# Patient Record
Sex: FEMALE | Race: WHITE | NOT HISPANIC OR LATINO | Employment: OTHER | URBAN - METROPOLITAN AREA
[De-identification: names, ages, dates, MRNs, and addresses within clinical notes are randomized per-mention and may not be internally consistent; named-entity substitution may affect disease eponyms.]

---

## 2017-01-05 ENCOUNTER — TELEPHONE (OUTPATIENT)
Dept: GYNECOLOGIC ONCOLOGY | Facility: CLINIC | Age: 37
End: 2017-01-05

## 2017-01-05 ENCOUNTER — HOSPITAL ENCOUNTER (INPATIENT)
Facility: HOSPITAL | Age: 37
LOS: 4 days | Discharge: HOME | DRG: 758 | End: 2017-01-09
Attending: OBSTETRICS & GYNECOLOGY | Admitting: OBSTETRICS & GYNECOLOGY
Payer: MEDICARE

## 2017-01-05 DIAGNOSIS — F41.9 ANXIETY: ICD-10-CM

## 2017-01-05 DIAGNOSIS — N73.9 PELVIC ABSCESS IN FEMALE: Primary | ICD-10-CM

## 2017-01-05 PROCEDURE — 25000003 PHARM REV CODE 250: Performed by: STUDENT IN AN ORGANIZED HEALTH CARE EDUCATION/TRAINING PROGRAM

## 2017-01-05 PROCEDURE — 20600001 HC STEP DOWN PRIVATE ROOM

## 2017-01-05 RX ORDER — HYDROMORPHONE HYDROCHLORIDE 1 MG/ML
0.5 INJECTION, SOLUTION INTRAMUSCULAR; INTRAVENOUS; SUBCUTANEOUS EVERY 6 HOURS PRN
Status: DISCONTINUED | OUTPATIENT
Start: 2017-01-06 | End: 2017-01-05

## 2017-01-05 RX ORDER — HYDROMORPHONE HYDROCHLORIDE 1 MG/ML
0.5 INJECTION, SOLUTION INTRAMUSCULAR; INTRAVENOUS; SUBCUTANEOUS EVERY 6 HOURS PRN
Status: DISCONTINUED | OUTPATIENT
Start: 2017-01-06 | End: 2017-01-09 | Stop reason: HOSPADM

## 2017-01-05 RX ORDER — OXYCODONE HYDROCHLORIDE 5 MG/1
5 TABLET ORAL EVERY 4 HOURS PRN
Status: DISCONTINUED | OUTPATIENT
Start: 2017-01-05 | End: 2017-01-09 | Stop reason: HOSPADM

## 2017-01-05 RX ORDER — SODIUM CHLORIDE 0.9 % (FLUSH) 0.9 %
3 SYRINGE (ML) INJECTION EVERY 8 HOURS
Status: DISCONTINUED | OUTPATIENT
Start: 2017-01-06 | End: 2017-01-09 | Stop reason: HOSPADM

## 2017-01-05 RX ORDER — SODIUM CHLORIDE 9 MG/ML
INJECTION, SOLUTION INTRAVENOUS CONTINUOUS
Status: DISCONTINUED | OUTPATIENT
Start: 2017-01-05 | End: 2017-01-09 | Stop reason: HOSPADM

## 2017-01-05 RX ORDER — OXYCODONE HYDROCHLORIDE 5 MG/1
10 TABLET ORAL EVERY 4 HOURS PRN
Status: DISCONTINUED | OUTPATIENT
Start: 2017-01-05 | End: 2017-01-09 | Stop reason: HOSPADM

## 2017-01-05 RX ORDER — ONDANSETRON 2 MG/ML
4 INJECTION INTRAMUSCULAR; INTRAVENOUS EVERY 12 HOURS PRN
Status: DISCONTINUED | OUTPATIENT
Start: 2017-01-05 | End: 2017-01-09 | Stop reason: HOSPADM

## 2017-01-05 RX ORDER — IBUPROFEN 600 MG/1
600 TABLET ORAL EVERY 6 HOURS PRN
Status: DISCONTINUED | OUTPATIENT
Start: 2017-01-05 | End: 2017-01-09 | Stop reason: HOSPADM

## 2017-01-05 RX ADMIN — HYDROMORPHONE HYDROCHLORIDE 0.5 MG: 1 INJECTION, SOLUTION INTRAMUSCULAR; INTRAVENOUS; SUBCUTANEOUS at 11:01

## 2017-01-05 RX ADMIN — SODIUM CHLORIDE: 0.9 INJECTION, SOLUTION INTRAVENOUS at 11:01

## 2017-01-05 NOTE — TELEPHONE ENCOUNTER
----- Message from Andrew Silveira MD sent at 1/5/2017  4:07 PM CST -----  Left message that pathology report was benign.

## 2017-01-05 NOTE — IP AVS SNAPSHOT
[facility]  [address]  [facility] [address]  Phone: [phone]           Patient Discharge Instructions     Our goal is to set you up for success. This packet includes information on your condition, medications, and your home care. It will help you to care for yourself so you don't get sicker and need to go back to the hospital.     Please ask your nurse if you have any questions.        There are many details to remember when preparing to leave the hospital. Here is what you will need to do:    1. Take your medicine. If you are prescribed medications, review your Medication List in the following pages. You may have new medications to  at the pharmacy and others that you'll need to stop taking. Review the instructions for how and when to take your medications. Talk with your doctor or nurses if you are unsure of what to do.     2. Go to your follow-up appointments. Specific follow-up information is listed in the following pages. Your may be contacted by a transition nurse or clinical provider about future appointments. Be sure we have all of the phone numbers to reach you, if needed. Please contact your provider's office if you are unable to make an appointment.     3. Watch for warning signs. Your doctor or nurse will give you detailed warning signs to watch for and when to call for assistance. These instructions may also include educational information about your condition. If you experience any of warning signs to your health, call your doctor.               [facility]  Unless otherwise directed by your provider, please contact [facility], our nurse care line that is available for 24/7 assistance.     [phone] (toll-free)    Registered nurses in the [facility] provide clinical advisement, health education, appointment booking, and other advisory services.                    ** Verify the list of medication(s) below is accurate and up  to date. Carry this with you in case of emergency. If your medications have changed, please notify your healthcare provider.             Medication List      START taking these medications        Additional Info                      doxycycline 100 MG capsule   Commonly known as:  MONODOX   Quantity:  14 capsule   Refills:  0   Dose:  100 mg    Instructions:  Take 1 capsule (100 mg total) by mouth every 12 (twelve) hours.     Begin Date    AM    Noon    PM    Bedtime       metronidazole 500 MG tablet   Commonly known as:  FLAGYL   Quantity:  14 tablet   Refills:  0   Dose:  500 mg    Instructions:  Take 1 tablet (500 mg total) by mouth every 8 (eight) hours.     Begin Date    AM    Noon    PM    Bedtime       oxycodone-acetaminophen 5-325 mg per tablet   Commonly known as:  PERCOCET   Quantity:  20 tablet   Refills:  0   Dose:  1 tablet    Instructions:  Take 1 tablet by mouth every 6 (six) hours as needed for Pain.     Begin Date    AM    Noon    PM    Bedtime         CONTINUE taking these medications        Additional Info                      alprazolam 1 MG tablet   Commonly known as:  XANAX   Quantity:  61 tablet   Refills:  0   Dose:  1 mg   Indications:  Anxiety    Last time this was given:  1 mg on 1/8/2017  9:32 AM   Instructions:  Take 1 tablet (1 mg total) by mouth 4 (four) times daily as needed for Anxiety.     Begin Date    AM    Noon    PM    Bedtime       CHOLESTYRAMINE LIGHT 4 gram Powd   Refills:  0   Generic drug:  cholestyramine-aspartame    Instructions:  TAKE 1 SCOOP by mouth three times a day     Begin Date    AM    Noon    PM    Bedtime       diphenoxylate-atropine 2.5-0.025 mg 2.5-0.025 mg per tablet   Commonly known as:  LOMOTIL   Quantity:  120 tablet   Refills:  4    Instructions:  take 1 to 2 tablets by mouth four times a day if needed for ILOSTOMY OUTPUT     Begin Date    AM    Noon    PM    Bedtime       estradiol 0.5 MG tablet   Commonly known as:  ESTRACE   Quantity:  30 tablet    Refills:  3   Dose:  0.5 mg    Instructions:  Take 1 tablet (0.5 mg total) by mouth once daily.     Begin Date    AM    Noon    PM    Bedtime       * HUMIRA PEN CROHN'S-UC-HS START Pnkt injection   Refills:  0   Generic drug:  adalimumab    Instructions:  INJECT THE CONTENTS OF 2 PENS SUBCUTANSOUSLY ON DAY 1, 2 PENS ON ...  (REFER TO PRESCRIPTION NOTES).     Begin Date    AM    Noon    PM    Bedtime       * HUMIRA 40 mg/0.8 mL injection   Refills:  0   Generic drug:  adalimumab      Begin Date    AM    Noon    PM    Bedtime       HYDROmorphone 2 MG tablet   Commonly known as:  DILAUDID   Quantity:  40 tablet   Refills:  0   Dose:  2 mg    Instructions:  Take 1 tablet (2 mg total) by mouth every 4 to 6 hours as needed.     Begin Date    AM    Noon    PM    Bedtime       pantoprazole 40 MG tablet   Commonly known as:  PROTONIX   Refills:  0   Dose:  40 mg    Instructions:  Take 40 mg by mouth 2 (two) times daily.     Begin Date    AM    Noon    PM    Bedtime       promethazine 25 MG tablet   Commonly known as:  PHENERGAN   Refills:  0   Dose:  25 mg    Instructions:  Take 25 mg by mouth every 4 (four) hours as needed.     Begin Date    AM    Noon    PM    Bedtime       sertraline 100 MG tablet   Commonly known as:  ZOLOFT   Refills:  0   Dose:  100 mg    Instructions:  Take 100 mg by mouth once daily.     Begin Date    AM    Noon    PM    Bedtime       topiramate 50 MG tablet   Commonly known as:  TOPAMAX   Refills:  0   Dose:  75 mg    Instructions:  Take 75 mg by mouth 2 (two) times daily.     Begin Date    AM    Noon    PM    Bedtime       * Notice:  This list has 2 medication(s) that are the same as other medications prescribed for you. Read the directions carefully, and ask your doctor or other care provider to review them with you.         Where to Get Your Medications      These medications were sent to [facility] [address], [place] - [address] [place] [address]  [address]     Phone:  [phone]     doxycycline 100 MG capsule    metronidazole 500 MG tablet         You can get these medications from any pharmacy     Bring a paper prescription for each of these medications     oxycodone-acetaminophen 5-325 mg per tablet                  Please bring to all follow up appointments:    1. A copy of your discharge instructions.  2. All medicines you are currently taking in their original bottles.  3. Identification and insurance card.    Please arrive 15 minutes ahead of scheduled appointment time.    Please call 24 hours in advance if you must reschedule your appointment and/or time.        Your Scheduled Appointments     Jan 17, 2017  2:15 PM CST   Post OP with Andrew Silveira MD   [facility] ([facility] )    [address]  [place] [id number] [phone]            Jan 20, 2017  2:00 PM CST   Cysto and Stent Removal with [facility]   [facility] ([facility])    [address] [facility] [id number] [phone]              Follow-up Information     Follow up with Andrew Silveira MD On 1/17/2017.    Specialty:  Gynecologic Oncology    Contact information:    [address] [facility] [address]  [phone]          Discharge Instructions     Future Orders    Activity as tolerated     Call MD for:  difficulty breathing or increased cough     Call MD for:  persistent dizziness, light-headedness, or visual disturbances     Call MD for:  persistent nausea and vomiting or diarrhea     Call MD for:  redness, tenderness, or signs of infection (pain, swelling, redness, odor or green/yellow discharge around incision site)     Call MD for:  severe persistent headache     Call MD for:  severe uncontrolled pain     Call MD for:  temperature >100.4     Diet general     Questions:    Total calories:      Fat restriction, if any:      Protein restriction, if any:      Na restriction, if any:      Fluid  "restriction:      Additional restrictions:          Primary Diagnosis     Your primary diagnosis was:  Pelvic Abscess      Admission Information     Date & Time Provider Department CSN    1/5/2017 11:08 PM Andrew Silveira MD [facility] [id number]      Care Providers     Provider Role Specialty Primary office phone    Andrew Silveira MD Attending Provider Gynecologic Oncology [phone]    Rhonda Surgeon Surgeon  -- Number not on file      Your Vitals Were     BP Pulse Temp Resp Height Weight    115/64 (BP [facility]: Left arm, Patient Position: Lying, BP Method: Automatic) 69 97.7 °F (36.5 °C) (Oral) 16 5' 6" (1.676 m) 86.9 kg (191 lb 8 oz)    Last Period SpO2 BMI          12/12/2016 93% 30.91 kg/m2        Recent Lab Values        3/1/2014                          10:26 AM           A1C 5.1                       Pending Labs     Order Current Status    Culture, Anaerobe In process    Aerobic culture Preliminary result      Allergies as of 1/9/2017        Reactions    Imuran [Azathioprine Sodium] Diarrhea, Nausea And Vomiting    [place]       Advance Directives     An advance directive is a document which, in the event you are no longer able to make decisions for yourself, tells your healthcare team what kind of treatment you do or do not want to receive, or who you would like to make those decisions for you.  If you do not currently have an advance directive, [facility] encourages you to create one.  For more information call:  [phone]), [phone] ([phone]),  or log on to [email or web address].        Language Assistance Services     ATTENTION: Language assistance services are available, free of charge. Please call [phone].      ATENCIÓN: Si habla español, tiene a raphael disposición servicios gratuitos de asistencia lingüística. Llame al [phone].     CHÚ Ý: N?u b?n nói Ti?ng Vi?t, có các d?ch v? h? tr? ngôn ng? mi?n phí dành cho b?n. G?i s? [phone].      "   [facility] Sign-Up     Activating your [facility] account is as easy as 1-2-3!     1) Visit [email or web address], select Sign Up Now, enter this activation code and your date of birth, then select Next.  [email or web address]  Expires: 2/23/2017 11:50 AM      2) Create a username and password to use when you visit [facility] in the future and select a security question in case you lose your password and select Next.    3) Enter your e-mail address and click Sign Up!    Additional Information  If you have questions, please e-mail [email or web address] or call [phone] to talk to our [facility] staff. Remember, [facility] is NOT to be used for urgent needs. For medical emergencies, dial 911.          [facility] complies with applicable Federal civil rights laws and does not discriminate on the basis of race, color, national origin, age, disability, or sex.

## 2017-01-05 NOTE — IP AVS SNAPSHOT
[address]  [place] [address]  Phone: [phone]           I have received a copy of my After Visit Summary and discharge instructions from [facility].    INSTRUCTIONS RECEIVED AND UNDERSTOOD BY:                     Patient/Patient Representative: ________________________________________________________________     Date/Time: ________________________________________________________________                     Instructions Given By: ________________________________________________________________     Date/Time: ________________________________________________________________

## 2017-01-06 ENCOUNTER — SURGERY (OUTPATIENT)
Age: 37
End: 2017-01-06

## 2017-01-06 DIAGNOSIS — N73.9 PELVIC ABSCESS IN FEMALE: Primary | ICD-10-CM

## 2017-01-06 LAB
ALBUMIN SERPL BCP-MCNC: 2.3 G/DL
ALBUMIN SERPL BCP-MCNC: 2.3 G/DL
ALP SERPL-CCNC: 101 U/L
ALP SERPL-CCNC: 116 U/L
ALT SERPL W/O P-5'-P-CCNC: 7 U/L
ALT SERPL W/O P-5'-P-CCNC: 7 U/L
ANION GAP SERPL CALC-SCNC: 9 MMOL/L
ANION GAP SERPL CALC-SCNC: 9 MMOL/L
APTT BLDCRRT: <21 SEC
AST SERPL-CCNC: 10 U/L
AST SERPL-CCNC: 10 U/L
BASOPHILS # BLD AUTO: 0.02 K/UL
BASOPHILS # BLD AUTO: 0.02 K/UL
BASOPHILS NFR BLD: 0.2 %
BASOPHILS NFR BLD: 0.2 %
BILIRUB SERPL-MCNC: 1 MG/DL
BILIRUB SERPL-MCNC: 1 MG/DL
BUN SERPL-MCNC: 15 MG/DL
BUN SERPL-MCNC: 17 MG/DL
CALCIUM SERPL-MCNC: 8.2 MG/DL
CALCIUM SERPL-MCNC: 8.4 MG/DL
CHLORIDE SERPL-SCNC: 101 MMOL/L
CHLORIDE SERPL-SCNC: 102 MMOL/L
CO2 SERPL-SCNC: 18 MMOL/L
CO2 SERPL-SCNC: 18 MMOL/L
CREAT SERPL-MCNC: 1.1 MG/DL
CREAT SERPL-MCNC: 1.1 MG/DL
DIFFERENTIAL METHOD: ABNORMAL
DIFFERENTIAL METHOD: ABNORMAL
EOSINOPHIL # BLD AUTO: 0.1 K/UL
EOSINOPHIL # BLD AUTO: 0.1 K/UL
EOSINOPHIL NFR BLD: 0.9 %
EOSINOPHIL NFR BLD: 1 %
ERYTHROCYTE [DISTWIDTH] IN BLOOD BY AUTOMATED COUNT: 14.1 %
ERYTHROCYTE [DISTWIDTH] IN BLOOD BY AUTOMATED COUNT: 14.4 %
EST. GFR  (AFRICAN AMERICAN): >60 ML/MIN/1.73 M^2
EST. GFR  (AFRICAN AMERICAN): >60 ML/MIN/1.73 M^2
EST. GFR  (NON AFRICAN AMERICAN): >60 ML/MIN/1.73 M^2
EST. GFR  (NON AFRICAN AMERICAN): >60 ML/MIN/1.73 M^2
GLUCOSE SERPL-MCNC: 89 MG/DL
GLUCOSE SERPL-MCNC: 89 MG/DL
HCT VFR BLD AUTO: 29.3 %
HCT VFR BLD AUTO: 29.9 %
HGB BLD-MCNC: 9.4 G/DL
HGB BLD-MCNC: 9.6 G/DL
INR PPP: 1.1
LYMPHOCYTES # BLD AUTO: 1.4 K/UL
LYMPHOCYTES # BLD AUTO: 1.5 K/UL
LYMPHOCYTES NFR BLD: 11.8 %
LYMPHOCYTES NFR BLD: 12.9 %
MCH RBC QN AUTO: 27 PG
MCH RBC QN AUTO: 27.3 PG
MCHC RBC AUTO-ENTMCNC: 32.1 %
MCHC RBC AUTO-ENTMCNC: 32.1 %
MCV RBC AUTO: 84 FL
MCV RBC AUTO: 85 FL
MONOCYTES # BLD AUTO: 1 K/UL
MONOCYTES # BLD AUTO: 1 K/UL
MONOCYTES NFR BLD: 8.5 %
MONOCYTES NFR BLD: 8.8 %
NEUTROPHILS # BLD AUTO: 9.1 K/UL
NEUTROPHILS # BLD AUTO: 9.1 K/UL
NEUTROPHILS NFR BLD: 76.9 %
NEUTROPHILS NFR BLD: 78.2 %
PLATELET # BLD AUTO: 314 K/UL
PLATELET # BLD AUTO: 333 K/UL
PMV BLD AUTO: 9.1 FL
PMV BLD AUTO: 9.4 FL
POTASSIUM SERPL-SCNC: 4.1 MMOL/L
POTASSIUM SERPL-SCNC: 4.1 MMOL/L
PROT SERPL-MCNC: 6.3 G/DL
PROT SERPL-MCNC: 6.3 G/DL
PROTHROMBIN TIME: 11.2 SEC
RBC # BLD AUTO: 3.48 M/UL
RBC # BLD AUTO: 3.52 M/UL
SODIUM SERPL-SCNC: 128 MMOL/L
SODIUM SERPL-SCNC: 129 MMOL/L
WBC # BLD AUTO: 11.61 K/UL
WBC # BLD AUTO: 11.76 K/UL

## 2017-01-06 PROCEDURE — 80053 COMPREHEN METABOLIC PANEL: CPT

## 2017-01-06 PROCEDURE — 25000003 PHARM REV CODE 250: Performed by: STUDENT IN AN ORGANIZED HEALTH CARE EDUCATION/TRAINING PROGRAM

## 2017-01-06 PROCEDURE — 36415 COLL VENOUS BLD VENIPUNCTURE: CPT

## 2017-01-06 PROCEDURE — 71000033 HC RECOVERY, INTIAL HOUR

## 2017-01-06 PROCEDURE — 20600001 HC STEP DOWN PRIVATE ROOM

## 2017-01-06 PROCEDURE — 80053 COMPREHEN METABOLIC PANEL: CPT | Mod: 91

## 2017-01-06 PROCEDURE — 85025 COMPLETE CBC W/AUTO DIFF WBC: CPT | Mod: 91

## 2017-01-06 PROCEDURE — 85610 PROTHROMBIN TIME: CPT

## 2017-01-06 PROCEDURE — 85730 THROMBOPLASTIN TIME PARTIAL: CPT

## 2017-01-06 RX ORDER — METRONIDAZOLE 500 MG/100ML
500 INJECTION, SOLUTION INTRAVENOUS
Status: DISCONTINUED | OUTPATIENT
Start: 2017-01-06 | End: 2017-01-09 | Stop reason: HOSPADM

## 2017-01-06 RX ORDER — ALPRAZOLAM 1 MG/1
1 TABLET ORAL 2 TIMES DAILY PRN
Status: DISCONTINUED | OUTPATIENT
Start: 2017-01-06 | End: 2017-01-09 | Stop reason: HOSPADM

## 2017-01-06 RX ADMIN — SODIUM CHLORIDE: 0.9 INJECTION, SOLUTION INTRAVENOUS at 07:01

## 2017-01-06 RX ADMIN — OXYCODONE HYDROCHLORIDE 10 MG: 5 TABLET ORAL at 07:01

## 2017-01-06 RX ADMIN — HYDROMORPHONE HYDROCHLORIDE 0.5 MG: 1 INJECTION, SOLUTION INTRAMUSCULAR; INTRAVENOUS; SUBCUTANEOUS at 05:01

## 2017-01-06 RX ADMIN — Medication 3 ML: at 02:01

## 2017-01-06 RX ADMIN — METRONIDAZOLE 500 MG: 500 SOLUTION INTRAVENOUS at 04:01

## 2017-01-06 RX ADMIN — OXYCODONE HYDROCHLORIDE 10 MG: 5 TABLET ORAL at 02:01

## 2017-01-06 RX ADMIN — OXYCODONE HYDROCHLORIDE 10 MG: 5 TABLET ORAL at 08:01

## 2017-01-06 RX ADMIN — HYDROMORPHONE HYDROCHLORIDE 0.5 MG: 1 INJECTION, SOLUTION INTRAMUSCULAR; INTRAVENOUS; SUBCUTANEOUS at 11:01

## 2017-01-06 RX ADMIN — PIPERACILLIN SODIUM AND TAZOBACTAM SODIUM 4.5 G: 4; .5 INJECTION, POWDER, LYOPHILIZED, FOR SOLUTION INTRAVENOUS at 07:01

## 2017-01-06 RX ADMIN — PIPERACILLIN SODIUM AND TAZOBACTAM SODIUM 4.5 G: 4; .5 INJECTION, POWDER, LYOPHILIZED, FOR SOLUTION INTRAVENOUS at 12:01

## 2017-01-06 RX ADMIN — METRONIDAZOLE 500 MG: 500 SOLUTION INTRAVENOUS at 12:01

## 2017-01-06 RX ADMIN — SODIUM CHLORIDE: 0.9 INJECTION, SOLUTION INTRAVENOUS at 10:01

## 2017-01-06 RX ADMIN — METRONIDAZOLE 500 MG: 500 SOLUTION INTRAVENOUS at 07:01

## 2017-01-06 RX ADMIN — PIPERACILLIN SODIUM AND TAZOBACTAM SODIUM 4.5 G: 4; .5 INJECTION, POWDER, LYOPHILIZED, FOR SOLUTION INTRAVENOUS at 04:01

## 2017-01-06 NOTE — PLAN OF CARE
Problem: Patient Care Overview  Goal: Plan of Care Review  Outcome: Ongoing (interventions implemented as appropriate)  Pt remains free from falls and injury. Afebrile, IV Flagyl and Zosyn administered. NPO as ordered. IV fluids continued. Complained of pain to abdomen and lower back. Oxycodone and Morphine administered with relief. Pt denies nausea this shift. No further complaints. SCDs in place, pt refused TEDS. VSS, no acute events, will continue to monitor.

## 2017-01-06 NOTE — CONSULTS
Radiology Consult    Candida Cardona is a 36 y.o. female POD#18 s/p ERIK/BSO/left ureteral stent placement 2/2 bilateral ovarian masses found to be benign who presents with fever, pelvic pain, fatigue and nausea/vomiting.     IR consulted for pelvic abscess drain. Outside CT reviewed with staff.    Past Medical History   Diagnosis Date    Anemia     Constipation     Crohn's disease     GERD (gastroesophageal reflux disease)     Interstitial cystitis     Kidney stones     Mass, ovarian 11/22/2016    Osteoporosis     S/P ERIK-BSO (total abdominal hysterectomy and bilateral salpingo-oophorectomy) 12/19/2016     Past Surgical History   Procedure Laterality Date    Portacath placement      Colon surgery       x 2    Colonoscopy       multiple    Total colectomy  2014    Ileostomy         Discussed with primary team, Dr. Raul Hammer.    Imaging reviewed with Radiology staff, Dr. Kareem Summers.     Procedure: Pelvic abscess drain.    Scheduled Meds:    metronidazole  500 mg Intravenous Q8H    piperacillin-tazobactam 4.5 g in dextrose 5 % 100 mL IVPB (ready to mix system)  4.5 g Intravenous Q8H    sodium chloride 0.9%  3 mL Intravenous Q8H     Continuous Infusions:    sodium chloride 0.9% 125 mL/hr at 01/05/17 2350     PRN Meds:HYDROmorphone, ibuprofen, ondansetron, oxycodone, oxycodone, promethazine (PHENERGAN) IVPB    Allergies:   Review of patient's allergies indicates:   Allergen Reactions    Imuran [azathioprine sodium] Diarrhea and Nausea And Vomiting    [place]        Labs:    Recent Labs  Lab 01/06/17  0033   INR 1.1       Recent Labs  Lab 01/06/17  0511   WBC 11.61   HGB 9.6*   HCT 29.9*   MCV 85         Recent Labs  Lab 01/06/17  0511   GLU 89   *   K 4.1      CO2 18*   BUN 15   CREATININE 1.1   CALCIUM 8.4*   ALT 7*   AST 10   ALBUMIN 2.3*   BILITOT 1.0         Vitals (Most Recent):  Temp: 98.3 °F (36.8 °C) (01/06/17 0751)  Pulse: 81 (01/06/17 0751)  Resp: 19  (01/06/17 0751)  BP: (!) 94/55 (01/06/17 0751)  SpO2: 97 % (01/06/17 0751)    Plan:   Pelvic abscess amenable for drainage with tube placement with IR.  IR order for abscess drainage and tube placement can be placed.    ZHANG CISNEROS  PGY-II Radiology Resident  [address] [facility]  Pager: [phone]

## 2017-01-06 NOTE — PROGRESS NOTES
"Pt arrived to  for abscess drain.  Pt is apprehensive and expresses concern over procedure.  Pt states she requires anesthesia and wishes to "not feel anything".  Dr. Summers speaking to patient, patient begins to cry appearing very anxious.    Procedure on hold for now.    "

## 2017-01-06 NOTE — PROGRESS NOTES
TISSUE NOTE:   Healing midline abdominal incision noted with old steri strips in place. RUQ ostomy noted, stoma beefy red    FALLS:  Patient at minimal risk for falls at this time. No complaints of dizziness, SOB or weakness at this time. Not connected to any monitoring equipment. Currently is not taking drugs for HTN.  Patient instructed to call for help when getting out of bed or needing assistance. Non-skid socks in place. Lighting adjusted for safety. Will continue to monitor.

## 2017-01-06 NOTE — H&P
See consult note 1/6/17    Addendum    ASA 3  Mallampati 2    Plan: pelvic abscess drainage with MAC with anesthesia

## 2017-01-06 NOTE — H&P
[facility]  History & Physical  Gynecological Oncology      SUBJECTIVE:     Chief Complaint/Reason for Admission: Possible pelvic abscess    History of Present Illness:  Candida Cardona is a 36 y.o. POD#18 s/p ERIK/BSO/left ureteral stent placement 2/2 bilateral ovarian masses found to be benign who presents with fever, pelvic pain, fatigue and nausea/vomiting.  She reports that two days ago, she began having flu like symptoms.  She reports a recorded fever of 101.  At that time, she reports decreased appetite and increasing nausea/vomiting to the point that she was unable to tolerate food or liquids.  She also began having dull abdominal/pelvic pain at that time.  Tylenol helped her symptoms a little at first.  Physical activity seemed to make it worse.  Because of these symptoms, she went to the ER at which time a CT scan showed a pelvic fluid collection concerning for an abscess.  She was then transferred to [facility].      PTA Medications   Medication Sig    alprazolam (XANAX) 1 MG tablet Take 1 tablet (1 mg total) by mouth 4 (four) times daily as needed for Anxiety.    CHOLESTYRAMINE LIGHT 4 gram Powd TAKE 1 SCOOP by mouth three times a day    diphenoxylate-atropine 2.5-0.025 mg (LOMOTIL) 2.5-0.025 mg per tablet take 1 to 2 tablets by mouth four times a day if needed for ILOSTOMY OUTPUT    estradiol (ESTRACE) 0.5 MG tablet Take 1 tablet (0.5 mg total) by mouth once daily.    HUMIRA 40 mg/0.8 mL injection     HUMIRA PEN CROHN'S-UC-HS START PnKt injection INJECT THE CONTENTS OF 2 PENS SUBCUTANSOUSLY ON DAY 1, 2 PENS ON ...  (REFER TO PRESCRIPTION NOTES).    HYDROmorphone (DILAUDID) 2 MG tablet Take 1 tablet (2 mg total) by mouth every 4 to 6 hours as needed.    pantoprazole (PROTONIX) 40 MG tablet Take 40 mg by mouth 2 (two) times daily.    promethazine (PHENERGAN) 25 MG tablet Take 25 mg by mouth every 4 (four) hours as needed.    sertraline (ZOLOFT) 100 MG tablet Take 100 mg by  mouth once daily.    topiramate (TOPAMAX) 50 MG tablet Take 75 mg by mouth 2 (two) times daily.       Review of patient's allergies indicates:   Allergen Reactions    Imuran [azathioprine sodium] Diarrhea and Nausea And Vomiting    [place]        Past Medical History   Diagnosis Date    Anemia     Constipation     Crohn's disease     GERD (gastroesophageal reflux disease)     Interstitial cystitis     Kidney stones     Mass, ovarian 11/22/2016    Osteoporosis     S/P ERIK-BSO (total abdominal hysterectomy and bilateral salpingo-oophorectomy) 12/19/2016     Past Surgical History   Procedure Laterality Date    Portacath placement      Colon surgery       x 2    Colonoscopy       multiple    Total colectomy  2014    Ileostomy       OB History     No data available        Family History   Problem Relation Age of Onset    Hypertension Mother     Joint hypermobility Mother     Cancer Father      brain    Stroke Paternal Grandmother     Ovarian cancer Neg Hx     Uterine cancer Neg Hx     Breast cancer Neg Hx     Colon cancer Neg Hx      Social History   Substance Use Topics    Smoking status: Never Smoker    Smokeless tobacco: Never Used    Alcohol use No       Review of Systems:  Constitutional: pain well controlled  Respiratory: no cough or shortness of breath  Cardiovascular: no chest pain or palpitations  Genitourinary: no hematuria, Positive for dysuria     OBJECTIVE:     Vital Signs (Most Recent):  Temp: 98.4 °F (36.9 °C) (01/06/17 0341)  Pulse: 83 (01/06/17 0341)  Resp: 16 (01/06/17 0341)  BP: (!) 102/56 (01/06/17 0341)  SpO2: (!) 93 % (01/06/17 0341)  Body mass index is 30.91 kg/(m^2).    Physical Exam:  General: well developed, well nourished, no distress  Lungs:  clear to auscultation bilaterally and normal respiratory effort  Heart: regular rate and rhythm, S1, S2 normal, no murmur, click, rub or gallop  Abdomen: soft, non tender, non distended colostomy bag in place, midline incision  healing well  Wound/Incision: clean, dry, intact  Pelvic:  Exam deferred.    Laboratory:  Recent Results (from the past 12 hour(s))   Comprehensive metabolic panel    Collection Time: 01/06/17 12:33 AM   Result Value Ref Range    Sodium 128 (L) 136 - 145 mmol/L    Potassium 4.1 3.5 - 5.1 mmol/L    Chloride 101 95 - 110 mmol/L    CO2 18 (L) 23 - 29 mmol/L    Glucose 89 70 - 110 mg/dL    BUN, Bld 17 6 - 20 mg/dL    Creatinine 1.1 0.5 - 1.4 mg/dL    Calcium 8.2 (L) 8.7 - 10.5 mg/dL    Total Protein 6.3 6.0 - 8.4 g/dL    Albumin 2.3 (L) 3.5 - 5.2 g/dL    Total Bilirubin 1.0 0.1 - 1.0 mg/dL    Alkaline Phosphatase 101 55 - 135 U/L    AST 10 10 - 40 U/L    ALT 7 (L) 10 - 44 U/L    Anion Gap 9 8 - 16 mmol/L    eGFR if African American >60.0 >60 mL/min/1.73 m^2    eGFR if non African American >60.0 >60 mL/min/1.73 m^2   CBC auto differential    Collection Time: 01/06/17 12:33 AM   Result Value Ref Range    WBC 11.76 3.90 - 12.70 K/uL    RBC 3.48 (L) 4.00 - 5.40 M/uL    Hemoglobin 9.4 (L) 12.0 - 16.0 g/dL    Hematocrit 29.3 (L) 37.0 - 48.5 %    MCV 84 82 - 98 fL    MCH 27.0 27.0 - 31.0 pg    MCHC 32.1 32.0 - 36.0 %    RDW 14.1 11.5 - 14.5 %    Platelets 314 150 - 350 K/uL    MPV 9.1 (L) 9.2 - 12.9 fL    Gran # 9.1 (H) 1.8 - 7.7 K/uL    Lymph # 1.5 1.0 - 4.8 K/uL    Mono # 1.0 0.3 - 1.0 K/uL    Eos # 0.1 0.0 - 0.5 K/uL    Baso # 0.02 0.00 - 0.20 K/uL    Gran% 76.9 (H) 38.0 - 73.0 %    Lymph% 12.9 (L) 18.0 - 48.0 %    Mono% 8.8 4.0 - 15.0 %    Eosinophil% 0.9 0.0 - 8.0 %    Basophil% 0.2 0.0 - 1.9 %    Differential Method Automated    Protime-INR    Collection Time: 01/06/17 12:33 AM   Result Value Ref Range    Prothrombin Time 11.2 9.0 - 12.5 sec    INR 1.1 0.8 - 1.2   APTT    Collection Time: 01/06/17 12:33 AM   Result Value Ref Range    aPTT <21.0 21.0 - 32.0 sec           ASSESSMENT/PLAN:     Active Hospital Problems    Diagnosis  POA    Pelvic abscess in female [N73.2]  Yes      Resolved Hospital Problems    Diagnosis  Date Resolved POA   No resolved problems to display.       1. Pelvic abscess  -VSSAF.  No leukocytosis  -Currently receiving zosyn/flagyl  -Will consult IR for possible drainage  -NPO  -IV fluids  -Percocet and dilaudid for pain control    2. Crohn's disease  -No acute issues    Raul Alaniz M.D.  PGY-2 OBGYN  [phone]

## 2017-01-06 NOTE — PLAN OF CARE
Problem: Patient Care Overview  Goal: Plan of Care Review  Outcome: Ongoing (interventions implemented as appropriate)  Pt has remained free from injury this shift. Bed in low locked position. Call light and personal belongings within reach. Side rails up x2. Nonskid socks in place. Pt ambulates in room independently. Pt c/o constant pain; PRN meds given as needed. Pt is anxious; calming techniques and reassurance provided. IR unable to do abscess drainage, so pt will be brought to OR this evening for procedure with anesthesia. IVFs continued; pt remains NPO. All questions and concerns addressed at this time. Will continue to monitor.

## 2017-01-06 NOTE — PROGRESS NOTES
Pt complaining of sever back and abdominal pain. Offered pt Oxycodone 10 mg, pt refused. Notified Dr. Corbin MD placed telephone order with read back for Dilaudid 0.5 mg IV q6h PRN for severe pain. Will continue to monitor.

## 2017-01-07 ENCOUNTER — ANESTHESIA (OUTPATIENT)
Dept: ENDOSCOPY | Facility: HOSPITAL | Age: 37
DRG: 758 | End: 2017-01-07
Payer: MEDICARE

## 2017-01-07 ENCOUNTER — SURGERY (OUTPATIENT)
Age: 37
End: 2017-01-07

## 2017-01-07 ENCOUNTER — ANESTHESIA EVENT (OUTPATIENT)
Dept: ENDOSCOPY | Facility: HOSPITAL | Age: 37
DRG: 758 | End: 2017-01-07
Payer: MEDICARE

## 2017-01-07 LAB
GRAM STN SPEC: NORMAL
GRAM STN SPEC: NORMAL

## 2017-01-07 PROCEDURE — D9220A PRA ANESTHESIA: Mod: CRNA,,, | Performed by: NURSE ANESTHETIST, CERTIFIED REGISTERED

## 2017-01-07 PROCEDURE — 25000003 PHARM REV CODE 250: Performed by: NURSE ANESTHETIST, CERTIFIED REGISTERED

## 2017-01-07 PROCEDURE — 87205 SMEAR GRAM STAIN: CPT

## 2017-01-07 PROCEDURE — D9220A PRA ANESTHESIA: Mod: ANES,,, | Performed by: ANESTHESIOLOGY

## 2017-01-07 PROCEDURE — 87070 CULTURE OTHR SPECIMN AEROBIC: CPT

## 2017-01-07 PROCEDURE — 63600175 PHARM REV CODE 636 W HCPCS: Performed by: NURSE ANESTHETIST, CERTIFIED REGISTERED

## 2017-01-07 PROCEDURE — 63600175 PHARM REV CODE 636 W HCPCS: Performed by: STUDENT IN AN ORGANIZED HEALTH CARE EDUCATION/TRAINING PROGRAM

## 2017-01-07 PROCEDURE — 99233 SBSQ HOSP IP/OBS HIGH 50: CPT | Mod: ,,, | Performed by: OBSTETRICS & GYNECOLOGY

## 2017-01-07 PROCEDURE — 37000009 HC ANESTHESIA EA ADD 15 MINS

## 2017-01-07 PROCEDURE — 71000033 HC RECOVERY, INTIAL HOUR

## 2017-01-07 PROCEDURE — 0W9J30Z DRAINAGE OF PELVIC CAVITY WITH DRAINAGE DEVICE, PERCUTANEOUS APPROACH: ICD-10-PCS | Performed by: RADIOLOGY

## 2017-01-07 PROCEDURE — 37000008 HC ANESTHESIA 1ST 15 MINUTES

## 2017-01-07 PROCEDURE — 87075 CULTR BACTERIA EXCEPT BLOOD: CPT

## 2017-01-07 PROCEDURE — 20600001 HC STEP DOWN PRIVATE ROOM

## 2017-01-07 PROCEDURE — 25000003 PHARM REV CODE 250: Performed by: STUDENT IN AN ORGANIZED HEALTH CARE EDUCATION/TRAINING PROGRAM

## 2017-01-07 RX ORDER — PROPOFOL 10 MG/ML
VIAL (ML) INTRAVENOUS CONTINUOUS PRN
Status: DISCONTINUED | OUTPATIENT
Start: 2017-01-07 | End: 2017-01-07

## 2017-01-07 RX ORDER — LIDOCAINE HYDROCHLORIDE 20 MG/ML
JELLY TOPICAL ONCE
Status: DISCONTINUED | OUTPATIENT
Start: 2017-01-07 | End: 2017-03-08

## 2017-01-07 RX ORDER — FENTANYL CITRATE 50 UG/ML
INJECTION, SOLUTION INTRAMUSCULAR; INTRAVENOUS
Status: DISCONTINUED | OUTPATIENT
Start: 2017-01-07 | End: 2017-01-07

## 2017-01-07 RX ORDER — CIPROFLOXACIN 500 MG/1
500 TABLET ORAL ONCE
Status: DISCONTINUED | OUTPATIENT
Start: 2017-01-07 | End: 2017-03-08

## 2017-01-07 RX ORDER — MIDAZOLAM HYDROCHLORIDE 1 MG/ML
INJECTION, SOLUTION INTRAMUSCULAR; INTRAVENOUS
Status: DISCONTINUED | OUTPATIENT
Start: 2017-01-07 | End: 2017-01-07

## 2017-01-07 RX ORDER — PROPOFOL 10 MG/ML
VIAL (ML) INTRAVENOUS
Status: DISCONTINUED | OUTPATIENT
Start: 2017-01-07 | End: 2017-01-07

## 2017-01-07 RX ORDER — LIDOCAINE HCL/PF 100 MG/5ML
SYRINGE (ML) INTRAVENOUS
Status: DISCONTINUED | OUTPATIENT
Start: 2017-01-07 | End: 2017-01-07

## 2017-01-07 RX ADMIN — HYDROMORPHONE HYDROCHLORIDE 0.5 MG: 1 INJECTION, SOLUTION INTRAMUSCULAR; INTRAVENOUS; SUBCUTANEOUS at 11:01

## 2017-01-07 RX ADMIN — PIPERACILLIN SODIUM AND TAZOBACTAM SODIUM 4.5 G: 4; .5 INJECTION, POWDER, LYOPHILIZED, FOR SOLUTION INTRAVENOUS at 12:01

## 2017-01-07 RX ADMIN — HYDROMORPHONE HYDROCHLORIDE 0.5 MG: 1 INJECTION, SOLUTION INTRAMUSCULAR; INTRAVENOUS; SUBCUTANEOUS at 12:01

## 2017-01-07 RX ADMIN — OXYCODONE HYDROCHLORIDE 10 MG: 5 TABLET ORAL at 01:01

## 2017-01-07 RX ADMIN — MIDAZOLAM HYDROCHLORIDE 2 MG: 1 INJECTION, SOLUTION INTRAMUSCULAR; INTRAVENOUS at 03:01

## 2017-01-07 RX ADMIN — SODIUM CHLORIDE, SODIUM ACETATE ANHYDROUS, SODIUM GLUCONATE, POTASSIUM CHLORIDE, AND MAGNESIUM CHLORIDE: 526; 222; 502; 37; 30 INJECTION, SOLUTION INTRAVENOUS at 03:01

## 2017-01-07 RX ADMIN — Medication 3 ML: at 02:01

## 2017-01-07 RX ADMIN — PIPERACILLIN SODIUM AND TAZOBACTAM SODIUM 4.5 G: 4; .5 INJECTION, POWDER, LYOPHILIZED, FOR SOLUTION INTRAVENOUS at 08:01

## 2017-01-07 RX ADMIN — PIPERACILLIN SODIUM AND TAZOBACTAM SODIUM 4.5 G: 4; .5 INJECTION, POWDER, LYOPHILIZED, FOR SOLUTION INTRAVENOUS at 06:01

## 2017-01-07 RX ADMIN — METRONIDAZOLE 500 MG: 500 SOLUTION INTRAVENOUS at 08:01

## 2017-01-07 RX ADMIN — METRONIDAZOLE 500 MG: 500 SOLUTION INTRAVENOUS at 06:01

## 2017-01-07 RX ADMIN — PROPOFOL 175 MCG/KG/MIN: 10 INJECTION, EMULSION INTRAVENOUS at 03:01

## 2017-01-07 RX ADMIN — OXYCODONE HYDROCHLORIDE 10 MG: 5 TABLET ORAL at 04:01

## 2017-01-07 RX ADMIN — OXYCODONE HYDROCHLORIDE 10 MG: 5 TABLET ORAL at 09:01

## 2017-01-07 RX ADMIN — PROPOFOL 30 MG: 10 INJECTION, EMULSION INTRAVENOUS at 03:01

## 2017-01-07 RX ADMIN — ONDANSETRON 4 MG: 2 INJECTION INTRAMUSCULAR; INTRAVENOUS at 04:01

## 2017-01-07 RX ADMIN — HYDROMORPHONE HYDROCHLORIDE 0.5 MG: 1 INJECTION, SOLUTION INTRAMUSCULAR; INTRAVENOUS; SUBCUTANEOUS at 07:01

## 2017-01-07 RX ADMIN — HYDROMORPHONE HYDROCHLORIDE 0.5 MG: 1 INJECTION, SOLUTION INTRAMUSCULAR; INTRAVENOUS; SUBCUTANEOUS at 06:01

## 2017-01-07 RX ADMIN — SODIUM CHLORIDE: 0.9 INJECTION, SOLUTION INTRAVENOUS at 04:01

## 2017-01-07 RX ADMIN — FENTANYL CITRATE 50 MCG: 50 INJECTION, SOLUTION INTRAMUSCULAR; INTRAVENOUS at 04:01

## 2017-01-07 RX ADMIN — LIDOCAINE HYDROCHLORIDE 50 MG: 20 INJECTION, SOLUTION INTRAVENOUS at 03:01

## 2017-01-07 RX ADMIN — METRONIDAZOLE 500 MG: 500 SOLUTION INTRAVENOUS at 12:01

## 2017-01-07 NOTE — TRANSFER OF CARE
"Anesthesia Transfer of Care Note    Patient: Candida Cardona    Procedure(s) Performed: Procedure(s) (LRB):  [id number] (N/A)    Patient location: PACU    Anesthesia Type: general    Transport from OR: Transported from OR on 6-10 L/min O2 by face mask with adequate spontaneous ventilation    Post pain: adequate analgesia    Post assessment: no apparent anesthetic complications    Post vital signs: stable    Level of consciousness: awake, alert and oriented    Nausea/Vomiting: no nausea/vomiting    Complications: none          Last vitals:   Visit Vitals    BP (!) 99/58    Pulse 84    Temp 36.9 °C (98.5 °F) (Axillary)    Resp 18    Ht 5' 6" (1.676 m)    Wt 86.9 kg (191 lb 8 oz)    LMP 12/12/2016    SpO2 95%    Breastfeeding No    BMI 30.91 kg/m2     "

## 2017-01-07 NOTE — PLAN OF CARE
Problem: Patient Care Overview  Goal: Plan of Care Review  Outcome: Ongoing (interventions implemented as appropriate)  Pt remains free from falls and injury. Afebrile. NPO for abscess drainage. Flagyl and Zosyn continued. Pt complains of pelvic pain. Oxy and Dilaudid given with relief. One episode of nausea noted. Zofran administered with relief. Old midline incision noted with steri strips still in place. RUQ ileostomy in place, stoma beefy red with good output. Pt with no further complaints. VSS, no acute events. Will continue to monitor.

## 2017-01-07 NOTE — PROGRESS NOTES
Pt resting quietly, denies c/o pain ,VSS per monitor, RLQ drain with dressing intact, draining clear tan fluid to bulb suction, report called to Esther ROLLINS, pt made ready for transport to [facility] via stretcher per RN,stable at present.

## 2017-01-07 NOTE — ANESTHESIA PREPROCEDURE EVALUATION
Pre-operative evaluation for Procedure(s) (LRB):  [id number] (N/A)    Candida Cardona is a 36 y.o. female POD#19 s/p ERIK/BSO/Left ureteral sent placement 2/2 bilateral ovarian masses admitted 2/2 pelvic abscess. Pt was set to have IR drainage of abscess yesterday. However, pt unable to tolerate procedure without anesthesia. Plan for above procedure today.     LDA:   Port-a cath single lumen R subclavian  22G PIV    Prev airway:   Present Prior to Hospital Arrival?: No; Placement Date: 12/19/16; Placement Time: 0828; Method of Intubation: Direct laryngoscopy; Inserted by: CRNA; Airway Device: Endotracheal Tube; Mask Ventilation: Easy; Intubated: Postinduction; Blade: Arango #2; Airway Device Size: 7.0; Style: Cuffed; Cuff Inflation: Minimal occlusive pressure; Placement Verified By: Auscultation, Capnometry, ETT Condensation; Grade: Grade I; Complicating Factors: None; Intubation Findings: Positive EtCO2, Atraumatic/Condition of teeth unchanged, Bilateral breath sounds;  Depth of Insertion: 22; Securment: Lips; Complications: None; Breath Sounds: Equal Bilateral; Insertion Attempts: 1; Removal Date: 12/19/16;  Removal Time: 1119    Patient Active Problem List   Diagnosis    Malnutrition    Attention to ileostomy    Crohn's disease of small and large intestines    Latent tuberculosis by blood test    Mass, ovarian    S/P ERIK-BSO (total abdominal hysterectomy and bilateral salpingo-oophorectomy)    Pelvic abscess in female       Review of patient's allergies indicates:   Allergen Reactions    Imuran [azathioprine sodium] Diarrhea and Nausea And Vomiting    Amy         No current facility-administered medications on file prior to encounter.      Current Outpatient Prescriptions on File Prior to Encounter   Medication Sig Dispense Refill    alprazolam (XANAX) 1 MG tablet Take 1 tablet (1 mg total) by mouth 4 (four) times daily as needed for  Anxiety. 61 tablet 0    CHOLESTYRAMINE LIGHT 4 gram Powd TAKE 1 SCOOP by mouth three times a day  0    diphenoxylate-atropine 2.5-0.025 mg (LOMOTIL) 2.5-0.025 mg per tablet take 1 to 2 tablets by mouth four times a day if needed for ILOSTOMY OUTPUT 120 tablet 4    estradiol (ESTRACE) 0.5 MG tablet Take 1 tablet (0.5 mg total) by mouth once daily. 30 tablet 3    HUMIRA 40 mg/0.8 mL injection   0    HUMIRA PEN CROHN'S-UC-HS START PnKt injection INJECT THE CONTENTS OF 2 PENS SUBCUTANSOUSLY ON DAY 1, 2 PENS ON ...  (REFER TO PRESCRIPTION NOTES).  0    HYDROmorphone (DILAUDID) 2 MG tablet Take 1 tablet (2 mg total) by mouth every 4 to 6 hours as needed. 40 tablet 0    pantoprazole (PROTONIX) 40 MG tablet Take 40 mg by mouth 2 (two) times daily.      promethazine (PHENERGAN) 25 MG tablet Take 25 mg by mouth every 4 (four) hours as needed.      sertraline (ZOLOFT) 100 MG tablet Take 100 mg by mouth once daily.      topiramate (TOPAMAX) 50 MG tablet Take 75 mg by mouth 2 (two) times daily.  0       Past Surgical History   Procedure Laterality Date    Portacath placement      Colon surgery       x 2    Colonoscopy       multiple    Total colectomy  2014    Ileostomy         Social History     Social History    Marital status: Single     Spouse name: N/A    Number of children: N/A    Years of education: N/A     Occupational History    Not on file.     Social History Main Topics    Smoking status: Never Smoker    Smokeless tobacco: Never Used    Alcohol use No    Drug use: No    Sexual activity: Yes     Partners: Male     Other Topics Concern    Not on file     Social History Narrative         Vital Signs Range (Last 24H):  Temp:  [36.7 °C (98.1 °F)-37.4 °C (99.4 °F)]   Pulse:  [78-91]   Resp:  [16-18]   BP: (101-107)/(55-61)   SpO2:  [95 %-97 %]       CBC:   Recent Labs      01/06/17   0033  01/06/17   0511   WBC  11.76  11.61   RBC  3.48*  3.52*   HGB  9.4*  9.6*   HCT  29.3*  29.9*   PLT  314  333    MCV  84  85   MCH  27.0  27.3   MCHC  32.1  32.1       CMP:   Recent Labs      17   0033  17   0511   NA  128*  129*   K  4.1  4.1   CL  101  102   CO2  18*  18*   BUN  17  15   CREATININE  1.1  1.1   GLU  89  89   CALCIUM  8.2*  8.4*   ALBUMIN  2.3*  2.3*   PROT  6.3  6.3   ALKPHOS  101  116   ALT  7*  7*   AST  10  10   BILITOT  1.0  1.0       INR  Recent Labs      17   0033   INR  1.1   APTT  <21.0     EK/16:  Vent. Rate : 098 BPM     Atrial Rate : 098 BPM     P-R Int : 136 ms          QRS Dur : 074 ms      QT Int : 346 ms       P-R-T Axes : 072 091 064 degrees     QTc Int : 441 ms    Normal sinus rhythm  Rightward axis  No previous ECGs available  Confirmed by Bonita JEAN, Allen ([id number]) on 2016 8:51:37 AM    2D Echo:  3/2014:  CONCLUSIONS     1 - Normal left ventricular systolic function (EF 60-65%).     2 - Normal left ventricular diastolic function.     3 - Normal right ventricular systolic function .     4 - Trivial pericardial effusion.       [facility] Anesthesia Evaluation    I have reviewed the Patient Summary Reports.    I have reviewed the Nursing Notes.      Review of Systems  Anesthesia Hx:  No problems with previous Anesthesia  History of prior surgery of interest to airway management or planning: Previous anesthesia: General 16 ERIK with BSO  with general anesthesia.  Denies Family Hx of Anesthesia complications.   Denies Personal Hx of Anesthesia complications.   Social:  Non-Smoker    Cardiovascular:  Cardiovascular Normal     Pulmonary:  Pulmonary Normal    Hepatic/GI:   GERD, well controlled    Neurological:  Neurology Normal    Endocrine:  Endocrine Normal        Physical Exam  General:  Well nourished, Obesity    Airway/Jaw/Neck:  Airway Findings: Mouth Opening: Normal Tongue: Normal  General Airway Assessment: Adult  Mallampati: II  Improves to II with phonation.  TM Distance: Normal, at least 6 cm  Jaw/Neck Findings:  Neck ROM: Normal ROM  Neck Findings:  Girth  Increased     Eyes/Ears/Nose:  Eyes/Ears/Nose Findings:    Dental:  Dental Findings: Periodontal disease, Mild   Chest/Lungs:  Chest/Lungs Findings: Normal Respiratory Rate, Clear to auscultation     Heart/Vascular:  Heart Findings: Rate: Normal  Rhythm: Regular Rhythm  Sounds: Normal        Mental Status:  Mental Status Findings:  Cooperative, Alert and Oriented         Anesthesia Plan  Type of Anesthesia, risks & benefits discussed:  Anesthesia Type:  general, MAC  Patient's Preference:   Intra-op Monitoring Plan:   Intra-op Monitoring Plan Comments:   Post Op Pain Control Plan:   Post Op Pain Control Plan Comments:   Induction:   IV  Beta Blocker:  Patient is not currently on a Beta-Blocker (No further documentation required).       Informed Consent: Patient understands risks and agrees with Anesthesia plan.  Questions answered. Anesthesia consent signed with patient.  ASA Score: 2     Day of Surgery Review of History & Physical: I have interviewed and examined the patient. I have reviewed the patient's H&P dated:    H&P update referred to the surgeon.         Ready For Surgery From Anesthesia Perspective.

## 2017-01-07 NOTE — PROGRESS NOTES
Progress Note  Gynecological Oncology      Admit Date: 1/5/2017  Post-operative Day:    Hospital Day: 3    SUBJECTIVE:     Candida Cardona is a 36 y.o. POD#19 s/p ERIK/BSO/Left ureteral sent placement 2/2 bilateral ovarian masses admitted 2/2 pelvic abscess     Pt doing well this morning.  Reports pain improved since admission. Pain controlled with IV and PO pain medication.  Pt was set to have IR drainage of abscess yesterday.  However, pt unable to tolerate procedure without anesthesia.  Pt tolerated a regular diet yesterday without difficulty. Voiding without difficulty but reports feeling that bladder doesn't completely empty. No nausea/vomiting.  Denies fever/chills.    Scheduled Meds:   metronidazole  500 mg Intravenous Q8H    piperacillin-tazobactam 4.5 g in dextrose 5 % 100 mL IVPB (ready to mix system)  4.5 g Intravenous Q8H    sodium chloride 0.9%  3 mL Intravenous Q8H     Continuous Infusions:   sodium chloride 0.9% 125 mL/hr at 01/07/17 0441     PRN Meds:alprazolam, HYDROmorphone, ibuprofen, ondansetron, oxycodone, oxycodone, promethazine (PHENERGAN) IVPB    Review of patient's allergies indicates:   Allergen Reactions    Imuran [azathioprine sodium] Diarrhea and Nausea And Vomiting    Amy        OBJECTIVE:     Vital Signs (Most Recent)  Temp: 98.1 °F (36.7 °C) (01/07/17 0815)  Pulse: 78 (01/07/17 0815)  Resp: 18 (01/07/17 0815)  BP: (!) 101/55 (01/07/17 0815)  SpO2: 96 % (01/07/17 0815)    Temperature Range Min/Max (Last 24H):  Temp:  [98.1 °F (36.7 °C)-99.4 °F (37.4 °C)]     Vital Signs Range (Last 24H):  Temp:  [98.1 °F (36.7 °C)-99.4 °F (37.4 °C)]   Pulse:  [78-91]   Resp:  [16-18]   BP: (101-107)/(55-61)   SpO2:  [95 %-97 %]     I & O (Last 24H):  Intake/Output Summary (Last 24 hours) at 01/07/17 0820  Last data filed at 01/07/17 0441   Gross per 24 hour   Intake          3475.41 ml   Output             1150 ml   Net          2325.41 ml     Physical Exam:  General: well developed,  well nourished  Lungs:  clear to auscultation bilaterally and normal respiratory effort  Heart: regular rate and rhythm, S1, S2 normal, no murmur, click, rub or gallop  Abdomen: soft, mild TTP in lower abdomen/pelvic area. +BS, no rebound/guarding  Extremities: no cyanosis or edema, or clubbing    Lines/Drains:       Port A Cath Single Lumen 02/28/14 1353 right subclavian (Active)   Number of days:1043            Peripheral IV - Double Lumen 01/05/16 0120 Left Forearm (Active)   Site Assessment Clean;Dry;Intact 1/6/2017  7:40 PM   Line 1 Status Infusing 1/6/2017  7:40 PM   Line 2 Status Infusing 1/6/2017  7:40 PM   Dressing Status Clean;Dry;Intact 1/6/2017  7:51 AM   Dressing Change Due 01/09/17 1/6/2017  7:40 PM   Site Change Due 01/09/17 1/6/2017  7:40 PM   Reason Not Rotated Not due 1/6/2017  7:40 PM   Number of days:368            Ileostomy 03/14/14 1651 Loop RLQ (Active)   Stoma Appearance rosebud appearance 1/6/2017  7:40 PM   Appliance intact;no leakage 1/6/2017  7:40 PM   Stoma Function flatus;stool 1/6/2017  7:40 PM   Peristomal Assessment Clean;Intact 1/6/2017  7:40 PM   Tolerance no signs/symptoms of discomfort 1/6/2017  7:40 PM   Output (mL) 250 mL 1/6/2017  5:22 AM   Number of days:1029            Ureteral Drain/Stent 12/20/16 1555 Left ureter 6.24 Fr. (Active)   Number of days:17       Laboratory:  CBC with Diff: No results for input(s): WBC, NEUTROPCT, LYMPH, MONOPCT, EOSPCT, BASOPHIL, RBC, HCT, HGB, MCV, MCH, RDW, PLT, MPV in the last 24 hours.    Invalid input(s): MCMC  BMP: No results for input(s): GLU, CALCIUM, NA, K, CO2, CL, BUN, CREATININE in the last 24 hours.    Diagnostic Results:  CT scan reviewed from [facility]    ASSESSMENT/PLAN:     Active Hospital Problems    Diagnosis  POA    Pelvic abscess in female [N73.9]  Yes      Resolved Hospital Problems    Diagnosis Date Resolved POA   No resolved problems to display.     Assessment:  37 y/o POD#19 s/p ERIK/BSO 2/2 ovarian masses found to be benign,  now with a pelvic abscess.  Improved this AM    Plan:    Pelvic Abscess  - Pt seen by IR yesterday who recommends IR drainage with drain placement  - NPO since midnight  - Plans to go to OR for IR drainage with MAC anesthesia  - Afebrile overnight.  VSS  - CBC pending  - Pain improved this AM.  Pain medication PRN  - Zosyn and Flagyl for abscess    Crohn's Disease  - No acute issues  - Ileostomy in place with output    Dispo: IR drainage of abscess today.  If stable, possible DC tomorrow      Jodie Dallas MD  PGY-3 OB/GYN  [phone]

## 2017-01-07 NOTE — PROCEDURES
Radiology Post-Procedure Note    Pre Op Diagnosis: Ct guided drainage of pelvic fluid collection  Post Op Diagnosis: Same    Procedure: CT guided drainage    Procedure performed by: Kareem Summers MD    Written Informed Consent Obtained: Yes  Specimen Removed: YES 60 cc of clear, dark yellow fluid  Estimated Blood Loss: Minimal    Findings:   CT guided drainage of pelvic fluid collection. 8 Fr APD catheter placed and connected to bulb suction.    Patient tolerated procedure well.    Kareem Summers MD  Department of Radiology  Pager: [phone]

## 2017-01-07 NOTE — ANESTHESIA RELEASE NOTE
Anesthesia Release from PACU Note    Patient: Candida Cardona    Procedure(s) Performed: Procedure(s) (LRB):  [id number] (N/A)    Anesthesia type: general    Post pain: Adequate analgesia    Post assessment: no apparent anesthetic complications, tolerated procedure well and no evidence of recall    Post vital signs:   Vitals:    01/07/17 1645   BP: 98/60   Pulse: 76   Resp: 16   Temp:         Level of consciousness: awake, alert  and oriented    Nausea/Vomiting: no nausea/no vomiting    Complications: none    Airway Patency: patent    Respiratory: unassisted, spontaneous ventilation    Cardiovascular: stable and blood pressure at baseline    Hydration: euvolemic

## 2017-01-07 NOTE — H&P
Inpatient Radiology Pre-procedure Note    History of Present Illness:  Candida Cardona is a 36 y.o. female who presents for drainage of pelvic fluid collection.  Admission H&P reviewed.  Past Medical History   Diagnosis Date    Anemia     Constipation     Crohn's disease     GERD (gastroesophageal reflux disease)     Interstitial cystitis     Kidney stones     Mass, ovarian 11/22/2016    Osteoporosis     S/P ERIK-BSO (total abdominal hysterectomy and bilateral salpingo-oophorectomy) 12/19/2016     Past Surgical History   Procedure Laterality Date    Portacath placement      Colon surgery       x 2    Colonoscopy       multiple    Total colectomy  2014    Ileostomy         Review of Systems:   As documented in primary team H&P    Home Meds:   Prior to Admission medications    Medication Sig Start Date End Date Taking? Authorizing Provider   alprazolam (XANAX) 1 MG tablet Take 1 tablet (1 mg total) by mouth 4 (four) times daily as needed for Anxiety. 3/21/14   Manuela Reed MD   CHOLESTYRAMINE LIGHT 4 gram Powd TAKE 1 SCOOP by mouth three times a day 9/6/16   Historical Provider, MD   diphenoxylate-atropine 2.5-0.025 mg (LOMOTIL) 2.5-0.025 mg per tablet take 1 to 2 tablets by mouth four times a day if needed for ILOSTOMY OUTPUT 9/23/14   Reji Peterson MD   estradiol (ESTRACE) 0.5 MG tablet Take 1 tablet (0.5 mg total) by mouth once daily. 12/29/16 12/29/17  MD ABIDA Devlin 40 mg/0.8 mL injection  10/29/16   Historical Provider, MD ABIDA LEON CROHN'S-UC-HS START PnKt injection INJECT THE CONTENTS OF 2 PENS SUBCUTANSOUSLY ON DAY 1, 2 PENS ON ...  (REFER TO PRESCRIPTION NOTES). 5/16/16   Historical Provider, MD   HYDROmorphone (DILAUDID) 2 MG tablet Take 1 tablet (2 mg total) by mouth every 4 to 6 hours as needed. 12/22/16   LETICIA Stoddard MD   pantoprazole (PROTONIX) 40 MG tablet Take 40 mg by mouth 2 (two) times daily.    Historical Provider, MD   promethazine (PHENERGAN)  25 MG tablet Take 25 mg by mouth every 4 (four) hours as needed.    Historical Provider, MD   sertraline (ZOLOFT) 100 MG tablet Take 100 mg by mouth once daily.    Historical Provider, MD   topiramate (TOPAMAX) 50 MG tablet Take 75 mg by mouth 2 (two) times daily. 4/27/16   Historical Provider, MD     Scheduled Meds:    metronidazole  500 mg Intravenous Q8H    piperacillin-tazobactam 4.5 g in dextrose 5 % 100 mL IVPB (ready to mix system)  4.5 g Intravenous Q8H    sodium chloride 0.9%  3 mL Intravenous Q8H     Continuous Infusions:    sodium chloride 0.9% 125 mL/hr at 01/07/17 0441     PRN Meds:alprazolam, HYDROmorphone, ibuprofen, ondansetron, oxycodone, oxycodone, promethazine (PHENERGAN) IVPB  Anticoagulants/Antiplatelets: no anticoagulation    Allergies:   Review of patient's allergies indicates:   Allergen Reactions    Imuran [azathioprine sodium] Diarrhea and Nausea And Vomiting    [place]      Sedation Hx: have not been any systemic reactions    Labs:    Recent Labs  Lab 01/06/17  0033   INR 1.1       Recent Labs  Lab 01/06/17  0511   WBC 11.61   HGB 9.6*   HCT 29.9*   MCV 85         Recent Labs  Lab 01/06/17  0511   GLU 89   *   K 4.1      CO2 18*   BUN 15   CREATININE 1.1   CALCIUM 8.4*   ALT 7*   AST 10   ALBUMIN 2.3*   BILITOT 1.0         Vitals:  Temp: 98.1 °F (36.7 °C) (01/07/17 1114)  Pulse: 78 (01/07/17 1114)  Resp: 18 (01/07/17 1114)  BP: (!) 101/58 (01/07/17 1114)  SpO2: 96 % (01/07/17 1114)     Physical Exam:  ASA: 3  Mallampati: 2    General: no acute distress  Mental Status: alert and oriented to person, place and time  HEENT: normocephalic, atraumatic  Chest: unlabored breathing  Heart: regular heart rate  Abdomen: nondistended  Extremity: moves all extremities    Plan: Ct guided drainage of pelvic fluid collection. Informed consent obtained. Patient will need anesthesia for the procedure. Greatly appreciate the help of the Anesthesiology Department.  Sedation Plan:  Anesthesia Choice.    Kareem Summers MD  Department of Radiology  Pager: [phone]

## 2017-01-07 NOTE — ANESTHESIA POSTPROCEDURE EVALUATION
"Anesthesia Post Evaluation    Patient: Candida Cardona    Procedure(s) Performed: Procedure(s) (LRB):  [id number] (N/A)    Final Anesthesia Type: general  Patient location during evaluation: PACU  Patient participation: Yes- Able to Participate  Level of consciousness: awake and alert  Post-procedure vital signs: reviewed and stable  Pain management: adequate  Airway patency: patent  PONV status at discharge: No PONV  Anesthetic complications: no      Cardiovascular status: hemodynamically stable  Respiratory status: unassisted  Hydration status: euvolemic  Follow-up not needed.        Visit Vitals    BP 98/60    Pulse 76    Temp 36.9 °C (98.5 °F) (Axillary)    Resp 16    Ht 5' 6" (1.676 m)    Wt 86.9 kg (191 lb 8 oz)    LMP 12/12/2016    SpO2 (!) 94%    Breastfeeding No    BMI 30.91 kg/m2       Pain/Enrrique Score: Pain Assessment Performed: Yes (1/7/2017  4:39 PM)  Presence of Pain: denies (1/7/2017  4:39 PM)  Pain Rating Prior to Med Admin: 7 (1/7/2017  1:58 PM)  Pain Rating Post Med Admin: 7 (1/7/2017 11:38 AM)  Enrrique Score: 10 (1/7/2017  3:08 PM)      "

## 2017-01-08 LAB
ANION GAP SERPL CALC-SCNC: 9 MMOL/L
BASOPHILS # BLD AUTO: 0.01 K/UL
BASOPHILS NFR BLD: 0.2 %
BODY FLUID SOURCE, CREATININE: NORMAL
BUN SERPL-MCNC: 3 MG/DL
CALCIUM SERPL-MCNC: 8.4 MG/DL
CHLORIDE SERPL-SCNC: 106 MMOL/L
CO2 SERPL-SCNC: 19 MMOL/L
CREAT FLD-MCNC: 0.8 MG/DL
CREAT SERPL-MCNC: 0.9 MG/DL
DIFFERENTIAL METHOD: ABNORMAL
EOSINOPHIL # BLD AUTO: 0.2 K/UL
EOSINOPHIL NFR BLD: 4.1 %
ERYTHROCYTE [DISTWIDTH] IN BLOOD BY AUTOMATED COUNT: 14.4 %
EST. GFR  (AFRICAN AMERICAN): >60 ML/MIN/1.73 M^2
EST. GFR  (NON AFRICAN AMERICAN): >60 ML/MIN/1.73 M^2
GLUCOSE SERPL-MCNC: 90 MG/DL
HCT VFR BLD AUTO: 30.5 %
HGB BLD-MCNC: 9.4 G/DL
LYMPHOCYTES # BLD AUTO: 1 K/UL
LYMPHOCYTES NFR BLD: 19.8 %
MCH RBC QN AUTO: 26.3 PG
MCHC RBC AUTO-ENTMCNC: 30.8 %
MCV RBC AUTO: 85 FL
MONOCYTES # BLD AUTO: 0.4 K/UL
MONOCYTES NFR BLD: 8.4 %
NEUTROPHILS # BLD AUTO: 3.4 K/UL
NEUTROPHILS NFR BLD: 67.3 %
PLATELET # BLD AUTO: 361 K/UL
PMV BLD AUTO: 9.5 FL
POTASSIUM SERPL-SCNC: 3.5 MMOL/L
RBC # BLD AUTO: 3.58 M/UL
SODIUM SERPL-SCNC: 134 MMOL/L
WBC # BLD AUTO: 5.09 K/UL

## 2017-01-08 PROCEDURE — 82570 ASSAY OF URINE CREATININE: CPT

## 2017-01-08 PROCEDURE — 36415 COLL VENOUS BLD VENIPUNCTURE: CPT

## 2017-01-08 PROCEDURE — 25000003 PHARM REV CODE 250: Performed by: STUDENT IN AN ORGANIZED HEALTH CARE EDUCATION/TRAINING PROGRAM

## 2017-01-08 PROCEDURE — 99233 SBSQ HOSP IP/OBS HIGH 50: CPT | Mod: ,,, | Performed by: OBSTETRICS & GYNECOLOGY

## 2017-01-08 PROCEDURE — 63600175 PHARM REV CODE 636 W HCPCS: Performed by: STUDENT IN AN ORGANIZED HEALTH CARE EDUCATION/TRAINING PROGRAM

## 2017-01-08 PROCEDURE — 80048 BASIC METABOLIC PNL TOTAL CA: CPT

## 2017-01-08 PROCEDURE — 25000003 PHARM REV CODE 250: Performed by: OBSTETRICS & GYNECOLOGY

## 2017-01-08 PROCEDURE — 85025 COMPLETE CBC W/AUTO DIFF WBC: CPT

## 2017-01-08 PROCEDURE — 20600001 HC STEP DOWN PRIVATE ROOM

## 2017-01-08 RX ADMIN — METRONIDAZOLE 500 MG: 500 SOLUTION INTRAVENOUS at 01:01

## 2017-01-08 RX ADMIN — OXYCODONE HYDROCHLORIDE 10 MG: 5 TABLET ORAL at 10:01

## 2017-01-08 RX ADMIN — METRONIDAZOLE 500 MG: 500 SOLUTION INTRAVENOUS at 11:01

## 2017-01-08 RX ADMIN — Medication 3 ML: at 08:01

## 2017-01-08 RX ADMIN — HYDROMORPHONE HYDROCHLORIDE 0.5 MG: 1 INJECTION, SOLUTION INTRAMUSCULAR; INTRAVENOUS; SUBCUTANEOUS at 08:01

## 2017-01-08 RX ADMIN — OXYCODONE HYDROCHLORIDE 10 MG: 5 TABLET ORAL at 04:01

## 2017-01-08 RX ADMIN — Medication 3 ML: at 01:01

## 2017-01-08 RX ADMIN — OXYCODONE HYDROCHLORIDE 10 MG: 5 TABLET ORAL at 11:01

## 2017-01-08 RX ADMIN — OXYCODONE HYDROCHLORIDE 10 MG: 5 TABLET ORAL at 06:01

## 2017-01-08 RX ADMIN — ONDANSETRON 4 MG: 2 INJECTION INTRAMUSCULAR; INTRAVENOUS at 06:01

## 2017-01-08 RX ADMIN — PIPERACILLIN SODIUM AND TAZOBACTAM SODIUM 4.5 G: 4; .5 INJECTION, POWDER, LYOPHILIZED, FOR SOLUTION INTRAVENOUS at 08:01

## 2017-01-08 RX ADMIN — Medication 3 ML: at 02:01

## 2017-01-08 RX ADMIN — PIPERACILLIN SODIUM AND TAZOBACTAM SODIUM 4.5 G: 4; .5 INJECTION, POWDER, LYOPHILIZED, FOR SOLUTION INTRAVENOUS at 04:01

## 2017-01-08 RX ADMIN — ALPRAZOLAM 1 MG: 1 TABLET ORAL at 09:01

## 2017-01-08 RX ADMIN — METRONIDAZOLE 500 MG: 500 SOLUTION INTRAVENOUS at 04:01

## 2017-01-08 RX ADMIN — HYDROMORPHONE HYDROCHLORIDE 0.5 MG: 1 INJECTION, SOLUTION INTRAMUSCULAR; INTRAVENOUS; SUBCUTANEOUS at 01:01

## 2017-01-08 RX ADMIN — PIPERACILLIN SODIUM AND TAZOBACTAM SODIUM 4.5 G: 4; .5 INJECTION, POWDER, LYOPHILIZED, FOR SOLUTION INTRAVENOUS at 01:01

## 2017-01-08 RX ADMIN — HYDROMORPHONE HYDROCHLORIDE 0.5 MG: 1 INJECTION, SOLUTION INTRAMUSCULAR; INTRAVENOUS; SUBCUTANEOUS at 02:01

## 2017-01-08 RX ADMIN — METRONIDAZOLE 500 MG: 500 SOLUTION INTRAVENOUS at 08:01

## 2017-01-08 NOTE — PROGRESS NOTES
Pt returned to room.  No acute distress noted.  RLQ drain to bulb suction, draining serous fluid.  VSS. Reconnected to IVF. Placed on regular diet. Low bed, call light within reach, will continue to monitor.

## 2017-01-08 NOTE — PLAN OF CARE
Problem: Patient Care Overview  Goal: Plan of Care Review  Outcome: Ongoing (interventions implemented as appropriate)  POC reviewed with patient who verbalized understanding. VSS. AAOX4. Midline incision intact with edges approximated and open to air. Right IR drain with moderate yellowish/tan output this shift. Fluids and Abx infusing. Nausea and pain well managed with current medication regimen throughout shift.Right ileostomy in place with moderate output over night. Adequate urinary output; ambulated up to restroom several times this shift and tolerated well. WCTM.

## 2017-01-08 NOTE — PROGRESS NOTES
Progress Note  Gynecological Oncology      Admit Date: 1/5/2017  Post-operative Day: 1 Day Post-Op  Hospital Day: 4    SUBJECTIVE:     Candida Cardona is a 36 y.o. POD#20 s/p ERIK/BSO/Left ureteral sent placement 2/2 bilateral ovarian masses admitted 2/2 pelvic abscess     Pt doing well this morning.  Reports pain improved since admission. Pain controlled with IV and PO pain medication.  Pt had IR drainage of abscess yesterday. Per note, 60 cc of dark yellow fluid removed.  Doing well with drain in place. Pt tolerated a regular diet yesterday without difficulty. Voiding without difficulty. No nausea/vomiting.  Denies fever/chills.    Scheduled Meds:   metronidazole  500 mg Intravenous Q8H    piperacillin-tazobactam 4.5 g in dextrose 5 % 100 mL IVPB (ready to mix system)  4.5 g Intravenous Q8H    sodium chloride 0.9%  3 mL Intravenous Q8H     Continuous Infusions:   sodium chloride 0.9% 125 mL/hr at 01/07/17 0441     PRN Meds:alprazolam, HYDROmorphone, ibuprofen, ondansetron, oxycodone, oxycodone, promethazine (PHENERGAN) IVPB    Review of patient's allergies indicates:   Allergen Reactions    Imuran [azathioprine sodium] Diarrhea and Nausea And Vomiting    Amy        OBJECTIVE:     Vital Signs (Most Recent)  Temp: 98.8 °F (37.1 °C) (01/08/17 0801)  Pulse: 78 (01/08/17 0801)  Resp: 18 (01/08/17 0801)  BP: (!) 111/56 (01/08/17 0801)  SpO2: 98 % (01/08/17 0801)    Temperature Range Min/Max (Last 24H):  Temp:  [98 °F (36.7 °C)-98.8 °F (37.1 °C)]     Vital Signs Range (Last 24H):  Temp:  [98 °F (36.7 °C)-98.8 °F (37.1 °C)]   Pulse:  [74-93]   Resp:  [16-18]   BP: ()/(56-63)   SpO2:  [93 %-98 %]     I & O (Last 24H):    Intake/Output Summary (Last 24 hours) at 01/08/17 0828  Last data filed at 01/08/17 0812   Gross per 24 hour   Intake              550 ml   Output             1590 ml   Net            -1040 ml       THAI drain: 95cc/24 hours.   20cc this AM    Physical Exam:  General: well developed,  well nourished  Lungs:  clear to auscultation bilaterally and normal respiratory effort  Heart: regular rate and rhythm, S1, S2 normal, no murmur, click, rub or gallop  Abdomen: soft, mild TTP in lower abdomen/pelvic area. +BS, no rebound/guarding. Ostomy with output  Drains:  THAI drain with dark yellow output  Extremities: no cyanosis or edema, or clubbing    Lines/Drains:       Port A Cath Single Lumen 02/28/14 1353 right subclavian (Active)   Number of days:1043            Peripheral IV - Double Lumen 01/05/16 0120 Left Forearm (Active)   Site Assessment Clean;Dry;Intact 1/6/2017  7:40 PM   Line 1 Status Infusing 1/6/2017  7:40 PM   Line 2 Status Infusing 1/6/2017  7:40 PM   Dressing Status Clean;Dry;Intact 1/6/2017  7:51 AM   Dressing Change Due 01/09/17 1/6/2017  7:40 PM   Site Change Due 01/09/17 1/6/2017  7:40 PM   Reason Not Rotated Not due 1/6/2017  7:40 PM   Number of days:368            Ileostomy 03/14/14 1651 Loop RLQ (Active)   Stoma Appearance rosebud appearance 1/6/2017  7:40 PM   Appliance intact;no leakage 1/6/2017  7:40 PM   Stoma Function flatus;stool 1/6/2017  7:40 PM   Peristomal Assessment Clean;Intact 1/6/2017  7:40 PM   Tolerance no signs/symptoms of discomfort 1/6/2017  7:40 PM   Output (mL) 250 mL 1/6/2017  5:22 AM   Number of days:1029            Ureteral Drain/Stent 12/20/16 1555 Left ureter 6.24 Fr. (Active)   Number of days:17       Laboratory:  CBC with Diff: No results for input(s): WBC, NEUTROPCT, LYMPH, MONOPCT, EOSPCT, BASOPHIL, RBC, HCT, HGB, MCV, MCH, RDW, PLT, MPV in the last 24 hours.    Invalid input(s): MCMC  BMP: No results for input(s): GLU, CALCIUM, NA, K, CO2, CL, BUN, CREATININE in the last 24 hours.    Diagnostic Results:  CT scan reviewed from [facility]SH    ASSESSMENT/PLAN:     Active Hospital Problems    Diagnosis  POA    Pelvic abscess in female [N73.9]  Yes      Resolved Hospital Problems    Diagnosis Date Resolved POA   No resolved problems to display.     Assessment:  36  y/o POD#20 s/p ERIK/BSO, POD#1 s/p IR drainage of pelvic abscess.  Improved this AM    Plan:    Pelvic Abscess  - s/p pelvic abscess drainage yesterday. THAI drain in place  - THAI Fluid is dark yellow.  95cc/24 hours.   - Will send fluid for Cr  - Pain controlled with pain medication  - Afebrile overnight.  VSS  - Zosyn and Flagyl for abscess.  Consider PO abx outpatient (doxy/flagyl)  - CBC not drawn yesterday 2/2 pt refusal.  CBC and BMP pending this AM  - Pain improved this AM.  Pain medication PRN    Crohn's Disease  - No acute issues  - Ileostomy in place with output    Dispo: THAI drain in place with decreasing output.  Will monitor output and consider discharge when output low.  Would discharge with PO abx      Jodie Dallas MD  PGY-3 OB/GYN  [phone]

## 2017-01-08 NOTE — PLAN OF CARE
Problem: Patient Care Overview  Goal: Plan of Care Review  Outcome: Ongoing (interventions implemented as appropriate)  POC reviewed with pt. No falls or trauma this shift. Pt c/o intermittent abdominal pain, stated is worse after pt voids.  Prn oxy and dilaudid given, moderate relief obtained. Continues on zosyn and flagyl IV.  IVF@ 125 mL/hr. Pt c/o anxiety, xanax given x1, full relief obtained. RLQ drain to bulb suction, draining serous/tan fluid. Afebrile this shift. VSS. Low bed, call light within reach, will continue to monitor.

## 2017-01-09 VITALS
TEMPERATURE: 98 F | SYSTOLIC BLOOD PRESSURE: 115 MMHG | HEIGHT: 66 IN | RESPIRATION RATE: 16 BRPM | HEART RATE: 69 BPM | WEIGHT: 191.5 LBS | DIASTOLIC BLOOD PRESSURE: 64 MMHG | OXYGEN SATURATION: 93 % | BODY MASS INDEX: 30.78 KG/M2

## 2017-01-09 PROCEDURE — 63600175 PHARM REV CODE 636 W HCPCS: Performed by: STUDENT IN AN ORGANIZED HEALTH CARE EDUCATION/TRAINING PROGRAM

## 2017-01-09 PROCEDURE — 25000003 PHARM REV CODE 250: Performed by: STUDENT IN AN ORGANIZED HEALTH CARE EDUCATION/TRAINING PROGRAM

## 2017-01-09 RX ORDER — METRONIDAZOLE 500 MG/1
500 TABLET ORAL EVERY 8 HOURS
Qty: 14 TABLET | Refills: 0 | Status: SHIPPED | OUTPATIENT
Start: 2017-01-09 | End: 2017-01-16

## 2017-01-09 RX ORDER — DOXYCYCLINE 100 MG/1
100 CAPSULE ORAL EVERY 12 HOURS
Qty: 14 CAPSULE | Refills: 0 | Status: SHIPPED | OUTPATIENT
Start: 2017-01-09 | End: 2017-01-16

## 2017-01-09 RX ORDER — OXYCODONE AND ACETAMINOPHEN 5; 325 MG/1; MG/1
1 TABLET ORAL EVERY 6 HOURS PRN
Qty: 20 TABLET | Refills: 0 | Status: SHIPPED | OUTPATIENT
Start: 2017-01-09 | End: 2017-01-17 | Stop reason: SDUPTHER

## 2017-01-09 RX ADMIN — METRONIDAZOLE 500 MG: 500 SOLUTION INTRAVENOUS at 08:01

## 2017-01-09 RX ADMIN — HYDROMORPHONE HYDROCHLORIDE 0.5 MG: 1 INJECTION, SOLUTION INTRAMUSCULAR; INTRAVENOUS; SUBCUTANEOUS at 02:01

## 2017-01-09 RX ADMIN — PIPERACILLIN SODIUM AND TAZOBACTAM SODIUM 4.5 G: 4; .5 INJECTION, POWDER, LYOPHILIZED, FOR SOLUTION INTRAVENOUS at 01:01

## 2017-01-09 RX ADMIN — ONDANSETRON 4 MG: 2 INJECTION INTRAMUSCULAR; INTRAVENOUS at 08:01

## 2017-01-09 RX ADMIN — HYDROMORPHONE HYDROCHLORIDE 0.5 MG: 1 INJECTION, SOLUTION INTRAMUSCULAR; INTRAVENOUS; SUBCUTANEOUS at 08:01

## 2017-01-09 RX ADMIN — OXYCODONE HYDROCHLORIDE 10 MG: 5 TABLET ORAL at 04:01

## 2017-01-09 RX ADMIN — OXYCODONE HYDROCHLORIDE 10 MG: 5 TABLET ORAL at 10:01

## 2017-01-09 NOTE — PROGRESS NOTES
Progress Note  Gynecological Oncology      Admit Date: 1/5/2017  Post-operative Day: 2 Days Post-Op  Hospital Day: 5    SUBJECTIVE:     Candida Cardona is a 36 y.o. POD#21 s/p ERIK/BSO/Left ureteral sent placement 2/2 bilateral ovarian masses admitted 2/2 pelvic abscess now s/p IR drainage.     Pt doing well this morning.  She is resting comfortably in bed.  Reports pain improved since admission. Pain controlled with IV and PO pain medication.  Pt had IR drainage of abscess on 1/7. Per note, 60 cc of dark yellow fluid removed.  70cc out of drain yesterday.  Doing well with drain in place. Pt tolerated a regular diet yesterday without difficulty. Voiding without difficulty. No nausea/vomiting.  Denies fever/chills.    Scheduled Meds:   metronidazole  500 mg Intravenous Q8H    piperacillin-tazobactam 4.5 g in dextrose 5 % 100 mL IVPB (ready to mix system)  4.5 g Intravenous Q8H    sodium chloride 0.9%  3 mL Intravenous Q8H     Continuous Infusions:   sodium chloride 0.9% Stopped (01/09/17 0330)     PRN Meds:alprazolam, HYDROmorphone, ibuprofen, ondansetron, oxycodone, oxycodone, promethazine (PHENERGAN) IVPB    Review of patient's allergies indicates:   Allergen Reactions    Imuran [azathioprine sodium] Diarrhea and Nausea And Vomiting    Amy        OBJECTIVE:     Vital Signs (Most Recent)  Temp: 97.9 °F (36.6 °C) (01/09/17 0427)  Pulse: 81 (01/09/17 0427)  Resp: 18 (01/09/17 0427)  BP: (!) 103/58 (01/09/17 0427)  SpO2: 98 % (01/09/17 0427)    Temperature Range Min/Max (Last 24H):  Temp:  [97.5 °F (36.4 °C)-98.8 °F (37.1 °C)]     Vital Signs Range (Last 24H):  Temp:  [97.5 °F (36.4 °C)-98.8 °F (37.1 °C)]   Pulse:  [77-85]   Resp:  [18]   BP: (103-116)/(56-73)   SpO2:  [98 %-99 %]     I & O (Last 24H):    Intake/Output Summary (Last 24 hours) at 01/09/17 0602  Last data filed at 01/09/17 0500   Gross per 24 hour   Intake          9252.08 ml   Output             1520 ml   Net          7732.08 ml        THAI drain: 70cc/24 hours.      Physical Exam:  General: well developed, well nourished  Lungs:  clear to auscultation bilaterally and normal respiratory effort  Heart: regular rate and rhythm, S1, S2 normal, no murmur, click, rub or gallop  Abdomen: soft, mild TTP in lower abdomen/pelvic area. +BS, no rebound/guarding. Ostomy with output  Drains:  THAI drain with dark yellow output  Extremities: no cyanosis or edema, or clubbing    Lines/Drains:       Port A Cath Single Lumen 02/28/14 1353 right subclavian (Active)   Number of days:1043            Peripheral IV - Double Lumen 01/05/16 0120 Left Forearm (Active)   Site Assessment Clean;Dry;Intact 1/6/2017  7:40 PM   Line 1 Status Infusing 1/6/2017  7:40 PM   Line 2 Status Infusing 1/6/2017  7:40 PM   Dressing Status Clean;Dry;Intact 1/6/2017  7:51 AM   Dressing Change Due 01/09/17 1/6/2017  7:40 PM   Site Change Due 01/09/17 1/6/2017  7:40 PM   Reason Not Rotated Not due 1/6/2017  7:40 PM   Number of days:368            Ileostomy 03/14/14 1651 Loop RLQ (Active)   Stoma Appearance rosebud appearance 1/6/2017  7:40 PM   Appliance intact;no leakage 1/6/2017  7:40 PM   Stoma Function flatus;stool 1/6/2017  7:40 PM   Peristomal Assessment Clean;Intact 1/6/2017  7:40 PM   Tolerance no signs/symptoms of discomfort 1/6/2017  7:40 PM   Output (mL) 250 mL 1/6/2017  5:22 AM   Number of days:1029            Ureteral Drain/Stent 12/20/16 1555 Left ureter 6.24 Fr. (Active)   Number of days:17       Laboratory:  CBC with Diff:     Recent Labs  Lab 01/08/17  1907   WBC 5.09   LYMPH 19.8  1.0   BASOPHIL 0.2   RBC 3.58*   HCT 30.5*   HGB 9.4*   MCV 85   MCH 26.3*   RDW 14.4   *   MPV 9.5     BMP:     Recent Labs  Lab 01/08/17  1907   GLU 90   CALCIUM 8.4*   *   K 3.5   CO2 19*      BUN 3*   CREATININE 0.9       Diagnostic Results:  CT scan reviewed from [facility]    ASSESSMENT/PLAN:     Active Hospital Problems    Diagnosis  POA    Pelvic abscess in female [N73.9]   Yes      Resolved Hospital Problems    Diagnosis Date Resolved POA   No resolved problems to display.     Assessment:  37 y/o POD#21 s/p ERIK/BSO, POD#1 s/p IR drainage of pelvic abscess.  Improved this AM    Plan:    Pelvic Abscess  - s/p pelvic abscess drainage on 1/7. THAI drain in place  - THAI Fluid is dark yellow.  70cc/24 hours.   - Pain controlled with pain medication  - Afebrile overnight.  VSS  - Zosyn and Flagyl for abscess.  Consider PO abx outpatient (doxy/flagyl)  - CBC yesterday: 9.4/30.5  - Pain improved this AM.  Pain medication PRN    Crohn's Disease  - No acute issues  - Ileostomy in place with output    Dispo: THAI drain in place with decreasing output.  Plan to discharge today with PO abx    Raul Alaniz M.D.  PGY-2 OBGYN  [phone]

## 2017-01-09 NOTE — PLAN OF CARE
Problem: Patient Care Overview  Goal: Plan of Care Review  Outcome: Ongoing (interventions implemented as appropriate)  Pt AAO VSS, see flowsheet for further assessment data.     Adx 1/5 Dx: Pelvic abcess. Drain placed 1/7, grenade put out 30mls of fluid overnight. Pain moderately managed w/ current pain medication regimen. IV abx administered as ordered, Zosyn. NS gtt @ 125mls/hr.     I/Os monitored. Ileostomy remains intact. Ileostomy care performed per pt. UO 600mls overnight.     Pt up ad shamika. No new skin breakdown noted.      Fall precautions in place; pt remains free of injury. Daily labs monitored. No acute events overnight.

## 2017-01-09 NOTE — DISCHARGE SUMMARY
[facility]  Discharge Summary  Gynecology      Admit Date: 1/5/2017    Discharge Date and Time: 1/9/2017 6:52 AM    Attending Physician: Andrew Silveira MD     Discharge Provider: Same    Reason for Admission: Pelvic abscess    Procedures Performed: Procedure(s) (LRB):  [id number] (N/A)    Hospital Course (synopsis of major diagnoses, care, treatment, and services provided during the course of the hospital stay): Ms. Cardona is a 36 y.o. F who was admitted on 1/6/17 for management of a pelvic abscess.  At the time of admission, vitals were stable and she was afebrile.  She was continued on zosyn and flagyl.  Interventional radiology was consulted and drainage was performed on 1/7/17.   60cc of fluid was removed at that time and a THAI drain was placed.  The rest of her hospital course was uncomplicated.  At the time of discharge, the drainage had decreased.  Her pain was controlled with PO medications.  She was discharged home with PO abx and instructions to call Dr. Silveira once the THAI drain was putting out less than 30cc/24hrs.    Consults: interventional radiology      Final Diagnoses:   Principal Problem: Pelvic abscess in female   Secondary Diagnoses:   Active Hospital Problems    Diagnosis  POA    *Pelvic abscess in female [N73.9]  Yes      Resolved Hospital Problems    Diagnosis Date Resolved POA   No resolved problems to display.       Discharged Condition: good    Disposition: Home or Self Care    Follow Up/Patient Instructions:     Medications:  Reconciled Home Medications:   Current Discharge Medication List      START taking these medications    Details   doxycycline (MONODOX) 100 MG capsule Take 1 capsule (100 mg total) by mouth every 12 (twelve) hours.  Qty: 14 capsule, Refills: 0    Associated Diagnoses: Pelvic abscess in female      metronidazole (FLAGYL) 500 MG tablet Take 1 tablet (500 mg total) by mouth every 8 (eight) hours.  Qty: 14 tablet, Refills: 0    Associated  Diagnoses: Pelvic abscess in female      oxycodone-acetaminophen (PERCOCET) 5-325 mg per tablet Take 1 tablet by mouth every 6 (six) hours as needed for Pain.  Qty: 20 tablet, Refills: 0    Associated Diagnoses: Pelvic abscess in female         CONTINUE these medications which have NOT CHANGED    Details   alprazolam (XANAX) 1 MG tablet Take 1 tablet (1 mg total) by mouth 4 (four) times daily as needed for Anxiety.  Qty: 61 tablet, Refills: 0      CHOLESTYRAMINE LIGHT 4 gram Powd TAKE 1 SCOOP by mouth three times a day  Refills: 0      diphenoxylate-atropine 2.5-0.025 mg (LOMOTIL) 2.5-0.025 mg per tablet take 1 to 2 tablets by mouth four times a day if needed for ILOSTOMY OUTPUT  Qty: 120 tablet, Refills: 4      estradiol (ESTRACE) 0.5 MG tablet Take 1 tablet (0.5 mg total) by mouth once daily.  Qty: 30 tablet, Refills: 3    Associated Diagnoses: Vasomotor symptoms due to menopause      HUMIRA 40 mg/0.8 mL injection Refills: 0      HUMIRA PEN CROHN'S-UC-HS START PnKt injection INJECT THE CONTENTS OF 2 PENS SUBCUTANSOUSLY ON DAY 1, 2 PENS ON ...  (REFER TO PRESCRIPTION NOTES).  Refills: 0      HYDROmorphone (DILAUDID) 2 MG tablet Take 1 tablet (2 mg total) by mouth every 4 to 6 hours as needed.  Qty: 40 tablet, Refills: 0    Associated Diagnoses: S/P ERIK-BSO (total abdominal hysterectomy and bilateral salpingo-oophorectomy)      pantoprazole (PROTONIX) 40 MG tablet Take 40 mg by mouth 2 (two) times daily.      promethazine (PHENERGAN) 25 MG tablet Take 25 mg by mouth every 4 (four) hours as needed.      sertraline (ZOLOFT) 100 MG tablet Take 100 mg by mouth once daily.      topiramate (TOPAMAX) 50 MG tablet Take 75 mg by mouth 2 (two) times daily.  Refills: 0             Discharge Procedure Orders  Diet general     Activity as tolerated     Call MD for:  temperature >100.4     Call MD for:  persistent nausea and vomiting or diarrhea     Call MD for:  severe uncontrolled pain     Call MD for:  redness, tenderness, or  signs of infection (pain, swelling, redness, odor or green/yellow discharge around incision site)     Call MD for:  difficulty breathing or increased cough     Call MD for:  severe persistent headache     Call MD for:  persistent dizziness, light-headedness, or visual disturbances       Follow-up Information     Follow up with Andrew Cam MD On 1/17/2017.    Specialty:  Gynecologic Oncology    Contact information:    [address] [place] [id number]  [phone]            ANDREW CAM MD

## 2017-01-09 NOTE — PROGRESS NOTES
ROADTEST  O2- Pt on room air sating 96%  Activity-Pt ambulates independently  Devices- Pt not being sent home on any devices.   Tolerating-Pt tolerating PO diet and medication.  Elimination-Pt voiding and having bowel movements independently.  Self Care- Pt able to do personal hygiene independently  Teaching- Pt instructed on when to take home meds.     Pt's peripheral IV removed. Cath tip intact. Pt tolerated well. AVS and prescriptions given to pt. All questions answered. Pt verbalized understanding. Pt awaiting transport at this time.

## 2017-01-10 LAB — BACTERIA SPEC AEROBE CULT: NO GROWTH

## 2017-01-13 ENCOUNTER — TELEPHONE (OUTPATIENT)
Dept: GYNECOLOGIC ONCOLOGY | Facility: CLINIC | Age: 37
End: 2017-01-13

## 2017-01-13 DIAGNOSIS — N73.9 PELVIC ABSCESS IN FEMALE: Primary | ICD-10-CM

## 2017-01-13 NOTE — TELEPHONE ENCOUNTER
----- Message from Atiya Bullard sent at 1/13/2017  9:56 AM CST -----  Candida Cardona said she was told to call when her drain was less than 30 cc/it has come to that point/pt can be reached at

## 2017-01-13 NOTE — TELEPHONE ENCOUNTER
Spoke with pt. She states she was informed to contact office when drain reach 30cc. Pt states her appetite has not been the same since surgery on 12/19/16 and she can hardly keep anything down. Pt was advise Dr. Silveira is in clinic, will speak with physician. She voiced understanding

## 2017-01-13 NOTE — TELEPHONE ENCOUNTER
Spoke with pt. Per Dr. Silveira, pt is aware she is to have drain removed, appt will be scheduled with interventional radiology and possible appetite loss is due to recovering from surgery. She voiced understanding

## 2017-01-16 LAB — BACTERIA SPEC ANAEROBE CULT: NORMAL

## 2017-01-17 ENCOUNTER — OFFICE VISIT (OUTPATIENT)
Dept: GYNECOLOGIC ONCOLOGY | Facility: CLINIC | Age: 37
End: 2017-01-17
Payer: MEDICARE

## 2017-01-17 VITALS
DIASTOLIC BLOOD PRESSURE: 64 MMHG | BODY MASS INDEX: 27.99 KG/M2 | SYSTOLIC BLOOD PRESSURE: 109 MMHG | HEIGHT: 66 IN | HEART RATE: 110 BPM | WEIGHT: 174.19 LBS

## 2017-01-17 DIAGNOSIS — Z90.722 S/P TAH-BSO (TOTAL ABDOMINAL HYSTERECTOMY AND BILATERAL SALPINGO-OOPHORECTOMY): Primary | ICD-10-CM

## 2017-01-17 DIAGNOSIS — Z90.710 S/P TAH-BSO (TOTAL ABDOMINAL HYSTERECTOMY AND BILATERAL SALPINGO-OOPHORECTOMY): Primary | ICD-10-CM

## 2017-01-17 DIAGNOSIS — N73.9 PELVIC ABSCESS IN FEMALE: ICD-10-CM

## 2017-01-17 DIAGNOSIS — R11.14 BILIOUS VOMITING WITH NAUSEA: ICD-10-CM

## 2017-01-17 DIAGNOSIS — Z90.79 S/P TAH-BSO (TOTAL ABDOMINAL HYSTERECTOMY AND BILATERAL SALPINGO-OOPHORECTOMY): Primary | ICD-10-CM

## 2017-01-17 PROCEDURE — 99213 OFFICE O/P EST LOW 20 MIN: CPT | Mod: PBBFAC | Performed by: OBSTETRICS & GYNECOLOGY

## 2017-01-17 PROCEDURE — 99999 PR PBB SHADOW E&M-EST. PATIENT-LVL III: CPT | Mod: PBBFAC,,, | Performed by: OBSTETRICS & GYNECOLOGY

## 2017-01-17 PROCEDURE — 99024 POSTOP FOLLOW-UP VISIT: CPT | Mod: ,,, | Performed by: OBSTETRICS & GYNECOLOGY

## 2017-01-17 RX ORDER — OXYCODONE AND ACETAMINOPHEN 5; 325 MG/1; MG/1
1 TABLET ORAL EVERY 6 HOURS PRN
Qty: 30 TABLET | Refills: 0 | Status: SHIPPED | OUTPATIENT
Start: 2017-01-17 | End: 2017-03-08

## 2017-01-17 RX ORDER — PROMETHAZINE HYDROCHLORIDE 25 MG/1
25 TABLET ORAL EVERY 4 HOURS PRN
Qty: 30 TABLET | Refills: 0 | Status: SHIPPED | OUTPATIENT
Start: 2017-01-17 | End: 2018-07-02

## 2017-01-17 NOTE — MR AVS SNAPSHOT
[facility] - GYN Oncology  [facility]  [place] [address]  Phone: [phone]                  Candida Cardona   2017 2:15 PM   Office Visit    Description:  Female : 1980   Provider:  Andrew Silveira MD   Department:  [facility] - GYN Oncology           Reason for Visit     mass, ovarian           Diagnoses this Visit        Comments    S/P ERIK-BSO (total abdominal hysterectomy and bilateral salpingo-oophorectomy)    -  Primary     Pelvic abscess in female         Bilious vomiting with nausea                To Do List           Future Appointments        Provider Department Dept Phone    2017 2:00 PM [facility] [phone]      Goals (5 Years of Data)     None      Follow-Up and Disposition     Return in about 4 weeks (around 2017).       These Medications        Disp Refills Start End    oxycodone-acetaminophen (PERCOCET) 5-325 mg per tablet 30 tablet 0 2017     Take 1 tablet by mouth every 6 (six) hours as needed for Pain. - Oral    Pharmacy: [facility] - [place], [place] - [address] Ph #: [phone]       promethazine (PHENERGAN) 25 MG tablet 30 tablet 0 2017     Take 1 tablet (25 mg total) by mouth every 4 (four) hours as needed. - Oral    [facility]: [facility] [address] Ph #: [phone]         [facility]     [facility] Care [facility] -  Assistance  Registered nurses in the [facility] provide clinical advisement, health education, appointment booking, and other advisory services.  Call for this free service at [phone].             Medications           Message regarding Medications     Verify the changes and/or additions to your medication regime listed below are the same as discussed with your clinician today.  If any of these changes or additions are incorrect, please notify your  healthcare provider.        CHANGE how you are taking these medications     Start Taking Instead of    promethazine (PHENERGAN) 25 MG tablet promethazine (PHENERGAN) 25 MG tablet    Dosage:  Take 1 tablet (25 mg total) by mouth every 4 (four) hours as needed. Dosage:  Take 25 mg by mouth every 4 (four) hours as needed.    Reason for Change:  Reorder       STOP taking these medications     HYDROmorphone (DILAUDID) 2 MG tablet Take 1 tablet (2 mg total) by mouth every 4 to 6 hours as needed.           Verify that the below list of medications is an accurate representation of the medications you are currently taking.  If none reported, the list may be blank. If incorrect, please contact your healthcare provider. Carry this list with you in case of emergency.           Current Medications     alprazolam (XANAX) 1 MG tablet Take 1 tablet (1 mg total) by mouth 4 (four) times daily as needed for Anxiety.    CHOLESTYRAMINE LIGHT 4 gram Powd TAKE 1 SCOOP by mouth three times a day    diphenoxylate-atropine 2.5-0.025 mg (LOMOTIL) 2.5-0.025 mg per tablet take 1 to 2 tablets by mouth four times a day if needed for ILOSTOMY OUTPUT    estradiol (ESTRACE) 0.5 MG tablet Take 1 tablet (0.5 mg total) by mouth once daily.    HUMIRA 40 mg/0.8 mL injection     HUMIRA PEN CROHN'S-UC-HS START PnKt injection INJECT THE CONTENTS OF 2 PENS SUBCUTANSOUSLY ON DAY 1, 2 PENS ON ...  (REFER TO PRESCRIPTION NOTES).    oxycodone-acetaminophen (PERCOCET) 5-325 mg per tablet Take 1 tablet by mouth every 6 (six) hours as needed for Pain.    pantoprazole (PROTONIX) 40 MG tablet Take 40 mg by mouth 2 (two) times daily.    promethazine (PHENERGAN) 25 MG tablet Take 1 tablet (25 mg total) by mouth every 4 (four) hours as needed.    sertraline (ZOLOFT) 100 MG tablet Take 100 mg by mouth once daily.    topiramate (TOPAMAX) 50 MG tablet Take 75 mg by mouth 2 (two) times daily.           Clinical Reference Information           Vital Signs - Last Recorded   "Most recent update: 1/17/2017  1:53 PM by Loreto Benedict MA    BP Pulse Ht Wt LMP BMI    109/64 110 5' 6" (1.676 m) 79 kg (174 lb 3.2 oz) 12/12/2016 28.12 kg/m2      Blood Pressure          Most Recent Value    BP  109/64      Allergies as of 1/17/2017     Imuran [Azathioprine Sodium]    [place]      Immunizations Administered on Date of Encounter - 1/17/2017     None      [facility] Sign-Up     Activating your [facility] account is as easy as 1-2-3!     1) Visit [email or web address], select Sign Up Now, enter this activation code and your date of birth, then select Next.  [email or web address]  Expires: 2/23/2017 11:50 AM      2) Create a username and password to use when you visit [facility] in the future and select a security question in case you lose your password and select Next.    3) Enter your e-mail address and click Sign Up!    Additional Information  If you have questions, please e-mail [email or web address] or call [phone] to talk to our [facility] staff. Remember, [facility] is NOT to be used for urgent needs. For medical emergencies, dial 911.         "

## 2017-01-17 NOTE — PROGRESS NOTES
"Subjective:       Patient ID: Candida Cardona is a 36 y.o. female.    Chief Complaint: mass, ovarian (post op)    HPI   Patient ocmes in today for her post op visit after open ERIK/BSO.   Had post op fluid collection just below the peritoneum that required IR drainage and placement of tube. Had drain removed today as the out put has been less that 10 cc for 2 days.   Has good ileostomy output.   Having trouble with nausea off and on but thinks this is due to nerves. Having trouble with her roommate and roommate's  .   Denies fever.     FINAL PATHOLOGIC DIAGNOSIS  UTERUS, CERVIX, RIGHT AND LEFT OVARIES AND FALLOPIAN TUBES, HYSTERECTOMY AND BILATERAL  SALPINGOOOPHORECTOMY:  -Cervix with chronic inflammation. Negative for dysplasia and malignancy.  -Proliferative endometrium, negative for hyperplasia and malignancy.  -Right ovary with benign follicular cyst and corpus luteum cyst.  -Right fallopian tube with benign paratubal cysts, hemorrhage and adhesions.  -Left ovary with benign serous cyst.  -Left fallopian tube with hydrosalpinx, adhesions, fibrosis and hemorrhage.  -Negative for malignancy.    Review of Systems   Constitutional: Negative for fever.   Gastrointestinal: Positive for nausea (see hpi ).       Objective:       Visit Vitals    /64    Pulse 110    Ht 5' 6" (1.676 m)    Wt 79 kg (174 lb 3.2 oz)    LMP 12/12/2016    BMI 28.12 kg/m2       Physical Exam   Constitutional: She is oriented to person, place, and time.   Abdominal: Soft.   Healing midline incision. Not distended. No rebound. Normal bowel sounds.    Genitourinary:   Genitourinary Comments: Bimanual exam:  Vulva: no lesions. Normal appearance  Urethra: Normal size and location. No lesions  Bladder: No masses or tenderness.  Vagina: normal mucosa. No lesion. Cuff intact. Stitches present. Mild induration.   Cervix: absent.   Uterus: absent.  Adnexa: no masses.  Rectovaginal: Not performed      Neurological: She is alert " and oriented to person, place, and time.   Skin: Skin is warm and dry.   Psychiatric: She has a normal mood and affect. Her behavior is normal. Judgment and thought content normal.       Assessment:       1. S/P ERIK-BSO (total abdominal hysterectomy and bilateral salpingo-oophorectomy)    2. Pelvic abscess in female    3. Bilious vomiting with nausea        Plan:   S/P ERIK-BSO (total abdominal hysterectomy and bilateral salpingo-oophorectomy)  RTC in 4 weeks for reinspection of vagina.     Pelvic abscess in female  -     oxycodone-acetaminophen (PERCOCET) 5-325 mg per tablet; Take 1 tablet by mouth every 6 (six) hours as needed for Pain.  Dispense: 30 tablet; Refill: 0    Bilious vomiting with nausea  -     promethazine (PHENERGAN) 25 MG tablet; Take 1 tablet (25 mg total) by mouth every 4 (four) hours as needed.  Dispense: 30 tablet; Refill: 0

## 2017-01-20 ENCOUNTER — TELEPHONE (OUTPATIENT)
Dept: UROLOGY | Facility: CLINIC | Age: 37
End: 2017-01-20

## 2017-01-20 NOTE — TELEPHONE ENCOUNTER
Called pt and left a message on her cell to tell her that i was forwarding her message to dr udnne and to call if she had any questions.

## 2017-01-20 NOTE — TELEPHONE ENCOUNTER
----- Message from Vanda Gillespie sent at 1/20/2017  8:41 AM CST -----  Contact: pt  duncan [phone]   states wife was admitted in hospital on last night and will not make it on this morning. Please call to reschedule cysto.

## 2017-02-03 ENCOUNTER — HOSPITAL ENCOUNTER (OUTPATIENT)
Dept: UROLOGY | Facility: HOSPITAL | Age: 37
Discharge: HOME | End: 2017-02-03
Attending: UROLOGY

## 2017-02-09 PROBLEM — E87.1 ACUTE HYPONATREMIA: Status: ACTIVE | Noted: 2017-02-09

## 2017-02-10 PROBLEM — R07.9 CHEST PAIN: Status: RESOLVED | Noted: 2017-02-10 | Resolved: 2017-02-10

## 2017-02-10 PROBLEM — R07.9 CHEST PAIN: Status: ACTIVE | Noted: 2017-02-10

## 2017-02-10 PROBLEM — E87.20 METABOLIC ACIDOSIS: Status: ACTIVE | Noted: 2017-02-10

## 2017-02-10 PROBLEM — N18.30 CKD (CHRONIC KIDNEY DISEASE) STAGE 3, GFR 30-59 ML/MIN: Chronic | Status: ACTIVE | Noted: 2017-02-10

## 2017-02-10 PROBLEM — N20.0 NEPHROLITHIASIS: Status: ACTIVE | Noted: 2017-02-10

## 2017-02-10 PROBLEM — E83.52 HYPERCALCEMIA: Status: ACTIVE | Noted: 2017-02-10

## 2017-02-10 PROBLEM — E87.5 HYPERKALEMIA: Status: ACTIVE | Noted: 2017-02-10

## 2017-02-11 PROBLEM — R19.7 DIARRHEA: Status: ACTIVE | Noted: 2017-02-11

## 2017-02-11 PROBLEM — D64.9 ANEMIA: Status: ACTIVE | Noted: 2017-02-11

## 2017-02-12 PROBLEM — I95.9 HYPOTENSION: Status: ACTIVE | Noted: 2017-02-12

## 2017-02-12 PROBLEM — R80.9 PROTEINURIA: Status: ACTIVE | Noted: 2017-02-12

## 2017-02-12 PROBLEM — D51.1 VITAMIN B12 DEFICIENCY ANEMIA DUE TO SELECTIVE VITAMIN B12 MALABSORPTION WITH PROTEINURIA: Status: ACTIVE | Noted: 2017-02-11

## 2017-02-12 PROBLEM — I95.9 HYPOTENSION: Status: RESOLVED | Noted: 2017-02-12 | Resolved: 2017-02-12

## 2017-02-12 PROBLEM — E87.1 ACUTE HYPONATREMIA: Status: RESOLVED | Noted: 2017-02-09 | Resolved: 2017-02-12

## 2017-02-13 PROBLEM — R19.7 DIARRHEA: Chronic | Status: ACTIVE | Noted: 2017-02-11

## 2017-02-13 PROBLEM — D64.9 ANEMIA: Chronic | Status: ACTIVE | Noted: 2017-02-11

## 2017-02-15 PROBLEM — E83.52 HYPERCALCEMIA: Status: RESOLVED | Noted: 2017-02-10 | Resolved: 2017-02-15

## 2017-02-16 PROBLEM — E87.5 HYPERKALEMIA: Status: RESOLVED | Noted: 2017-02-10 | Resolved: 2017-02-16

## 2017-02-16 PROBLEM — E87.6 HYPOKALEMIA: Status: ACTIVE | Noted: 2017-02-16

## 2017-02-17 PROBLEM — E87.6 HYPOKALEMIA: Chronic | Status: ACTIVE | Noted: 2017-02-16

## 2017-03-13 PROBLEM — E87.1 HYPONATREMIA: Status: ACTIVE | Noted: 2017-03-13

## 2017-05-03 PROBLEM — K50.90 CROHN DISEASE: Status: ACTIVE | Noted: 2017-05-03

## 2017-05-17 PROBLEM — Z92.241 HISTORY OF RECENT STEROID USE: Status: ACTIVE | Noted: 2017-05-17

## 2017-06-05 PROBLEM — R10.9 ABDOMINAL PAIN: Status: ACTIVE | Noted: 2017-06-05

## 2017-06-05 PROBLEM — E86.0 DEHYDRATION, MODERATE: Status: ACTIVE | Noted: 2017-06-05

## 2017-06-06 PROBLEM — T40.601A NARCOTIC BOWEL SYNDROME: Status: ACTIVE | Noted: 2017-06-06

## 2017-06-06 PROBLEM — M79.89 LEFT UPPER EXTREMITY SWELLING: Status: ACTIVE | Noted: 2017-06-06

## 2017-06-06 PROBLEM — D84.9 IMMUNOSUPPRESSED STATUS: Status: ACTIVE | Noted: 2017-06-06

## 2017-06-06 PROBLEM — E87.20 METABOLIC ACIDOSIS, NORMAL ANION GAP (NAG): Status: RESOLVED | Noted: 2017-02-10 | Resolved: 2017-06-06

## 2017-06-06 PROBLEM — E87.1 HYPONATREMIA: Status: RESOLVED | Noted: 2017-03-13 | Resolved: 2017-06-06

## 2017-06-06 PROBLEM — K50.90 CROHN DISEASE: Status: RESOLVED | Noted: 2017-05-03 | Resolved: 2017-06-06

## 2017-06-06 PROBLEM — K63.89 NARCOTIC BOWEL SYNDROME: Status: ACTIVE | Noted: 2017-06-06

## 2017-06-06 PROBLEM — D64.89 OTHER SPECIFIED ANEMIAS: Status: ACTIVE | Noted: 2017-06-06

## 2017-06-06 PROBLEM — E86.0 DEHYDRATION, MODERATE: Status: ACTIVE | Noted: 2017-06-06

## 2017-06-06 PROBLEM — R11.14 BILIOUS VOMITING WITH NAUSEA: Status: RESOLVED | Noted: 2017-01-17 | Resolved: 2017-06-06

## 2017-06-06 PROBLEM — N18.30 CKD (CHRONIC KIDNEY DISEASE) STAGE 3, GFR 30-59 ML/MIN: Chronic | Status: RESOLVED | Noted: 2017-02-10 | Resolved: 2017-06-06

## 2017-06-21 PROBLEM — E86.0 DEHYDRATION: Status: ACTIVE | Noted: 2017-06-21

## 2018-01-18 PROBLEM — N95.1 VASOMOTOR SYMPTOMS DUE TO MENOPAUSE: Status: ACTIVE | Noted: 2018-01-18

## 2018-01-18 PROBLEM — R05.9 COUGH: Status: ACTIVE | Noted: 2018-01-18

## 2018-01-18 PROBLEM — J06.9 UPPER RESPIRATORY TRACT INFECTION: Status: ACTIVE | Noted: 2018-01-18

## 2018-07-02 PROBLEM — B37.2 CANDIDAL INTERTRIGO: Status: ACTIVE | Noted: 2018-07-02

## 2018-10-24 PROBLEM — E87.20 METABOLIC ACIDOSIS: Status: ACTIVE | Noted: 2018-10-24

## 2018-10-25 PROBLEM — R79.89 ELEVATED LFTS: Status: ACTIVE | Noted: 2018-10-25

## 2018-10-25 PROBLEM — R80.9 PROTEINURIA: Chronic | Status: ACTIVE | Noted: 2017-02-12

## 2018-10-25 PROBLEM — N17.9 AKI (ACUTE KIDNEY INJURY): Status: ACTIVE | Noted: 2018-10-25

## 2018-10-25 PROBLEM — R21 RASH: Status: ACTIVE | Noted: 2018-10-25

## 2018-10-28 PROBLEM — E87.5 HYPERKALEMIA: Status: RESOLVED | Noted: 2017-02-10 | Resolved: 2018-10-28

## 2018-10-28 PROBLEM — E87.20 METABOLIC ACIDOSIS: Status: RESOLVED | Noted: 2018-10-24 | Resolved: 2018-10-28

## 2018-10-30 PROBLEM — N17.9 AKI (ACUTE KIDNEY INJURY): Status: RESOLVED | Noted: 2018-10-25 | Resolved: 2018-10-30

## 2018-10-30 PROBLEM — E87.1 HYPONATREMIA: Status: RESOLVED | Noted: 2017-03-13 | Resolved: 2018-10-30

## 2018-10-30 PROBLEM — R21 RASH: Status: RESOLVED | Noted: 2018-10-25 | Resolved: 2018-10-30

## 2018-10-31 PROBLEM — E87.6 HYPOKALEMIA: Chronic | Status: RESOLVED | Noted: 2017-02-16 | Resolved: 2018-10-31

## 2018-11-01 PROBLEM — E83.42 HYPOMAGNESEMIA: Status: ACTIVE | Noted: 2018-11-01

## 2018-11-01 PROBLEM — E63.9 INADEQUATE DIETARY ENERGY INTAKE: Status: ACTIVE | Noted: 2018-11-01

## 2018-11-02 PROBLEM — E83.42 HYPOMAGNESEMIA: Status: RESOLVED | Noted: 2018-11-01 | Resolved: 2018-11-02

## 2018-11-06 PROBLEM — E87.20 METABOLIC ACIDOSIS: Status: ACTIVE | Noted: 2018-11-06

## 2018-11-07 PROBLEM — R53.81 PHYSICAL DECONDITIONING: Status: ACTIVE | Noted: 2018-11-07

## 2018-11-26 ENCOUNTER — OFFICE VISIT (OUTPATIENT)
Dept: SURGERY | Facility: CLINIC | Age: 38
End: 2018-11-26
Payer: MEDICARE

## 2018-11-26 VITALS
SYSTOLIC BLOOD PRESSURE: 104 MMHG | HEART RATE: 106 BPM | DIASTOLIC BLOOD PRESSURE: 69 MMHG | BODY MASS INDEX: 25.99 KG/M2 | WEIGHT: 165.56 LBS | HEIGHT: 67 IN

## 2018-11-26 DIAGNOSIS — K50.819 CROHN'S DISEASE OF SMALL AND LARGE INTESTINES WITH COMPLICATION: Primary | ICD-10-CM

## 2018-11-26 PROCEDURE — 99999 PR PBB SHADOW E&M-EST. PATIENT-LVL III: CPT | Mod: PBBFAC,,, | Performed by: COLON & RECTAL SURGERY

## 2018-11-26 PROCEDURE — 99213 OFFICE O/P EST LOW 20 MIN: CPT | Mod: PBBFAC | Performed by: COLON & RECTAL SURGERY

## 2018-11-26 PROCEDURE — 99204 OFFICE O/P NEW MOD 45 MIN: CPT | Mod: S$PBB,,, | Performed by: COLON & RECTAL SURGERY

## 2018-11-26 NOTE — LETTER
November 30, 2018      Hussain Amaro MD  [address] [place] [address] [facility], [facility] [address]           [facility]  [address]  [facility] [address]  Phone: [phone]          Patient: Candida Cardona   MR Number: [id number]   YOB: 1980   Date of Visit: 11/26/2018       Dear Dr. Hussain Amaro:    Thank you for referring Candida Cardona to me for evaluation. Attached you will find relevant portions of my assessment and plan of care.    If you have questions, please do not hesitate to call me. I look forward to following Candida Cardona along with you.    Sincerely,    Reji Peterson MD    Enclosure  CC:  No Recipients    If you would like to receive this communication electronically, please contact [email or web address] or [phone] to request more information on [employer] access.    For providers and/or their staff who would like to refer a patient to [facility], please contact us through our one-stop-shop provider referral line, [facility], at [phone].    If you feel you have received this communication in error or would no longer like to receive these types of communications, please e-mail [email or web address]

## 2018-11-27 NOTE — PROGRESS NOTES
Subjective:       Patient ID: Candida Cardona is a 38 y.o. female.    Chief Complaint: Crohn's Disease      Past Medical History:   Diagnosis Date    Anemia     Anxiety     Bilious vomiting with nausea 1/17/2017    Constipation     Crohn's disease     Encounter for blood transfusion     Fibromyalgia     GERD (gastroesophageal reflux disease)     Hyponatremia     Interstitial cystitis     Kidney stones     Mass, ovarian 11/22/2016    Metabolic acidosis     Osteoporosis     PAF (paroxysmal atrial fibrillation)     S/P ERIK-BSO (total abdominal hysterectomy and bilateral salpingo-oophorectomy) 12/19/2016    UTI (urinary tract infection)      Past Surgical History:   Procedure Laterality Date    ABDOMINAL SURGERY      ANGIOPLASTY      ASPIRATION ABSCESS N/A 1/6/2017    Performed by [facility]    [id number]-CL N/A 1/7/2017    [facility] by [facility]    CATHETERIZATION-URETERAL Left 12/19/2016    [facility] by [facility]frey [facility] at [facility] OR 2ND FLR    COLECTOMY, TOTAL, ABDOMINAL N/A 3/14/2014    [facility]formed by [facility]ji [facility] at [facility] OR [facility]ND [facility]LR    COLON SURGERY      x 2    COLONOSCOPY      multiple    COLONOSCOPY N/A 4/11/2017    Procedure: COLONOSCOPY;  Surgeon: Hussain Amaro MD;  [facility][facility];  Service: Endoscopy;  Laterality: N/A;    COLONOSCOPY N/A 4/11/2017    Performed by [facility]ain [facility] MD at [facility] ENDO    CYSTOSCOPY N/A 12/19/2016    [facility] by [facility] at [facility] OR [facility]ND [facility]LR    CYSTOSCOPY W/ URETERAL STENT PLACEMENT      CYSTOSCOPY W/ URETERAL STENT REMOVAL      CYSTOSCOPY WITH STENT PLACEMENT Left 12/20/2016    [facility] by [facility] at [facility] OR [facility]ST FLR    EXPLORATORY-LAPAROTOMY N/A 3/14/2014    Performed by [facility]ji [facility] at [facility] OR [facility]    HYSTERECTOMY      HYSTERECTOMY-TOTAL-ABDOMINAL AND BSO N/A 12/19/2016    Performed by [facility]ira [facility] at [facility] OR [facility]ND [facility]    ILEOSTOMY       INSERTION, PICC N/A 10/30/2018    Performed by [facility] at [facility] OR    LYSIS-ADHESION Left 12/19/2016    Performed by [facility] MD at [facility] OR 2ND FLR    LYSIS-ADHESION  12/19/2016    Performed by Reji Peterson MD at [facility] OR [facility]ND [facility]LR    PERIPHERALLY INSERTED CENTRAL CATHETER INSERTION N/A 10/30/2018    Procedure: INSERTION, PICC;  Surgeon: [facility] Diagnostic Provider;  [facility]: [facility] OR;  Service: General;  Laterality: N/A;    PORTACATH PLACEMENT      SIGMOIDOSCOPY, FLEXIBLE N/A 3/5/2014    Performed by Reji Peterson MD at [facility] ENDO (2ND FLR)    TOTAL COLECTOMY  2014       HPI new patient.  Status post total abdominal colectomy with end ileostomy March of 2014 for Crohn's disease with colonic obstruction. Sigmoidoscopy April 2017 showed mild erythema in the sigmoid colon was otherwise unremarkable.  She is currently on Stelara and is asymptomatic from a Crohn's disease standpoint.  She presents today for consideration of ileostomy closure after having talked with Dr. Amaro.      Review of Systems   Constitutional: Negative for chills and fever.   Respiratory: Negative for cough and shortness of breath.    Cardiovascular: Negative for chest pain and palpitations.   Gastrointestinal: Negative for nausea and vomiting.   Genitourinary: Negative for dysuria and urgency.   Neurological: Negative for seizures and numbness.       Objective:      Physical Exam   Constitutional: She is oriented to person, place, and time. She appears well-developed and well-nourished.   Eyes: Conjunctivae and EOM are normal.   Pulmonary/Chest: Effort normal. No respiratory distress.   Abdominal: Soft. She exhibits no distension. No hernia.   Right lower quadrant ileostomy.   Musculoskeletal: Normal range of motion. She exhibits no edema.   Neurological: She is alert and oriented to person, place, and time.   Skin: Skin is warm and dry.   Psychiatric: She has a normal mood and affect. Her behavior is  normal.       Assessment:       1. Crohn's disease of small and large intestines with complication        Plan:       Will plan for ileostomy closure on 15 January 2019.  Her next Stelara should be mid December.  I have explained the risks, benefits, and alternatives of the procedure in detail.  The patient voices understanding and all questions have been answered. The patient agrees to proceed as planned.

## 2018-12-17 ENCOUNTER — OFFICE VISIT (OUTPATIENT)
Dept: SURGERY | Facility: CLINIC | Age: 38
End: 2018-12-17
Payer: MEDICARE

## 2018-12-17 VITALS — WEIGHT: 158.31 LBS | BODY MASS INDEX: 24.85 KG/M2 | HEIGHT: 67 IN

## 2018-12-17 DIAGNOSIS — K50.819 CROHN'S DISEASE OF SMALL AND LARGE INTESTINES WITH COMPLICATION: Primary | ICD-10-CM

## 2018-12-17 PROCEDURE — 99212 OFFICE O/P EST SF 10 MIN: CPT | Mod: S$PBB,,, | Performed by: COLON & RECTAL SURGERY

## 2018-12-17 PROCEDURE — 99999 PR PBB SHADOW E&M-EST. PATIENT-LVL II: CPT | Mod: PBBFAC,,, | Performed by: COLON & RECTAL SURGERY

## 2018-12-17 PROCEDURE — 99212 OFFICE O/P EST SF 10 MIN: CPT | Mod: PBBFAC | Performed by: COLON & RECTAL SURGERY

## 2018-12-17 RX ORDER — NEOMYCIN SULFATE 500 MG/1
500 TABLET ORAL SEE ADMIN INSTRUCTIONS
Qty: 6 TABLET | Refills: 0 | Status: ON HOLD | OUTPATIENT
Start: 2018-12-17 | End: 2019-01-24 | Stop reason: HOSPADM

## 2018-12-17 RX ORDER — METRONIDAZOLE 500 MG/1
500 TABLET ORAL SEE ADMIN INSTRUCTIONS
Qty: 3 TABLET | Refills: 0 | Status: ON HOLD | OUTPATIENT
Start: 2018-12-17 | End: 2019-01-24 | Stop reason: HOSPADM

## 2018-12-17 NOTE — H&P (VIEW-ONLY)
Subjective:       Patient ID: Candida Cardona is a 38 y.o. female.    Chief Complaint: Pre-op Exam    HPI: Status post total abdominal colectomy with end ileostomy in March of 2014 for Crohn's disease with colonic obstruction. Sigmoidoscopy April 2017 showed mild erythema in the sigmoid colon but was otherwise unremarkable.  She is currently on Stelara and is asymptomatic from a Crohn's disease standpoint. Last dose of Stelara was November 12. She notes she plans on receiving next dose of Stelara 8 weeks from last dose (around December 31st). She presents today for pre-op workup for ileostomy closure. Only interval change is that she notes recent increase in ostomy output. States she has mentioned this with Dr. Nieves; his PA is currently running stool samples for workup. Otherwise no new fevers/chills or constitutional symptoms.      Past Medical History:   Diagnosis Date    Anemia     Anxiety     Bilious vomiting with nausea 1/17/2017    Constipation     Crohn's disease     Encounter for blood transfusion     Fibromyalgia     GERD (gastroesophageal reflux disease)     Hyponatremia     Interstitial cystitis     Kidney stones     Mass, ovarian 11/22/2016    Metabolic acidosis     Osteoporosis     PAF (paroxysmal atrial fibrillation)     S/P ERIK-BSO (total abdominal hysterectomy and bilateral salpingo-oophorectomy) 12/19/2016    UTI (urinary tract infection)      Past Surgical History:   Procedure Laterality Date    ABDOMINAL SURGERY      ANGIOPLASTY      ASPIRATION ABSCESS N/A 1/6/2017    Performed by Rhonda [facility]geon at [facility]    [id number] N/A 1/7/2017    Performed by Rhonda Surgeon at [facility]    CATHETERIZATION-URETERAL Left 12/19/2016    Performed by Matti [facility]frey MD at [facility] OR [facility]    COLECTOMY, TOTAL, ABDOMINAL N/A 3/14/2014    Performed by Reji Peterson MD at [facility] OR 2ND FLR    COLON SURGERY      x 2    COLONOSCOPY      multiple    COLONOSCOPY N/A  4/11/2017    Procedure: COLONOSCOPY;  Surgeon: Hussain Amaro MD;  [facility]: [facility] ENDO;  Service: Endoscopy;  Laterality: N/A;    COLONOSCOPY N/A 4/11/2017    Performed by Hussain [facility]aro MD at [facility] END[facility]    CYSTOSCOPY N/A 12/19/2016    Performed by [facility]i [facility]frey MD at [facility] OR 2ND FLR    CYSTOSCOPY W/ URETERAL STENT PLACEMENT      CYSTOSCOPY W/ URETERAL STENT REMOVAL      CYSTOSCOPY WITH STENT PLACEMENT Left 12/20/2016    Performed by Matti Godfrey MD at [facility] OR 1ST FLR    EXPLORATORY-LAPAROTOMY N/A 3/14/2014    Performed by Reji Peterson MD at [facility] OR 2ND FLR    HYSTERECTOMY      HYSTERECTOMY-TOTAL-ABDOMINAL AND BSO N/A 12/19/2016    Performed by Andrew Silveira MD at [facility] OR 2ND FLR    ILEOSTOMY      INSERTION, PICC N/A 10/30/2018    Performed by [facility] Diagnostic Provider at [facility] OR    LYSIS-ADHESION Left 12/19/2016    Performed by Andrew Silveira MD at [facility] OR 2ND FLR    LYSIS-ADHESION  12/19/2016    Performed by Reji Peterson MD at [facility] OR 2ND FLR    PERIPHERALLY INSERTED CENTRAL CATHETER INSERTION N/A 10/30/2018    Procedure: INSERTION, PICC;  Surgeon: [facility] Diagnostic Provider;  [facility]: [facility] OR;  Service: General;  Laterality: N/A;    PORTACATH PLACEMENT      SIGMOIDOSCOPY, FLEXIBLE N/A 3/5/2014    Performed by Reji Peterson MD at [facility] ENDO (2ND FLR)    TOTAL COLECTOMY  2014       Review of Systems   Constitutional: Negative for chills and fever.   Respiratory: Negative for cough and shortness of breath.    Cardiovascular: Negative for chest pain and palpitations.   Gastrointestinal: Negative for nausea and vomiting.   Genitourinary: Negative for dysuria and urgency.   Neurological: Negative for seizures and numbness.       Objective:      Physical Exam   Constitutional: She is oriented to person, place, and time. She appears well-developed and well-nourished.   Eyes: Conjunctivae and EOM are normal.   Pulmonary/Chest: Effort normal. No respiratory  distress.   Abdominal: Soft. She exhibits no distension. No hernia.   Right lower quadrant ileostomy.   Musculoskeletal: Normal range of motion. She exhibits no edema.   Neurological: She is alert and oriented to person, place, and time.   Skin: Skin is warm and dry.   Psychiatric: She has a normal mood and affect. Her behavior is normal.       Assessment:       Ms. Candida Cardona is a 37 yo female who presents for pre-operative assessment for ileostomy closure.   Plan:       Will plan for Open ileostomy closure on 15 January 2019.  Her last Stelara dose was November 12, with the next dose scheduled for end of December. Will hold December dose.  I have explained the risks, benefits, and alternatives of the procedure in detail.  The patient voices understanding and all questions have been answered. The patient agrees to proceed as planned.    -Will obtain consents and complete pre-operative teaching.      Cat Wilson MD  Colorectal Surgery, PGYI [facility]

## 2018-12-17 NOTE — PRE ADMISSION SCREENING
"Anesthesia Assessment: Preoperative EQUATION    Planned Procedure: Procedure(s) (LRB):  CLOSURE, ILEOSTOMY (N/A)  Requested Anesthesia Type:General  Surgeon: Reji Peterson MD  Service: Colon and Rectal  Known or anticipated Date of Surgery:1/15/2019    Surgeon notes: reviewed and Crohn's disease of small and large intestines with complication  Previous anesthesia records:4/11/17-Colonoscopy-MAC-no apparent anesthetic complications    Anesthesia Hx:  No problems with previous Anesthesia     Last PCP note: 3-6 months ago , within [facility] , focused visit   Subspecialty notes: GYN Oncology  Tests already available:  Results have been reviewed.Labs-11/8/18-CBC/BMP/Phosphorus/Magnesium/  10/24/18-UA/  10/11/18-HFP/  CXR-3/13/17/   EKG- 3/13/17              Plan:    Testing:  Hemoglobin and HCT ?     Patient  has previously scheduled Medical Appointment:    Navigation: Tests Scheduled. Pending decision per anesthesia review of recent labs?             Consults scheduled.POC on ? & [facility] PCP--Sent in-basket message requesting "Clearance"-PENDING             Results will be tracked by Preop Clinic.                 Reyna Elizabeth RN  12/17/18  "

## 2018-12-17 NOTE — PROGRESS NOTES
Subjective:       Patient ID: Candida Cardona is a 38 y.o. female.    Chief Complaint: Pre-op Exam    HPI: Status post total abdominal colectomy with end ileostomy in March of 2014 for Crohn's disease with colonic obstruction. Sigmoidoscopy April 2017 showed mild erythema in the sigmoid colon but was otherwise unremarkable.  She is currently on Stelara and is asymptomatic from a Crohn's disease standpoint. Last dose of Stelara was November 12. She notes she plans on receiving next dose of Stelara 8 weeks from last dose (around December 31st). She presents today for pre-op workup for ileostomy closure. Only interval change is that she notes recent increase in ostomy output. States she has mentioned this with Dr. Nieves; his PA is currently running stool samples for workup. Otherwise no new fevers/chills or constitutional symptoms.      Past Medical History:   Diagnosis Date    Anemia     Anxiety     Bilious vomiting with nausea 1/17/2017    Constipation     Crohn's disease     Encounter for blood transfusion     Fibromyalgia     GERD (gastroesophageal reflux disease)     Hyponatremia     Interstitial cystitis     Kidney stones     Mass, ovarian 11/22/2016    Metabolic acidosis     Osteoporosis     PAF (paroxysmal atrial fibrillation)     S/P ERIK-BSO (total abdominal hysterectomy and bilateral salpingo-oophorectomy) 12/19/2016    UTI (urinary tract infection)      Past Surgical History:   Procedure Laterality Date    ABDOMINAL SURGERY      ANGIOPLASTY      ASPIRATION ABSCESS N/A 1/6/2017    Performed by [facility]onda [facility]geon at [facility]DA    [id number] N/A 1/7/2017    Performed by Rhonda Surgeon at [facility] RHONDA    CATHETERIZATION-URETERAL Left 12/19/2016    Performed by Matti Godfrey MD at [facility] OR [facility] FLR    COLECTOMY, TOTAL, ABDOMINAL N/A 3/14/2014    Performed by Reji Peterson MD at [facility] OR 2ND FLR    COLON SURGERY      x 2    COLONOSCOPY      multiple    COLONOSCOPY N/A  4/11/2017    Procedure: COLONOSCOPY;  Surgeon: Hussain Amaro MD;  [facility]: [facility] ENDO;  Service: Endoscopy;  Laterality: N/A;    COLONOSCOPY N/A 4/11/2017    Performed by Hussain Amaro MD at [facility] ENDO    CYSTOSCOPY N/A 12/19/2016    Performed by Matti [facility]frey MD at [facility] OR 2ND FLR    CYSTOSCOPY W/ URETERAL STENT PLACEMENT      CYSTOSCOPY W/ URETERAL STENT REMOVAL      CYSTOSCOPY WITH STENT PLACEMENT Left 12/20/2016    Performed by Matti [facility]frey MD at [facility] OR 1ST FLR    EXPLORATORY-LAPAROTOMY N/A 3/14/2014    Performed by Reji Peterson MD at [facility] OR 2ND FLR    HYSTERECTOMY      HYSTERECTOMY-TOTAL-ABDOMINAL AND BSO N/A 12/19/2016    Performed by Andrew Silveira MD at [facility] OR 2ND FLR    ILEOSTOMY      INSERTION, PICC N/A 10/30/2018    Performed by [facility] Diagnostic Provider at [facility] OR    LYSIS-ADHESION Left 12/19/2016    Performed by Andrew Silveira MD at [facility] OR 2ND FLR    LYSIS-ADHESION  12/19/2016    Performed by Reji Peterson MD at [facility] OR 2ND FLR    PERIPHERALLY INSERTED CENTRAL CATHETER INSERTION N/A 10/30/2018    Procedure: INSERTION, PICC;  Surgeon: [facility] Diagnostic Provider;  [facility]: [facility] OR;  Service: General;  Laterality: N/A;    PORTACATH PLACEMENT      SIGMOIDOSCOPY, FLEXIBLE N/A 3/5/2014    Performed by Reji Peterson MD at [facility] ENDO (2ND FLR)    TOTAL COLECTOMY  2014       Review of Systems   Constitutional: Negative for chills and fever.   Respiratory: Negative for cough and shortness of breath.    Cardiovascular: Negative for chest pain and palpitations.   Gastrointestinal: Negative for nausea and vomiting.   Genitourinary: Negative for dysuria and urgency.   Neurological: Negative for seizures and numbness.       Objective:      Physical Exam   Constitutional: She is oriented to person, place, and time. She appears well-developed and well-nourished.   Eyes: Conjunctivae and EOM are normal.   Pulmonary/Chest: Effort normal. No respiratory  distress.   Abdominal: Soft. She exhibits no distension. No hernia.   Right lower quadrant ileostomy.   Musculoskeletal: Normal range of motion. She exhibits no edema.   Neurological: She is alert and oriented to person, place, and time.   Skin: Skin is warm and dry.   Psychiatric: She has a normal mood and affect. Her behavior is normal.       Assessment:       Ms. Candida Cardona is a 37 yo female who presents for pre-operative assessment for ileostomy closure.   Plan:       Will plan for Open ileostomy closure on 15 January 2019.  Her last Stelara dose was November 12, with the next dose scheduled for end of December. Will hold December dose.  I have explained the risks, benefits, and alternatives of the procedure in detail.  The patient voices understanding and all questions have been answered. The patient agrees to proceed as planned.    -Will obtain consents and complete pre-operative teaching.      Cat Wilson MD  Colorectal Surgery, PGYI [facility]

## 2018-12-18 ENCOUNTER — ANESTHESIA EVENT (OUTPATIENT)
Dept: SURGERY | Facility: HOSPITAL | Age: 38
DRG: 330 | End: 2018-12-18
Payer: MEDICARE

## 2018-12-19 DIAGNOSIS — Z01.818 PREOP TESTING: Primary | ICD-10-CM

## 2018-12-19 NOTE — ANESTHESIA PREPROCEDURE EVALUATION
" Anesthesia Assessment: Preoperative EQUATION    Planned Procedure: Procedure(s) (LRB):  CLOSURE, ILEOSTOMY (N/A)  Requested Anesthesia Type:General  Surgeon: Reji Peterson MD  Service: Colon and Rectal  Known or anticipated Date of Surgery:1/15/2019    Surgeon notes: reviewed and Crohn's disease of small and large intestines with complication  Previous anesthesia records:4/11/17-Colonoscopy-MAC-no apparent anesthetic complications    Anesthesia Hx:  No problems with previous Anesthesia     Last PCP note: 3-6 months ago , within [facility] , focused visit   Subspecialty notes: GYN Oncology  Tests already available:  Results have been reviewed.Labs-11/8/18-CBC/BMP/Phosphorus/Magnesium/  10/24/18-UA/  10/11/18-HFP/  CXR-3/13/17/   EKG- 3/13/17              Plan:    Testing: Hem Prof (per review with -Anesthesia)     Patient  has previously scheduled Medical Appointment:    Navigation: Tests Scheduled. Hem Profile on  1/8/18 @ 10:15a @ [facility] lab   (per review of last H&H results with )             Consults scheduled.POC on 1/9/19 @ 3p & [facility] PCP--Sent in-basket message requesting "Clearance"-PENDING--Update:Patient has "Clearance" appt. Set up with Dr. Hiram Vásquez on 1/7/18 @ 10:20 @ [facility]/Clinic-PENDING             Results will be tracked by Preop Clinic.                 Reyna Elizabeth RN  12/17/18     Addendum:Patient has "Clearance" appt. set up with Dr. Hiram Vásquez @ [facility] on 1/7/18 @ 10:20a-                                                                                                         12/19/2018  Candida Cardona is a 38 y.o., female.    Anesthesia Evaluation         Review of Systems  Anesthesia Hx:  No problems with previous Anesthesia History of prior surgery of interest to airway management or planning: Previous anesthesia: General Colonoscopy 10/2018 at outside facility with general " anesthesia.  Denies Family Hx of Anesthesia complications.    Social:  Non-Smoker, No Alcohol Use    Hematology/Oncology:     Oncology Normal    -- Anemia:   EENT/Dental:   glasses   Cardiovascular:   Denies Hypertension. Dysrhythmias (paroxysmal AFib 2017 r/t dehydration)   Functional Capacity good / => 4 METS, climb 1 FOS, grocery shop; tires easily; denies CP, SOB    Pulmonary:   Denies Asthma.  Denies Shortness of breath.  Denies Sleep Apnea.    Renal/:   Chronic Renal Disease ([facility] 12/21/18 per labs-followed by PCP; repeat BMP today, 1/9/19) renal calculi    Hepatic/GI:   GERD, well controlled Crohn's ds s/p TAC w/ileostomy 2014; h/o hydration issues due to ileostomy output   Musculoskeletal:   Arthritis (pt reports RA)     OB/GYN/PEDS:  S/p hysterectomy 2017   Neurological:   Denies CVA. Denies Seizures. fibromyalgia Denies Pain    Endocrine:   Denies Diabetes.    Psych:   anxiety          Physical Exam  General:  Well nourished    Airway/Jaw/Neck:  Airway Findings: Mouth Opening: Small, but > 3cm Tongue: Normal  General Airway Assessment: Adult  Mallampati: I  TM Distance: Normal, at least 6 cm  Jaw/Neck Findings:     Neck ROM: Normal ROM      Dental:  Dental Findings: Periodontal disease, Severe    Chest/Lungs:  Chest/Lungs Findings: Clear to auscultation, Normal Respiratory Rate     Heart/Vascular:  Heart Findings: Rate: Normal  Rhythm: Regular Rhythm  Sounds: Normal        Mental Status:  Mental Status Findings:  Cooperative, Alert and Oriented       Pt was seen in POC 1/9/19; pending PCP clearance  /Paris Keenan RN    Addendum 1/14/19 1726: requested final clearance from Dr Vásquez by in basket message (11:13 am); also discussed case with Dr Mireles-no need to repeat BMP the AM of surgery.    Pt was seen by PCP on 1/7/19; per PCP, Dr Vásquez:    1.  Patient here for preop clearance, has a history of Crohn's disease, has a ileostomy, plan to have reversal done at [facility], denied any chest pain  shortness of breath able to do adult daily living with no issues, can walk up a flight of stairs with no issues, review of lab noted with IRIS, patient has a tendency to have dehydration secondary to ostomy output, will need repeat blood work, also had a episode of proximal AFib, after dehydration episode, denied any chest pain or palpitation, no issues since then, EKG done today with sinus rhythm, repeat blood work, would in no with final clearance  2. IRIS, repeat blood work from 2 weeks ago showed creatinine 1.5, baseline around 0.9, will need to repeat  3.  Ileostomy, planning for reversal with surgeons at [facility], no paperwork present  4.  Crohn disease, followed with GI, on Stelara therapy  5.  Lab today  6. RTC as scheduled or if symptoms worsen    Pt had BMP on 1/7/19, 1/8/19 and 1/9/18; results were reviewed with Dr Mireles/Paris Keenan RN              Anesthesia Plan  Type of Anesthesia, risks & benefits discussed:  Anesthesia Type:  general  Patient's Preference:   Intra-op Monitoring Plan: standard ASA monitors  Intra-op Monitoring Plan Comments:   Post Op Pain Control Plan: multimodal analgesia  Post Op Pain Control Plan Comments:   Induction:   IV  Beta Blocker:  Patient is not currently on a Beta-Blocker (No further documentation required).       Informed Consent: Patient understands risks and agrees with Anesthesia plan.  Questions answered. Anesthesia consent signed with patient.  ASA Score: 2     Day of Surgery Review of History & Physical:    H&P update referred to the surgeon.         Ready For Surgery From Anesthesia Perspective.

## 2018-12-20 ENCOUNTER — TELEPHONE (OUTPATIENT)
Dept: PREADMISSION TESTING | Facility: HOSPITAL | Age: 38
End: 2018-12-20

## 2018-12-20 NOTE — TELEPHONE ENCOUNTER
----- Message from Reyna Elizabeth RN sent at 12/19/2018  1:33 PM CST -----  Garrett Roldan, Patient is having surgery with , on 1/15/18, and will need a POC appt. The lab appt-(Hem Prof) has already been set up to be done at [facility]-pt. Requested. Thank you. PJ

## 2019-01-08 RX ORDER — SODIUM CHLORIDE 9 MG/ML
INJECTION, SOLUTION INTRAVENOUS CONTINUOUS
Status: DISCONTINUED | OUTPATIENT
Start: 2019-01-08 | End: 2019-01-08 | Stop reason: HOSPADM

## 2019-01-09 ENCOUNTER — HOSPITAL ENCOUNTER (OUTPATIENT)
Dept: PREADMISSION TESTING | Facility: HOSPITAL | Age: 39
Discharge: HOME | End: 2019-01-09
Attending: ANESTHESIOLOGY
Payer: MEDICARE

## 2019-01-09 VITALS
OXYGEN SATURATION: 98 % | TEMPERATURE: 98 F | BODY MASS INDEX: 24.53 KG/M2 | HEIGHT: 67 IN | DIASTOLIC BLOOD PRESSURE: 59 MMHG | HEART RATE: 96 BPM | SYSTOLIC BLOOD PRESSURE: 92 MMHG | WEIGHT: 156.31 LBS

## 2019-01-09 NOTE — DISCHARGE INSTRUCTIONS
Your surgery has been scheduled for:__________________________________________    You should report to:  ____[facility], located on the [place] side of the first floor of the           [facility] ([phone])  ____[facility], located on the [facility] side of the            Second floor of the [facility] ([phone])  ____3rd Floor [facility] located on the [facility] side of the [facility] [phone]  Please Note   - Tell your doctor if you take Aspirin, products containing Aspirin, herbal medications  or blood thinners, such as Coumadin, Ticlid, or Plavix.  (Consult your provider regarding holding or stopping before surgery).  - Arrange for someone to drive you home following surgery.  You will not be allowed to leave the surgical facility alone or drive yourself home following sedation and anesthesia.  Before Surgery  - Stop taking all herbal medications 14days prior to surgery  - No Motrin/Advil (Ibuprofen) 7 days before surgery  - No Aleve (Naproxen) 7 days before surgery  - Stop Taking Asprin, products containing Asprin _____days before surgery  - Stop taking blood thinners_______days before surgery  - No Goody's/BC  Powder 7 days before surgery  - Refrain from drinking alcoholic beverages for 24hours before and after surgery  - Stop or limit smoking _________days before surgery  - You may take Tylenol for pain    Night before Surgery  NOTHING TO EAT OR DRINK AFTER MIDNIGHT OR FOLLOW SURGEON'S INSTRUCTIONS  - Take a shower or bath (shower is recommended).  Bathe with Hibiclens soap or an antibacterial soap from the neck down.  If not supplied by your surgeon, hibiclens soap will need to be purchased over the counter in pharmacy.  Rinse soap off thoroughly.  - Shampoo your hair with your regular shampoo  The Day of Surgery  ·  If you are told to take medication on the morning of surgery, it may be taken with  a sip of water.   - Take another bath or shower with hibiclens or any antibacterial soap, to reduce the chance of infection.  - Take heart and blood pressure medications with a small sip of water, as advised by the perioperative team.  - Do not take fluid pills  - You may brush your teeth and rinse your mouth, but do not swall any additional water.   - Do not apply perfumes, powder, body lotions or deodorant on the day of surgery.  - Nail polish should be removed.  - Do not wear makeup or moisturizer  - Wear comfortable clothes, such as a button front shirt and loose fitting pants.  - Leave all jewelry, including body piercings, and valuables at home.    - Bring any devices you will neeed after surgery such as crutches or canes.  - If you have sleep apnea, please bring your CPAP machine  In the event that your physical condition changes including the onset of a cold or respiratory illness, or if you have to delay or cancel your surgery, please notify your surgeon.    Anesthesia: General Anesthesia  Youre due to have surgery. During surgery, youll be given medication called anesthesia. (It is also called anesthetic.) This will keep you comfortable and pain-free. Your anesthesia provider will use general anesthesia. This sheet tells you more about it.  What is general anesthesia?     You are watched continuously during your procedure by the anesthesia provider   General anesthesia puts you into a state like deep sleep. It goes into the bloodstream (IV anesthetics), into the lungs (gas anesthetics), or both. You feel nothing during the procedure. You will not remember it. During the procedure, the anesthesia provider monitors you continuously. He or she checks your heart rate and rhythm, blood pressure, breathing, and blood oxygen.  · IV Anesthetics. IV anesthetics are given through an IV line in your arm. Theyre often given first. This is so you are asleep before a gas anesthetic is started. Some kinds of IV  anesthetics relieve pain. Others relax you. Your doctor will decide which kind is best in your case.  · Gas Anesthetics. Gas anesthetics are breathed into the lungs. They are often used to keep you asleep. They can be given through a facemask or a tube placed in your larynx or trachea (breathing tube).  ? If you have a facemask, your anesthesia provider will most likely place it over your nose and mouth while youre still awake. Youll breathe oxygen through the mask as your IV anesthetic is started. Gas anesthetic may be added through the mask.  ? If you have a tube in the larynx or trachea, it will be inserted into your throat after youre asleep.  Anesthesia tools and medications  You will likely have:  · IV anesthetics. These are put into an IV line into your bloodstream.  · Gas anesthetics. You breathe these anesthetics into your lungs, where they pass into your bloodstream.  · Pulse oximeter. This is a small clip that is attached to the end of your finger. This measures your blood oxygen level.  · Electrocardiography leads (electrodes). These are small sticky pads that are placed on your chest. They record your heart rate and rhythm.  · Blood pressure cuff. This reads your blood pressure.  Risks and possible complications  General anesthesia has some risks. These include:  · Breathing problems  · Nausea and vomiting  · Sore throat or hoarseness (usually temporary)  · Allergic reaction to the anesthetic  · Irregular heartbeat (rare)  · Cardiac arrest (rare)   Anesthesia safety  · Follow all instructions you are given for how long not to eat or drink before your procedure.  · Be sure your doctor knows what medications and drugs you take. This includes over-the-counter medications, herbs, supplements, alcohol or other drugs. You will be asked when those were last taken.  · Have an adult family member or friend drive you home after the procedure.  · For the first 24 hours after your surgery:  ? Do not drive or use  heavy equipment.  ? Have a trusted family member or spouse make important decisions or sign documents.  ? Avoid alcohol.  ? Have a responsible adult stay with you. He or she can watch for problems and help keep you safe.  Date Last Reviewed: 10/16/2014  © 9715-5979 [employer]. [address] [place] [address]. All rights reserved. This information is not intended as a substitute for professional medical care. Always follow your healthcare professional's instructions.

## 2019-01-11 ENCOUNTER — TELEPHONE (OUTPATIENT)
Dept: SURGERY | Facility: CLINIC | Age: 39
End: 2019-01-11

## 2019-01-11 NOTE — TELEPHONE ENCOUNTER
----- Message from Rosalba Ramachandran sent at 1/11/2019  1:50 PM CST -----  Contact:  (Amarilys):[phone]  .Needs Advice    Reason for call:'s office Amarilys called and states she would like to speak with the nurse in regards to getting more information for the pt surgery clearance.        Communication Preference: (Amarilys):[phone] or Fax:[phone]    Additional Information:

## 2019-01-11 NOTE — TELEPHONE ENCOUNTER
Called Tasneem with the answering service picking up.  The office closed at 2:00 pm.  Will call again on Monday.

## 2019-01-14 ENCOUNTER — TELEPHONE (OUTPATIENT)
Dept: SURGERY | Facility: CLINIC | Age: 39
End: 2019-01-14

## 2019-01-14 NOTE — TELEPHONE ENCOUNTER
Spoke with Amarilys and she informed me that she is waiting for her lab work to be done in order for Dr. Vásquez to clear her for surgery.

## 2019-01-14 NOTE — TELEPHONE ENCOUNTER
----- Message from Kanika Morales RN sent at 1/11/2019  3:57 PM CST -----  Contact:  (Amarilys):[phone]  Call Monday.  ----- Message -----  From: Rosalba Ramachandran  Sent: 1/11/2019   1:50 PM  To: Kristen MISHRA Staff    .Needs Advice    Reason for call:'s office Amarilys called and states she would like to speak with the nurse in regards to getting more information for the pt surgery clearance.        Communication Preference: (Amarilys):[phone] or Fax:[phone]    Additional Information:

## 2019-01-15 ENCOUNTER — HOSPITAL ENCOUNTER (INPATIENT)
Facility: HOSPITAL | Age: 39
LOS: 17 days | Discharge: HOME HEALTH | DRG: 330 | End: 2019-02-01
Attending: COLON & RECTAL SURGERY | Admitting: COLON & RECTAL SURGERY
Payer: MEDICARE

## 2019-01-15 ENCOUNTER — ANESTHESIA (OUTPATIENT)
Dept: SURGERY | Facility: HOSPITAL | Age: 39
DRG: 330 | End: 2019-01-15
Payer: MEDICARE

## 2019-01-15 DIAGNOSIS — K50.819 CROHN'S DISEASE OF SMALL AND LARGE INTESTINES WITH COMPLICATION: Chronic | ICD-10-CM

## 2019-01-15 DIAGNOSIS — E46 MALNUTRITION, UNSPECIFIED TYPE: ICD-10-CM

## 2019-01-15 DIAGNOSIS — E87.1 HYPONATREMIA: ICD-10-CM

## 2019-01-15 DIAGNOSIS — K50.818 CROHN'S DISEASE OF BOTH SMALL AND LARGE INTESTINE WITH OTHER COMPLICATION: Primary | Chronic | ICD-10-CM

## 2019-01-15 DIAGNOSIS — Z22.7 LATENT TUBERCULOSIS BY BLOOD TEST: ICD-10-CM

## 2019-01-15 DIAGNOSIS — Z93.2 ILEOSTOMY IN PLACE: ICD-10-CM

## 2019-01-15 DIAGNOSIS — N28.9 ACUTE RENAL INSUFFICIENCY: ICD-10-CM

## 2019-01-15 LAB
ABO + RH BLD: NORMAL
BLD GP AB SCN CELLS X3 SERPL QL: NORMAL

## 2019-01-15 PROCEDURE — 63600175 PHARM REV CODE 636 W HCPCS

## 2019-01-15 PROCEDURE — 71000033 HC RECOVERY, INTIAL HOUR: Performed by: COLON & RECTAL SURGERY

## 2019-01-15 PROCEDURE — 63600175 PHARM REV CODE 636 W HCPCS: Performed by: STUDENT IN AN ORGANIZED HEALTH CARE EDUCATION/TRAINING PROGRAM

## 2019-01-15 PROCEDURE — D9220A PRA ANESTHESIA: Mod: CRNA,,, | Performed by: NURSE ANESTHETIST, CERTIFIED REGISTERED

## 2019-01-15 PROCEDURE — C9290 INJ, BUPIVACAINE LIPOSOME: HCPCS | Performed by: COLON & RECTAL SURGERY

## 2019-01-15 PROCEDURE — 94799 UNLISTED PULMONARY SVC/PX: CPT

## 2019-01-15 PROCEDURE — 44626 REPAIR BOWEL OPENING: CPT | Mod: 22,,, | Performed by: COLON & RECTAL SURGERY

## 2019-01-15 PROCEDURE — 86850 RBC ANTIBODY SCREEN: CPT

## 2019-01-15 PROCEDURE — D9220A PRA ANESTHESIA: Mod: ANES,,, | Performed by: ANESTHESIOLOGY

## 2019-01-15 PROCEDURE — 27000221 HC OXYGEN, UP TO 24 HOURS

## 2019-01-15 PROCEDURE — 20600001 HC STEP DOWN PRIVATE ROOM

## 2019-01-15 PROCEDURE — 25000003 PHARM REV CODE 250: Performed by: NURSE PRACTITIONER

## 2019-01-15 PROCEDURE — 44626 PR CLOSE ENTEROSTOMY,RESEC+COLOREC ANAS: ICD-10-PCS | Mod: 22,,, | Performed by: COLON & RECTAL SURGERY

## 2019-01-15 PROCEDURE — 63600175 PHARM REV CODE 636 W HCPCS: Performed by: NURSE PRACTITIONER

## 2019-01-15 PROCEDURE — 36000708 HC OR TIME LEV III 1ST 15 MIN: Performed by: COLON & RECTAL SURGERY

## 2019-01-15 PROCEDURE — 63600175 PHARM REV CODE 636 W HCPCS: Performed by: NURSE ANESTHETIST, CERTIFIED REGISTERED

## 2019-01-15 PROCEDURE — 37000009 HC ANESTHESIA EA ADD 15 MINS: Performed by: COLON & RECTAL SURGERY

## 2019-01-15 PROCEDURE — 37000008 HC ANESTHESIA 1ST 15 MINUTES: Performed by: COLON & RECTAL SURGERY

## 2019-01-15 PROCEDURE — C1765 ADHESION BARRIER: HCPCS | Performed by: COLON & RECTAL SURGERY

## 2019-01-15 PROCEDURE — D9220A PRA ANESTHESIA: ICD-10-PCS | Mod: ANES,,, | Performed by: ANESTHESIOLOGY

## 2019-01-15 PROCEDURE — 63600175 PHARM REV CODE 636 W HCPCS: Performed by: COLON & RECTAL SURGERY

## 2019-01-15 PROCEDURE — 94761 N-INVAS EAR/PLS OXIMETRY MLT: CPT

## 2019-01-15 PROCEDURE — 25000003 PHARM REV CODE 250: Performed by: COLON & RECTAL SURGERY

## 2019-01-15 PROCEDURE — 27201423 OPTIME MED/SURG SUP & DEVICES STERILE SUPPLY: Performed by: COLON & RECTAL SURGERY

## 2019-01-15 PROCEDURE — 25000003 PHARM REV CODE 250: Performed by: STUDENT IN AN ORGANIZED HEALTH CARE EDUCATION/TRAINING PROGRAM

## 2019-01-15 PROCEDURE — 36000709 HC OR TIME LEV III EA ADD 15 MIN: Performed by: COLON & RECTAL SURGERY

## 2019-01-15 PROCEDURE — C1729 CATH, DRAINAGE: HCPCS | Performed by: COLON & RECTAL SURGERY

## 2019-01-15 PROCEDURE — 27100025 HC TUBING, SET FLUID WARMER: Performed by: NURSE ANESTHETIST, CERTIFIED REGISTERED

## 2019-01-15 PROCEDURE — 71000039 HC RECOVERY, EACH ADD'L HOUR: Performed by: COLON & RECTAL SURGERY

## 2019-01-15 PROCEDURE — 25000003 PHARM REV CODE 250: Performed by: NURSE ANESTHETIST, CERTIFIED REGISTERED

## 2019-01-15 PROCEDURE — S0030 INJECTION, METRONIDAZOLE: HCPCS | Performed by: NURSE PRACTITIONER

## 2019-01-15 PROCEDURE — D9220A PRA ANESTHESIA: ICD-10-PCS | Mod: CRNA,,, | Performed by: NURSE ANESTHETIST, CERTIFIED REGISTERED

## 2019-01-15 DEVICE — BARRIER SEPRAFILM ADHESION: Type: IMPLANTABLE DEVICE | Site: ABDOMEN | Status: FUNCTIONAL

## 2019-01-15 RX ORDER — DEXAMETHASONE SODIUM PHOSPHATE 4 MG/ML
INJECTION, SOLUTION INTRA-ARTICULAR; INTRALESIONAL; INTRAMUSCULAR; INTRAVENOUS; SOFT TISSUE
Status: DISCONTINUED | OUTPATIENT
Start: 2019-01-15 | End: 2019-01-15

## 2019-01-15 RX ORDER — ROCURONIUM BROMIDE 10 MG/ML
INJECTION, SOLUTION INTRAVENOUS
Status: DISCONTINUED | OUTPATIENT
Start: 2019-01-15 | End: 2019-01-15

## 2019-01-15 RX ORDER — SODIUM CHLORIDE 9 MG/ML
INJECTION, SOLUTION INTRAVENOUS CONTINUOUS
Status: DISCONTINUED | OUTPATIENT
Start: 2019-01-15 | End: 2019-01-16

## 2019-01-15 RX ORDER — MUPIROCIN 20 MG/G
OINTMENT TOPICAL
Status: DISPENSED | OUTPATIENT
Start: 2019-01-15

## 2019-01-15 RX ORDER — PROPOFOL 10 MG/ML
VIAL (ML) INTRAVENOUS
Status: DISCONTINUED | OUTPATIENT
Start: 2019-01-15 | End: 2019-01-15

## 2019-01-15 RX ORDER — ACETAMINOPHEN 10 MG/ML
1000 INJECTION, SOLUTION INTRAVENOUS
Status: COMPLETED | OUTPATIENT
Start: 2019-01-15 | End: 2019-01-15

## 2019-01-15 RX ORDER — ONDANSETRON 2 MG/ML
4 INJECTION INTRAMUSCULAR; INTRAVENOUS EVERY 6 HOURS PRN
Status: DISCONTINUED | OUTPATIENT
Start: 2019-01-15 | End: 2019-01-16

## 2019-01-15 RX ORDER — ACETAMINOPHEN 10 MG/ML
1000 INJECTION, SOLUTION INTRAVENOUS EVERY 8 HOURS
Status: COMPLETED | OUTPATIENT
Start: 2019-01-15 | End: 2019-01-16

## 2019-01-15 RX ORDER — LIDOCAINE HCL/PF 100 MG/5ML
SYRINGE (ML) INTRAVENOUS
Status: DISCONTINUED | OUTPATIENT
Start: 2019-01-15 | End: 2019-01-15

## 2019-01-15 RX ORDER — PANTOPRAZOLE SODIUM 40 MG/1
40 TABLET, DELAYED RELEASE ORAL 2 TIMES DAILY
Status: DISCONTINUED | OUTPATIENT
Start: 2019-01-15 | End: 2019-02-01 | Stop reason: HOSPADM

## 2019-01-15 RX ORDER — NALOXONE HCL 0.4 MG/ML
0.02 VIAL (ML) INJECTION
Status: DISCONTINUED | OUTPATIENT
Start: 2019-01-15 | End: 2019-02-01 | Stop reason: HOSPADM

## 2019-01-15 RX ORDER — METRONIDAZOLE 500 MG/100ML
500 INJECTION, SOLUTION INTRAVENOUS
Status: COMPLETED | OUTPATIENT
Start: 2019-01-15 | End: 2019-01-15

## 2019-01-15 RX ORDER — LIDOCAINE HYDROCHLORIDE 10 MG/ML
1 INJECTION, SOLUTION EPIDURAL; INFILTRATION; INTRACAUDAL; PERINEURAL ONCE
Status: COMPLETED | OUTPATIENT
Start: 2019-01-15 | End: 2019-01-15

## 2019-01-15 RX ORDER — HEPARIN SODIUM 5000 [USP'U]/ML
5000 INJECTION, SOLUTION INTRAVENOUS; SUBCUTANEOUS
Status: COMPLETED | OUTPATIENT
Start: 2019-01-15 | End: 2019-01-15

## 2019-01-15 RX ORDER — FLUOXETINE HYDROCHLORIDE 20 MG/1
60 CAPSULE ORAL DAILY
Status: DISCONTINUED | OUTPATIENT
Start: 2019-01-15 | End: 2019-02-01 | Stop reason: HOSPADM

## 2019-01-15 RX ORDER — FENTANYL CITRATE 50 UG/ML
INJECTION, SOLUTION INTRAMUSCULAR; INTRAVENOUS
Status: DISCONTINUED | OUTPATIENT
Start: 2019-01-15 | End: 2019-01-15

## 2019-01-15 RX ORDER — GLYCOPYRROLATE 0.2 MG/ML
INJECTION INTRAMUSCULAR; INTRAVENOUS
Status: DISCONTINUED | OUTPATIENT
Start: 2019-01-15 | End: 2019-01-15

## 2019-01-15 RX ORDER — MIDAZOLAM HYDROCHLORIDE 1 MG/ML
INJECTION, SOLUTION INTRAMUSCULAR; INTRAVENOUS
Status: DISCONTINUED | OUTPATIENT
Start: 2019-01-15 | End: 2019-01-15

## 2019-01-15 RX ORDER — PHENYLEPHRINE HYDROCHLORIDE 10 MG/ML
INJECTION INTRAVENOUS
Status: DISCONTINUED | OUTPATIENT
Start: 2019-01-15 | End: 2019-01-15

## 2019-01-15 RX ORDER — HYDROMORPHONE HCL IN 0.9% NACL 6 MG/30 ML
PATIENT CONTROLLED ANALGESIA SYRINGE INTRAVENOUS
Status: COMPLETED
Start: 2019-01-15 | End: 2019-01-15

## 2019-01-15 RX ORDER — TOPIRAMATE 25 MG/1
50 TABLET ORAL NIGHTLY
Status: DISCONTINUED | OUTPATIENT
Start: 2019-01-15 | End: 2019-02-01 | Stop reason: HOSPADM

## 2019-01-15 RX ORDER — MUPIROCIN 20 MG/G
1 OINTMENT TOPICAL 2 TIMES DAILY
Status: DISPENSED | OUTPATIENT
Start: 2019-01-15 | End: 2019-01-20

## 2019-01-15 RX ORDER — SODIUM CHLORIDE, SODIUM LACTATE, POTASSIUM CHLORIDE, CALCIUM CHLORIDE 600; 310; 30; 20 MG/100ML; MG/100ML; MG/100ML; MG/100ML
INJECTION, SOLUTION INTRAVENOUS CONTINUOUS
Status: DISCONTINUED | OUTPATIENT
Start: 2019-01-15 | End: 2019-01-16

## 2019-01-15 RX ORDER — KETAMINE HYDROCHLORIDE 10 MG/ML
INJECTION, SOLUTION INTRAMUSCULAR; INTRAVENOUS
Status: DISCONTINUED | OUTPATIENT
Start: 2019-01-15 | End: 2019-01-15

## 2019-01-15 RX ORDER — HYDROMORPHONE HCL IN 0.9% NACL 6 MG/30 ML
PATIENT CONTROLLED ANALGESIA SYRINGE INTRAVENOUS CONTINUOUS
Status: DISCONTINUED | OUTPATIENT
Start: 2019-01-15 | End: 2019-01-20

## 2019-01-15 RX ORDER — ONDANSETRON 2 MG/ML
INJECTION INTRAMUSCULAR; INTRAVENOUS
Status: DISCONTINUED | OUTPATIENT
Start: 2019-01-15 | End: 2019-01-15

## 2019-01-15 RX ORDER — NEOSTIGMINE METHYLSULFATE 1 MG/ML
INJECTION, SOLUTION INTRAVENOUS
Status: DISCONTINUED | OUTPATIENT
Start: 2019-01-15 | End: 2019-01-15

## 2019-01-15 RX ORDER — HYDROMORPHONE HYDROCHLORIDE 1 MG/ML
0.2 INJECTION, SOLUTION INTRAMUSCULAR; INTRAVENOUS; SUBCUTANEOUS EVERY 5 MIN PRN
Status: DISCONTINUED | OUTPATIENT
Start: 2019-01-15 | End: 2019-01-15 | Stop reason: HOSPADM

## 2019-01-15 RX ORDER — CYCLOBENZAPRINE HCL 5 MG
10 TABLET ORAL 3 TIMES DAILY PRN
Status: DISCONTINUED | OUTPATIENT
Start: 2019-01-15 | End: 2019-02-01 | Stop reason: HOSPADM

## 2019-01-15 RX ORDER — BUPIVACAINE HYDROCHLORIDE 2.5 MG/ML
INJECTION, SOLUTION EPIDURAL; INFILTRATION; INTRACAUDAL
Status: DISCONTINUED | OUTPATIENT
Start: 2019-01-15 | End: 2019-01-15 | Stop reason: HOSPADM

## 2019-01-15 RX ADMIN — ROCURONIUM BROMIDE 10 MG: 10 INJECTION, SOLUTION INTRAVENOUS at 08:01

## 2019-01-15 RX ADMIN — SODIUM CHLORIDE, SODIUM LACTATE, POTASSIUM CHLORIDE, AND CALCIUM CHLORIDE: .6; .31; .03; .02 INJECTION, SOLUTION INTRAVENOUS at 11:01

## 2019-01-15 RX ADMIN — FENTANYL CITRATE 100 MCG: 50 INJECTION, SOLUTION INTRAMUSCULAR; INTRAVENOUS at 07:01

## 2019-01-15 RX ADMIN — FENTANYL CITRATE 25 MCG: 50 INJECTION, SOLUTION INTRAMUSCULAR; INTRAVENOUS at 09:01

## 2019-01-15 RX ADMIN — PROPOFOL 200 MG: 10 INJECTION, EMULSION INTRAVENOUS at 07:01

## 2019-01-15 RX ADMIN — Medication: at 08:01

## 2019-01-15 RX ADMIN — CEFTRIAXONE 2 G: 2 INJECTION, SOLUTION INTRAVENOUS at 07:01

## 2019-01-15 RX ADMIN — SODIUM CHLORIDE, SODIUM LACTATE, POTASSIUM CHLORIDE, AND CALCIUM CHLORIDE: .6; .31; .03; .02 INJECTION, SOLUTION INTRAVENOUS at 06:01

## 2019-01-15 RX ADMIN — ACETAMINOPHEN 1000 MG: 10 INJECTION, SOLUTION INTRAVENOUS at 01:01

## 2019-01-15 RX ADMIN — LIDOCAINE HYDROCHLORIDE 100 MG: 20 INJECTION, SOLUTION INTRAVENOUS at 07:01

## 2019-01-15 RX ADMIN — Medication: at 11:01

## 2019-01-15 RX ADMIN — ROCURONIUM BROMIDE 50 MG: 10 INJECTION, SOLUTION INTRAVENOUS at 07:01

## 2019-01-15 RX ADMIN — SODIUM CHLORIDE: 0.9 INJECTION, SOLUTION INTRAVENOUS at 07:01

## 2019-01-15 RX ADMIN — NEOSTIGMINE METHYLSULFATE 3.5 MG: 1 INJECTION INTRAVENOUS at 10:01

## 2019-01-15 RX ADMIN — DEXAMETHASONE SODIUM PHOSPHATE 8 MG: 4 INJECTION, SOLUTION INTRAMUSCULAR; INTRAVENOUS at 07:01

## 2019-01-15 RX ADMIN — KETAMINE HYDROCHLORIDE 10 MG: 10 INJECTION, SOLUTION INTRAMUSCULAR; INTRAVENOUS at 09:01

## 2019-01-15 RX ADMIN — MIDAZOLAM HYDROCHLORIDE 2 MG: 1 INJECTION, SOLUTION INTRAMUSCULAR; INTRAVENOUS at 07:01

## 2019-01-15 RX ADMIN — KETAMINE HYDROCHLORIDE 10 MG: 10 INJECTION, SOLUTION INTRAMUSCULAR; INTRAVENOUS at 07:01

## 2019-01-15 RX ADMIN — METRONIDAZOLE 500 MG: 500 SOLUTION INTRAVENOUS at 07:01

## 2019-01-15 RX ADMIN — ACETAMINOPHEN 1000 MG: 10 INJECTION, SOLUTION INTRAVENOUS at 09:01

## 2019-01-15 RX ADMIN — PHENYLEPHRINE HYDROCHLORIDE 100 MCG: 10 INJECTION INTRAVENOUS at 10:01

## 2019-01-15 RX ADMIN — PHENYLEPHRINE HYDROCHLORIDE 100 MCG: 10 INJECTION INTRAVENOUS at 07:01

## 2019-01-15 RX ADMIN — PANTOPRAZOLE SODIUM 40 MG: 40 TABLET, DELAYED RELEASE ORAL at 08:01

## 2019-01-15 RX ADMIN — MUPIROCIN: 20 OINTMENT TOPICAL at 06:01

## 2019-01-15 RX ADMIN — TOPIRAMATE 50 MG: 25 TABLET, FILM COATED ORAL at 08:01

## 2019-01-15 RX ADMIN — LIDOCAINE HYDROCHLORIDE 0.1 MG: 10 INJECTION, SOLUTION EPIDURAL; INFILTRATION; INTRACAUDAL; PERINEURAL at 07:01

## 2019-01-15 RX ADMIN — HEPARIN SODIUM 5000 UNITS: 5000 INJECTION, SOLUTION INTRAVENOUS; SUBCUTANEOUS at 06:01

## 2019-01-15 RX ADMIN — MUPIROCIN 1 G: 20 OINTMENT TOPICAL at 01:01

## 2019-01-15 RX ADMIN — ROCURONIUM BROMIDE 5 MG: 10 INJECTION, SOLUTION INTRAVENOUS at 10:01

## 2019-01-15 RX ADMIN — SODIUM CHLORIDE, SODIUM GLUCONATE, SODIUM ACETATE, POTASSIUM CHLORIDE, MAGNESIUM CHLORIDE, SODIUM PHOSPHATE, DIBASIC, AND POTASSIUM PHOSPHATE: .53; .5; .37; .037; .03; .012; .00082 INJECTION, SOLUTION INTRAVENOUS at 10:01

## 2019-01-15 RX ADMIN — ONDANSETRON 4 MG: 2 INJECTION INTRAMUSCULAR; INTRAVENOUS at 07:01

## 2019-01-15 RX ADMIN — SODIUM CHLORIDE, SODIUM GLUCONATE, SODIUM ACETATE, POTASSIUM CHLORIDE, MAGNESIUM CHLORIDE, SODIUM PHOSPHATE, DIBASIC, AND POTASSIUM PHOSPHATE: .53; .5; .37; .037; .03; .012; .00082 INJECTION, SOLUTION INTRAVENOUS at 08:01

## 2019-01-15 RX ADMIN — GLYCOPYRROLATE 0.4 MG: 0.2 INJECTION, SOLUTION INTRAMUSCULAR; INTRAVENOUS at 10:01

## 2019-01-15 RX ADMIN — ACETAMINOPHEN 1000 MG: 10 INJECTION, SOLUTION INTRAVENOUS at 07:01

## 2019-01-15 NOTE — INTERVAL H&P NOTE
The patient has been examined and the H&P has been reviewed:    I concur with the findings and no changes have occurred since H&P was written.    Anesthesia/Surgery risks, benefits and alternative options discussed and understood by patient/family.          Active Hospital Problems    Diagnosis  POA    Ileostomy in place [Z93.2]  Not Applicable      Resolved Hospital Problems   No resolved problems to display.

## 2019-01-15 NOTE — PLAN OF CARE
VSS. Patient demonstrates proper usage of PCA pump. No N&V. SCD's in place throughout duration in PACU. Transferred to the next Phase of Care.

## 2019-01-15 NOTE — PROGRESS NOTES
Pt. Difficult IV. Delay to OR due to difficult stick. Dr. Roque made aware of rash and skin condition to abdomen.

## 2019-01-15 NOTE — NURSING TRANSFER
Nursing Transfer Note      1/15/2019     Transfer To: 1022    Transfer via bed    Transported by PCT    Medicines sent: MIVF & dilaudid PCA    Chart send with patient: Yes    Notified: sister    Patient reassessed at: 1/15/18 @ 1300    x2 belongings bags in bed at time of transport

## 2019-01-15 NOTE — TRANSFER OF CARE
"Anesthesia Transfer of Care Note    Patient: Candida Cardona    Procedure(s) Performed: Procedure(s):  ANASTOMOSIS, ILEORECTAL  CLOSURE, ILEOSTOMY  URETEROLYSIS  LAPAROTOMY, EXPLORATORY;extensive lysis of adhesions    Patient location: PACU    Anesthesia Type: general    Transport from OR: Transported from OR on 100% O2 by closed face mask with adequate spontaneous ventilation    Post pain: adequate analgesia    Post assessment: no apparent anesthetic complications and tolerated procedure well    Post vital signs: stable    Level of consciousness: awake and responds to stimulation    Nausea/Vomiting: no nausea/vomiting    Complications: none    Transfer of care protocol was followed      Last vitals:   Visit Vitals  BP 95/66 (BP [facility]: Left arm, Patient Position: Lying)   Pulse 66   Temp 36.5 °C (97.7 °F) (Temporal)   Resp 13   Ht 5' 7" (1.702 m)   Wt 70.3 kg (155 lb)   LMP  (LMP Unknown)   SpO2 100%   Breastfeeding? No   BMI 24.28 kg/m²     "

## 2019-01-15 NOTE — OP NOTE
DATE OF PROCEDURE:  01/15/2019    PREOPERATIVE DIAGNOSIS:  Crohn's disease with unwanted ileostomy.    POSTOPERATIVE DIAGNOSIS:  Crohn's disease with unwanted ileostomy.    OPERATIONS:  1.  Ileostomy closure with ileorectal anastomosis.  2.  Exploratory laparotomy with extensive lysis of adhesions.  3.  Ureterolysis.  4.  Sigmoidoscopy.    SURGEON:  Reji Peterson M.D.    ASSISTANT:  Dr. Luis Frank.    ANESTHESIA:  General endotracheal.    SPECIMENS:  None.    ESTIMATED BLOOD LOSS:  300 mL.    COMPLICATIONS:  None.    INDICATIONS FOR OPERATION:  Ms. Cardona is a 38-year-old female who is status   post subtotal colectomy with end ileostomy.  She has been on treatment for   Crohn's disease and most recently has had good clinical response to her   treatment and has no evidence of active Crohn's disease.  She presents today for   ileostomy closure understanding that she may have poor functional results given   the short amount of colon and rectum that she has.    DESCRIPTION OF PROCEDURE:  After obtaining informed consent, the patient was   taken to the Operating Room and placed in the supine position.  She underwent   general endotracheal anesthesia and was placed lithotomy.  She was noted to have   extensive excoriation of the skin around her stoma.  A midline incision was   made from the symphysis pubis up to just above the umbilicus, taken down sharply   through the subcutaneous fat, and linea alba.  On entering the abdominal   cavity, there were significant adhesions to the abdominal wall to the   retroperitoneum and to adjacent small bowel.  These were taken down sharply.    This portion of the case involved extensive adhesiolysis, which made the case   take twice as long as what would normally be expected.  Once the adhesions were   taken down off the abdominal wall, retroperitoneum, and out of the pelvis, we   were able to identify the remaining colon and rectum.  This was adherent to the   posterior bladder  and taken down sharply.  Some mobilization posterior was   performed.  Once this was completed, a sigmoidoscope was placed and inserted up   to the apex of the rectum.  There was no evidence of active Crohn's disease.    The ileostomy was then taken down sharply by incising the mucocutaneous junction   down to the level of the fascia.  Once this was completely mobilized, small   bowel was brought back in the abdominal cavity.  TAP block was performed with 20   mL of Exparel, 25 mL of 0.25% Marcaine, and 50 mL of saline.  The small bowel   was mobilized to the point that it would reach the rectum without difficulty.    The stapled end of the sigmoid colon was excised.  There was brisk bleeding from   the mesentery here.  Pursestring suture was placed with 3-0 PDS.  The anvil of   a 25 mm circular stapler was placed.  The cut end of the terminal small bowel   was then excised and the stapler was placed with the spike brought out through   the antimesenteric portion approximately 6 cm from the transected end.  Stapled   side-to-end anastomosis was performed.  The staple was removed and then the   enterotomy where the staple was not placed was closed with a linear firing of   the linear cutting stapler.  This staple line was oversewed with a running 3-0   PDS.  Interrupted 3-0 Lembert's were placed circumferentially around the   side-to-end anastomosis.  This was leak tested.  There were no bubbles.  The   anastomosis was widely patent.  Hemostasis was ensured.  ____ placed in the   pelvis and the space of Retzius was removed.  A drain was placed in the pelvis   and brought up through a separate stab incision in the left lower quadrant.  The   prior ostomy site was closed anterior and posterior with #1 PDS.  Subcutaneous   fat was irrigated.  Seprafilm was placed throughout the abdomen and then the   midline fascia was closed with #1 PDS in a running fashion.  Subcutaneous fat   was irrigated.  Skin was closed with  subcuticular Monocryl and the previous   ostomy site was closed to the skin with a pursestring 4-0 Monocryl and the wound   packed.  Sponge, needle, and instrument counts were correct at this point.    Sterile dressings were applied.  The patient tolerated the procedure well, taken   to Recovery Room in a stable condition.      RHEA/WADE  dd: 01/15/2019 16:08:08 (CST)  td: 01/15/2019 17:30:28 (CST)  Doc ID   #[id number]  Job ID #[id number]    CC:

## 2019-01-15 NOTE — NURSING
Pt arrived to unit. No acute distress noted. Pt lying in bed resting. Pt oriented to unit. Will continue to monitor closely.

## 2019-01-15 NOTE — BRIEF OP NOTE
[facility]  Brief Operative Note    SUMMARY     Surgery Date: 1/15/2019     Surgeon(s) and Role:     * Reji Peterson MD - Primary    Assisting Surgeon: None    Pre-Operative Care:  Pre-operative bowel prep Mechanical and PO antibiotics  IV antibiotics given within 1 hour of incision? Yes  Preoperative multimodal pain control? Orfirmev, Calador and TAP    Pre-op Diagnosis:  Crohn's disease of small and large intestines with complication [K50.819]    Operative Care:  Post-op Diagnosis: Post-Op Diagnosis Codes:     * Crohn's disease of small and large intestines with complication [K50.819]    Procedure(s):  ANASTOMOSIS, ILEORECTAL  CLOSURE, ILEOSTOMY  URETEROLYSIS  LAPAROTOMY, EXPLORATORY;extensive lysis of adhesions    Anesthesia: * No anesthesia type entered *  Total volume administered .   Total UOP: .  Anesthesia Start: .  Anesthesia Stop: .    Technical Procedures Used:  Ileostomy takedown, RENETTA, ileorectal anastomosis    Use of closing instrument setup? N/A  Gown/Glove Change @ time of closing? N/A  Suction tip change at time of closing? N/A  Wound Protector used if open case? N/A    Description of the findings of the procedure:   1. Extensive RENETTA  2. Ileorectal anastomosis     Wound Class contaminated    Complications: No     Estimated Blood Loss: 300 mL .          Specimens:   Specimen (12h ago, onward)    None          Implants:   Implant Name Type Inv. Item Serial No.  Lot No. LRB No. Used   BARRIER SEPRAFILM ADHESION - [id number]  BARRIER SEPRAFILM ADHESION  [id number] [id number]  1       Post-Operative Care:         Disposition: PACU - hemodynamically stable.           Condition: Good  PT Temp >36 upon leaving the OR? Yes

## 2019-01-16 PROBLEM — N28.9 ACUTE RENAL INSUFFICIENCY: Status: ACTIVE | Noted: 2019-01-16

## 2019-01-16 LAB
ANION GAP SERPL CALC-SCNC: 11 MMOL/L
ANION GAP SERPL CALC-SCNC: 12 MMOL/L
ANION GAP SERPL CALC-SCNC: 6 MMOL/L
ANION GAP SERPL CALC-SCNC: 9 MMOL/L
ANISOCYTOSIS BLD QL SMEAR: SLIGHT
BASO STIPL BLD QL SMEAR: ABNORMAL
BASOPHILS # BLD AUTO: 0.02 K/UL
BASOPHILS # BLD AUTO: 0.02 K/UL
BASOPHILS NFR BLD: 0.1 %
BASOPHILS NFR BLD: 0.1 %
BUN SERPL-MCNC: 39 MG/DL
BUN SERPL-MCNC: 39 MG/DL
BUN SERPL-MCNC: 41 MG/DL
BUN SERPL-MCNC: 41 MG/DL
CALCIUM SERPL-MCNC: 8.7 MG/DL
CALCIUM SERPL-MCNC: 8.8 MG/DL
CALCIUM SERPL-MCNC: 9.2 MG/DL
CALCIUM SERPL-MCNC: 9.5 MG/DL
CHLORIDE SERPL-SCNC: 100 MMOL/L
CHLORIDE SERPL-SCNC: 96 MMOL/L
CHLORIDE SERPL-SCNC: 96 MMOL/L
CHLORIDE SERPL-SCNC: 97 MMOL/L
CO2 SERPL-SCNC: 15 MMOL/L
CO2 SERPL-SCNC: 15 MMOL/L
CO2 SERPL-SCNC: 18 MMOL/L
CO2 SERPL-SCNC: 21 MMOL/L
CREAT SERPL-MCNC: 1.9 MG/DL
CREAT SERPL-MCNC: 2 MG/DL
CREAT SERPL-MCNC: 2 MG/DL
CREAT SERPL-MCNC: 2.2 MG/DL
CREAT UR-MCNC: 101 MG/DL
DIFFERENTIAL METHOD: ABNORMAL
DIFFERENTIAL METHOD: ABNORMAL
EOSINOPHIL # BLD AUTO: 0 K/UL
EOSINOPHIL # BLD AUTO: 0 K/UL
EOSINOPHIL NFR BLD: 0 %
EOSINOPHIL NFR BLD: 0 %
ERYTHROCYTE [DISTWIDTH] IN BLOOD BY AUTOMATED COUNT: 15.9 %
ERYTHROCYTE [DISTWIDTH] IN BLOOD BY AUTOMATED COUNT: 16.3 %
EST. GFR  (AFRICAN AMERICAN): 32.3 ML/MIN/1.73 M^2
EST. GFR  (AFRICAN AMERICAN): 36.2 ML/MIN/1.73 M^2
EST. GFR  (AFRICAN AMERICAN): 36.2 ML/MIN/1.73 M^2
EST. GFR  (AFRICAN AMERICAN): 38.5 ML/MIN/1.73 M^2
EST. GFR  (NON AFRICAN AMERICAN): 28 ML/MIN/1.73 M^2
EST. GFR  (NON AFRICAN AMERICAN): 31.4 ML/MIN/1.73 M^2
EST. GFR  (NON AFRICAN AMERICAN): 31.4 ML/MIN/1.73 M^2
EST. GFR  (NON AFRICAN AMERICAN): 33.4 ML/MIN/1.73 M^2
GLUCOSE SERPL-MCNC: 127 MG/DL
GLUCOSE SERPL-MCNC: 127 MG/DL
GLUCOSE SERPL-MCNC: 132 MG/DL
GLUCOSE SERPL-MCNC: 133 MG/DL
HCT VFR BLD AUTO: 35.1 %
HCT VFR BLD AUTO: 38.2 %
HGB BLD-MCNC: 11 G/DL
HGB BLD-MCNC: 12.2 G/DL
IMM GRANULOCYTES # BLD AUTO: 0.09 K/UL
IMM GRANULOCYTES # BLD AUTO: 0.09 K/UL
IMM GRANULOCYTES NFR BLD AUTO: 0.5 %
IMM GRANULOCYTES NFR BLD AUTO: 0.6 %
LYMPHOCYTES # BLD AUTO: 1.3 K/UL
LYMPHOCYTES # BLD AUTO: 1.3 K/UL
LYMPHOCYTES NFR BLD: 6.4 %
LYMPHOCYTES NFR BLD: 8.1 %
MAGNESIUM SERPL-MCNC: 1.6 MG/DL
MCH RBC QN AUTO: 29.5 PG
MCH RBC QN AUTO: 29.6 PG
MCHC RBC AUTO-ENTMCNC: 31.3 G/DL
MCHC RBC AUTO-ENTMCNC: 31.9 G/DL
MCV RBC AUTO: 92 FL
MCV RBC AUTO: 95 FL
MONOCYTES # BLD AUTO: 1.8 K/UL
MONOCYTES # BLD AUTO: 2.2 K/UL
MONOCYTES NFR BLD: 11.1 %
MONOCYTES NFR BLD: 11.1 %
NEUTROPHILS # BLD AUTO: 12.9 K/UL
NEUTROPHILS # BLD AUTO: 16.1 K/UL
NEUTROPHILS NFR BLD: 80.1 %
NEUTROPHILS NFR BLD: 81.9 %
NRBC BLD-RTO: 0 /100 WBC
NRBC BLD-RTO: 0 /100 WBC
PHOSPHATE SERPL-MCNC: 6 MG/DL
PLATELET # BLD AUTO: 393 K/UL
PLATELET # BLD AUTO: 448 K/UL
PLATELET BLD QL SMEAR: ABNORMAL
PMV BLD AUTO: 9.8 FL
PMV BLD AUTO: 9.9 FL
POTASSIUM SERPL-SCNC: 4.2 MMOL/L
POTASSIUM SERPL-SCNC: 4.2 MMOL/L
POTASSIUM SERPL-SCNC: 4.3 MMOL/L
POTASSIUM SERPL-SCNC: 4.7 MMOL/L
RBC # BLD AUTO: 3.71 M/UL
RBC # BLD AUTO: 4.14 M/UL
SODIUM SERPL-SCNC: 122 MMOL/L
SODIUM SERPL-SCNC: 123 MMOL/L
SODIUM SERPL-SCNC: 124 MMOL/L
SODIUM SERPL-SCNC: 127 MMOL/L
SODIUM UR-SCNC: <20 MMOL/L
WBC # BLD AUTO: 16.11 K/UL
WBC # BLD AUTO: 19.66 K/UL

## 2019-01-16 PROCEDURE — 84100 ASSAY OF PHOSPHORUS: CPT

## 2019-01-16 PROCEDURE — 25000003 PHARM REV CODE 250: Performed by: STUDENT IN AN ORGANIZED HEALTH CARE EDUCATION/TRAINING PROGRAM

## 2019-01-16 PROCEDURE — 63600175 PHARM REV CODE 636 W HCPCS: Performed by: STUDENT IN AN ORGANIZED HEALTH CARE EDUCATION/TRAINING PROGRAM

## 2019-01-16 PROCEDURE — 25000003 PHARM REV CODE 250: Performed by: NURSE PRACTITIONER

## 2019-01-16 PROCEDURE — 36415 COLL VENOUS BLD VENIPUNCTURE: CPT

## 2019-01-16 PROCEDURE — 80048 BASIC METABOLIC PNL TOTAL CA: CPT | Mod: 91

## 2019-01-16 PROCEDURE — 82570 ASSAY OF URINE CREATININE: CPT

## 2019-01-16 PROCEDURE — 20600001 HC STEP DOWN PRIVATE ROOM

## 2019-01-16 PROCEDURE — 84300 ASSAY OF URINE SODIUM: CPT

## 2019-01-16 PROCEDURE — 83735 ASSAY OF MAGNESIUM: CPT

## 2019-01-16 PROCEDURE — 85025 COMPLETE CBC W/AUTO DIFF WBC: CPT

## 2019-01-16 PROCEDURE — 80048 BASIC METABOLIC PNL TOTAL CA: CPT

## 2019-01-16 RX ORDER — ONDANSETRON 2 MG/ML
8 INJECTION INTRAMUSCULAR; INTRAVENOUS EVERY 6 HOURS PRN
Status: DISCONTINUED | OUTPATIENT
Start: 2019-01-16 | End: 2019-02-01 | Stop reason: HOSPADM

## 2019-01-16 RX ORDER — SODIUM CHLORIDE 9 MG/ML
INJECTION, SOLUTION INTRAVENOUS CONTINUOUS
Status: DISCONTINUED | OUTPATIENT
Start: 2019-01-16 | End: 2019-01-19

## 2019-01-16 RX ADMIN — MUPIROCIN 1 G: 20 OINTMENT TOPICAL at 11:01

## 2019-01-16 RX ADMIN — PANTOPRAZOLE SODIUM 40 MG: 40 TABLET, DELAYED RELEASE ORAL at 08:01

## 2019-01-16 RX ADMIN — SODIUM CHLORIDE: 0.9 INJECTION, SOLUTION INTRAVENOUS at 06:01

## 2019-01-16 RX ADMIN — SODIUM CHLORIDE: 0.9 INJECTION, SOLUTION INTRAVENOUS at 05:01

## 2019-01-16 RX ADMIN — MUPIROCIN 1 G: 20 OINTMENT TOPICAL at 08:01

## 2019-01-16 RX ADMIN — ONDANSETRON 4 MG: 2 INJECTION INTRAMUSCULAR; INTRAVENOUS at 12:01

## 2019-01-16 RX ADMIN — SODIUM CHLORIDE, SODIUM LACTATE, POTASSIUM CHLORIDE, AND CALCIUM CHLORIDE 1000 ML: .6; .31; .03; .02 INJECTION, SOLUTION INTRAVENOUS at 06:01

## 2019-01-16 RX ADMIN — ONDANSETRON 4 MG: 2 INJECTION INTRAMUSCULAR; INTRAVENOUS at 10:01

## 2019-01-16 RX ADMIN — FLUOXETINE HYDROCHLORIDE 60 MG: 20 CAPSULE ORAL at 08:01

## 2019-01-16 RX ADMIN — TOPIRAMATE 50 MG: 25 TABLET, FILM COATED ORAL at 08:01

## 2019-01-16 RX ADMIN — SODIUM CHLORIDE: 0.9 INJECTION, SOLUTION INTRAVENOUS at 10:01

## 2019-01-16 RX ADMIN — ACETAMINOPHEN 1000 MG: 10 INJECTION, SOLUTION INTRAVENOUS at 06:01

## 2019-01-16 RX ADMIN — Medication: at 03:01

## 2019-01-16 RX ADMIN — SODIUM CHLORIDE, SODIUM LACTATE, POTASSIUM CHLORIDE, AND CALCIUM CHLORIDE 1000 ML: .6; .31; .03; .02 INJECTION, SOLUTION INTRAVENOUS at 11:01

## 2019-01-16 RX ADMIN — ONDANSETRON 4 MG: 2 INJECTION INTRAMUSCULAR; INTRAVENOUS at 03:01

## 2019-01-16 RX ADMIN — ONDANSETRON 8 MG: 2 INJECTION INTRAMUSCULAR; INTRAVENOUS at 08:01

## 2019-01-16 NOTE — PROGRESS NOTES
Laurent not discontinued per MD order. 1000 ml bolus of LR started for low urine output. Will continue to monitor.

## 2019-01-16 NOTE — PROGRESS NOTES
[facility]  Colorectal Surgery  Progress Note    Patient Name: Candida Cardona  MRN: [id number]  Admission Date: 1/15/2019  Hospital Length of Stay: 1 days  Attending Physician: Reji Peterson MD    Subjective:     Interval History: no acute events overnite, good urine output, adequaet pain control, no n/v    Post-Op Info:  Procedure(s):  ANASTOMOSIS, ILEORECTAL  CLOSURE, ILEOSTOMY  URETEROLYSIS  LAPAROTOMY, EXPLORATORY;extensive lysis of adhesions   1 Day Post-Op      Medications:  Continuous Infusions:   sodium chloride 0.9% 150 mL/hr at 01/16/19 1033    hydromorphone in 0.9 % NaCl 6 mg/30 ml       Scheduled Meds:   FLUoxetine  60 mg Oral Daily    lactated ringers  1,000 mL Intravenous Once    mupirocin  1 g Nasal BID    pantoprazole  40 mg Oral BID    topiramate  50 mg Oral QHS     PRN Meds:   cyclobenzaprine    mupirocin    naloxone    ondansetron        Objective:     Vital Signs (Most Recent):  Temp: 97.5 °F (36.4 °C) (01/16/19 1147)  Pulse: 90 (01/16/19 1147)  Resp: 16 (01/16/19 1147)  BP: (!) 104/56 (01/16/19 1147)  SpO2: 97 % (01/16/19 1147) Vital Signs (24h Range):  Temp:  [96.9 °F (36.1 °C)-97.9 °F (36.6 °C)] 97.5 °F (36.4 °C)  Pulse:  [65-94] 90  Resp:  [15-17] 16  SpO2:  [95 %-100 %] 97 %  BP: (103-114)/(53-71) 104/56     Intake/Output - Last 3 Shifts       01/14 0700 - 01/15 0659 01/15 0700 - 01/16 0659 01/16 0700 - 01/17 0659    P.O.  1237     I.V. (mL/kg)  4051 (57.6)     IV Piggyback  200     Total Intake(mL/kg)  5488 (78.1)     Urine (mL/kg/hr)  400 (0.2)     Emesis/NG output  0     Drains  145     Other  0     Stool  0     Blood  300     Total Output  845     Net  +4643            Stool Occurrence  0 x     Emesis Occurrence  0 x           Physical Exam   Constitutional: She is oriented to person, place, and time. She appears well-developed and well-nourished.   HENT:   Head: Normocephalic.   Eyes: Pupils are equal, round, and reactive to light.   Cardiovascular:  Normal rate, regular rhythm and normal heart sounds.   Pulmonary/Chest: Effort normal and breath sounds normal. No respiratory distress. She has no wheezes. She has no rales.   Abdominal: Soft. She exhibits no mass. There is no rebound and no guarding.   abd inc line healing well  Former ostomy site healing well  Denies flatus or bm   Neurological: She is alert and oriented to person, place, and time.   Skin: Skin is warm and dry.   Psychiatric: She has a normal mood and affect. Her behavior is normal. Judgment and thought content normal.   Nursing note and vitals reviewed.        Significant Labs:  BMP (Last 3 Results):   Recent Labs   Lab 01/16/19  0336 01/16/19  0604 01/16/19  1200   * 133* 127*   * 122* 124*   K 4.2 4.7 4.2   CL 96 96 97   CO2 15* 15* 18*   BUN 39* 39* 41*   CREATININE 1.9* 2.0* 2.2*   CALCIUM 9.5 9.2 8.7   MG 1.6  --   --      CBC (Last 3 Results):   Recent Labs   Lab 01/16/19  0336 01/16/19  1200   WBC 19.66* 16.11*   RBC 4.14 3.71*   HGB 12.2 11.0*   HCT 38.2 35.1*   * 393*   MCV 92 95   MCH 29.5 29.6   MCHC 31.9* 31.3*       Significant Diagnostics:  None    Assessment/Plan:     * Ileostomy in place    See above     Acute renal insufficiency    2 IVF bolus  Monitor labs  Accurate I&O      Hyponatremia    Na 22 1/16  Change IVF to normal saline  Monitor labs     Crohn's disease of small and large intestines    Ileostomy closure 1/15, post op TAC for Crohns    -await for return of bowel funtion  -cont PCA for now  -enc amb and IS  -ivan hose and SCD  -no NSAIDS due to renal function  -PPI     Malnutrition    Await return of bowel function  Monitor labs           Vidhi Mathis NP  Colorectal Surgery  [facility]

## 2019-01-16 NOTE — PLAN OF CARE
Problem: Adult Inpatient Plan of Care  Goal: Plan of Care Review  Outcome: Ongoing (interventions implemented as appropriate)  Pt AAOX4. Pain managed with PCA pump. Clear liquid diet, one complaint of nausea managed with PRN meds. Folley intact and patent. Low urine output overnight, MD notified. VS stable. SCD's in place. Pt slept between care. Bed low and locked, call bell within reach. Will continue to monitor.

## 2019-01-16 NOTE — SUBJECTIVE & OBJECTIVE
Subjective:     Interval History: no acute events overnite, good urine output, adequaet pain control, no n/v    Post-Op Info:  Procedure(s):  ANASTOMOSIS, ILEORECTAL  CLOSURE, ILEOSTOMY  URETEROLYSIS  LAPAROTOMY, EXPLORATORY;extensive lysis of adhesions   1 Day Post-Op      Medications:  Continuous Infusions:   sodium chloride 0.9% 150 mL/hr at 01/16/19 1033    hydromorphone in 0.9 % NaCl 6 mg/30 ml       Scheduled Meds:   FLUoxetine  60 mg Oral Daily    lactated ringers  1,000 mL Intravenous Once    mupirocin  1 g Nasal BID    pantoprazole  40 mg Oral BID    topiramate  50 mg Oral QHS     PRN Meds:   cyclobenzaprine    mupirocin    naloxone    ondansetron        Objective:     Vital Signs (Most Recent):  Temp: 97.5 °F (36.4 °C) (01/16/19 1147)  Pulse: 90 (01/16/19 1147)  Resp: 16 (01/16/19 1147)  BP: (!) 104/56 (01/16/19 1147)  SpO2: 97 % (01/16/19 1147) Vital Signs (24h Range):  Temp:  [96.9 °F (36.1 °C)-97.9 °F (36.6 °C)] 97.5 °F (36.4 °C)  Pulse:  [65-94] 90  Resp:  [15-17] 16  SpO2:  [95 %-100 %] 97 %  BP: (103-114)/(53-71) 104/56     Intake/Output - Last 3 Shifts       01/14 0700 - 01/15 0659 01/15 0700 - 01/16 0659 01/16 0700 - 01/17 0659    P.O.  1237     I.V. (mL/kg)  4051 (57.6)     IV Piggyback  200     Total Intake(mL/kg)  5488 (78.1)     Urine (mL/kg/hr)  400 (0.2)     Emesis/NG output  0     Drains  145     Other  0     Stool  0     Blood  300     Total Output  845     Net  +4643            Stool Occurrence  0 x     Emesis Occurrence  0 x           Physical Exam   Constitutional: She is oriented to person, place, and time. She appears well-developed and well-nourished.   HENT:   Head: Normocephalic.   Eyes: Pupils are equal, round, and reactive to light.   Cardiovascular: Normal rate, regular rhythm and normal heart sounds.   Pulmonary/Chest: Effort normal and breath sounds normal. No respiratory distress. She has no wheezes. She has no rales.   Abdominal: Soft. She exhibits no mass. There  is no rebound and no guarding.   abd inc line healing well  Former ostomy site healing well  Denies flatus or bm   Neurological: She is alert and oriented to person, place, and time.   Skin: Skin is warm and dry.   Psychiatric: She has a normal mood and affect. Her behavior is normal. Judgment and thought content normal.   Nursing note and vitals reviewed.        Significant Labs:  BMP (Last 3 Results):   Recent Labs   Lab 01/16/19  0336 01/16/19  0604 01/16/19  1200   * 133* 127*   * 122* 124*   K 4.2 4.7 4.2   CL 96 96 97   CO2 15* 15* 18*   BUN 39* 39* 41*   CREATININE 1.9* 2.0* 2.2*   CALCIUM 9.5 9.2 8.7   MG 1.6  --   --      CBC (Last 3 Results):   Recent Labs   Lab 01/16/19  0336 01/16/19  1200   WBC 19.66* 16.11*   RBC 4.14 3.71*   HGB 12.2 11.0*   HCT 38.2 35.1*   * 393*   MCV 92 95   MCH 29.5 29.6   MCHC 31.9* 31.3*       Significant Diagnostics:  None

## 2019-01-16 NOTE — ASSESSMENT & PLAN NOTE
Ileostomy closure 1/15, post op TAC for Crohns    -await for return of bowel funtion  -cont PCA for now  -enc amb and IS  -ivan hose and SCD  -no NSAIDS due to renal function  -PPI

## 2019-01-16 NOTE — ANESTHESIA RELEASE NOTE
"Anesthesia Release from PACU Note    Patient: Candida Cardona    Procedure(s) Performed: Procedure(s):  ANASTOMOSIS, ILEORECTAL  CLOSURE, ILEOSTOMY  URETEROLYSIS  LAPAROTOMY, EXPLORATORY;extensive lysis of adhesions    Anesthesia type: general    Post pain: Adequate analgesia    Post assessment: no apparent anesthetic complications and tolerated procedure well    Last Vitals:   Visit Vitals  BP (!) 104/56 (BP [facility]: Right arm, Patient Position: Sitting)   Pulse 90   Temp 36.4 °C (97.5 °F) (Oral)   Resp 16   Ht 5' 7" (1.702 m)   Wt 70.3 kg (155 lb)   LMP  (LMP Unknown)   SpO2 97%   Breastfeeding? No   BMI 24.28 kg/m²       Post vital signs: stable    Level of consciousness: awake and alert     Nausea/Vomiting: no nausea/no vomiting    Complications: none    Airway Patency: patent    Respiratory: unassisted, spontaneous ventilation, room air    Cardiovascular: stable and blood pressure at baseline    Hydration: euvolemic  "

## 2019-01-16 NOTE — ANESTHESIA POSTPROCEDURE EVALUATION
"Anesthesia Post Evaluation    Patient: Candida Cardona    Procedure(s) Performed: Procedure(s):  ANASTOMOSIS, ILEORECTAL  CLOSURE, ILEOSTOMY  URETEROLYSIS  LAPAROTOMY, EXPLORATORY;extensive lysis of adhesions    Final Anesthesia Type: general  Patient location during evaluation: PACU  Patient participation: Yes- Able to Participate  Level of consciousness: awake and alert  Post-procedure vital signs: reviewed and stable  Pain management: adequate  Airway patency: patent  PONV status at discharge: No PONV  Anesthetic complications: no      Cardiovascular status: hemodynamically stable  Respiratory status: unassisted, spontaneous ventilation and room air  Hydration status: euvolemic  Follow-up not needed.        Visit Vitals  BP (!) 104/56 (BP [facility]: Right arm, Patient Position: Sitting)   Pulse 90   Temp 36.4 °C (97.5 °F) (Oral)   Resp 16   Ht 5' 7" (1.702 m)   Wt 70.3 kg (155 lb)   LMP  (LMP Unknown)   SpO2 97%   Breastfeeding? No   BMI 24.28 kg/m²       Pain/Enrrique Score: Pain Rating Prior to Med Admin: 7 (1/16/2019  6:20 AM)  Enrrique Score: 9 (1/15/2019  1:00 PM)        "

## 2019-01-17 LAB
ANION GAP SERPL CALC-SCNC: 11 MMOL/L
ANION GAP SERPL CALC-SCNC: 8 MMOL/L
BASOPHILS # BLD AUTO: 0.02 K/UL
BASOPHILS # BLD AUTO: 0.02 K/UL
BASOPHILS NFR BLD: 0.1 %
BASOPHILS NFR BLD: 0.1 %
BUN SERPL-MCNC: 43 MG/DL
BUN SERPL-MCNC: 45 MG/DL
CALCIUM SERPL-MCNC: 8.8 MG/DL
CALCIUM SERPL-MCNC: 9.1 MG/DL
CHLORIDE SERPL-SCNC: 101 MMOL/L
CHLORIDE SERPL-SCNC: 99 MMOL/L
CO2 SERPL-SCNC: 20 MMOL/L
CO2 SERPL-SCNC: 20 MMOL/L
CREAT SERPL-MCNC: 1.6 MG/DL
CREAT SERPL-MCNC: 1.7 MG/DL
DIFFERENTIAL METHOD: ABNORMAL
DIFFERENTIAL METHOD: ABNORMAL
EOSINOPHIL # BLD AUTO: 0 K/UL
EOSINOPHIL # BLD AUTO: 0 K/UL
EOSINOPHIL NFR BLD: 0 %
EOSINOPHIL NFR BLD: 0 %
ERYTHROCYTE [DISTWIDTH] IN BLOOD BY AUTOMATED COUNT: 16.3 %
ERYTHROCYTE [DISTWIDTH] IN BLOOD BY AUTOMATED COUNT: 16.4 %
EST. GFR  (AFRICAN AMERICAN): 44.1 ML/MIN/1.73 M^2
EST. GFR  (AFRICAN AMERICAN): 47.4 ML/MIN/1.73 M^2
EST. GFR  (NON AFRICAN AMERICAN): 38.3 ML/MIN/1.73 M^2
EST. GFR  (NON AFRICAN AMERICAN): 41.2 ML/MIN/1.73 M^2
GLUCOSE SERPL-MCNC: 106 MG/DL
GLUCOSE SERPL-MCNC: 98 MG/DL
HCT VFR BLD AUTO: 31.2 %
HCT VFR BLD AUTO: 32.4 %
HGB BLD-MCNC: 10.2 G/DL
HGB BLD-MCNC: 10.7 G/DL
IMM GRANULOCYTES # BLD AUTO: 0.09 K/UL
IMM GRANULOCYTES # BLD AUTO: 0.15 K/UL
IMM GRANULOCYTES NFR BLD AUTO: 0.6 %
IMM GRANULOCYTES NFR BLD AUTO: 0.9 %
LYMPHOCYTES # BLD AUTO: 0.7 K/UL
LYMPHOCYTES # BLD AUTO: 0.9 K/UL
LYMPHOCYTES NFR BLD: 4.5 %
LYMPHOCYTES NFR BLD: 5.8 %
MAGNESIUM SERPL-MCNC: 1.5 MG/DL
MCH RBC QN AUTO: 29.6 PG
MCH RBC QN AUTO: 30.4 PG
MCHC RBC AUTO-ENTMCNC: 31.5 G/DL
MCHC RBC AUTO-ENTMCNC: 34.3 G/DL
MCV RBC AUTO: 89 FL
MCV RBC AUTO: 94 FL
MONOCYTES # BLD AUTO: 1.5 K/UL
MONOCYTES # BLD AUTO: 1.8 K/UL
MONOCYTES NFR BLD: 10.9 %
MONOCYTES NFR BLD: 9.2 %
NEUTROPHILS # BLD AUTO: 13.4 K/UL
NEUTROPHILS # BLD AUTO: 13.9 K/UL
NEUTROPHILS NFR BLD: 82.6 %
NEUTROPHILS NFR BLD: 85.3 %
NRBC BLD-RTO: 0 /100 WBC
NRBC BLD-RTO: 0 /100 WBC
PHOSPHATE SERPL-MCNC: 4.2 MG/DL
PLATELET # BLD AUTO: 347 K/UL
PLATELET # BLD AUTO: 377 K/UL
PMV BLD AUTO: 10 FL
PMV BLD AUTO: 10.2 FL
POTASSIUM SERPL-SCNC: 4 MMOL/L
POTASSIUM SERPL-SCNC: 4.5 MMOL/L
RBC # BLD AUTO: 3.45 M/UL
RBC # BLD AUTO: 3.52 M/UL
SODIUM SERPL-SCNC: 127 MMOL/L
SODIUM SERPL-SCNC: 132 MMOL/L
WBC # BLD AUTO: 16.2 K/UL
WBC # BLD AUTO: 16.36 K/UL

## 2019-01-17 PROCEDURE — 85025 COMPLETE CBC W/AUTO DIFF WBC: CPT | Mod: 91

## 2019-01-17 PROCEDURE — 20600001 HC STEP DOWN PRIVATE ROOM

## 2019-01-17 PROCEDURE — 80048 BASIC METABOLIC PNL TOTAL CA: CPT

## 2019-01-17 PROCEDURE — 36415 COLL VENOUS BLD VENIPUNCTURE: CPT

## 2019-01-17 PROCEDURE — 83735 ASSAY OF MAGNESIUM: CPT

## 2019-01-17 PROCEDURE — 84100 ASSAY OF PHOSPHORUS: CPT

## 2019-01-17 PROCEDURE — 80048 BASIC METABOLIC PNL TOTAL CA: CPT | Mod: 91

## 2019-01-17 PROCEDURE — 63600175 PHARM REV CODE 636 W HCPCS: Performed by: NURSE PRACTITIONER

## 2019-01-17 PROCEDURE — 63600175 PHARM REV CODE 636 W HCPCS: Performed by: STUDENT IN AN ORGANIZED HEALTH CARE EDUCATION/TRAINING PROGRAM

## 2019-01-17 PROCEDURE — 25000003 PHARM REV CODE 250: Performed by: STUDENT IN AN ORGANIZED HEALTH CARE EDUCATION/TRAINING PROGRAM

## 2019-01-17 RX ORDER — HEPARIN SODIUM 5000 [USP'U]/ML
5000 INJECTION, SOLUTION INTRAVENOUS; SUBCUTANEOUS EVERY 8 HOURS
Status: DISCONTINUED | OUTPATIENT
Start: 2019-01-17 | End: 2019-02-01 | Stop reason: HOSPADM

## 2019-01-17 RX ORDER — LORAZEPAM 2 MG/ML
0.5 INJECTION INTRAMUSCULAR EVERY 6 HOURS PRN
Status: DISCONTINUED | OUTPATIENT
Start: 2019-01-17 | End: 2019-01-22

## 2019-01-17 RX ADMIN — MUPIROCIN 1 G: 20 OINTMENT TOPICAL at 10:01

## 2019-01-17 RX ADMIN — LORAZEPAM 0.5 MG: 2 INJECTION INTRAMUSCULAR; INTRAVENOUS at 12:01

## 2019-01-17 RX ADMIN — Medication: at 09:01

## 2019-01-17 RX ADMIN — TOPIRAMATE 50 MG: 25 TABLET, FILM COATED ORAL at 10:01

## 2019-01-17 RX ADMIN — Medication: at 02:01

## 2019-01-17 RX ADMIN — PANTOPRAZOLE SODIUM 40 MG: 40 TABLET, DELAYED RELEASE ORAL at 09:01

## 2019-01-17 RX ADMIN — SODIUM CHLORIDE: 0.9 INJECTION, SOLUTION INTRAVENOUS at 03:01

## 2019-01-17 RX ADMIN — SODIUM CHLORIDE: 0.9 INJECTION, SOLUTION INTRAVENOUS at 11:01

## 2019-01-17 RX ADMIN — Medication: at 05:01

## 2019-01-17 RX ADMIN — PANTOPRAZOLE SODIUM 40 MG: 40 TABLET, DELAYED RELEASE ORAL at 10:01

## 2019-01-17 RX ADMIN — LORAZEPAM 0.5 MG: 2 INJECTION INTRAMUSCULAR; INTRAVENOUS at 05:01

## 2019-01-17 RX ADMIN — FLUOXETINE HYDROCHLORIDE 60 MG: 20 CAPSULE ORAL at 09:01

## 2019-01-17 RX ADMIN — HEPARIN SODIUM 5000 UNITS: 5000 INJECTION, SOLUTION INTRAVENOUS; SUBCUTANEOUS at 10:01

## 2019-01-17 RX ADMIN — MUPIROCIN 1 G: 20 OINTMENT TOPICAL at 09:01

## 2019-01-17 RX ADMIN — HEPARIN SODIUM 5000 UNITS: 5000 INJECTION, SOLUTION INTRAVENOUS; SUBCUTANEOUS at 01:01

## 2019-01-17 NOTE — PLAN OF CARE
SW following for d/c needs. Post acute needs to be determined.        Crow Canela, MSW, [facility]  [facility]  [id number]

## 2019-01-17 NOTE — PLAN OF CARE
PCP: Ted Gómez MD    Pharmacy:   [id number]  [place], [place] - [address] AT [address] [facility] [address]  [address]  [place] [address]  Phone: [phone] Fax: [phone]    Payor: MEDICARE / Plan: MEDICARE PART A & B / Product Type: Government /      01/17/19 1700   Discharge Assessment   Assessment Type Discharge Planning Assessment   Confirmed/corrected address and phone number on facesheet? Yes   Assessment information obtained from? Medical Record   Communicated expected length of stay with patient/caregiver yes;no   Prior to hospitalization functional status: Independent   Current cognitive status: Alert/Oriented   Able to Return to Prior Arrangements yes   Is patient able to care for self after discharge? Unable to determine at this time (comments)   Patient's perception of discharge disposition home or selfcare   Readmission Within the Last 30 Days no previous admission in last 30 days   Patient currently being followed by outpatient case management? Unable to determine (comments)   Discharge Plan A Home;Home with family   Discharge Plan B Home;Home with family;Home Health   Patient/Family in Agreement with Plan yes

## 2019-01-17 NOTE — PROGRESS NOTES
[facility]  Colorectal Surgery  Progress Note    Patient Name: Candida Cardona  MRN: [id number]  Admission Date: 1/15/2019  Hospital Length of Stay: 2 days  Attending Physician: Reji Peterson MD    Subjective:     Interval History: emesis last night requiring insertion of NGT, obtained 1000cc stomach content,      Post-Op Info:  Procedure(s):  ANASTOMOSIS, ILEORECTAL  CLOSURE, ILEOSTOMY  URETEROLYSIS  LAPAROTOMY, EXPLORATORY;extensive lysis of adhesions   2 Days Post-Op      Medications:  Continuous Infusions:   sodium chloride 0.9% 150 mL/hr at 01/17/19 0727    hydromorphone in 0.9 % NaCl 6 mg/30 ml       Scheduled Meds:   FLUoxetine  60 mg Oral Daily    heparin (porcine)  5,000 Units Subcutaneous Q8H    mupirocin  1 g Nasal BID    pantoprazole  40 mg Oral BID    topiramate  50 mg Oral QHS     PRN Meds:   cyclobenzaprine    lorazepam    mupirocin    naloxone    ondansetron        Objective:     Vital Signs (Most Recent):  Temp: 96.1 °F (35.6 °C) (01/17/19 0923)  Pulse: 88 (01/17/19 0923)  Resp: 18 (01/17/19 0923)  BP: 104/62 (01/17/19 0923)  SpO2: (!) 93 % (01/17/19 0923) Vital Signs (24h Range):  Temp:  [96.1 °F (35.6 °C)-97.7 °F (36.5 °C)] 96.1 °F (35.6 °C)  Pulse:  [86-97] 88  Resp:  [16-21] 18  SpO2:  [93 %-99 %] 93 %  BP: (104-117)/(56-68) 104/62     Intake/Output - Last 3 Shifts       01/15 0700 - 01/16 0659 01/16 0700 - 01/17 0659 01/17 0700 - 01/18 0659    P.O. 1237      I.V. (mL/kg) 4051 (57.6) 2754.2 (39.2)     IV Piggyback 200      Total Intake(mL/kg) 5488 (78.1) 2754.2 (39.2)     Urine (mL/kg/hr) 400 (0.2) 430 (0.3)     Emesis/NG output 0 600     Drains 145 1000     Other 0 0     Stool 0 0     Blood 300 0     Total Output 845 2030     Net +4643 +724.2            Stool Occurrence 0 x      Emesis Occurrence 0 x 700 x           Physical Exam   Constitutional: She is oriented to person, place, and time. She appears well-developed and well-nourished. No distress.   HENT:    Head: Normocephalic.   Eyes: Pupils are equal, round, and reactive to light.   Cardiovascular: Normal rate, regular rhythm and normal heart sounds.   Pulmonary/Chest: Effort normal and breath sounds normal. No respiratory distress. She has no wheezes. She has no rales.   Abdominal: Soft. She exhibits no mass. There is no rebound and no guarding.   NGT in place  abd inc line healing well  Former ostomy site healing well   Neurological: She is alert and oriented to person, place, and time.   Skin: Skin is warm and dry.   Psychiatric: She has a normal mood and affect. Her behavior is normal. Judgment and thought content normal.   Nursing note and vitals reviewed.        Significant Labs:  BMP (Last 3 Results):   Recent Labs   Lab 01/16/19  0336  01/16/19  1200 01/16/19  1824 01/17/19  0518   *   < > 127* 127* 106   *   < > 124* 127* 127*   K 4.2   < > 4.2 4.3 4.0   CL 96   < > 97 100 99   CO2 15*   < > 18* 21* 20*   BUN 39*   < > 41* 41* 43*   CREATININE 1.9*   < > 2.2* 2.0* 1.7*   CALCIUM 9.5   < > 8.7 8.8 9.1   MG 1.6  --   --   --  1.5*    < > = values in this interval not displayed.     CBC (Last 3 Results):   Recent Labs   Lab 01/16/19  0336 01/16/19  1200 01/17/19  0518   WBC 19.66* 16.11* 16.20*   RBC 4.14 3.71* 3.45*   HGB 12.2 11.0* 10.2*   HCT 38.2 35.1* 32.4*   * 393* 377*   MCV 92 95 94   MCH 29.5 29.6 29.6   MCHC 31.9* 31.3* 31.5*       Significant Diagnostics:  None    Assessment/Plan:     * Ileostomy in place    See above     Acute renal insufficiency    2 IVF bolus given 1/16  Monitor labs  Accurate I&O      Hyponatremia    Na 127 1/17  Change IVF to normal saline  Monitor labs     Crohn's disease of small and large intestines    Ileostomy closure 1/15, post op TAC for Crohns    -await for return of bowel funtion  -NGT inserted 1/16 for post op n/v  -cont PCA for now  -enc amb and IS  -ivan hose and SCD  -no NSAIDS due to renal function  -PPI     Malnutrition    Await return of  bowel function  Monitor labs           Vidhi Mathis NP  Colorectal Surgery  [facility]

## 2019-01-17 NOTE — ASSESSMENT & PLAN NOTE
Ileostomy closure 1/15, post op TAC for Crohns    -await for return of bowel funtion  -NGT inserted 1/16 for post op n/v  -cont PCA for now  -enc amb and IS  -ivan hose and SCD  -no NSAIDS due to renal function  -PPI

## 2019-01-17 NOTE — PLAN OF CARE
Problem: Adult Inpatient Plan of Care  Goal: Plan of Care Review  Outcome: Ongoing (interventions implemented as appropriate)  Pt A & O x 4, low urine output via barakat catheter, spoke with Dr Vivas, he is going to look at her chart, labs etc and get back with me.  Vital signs stable on room air.  Pt has thrown up emesis twice today for a total of 600 mL today.  Is drinking water and juice, but has slept most of today.     Pt just vomited 100 mL, looked like the cranberry juice that she just finished drinking.

## 2019-01-17 NOTE — SUBJECTIVE & OBJECTIVE
Subjective:     Interval History: emesis last night requiring insertion of NGT, obtained 1000cc stomach content,      Post-Op Info:  Procedure(s):  ANASTOMOSIS, ILEORECTAL  CLOSURE, ILEOSTOMY  URETEROLYSIS  LAPAROTOMY, EXPLORATORY;extensive lysis of adhesions   2 Days Post-Op      Medications:  Continuous Infusions:   sodium chloride 0.9% 150 mL/hr at 01/17/19 0727    hydromorphone in 0.9 % NaCl 6 mg/30 ml       Scheduled Meds:   FLUoxetine  60 mg Oral Daily    heparin (porcine)  5,000 Units Subcutaneous Q8H    mupirocin  1 g Nasal BID    pantoprazole  40 mg Oral BID    topiramate  50 mg Oral QHS     PRN Meds:   cyclobenzaprine    lorazepam    mupirocin    naloxone    ondansetron        Objective:     Vital Signs (Most Recent):  Temp: 96.1 °F (35.6 °C) (01/17/19 0923)  Pulse: 88 (01/17/19 0923)  Resp: 18 (01/17/19 0923)  BP: 104/62 (01/17/19 0923)  SpO2: (!) 93 % (01/17/19 0923) Vital Signs (24h Range):  Temp:  [96.1 °F (35.6 °C)-97.7 °F (36.5 °C)] 96.1 °F (35.6 °C)  Pulse:  [86-97] 88  Resp:  [16-21] 18  SpO2:  [93 %-99 %] 93 %  BP: (104-117)/(56-68) 104/62     Intake/Output - Last 3 Shifts       01/15 0700 - 01/16 0659 01/16 0700 - 01/17 0659 01/17 0700 - 01/18 0659    P.O. 1237      I.V. (mL/kg) 4051 (57.6) 2754.2 (39.2)     IV Piggyback 200      Total Intake(mL/kg) 5488 (78.1) 2754.2 (39.2)     Urine (mL/kg/hr) 400 (0.2) 430 (0.3)     Emesis/NG output 0 600     Drains 145 1000     Other 0 0     Stool 0 0     Blood 300 0     Total Output 845 2030     Net +4643 +724.2            Stool Occurrence 0 x      Emesis Occurrence 0 x 700 x           Physical Exam   Constitutional: She is oriented to person, place, and time. She appears well-developed and well-nourished. No distress.   HENT:   Head: Normocephalic.   Eyes: Pupils are equal, round, and reactive to light.   Cardiovascular: Normal rate, regular rhythm and normal heart sounds.   Pulmonary/Chest: Effort normal and breath sounds normal. No  respiratory distress. She has no wheezes. She has no rales.   Abdominal: Soft. She exhibits no mass. There is no rebound and no guarding.   NGT in place  abd inc line healing well  Former ostomy site healing well   Neurological: She is alert and oriented to person, place, and time.   Skin: Skin is warm and dry.   Psychiatric: She has a normal mood and affect. Her behavior is normal. Judgment and thought content normal.   Nursing note and vitals reviewed.        Significant Labs:  BMP (Last 3 Results):   Recent Labs   Lab 01/16/19  0336  01/16/19  1200 01/16/19  1824 01/17/19  0518   *   < > 127* 127* 106   *   < > 124* 127* 127*   K 4.2   < > 4.2 4.3 4.0   CL 96   < > 97 100 99   CO2 15*   < > 18* 21* 20*   BUN 39*   < > 41* 41* 43*   CREATININE 1.9*   < > 2.2* 2.0* 1.7*   CALCIUM 9.5   < > 8.7 8.8 9.1   MG 1.6  --   --   --  1.5*    < > = values in this interval not displayed.     CBC (Last 3 Results):   Recent Labs   Lab 01/16/19  0336 01/16/19  1200 01/17/19  0518   WBC 19.66* 16.11* 16.20*   RBC 4.14 3.71* 3.45*   HGB 12.2 11.0* 10.2*   HCT 38.2 35.1* 32.4*   * 393* 377*   MCV 92 95 94   MCH 29.5 29.6 29.6   MCHC 31.9* 31.3* 31.5*       Significant Diagnostics:  None

## 2019-01-17 NOTE — PLAN OF CARE
Problem: Adult Inpatient Plan of Care  Goal: Plan of Care Review  Outcome: Ongoing (interventions implemented as appropriate)  Pt AAOX4. Pain managed with dilaudid PCA pump. Folley intact and patent, low urine output overnight. MD aware. NG to left nostril, secure and patent. NPO. VS stable. Bed low and locked, call bell within reach. Will continue to monitor.

## 2019-01-17 NOTE — PROGRESS NOTES
MD notified for multiple complaints of N/V unrelieved by PRN meds. NG tube ordered. 1000ml output. Will continue to monitor.

## 2019-01-18 LAB
ANION GAP SERPL CALC-SCNC: 11 MMOL/L
BASOPHILS # BLD AUTO: 0.01 K/UL
BASOPHILS NFR BLD: 0.1 %
BUN SERPL-MCNC: 43 MG/DL
CALCIUM SERPL-MCNC: 9.3 MG/DL
CHLORIDE SERPL-SCNC: 99 MMOL/L
CO2 SERPL-SCNC: 22 MMOL/L
CREAT SERPL-MCNC: 1.2 MG/DL
CRP SERPL-MCNC: 291.4 MG/L
DIFFERENTIAL METHOD: ABNORMAL
EOSINOPHIL # BLD AUTO: 0 K/UL
EOSINOPHIL NFR BLD: 0.3 %
ERYTHROCYTE [DISTWIDTH] IN BLOOD BY AUTOMATED COUNT: 16.8 %
EST. GFR  (AFRICAN AMERICAN): >60 ML/MIN/1.73 M^2
EST. GFR  (NON AFRICAN AMERICAN): 58.3 ML/MIN/1.73 M^2
GLUCOSE SERPL-MCNC: 93 MG/DL
HCT VFR BLD AUTO: 35.2 %
HGB BLD-MCNC: 10.9 G/DL
IMM GRANULOCYTES # BLD AUTO: 0.09 K/UL
IMM GRANULOCYTES NFR BLD AUTO: 1 %
LYMPHOCYTES # BLD AUTO: 0.7 K/UL
LYMPHOCYTES NFR BLD: 7.5 %
MAGNESIUM SERPL-MCNC: 1.6 MG/DL
MCH RBC QN AUTO: 30.3 PG
MCHC RBC AUTO-ENTMCNC: 31 G/DL
MCV RBC AUTO: 98 FL
MONOCYTES # BLD AUTO: 0.9 K/UL
MONOCYTES NFR BLD: 9.8 %
NEUTROPHILS # BLD AUTO: 7.2 K/UL
NEUTROPHILS NFR BLD: 81.3 %
NRBC BLD-RTO: 1 /100 WBC
PHOSPHATE SERPL-MCNC: 2.8 MG/DL
PLATELET # BLD AUTO: 337 K/UL
PMV BLD AUTO: 9.8 FL
POTASSIUM SERPL-SCNC: 3.7 MMOL/L
RBC # BLD AUTO: 3.6 M/UL
SODIUM SERPL-SCNC: 132 MMOL/L
WBC # BLD AUTO: 8.8 K/UL

## 2019-01-18 PROCEDURE — 85025 COMPLETE CBC W/AUTO DIFF WBC: CPT

## 2019-01-18 PROCEDURE — 25000003 PHARM REV CODE 250: Performed by: STUDENT IN AN ORGANIZED HEALTH CARE EDUCATION/TRAINING PROGRAM

## 2019-01-18 PROCEDURE — 36415 COLL VENOUS BLD VENIPUNCTURE: CPT

## 2019-01-18 PROCEDURE — 63600175 PHARM REV CODE 636 W HCPCS: Performed by: NURSE PRACTITIONER

## 2019-01-18 PROCEDURE — 86140 C-REACTIVE PROTEIN: CPT

## 2019-01-18 PROCEDURE — 84100 ASSAY OF PHOSPHORUS: CPT

## 2019-01-18 PROCEDURE — 80048 BASIC METABOLIC PNL TOTAL CA: CPT

## 2019-01-18 PROCEDURE — 25000003 PHARM REV CODE 250: Performed by: NURSE PRACTITIONER

## 2019-01-18 PROCEDURE — 20600001 HC STEP DOWN PRIVATE ROOM

## 2019-01-18 PROCEDURE — 83735 ASSAY OF MAGNESIUM: CPT

## 2019-01-18 PROCEDURE — 63600175 PHARM REV CODE 636 W HCPCS: Performed by: STUDENT IN AN ORGANIZED HEALTH CARE EDUCATION/TRAINING PROGRAM

## 2019-01-18 RX ORDER — MAGNESIUM SULFATE HEPTAHYDRATE 40 MG/ML
2 INJECTION, SOLUTION INTRAVENOUS ONCE
Status: COMPLETED | OUTPATIENT
Start: 2019-01-18 | End: 2019-01-18

## 2019-01-18 RX ORDER — LORAZEPAM 2 MG/ML
INJECTION INTRAMUSCULAR
Status: DISPENSED
Start: 2019-01-18 | End: 2019-01-18

## 2019-01-18 RX ADMIN — SODIUM CHLORIDE: 0.9 INJECTION, SOLUTION INTRAVENOUS at 08:01

## 2019-01-18 RX ADMIN — HEPARIN SODIUM 5000 UNITS: 5000 INJECTION, SOLUTION INTRAVENOUS; SUBCUTANEOUS at 01:01

## 2019-01-18 RX ADMIN — FLUOXETINE HYDROCHLORIDE 60 MG: 20 CAPSULE ORAL at 08:01

## 2019-01-18 RX ADMIN — MAGNESIUM SULFATE IN WATER 2 G: 40 INJECTION, SOLUTION INTRAVENOUS at 08:01

## 2019-01-18 RX ADMIN — MUPIROCIN 1 G: 20 OINTMENT TOPICAL at 08:01

## 2019-01-18 RX ADMIN — PANTOPRAZOLE SODIUM 40 MG: 40 TABLET, DELAYED RELEASE ORAL at 08:01

## 2019-01-18 RX ADMIN — LORAZEPAM 0.5 MG: 2 INJECTION INTRAMUSCULAR; INTRAVENOUS at 01:01

## 2019-01-18 RX ADMIN — LORAZEPAM 0.5 MG: 2 INJECTION INTRAMUSCULAR; INTRAVENOUS at 06:01

## 2019-01-18 RX ADMIN — LORAZEPAM 0.5 MG: 2 INJECTION INTRAMUSCULAR; INTRAVENOUS at 08:01

## 2019-01-18 RX ADMIN — HEPARIN SODIUM 5000 UNITS: 5000 INJECTION, SOLUTION INTRAVENOUS; SUBCUTANEOUS at 05:01

## 2019-01-18 RX ADMIN — LORAZEPAM 0.5 MG: 2 INJECTION INTRAMUSCULAR; INTRAVENOUS at 12:01

## 2019-01-18 RX ADMIN — HEPARIN SODIUM 5000 UNITS: 5000 INJECTION, SOLUTION INTRAVENOUS; SUBCUTANEOUS at 08:01

## 2019-01-18 RX ADMIN — Medication: at 05:01

## 2019-01-18 RX ADMIN — TOPIRAMATE 50 MG: 25 TABLET, FILM COATED ORAL at 08:01

## 2019-01-18 NOTE — SUBJECTIVE & OBJECTIVE
Subjective:     Interval History:  Pain controlled, ambulating. NAEON.     Post-Op Info:  Procedure(s):  ANASTOMOSIS, ILEORECTAL  CLOSURE, ILEOSTOMY  URETEROLYSIS  LAPAROTOMY, EXPLORATORY;extensive lysis of adhesions   3 Days Post-Op      Medications:  Continuous Infusions:   sodium chloride 0.9% 100 mL/hr at 01/18/19 0836    hydromorphone in 0.9 % NaCl 6 mg/30 ml       Scheduled Meds:   FLUoxetine  60 mg Oral Daily    heparin (porcine)  5,000 Units Subcutaneous Q8H    lorazepam        magnesium sulfate IVPB  2 g Intravenous Once    mupirocin  1 g Nasal BID    pantoprazole  40 mg Oral BID    topiramate  50 mg Oral QHS     PRN Meds:   cyclobenzaprine    lorazepam    mupirocin    naloxone    ondansetron        Objective:     Vital Signs (Most Recent):  Temp: 98.1 °F (36.7 °C) (01/18/19 0834)  Pulse: 106 (01/18/19 0834)  Resp: 16 (01/18/19 0834)  BP: 114/78 (01/18/19 0834)  SpO2: (!) 93 % (01/18/19 0834) Vital Signs (24h Range):  Temp:  [96.4 °F (35.8 °C)-98.3 °F (36.8 °C)] 98.1 °F (36.7 °C)  Pulse:  [] 106  Resp:  [16-19] 16  SpO2:  [93 %-99 %] 93 %  BP: (114-131)/(57-80) 114/78     Intake/Output - Last 3 Shifts       01/16 0700 - 01/17 0659 01/17 0700 - 01/18 0659 01/18 0700 - 01/19 0659    P.O.       I.V. (mL/kg) 2754.2 (39.2)      IV Piggyback       Total Intake(mL/kg) 2754.2 (39.2)      Urine (mL/kg/hr) 430 (0.3) 480 (0.3)     Emesis/NG output 600      Drains 1000 2000     Other 0      Stool 0      Blood 0      Total Output 2030 2480     Net +724.2 -2480            Emesis Occurrence 700 x            Physical Exam   Constitutional: She is oriented to person, place, and time. She appears well-developed and well-nourished. No distress.   HENT:   Head: Normocephalic.   Eyes: Pupils are equal, round, and reactive to light.   Cardiovascular: Normal rate, regular rhythm and normal heart sounds.   Pulmonary/Chest: Effort normal and breath sounds normal. No respiratory distress. She has no wheezes.  She has no rales.   Abdominal: Soft. She exhibits no mass. There is no rebound and no guarding.   NGT in place  abd inc line healing well  Former ostomy site healing well   Neurological: She is alert and oriented to person, place, and time.   Skin: Skin is warm and dry.   Psychiatric: She has a normal mood and affect. Her behavior is normal. Judgment and thought content normal.   Nursing note and vitals reviewed.        Significant Labs:  BMP (Last 3 Results):   Recent Labs   Lab 01/16/19  0336  01/17/19  0518 01/17/19  1226 01/18/19  0402   *   < > 106 98 93   *   < > 127* 132* 132*   K 4.2   < > 4.0 4.5 3.7   CL 96   < > 99 101 99   CO2 15*   < > 20* 20* 22*   BUN 39*   < > 43* 45* 43*   CREATININE 1.9*   < > 1.7* 1.6* 1.2   CALCIUM 9.5   < > 9.1 8.8 9.3   MG 1.6  --  1.5*  --  1.6    < > = values in this interval not displayed.     CBC (Last 3 Results):   Recent Labs   Lab 01/17/19  0518 01/17/19  1226 01/18/19  0750   WBC 16.20* 16.36* 8.80   RBC 3.45* 3.52* 3.60*   HGB 10.2* 10.7* 10.9*   HCT 32.4* 31.2* 35.2*   * 347 337   MCV 94 89 98   MCH 29.6 30.4 30.3   MCHC 31.5* 34.3 31.0*       Significant Diagnostics:  None

## 2019-01-18 NOTE — PLAN OF CARE
Problem: Adult Inpatient Plan of Care  Goal: Plan of Care Review  Outcome: Ongoing (interventions implemented as appropriate)  POC reviewed w/ patient who verbalized understanding.  AAOx4, vss on room air.  NG tube to LIWS w/ green output.  ML w/ dermabond.  [facility] patent and intact w/ clear, yellow urine.  Low urine output; MD made aware.  LLQ THAI drain to bulb suction w/ serosanguineous drainage.  Pt walked the halls w/ walker.  Pain controlled w/ Dilaudid PCA.  No acute events this shift.  WCTM.

## 2019-01-18 NOTE — PROGRESS NOTES
[facility]  Colorectal Surgery  Progress Note    Patient Name: Candida Cardona  MRN: [id number]  Admission Date: 1/15/2019  Hospital Length of Stay: 3 days  Attending Physician: Reji Peterson MD    Subjective:     Interval History:  Pain controlled, ambulating. NAEON.     Post-Op Info:  Procedure(s):  ANASTOMOSIS, ILEORECTAL  CLOSURE, ILEOSTOMY  URETEROLYSIS  LAPAROTOMY, EXPLORATORY;extensive lysis of adhesions   3 Days Post-Op      Medications:  Continuous Infusions:   sodium chloride 0.9% 100 mL/hr at 01/18/19 0836    hydromorphone in 0.9 % NaCl 6 mg/30 ml       Scheduled Meds:   FLUoxetine  60 mg Oral Daily    heparin (porcine)  5,000 Units Subcutaneous Q8H    lorazepam        magnesium sulfate IVPB  2 g Intravenous Once    mupirocin  1 g Nasal BID    pantoprazole  40 mg Oral BID    topiramate  50 mg Oral QHS     PRN Meds:   cyclobenzaprine    lorazepam    mupirocin    naloxone    ondansetron        Objective:     Vital Signs (Most Recent):  Temp: 98.1 °F (36.7 °C) (01/18/19 0834)  Pulse: 106 (01/18/19 0834)  Resp: 16 (01/18/19 0834)  BP: 114/78 (01/18/19 0834)  SpO2: (!) 93 % (01/18/19 0834) Vital Signs (24h Range):  Temp:  [96.4 °F (35.8 °C)-98.3 °F (36.8 °C)] 98.1 °F (36.7 °C)  Pulse:  [] 106  Resp:  [16-19] 16  SpO2:  [93 %-99 %] 93 %  BP: (114-131)/(57-80) 114/78     Intake/Output - Last 3 Shifts       01/16 0700 - 01/17 0659 01/17 0700 - 01/18 0659 01/18 0700 - 01/19 0659    P.O.       I.V. (mL/kg) 2754.2 (39.2)      IV Piggyback       Total Intake(mL/kg) 2754.2 (39.2)      Urine (mL/kg/hr) 430 (0.3) 480 (0.3)     Emesis/NG output 600      Drains 1000 2000     Other 0      Stool 0      Blood 0      Total Output 2030 2480     Net +724.2 -2480            Emesis Occurrence 700 x            Physical Exam   Constitutional: She is oriented to person, place, and time. She appears well-developed and well-nourished. No distress.   HENT:   Head: Normocephalic.   Eyes:  Pupils are equal, round, and reactive to light.   Cardiovascular: Normal rate, regular rhythm and normal heart sounds.   Pulmonary/Chest: Effort normal and breath sounds normal. No respiratory distress. She has no wheezes. She has no rales.   Abdominal: Soft. She exhibits no mass. There is no rebound and no guarding.   NGT in place  abd inc line healing well  Former ostomy site healing well   Neurological: She is alert and oriented to person, place, and time.   Skin: Skin is warm and dry.   Psychiatric: She has a normal mood and affect. Her behavior is normal. Judgment and thought content normal.   Nursing note and vitals reviewed.        Significant Labs:  BMP (Last 3 Results):   Recent Labs   Lab 01/16/19  0336  01/17/19  0518 01/17/19  1226 01/18/19  0402   *   < > 106 98 93   *   < > 127* 132* 132*   K 4.2   < > 4.0 4.5 3.7   CL 96   < > 99 101 99   CO2 15*   < > 20* 20* 22*   BUN 39*   < > 43* 45* 43*   CREATININE 1.9*   < > 1.7* 1.6* 1.2   CALCIUM 9.5   < > 9.1 8.8 9.3   MG 1.6  --  1.5*  --  1.6    < > = values in this interval not displayed.     CBC (Last 3 Results):   Recent Labs   Lab 01/17/19  0518 01/17/19  1226 01/18/19  0750   WBC 16.20* 16.36* 8.80   RBC 3.45* 3.52* 3.60*   HGB 10.2* 10.7* 10.9*   HCT 32.4* 31.2* 35.2*   * 347 337   MCV 94 89 98   MCH 29.6 30.4 30.3   MCHC 31.5* 34.3 31.0*       Significant Diagnostics:  None    Assessment/Plan:     * Ileostomy in place    See above     Acute renal insufficiency    2 IVF bolus given 1/16  Monitor labs  Accurate I&O      Hyponatremia    improving  Change IVF to normal saline, decrease  Monitor labs     Crohn's disease of small and large intestines    Ileostomy closure 1/15, post op TAC for Crohns    -await for return of bowel funtion  -Clamp trial, likely remove NG this afternoon   -cont PCA for now  -enc amb and IS  -ivan hose and SCD  -no NSAIDS due to renal function  -PPI     Malnutrition    Await return of bowel function  Monitor  labs           Irma Hughes NP  Colorectal Surgery  [facility]

## 2019-01-18 NOTE — ASSESSMENT & PLAN NOTE
Ileostomy closure 1/15, post op TAC for Crohns    -await for return of bowel funtion  -Clamp trial, likely remove NG this afternoon   -cont PCA for now  -enc amb and IS  -ivan hose and SCD  -no NSAIDS due to renal function  -PPI

## 2019-01-19 LAB
ANION GAP SERPL CALC-SCNC: 14 MMOL/L
BASOPHILS # BLD AUTO: 0.02 K/UL
BASOPHILS NFR BLD: 0.2 %
BUN SERPL-MCNC: 34 MG/DL
CALCIUM SERPL-MCNC: 8.7 MG/DL
CHLORIDE SERPL-SCNC: 98 MMOL/L
CO2 SERPL-SCNC: 25 MMOL/L
CREAT SERPL-MCNC: 0.9 MG/DL
CRP SERPL-MCNC: 231.3 MG/L
DIFFERENTIAL METHOD: ABNORMAL
EOSINOPHIL # BLD AUTO: 0.1 K/UL
EOSINOPHIL NFR BLD: 0.8 %
ERYTHROCYTE [DISTWIDTH] IN BLOOD BY AUTOMATED COUNT: 16.4 %
EST. GFR  (AFRICAN AMERICAN): >60 ML/MIN/1.73 M^2
EST. GFR  (NON AFRICAN AMERICAN): >60 ML/MIN/1.73 M^2
GLUCOSE SERPL-MCNC: 74 MG/DL
HCT VFR BLD AUTO: 28.9 %
HGB BLD-MCNC: 9 G/DL
IMM GRANULOCYTES # BLD AUTO: 0.11 K/UL
IMM GRANULOCYTES NFR BLD AUTO: 1.2 %
LYMPHOCYTES # BLD AUTO: 1.1 K/UL
LYMPHOCYTES NFR BLD: 12.4 %
MAGNESIUM SERPL-MCNC: 1.8 MG/DL
MCH RBC QN AUTO: 29.9 PG
MCHC RBC AUTO-ENTMCNC: 31.1 G/DL
MCV RBC AUTO: 96 FL
MONOCYTES # BLD AUTO: 1 K/UL
MONOCYTES NFR BLD: 10.7 %
NEUTROPHILS # BLD AUTO: 6.8 K/UL
NEUTROPHILS NFR BLD: 74.7 %
NRBC BLD-RTO: 0 /100 WBC
PHOSPHATE SERPL-MCNC: 2.3 MG/DL
PLATELET # BLD AUTO: 385 K/UL
PMV BLD AUTO: 9.7 FL
POTASSIUM SERPL-SCNC: 3.2 MMOL/L
RBC # BLD AUTO: 3.01 M/UL
SODIUM SERPL-SCNC: 137 MMOL/L
WBC # BLD AUTO: 9.16 K/UL

## 2019-01-19 PROCEDURE — 36415 COLL VENOUS BLD VENIPUNCTURE: CPT

## 2019-01-19 PROCEDURE — 85025 COMPLETE CBC W/AUTO DIFF WBC: CPT

## 2019-01-19 PROCEDURE — 84100 ASSAY OF PHOSPHORUS: CPT

## 2019-01-19 PROCEDURE — 63600175 PHARM REV CODE 636 W HCPCS: Performed by: STUDENT IN AN ORGANIZED HEALTH CARE EDUCATION/TRAINING PROGRAM

## 2019-01-19 PROCEDURE — 25000003 PHARM REV CODE 250: Performed by: STUDENT IN AN ORGANIZED HEALTH CARE EDUCATION/TRAINING PROGRAM

## 2019-01-19 PROCEDURE — 63600175 PHARM REV CODE 636 W HCPCS: Performed by: NURSE PRACTITIONER

## 2019-01-19 PROCEDURE — 86140 C-REACTIVE PROTEIN: CPT

## 2019-01-19 PROCEDURE — 20600001 HC STEP DOWN PRIVATE ROOM

## 2019-01-19 PROCEDURE — 83735 ASSAY OF MAGNESIUM: CPT

## 2019-01-19 PROCEDURE — 80048 BASIC METABOLIC PNL TOTAL CA: CPT

## 2019-01-19 RX ORDER — DEXTROSE MONOHYDRATE, SODIUM CHLORIDE, AND POTASSIUM CHLORIDE 50; 1.49; 4.5 G/1000ML; G/1000ML; G/1000ML
INJECTION, SOLUTION INTRAVENOUS CONTINUOUS
Status: DISCONTINUED | OUTPATIENT
Start: 2019-01-19 | End: 2019-01-25

## 2019-01-19 RX ORDER — OXYCODONE AND ACETAMINOPHEN 5; 325 MG/1; MG/1
1 TABLET ORAL ONCE
Status: COMPLETED | OUTPATIENT
Start: 2019-01-19 | End: 2019-01-19

## 2019-01-19 RX ORDER — SODIUM CHLORIDE AND POTASSIUM CHLORIDE 150; 450 MG/100ML; MG/100ML
INJECTION, SOLUTION INTRAVENOUS CONTINUOUS
Status: DISCONTINUED | OUTPATIENT
Start: 2019-01-19 | End: 2019-01-19

## 2019-01-19 RX ORDER — SODIUM,POTASSIUM PHOSPHATES 280-250MG
1 POWDER IN PACKET (EA) ORAL ONCE
Status: COMPLETED | OUTPATIENT
Start: 2019-01-19 | End: 2019-01-19

## 2019-01-19 RX ADMIN — TOPIRAMATE 50 MG: 25 TABLET, FILM COATED ORAL at 08:01

## 2019-01-19 RX ADMIN — PANTOPRAZOLE SODIUM 40 MG: 40 TABLET, DELAYED RELEASE ORAL at 08:01

## 2019-01-19 RX ADMIN — OXYCODONE HYDROCHLORIDE AND ACETAMINOPHEN 1 TABLET: 5; 325 TABLET ORAL at 10:01

## 2019-01-19 RX ADMIN — HEPARIN SODIUM 5000 UNITS: 5000 INJECTION, SOLUTION INTRAVENOUS; SUBCUTANEOUS at 05:01

## 2019-01-19 RX ADMIN — POTASSIUM CHLORIDE, DEXTROSE MONOHYDRATE AND SODIUM CHLORIDE: 150; 5; 450 INJECTION, SOLUTION INTRAVENOUS at 03:01

## 2019-01-19 RX ADMIN — FLUOXETINE HYDROCHLORIDE 60 MG: 20 CAPSULE ORAL at 08:01

## 2019-01-19 RX ADMIN — Medication: at 03:01

## 2019-01-19 RX ADMIN — HEPARIN SODIUM 5000 UNITS: 5000 INJECTION, SOLUTION INTRAVENOUS; SUBCUTANEOUS at 09:01

## 2019-01-19 RX ADMIN — LORAZEPAM 0.5 MG: 2 INJECTION INTRAMUSCULAR; INTRAVENOUS at 02:01

## 2019-01-19 RX ADMIN — HEPARIN SODIUM 5000 UNITS: 5000 INJECTION, SOLUTION INTRAVENOUS; SUBCUTANEOUS at 02:01

## 2019-01-19 RX ADMIN — MUPIROCIN 1 G: 20 OINTMENT TOPICAL at 08:01

## 2019-01-19 RX ADMIN — POTASSIUM & SODIUM PHOSPHATES POWDER PACK 280-160-250 MG 1 PACKET: 280-160-250 PACK at 02:01

## 2019-01-19 RX ADMIN — LORAZEPAM 0.5 MG: 2 INJECTION INTRAMUSCULAR; INTRAVENOUS at 11:01

## 2019-01-19 RX ADMIN — LORAZEPAM 0.5 MG: 2 INJECTION INTRAMUSCULAR; INTRAVENOUS at 04:01

## 2019-01-19 NOTE — PROGRESS NOTES
Notified Dr. Benitez notified that patient sneaking water while in restroom. Patient asked for privacy in bathroom. Nurse heard water running in restroom. Nurse caught patient drinking water. Patient was told by MD and Nurse multiple times that she could not drink due high NG tube output. Patient currently does not have IV access. Anesthesia notified. They were in a case and would call nurse back. Dr. Benitez wants midline placed. Midline consult ordered and information relayed to anesthesia. Nurse will continue to monitor.

## 2019-01-19 NOTE — PLAN OF CARE
Problem: Adult Inpatient Plan of Care  Goal: Plan of Care Review  Outcome: Ongoing (interventions implemented as appropriate)  Patient is alert and oriented. Able to make needs known to staff. Dilaudid PCA pump remains effective for pain control. No s/s of respiratory/cardiac distress noted. RLQ closure site dressing is intact. LLQ THAI drain remains patent. PIVs are patent and flush well. NS runs continuously at 100 ml/hr. Patient remains NPO except ice chips and meds. R nare NG tube remains. MD ordered for the NG tube to be clamped this AM and for residuals to be checked at 1200. At 1230, the patient's residuals were 500 after 5 min of being placed on continuous wall suction per MD verbal order. Patient was then placed on LIWS. As of 1830 the patient has drained a total of 2400 mls from NG tube. MD is aware of the patient's high NG tube output. Patient's barakat catheter was discontinued today. Patient has voided 175 mls once today on her own and states she will try again in a little bit. Patient was educated on complying with her NPO diet and the reasoning of why she has been placed on that diet. Patient verbalized an understanding and patient's water was taken and she was given ice chips only. No issues or concerns at this time. Continue to monitor.

## 2019-01-19 NOTE — PROGRESS NOTES
"The pt is very manipulative, when pt asked to go walking it was down to the observation room.  Pt told the nurse she did not need any help to walk to the room but the nurse informed the pt they were walking with them to make sure she did not fall.  The pt did take her little purse with her for the walk, the nurse asked her why she needed to take the purse and the pt's response it was for her cell phone.  The nurse left the pt in the observation room because the pt stated "I need some fresh air".  On the way back up the pierre the nurse asked the charge nurse to check on the pt. When the charge nurse walked into the observation room pt was up to the coffee machine, the charge nurse told her she could not have any coffee, the pt said "I was just looking at it".  The nurse returned to visit the pt about 10 min later and pt was sitting in a wheel chair with a large coke and orange juice she had just purchased from the vending machine.  The nurse asked her what she was doing with the drinks and she said she could have them if she wanted, and that she had paid for them.  The nurse was angry and told the pt she had broke the trust with the nurse and that she could no longer be trusted. Pt went back to her room except this time she did not play the injured pt using the walker but pulled it back to her room with no problems.  When the pt arrived in her room the nurse reconnected the NG tube to low intermittent wall suction. The pt did complain of nausea after having the coke and orange juice.  The pt's PCA did not need refilling during the night shift.  At 0200 the pt was given Ativan, at that time the pt wanted to see the charge nurse and also wanted to see the on call Dr.  The nurse informed the charge nurse that the pt wanted to see her and the nurse called on call Dr to inform her that the pt wanted to see a DR.  At 0300 the charge nurse informed the night nurse she would be taking over the care of the pt because the pt " was not comfortable with her current nurse.

## 2019-01-19 NOTE — PROGRESS NOTES
[facility]  Colorectal Surgery  Progress Note    Patient Name: Candida Cardona  MRN: [id number]  Admission Date: 1/15/2019  Hospital Length of Stay: 4 days  Attending Physician: Reji Peterson MD    Subjective:     Interval History: Over 4L output through NG tube. Patient had been drinking large amounts of clears through vending machines overnight. Felt nauseated this morning. 2 BMs overnight.     Post-Op Info:  Procedure(s):  ANASTOMOSIS, ILEORECTAL  CLOSURE, ILEOSTOMY  URETEROLYSIS  LAPAROTOMY, EXPLORATORY;extensive lysis of adhesions   4 Days Post-Op      Medications:  Continuous Infusions:   sodium chloride 0.9% 100 mL/hr at 01/18/19 2023    hydromorphone in 0.9 % NaCl 6 mg/30 ml       Scheduled Meds:   FLUoxetine  60 mg Oral Daily    heparin (porcine)  5,000 Units Subcutaneous Q8H    mupirocin  1 g Nasal BID    pantoprazole  40 mg Oral BID    topiramate  50 mg Oral QHS     PRN Meds:   cyclobenzaprine    lorazepam    mupirocin    naloxone    ondansetron        Objective:     Vital Signs (Most Recent):  Temp: 98.7 °F (37.1 °C) (01/19/19 0729)  Pulse: 85 (01/19/19 0729)  Resp: 18 (01/19/19 0729)  BP: 116/71 (01/19/19 0729)  SpO2: 99 % (01/19/19 0729) Vital Signs (24h Range):  Temp:  [96.3 °F (35.7 °C)-98.7 °F (37.1 °C)] 98.7 °F (37.1 °C)  Pulse:  [] 85  Resp:  [16-18] 18  SpO2:  [93 %-100 %] 99 %  BP: (104-127)/(63-78) 116/71     Intake/Output - Last 3 Shifts       01/17 0700 - 01/18 0659 01/18 0700 - 01/19 0659 01/19 0700 - 01/20 0659    I.V. (mL/kg) 3643.3 (51.8) 2874.6 (40.9)     NG/GT  30     Total Intake(mL/kg) 3643.3 (51.8) 2904.6 (41.3)     Urine (mL/kg/hr) 480 (0.3) 575 (0.3)     Emesis/NG output       Drains 2000 4880     Other       Stool  0     Blood       Total Output 2480 5455     Net +1163.3 -2550.4            Urine Occurrence  1 x     Stool Occurrence  0 x 0 x          Physical Exam   Constitutional: She is oriented to person, place, and time. She appears  well-developed and well-nourished. No distress.   HENT:   Head: Normocephalic.   Eyes: Pupils are equal, round, and reactive to light.   Cardiovascular: Normal rate, regular rhythm and normal heart sounds.   Pulmonary/Chest: Effort normal and breath sounds normal. No respiratory distress. She has no wheezes. She has no rales.   Abdominal: Soft. She exhibits no mass. There is no rebound and no guarding.   NGT in place  abd inc line healing well  Former ostomy site healing well   Neurological: She is alert and oriented to person, place, and time.   Skin: Skin is warm and dry.   Psychiatric: She has a normal mood and affect. Her behavior is normal. Judgment and thought content normal.   Nursing note and vitals reviewed.      Significant Labs:  Labs pending    Significant Diagnostics:  I have reviewed all pertinent imaging results/findings within the past 24 hours.    Assessment/Plan:     * Ileostomy in place    See above     Acute renal insufficiency    2 IVF bolus given 1/16  Monitor labs  Accurate I&O      Hyponatremia    improving  Change IVF to normal saline, decrease  Monitor labs     Crohn's disease of small and large intestines    Ileostomy closure 1/15, post op TAC for Crohns. Drank large amounts of clears overnight while NPO. Contributing to high NG output. 2 bowel movements yesterday.     -Consider pulling NG tube today.   -Consider advancing diet.   -Continue oral pain meds with dilaudid for breakthrough.   -Follow up labs and bolus if renal function indicates.   -enc amb and IS  -vian hose and SCD  -no NSAIDS due to renal function  -PPI     Malnutrition    Await return of bowel function  Monitor labs           Benjamin Vivas MD  Colorectal Surgery  [facility]

## 2019-01-19 NOTE — ASSESSMENT & PLAN NOTE
Ileostomy closure 1/15, post op TAC for Crohns. Drank large amounts of clears overnight while NPO. Contributing to high NG output. 2 bowel movements yesterday.     -Consider pulling NG tube today.   -Consider advancing diet.   -Continue oral pain meds with dilaudid for breakthrough.   -Follow up labs and bolus if renal function indicates.   -enc amb and IS  -ivan hose and SCD  -no NSAIDS due to renal function  -PPI

## 2019-01-19 NOTE — SUBJECTIVE & OBJECTIVE
Subjective:     Interval History: Over 4L output through NG tube. Patient had been drinking large amounts of clears through vending machines overnight. Felt nauseated this morning. 2 BMs overnight.     Post-Op Info:  Procedure(s):  ANASTOMOSIS, ILEORECTAL  CLOSURE, ILEOSTOMY  URETEROLYSIS  LAPAROTOMY, EXPLORATORY;extensive lysis of adhesions   4 Days Post-Op      Medications:  Continuous Infusions:   sodium chloride 0.9% 100 mL/hr at 01/18/19 2023    hydromorphone in 0.9 % NaCl 6 mg/30 ml       Scheduled Meds:   FLUoxetine  60 mg Oral Daily    heparin (porcine)  5,000 Units Subcutaneous Q8H    mupirocin  1 g Nasal BID    pantoprazole  40 mg Oral BID    topiramate  50 mg Oral QHS     PRN Meds:   cyclobenzaprine    lorazepam    mupirocin    naloxone    ondansetron        Objective:     Vital Signs (Most Recent):  Temp: 98.7 °F (37.1 °C) (01/19/19 0729)  Pulse: 85 (01/19/19 0729)  Resp: 18 (01/19/19 0729)  BP: 116/71 (01/19/19 0729)  SpO2: 99 % (01/19/19 0729) Vital Signs (24h Range):  Temp:  [96.3 °F (35.7 °C)-98.7 °F (37.1 °C)] 98.7 °F (37.1 °C)  Pulse:  [] 85  Resp:  [16-18] 18  SpO2:  [93 %-100 %] 99 %  BP: (104-127)/(63-78) 116/71     Intake/Output - Last 3 Shifts       01/17 0700 - 01/18 0659 01/18 0700 - 01/19 0659 01/19 0700 - 01/20 0659    I.V. (mL/kg) 3643.3 (51.8) 2874.6 (40.9)     NG/GT  30     Total Intake(mL/kg) 3643.3 (51.8) 2904.6 (41.3)     Urine (mL/kg/hr) 480 (0.3) 575 (0.3)     Emesis/NG output       Drains 2000 4880     Other       Stool  0     Blood       Total Output 2480 5455     Net +1163.3 -2550.4            Urine Occurrence  1 x     Stool Occurrence  0 x 0 x          Physical Exam   Constitutional: She is oriented to person, place, and time. She appears well-developed and well-nourished. No distress.   HENT:   Head: Normocephalic.   Eyes: Pupils are equal, round, and reactive to light.   Cardiovascular: Normal rate, regular rhythm and normal heart sounds.    Pulmonary/Chest: Effort normal and breath sounds normal. No respiratory distress. She has no wheezes. She has no rales.   Abdominal: Soft. She exhibits no mass. There is no rebound and no guarding.   NGT in place  abd inc line healing well  Former ostomy site healing well   Neurological: She is alert and oriented to person, place, and time.   Skin: Skin is warm and dry.   Psychiatric: She has a normal mood and affect. Her behavior is normal. Judgment and thought content normal.   Nursing note and vitals reviewed.      Significant Labs:  Labs pending    Significant Diagnostics:  I have reviewed all pertinent imaging results/findings within the past 24 hours.

## 2019-01-19 NOTE — PLAN OF CARE
"Problem: Adult Inpatient Plan of Care  Goal: Plan of Care Review  Outcome: Ongoing (interventions implemented as appropriate)  POC reviewed with patient. Verbalizes understanding. VSS. NG tube removed at the beginning of shift. No reports of nausea or vomiting noted. Incisions intact. Patient complained of "dryness in throat" throughout shift; IV fluids restarted after IV placement. Patient has no other concerns at this time. Patient denies any pain. Call light within reach. WCTM.      "

## 2019-01-19 NOTE — PLAN OF CARE
Problem: Adult Inpatient Plan of Care  Goal: Plan of Care Review  Outcome: Ongoing (interventions implemented as appropriate)  Pt is alert and oriented x 4 injury free.  Breathing is regular equal and unlabored bilaterally.  The pt's pain is well controlled with the PCA.   The pt is is very manipulative and can not be allowed to go to the bathroom by her self because she will drink water from the faucet. Pt can not walk the pierre by her self because she will go to the coffee machine in the day room or go to the vending machines by the elevators.  If the Dr's are to get any kind of accurate NG tube output this pt can not be left alone. The pt is able to ambulate in the pierre with no problems and the walker is not needed.

## 2019-01-20 PROBLEM — N28.9 ACUTE RENAL INSUFFICIENCY: Status: RESOLVED | Noted: 2019-01-16 | Resolved: 2019-01-20

## 2019-01-20 PROBLEM — Z93.2 ILEOSTOMY IN PLACE: Status: RESOLVED | Noted: 2019-01-15 | Resolved: 2019-01-20

## 2019-01-20 PROBLEM — E87.1 HYPONATREMIA: Status: RESOLVED | Noted: 2017-03-13 | Resolved: 2019-01-20

## 2019-01-20 LAB
ANION GAP SERPL CALC-SCNC: 8 MMOL/L
BASOPHILS # BLD AUTO: 0.02 K/UL
BASOPHILS NFR BLD: 0.2 %
BUN SERPL-MCNC: 27 MG/DL
CALCIUM SERPL-MCNC: 8.5 MG/DL
CHLORIDE SERPL-SCNC: 100 MMOL/L
CO2 SERPL-SCNC: 29 MMOL/L
CREAT SERPL-MCNC: 0.8 MG/DL
CRP SERPL-MCNC: 172 MG/L
DIFFERENTIAL METHOD: ABNORMAL
EOSINOPHIL # BLD AUTO: 0.1 K/UL
EOSINOPHIL NFR BLD: 1.3 %
ERYTHROCYTE [DISTWIDTH] IN BLOOD BY AUTOMATED COUNT: 16.2 %
EST. GFR  (AFRICAN AMERICAN): >60 ML/MIN/1.73 M^2
EST. GFR  (NON AFRICAN AMERICAN): >60 ML/MIN/1.73 M^2
GLUCOSE SERPL-MCNC: 78 MG/DL
HCT VFR BLD AUTO: 27.6 %
HGB BLD-MCNC: 9.1 G/DL
IMM GRANULOCYTES # BLD AUTO: 0.19 K/UL
IMM GRANULOCYTES NFR BLD AUTO: 2.2 %
LYMPHOCYTES # BLD AUTO: 1.3 K/UL
LYMPHOCYTES NFR BLD: 15.2 %
MAGNESIUM SERPL-MCNC: 1.5 MG/DL
MCH RBC QN AUTO: 29.5 PG
MCHC RBC AUTO-ENTMCNC: 33 G/DL
MCV RBC AUTO: 90 FL
MONOCYTES # BLD AUTO: 1 K/UL
MONOCYTES NFR BLD: 11.4 %
NEUTROPHILS # BLD AUTO: 6 K/UL
NEUTROPHILS NFR BLD: 69.7 %
NRBC BLD-RTO: 0 /100 WBC
PHOSPHATE SERPL-MCNC: 1.9 MG/DL
PLATELET # BLD AUTO: 318 K/UL
PMV BLD AUTO: 9.6 FL
POTASSIUM SERPL-SCNC: 3.6 MMOL/L
RBC # BLD AUTO: 3.08 M/UL
SODIUM SERPL-SCNC: 137 MMOL/L
WBC # BLD AUTO: 8.62 K/UL

## 2019-01-20 PROCEDURE — 86140 C-REACTIVE PROTEIN: CPT

## 2019-01-20 PROCEDURE — 80048 BASIC METABOLIC PNL TOTAL CA: CPT

## 2019-01-20 PROCEDURE — 63600175 PHARM REV CODE 636 W HCPCS: Performed by: STUDENT IN AN ORGANIZED HEALTH CARE EDUCATION/TRAINING PROGRAM

## 2019-01-20 PROCEDURE — 25000003 PHARM REV CODE 250: Performed by: STUDENT IN AN ORGANIZED HEALTH CARE EDUCATION/TRAINING PROGRAM

## 2019-01-20 PROCEDURE — 20600001 HC STEP DOWN PRIVATE ROOM

## 2019-01-20 PROCEDURE — 83735 ASSAY OF MAGNESIUM: CPT

## 2019-01-20 PROCEDURE — 63600175 PHARM REV CODE 636 W HCPCS: Performed by: NURSE PRACTITIONER

## 2019-01-20 PROCEDURE — 84100 ASSAY OF PHOSPHORUS: CPT

## 2019-01-20 PROCEDURE — 85025 COMPLETE CBC W/AUTO DIFF WBC: CPT

## 2019-01-20 PROCEDURE — 36415 COLL VENOUS BLD VENIPUNCTURE: CPT

## 2019-01-20 RX ORDER — HYDROMORPHONE HYDROCHLORIDE 1 MG/ML
0.5 INJECTION, SOLUTION INTRAMUSCULAR; INTRAVENOUS; SUBCUTANEOUS EVERY 6 HOURS PRN
Status: DISCONTINUED | OUTPATIENT
Start: 2019-01-20 | End: 2019-02-01 | Stop reason: HOSPADM

## 2019-01-20 RX ORDER — OXYCODONE AND ACETAMINOPHEN 7.5; 325 MG/1; MG/1
1 TABLET ORAL EVERY 4 HOURS PRN
Status: DISCONTINUED | OUTPATIENT
Start: 2019-01-20 | End: 2019-01-30

## 2019-01-20 RX ADMIN — PANTOPRAZOLE SODIUM 40 MG: 40 TABLET, DELAYED RELEASE ORAL at 09:01

## 2019-01-20 RX ADMIN — OXYCODONE AND ACETAMINOPHEN 1 TABLET: 7.5; 325 TABLET ORAL at 04:01

## 2019-01-20 RX ADMIN — OXYCODONE AND ACETAMINOPHEN 1 TABLET: 7.5; 325 TABLET ORAL at 09:01

## 2019-01-20 RX ADMIN — TOPIRAMATE 50 MG: 25 TABLET, FILM COATED ORAL at 08:01

## 2019-01-20 RX ADMIN — HYDROMORPHONE HYDROCHLORIDE 0.5 MG: 1 INJECTION, SOLUTION INTRAMUSCULAR; INTRAVENOUS; SUBCUTANEOUS at 12:01

## 2019-01-20 RX ADMIN — POTASSIUM CHLORIDE, DEXTROSE MONOHYDRATE AND SODIUM CHLORIDE: 150; 5; 450 INJECTION, SOLUTION INTRAVENOUS at 04:01

## 2019-01-20 RX ADMIN — ONDANSETRON 8 MG: 2 INJECTION INTRAMUSCULAR; INTRAVENOUS at 10:01

## 2019-01-20 RX ADMIN — PANTOPRAZOLE SODIUM 40 MG: 40 TABLET, DELAYED RELEASE ORAL at 08:01

## 2019-01-20 RX ADMIN — HEPARIN SODIUM 5000 UNITS: 5000 INJECTION, SOLUTION INTRAVENOUS; SUBCUTANEOUS at 12:01

## 2019-01-20 RX ADMIN — HEPARIN SODIUM 5000 UNITS: 5000 INJECTION, SOLUTION INTRAVENOUS; SUBCUTANEOUS at 06:01

## 2019-01-20 RX ADMIN — OXYCODONE AND ACETAMINOPHEN 1 TABLET: 7.5; 325 TABLET ORAL at 11:01

## 2019-01-20 RX ADMIN — LORAZEPAM 0.5 MG: 2 INJECTION INTRAMUSCULAR; INTRAVENOUS at 09:01

## 2019-01-20 RX ADMIN — FLUOXETINE HYDROCHLORIDE 60 MG: 20 CAPSULE ORAL at 09:01

## 2019-01-20 RX ADMIN — LORAZEPAM 0.5 MG: 2 INJECTION INTRAMUSCULAR; INTRAVENOUS at 10:01

## 2019-01-20 RX ADMIN — HEPARIN SODIUM 5000 UNITS: 5000 INJECTION, SOLUTION INTRAVENOUS; SUBCUTANEOUS at 10:01

## 2019-01-20 RX ADMIN — POTASSIUM CHLORIDE, DEXTROSE MONOHYDRATE AND SODIUM CHLORIDE: 150; 5; 450 INJECTION, SOLUTION INTRAVENOUS at 01:01

## 2019-01-20 RX ADMIN — HYDROMORPHONE HYDROCHLORIDE 0.5 MG: 1 INJECTION, SOLUTION INTRAMUSCULAR; INTRAVENOUS; SUBCUTANEOUS at 06:01

## 2019-01-20 RX ADMIN — LORAZEPAM 0.5 MG: 2 INJECTION INTRAMUSCULAR; INTRAVENOUS at 04:01

## 2019-01-20 NOTE — SUBJECTIVE & OBJECTIVE
Subjective:     Interval History: NGT removed, had multiple BMs yesterday, no N/V with sips    Post-Op Info:  Procedure(s):  ANASTOMOSIS, ILEORECTAL  CLOSURE, ILEOSTOMY  URETEROLYSIS  LAPAROTOMY, EXPLORATORY;extensive lysis of adhesions   5 Days Post-Op      Medications:  Continuous Infusions:   dextrose 5 % and 0.45 % NaCl with KCl 20 mEq 100 mL/hr at 01/20/19 0157    hydromorphone in 0.9 % NaCl 6 mg/30 ml       Scheduled Meds:   FLUoxetine  60 mg Oral Daily    heparin (porcine)  5,000 Units Subcutaneous Q8H    pantoprazole  40 mg Oral BID    topiramate  50 mg Oral QHS     PRN Meds:   cyclobenzaprine    lorazepam    mupirocin    naloxone    ondansetron        Objective:     Vital Signs (Most Recent):  Temp: 96.9 °F (36.1 °C) (01/20/19 0813)  Pulse: 83 (01/20/19 0813)  Resp: 20 (01/20/19 0813)  BP: 122/71 (01/20/19 0813)  SpO2: 95 % (01/20/19 0813) Vital Signs (24h Range):  Temp:  [96.9 °F (36.1 °C)-98.4 °F (36.9 °C)] 96.9 °F (36.1 °C)  Pulse:  [83-93] 83  Resp:  [12-20] 20  SpO2:  [95 %-100 %] 95 %  BP: (107-139)/(60-78) 122/71     Intake/Output - Last 3 Shifts       01/18 0700 - 01/19 0659 01/19 0700 - 01/20 0659 01/20 0700 - 01/21 0659    P.O.  180     I.V. (mL/kg) 2874.6 (40.9) 48.3 (0.7)     NG/GT 30      Total Intake(mL/kg) 2904.6 (41.3) 228.3 (3.2)     Urine (mL/kg/hr) 575 (0.3) 0 (0)     Drains 4880 315     Stool 0 0     Total Output 5455 315     Net -2550.4 -86.7            Urine Occurrence 1 x 4 x     Stool Occurrence 0 x 2 x           Physical Exam   Constitutional: She is oriented to person, place, and time. No distress.   Neck: Neck supple.   Cardiovascular: Normal rate and regular rhythm.   Pulmonary/Chest: Effort normal. No respiratory distress.   Abdominal: Soft.   Incision c/d/i, excoriated skin around previous ostomy site, THAI serosang   Neurological: She is alert and oriented to person, place, and time.       Significant Labs:  BMP (Last 3 Results):   Recent Labs   Lab 01/18/19  040  01/19/19  0706 01/20/19  0424   GLU 93 74 78   * 137 137   K 3.7 3.2* 3.6   CL 99 98 100   CO2 22* 25 29   BUN 43* 34* 27*   CREATININE 1.2 0.9 0.8   CALCIUM 9.3 8.7 8.5*   MG 1.6 1.8 1.5*     CBC (Last 3 Results):   Recent Labs   Lab 01/18/19  0750 01/19/19  1526 01/20/19  0424   WBC 8.80 9.16 8.62   RBC 3.60* 3.01* 3.08*   HGB 10.9* 9.0* 9.1*   HCT 35.2* 28.9* 27.6*    385* 318   MCV 98 96 90   MCH 30.3 29.9 29.5   MCHC 31.0* 31.1* 33.0       Significant Diagnostics:  None

## 2019-01-20 NOTE — PROGRESS NOTES
[facility]  Colorectal Surgery  Progress Note    Patient Name: Candida Cardona  MRN: [id number]  Admission Date: 1/15/2019  Hospital Length of Stay: 5 days  Attending Physician: Reji Peterson MD    Subjective:     Interval History: NGT removed, had multiple BMs yesterday, no N/V with sips    Post-Op Info:  Procedure(s):  ANASTOMOSIS, ILEORECTAL  CLOSURE, ILEOSTOMY  URETEROLYSIS  LAPAROTOMY, EXPLORATORY;extensive lysis of adhesions   5 Days Post-Op      Medications:  Continuous Infusions:   dextrose 5 % and 0.45 % NaCl with KCl 20 mEq 100 mL/hr at 01/20/19 0157    hydromorphone in 0.9 % NaCl 6 mg/30 ml       Scheduled Meds:   FLUoxetine  60 mg Oral Daily    heparin (porcine)  5,000 Units Subcutaneous Q8H    pantoprazole  40 mg Oral BID    topiramate  50 mg Oral QHS     PRN Meds:   cyclobenzaprine    lorazepam    mupirocin    naloxone    ondansetron        Objective:     Vital Signs (Most Recent):  Temp: 96.9 °F (36.1 °C) (01/20/19 0813)  Pulse: 83 (01/20/19 0813)  Resp: 20 (01/20/19 0813)  BP: 122/71 (01/20/19 0813)  SpO2: 95 % (01/20/19 0813) Vital Signs (24h Range):  Temp:  [96.9 °F (36.1 °C)-98.4 °F (36.9 °C)] 96.9 °F (36.1 °C)  Pulse:  [83-93] 83  Resp:  [12-20] 20  SpO2:  [95 %-100 %] 95 %  BP: (107-139)/(60-78) 122/71     Intake/Output - Last 3 Shifts       01/18 0700 - 01/19 0659 01/19 0700 - 01/20 0659 01/20 0700 - 01/21 0659    P.O.  180     I.V. (mL/kg) 2874.6 (40.9) 48.3 (0.7)     NG/GT 30      Total Intake(mL/kg) 2904.6 (41.3) 228.3 (3.2)     Urine (mL/kg/hr) 575 (0.3) 0 (0)     Drains 4880 315     Stool 0 0     Total Output 5455 315     Net -2550.4 -86.7            Urine Occurrence 1 x 4 x     Stool Occurrence 0 x 2 x           Physical Exam   Constitutional: She is oriented to person, place, and time. No distress.   Neck: Neck supple.   Cardiovascular: Normal rate and regular rhythm.   Pulmonary/Chest: Effort normal. No respiratory distress.   Abdominal: Soft.    Incision c/d/i, excoriated skin around previous ostomy site, THAI serosang   Neurological: She is alert and oriented to person, place, and time.       Significant Labs:  BMP (Last 3 Results):   Recent Labs   Lab 01/18/19  0402 01/19/19  0706 01/20/19  0424   GLU 93 74 78   * 137 137   K 3.7 3.2* 3.6   CL 99 98 100   CO2 22* 25 29   BUN 43* 34* 27*   CREATININE 1.2 0.9 0.8   CALCIUM 9.3 8.7 8.5*   MG 1.6 1.8 1.5*     CBC (Last 3 Results):   Recent Labs   Lab 01/18/19  0750 01/19/19  1526 01/20/19  0424   WBC 8.80 9.16 8.62   RBC 3.60* 3.01* 3.08*   HGB 10.9* 9.0* 9.1*   HCT 35.2* 28.9* 27.6*    385* 318   MCV 98 96 90   MCH 30.3 29.9 29.5   MCHC 31.0* 31.1* 33.0       Significant Diagnostics:  None    Assessment/Plan:     Crohn's disease of small and large intestines    Ileostomy closure 1/15, post op TAC for Crohns. Drank large amounts of clears overnight while NPO. Contributing to high NG output. 2 bowel movements yesterday.     -adv to clears  -d/c PCA  - oral pain meds with dilaudid for breakthrough.   -Cr has normalized, cont MIVFs    -enc amb and IS  -ivan hose and SCD, ppx hep  -PPI     Malnutrition    Adv diet as tolerated  Monitor labs           Luis Roque MD  Colorectal Surgery  [facility]

## 2019-01-20 NOTE — PLAN OF CARE
Problem: Adult Inpatient Plan of Care  Goal: Plan of Care Review  Outcome: Ongoing (interventions implemented as appropriate)  POC reviewed with patient. Verbal understanding received. Patient aox4, VSS. Pt is alert and oriented x 4 injury free.  Pain well controlled with dilaudid PCA. Bed alarm on as patient has been non compliant with NPO status. Ice chips made available. Patient incontinent of bowls. Call light in reach. Bed low locked. RN WCTM

## 2019-01-20 NOTE — ASSESSMENT & PLAN NOTE
Ileostomy closure 1/15, post op TAC for Crohns. Drank large amounts of clears overnight while NPO. Contributing to high NG output. 2 bowel movements yesterday.     -adv to clears  -d/c PCA  - oral pain meds with dilaudid for breakthrough.   -Cr has normalized, cont MIVFs    -enc amb and IS  -ivan hose and SCD, ppx hep  -PPI

## 2019-01-21 PROBLEM — E83.39 HYPOPHOSPHATASIA: Status: ACTIVE | Noted: 2019-01-21

## 2019-01-21 LAB
ANION GAP SERPL CALC-SCNC: 9 MMOL/L
BASOPHILS # BLD AUTO: 0.02 K/UL
BASOPHILS NFR BLD: 0.1 %
BUN SERPL-MCNC: 18 MG/DL
CALCIUM SERPL-MCNC: 8.4 MG/DL
CHLORIDE SERPL-SCNC: 101 MMOL/L
CO2 SERPL-SCNC: 25 MMOL/L
CREAT SERPL-MCNC: 0.8 MG/DL
CRP SERPL-MCNC: 143.1 MG/L
DIFFERENTIAL METHOD: ABNORMAL
EOSINOPHIL # BLD AUTO: 0.1 K/UL
EOSINOPHIL NFR BLD: 0.8 %
ERYTHROCYTE [DISTWIDTH] IN BLOOD BY AUTOMATED COUNT: 16.3 %
EST. GFR  (AFRICAN AMERICAN): >60 ML/MIN/1.73 M^2
EST. GFR  (NON AFRICAN AMERICAN): >60 ML/MIN/1.73 M^2
GLUCOSE SERPL-MCNC: 114 MG/DL
HCT VFR BLD AUTO: 32 %
HGB BLD-MCNC: 10.4 G/DL
IMM GRANULOCYTES # BLD AUTO: 0.38 K/UL
IMM GRANULOCYTES NFR BLD AUTO: 2.7 %
LYMPHOCYTES # BLD AUTO: 1 K/UL
LYMPHOCYTES NFR BLD: 6.8 %
MAGNESIUM SERPL-MCNC: 1.3 MG/DL
MCH RBC QN AUTO: 29.6 PG
MCHC RBC AUTO-ENTMCNC: 32.5 G/DL
MCV RBC AUTO: 91 FL
MONOCYTES # BLD AUTO: 1 K/UL
MONOCYTES NFR BLD: 7.3 %
NEUTROPHILS # BLD AUTO: 11.7 K/UL
NEUTROPHILS NFR BLD: 82.3 %
NRBC BLD-RTO: 0 /100 WBC
PHOSPHATE SERPL-MCNC: 1.9 MG/DL
PLATELET # BLD AUTO: 393 K/UL
PMV BLD AUTO: 9.8 FL
POTASSIUM SERPL-SCNC: 3.4 MMOL/L
RBC # BLD AUTO: 3.51 M/UL
SODIUM SERPL-SCNC: 135 MMOL/L
WBC # BLD AUTO: 14.2 K/UL

## 2019-01-21 PROCEDURE — 25000003 PHARM REV CODE 250: Performed by: NURSE PRACTITIONER

## 2019-01-21 PROCEDURE — 25000003 PHARM REV CODE 250: Performed by: STUDENT IN AN ORGANIZED HEALTH CARE EDUCATION/TRAINING PROGRAM

## 2019-01-21 PROCEDURE — 84100 ASSAY OF PHOSPHORUS: CPT

## 2019-01-21 PROCEDURE — 36415 COLL VENOUS BLD VENIPUNCTURE: CPT

## 2019-01-21 PROCEDURE — 83735 ASSAY OF MAGNESIUM: CPT

## 2019-01-21 PROCEDURE — 85025 COMPLETE CBC W/AUTO DIFF WBC: CPT

## 2019-01-21 PROCEDURE — 86140 C-REACTIVE PROTEIN: CPT

## 2019-01-21 PROCEDURE — 20600001 HC STEP DOWN PRIVATE ROOM

## 2019-01-21 PROCEDURE — 63600175 PHARM REV CODE 636 W HCPCS: Performed by: NURSE PRACTITIONER

## 2019-01-21 PROCEDURE — 63600175 PHARM REV CODE 636 W HCPCS

## 2019-01-21 PROCEDURE — 63600175 PHARM REV CODE 636 W HCPCS: Performed by: STUDENT IN AN ORGANIZED HEALTH CARE EDUCATION/TRAINING PROGRAM

## 2019-01-21 PROCEDURE — 80048 BASIC METABOLIC PNL TOTAL CA: CPT

## 2019-01-21 RX ORDER — SODIUM,POTASSIUM PHOSPHATES 280-250MG
1 POWDER IN PACKET (EA) ORAL ONCE
Status: COMPLETED | OUTPATIENT
Start: 2019-01-21 | End: 2019-01-21

## 2019-01-21 RX ORDER — LORAZEPAM 2 MG/ML
INJECTION INTRAMUSCULAR
Status: COMPLETED
Start: 2019-01-21 | End: 2019-01-21

## 2019-01-21 RX ORDER — SODIUM,POTASSIUM PHOSPHATES 280-250MG
1 POWDER IN PACKET (EA) ORAL
Status: COMPLETED | OUTPATIENT
Start: 2019-01-21 | End: 2019-01-21

## 2019-01-21 RX ORDER — MAGNESIUM SULFATE HEPTAHYDRATE 40 MG/ML
2 INJECTION, SOLUTION INTRAVENOUS ONCE
Status: COMPLETED | OUTPATIENT
Start: 2019-01-21 | End: 2019-01-21

## 2019-01-21 RX ADMIN — TOPIRAMATE 50 MG: 25 TABLET, FILM COATED ORAL at 09:01

## 2019-01-21 RX ADMIN — OXYCODONE AND ACETAMINOPHEN 1 TABLET: 7.5; 325 TABLET ORAL at 09:01

## 2019-01-21 RX ADMIN — FLUOXETINE HYDROCHLORIDE 60 MG: 20 CAPSULE ORAL at 09:01

## 2019-01-21 RX ADMIN — LORAZEPAM 0.5 MG: 2 INJECTION INTRAMUSCULAR; INTRAVENOUS at 05:01

## 2019-01-21 RX ADMIN — HYDROMORPHONE HYDROCHLORIDE 0.5 MG: 1 INJECTION, SOLUTION INTRAMUSCULAR; INTRAVENOUS; SUBCUTANEOUS at 11:01

## 2019-01-21 RX ADMIN — POTASSIUM CHLORIDE, DEXTROSE MONOHYDRATE AND SODIUM CHLORIDE: 150; 5; 450 INJECTION, SOLUTION INTRAVENOUS at 04:01

## 2019-01-21 RX ADMIN — HYDROMORPHONE HYDROCHLORIDE 0.5 MG: 1 INJECTION, SOLUTION INTRAMUSCULAR; INTRAVENOUS; SUBCUTANEOUS at 05:01

## 2019-01-21 RX ADMIN — MAGNESIUM SULFATE IN WATER 2 G: 40 INJECTION, SOLUTION INTRAVENOUS at 11:01

## 2019-01-21 RX ADMIN — HEPARIN SODIUM 5000 UNITS: 5000 INJECTION, SOLUTION INTRAVENOUS; SUBCUTANEOUS at 09:01

## 2019-01-21 RX ADMIN — PANTOPRAZOLE SODIUM 40 MG: 40 TABLET, DELAYED RELEASE ORAL at 09:01

## 2019-01-21 RX ADMIN — POTASSIUM & SODIUM PHOSPHATES POWDER PACK 280-160-250 MG 1 PACKET: 280-160-250 PACK at 09:01

## 2019-01-21 RX ADMIN — HYDROMORPHONE HYDROCHLORIDE 0.5 MG: 1 INJECTION, SOLUTION INTRAMUSCULAR; INTRAVENOUS; SUBCUTANEOUS at 04:01

## 2019-01-21 RX ADMIN — MAGNESIUM SULFATE IN WATER 2 G: 40 INJECTION, SOLUTION INTRAVENOUS at 09:01

## 2019-01-21 RX ADMIN — HEPARIN SODIUM 5000 UNITS: 5000 INJECTION, SOLUTION INTRAVENOUS; SUBCUTANEOUS at 01:01

## 2019-01-21 RX ADMIN — POTASSIUM & SODIUM PHOSPHATES POWDER PACK 280-160-250 MG 1 PACKET: 280-160-250 PACK at 01:01

## 2019-01-21 RX ADMIN — POTASSIUM & SODIUM PHOSPHATES POWDER PACK 280-160-250 MG 1 PACKET: 280-160-250 PACK at 11:01

## 2019-01-21 RX ADMIN — HEPARIN SODIUM 5000 UNITS: 5000 INJECTION, SOLUTION INTRAVENOUS; SUBCUTANEOUS at 05:01

## 2019-01-21 RX ADMIN — LORAZEPAM 0.5 MG: 2 INJECTION INTRAMUSCULAR; INTRAVENOUS at 04:01

## 2019-01-21 RX ADMIN — LORAZEPAM 0.5 MG: 2 INJECTION, SOLUTION INTRAMUSCULAR; INTRAVENOUS at 11:01

## 2019-01-21 RX ADMIN — POTASSIUM & SODIUM PHOSPHATES POWDER PACK 280-160-250 MG 1 PACKET: 280-160-250 PACK at 12:01

## 2019-01-21 RX ADMIN — LORAZEPAM 0.5 MG: 2 INJECTION INTRAMUSCULAR; INTRAVENOUS at 11:01

## 2019-01-21 NOTE — PROGRESS NOTES
[facility]  Colorectal Surgery  Progress Note    Patient Name: Candida Cardona  MRN: [id number]  Admission Date: 1/15/2019  Hospital Length of Stay: 6 days  Attending Physician: Reji Peterson MD    Subjective:     Interval History: no acute events overnite, nausea improved, adequate pain control    Post-Op Info:  Procedure(s):  ANASTOMOSIS, ILEORECTAL  CLOSURE, ILEOSTOMY  URETEROLYSIS  LAPAROTOMY, EXPLORATORY;extensive lysis of adhesions   6 Days Post-Op      Medications:  Continuous Infusions:   dextrose 5 % and 0.45 % NaCl with KCl 20 mEq 50 mL/hr at 01/21/19 0941     Scheduled Meds:   FLUoxetine  60 mg Oral Daily    heparin (porcine)  5,000 Units Subcutaneous Q8H    magnesium sulfate IVPB  2 g Intravenous Once    magnesium sulfate IVPB  2 g Intravenous Once    pantoprazole  40 mg Oral BID    potassium, sodium phosphates  1 packet Oral Q1H    topiramate  50 mg Oral QHS     PRN Meds:   cyclobenzaprine    HYDROmorphone    lorazepam    mupirocin    naloxone    ondansetron    oxyCODONE-acetaminophen        Objective:     Vital Signs (Most Recent):  Temp: 98.2 °F (36.8 °C) (01/21/19 1000)  Pulse: 81 (01/21/19 1000)  Resp: 16 (01/21/19 1000)  BP: (!) 93/51 (01/21/19 1000)  SpO2: 96 % (01/21/19 1000) Vital Signs (24h Range):  Temp:  [97.4 °F (36.3 °C)-98.2 °F (36.8 °C)] 98.2 °F (36.8 °C)  Pulse:  [79-87] 81  Resp:  [16] 16  SpO2:  [96 %-99 %] 96 %  BP: ()/(51-76) 93/51     Intake/Output - Last 3 Shifts       01/19 0700 - 01/20 0659 01/20 0700 - 01/21 0659 01/21 0700 - 01/22 0659    P.O. 180 550 0    I.V. (mL/kg) 48.3 (0.7) 2513.3 (35.8) 471.7 (6.7)    NG/GT       Total Intake(mL/kg) 228.3 (3.2) 3063.3 (43.6) 471.7 (6.7)    Urine (mL/kg/hr) 0 (0) 0 (0)     Emesis/NG output  100     Drains 315 40     Stool 0 0     Total Output 315 140     Net -86.7 +2923.3 +471.7           Urine Occurrence 4 x 6 x 1 x    Stool Occurrence 2 x 4 x           Physical Exam   Constitutional: She is  oriented to person, place, and time. She appears well-developed and well-nourished.   HENT:   Head: Normocephalic.   Eyes: Pupils are equal, round, and reactive to light.   Cardiovascular: Normal rate, regular rhythm and normal heart sounds.   Pulmonary/Chest: Effort normal and breath sounds normal. No respiratory distress. She has no wheezes. She has no rales.   Abdominal: Soft. She exhibits no mass. There is no rebound and no guarding.   abd inc line healing well   Neurological: She is alert and oriented to person, place, and time.   Skin: Skin is warm and dry.   Psychiatric: She has a normal mood and affect. Her behavior is normal. Judgment and thought content normal.   Nursing note and vitals reviewed.        Significant Labs:  BMP (Last 3 Results):   Recent Labs   Lab 01/19/19  0706 01/20/19  0424 01/21/19  0545   GLU 74 78 114*    137 135*   K 3.2* 3.6 3.4*   CL 98 100 101   CO2 25 29 25   BUN 34* 27* 18   CREATININE 0.9 0.8 0.8   CALCIUM 8.7 8.5* 8.4*   MG 1.8 1.5* 1.3*     CBC (Last 3 Results):   Recent Labs   Lab 01/19/19  1526 01/20/19  0424 01/21/19  0545   WBC 9.16 8.62 14.20*   RBC 3.01* 3.08* 3.51*   HGB 9.0* 9.1* 10.4*   HCT 28.9* 27.6* 32.0*   * 318 393*   MCV 96 90 91   MCH 29.9 29.5 29.6   MCHC 31.1* 33.0 32.5       Significant Diagnostics:  None    Assessment/Plan:     Hypophosphatasia    Replace phos  Monitor labs     Hypomagnesemia    Replace Mag  Monitor labs     Crohn's disease of small and large intestines    Ileostomy closure 1/15, post op TAC for Crohns.      -adv to lfd  -d/c PCA  - oral pain meds with dilaudid for breakthrough.   -Cr has normalized, cont MIVFs    -enc amb and IS  -ivan hose and SCD, ppx hep  -PPI     Malnutrition    Adv diet as tolerated  Monitor labs           Vidhi Mathis NP  Colorectal Surgery  [facility]

## 2019-01-21 NOTE — SUBJECTIVE & OBJECTIVE
Subjective:     Interval History: no acute events overnite, nausea improved, adequate pain control    Post-Op Info:  Procedure(s):  ANASTOMOSIS, ILEORECTAL  CLOSURE, ILEOSTOMY  URETEROLYSIS  LAPAROTOMY, EXPLORATORY;extensive lysis of adhesions   6 Days Post-Op      Medications:  Continuous Infusions:   dextrose 5 % and 0.45 % NaCl with KCl 20 mEq 50 mL/hr at 01/21/19 0941     Scheduled Meds:   FLUoxetine  60 mg Oral Daily    heparin (porcine)  5,000 Units Subcutaneous Q8H    magnesium sulfate IVPB  2 g Intravenous Once    magnesium sulfate IVPB  2 g Intravenous Once    pantoprazole  40 mg Oral BID    potassium, sodium phosphates  1 packet Oral Q1H    topiramate  50 mg Oral QHS     PRN Meds:   cyclobenzaprine    HYDROmorphone    lorazepam    mupirocin    naloxone    ondansetron    oxyCODONE-acetaminophen        Objective:     Vital Signs (Most Recent):  Temp: 98.2 °F (36.8 °C) (01/21/19 1000)  Pulse: 81 (01/21/19 1000)  Resp: 16 (01/21/19 1000)  BP: (!) 93/51 (01/21/19 1000)  SpO2: 96 % (01/21/19 1000) Vital Signs (24h Range):  Temp:  [97.4 °F (36.3 °C)-98.2 °F (36.8 °C)] 98.2 °F (36.8 °C)  Pulse:  [79-87] 81  Resp:  [16] 16  SpO2:  [96 %-99 %] 96 %  BP: ()/(51-76) 93/51     Intake/Output - Last 3 Shifts       01/19 0700 - 01/20 0659 01/20 0700 - 01/21 0659 01/21 0700 - 01/22 0659    P.O. 180 550 0    I.V. (mL/kg) 48.3 (0.7) 2513.3 (35.8) 471.7 (6.7)    NG/GT       Total Intake(mL/kg) 228.3 (3.2) 3063.3 (43.6) 471.7 (6.7)    Urine (mL/kg/hr) 0 (0) 0 (0)     Emesis/NG output  100     Drains 315 40     Stool 0 0     Total Output 315 140     Net -86.7 +2923.3 +471.7           Urine Occurrence 4 x 6 x 1 x    Stool Occurrence 2 x 4 x           Physical Exam   Constitutional: She is oriented to person, place, and time. She appears well-developed and well-nourished.   HENT:   Head: Normocephalic.   Eyes: Pupils are equal, round, and reactive to light.   Cardiovascular: Normal rate, regular rhythm and  normal heart sounds.   Pulmonary/Chest: Effort normal and breath sounds normal. No respiratory distress. She has no wheezes. She has no rales.   Abdominal: Soft. She exhibits no mass. There is no rebound and no guarding.   abd inc line healing well   Neurological: She is alert and oriented to person, place, and time.   Skin: Skin is warm and dry.   Psychiatric: She has a normal mood and affect. Her behavior is normal. Judgment and thought content normal.   Nursing note and vitals reviewed.        Significant Labs:  BMP (Last 3 Results):   Recent Labs   Lab 01/19/19  0706 01/20/19  0424 01/21/19  0545   GLU 74 78 114*    137 135*   K 3.2* 3.6 3.4*   CL 98 100 101   CO2 25 29 25   BUN 34* 27* 18   CREATININE 0.9 0.8 0.8   CALCIUM 8.7 8.5* 8.4*   MG 1.8 1.5* 1.3*     CBC (Last 3 Results):   Recent Labs   Lab 01/19/19  1526 01/20/19  0424 01/21/19  0545   WBC 9.16 8.62 14.20*   RBC 3.01* 3.08* 3.51*   HGB 9.0* 9.1* 10.4*   HCT 28.9* 27.6* 32.0*   * 318 393*   MCV 96 90 91   MCH 29.9 29.5 29.6   MCHC 31.1* 33.0 32.5       Significant Diagnostics:  None

## 2019-01-21 NOTE — PLAN OF CARE
SW following for d/c needs. Pt expected to d/c home with no needs.          Crow Canela, MSW, LCSW  [facility]  [id number]

## 2019-01-21 NOTE — PHYSICIAN QUERY
"PT Name: Candida Cardona  MR #: [id number]    Physician Query Form - Nutrition Clarification     CDS/: Brandy E Capley               Contact information: Spectralink:  [phone]    This form is a permanent document in the medical record.     Query Date: January 21, 2019    By submitting this query, we are merely seeking further clarification of documentation.. Please utilize your independent clinical judgment when addressing the question(s) below.    The Medical record contains the following:   Indicators  Supporting Clinical Findings [facility] in Medical Record    % of Estimated Energy Intake over a time frame from p.o., TF, or TPN      Weight Status over a time frame      Subcutaneous Fat and/or Muscle Loss      Fluid Accumulation or Edema      Reduced  Strength     X Wt / BMI / Usual Body Weight Height 5' 7" (1.702 m)    Weight 71.7 kg (158lb)  BMI (Calculated) 24.8    Anthropometric flowsheet 1/17    Delayed Wound Healing / Failure to Thrive     X Acute or Chronic Illness HPI:   Status post total abdominal colectomy with end ileostomy in March of 2014 for Crohn's disease with colonic obstruction.     Only interval change is that she notes recent increase in ostomy output. States she has mentioned this with Dr. Nieves; his PA is currently running stool samples for workup.    DATE OF PROCEDURE:  01/15/2019     POSTOPERATIVE DIAGNOSIS:  Crohn's disease with unwanted ileostomy.     OPERATIONS:  1.  Ileostomy closure with ileorectal anastomosis.  2.  Exploratory laparotomy with extensive lysis of adhesions.  3.  Ureterolysis.  4.  Sigmoidoscopy   Interval H&P 1/15                Op note filed 1/21    Medication      Treatment     X Other She appears well-developed and well-nourished.    Malnutrition  Await return of bowel function  Monitor labs    Hyponatremia  Hypophosphatasia  Hypomagnesemia   Colon and rectal surgery progress  Note 1/16            Colon and rectal surgery progress notes 1050     AND / ASPEN " Clinical Characteristics (October 2011)  A minimum of two characteristics is recommended for diagnosing either moderate or severe malnutrition   Mild Malnutrition Moderate Malnutrition Severe Malnutrition   Energy Intake from p.o., TF or TPN. < 75% intake of estimated energy needs for less than 7 days < 75% intake of estimated energy needs for greater than 7 days < 50% intake of estimated energy needs for > 5 days   Weight Loss 1-2% in 1 month  5% in 3 months  7.5% in 6 months  10% in 1 year 1-2 % in 1 week  5% in 1 month  7.5% in 3 months  10% in 6 months  20% in 1 year > 2% in 1 week  > 5% in 1 month  > 7.5% in 3 months  > 10% in 6 months  > 20% in 1 year   Physical Findings     None *Mild subcutaneous fat and/or muscle loss  *Mild fluid accumulation  *Stage II decubitus  *Surgical wound or non-healing wound *Mod/severe subcutaneous fat and/or muscle loss  *Mod/severe fluid accumulation  *Stage III or IV decubitus  *Non-healing surgical wound     Provider, please specify diagnosis or diagnoses associated with above clinical findings.    [  ] Mild Protein-Calorie Malnutrition   [  ] Other Nutritional Diagnosis (please specify):    [  ] Malnutrition ruled out   [x  ] Other:    [  ] Clinically Undetermined       Please document in your progress notes daily for the duration of treatment until resolved and include in your discharge summary.

## 2019-01-21 NOTE — ASSESSMENT & PLAN NOTE
Ileostomy closure 1/15, post op TAC for Crohns.      -adv to lfd  -d/c PCA  - oral pain meds with dilaudid for breakthrough.   -Cr has normalized, cont MIVFs    -enc amb and IS  -ivan hose and SCD, ppx hep  -PPI

## 2019-01-21 NOTE — PLAN OF CARE
Problem: Adult Inpatient Plan of Care  Goal: Plan of Care Review  POC reviewed with patient. AAOX4, VSS. ABD ML healing, LUQ guaze dressing intact. L.THAI drain intact draining small amounts of serosanguineous fluid. IVF infusing. PCA pump discontinued per order. Pain remained at a 9 on a 1-10 scale. Prn percocet,dilaudid given. Prn Ativan given for anxiety. Diet advanced to clear liquids. Pt. Consumed 50% of tray. No complaints of nausea. Bedside commode present. Pt. Refused to get out of bed, had 5 incontinent episodes of urine/bowel. Shashi hose in place, refused SCD's. Nurse encouraged ambulation. Call light is within reach. Frequent rounding completed by nurse.

## 2019-01-22 LAB
ANION GAP SERPL CALC-SCNC: 9 MMOL/L
ANISOCYTOSIS BLD QL SMEAR: SLIGHT
BASOPHILS # BLD AUTO: 0.04 K/UL
BASOPHILS NFR BLD: 0.4 %
BUN SERPL-MCNC: 16 MG/DL
CALCIUM SERPL-MCNC: 8.2 MG/DL
CHLORIDE SERPL-SCNC: 103 MMOL/L
CO2 SERPL-SCNC: 25 MMOL/L
CREAT SERPL-MCNC: 0.8 MG/DL
CRP SERPL-MCNC: 149.5 MG/L
DIFFERENTIAL METHOD: ABNORMAL
EOSINOPHIL # BLD AUTO: 0.2 K/UL
EOSINOPHIL NFR BLD: 1.6 %
ERYTHROCYTE [DISTWIDTH] IN BLOOD BY AUTOMATED COUNT: 16.3 %
EST. GFR  (AFRICAN AMERICAN): >60 ML/MIN/1.73 M^2
EST. GFR  (NON AFRICAN AMERICAN): >60 ML/MIN/1.73 M^2
GLUCOSE SERPL-MCNC: 80 MG/DL
HCT VFR BLD AUTO: 30.5 %
HGB BLD-MCNC: 9.4 G/DL
IMM GRANULOCYTES # BLD AUTO: 0.27 K/UL
IMM GRANULOCYTES NFR BLD AUTO: 2.6 %
LYMPHOCYTES # BLD AUTO: 1.7 K/UL
LYMPHOCYTES NFR BLD: 16.1 %
MAGNESIUM SERPL-MCNC: 1.8 MG/DL
MCH RBC QN AUTO: 29.6 PG
MCHC RBC AUTO-ENTMCNC: 30.8 G/DL
MCV RBC AUTO: 96 FL
MONOCYTES # BLD AUTO: 0.8 K/UL
MONOCYTES NFR BLD: 8 %
NEUTROPHILS # BLD AUTO: 7.4 K/UL
NEUTROPHILS NFR BLD: 71.3 %
NRBC BLD-RTO: 0 /100 WBC
PHOSPHATE SERPL-MCNC: 2.3 MG/DL
PLATELET # BLD AUTO: 385 K/UL
PLATELET BLD QL SMEAR: ABNORMAL
PMV BLD AUTO: 10.2 FL
POLYCHROMASIA BLD QL SMEAR: ABNORMAL
POTASSIUM SERPL-SCNC: 3.7 MMOL/L
RBC # BLD AUTO: 3.18 M/UL
SODIUM SERPL-SCNC: 137 MMOL/L
WBC # BLD AUTO: 10.37 K/UL

## 2019-01-22 PROCEDURE — 25000003 PHARM REV CODE 250: Performed by: NURSE PRACTITIONER

## 2019-01-22 PROCEDURE — 86140 C-REACTIVE PROTEIN: CPT

## 2019-01-22 PROCEDURE — 20600001 HC STEP DOWN PRIVATE ROOM

## 2019-01-22 PROCEDURE — 36415 COLL VENOUS BLD VENIPUNCTURE: CPT

## 2019-01-22 PROCEDURE — 80048 BASIC METABOLIC PNL TOTAL CA: CPT

## 2019-01-22 PROCEDURE — 84100 ASSAY OF PHOSPHORUS: CPT

## 2019-01-22 PROCEDURE — 83735 ASSAY OF MAGNESIUM: CPT

## 2019-01-22 PROCEDURE — 85025 COMPLETE CBC W/AUTO DIFF WBC: CPT

## 2019-01-22 PROCEDURE — 63600175 PHARM REV CODE 636 W HCPCS: Performed by: NURSE PRACTITIONER

## 2019-01-22 PROCEDURE — 63600175 PHARM REV CODE 636 W HCPCS: Performed by: STUDENT IN AN ORGANIZED HEALTH CARE EDUCATION/TRAINING PROGRAM

## 2019-01-22 PROCEDURE — 25000003 PHARM REV CODE 250: Performed by: STUDENT IN AN ORGANIZED HEALTH CARE EDUCATION/TRAINING PROGRAM

## 2019-01-22 RX ORDER — LOPERAMIDE HYDROCHLORIDE 2 MG/1
2 CAPSULE ORAL 2 TIMES DAILY
Status: DISCONTINUED | OUTPATIENT
Start: 2019-01-22 | End: 2019-01-24

## 2019-01-22 RX ORDER — DRONABINOL 2.5 MG/1
2.5 CAPSULE ORAL 2 TIMES DAILY
Status: DISCONTINUED | OUTPATIENT
Start: 2019-01-22 | End: 2019-02-01 | Stop reason: HOSPADM

## 2019-01-22 RX ORDER — LORAZEPAM 0.5 MG/1
0.5 TABLET ORAL EVERY 6 HOURS PRN
Status: DISCONTINUED | OUTPATIENT
Start: 2019-01-22 | End: 2019-01-24

## 2019-01-22 RX ORDER — SODIUM,POTASSIUM PHOSPHATES 280-250MG
1 POWDER IN PACKET (EA) ORAL
Status: COMPLETED | OUTPATIENT
Start: 2019-01-22 | End: 2019-01-22

## 2019-01-22 RX ADMIN — LORAZEPAM 0.5 MG: 2 INJECTION INTRAMUSCULAR; INTRAVENOUS at 04:01

## 2019-01-22 RX ADMIN — PANTOPRAZOLE SODIUM 40 MG: 40 TABLET, DELAYED RELEASE ORAL at 09:01

## 2019-01-22 RX ADMIN — POTASSIUM CHLORIDE, DEXTROSE MONOHYDRATE AND SODIUM CHLORIDE: 150; 5; 450 INJECTION, SOLUTION INTRAVENOUS at 05:01

## 2019-01-22 RX ADMIN — POTASSIUM & SODIUM PHOSPHATES POWDER PACK 280-160-250 MG 1 PACKET: 280-160-250 PACK at 11:01

## 2019-01-22 RX ADMIN — HEPARIN SODIUM 5000 UNITS: 5000 INJECTION, SOLUTION INTRAVENOUS; SUBCUTANEOUS at 05:01

## 2019-01-22 RX ADMIN — LOPERAMIDE HYDROCHLORIDE 2 MG: 2 CAPSULE ORAL at 10:01

## 2019-01-22 RX ADMIN — DRONABINOL 2.5 MG: 2.5 CAPSULE ORAL at 10:01

## 2019-01-22 RX ADMIN — OXYCODONE AND ACETAMINOPHEN 1 TABLET: 7.5; 325 TABLET ORAL at 04:01

## 2019-01-22 RX ADMIN — OXYCODONE AND ACETAMINOPHEN 1 TABLET: 7.5; 325 TABLET ORAL at 09:01

## 2019-01-22 RX ADMIN — PANTOPRAZOLE SODIUM 40 MG: 40 TABLET, DELAYED RELEASE ORAL at 10:01

## 2019-01-22 RX ADMIN — LORAZEPAM 0.5 MG: 2 INJECTION INTRAMUSCULAR; INTRAVENOUS at 05:01

## 2019-01-22 RX ADMIN — POTASSIUM CHLORIDE, DEXTROSE MONOHYDRATE AND SODIUM CHLORIDE: 150; 5; 450 INJECTION, SOLUTION INTRAVENOUS at 04:01

## 2019-01-22 RX ADMIN — LORAZEPAM 0.5 MG: 2 INJECTION INTRAMUSCULAR; INTRAVENOUS at 10:01

## 2019-01-22 RX ADMIN — TOPIRAMATE 50 MG: 25 TABLET, FILM COATED ORAL at 10:01

## 2019-01-22 RX ADMIN — OXYCODONE AND ACETAMINOPHEN 1 TABLET: 7.5; 325 TABLET ORAL at 02:01

## 2019-01-22 RX ADMIN — LOPERAMIDE HYDROCHLORIDE 2 MG: 2 CAPSULE ORAL at 09:01

## 2019-01-22 RX ADMIN — POTASSIUM & SODIUM PHOSPHATES POWDER PACK 280-160-250 MG 1 PACKET: 280-160-250 PACK at 10:01

## 2019-01-22 RX ADMIN — FLUOXETINE HYDROCHLORIDE 60 MG: 20 CAPSULE ORAL at 09:01

## 2019-01-22 RX ADMIN — HEPARIN SODIUM 5000 UNITS: 5000 INJECTION, SOLUTION INTRAVENOUS; SUBCUTANEOUS at 02:01

## 2019-01-22 RX ADMIN — POTASSIUM & SODIUM PHOSPHATES POWDER PACK 280-160-250 MG 1 PACKET: 280-160-250 PACK at 09:01

## 2019-01-22 RX ADMIN — HEPARIN SODIUM 5000 UNITS: 5000 INJECTION, SOLUTION INTRAVENOUS; SUBCUTANEOUS at 10:01

## 2019-01-22 RX ADMIN — LORAZEPAM 0.5 MG: 0.5 TABLET ORAL at 11:01

## 2019-01-22 NOTE — SUBJECTIVE & OBJECTIVE
Subjective:     Interval History: no acute events last night, not eating. Some nausea but no vomiting, adequate pain control     Post-Op Info:  Procedure(s):  ANASTOMOSIS, ILEORECTAL  CLOSURE, ILEOSTOMY  URETEROLYSIS  LAPAROTOMY, EXPLORATORY;extensive lysis of adhesions   7 Days Post-Op      Medications:  Continuous Infusions:   dextrose 5 % and 0.45 % NaCl with KCl 20 mEq 75 mL/hr at 01/22/19 0431     Scheduled Meds:   FLUoxetine  60 mg Oral Daily    heparin (porcine)  5,000 Units Subcutaneous Q8H    loperamide  2 mg Oral BID    pantoprazole  40 mg Oral BID    topiramate  50 mg Oral QHS     PRN Meds:   cyclobenzaprine    HYDROmorphone    lorazepam    mupirocin    naloxone    ondansetron    oxyCODONE-acetaminophen        Objective:     Vital Signs (Most Recent):  Temp: 97.7 °F (36.5 °C) (01/22/19 1224)  Pulse: 81 (01/22/19 1224)  Resp: 20 (01/22/19 1224)  BP: 102/66 (01/22/19 1224)  SpO2: 99 % (01/22/19 1224) Vital Signs (24h Range):  Temp:  [96 °F (35.6 °C)-98 °F (36.7 °C)] 97.7 °F (36.5 °C)  Pulse:  [75-90] 81  Resp:  [12-20] 20  SpO2:  [94 %-100 %] 99 %  BP: ()/(57-67) 102/66     Intake/Output - Last 3 Shifts       01/20 0700 - 01/21 0659 01/21 0700 - 01/22 0659 01/22 0700 - 01/23 0659    P.O. 550 480     I.V. (mL/kg) 2513.3 (35.8) 1707.5 (24.3) 186.3 (2.6)    Total Intake(mL/kg) 3063.3 (43.6) 2187.5 (31.1) 186.3 (2.6)    Urine (mL/kg/hr) 0 (0) 250 (0.1) 0 (0)    Emesis/NG output 100      Drains 40 30 15    Stool 0 0 0    Total Output 140 280 15    Net +2923.3 +1907.5 +171.3           Urine Occurrence 6 x 5 x 1 x    Stool Occurrence 4 x 5 x 2 x          Physical Exam   Constitutional: She is oriented to person, place, and time. She appears well-developed and well-nourished. No distress.   HENT:   Head: Normocephalic.   Eyes: Pupils are equal, round, and reactive to light.   Cardiovascular: Normal rate, regular rhythm and normal heart sounds.   Pulmonary/Chest: Effort normal and breath sounds  normal. No respiratory distress. She has no wheezes. She has no rales.   Abdominal: Soft. She exhibits no mass. There is no rebound and no guarding.   abd inc line with some erythema below umbilicus, THAI serous drainage  Pos for bm with flatus   Neurological: She is alert and oriented to person, place, and time.   Skin: Skin is warm and dry.   Psychiatric: She has a normal mood and affect. Her behavior is normal. Judgment and thought content normal.   Nursing note and vitals reviewed.        Significant Labs:  BMP (Last 3 Results):   Recent Labs   Lab 01/20/19  0424 01/21/19  0545 01/22/19  0353   GLU 78 114* 80    135* 137   K 3.6 3.4* 3.7    101 103   CO2 29 25 25   BUN 27* 18 16   CREATININE 0.8 0.8 0.8   CALCIUM 8.5* 8.4* 8.2*   MG 1.5* 1.3* 1.8     CBC (Last 3 Results):   Recent Labs   Lab 01/20/19  0424 01/21/19  0545 01/22/19  0353   WBC 8.62 14.20* 10.37   RBC 3.08* 3.51* 3.18*   HGB 9.1* 10.4* 9.4*   HCT 27.6* 32.0* 30.5*    393* 385*   MCV 90 91 96   MCH 29.5 29.6 29.6   MCHC 33.0 32.5 30.8*       Significant Diagnostics:  None

## 2019-01-22 NOTE — PLAN OF CARE
CM placed call to [facility] to schedule pt's post op appt with Dr Peterson.  Future Appointments   Date Time Provider Department Center   2/5/2019 11:15 AM Ted Gómez MD [facility]  [facility]   2/14/2019  2:15 PM Reji Peterson MD [facility]

## 2019-01-22 NOTE — PROGRESS NOTES
[facility]  Colorectal Surgery  Progress Note    Patient Name: Candida Cardona  MRN: [id number]  Admission Date: 1/15/2019  Hospital Length of Stay: 7 days  Attending Physician: Reji Peterson MD    Subjective:     Interval History: no acute events last night, not eating. Some nausea but no vomiting, adequate pain control     Post-Op Info:  Procedure(s):  ANASTOMOSIS, ILEORECTAL  CLOSURE, ILEOSTOMY  URETEROLYSIS  LAPAROTOMY, EXPLORATORY;extensive lysis of adhesions   7 Days Post-Op      Medications:  Continuous Infusions:   dextrose 5 % and 0.45 % NaCl with KCl 20 mEq 75 mL/hr at 01/22/19 0431     Scheduled Meds:   FLUoxetine  60 mg Oral Daily    heparin (porcine)  5,000 Units Subcutaneous Q8H    loperamide  2 mg Oral BID    pantoprazole  40 mg Oral BID    topiramate  50 mg Oral QHS     PRN Meds:   cyclobenzaprine    HYDROmorphone    lorazepam    mupirocin    naloxone    ondansetron    oxyCODONE-acetaminophen        Objective:     Vital Signs (Most Recent):  Temp: 97.7 °F (36.5 °C) (01/22/19 1224)  Pulse: 81 (01/22/19 1224)  Resp: 20 (01/22/19 1224)  BP: 102/66 (01/22/19 1224)  SpO2: 99 % (01/22/19 1224) Vital Signs (24h Range):  Temp:  [96 °F (35.6 °C)-98 °F (36.7 °C)] 97.7 °F (36.5 °C)  Pulse:  [75-90] 81  Resp:  [12-20] 20  SpO2:  [94 %-100 %] 99 %  BP: ()/(57-67) 102/66     Intake/Output - Last 3 Shifts       01/20 0700 - 01/21 0659 01/21 0700 - 01/22 0659 01/22 0700 - 01/23 0659    P.O. 550 480     I.V. (mL/kg) 2513.3 (35.8) 1707.5 (24.3) 186.3 (2.6)    Total Intake(mL/kg) 3063.3 (43.6) 2187.5 (31.1) 186.3 (2.6)    Urine (mL/kg/hr) 0 (0) 250 (0.1) 0 (0)    Emesis/NG output 100      Drains 40 30 15    Stool 0 0 0    Total Output 140 280 15    Net +2923.3 +1907.5 +171.3           Urine Occurrence 6 x 5 x 1 x    Stool Occurrence 4 x 5 x 2 x          Physical Exam   Constitutional: She is oriented to person, place, and time. She appears well-developed and well-nourished.  No distress.   HENT:   Head: Normocephalic.   Eyes: Pupils are equal, round, and reactive to light.   Cardiovascular: Normal rate, regular rhythm and normal heart sounds.   Pulmonary/Chest: Effort normal and breath sounds normal. No respiratory distress. She has no wheezes. She has no rales.   Abdominal: Soft. She exhibits no mass. There is no rebound and no guarding.   abd inc line with some erythema below umbilicus, THAI serous drainage  Pos for bm with flatus   Neurological: She is alert and oriented to person, place, and time.   Skin: Skin is warm and dry.   Psychiatric: She has a normal mood and affect. Her behavior is normal. Judgment and thought content normal.   Nursing note and vitals reviewed.        Significant Labs:  BMP (Last 3 Results):   Recent Labs   Lab 01/20/19  0424 01/21/19  0545 01/22/19  0353   GLU 78 114* 80    135* 137   K 3.6 3.4* 3.7    101 103   CO2 29 25 25   BUN 27* 18 16   CREATININE 0.8 0.8 0.8   CALCIUM 8.5* 8.4* 8.2*   MG 1.5* 1.3* 1.8     CBC (Last 3 Results):   Recent Labs   Lab 01/20/19  0424 01/21/19  0545 01/22/19  0353   WBC 8.62 14.20* 10.37   RBC 3.08* 3.51* 3.18*   HGB 9.1* 10.4* 9.4*   HCT 27.6* 32.0* 30.5*    393* 385*   MCV 90 91 96   MCH 29.5 29.6 29.6   MCHC 33.0 32.5 30.8*       Significant Diagnostics:  None    Assessment/Plan:     Hypophosphatasia    Replace phos  Monitor labs     Hypomagnesemia    Replace Mag  Monitor labs     Crohn's disease of small and large intestines    Ileostomy closure 1/15, post op TAC for Crohns.      -adv to lfd  -d/c PCA  - oral pain meds with dilaudid for breakthrough.   -Cr has normalized, cont MIVFs    -enc amb and IS  -ivan hose and SCD, ppx hep  -PPI     Malnutrition    Adv diet as tolerated  Monitor labs  Enc diet           Vidhi Mathis NP  Colorectal Surgery  [facility]

## 2019-01-22 NOTE — PLAN OF CARE
Problem: Adult Inpatient Plan of Care  Goal: Plan of Care Review  Outcome: Ongoing (interventions implemented as appropriate)  POC reviewed with patient. Verbal understanding received. Patient aox4, VSS. Patient has no complaints of nausea this shift. Up to bedside commode. Pain and anxiety well controlled with PRN medication administered as per MAR. No acute events overnight. Call light in reach. Bed low locked. RN WCTM

## 2019-01-22 NOTE — PLAN OF CARE
01/21/19 1500   Discharge Reassessment   Assessment Type Discharge Planning Reassessment   Do you have any problems affording any of your prescribed medications? TBD   Discharge Plan A Home;Home with family   Discharge Plan B Home;Home with family;Home Health   DME Needed Upon Discharge  none   Anticipated Discharge Disposition Home   Can the patient answer the patient profile reliably? Yes, cognitively intact   How does the patient rate their overall health at the present time? Fair   Describe the patient's ability to walk at the present time. No restrictions   How often would a person be available to care for the patient? Often   Number of comorbid conditions (as recorded on the chart) Two

## 2019-01-22 NOTE — PLAN OF CARE
Problem: Adult Inpatient Plan of Care  Goal: Plan of Care Review  Outcome: Ongoing (interventions implemented as appropriate)  POC is reviewed and understood by patient. VS stable, while on room air. AAOx4, possibly developmentally delayed. Pt advanced to low residue/ low fiber diet. Pt incontinent at times to bowel and bladder. Bedside commode in room. Pt gets Dilaudid 0.5mg Q6 PRN for pain, and Ativan Q6 for anxiety. Call bell in reach. HOB elevated 45 degrees. No signs of distress noted.

## 2019-01-23 PROBLEM — F39 UNSPECIFIED MOOD (AFFECTIVE) DISORDER: Status: ACTIVE | Noted: 2019-01-23

## 2019-01-23 PROBLEM — E83.39 HYPOPHOSPHATEMIA: Status: ACTIVE | Noted: 2019-01-23

## 2019-01-23 LAB
ANION GAP SERPL CALC-SCNC: 9 MMOL/L
BASOPHILS # BLD AUTO: 0.05 K/UL
BASOPHILS NFR BLD: 0.4 %
BUN SERPL-MCNC: 13 MG/DL
CALCIUM SERPL-MCNC: 8.3 MG/DL
CHLORIDE SERPL-SCNC: 104 MMOL/L
CO2 SERPL-SCNC: 21 MMOL/L
CREAT SERPL-MCNC: 0.8 MG/DL
CRP SERPL-MCNC: 104.8 MG/L
DIFFERENTIAL METHOD: ABNORMAL
EOSINOPHIL # BLD AUTO: 0.1 K/UL
EOSINOPHIL NFR BLD: 1.3 %
ERYTHROCYTE [DISTWIDTH] IN BLOOD BY AUTOMATED COUNT: 16.9 %
EST. GFR  (AFRICAN AMERICAN): >60 ML/MIN/1.73 M^2
EST. GFR  (NON AFRICAN AMERICAN): >60 ML/MIN/1.73 M^2
GLUCOSE SERPL-MCNC: 84 MG/DL
HCT VFR BLD AUTO: 32.4 %
HGB BLD-MCNC: 9.6 G/DL
IMM GRANULOCYTES # BLD AUTO: 0.34 K/UL
IMM GRANULOCYTES NFR BLD AUTO: 3 %
LYMPHOCYTES # BLD AUTO: 2 K/UL
LYMPHOCYTES NFR BLD: 17.6 %
MAGNESIUM SERPL-MCNC: 1.4 MG/DL
MCH RBC QN AUTO: 29.1 PG
MCHC RBC AUTO-ENTMCNC: 29.6 G/DL
MCV RBC AUTO: 98 FL
MONOCYTES # BLD AUTO: 0.8 K/UL
MONOCYTES NFR BLD: 7.1 %
NEUTROPHILS # BLD AUTO: 7.9 K/UL
NEUTROPHILS NFR BLD: 70.6 %
NRBC BLD-RTO: 0 /100 WBC
PHOSPHATE SERPL-MCNC: 2.4 MG/DL
PLATELET # BLD AUTO: 306 K/UL
PMV BLD AUTO: 10.4 FL
POTASSIUM SERPL-SCNC: 3.8 MMOL/L
RBC # BLD AUTO: 3.3 M/UL
SODIUM SERPL-SCNC: 134 MMOL/L
WBC # BLD AUTO: 11.2 K/UL

## 2019-01-23 PROCEDURE — 25000003 PHARM REV CODE 250: Performed by: STUDENT IN AN ORGANIZED HEALTH CARE EDUCATION/TRAINING PROGRAM

## 2019-01-23 PROCEDURE — 80048 BASIC METABOLIC PNL TOTAL CA: CPT

## 2019-01-23 PROCEDURE — 85025 COMPLETE CBC W/AUTO DIFF WBC: CPT

## 2019-01-23 PROCEDURE — 25000003 PHARM REV CODE 250: Performed by: NURSE PRACTITIONER

## 2019-01-23 PROCEDURE — 86140 C-REACTIVE PROTEIN: CPT

## 2019-01-23 PROCEDURE — 99222 1ST HOSP IP/OBS MODERATE 55: CPT | Mod: ,,, | Performed by: PSYCHIATRY & NEUROLOGY

## 2019-01-23 PROCEDURE — 84100 ASSAY OF PHOSPHORUS: CPT

## 2019-01-23 PROCEDURE — 20600001 HC STEP DOWN PRIVATE ROOM

## 2019-01-23 PROCEDURE — 99222 PR INITIAL HOSPITAL CARE,LEVL II: ICD-10-PCS | Mod: ,,, | Performed by: PSYCHIATRY & NEUROLOGY

## 2019-01-23 PROCEDURE — 83735 ASSAY OF MAGNESIUM: CPT

## 2019-01-23 PROCEDURE — 36415 COLL VENOUS BLD VENIPUNCTURE: CPT

## 2019-01-23 PROCEDURE — 63600175 PHARM REV CODE 636 W HCPCS: Performed by: STUDENT IN AN ORGANIZED HEALTH CARE EDUCATION/TRAINING PROGRAM

## 2019-01-23 PROCEDURE — 63600175 PHARM REV CODE 636 W HCPCS: Performed by: NURSE PRACTITIONER

## 2019-01-23 RX ORDER — MAGNESIUM SULFATE HEPTAHYDRATE 40 MG/ML
2 INJECTION, SOLUTION INTRAVENOUS ONCE
Status: COMPLETED | OUTPATIENT
Start: 2019-01-23 | End: 2019-01-23

## 2019-01-23 RX ADMIN — POTASSIUM CHLORIDE, DEXTROSE MONOHYDRATE AND SODIUM CHLORIDE: 150; 5; 450 INJECTION, SOLUTION INTRAVENOUS at 05:01

## 2019-01-23 RX ADMIN — LORAZEPAM 0.5 MG: 0.5 TABLET ORAL at 10:01

## 2019-01-23 RX ADMIN — DRONABINOL 2.5 MG: 2.5 CAPSULE ORAL at 09:01

## 2019-01-23 RX ADMIN — HEPARIN SODIUM 5000 UNITS: 5000 INJECTION, SOLUTION INTRAVENOUS; SUBCUTANEOUS at 02:01

## 2019-01-23 RX ADMIN — LOPERAMIDE HYDROCHLORIDE 2 MG: 2 CAPSULE ORAL at 10:01

## 2019-01-23 RX ADMIN — PANTOPRAZOLE SODIUM 40 MG: 40 TABLET, DELAYED RELEASE ORAL at 09:01

## 2019-01-23 RX ADMIN — HEPARIN SODIUM 5000 UNITS: 5000 INJECTION, SOLUTION INTRAVENOUS; SUBCUTANEOUS at 05:01

## 2019-01-23 RX ADMIN — DRONABINOL 2.5 MG: 2.5 CAPSULE ORAL at 10:01

## 2019-01-23 RX ADMIN — OXYCODONE AND ACETAMINOPHEN 1 TABLET: 7.5; 325 TABLET ORAL at 12:01

## 2019-01-23 RX ADMIN — FLUOXETINE HYDROCHLORIDE 60 MG: 20 CAPSULE ORAL at 10:01

## 2019-01-23 RX ADMIN — TOPIRAMATE 50 MG: 25 TABLET, FILM COATED ORAL at 09:01

## 2019-01-23 RX ADMIN — LORAZEPAM 0.5 MG: 0.5 TABLET ORAL at 05:01

## 2019-01-23 RX ADMIN — MAGNESIUM SULFATE IN WATER 2 G: 40 INJECTION, SOLUTION INTRAVENOUS at 10:01

## 2019-01-23 RX ADMIN — LOPERAMIDE HYDROCHLORIDE 2 MG: 2 CAPSULE ORAL at 09:01

## 2019-01-23 RX ADMIN — POTASSIUM CHLORIDE, DEXTROSE MONOHYDRATE AND SODIUM CHLORIDE: 150; 5; 450 INJECTION, SOLUTION INTRAVENOUS at 09:01

## 2019-01-23 RX ADMIN — PANTOPRAZOLE SODIUM 40 MG: 40 TABLET, DELAYED RELEASE ORAL at 10:01

## 2019-01-23 RX ADMIN — OXYCODONE AND ACETAMINOPHEN 1 TABLET: 7.5; 325 TABLET ORAL at 07:01

## 2019-01-23 RX ADMIN — OXYCODONE AND ACETAMINOPHEN 1 TABLET: 7.5; 325 TABLET ORAL at 02:01

## 2019-01-23 RX ADMIN — POTASSIUM PHOSPHATE, MONOBASIC AND POTASSIUM PHOSPHATE, DIBASIC 20 MMOL: 224; 236 INJECTION, SOLUTION, CONCENTRATE INTRAVENOUS at 11:01

## 2019-01-23 RX ADMIN — HEPARIN SODIUM 5000 UNITS: 5000 INJECTION, SOLUTION INTRAVENOUS; SUBCUTANEOUS at 09:01

## 2019-01-23 NOTE — SUBJECTIVE & OBJECTIVE
Patient History           Medical as of 1/23/2019     Past Medical History     Diagnosis Date Comments Source    Anemia -- -- Provider    Anxiety -- -- Provider    Bilious vomiting with nausea 1/17/2017 -- Provider    Constipation -- -- Provider    Crohn's disease -- -- Provider    Encounter for blood transfusion -- -- Provider    Fibromyalgia -- -- Provider    GERD (gastroesophageal reflux disease) -- -- Provider    Hyponatremia -- -- Provider    Interstitial cystitis -- -- Provider    Kidney stones -- -- Provider    Mass, ovarian 11/22/2016 -- Provider    Metabolic acidosis -- -- Provider    Osteoporosis -- -- Provider    PAF (paroxysmal atrial fibrillation) -- -- Provider    S/P ERIK-BSO (total abdominal hysterectomy and bilateral salpingo-oophorectomy) 12/19/2016 -- Provider    UTI (urinary tract infection) -- -- Provider                  Surgical as of 1/23/2019     Past Surgical History     Procedure Laterality Date Comments Source    PORTACATH PLACEMENT -- -- -- Provider    COLONOSCOPY -- -- multiple Provider    TOTAL COLECTOMY -- 2014 -- Provider    ILEOSTOMY -- -- -- Provider    HYSTERECTOMY -- -- -- Provider    ABDOMINAL SURGERY -- -- -- Provider    CYSTOSCOPY W/ URETERAL STENT PLACEMENT -- -- -- Provider    CYSTOSCOPY W/ URETERAL STENT REMOVAL -- -- -- Provider    ANGIOPLASTY -- -- -- Provider    COLONOSCOPY N/A 4/11/2017 Procedure: COLONOSCOPY;  Surgeon: Hussain Amaro MD;  [facility]: [facility] END[facility];  Service: Endoscopy;  Laterality: N/A; Provider    COLON SURGERY -- -- x 2 Provider    PERIPHERALLY INSERTED CENTRAL CATHETER INSERTION N/A 10/30/2018 Procedure: INSERTION, PICC;  Surgeon: [facility] Diagnostic Provider;  [facility]: [facility] OR;  Service: General;  Laterality: N/A; Provider    ANASTOMOSIS OF ILEUM TO RECTUM -- 1/15/2019 Procedure: ANASTOMOSIS, ILEORECTAL;  Surgeon: Reji Peterson MD;  [facility]: [address];  Service: Colon and Rectal;; Provider    LYSIS OF ADHESIONS OF URETER --  1/15/2019 Procedure: URETEROLYSIS;  Surgeon: Reji Peterson MD;  [facility];  Service: Colon and Rectal;; Provider    LAPAROTOMY, EXPLORATORY -- 1/15/2019 Procedure: LAPAROTOMY, EXPLORATORY;extensive lysis of adhesions;  Surgeon: Reji Peterson MD;  [facility];  Service: Colon and Rectal;; Provider                  Family as of 1/23/2019     Problem Relation Name Age of Onset Comments Source    Hypertension Mother -- -- -- Provider    Joint hypermobility Mother -- -- -- Provider    Cancer Father -- -- brain Provider    Diabetes Father -- -- -- Provider    No Known Problems Brother 1 -- -- Provider    No Known Problems Brother -- -- -- Provider    Stroke Paternal Grandmother -- -- -- Provider    Ovarian cancer Neg Hx -- -- -- Provider    Uterine cancer Neg Hx -- -- -- Provider    Breast cancer Neg Hx -- -- -- Provider    Colon cancer Neg Hx -- -- -- Provider    Anesthesia problems Neg Hx -- -- -- Provider            Tobacco Use as of 1/23/2019     Smoking Status Smoking Start Date Smoking Quit Date Packs/Day Years Used    Never Smoker -- -- -- --    Types Comments Smokeless Tobacco Status Smokeless Tobacco Quit Date Source    -- -- Never Used -- Provider            Alcohol Use as of 1/23/2019     Alcohol Use Drinks/Week Alcohol/Week Comments Source    No -- -- -- Provider    Frequency Standard Drinks Binge Drinking Source      -- -- -- Provider             Drug Use as of 1/23/2019     Drug Use Types Frequency Comments Source    No -- -- -- Provider            Sexual Activity as of 1/23/2019     Sexually Active Birth Control Partners Comments Source    Not Asked -- -- -- Provider            Activities of Daily Living as of 1/23/2019    None           Social Documentation as of 1/23/2019    Lives with significant other  Source: Provider           Occupational as of 1/23/2019    None           Socioeconomic as of 1/23/2019     Marital Status Spouse Name Number of Children Years  Education Education Level Preferred Language Ethnicity Race Source    Single -- 0 -- -- English /White White Provider    Financial Resource Strain Food Insecurity: Worry Food Insecurity: Inability Transportation Needs: Medical Transportation Needs: Non-medical       -- -- -- -- --             Pertinent History     Question Response Comments    Lives with -- --    Place in Birth Order -- --    Lives in -- --    Number of Siblings -- --    Raised by -- --    Legal Involvement -- --    Childhood Trauma -- --    Criminal History of -- --    Financial Status -- --    Highest Level of Education -- --    Does patient have access to a firearm? -- --     Service -- --    Primary Leisure Activity -- --    Spirituality -- --        Past Medical History:   Diagnosis Date    Anemia     Anxiety     Bilious vomiting with nausea 1/17/2017    Constipation     Crohn's disease     Encounter for blood transfusion     Fibromyalgia     GERD (gastroesophageal reflux disease)     Hyponatremia     Interstitial cystitis     Kidney stones     Mass, ovarian 11/22/2016    Metabolic acidosis     Osteoporosis     PAF (paroxysmal atrial fibrillation)     S/P ERIK-BSO (total abdominal hysterectomy and bilateral salpingo-oophorectomy) 12/19/2016    UTI (urinary tract infection)      Past Surgical History:   Procedure Laterality Date    ABDOMINAL SURGERY      ANASTOMOSIS, ILEORECTAL  1/15/2019    [facility]    ANGIOPLASTY      ASPIRATION ABSCESS N/A 1/6/2017    Performed by Rhonda Surgeon at [facility]    [id number]-FA N/A 1/7/2017    [facility]formed by [facility]geon at [facility]    CATHETERIZATION-URETERAL Left 12/19/2016    [facility] at [facility]    CLOSURE, ILEOSTOMY  1/15/2019    [facility]    COLECTOMY, TOTAL, ABDOMINAL N/A 3/14/2014    Performed by [facility] MD at [facility] OR [address] [place] [address]     COLON SURGERY      x 2    COLONOSCOPY      multiple    COLONOSCOPY N/A 4/11/2017    [facility]    CYSTOSCOPY N/A 12/19/2016    [facility] at [facility]    CYSTOSCOPY W/ URETERAL STENT PLACEMENT      CYSTOSCOPY W/ URETERAL STENT REMOVAL      CYSTOSCOPY WITH STENT PLACEMENT Left 12/20/2016    [facility] at [facility]    EXPLORATORY-LAPAROTOMY N/A 3/14/2014    [facility] at [facility]    HYSTERECTOMY      HYSTERECTOMY-TOTAL-ABDOMINAL AND BSO N/A 12/19/2016    Performed by Andrew Sil[facility]ira MD at [facility]    ILEOSTOMY      INSERTION, PICC N/A 10/30/2018    [facility] by [facility]    LAPAROTOMY, EXPLORATORY;extensive lysis of adhesions  1/15/2019    Per[facility] at [facility]    LYSIS-ADHESION Left 12/19/2016    Per[facility] by [facility] at [facility]    LYSIS-ADHESION  12/19/2016    Per[facility]ji [facility] at [facility]    PORTACATH PLACEMENT      SIGMOIDOSCOPY, FLEXIBLE N/A 3/5/2014    Performed by Reji Peterson MD at [facility] END[facility] ([facility]ND [facility])    TOTAL COLECTOMY  2014    URETEROLYSIS  1/15/2019    Performed by [facility]ji Peterson MD at [facility]     Family History     Problem Relation (Age of Onset)    Cancer Father    Diabetes Father    Hypertension Mother    Joint hypermobility Mother    No Known Problems Brother, Brother    Stroke Paternal Grandmother        Tobacco Use    Smoking status: Never Smoker    Smokeless tobacco: Never Used   Substance and Sexual Activity    Alcohol use: No    Drug use: No    Sexual activity: Not on file     Review of patient's allergies indicates:   Allergen Reactions    Ciprofloxacin Shortness Of Breath    Imuran [azathioprine sodium] Diarrhea and Nausea And Vomiting     Pt states that she becomes hot sweaty and passes out also. She states that  she has diarrhea and nausea and vomiting     Galt     Adhesive Itching       No current facility-administered medications on file prior to encounter.      Current Outpatient Medications on File Prior to Encounter   Medication Sig    diphenoxylate-atropine 2.5-0.025 mg (LOMOTIL) 2.5-0.025 mg per tablet take 1 to 2 tablets by mouth four times a day if needed for ILOSTOMY OUTPUT    estradiol (ESTRACE) 2 MG tablet Take 1 tablet (2 mg total) by mouth once daily. (Patient taking differently: Take 2 mg by mouth every morning. )    FLUoxetine 60 mg Tab Take 60 mg by mouth every morning.     hydrOXYzine pamoate (VISTARIL) 25 MG Cap 25 mg every 8 (eight) hours as needed.     nystatin (MYCOSTATIN) powder Apply topically 2 (two) times daily.    pantoprazole (PROTONIX) 40 MG tablet take 1 tablet by mouth twice a day    methotrexate 25 mg/mL injection 25 mg every 7 days. TAKES ON FRIDAYS    topiramate (TOPAMAX) 50 MG tablet Take 50 mg by mouth every evening.     ustekinumab (STELARA) 90 mg/mL Syrg syringe Inject 90 mg into the skin every 8 weeks.     zinc oxide 20 % ointment Apply topically once daily.     Psychotherapeutics (From admission, onward)    Start     Stop Route Frequency Ordered    01/22/19 2222  LORazepam tablet 0.5 mg      -- Oral Every 6 hours PRN 01/22/19 2123 01/18/19 0001  lorazepam (ATIVAN) 2 mg/mL injection     Comments:  Created by cabinet override    01/18 1214   01/18/19 0001    01/15/19 1215  FLUoxetine capsule 60 mg      -- Oral Daily 01/15/19 1104        Review of Systems  Strengths and Liabilities: Liability: Patient lacks social skills., Liability: Patient has poor judgment    Objective:     Vital Signs (Most Recent):  Temp: 99.2 °F (37.3 °C) (01/23/19 1158)  Pulse: 84 (01/23/19 1158)  Resp: 16 (01/23/19 1158)  BP: (!) 98/58 (01/23/19 1158)  SpO2: 100 % (01/23/19 1158) Vital Signs (24h Range):  Temp:  [96.8 °F (36 °C)-99.2 °F (37.3 °C)] 99.2 °F (37.3 °C)  Pulse:  [81-90] 84  Resp:   "[16-20] 16  SpO2:  [96 %-100 %] 100 %  BP: ()/(56-74) 98/58     Height: 5' 7" (170.2 cm)  Weight: 70.3 kg (155 lb)  Body mass index is 24.28 kg/m².      Intake/Output Summary (Last 24 hours) at 1/23/2019 1413  Last data filed at 1/23/2019 0427  Gross per 24 hour   Intake 820 ml   Output 1200 ml   Net -380 ml       Physical Exam      Mental Status Exam:  Appearance: disheveled,  female, poor dentition, wearing glasses  Grooming: appropriate to situation  Arousal: alert, awake  Behavior/Cooperation: reluctant to participate, attitude variable, irritable at times  Speech: normal tone, normal rate, normal pitch, normal volume  Language: appropriate english vocabulary  Mood: "I don't know"  Affect: reactive, irritable  Thought Process: goal-directed  Thought Content: normal, no suicidality, no homicidality, delusions, or paranoia  Associations: no loose associations noted  Orientation: grossly intact  Memory: Grossly intact  Fund of Knowledge: appropriate for education level  Attention Span/Concentration: Normal  Cognition: grossly intact  Insight: limited  Judgment: limited      Significant Labs:   Last 24 Hours:   Recent Lab Results       01/23/19  0414        Immature Granulocytes 3.0     Immature Grans (Abs) 0.34  Comment:  Mild elevation in immature granulocytes is non specific and   can be seen in a variety of conditions including stress response,   acute inflammation, trauma and pregnancy. Correlation with other   laboratory and clinical findings is essential.       Anion Gap 9     Baso # 0.05     Basophil% 0.4     BUN, Bld 13     Calcium 8.3     Chloride 104     CO2 21     Creatinine 0.8     .8     Differential Method Automated     eGFR if African American >60.0     eGFR if non  >60.0  Comment:  Calculation used to obtain the estimated glomerular filtration  rate (eGFR) is the CKD-EPI equation.        Eos # 0.1     Eosinophil% 1.3     Glucose 84     Gran # (ANC) 7.9     Gran% " 70.6     Hematocrit 32.4     Hemoglobin 9.6     Lymph # 2.0     Lymph% 17.6     Magnesium 1.4     MCH 29.1     MCHC 29.6     MCV 98     Mono # 0.8     Mono% 7.1     MPV 10.4     nRBC 0     Phosphorus 2.4     Platelets 306     Potassium 3.8     RBC 3.30     RDW 16.9     Sodium 134     WBC 11.20           Significant Imaging: None
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
## 2019-01-23 NOTE — ASSESSMENT & PLAN NOTE
39 yo F with reported history of bipolar disorder (but no history consistent with manic episodes), mood dysphoria was admitted for ileostomy takedown.  Patient minimally participatory in her care while in hospital, and has a few negative encounters with nursing staff.  Patient admits to poor coping skills, fixates on small perceived slights, and demonstrates pervasive negative cognitive distortions.  These issues are likely reflective of her trauma history and personality construct and are not easily amenable to medication but require long term intensive psychotherapy.      There are no emergent psychiatric issues present, no suicidal ideation, homicidal ideation, bradley, or psychosis.  Patient initially denied anxiety; though endorsed anxiety/panic and PTSD symptoms on rounds with staff.  She has been receiving Ativan q6 hours here; but is not on benzos at home.  We discussed the risks of benzodiazepine use in chronically anxious patients with PTSD.  We also discussed alternative treatments for anxiety including boosting coping skills, and utilization of meditation and breathing techniques.  Patient is hesitant to make medication adjustments in the inpatient setting, which is reasonable.  She was encouraged to discuss potential medication changes and additional therapy needs with the outpatient providers she has already established.      Impression:   - Unspecified Mood Disorder  - Unspecified Anxiety Disorder  - Cluster B Personality Traits    Recommendations:   - Medications: Continue home Prozac and Topamax at current doses.  Would begin weaning Ativan before discontinuing prior to discharge.  Vistaril 25 mg q6 hours could be used for first line anxiety.    - Legal: No indication for PEC or involuntary psychiatric commitment.   - We will sign off at this time, please call with questions.

## 2019-01-23 NOTE — SUBJECTIVE & OBJECTIVE
Subjective:     Interval History: no acute events overnite, still not eating, says too nauseated, appears more depressed, requesting to see [facility], not motivated     Post-Op Info:  Procedure(s):  ANASTOMOSIS, ILEORECTAL  CLOSURE, ILEOSTOMY  URETEROLYSIS  LAPAROTOMY, EXPLORATORY;extensive lysis of adhesions   8 Days Post-Op      Medications:  Continuous Infusions:   dextrose 5 % and 0.45 % NaCl with KCl 20 mEq 75 mL/hr at 01/23/19 0541     Scheduled Meds:   dronabinol  2.5 mg Oral BID    FLUoxetine  60 mg Oral Daily    heparin (porcine)  5,000 Units Subcutaneous Q8H    loperamide  2 mg Oral BID    magnesium sulfate IVPB  2 g Intravenous Once    pantoprazole  40 mg Oral BID    potassium phosphate IVPB  20 mmol Intravenous Once    topiramate  50 mg Oral QHS     PRN Meds:   cyclobenzaprine    HYDROmorphone    LORazepam    mupirocin    naloxone    ondansetron    oxyCODONE-acetaminophen        Objective:     Vital Signs (Most Recent):  Temp: 97.1 °F (36.2 °C) (01/23/19 0816)  Pulse: 85 (01/23/19 0816)  Resp: 16 (01/23/19 0816)  BP: 102/65 (01/23/19 0816)  SpO2: 98 % (01/23/19 0816) Vital Signs (24h Range):  Temp:  [96.8 °F (36 °C)-98 °F (36.7 °C)] 97.1 °F (36.2 °C)  Pulse:  [81-90] 85  Resp:  [16-20] 16  SpO2:  [96 %-99 %] 98 %  BP: (100-114)/(56-74) 102/65     Intake/Output - Last 3 Shifts       01/21 0700 - 01/22 0659 01/22 0700 - 01/23 0659 01/23 0700 - 01/24 0659    P.O. 480      I.V. (mL/kg) 1707.5 (24.3) 1006.3 (14.3)     Total Intake(mL/kg) 2187.5 (31.1) 1006.3 (14.3)     Urine (mL/kg/hr) 250 (0.1) 1200 (0.7)     Emesis/NG output       Drains 30 15     Stool 0 0     Total Output 280 1215     Net +1907.5 -208.8            Urine Occurrence 5 x 4 x     Stool Occurrence 5 x 3 x           Physical Exam   Constitutional: She is oriented to person, place, and time. She appears well-developed and well-nourished. No distress.   HENT:   Head: Normocephalic.   Eyes: Pupils are equal, round, and reactive to  light.   Cardiovascular: Normal rate, regular rhythm and normal heart sounds.   Pulmonary/Chest: Effort normal and breath sounds normal. No respiratory distress. She has no wheezes. She has no rales.   Abdominal: Soft. She exhibits no mass. There is tenderness. There is no rebound and no guarding.   abd inc line erythema slightly improved  THAI with serous drainage  Multiple BM (had TAC)   Neurological: She is alert and oriented to person, place, and time.   Skin: Skin is warm and dry.   Psychiatric: Her behavior is normal. Judgment normal.   Nursing note and vitals reviewed.        Significant Labs:  BMP (Last 3 Results):   Recent Labs   Lab 01/21/19  0545 01/22/19  0353 01/23/19  0414   * 80 84   * 137 134*   K 3.4* 3.7 3.8    103 104   CO2 25 25 21*   BUN 18 16 13   CREATININE 0.8 0.8 0.8   CALCIUM 8.4* 8.2* 8.3*   MG 1.3* 1.8 1.4*     CBC (Last 3 Results):   Recent Labs   Lab 01/21/19  0545 01/22/19  0353 01/23/19  0414   WBC 14.20* 10.37 11.20   RBC 3.51* 3.18* 3.30*   HGB 10.4* 9.4* 9.6*   HCT 32.0* 30.5* 32.4*   * 385* 306   MCV 91 96 98   MCH 29.6 29.6 29.1   MCHC 32.5 30.8* 29.6*       Significant Diagnostics:  None

## 2019-01-23 NOTE — PLAN OF CARE
Problem: Adult Inpatient Plan of Care  Goal: Plan of Care Review  Outcome: Ongoing (interventions implemented as appropriate)  Care plan reviewed and understood by patient. Resp rate even and unlabored. VSS. LLQ THAI drain intact and patent.   Patient tolerating diet with no reports of nausea. Patient up to bedside commode throughout day independently. Patient does not want to take a shower today and states she promises she will take one tomorrow. Patient remain free of falls. No acute pain or distress noted. Will continue to monitor.

## 2019-01-23 NOTE — PROGRESS NOTES
[facility]  Colorectal Surgery  Progress Note    Patient Name: Candida Cardona  MRN: [id number]  Admission Date: 1/15/2019  Hospital Length of Stay: 8 days  Attending Physician: Reji Peterson MD    Subjective:     Interval History: no acute events overnite, still not eating, says too nauseated, appears more depressed, requesting to see [facility], not motivated     Post-Op Info:  Procedure(s):  ANASTOMOSIS, ILEORECTAL  CLOSURE, ILEOSTOMY  URETEROLYSIS  LAPAROTOMY, EXPLORATORY;extensive lysis of adhesions   8 Days Post-Op      Medications:  Continuous Infusions:   dextrose 5 % and 0.45 % NaCl with KCl 20 mEq 75 mL/hr at 01/23/19 0541     Scheduled Meds:   dronabinol  2.5 mg Oral BID    FLUoxetine  60 mg Oral Daily    heparin (porcine)  5,000 Units Subcutaneous Q8H    loperamide  2 mg Oral BID    magnesium sulfate IVPB  2 g Intravenous Once    pantoprazole  40 mg Oral BID    potassium phosphate IVPB  20 mmol Intravenous Once    topiramate  50 mg Oral QHS     PRN Meds:   cyclobenzaprine    HYDROmorphone    LORazepam    mupirocin    naloxone    ondansetron    oxyCODONE-acetaminophen        Objective:     Vital Signs (Most Recent):  Temp: 97.1 °F (36.2 °C) (01/23/19 0816)  Pulse: 85 (01/23/19 0816)  Resp: 16 (01/23/19 0816)  BP: 102/65 (01/23/19 0816)  SpO2: 98 % (01/23/19 0816) Vital Signs (24h Range):  Temp:  [96.8 °F (36 °C)-98 °F (36.7 °C)] 97.1 °F (36.2 °C)  Pulse:  [81-90] 85  Resp:  [16-20] 16  SpO2:  [96 %-99 %] 98 %  BP: (100-114)/(56-74) 102/65     Intake/Output - Last 3 Shifts       01/21 0700 - 01/22 0659 01/22 0700 - 01/23 0659 01/23 0700 - 01/24 0659    P.O. 480      I.V. (mL/kg) 1707.5 (24.3) 1006.3 (14.3)     Total Intake(mL/kg) 2187.5 (31.1) 1006.3 (14.3)     Urine (mL/kg/hr) 250 (0.1) 1200 (0.7)     Emesis/NG output       Drains 30 15     Stool 0 0     Total Output 280 1215     Net +1907.5 -208.8            Urine Occurrence 5 x 4 x     Stool Occurrence 5 x 3 x            Physical Exam   Constitutional: She is oriented to person, place, and time. She appears well-developed and well-nourished. No distress.   HENT:   Head: Normocephalic.   Eyes: Pupils are equal, round, and reactive to light.   Cardiovascular: Normal rate, regular rhythm and normal heart sounds.   Pulmonary/Chest: Effort normal and breath sounds normal. No respiratory distress. She has no wheezes. She has no rales.   Abdominal: Soft. She exhibits no mass. There is tenderness. There is no rebound and no guarding.   abd inc line erythema slightly improved  THAI with serous drainage  Multiple BM (had TAC)   Neurological: She is alert and oriented to person, place, and time.   Skin: Skin is warm and dry.   Psychiatric: Her behavior is normal. Judgment normal.   Nursing note and vitals reviewed.        Significant Labs:  BMP (Last 3 Results):   Recent Labs   Lab 01/21/19  0545 01/22/19  0353 01/23/19  0414   * 80 84   * 137 134*   K 3.4* 3.7 3.8    103 104   CO2 25 25 21*   BUN 18 16 13   CREATININE 0.8 0.8 0.8   CALCIUM 8.4* 8.2* 8.3*   MG 1.3* 1.8 1.4*     CBC (Last 3 Results):   Recent Labs   Lab 01/21/19  0545 01/22/19  0353 01/23/19  0414   WBC 14.20* 10.37 11.20   RBC 3.51* 3.18* 3.30*   HGB 10.4* 9.4* 9.6*   HCT 32.0* 30.5* 32.4*   * 385* 306   MCV 91 96 98   MCH 29.6 29.6 29.1   MCHC 32.5 30.8* 29.6*       Significant Diagnostics:  None    Assessment/Plan:     Hypophosphatemia    Monitor labs  repalce phos     Hypophosphatasia    Replace phos  Monitor labs     Hypomagnesemia    Replace Mag  Monitor labs     Crohn's disease of small and large intestines    Ileostomy closure 1/15, post op TAC for Crohns.      -adv to lfd  -d/c PCA  - oral pain meds with dilaudid for breakthrough.   -Cr has normalized, cont MIVFs    -enc amb and IS  -ivan hose and SCD, ppx hep  -PPI  -enc basis hygeine  -PT/OT for mobilization     Malnutrition    Adv diet as tolerated  Monitor labs  Enc diet  Begin  marinol  psy consult as requested           Vidhi Mathis NP  Colorectal Surgery  [facility]
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
## 2019-01-23 NOTE — ASSESSMENT & PLAN NOTE
Ileostomy closure 1/15, post op TAC for Crohns.      -adv to lfd  -d/c PCA  - oral pain meds with dilaudid for breakthrough.   -Cr has normalized, cont MIVFs    -enc amb and IS  -ivan hose and SCD, ppx hep  -PPI  -enc basis hygeine  -PT/OT for mobilization

## 2019-01-23 NOTE — PLAN OF CARE
Problem: Adult Inpatient Plan of Care  Goal: Plan of Care Review  Outcome: Ongoing (interventions implemented as appropriate)  POC reviewed with patient. Verbal understanding received. Patient aox4, VSS. Patient has no complaints of nausea this shift. Up to bedside commode. Pain and anxiety well controlled with PO PRN medication administered as per MAR. No acute events overnight. Call light in reach. Bed low locked. RN WCTM
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
## 2019-01-24 LAB
ANION GAP SERPL CALC-SCNC: 8 MMOL/L
BASOPHILS # BLD AUTO: 0.03 K/UL
BASOPHILS NFR BLD: 0.3 %
BUN SERPL-MCNC: 8 MG/DL
CALCIUM SERPL-MCNC: 8 MG/DL
CHLORIDE SERPL-SCNC: 107 MMOL/L
CO2 SERPL-SCNC: 21 MMOL/L
CREAT SERPL-MCNC: 0.8 MG/DL
CRP SERPL-MCNC: 75.2 MG/L
DIFFERENTIAL METHOD: ABNORMAL
EOSINOPHIL # BLD AUTO: 0.1 K/UL
EOSINOPHIL NFR BLD: 1.2 %
ERYTHROCYTE [DISTWIDTH] IN BLOOD BY AUTOMATED COUNT: 16.6 %
EST. GFR  (AFRICAN AMERICAN): >60 ML/MIN/1.73 M^2
EST. GFR  (NON AFRICAN AMERICAN): >60 ML/MIN/1.73 M^2
GLUCOSE SERPL-MCNC: 88 MG/DL
HCT VFR BLD AUTO: 30.2 %
HGB BLD-MCNC: 10 G/DL
IMM GRANULOCYTES # BLD AUTO: 0.2 K/UL
IMM GRANULOCYTES NFR BLD AUTO: 1.8 %
LYMPHOCYTES # BLD AUTO: 1.8 K/UL
LYMPHOCYTES NFR BLD: 16.8 %
MAGNESIUM SERPL-MCNC: 1.7 MG/DL
MCH RBC QN AUTO: 29.4 PG
MCHC RBC AUTO-ENTMCNC: 33.1 G/DL
MCV RBC AUTO: 89 FL
MONOCYTES # BLD AUTO: 0.7 K/UL
MONOCYTES NFR BLD: 6.1 %
NEUTROPHILS # BLD AUTO: 8 K/UL
NEUTROPHILS NFR BLD: 73.8 %
NRBC BLD-RTO: 0 /100 WBC
PHOSPHATE SERPL-MCNC: 3.7 MG/DL
PLATELET # BLD AUTO: 190 K/UL
PMV BLD AUTO: 10.7 FL
POTASSIUM SERPL-SCNC: 4.5 MMOL/L
RBC # BLD AUTO: 3.4 M/UL
SODIUM SERPL-SCNC: 136 MMOL/L
WBC # BLD AUTO: 10.89 K/UL

## 2019-01-24 PROCEDURE — 97165 OT EVAL LOW COMPLEX 30 MIN: CPT

## 2019-01-24 PROCEDURE — 25000003 PHARM REV CODE 250: Performed by: STUDENT IN AN ORGANIZED HEALTH CARE EDUCATION/TRAINING PROGRAM

## 2019-01-24 PROCEDURE — 36415 COLL VENOUS BLD VENIPUNCTURE: CPT

## 2019-01-24 PROCEDURE — 25000003 PHARM REV CODE 250: Performed by: NURSE PRACTITIONER

## 2019-01-24 PROCEDURE — 83735 ASSAY OF MAGNESIUM: CPT

## 2019-01-24 PROCEDURE — 85025 COMPLETE CBC W/AUTO DIFF WBC: CPT

## 2019-01-24 PROCEDURE — 80048 BASIC METABOLIC PNL TOTAL CA: CPT

## 2019-01-24 PROCEDURE — 20600001 HC STEP DOWN PRIVATE ROOM

## 2019-01-24 PROCEDURE — 63600175 PHARM REV CODE 636 W HCPCS: Performed by: STUDENT IN AN ORGANIZED HEALTH CARE EDUCATION/TRAINING PROGRAM

## 2019-01-24 PROCEDURE — 97161 PT EVAL LOW COMPLEX 20 MIN: CPT

## 2019-01-24 PROCEDURE — 63600175 PHARM REV CODE 636 W HCPCS: Performed by: NURSE PRACTITIONER

## 2019-01-24 PROCEDURE — 86140 C-REACTIVE PROTEIN: CPT

## 2019-01-24 PROCEDURE — 84100 ASSAY OF PHOSPHORUS: CPT

## 2019-01-24 RX ORDER — LORAZEPAM 0.5 MG/1
0.5 TABLET ORAL EVERY 12 HOURS PRN
Status: DISCONTINUED | OUTPATIENT
Start: 2019-01-24 | End: 2019-02-01 | Stop reason: HOSPADM

## 2019-01-24 RX ORDER — LOPERAMIDE HYDROCHLORIDE 2 MG/1
2 CAPSULE ORAL 4 TIMES DAILY
Status: DISCONTINUED | OUTPATIENT
Start: 2019-01-24 | End: 2019-01-24

## 2019-01-24 RX ORDER — OXYCODONE AND ACETAMINOPHEN 7.5; 325 MG/1; MG/1
1 TABLET ORAL EVERY 4 HOURS PRN
Qty: 30 TABLET | Refills: 0 | Status: SHIPPED | OUTPATIENT
Start: 2019-01-24 | End: 2019-02-28

## 2019-01-24 RX ORDER — HYDROXYZINE PAMOATE 25 MG/1
25 CAPSULE ORAL EVERY 6 HOURS PRN
Status: DISCONTINUED | OUTPATIENT
Start: 2019-01-24 | End: 2019-02-01 | Stop reason: HOSPADM

## 2019-01-24 RX ORDER — LOPERAMIDE HYDROCHLORIDE 2 MG/1
4 CAPSULE ORAL 4 TIMES DAILY
Qty: 120 CAPSULE | Refills: 2 | Status: SHIPPED | OUTPATIENT
Start: 2019-01-24 | End: 2019-02-03

## 2019-01-24 RX ORDER — LOPERAMIDE HYDROCHLORIDE 2 MG/1
4 CAPSULE ORAL 4 TIMES DAILY
Status: DISCONTINUED | OUTPATIENT
Start: 2019-01-24 | End: 2019-01-26

## 2019-01-24 RX ADMIN — LOPERAMIDE HYDROCHLORIDE 4 MG: 2 CAPSULE ORAL at 12:01

## 2019-01-24 RX ADMIN — HYDROXYZINE PAMOATE 25 MG: 25 CAPSULE ORAL at 10:01

## 2019-01-24 RX ADMIN — LOPERAMIDE HYDROCHLORIDE 4 MG: 2 CAPSULE ORAL at 04:01

## 2019-01-24 RX ADMIN — LOPERAMIDE HYDROCHLORIDE 4 MG: 2 CAPSULE ORAL at 09:01

## 2019-01-24 RX ADMIN — HEPARIN SODIUM 5000 UNITS: 5000 INJECTION, SOLUTION INTRAVENOUS; SUBCUTANEOUS at 09:01

## 2019-01-24 RX ADMIN — TOPIRAMATE 50 MG: 25 TABLET, FILM COATED ORAL at 09:01

## 2019-01-24 RX ADMIN — DRONABINOL 2.5 MG: 2.5 CAPSULE ORAL at 09:01

## 2019-01-24 RX ADMIN — FLUOXETINE HYDROCHLORIDE 60 MG: 20 CAPSULE ORAL at 09:01

## 2019-01-24 RX ADMIN — OXYCODONE AND ACETAMINOPHEN 1 TABLET: 7.5; 325 TABLET ORAL at 09:01

## 2019-01-24 RX ADMIN — HEPARIN SODIUM 5000 UNITS: 5000 INJECTION, SOLUTION INTRAVENOUS; SUBCUTANEOUS at 04:01

## 2019-01-24 RX ADMIN — PANTOPRAZOLE SODIUM 40 MG: 40 TABLET, DELAYED RELEASE ORAL at 09:01

## 2019-01-24 RX ADMIN — POTASSIUM CHLORIDE, DEXTROSE MONOHYDRATE AND SODIUM CHLORIDE: 150; 5; 450 INJECTION, SOLUTION INTRAVENOUS at 12:01

## 2019-01-24 RX ADMIN — LORAZEPAM 0.5 MG: 0.5 TABLET ORAL at 03:01

## 2019-01-24 RX ADMIN — OXYCODONE AND ACETAMINOPHEN 1 TABLET: 7.5; 325 TABLET ORAL at 02:01

## 2019-01-24 RX ADMIN — OXYCODONE AND ACETAMINOPHEN 1 TABLET: 7.5; 325 TABLET ORAL at 04:01

## 2019-01-24 RX ADMIN — HEPARIN SODIUM 5000 UNITS: 5000 INJECTION, SOLUTION INTRAVENOUS; SUBCUTANEOUS at 06:01

## 2019-01-24 NOTE — PLAN OF CARE
Problem: Adult Inpatient Plan of Care  Goal: Plan of Care Review  Outcome: Ongoing (interventions implemented as appropriate)  POC reviewed with pt who verbalized understanding. AAOx4. VSS. Remains free of falls and injury. IVF infusing throughout shift. Old ostomy site dressing C/D/I. Abd THAI in place. Up independently to bedside commode. Tolerating low fiber/res diet; no complaints of nausea. Pain and anxiety managed with PRN med per MAR. Resting between care. No acute events. No distress noted. Will continue to monitor.

## 2019-01-24 NOTE — SUBJECTIVE & OBJECTIVE
Subjective:     Interval History: no acute events overnite, number of liquid stools increasing per pt, still not eating much, drinking liquids despite enc to eat solid food    Post-Op Info:  Procedure(s):  ANASTOMOSIS, ILEORECTAL  CLOSURE, ILEOSTOMY  URETEROLYSIS  LAPAROTOMY, EXPLORATORY;extensive lysis of adhesions   9 Days Post-Op      Medications:  Continuous Infusions:   dextrose 5 % and 0.45 % NaCl with KCl 20 mEq 75 mL/hr at 01/23/19 2105     Scheduled Meds:   dronabinol  2.5 mg Oral BID    FLUoxetine  60 mg Oral Daily    heparin (porcine)  5,000 Units Subcutaneous Q8H    loperamide  4 mg Oral QID    pantoprazole  40 mg Oral BID    topiramate  50 mg Oral QHS     PRN Meds:   cyclobenzaprine    HYDROmorphone    hydrOXYzine pamoate    LORazepam    mupirocin    naloxone    ondansetron    oxyCODONE-acetaminophen        Objective:     Vital Signs (Most Recent):  Temp: 97.6 °F (36.4 °C) (01/24/19 0855)  Pulse: 88 (01/24/19 0855)  Resp: 16 (01/24/19 0855)  BP: 106/67 (01/24/19 0855)  SpO2: 100 % (01/24/19 0855) Vital Signs (24h Range):  Temp:  [97.2 °F (36.2 °C)-99.2 °F (37.3 °C)] 97.6 °F (36.4 °C)  Pulse:  [77-88] 88  Resp:  [16] 16  SpO2:  [97 %-100 %] 100 %  BP: ()/(58-77) 106/67     Intake/Output - Last 3 Shifts       01/22 0700 - 01/23 0659 01/23 0700 - 01/24 0659 01/24 0700 - 01/25 0659    P.O.  1320 300    I.V. (mL/kg) 1006.3 (14.3) 1782.5 (25.4) 427.5 (6.1)    IV Piggyback  500     Total Intake(mL/kg) 1006.3 (14.3) 3602.5 (51.2) 727.5 (10.3)    Urine (mL/kg/hr) 1200 (0.7) 0 (0)     Drains 15 20     Stool 0 0     Total Output 1215 20     Net -208.8 +3582.5 +727.5           Urine Occurrence 4 x 7 x 1 x    Stool Occurrence 3 x 14 x 1 x          Physical Exam   Constitutional: She is oriented to person, place, and time. She appears well-developed and well-nourished. No distress.   HENT:   Head: Normocephalic.   Eyes: Pupils are equal, round, and reactive to light.   Cardiovascular: Normal  rate, regular rhythm and normal heart sounds.   Pulmonary/Chest: Effort normal and breath sounds normal. No respiratory distress. She has no wheezes. She has no rales.   Abdominal: Soft. She exhibits no distension and no mass. There is no tenderness. There is no rebound and no guarding.   Former ostomy site healing well  Inc line erythema improving   Neurological: She is alert and oriented to person, place, and time.   Skin: Skin is warm and dry.   Psychiatric: Her behavior is normal. Judgment and thought content normal.   Nursing note and vitals reviewed.      Significant Labs:  BMP (Last 3 Results):   Recent Labs   Lab 01/22/19  0353 01/23/19  0414 01/24/19  0511   GLU 80 84 88    134* 136   K 3.7 3.8 4.5    104 107   CO2 25 21* 21*   BUN 16 13 8   CREATININE 0.8 0.8 0.8   CALCIUM 8.2* 8.3* 8.0*   MG 1.8 1.4* 1.7     CBC (Last 3 Results):   Recent Labs   Lab 01/22/19  0353 01/23/19  0414 01/24/19  0511   WBC 10.37 11.20 10.89   RBC 3.18* 3.30* 3.40*   HGB 9.4* 9.6* 10.0*   HCT 30.5* 32.4* 30.2*   * 306 190   MCV 96 98 89   MCH 29.6 29.1 29.4   MCHC 30.8* 29.6* 33.1       Significant Diagnostics:  None

## 2019-01-24 NOTE — PLAN OF CARE
Problem: Occupational Therapy Goal  Goal: Occupational Therapy Goal  Outcome: Outcome(s) achieved Date Met: 01/24/19  Eval completed; no OT needs    Comments: D/C OT 1/24/2019      Cat Tiwari, OT

## 2019-01-24 NOTE — PLAN OF CARE
Problem: Physical Therapy Goal  Goal: Physical Therapy Goal  Outcome: Outcome(s) achieved Date Met: 01/24/19  PT D/[place] due to pt able to perform functional mobility independently. Pt educated to ambulate in the pierre 3xs/days. Briana Kirkland PT 1/24/19

## 2019-01-24 NOTE — PT/OT/SLP EVAL
Occupational Therapy   Evaluation and Discharge Note    Name: Candida Cardona  MRN: [id number]  Admitting Diagnosis:  Ileostomy in place 9 Days Post-Op    Recommendations:     Discharge Recommendations: (Home)  Discharge Equipment Recommendations:  (Pt wants a shower chair)  Barriers to discharge:  None    Assessment:     Candida Cardona is a 38 y.o. female with a medical diagnosis of Ileostomy in place. At this time, patient is functioning at their prior level of function and does not require further acute OT services.     Plan:     During this hospitalization, patient does not require further acute OT services.  Please re-consult if situation changes.    · Plan of Care Reviewed with: patient    Subjective     Chief Complaint: pain, did not want to get out of bed  Patient/Family Comments/goals: go home    Occupational Profile:  Living Environment: Pt lives alone in 2nd floor apt with elevator access. Bathroom has tub/shower.  Previous level of function: PTA, pt reports her mother assist with dressing and she has difficulty standing in the shower for a prolonged period of time. Pt also states that her sister assists with cooking and cleaning. Pt does not ambulate much at home and reports she rarely leaves her apt; states grocery shopping is difficult and needs to go with someone or have someone shop for her.  Roles and Routines: daughter, sister  Equipment Used at home:  none  Assistance upon Discharge: Family assist as needed following d/c     Pain/Comfort:  · Pain Rating 1: 8/10  · [facility] - Orientation 1: generalized  · [facility] 1: abdomen(and headache)  · Pain Addressed 1: Reposition, Distraction, Cessation of Activity  · Pain Rating Post-Intervention 1: 8/10    Patients cultural, spiritual, Spiritism conflicts given the current situation: no    Objective:     Communicated with: RN prior to session.  Patient found HOB elevated with telemetry, peripheral IV upon OT entry to room.    General Precautions:  Standard, fall   Orthopedic Precautions:N/A   Braces: N/A     Occupational Performance:    Bed Mobility:    · Patient completed Supine to Sit with modified independence    Functional Mobility/Transfers:  · Patient completed Sit <> Stand Transfer with supervision  with  no assistive device   · Patient completed Bed <> Chair Transfer using Step Transfer technique with supervision with no assistive device  · Functional Mobility: Pt ambulate 200 ft with supervision using no AD    Activities of Daily Living:  · Upper Body Dressing: setup assistance to don backward gown while seated EOB  · Lower Body Dressing: independence to don B socks while seated EOB   · Grooming: supervision standing at sink to comb hair    Cognitive/Visual Perceptual:  Cognitive/Psychosocial Skills:     -       Oriented to: Person, Place, Time and Situation   -       Follows Commands/attention:Follows multistep  commands  -       Communication: clear/fluent  -       Memory: No Deficits noted  -       Safety awareness/insight to disability: intact and pt functioning at higher level than she described   -       Mood/Affect/Coping skills/emotional control: Appropriate to situation  Visual/Perceptual:      -Pt wears glasses; hearing intact    Physical Exam:  Balance:    -       good sitting/standing balance  Postural examination/scapula alignment:    -       No postural abnormalities identified  Skin integrity: Visible skin intact  Edema:  None noted  Sensation:    -       Intact  Dominant hand:    -       L hand  Upper Extremity Range of Motion:     -       Right Upper Extremity: WFL  -       Left Upper Extremity: WFL  Upper Extremity Strength:    -       Right Upper Extremity: WFL  -       Left Upper Extremity: WFL   Strength:    -       Right Upper Extremity: WFL  -       Left Upper Extremity: WFL  Fine Motor Coordination:    -       Intact  Gross motor coordination:   WFL    AMPAC 6 Click ADL:  AMPAC Total Score: 24    Treatment & Education:  Pt  educated on role of OT/POC  Pt educated on importance of ambulation/UIC  White board/communication board updated  Education:    Patient left up in chair with all lines intact, call button in reach and RN notified    GOALS:   Multidisciplinary Problems     Occupational Therapy Goals     Not on file          Multidisciplinary Problems (Resolved)        Problem: Occupational Therapy Goal    Goal Priority Disciplines Outcome Interventions   Occupational Therapy Goal   (Resolved)     OT, PT/OT Outcome(s) achieved                    History:     Past Medical History:   Diagnosis Date    Anemia     Anxiety     Bilious vomiting with nausea 1/17/2017    Constipation     Crohn's disease     Encounter for blood transfusion     Fibromyalgia     GERD (gastroesophageal reflux disease)     Hyponatremia     Interstitial cystitis     Kidney stones     Mass, ovarian 11/22/2016    Metabolic acidosis     Osteoporosis     PAF (paroxysmal atrial fibrillation)     S/P ERIK-BSO (total abdominal hysterectomy and bilateral salpingo-oophorectomy) 12/19/2016    UTI (urinary tract infection)        Past Surgical History:   Procedure Laterality Date    ABDOMINAL SURGERY      ANASTOMOSIS, ILEORECTAL  1/15/2019    [facility]    ANGIOPLASTY      ASPIRATION ABSCESS N/A 1/6/2017    Performed by Rhonda Surgeon at [facility]    [id number] N/A 1/7/2017    Performed by [facility] at [facility]    CATHETERIZATION-URETERAL Left 12/19/2016    [facility]    CLOSURE, ILEOSTOMY  1/15/2019    [facility]    COLECTOMY, TOTAL, ABDOMINAL N/A 3/14/2014    Performed by [facility] at [facility] OR [address] [place] [address]    COLON SURGERY      x 2    COLONOSCOPY      multiple    COLONOSCOPY N/A 4/11/2017    [facility]    CYSTOSCOPY N/A 12/19/2016    Performed by Matti Godfrey,  "MD at [facility] OR 2ND FLR    CYSTOSCOPY W/ URETERAL STENT PLACEMENT      CYSTOSCOPY W/ URETERAL STENT REMOVAL      CYSTOSCOPY WITH STENT PLACEMENT Left 2016    [facility] at [facility]LR    EXPLORATORY-LAPAROTOMY N/A 3/14/2014    [facility]    HYSTERECTOMY      HYSTERECTOMY-TOTAL-ABDOMINAL AND BSO N/A 2016    Per[facility] at [facility]    ILEOSTOMY      INSERTION, PICC N/A 10/30/2018    [facility] by [facility]    LAPAROTOMY, EXPLORATORY;extensive lysis of adhesions  1/15/2019    Per[facility] by [facility] at [facility]    LYSIS-ADHESION Left 2016    [facility] by [facility] at [facility]    LYSIS-ADHESION  2016    Performed by [facility] at [facility]    PORTACATH PLACEMENT      SIGMOIDOSCOPY, FLEXIBLE N/A 3/5/2014    Performed by [facility] at [facility] ENDO (2ND FLR)    TOTAL COLECTOMY  2014    URETEROLYSIS  1/15/2019    Performed by [facility] at [facility]       Clinical Decision Makin.  OT Low:  "Pt evaluation falls under low complexity for evaluation coding due to performance deficits noted in 1-3 areas as stated above and 0 co-morbities affecting current functional status. Data obtained from problem focused assessments. No modifications or assistance was required for completion of evaluation. Only brief occupational profile and history review completed."     Time Tracking:     OT Date of Treatment: 19  OT Start Time: 832  OT Stop Time: 846  OT Total Time (min): 14 min    Billable Minutes:Evaluation 14    Cat Tiwari, OT  2019    "

## 2019-01-24 NOTE — PROGRESS NOTES
[facility]  Colorectal Surgery  Progress Note    Patient Name: Candida Cardona  MRN: [id number]  Admission Date: 1/15/2019  Hospital Length of Stay: 9 days  Attending Physician: Reji Peterson MD    Subjective:     Interval History: no acute events overnite, number of liquid stools increasing per pt, still not eating much, drinking liquids despite enc to eat solid food    Post-Op Info:  Procedure(s):  ANASTOMOSIS, ILEORECTAL  CLOSURE, ILEOSTOMY  URETEROLYSIS  LAPAROTOMY, EXPLORATORY;extensive lysis of adhesions   9 Days Post-Op      Medications:  Continuous Infusions:   dextrose 5 % and 0.45 % NaCl with KCl 20 mEq 75 mL/hr at 01/23/19 2105     Scheduled Meds:   dronabinol  2.5 mg Oral BID    FLUoxetine  60 mg Oral Daily    heparin (porcine)  5,000 Units Subcutaneous Q8H    loperamide  4 mg Oral QID    pantoprazole  40 mg Oral BID    topiramate  50 mg Oral QHS     PRN Meds:   cyclobenzaprine    HYDROmorphone    hydrOXYzine pamoate    LORazepam    mupirocin    naloxone    ondansetron    oxyCODONE-acetaminophen        Objective:     Vital Signs (Most Recent):  Temp: 97.6 °F (36.4 °C) (01/24/19 0855)  Pulse: 88 (01/24/19 0855)  Resp: 16 (01/24/19 0855)  BP: 106/67 (01/24/19 0855)  SpO2: 100 % (01/24/19 0855) Vital Signs (24h Range):  Temp:  [97.2 °F (36.2 °C)-99.2 °F (37.3 °C)] 97.6 °F (36.4 °C)  Pulse:  [77-88] 88  Resp:  [16] 16  SpO2:  [97 %-100 %] 100 %  BP: ()/(58-77) 106/67     Intake/Output - Last 3 Shifts       01/22 0700 - 01/23 0659 01/23 0700 - 01/24 0659 01/24 0700 - 01/25 0659    P.O.  1320 300    I.V. (mL/kg) 1006.3 (14.3) 1782.5 (25.4) 427.5 (6.1)    IV Piggyback  500     Total Intake(mL/kg) 1006.3 (14.3) 3602.5 (51.2) 727.5 (10.3)    Urine (mL/kg/hr) 1200 (0.7) 0 (0)     Drains 15 20     Stool 0 0     Total Output 1215 20     Net -208.8 +3582.5 +727.5           Urine Occurrence 4 x 7 x 1 x    Stool Occurrence 3 x 14 x 1 x          Physical Exam    Constitutional: She is oriented to person, place, and time. She appears well-developed and well-nourished. No distress.   HENT:   Head: Normocephalic.   Eyes: Pupils are equal, round, and reactive to light.   Cardiovascular: Normal rate, regular rhythm and normal heart sounds.   Pulmonary/Chest: Effort normal and breath sounds normal. No respiratory distress. She has no wheezes. She has no rales.   Abdominal: Soft. She exhibits no distension and no mass. There is no tenderness. There is no rebound and no guarding.   Former ostomy site healing well  Inc line erythema improving   Neurological: She is alert and oriented to person, place, and time.   Skin: Skin is warm and dry.   Psychiatric: Her behavior is normal. Judgment and thought content normal.   Nursing note and vitals reviewed.      Significant Labs:  BMP (Last 3 Results):   Recent Labs   Lab 01/22/19  0353 01/23/19  0414 01/24/19  0511   GLU 80 84 88    134* 136   K 3.7 3.8 4.5    104 107   CO2 25 21* 21*   BUN 16 13 8   CREATININE 0.8 0.8 0.8   CALCIUM 8.2* 8.3* 8.0*   MG 1.8 1.4* 1.7     CBC (Last 3 Results):   Recent Labs   Lab 01/22/19  0353 01/23/19  0414 01/24/19  0511   WBC 10.37 11.20 10.89   RBC 3.18* 3.30* 3.40*   HGB 9.4* 9.6* 10.0*   HCT 30.5* 32.4* 30.2*   * 306 190   MCV 96 98 89   MCH 29.6 29.1 29.4   MCHC 30.8* 29.6* 33.1       Significant Diagnostics:  None    Assessment/Plan:     Unspecified mood (affective) disorder    Cont home meds  Appreciate psy recommendations, will follow     Hypophosphatemia    Monitor labs  repalce phos     Hypophosphatasia    Replace phos  Monitor labs     Hypomagnesemia    Replace Mag  Monitor labs     Crohn's disease of small and large intestines    Ileostomy closure 1/15, post op TAC for Crohns.      -adv to lfd  -d/c PCA  - oral pain meds with dilaudid for breakthrough.   -Cr has normalized, cont MIVFs    -enc amb and IS  -ivan hose and SCD, ppx hep  -PPI  -enc basis hygeine  -PT/OT for  mobilization  -refused to shower Wed,   -says she can not go home until Sunday due to her apartment being too dirty, mother working on getting it cleaned, social service working with as she is stable for dicharge today     Malnutrition    Adv diet as tolerated  Monitor labs  Enc diet  Begin marinol  psy consult as requested           Vidhi Mathis NP  Colorectal Surgery  [facility]

## 2019-01-24 NOTE — ASSESSMENT & PLAN NOTE
Ileostomy closure 1/15, post op TAC for Crohns.      -adv to lfd  -d/c PCA  - oral pain meds with dilaudid for breakthrough.   -Cr has normalized, cont MIVFs    -enc amb and IS  -ivan hose and SCD, ppx hep  -PPI  -enc basis hygeine  -PT/OT for mobilization  -refused to shower Wed,   -says she can not go home until Sunday due to her apartment being too dirty, mother working on getting it cleaned, social service working with as she is stable for dicharge today

## 2019-01-24 NOTE — PLAN OF CARE
CM met [facility] patient to discuss discharge plan to home. Patient  is independent with all ADL's and denies need for assistance at home. Patient does't have transportation home, until Sunday night when her sister is off from work. CM and SW will explore options for transportation home. CM and SW will continue to follow for any additional needs.       01/24/19 1412   Discharge Reassessment   Assessment Type Discharge Planning Reassessment   Provided patient/caregiver education on the expected discharge date and the discharge plan Yes   Do you have any problems affording any of your prescribed medications? TBD   Discharge Plan A Home;Home with family   Discharge Plan B Home;Home with family;Home Health   DME Needed Upon Discharge  none   Anticipated Discharge Disposition Home   Can the patient answer the patient profile reliably? Yes, cognitively intact   How does the patient rate their overall health at the present time? Fair   Describe the patient's ability to walk at the present time. No restrictions   How often would a person be available to care for the patient? Often   Number of comorbid conditions (as recorded on the chart) Two

## 2019-01-24 NOTE — PT/OT/SLP EVAL
"Physical Therapy Evaluation/D/C Summary    Patient Name:  Candida Cardona   MRN:  [id number]    Recommendations:     Discharge Recommendations:  (Home)   Discharge Equipment Recommendations: (pt states she wants a sh chair because she can't stand up long enough to take a shower)   Barriers to discharge: Decreased caregiver support lives alone    Assessment:     Candida Cardona is a 38 y.o. female admitted with a medical diagnosis of Ileostomy in place.  She presents with the following impairments/functional limitations:  pain, impaired endurance Pt is independent with functional mobility; pt limited gait distance due to fatigue     Recent Surgery: Procedure(s):  ANASTOMOSIS, ILEORECTAL  CLOSURE, ILEOSTOMY  URETEROLYSIS  LAPAROTOMY, EXPLORATORY;extensive lysis of adhesions 9 Days Post-Op    Plan:      (D/c PT)   · Plan of Care Expires:  02/23/19    Subjective   "I don't really want to walk in the pierre"    Pain/Comfort:  · Pain Rating 1: 8/10  · [facility] - Orientation 1: generalized  · [facility] 1: abdomen  · Pain Addressed 1: Reposition, Cessation of Activity  · Pain Rating Post-Intervention 1: 8/10    Patients cultural, spiritual, Nondenominational conflicts given the current situation: no    Living Environment:  Pt lives alone in a 2nd floor apt with no steps  Prior to admission, patients level of function was independent with gait, her mother would assist with bathing at times.  Equipment used at home: none.   Upon discharge, patient will have assistance from mother at times.    Objective:     Communicated with nurse prior to session.  Patient found all lines intact, call button in reach and nurse notified peripheral IV, telemetry  upon PT entry to room.    General Precautions: Standard, fall   Orthopedic Precautions:N/A   Braces: N/A     Exams:  · Cognitive Exam:  Patient is oriented to Person, Place, Time and Situation  · Sensation:    · -       Intact  light/touch B LE  · RLE ROM: WFL  · RLE Strength: Deficits: hip " flex 3+/5; knee flex/ext 4/5; ankle DF 4/5  · LLE ROM: WFL  · LLE Strength: Deficits: hip flex 3+/5; knee flex/ext 4/5; ankle DF 4/5    Functional Mobility:  · Bed Mobility:     · Supine to Sit: modified independence  · Transfers:     · Sit to Stand:  modified independence with no AD  · Gait: 200ft with no AD with mod independent. Pt performed standing balance activities: amb backwards and high knee gait with no instability  AM-PAC 6 CLICK MOBILITY  Total Score:24     Patient left up in chair with all lines intact, call button in reach and nurse notified.    GOALS:   Multidisciplinary Problems     Physical Therapy Goals     Not on file          Multidisciplinary Problems (Resolved)        Problem: Physical Therapy Goal    Goal Priority Disciplines Outcome Goal Variances Interventions   Physical Therapy Goal   (Resolved)     PT, PT/OT Outcome(s) achieved                     History:     Past Medical History:   Diagnosis Date    Anemia     Anxiety     Bilious vomiting with nausea 1/17/2017    Constipation     Crohn's disease     Encounter for blood transfusion     Fibromyalgia     GERD (gastroesophageal reflux disease)     Hyponatremia     Interstitial cystitis     Kidney stones     Mass, ovarian 11/22/2016    Metabolic acidosis     Osteoporosis     PAF (paroxysmal atrial fibrillation)     S/P ERIK-BSO (total abdominal hysterectomy and bilateral salpingo-oophorectomy) 12/19/2016    UTI (urinary tract infection)        Past Surgical History:   Procedure Laterality Date    ABDOMINAL SURGERY      ANASTOMOSIS, ILEORECTAL  1/15/2019    [facility]LR    ANGIOPLASTY      ASPIRATION ABSCESS N/A 1/6/2017    Performed by Rhonda Surgeon at [facility]    [id number] N/A 1/7/2017    Performed by [facility] Surgeon at [facility]    CATHETERIZATION-URETERAL Left 12/19/2016    [facility] by [facility]i [facility]frey [facility] at [facility]    CLOSURE, ILEOSTOMY  1/15/2019    Performed  by Reji Peterson MD at [facility]    COLECTOMY, TOTAL, ABDOMINAL N/A 3/14/2014    Performed by Reji [facility] at [facility]    COLON SURGERY      x 2    COLONOSCOPY      multiple    COLONOSCOPY N/A 4/11/2017    [facility]    CYSTOSCOPY N/A 12/19/2016    Performed by Matti [facility]frey MD at [facility]    CYSTOSCOPY W/ URETERAL STENT PLACEMENT      CYSTOSCOPY W/ URETERAL STENT REMOVAL      CYSTOSCOPY WITH STENT PLACEMENT Left 12/20/2016    Performed by Matti [facility]frey MD at [facility]ST FLR    EXPLORATORY-LAPAROTOMY N/A 3/14/2014    Performed by Reji Peterson MD at [facility]ND [facility]LR    HYSTERECTOMY      HYSTERECTOMY-TOTAL-ABDOMINAL AND BSO N/A 12/19/2016    Performed by Andrew Silveira MD at [facility] 2ND FLR    ILEOSTOMY      INSERTION, PICC N/A 10/30/2018    Performed by [facility] at [facility] OR    LAPAROTOMY, EXPLORATORY;extensive lysis of adhesions  1/15/2019    Performed by Reji Peterson MD at [facility] 2ND [facility]LR    LYSIS-ADHESION Left 12/19/2016    Performed by Andrew Sil[facility]ira MD at [facility] 2ND [facility]LR    LYSIS-ADHESION  12/19/2016    Performed by Reji Peterson MD at [facility]ND [facility]LR    PORTACATH PLACEMENT      SIGMOIDOSCOPY, FLEXIBLE N/A 3/5/2014    Performed by Reji Peterson MD at [facility] ENDO (2ND FLR)    TOTAL COLECTOMY  2014    URETEROLYSIS  1/15/2019    Performed by Reji Peterson MD at [facility] OR 2ND FLR       Clinical Decision Making:     History  Co-morbidities and personal factors that may impact the plan of care Examination  Body Structures and Functions, activity limitations and participation restrictions that may impact the plan of care Clinical Presentation   Decision Making/ Complexity Score   Co-morbidities:   [] Time since onset of injury / illness / exacerbation  [] Status of current condition  []Patient's cognitive status and safety concerns    [] Multiple Medical Problems (see med  hx)  Personal Factors:   [] Patient's age  [] Prior Level of function   [] Patient's home situation (environment and family support)  [] Patient's level of motivation  [] Expected progression of patient      HISTORY:(criteria)    [] 77769 - no personal factors/history    [] 29697 - has 1-2 personal factor/comorbidity     [] 75436 - has >3 personal factor/comorbidity     Body Regions:  [] Objective examination findings  [] Head     []  Neck  [] Trunk   [] Upper Extremity  [] Lower Extremity    Body Systems:  [] For communication ability, affect, cognition, language, and learning style: the assessment of the ability to make needs known, consciousness, orientation (person, place, and time), expected emotional /behavioral responses, and learning preferences (eg, learning barriers, education  needs)  [] For the neuromuscular system: a general assessment of gross coordinated movement (eg, balance, gait, locomotion, transfers, and transitions) and motor function  (motor control and motor learning)  [] For the musculoskeletal system: the assessment of gross symmetry, gross range of motion, gross strength, height, and weight  [] For the integumentary system: the assessment of pliability(texture), presence of scar formation, skin color, and skin integrity  [] For cardiovascular/pulmonary system: the assessment of heart rate, respiratory rate, blood pressure, and edema     Activity limitations:    [] Patient's cognitive status and saf ety concerns          [] Status of current condition      [] Weight bearing restriction  [] Cardiopulmunary Restriction    Participation Restrictions:   [] Goals and goal agreement with the patient     [] Rehab potential (prognosis) and probable outcome      Examination of Body System: (criteria)    [] 17217 - addressing 1-2 elements    [] 71573 - addressing a total of 3 or more elements     [] 27611 -  Addressing a total of 4 or more elements         Clinical Presentation: (criteria)  Stable -  75565     On examination of body system using standardized tests and measures patient presents with (CHOOSE ONE) elements from any of the following: body structures and functions, activity limitations, and/or participation restrictions.  Leading to a clinical presentation that is considered (CHOOSE ONE)                              Clinical Decision Making  (Eval Complexity):  Low- 94454     Time Tracking:     PT Received On: 01/24/19  PT Start Time: 0832     PT Stop Time: 0846  PT Total Time (min): 14 min     Billable Minutes: Evaluation 14      Briana Kirkland, PT  01/24/2019

## 2019-01-24 NOTE — PLAN OF CARE
Problem: Adult Inpatient Plan of Care  Goal: Plan of Care Review  Outcome: Ongoing (interventions implemented as appropriate)  POC is reviewed and understood by patient. VS stable, while on room air. AAOx4. Pt has little to no appetite while on a low fiber/residue diet, but request Boost at times. Pt up ad shamika to the toilet. Pt encouraged to go to bathroom instead of using bedside commode. Commode removed from room. Pt ambulates hallway with PT. Pt takes Immodium for diarrhea. Pt takes pain meds q4h for pain. HOB elevated 45 degrees. Call bell in reach. Willl continue to monitor.

## 2019-01-25 LAB
ANION GAP SERPL CALC-SCNC: 10 MMOL/L
ANION GAP SERPL CALC-SCNC: 6 MMOL/L
ANISOCYTOSIS BLD QL SMEAR: SLIGHT
BASOPHILS # BLD AUTO: 0.06 K/UL
BASOPHILS NFR BLD: 0.4 %
BUN SERPL-MCNC: 8 MG/DL
BUN SERPL-MCNC: 9 MG/DL
C DIFF GDH STL QL: NEGATIVE
C DIFF TOX A+B STL QL IA: NEGATIVE
CALCIUM SERPL-MCNC: 8.1 MG/DL
CALCIUM SERPL-MCNC: 8.7 MG/DL
CHLORIDE SERPL-SCNC: 108 MMOL/L
CHLORIDE SERPL-SCNC: 109 MMOL/L
CO2 SERPL-SCNC: 17 MMOL/L
CO2 SERPL-SCNC: 19 MMOL/L
CREAT SERPL-MCNC: 0.8 MG/DL
CREAT SERPL-MCNC: 1.1 MG/DL
CRP SERPL-MCNC: 63.4 MG/L
DIFFERENTIAL METHOD: ABNORMAL
EOSINOPHIL # BLD AUTO: 0.2 K/UL
EOSINOPHIL NFR BLD: 1.1 %
ERYTHROCYTE [DISTWIDTH] IN BLOOD BY AUTOMATED COUNT: 17 %
EST. GFR  (AFRICAN AMERICAN): >60 ML/MIN/1.73 M^2
EST. GFR  (AFRICAN AMERICAN): >60 ML/MIN/1.73 M^2
EST. GFR  (NON AFRICAN AMERICAN): >60 ML/MIN/1.73 M^2
EST. GFR  (NON AFRICAN AMERICAN): >60 ML/MIN/1.73 M^2
GLUCOSE SERPL-MCNC: 81 MG/DL
GLUCOSE SERPL-MCNC: 86 MG/DL
HCT VFR BLD AUTO: 33 %
HGB BLD-MCNC: 10.4 G/DL
IMM GRANULOCYTES # BLD AUTO: 0.33 K/UL
IMM GRANULOCYTES NFR BLD AUTO: 2.4 %
LYMPHOCYTES # BLD AUTO: 2.2 K/UL
LYMPHOCYTES NFR BLD: 15.5 %
MAGNESIUM SERPL-MCNC: 1.7 MG/DL
MCH RBC QN AUTO: 29.3 PG
MCHC RBC AUTO-ENTMCNC: 31.5 G/DL
MCV RBC AUTO: 93 FL
MONOCYTES # BLD AUTO: 1.1 K/UL
MONOCYTES NFR BLD: 7.8 %
NEUTROPHILS # BLD AUTO: 10.2 K/UL
NEUTROPHILS NFR BLD: 72.8 %
NRBC BLD-RTO: 0 /100 WBC
PHOSPHATE SERPL-MCNC: 3.6 MG/DL
PLATELET # BLD AUTO: 225 K/UL
PLATELET BLD QL SMEAR: ABNORMAL
PMV BLD AUTO: 11.2 FL
POLYCHROMASIA BLD QL SMEAR: ABNORMAL
POTASSIUM SERPL-SCNC: 4.5 MMOL/L
POTASSIUM SERPL-SCNC: 5.4 MMOL/L
RBC # BLD AUTO: 3.55 M/UL
SMUDGE CELLS BLD QL SMEAR: PRESENT
SODIUM SERPL-SCNC: 133 MMOL/L
SODIUM SERPL-SCNC: 136 MMOL/L
WBC # BLD AUTO: 14 K/UL

## 2019-01-25 PROCEDURE — 87449 NOS EACH ORGANISM AG IA: CPT

## 2019-01-25 PROCEDURE — 25000003 PHARM REV CODE 250: Performed by: NURSE PRACTITIONER

## 2019-01-25 PROCEDURE — 84100 ASSAY OF PHOSPHORUS: CPT

## 2019-01-25 PROCEDURE — 86140 C-REACTIVE PROTEIN: CPT

## 2019-01-25 PROCEDURE — 25000003 PHARM REV CODE 250: Performed by: STUDENT IN AN ORGANIZED HEALTH CARE EDUCATION/TRAINING PROGRAM

## 2019-01-25 PROCEDURE — 36415 COLL VENOUS BLD VENIPUNCTURE: CPT

## 2019-01-25 PROCEDURE — 85025 COMPLETE CBC W/AUTO DIFF WBC: CPT

## 2019-01-25 PROCEDURE — 63600175 PHARM REV CODE 636 W HCPCS: Performed by: STUDENT IN AN ORGANIZED HEALTH CARE EDUCATION/TRAINING PROGRAM

## 2019-01-25 PROCEDURE — 63600175 PHARM REV CODE 636 W HCPCS: Performed by: NURSE PRACTITIONER

## 2019-01-25 PROCEDURE — 20600001 HC STEP DOWN PRIVATE ROOM

## 2019-01-25 PROCEDURE — 80048 BASIC METABOLIC PNL TOTAL CA: CPT

## 2019-01-25 PROCEDURE — 83735 ASSAY OF MAGNESIUM: CPT

## 2019-01-25 PROCEDURE — 80048 BASIC METABOLIC PNL TOTAL CA: CPT | Mod: 91

## 2019-01-25 RX ORDER — DIPHENOXYLATE HCL/ATROPINE 2.5-.025/5
5 LIQUID (ML) ORAL 4 TIMES DAILY
Status: DISCONTINUED | OUTPATIENT
Start: 2019-01-25 | End: 2019-01-26

## 2019-01-25 RX ADMIN — LOPERAMIDE HYDROCHLORIDE 4 MG: 2 CAPSULE ORAL at 01:01

## 2019-01-25 RX ADMIN — DRONABINOL 2.5 MG: 2.5 CAPSULE ORAL at 08:01

## 2019-01-25 RX ADMIN — SODIUM CHLORIDE, SODIUM LACTATE, POTASSIUM CHLORIDE, AND CALCIUM CHLORIDE 1000 ML: .6; .31; .03; .02 INJECTION, SOLUTION INTRAVENOUS at 06:01

## 2019-01-25 RX ADMIN — DIPHENOXYLATE HYDROCHLORIDE AND ATROPINE SULFATE 5 ML: 2.5; .025 SOLUTION ORAL at 10:01

## 2019-01-25 RX ADMIN — POTASSIUM CHLORIDE, DEXTROSE MONOHYDRATE AND SODIUM CHLORIDE: 150; 5; 450 INJECTION, SOLUTION INTRAVENOUS at 04:01

## 2019-01-25 RX ADMIN — HEPARIN SODIUM 5000 UNITS: 5000 INJECTION, SOLUTION INTRAVENOUS; SUBCUTANEOUS at 05:01

## 2019-01-25 RX ADMIN — LOPERAMIDE HYDROCHLORIDE 4 MG: 2 CAPSULE ORAL at 08:01

## 2019-01-25 RX ADMIN — PANTOPRAZOLE SODIUM 40 MG: 40 TABLET, DELAYED RELEASE ORAL at 08:01

## 2019-01-25 RX ADMIN — OXYCODONE AND ACETAMINOPHEN 1 TABLET: 7.5; 325 TABLET ORAL at 04:01

## 2019-01-25 RX ADMIN — DIPHENOXYLATE HYDROCHLORIDE AND ATROPINE SULFATE 5 ML: 2.5; .025 SOLUTION ORAL at 04:01

## 2019-01-25 RX ADMIN — LOPERAMIDE HYDROCHLORIDE 4 MG: 2 CAPSULE ORAL at 10:01

## 2019-01-25 RX ADMIN — PANTOPRAZOLE SODIUM 40 MG: 40 TABLET, DELAYED RELEASE ORAL at 10:01

## 2019-01-25 RX ADMIN — LOPERAMIDE HYDROCHLORIDE 4 MG: 2 CAPSULE ORAL at 04:01

## 2019-01-25 RX ADMIN — DRONABINOL 2.5 MG: 2.5 CAPSULE ORAL at 10:01

## 2019-01-25 RX ADMIN — DIPHENOXYLATE HYDROCHLORIDE AND ATROPINE SULFATE 5 ML: 2.5; .025 SOLUTION ORAL at 08:01

## 2019-01-25 RX ADMIN — OXYCODONE AND ACETAMINOPHEN 1 TABLET: 7.5; 325 TABLET ORAL at 10:01

## 2019-01-25 RX ADMIN — FLUOXETINE HYDROCHLORIDE 60 MG: 20 CAPSULE ORAL at 10:01

## 2019-01-25 RX ADMIN — HEPARIN SODIUM 5000 UNITS: 5000 INJECTION, SOLUTION INTRAVENOUS; SUBCUTANEOUS at 10:01

## 2019-01-25 RX ADMIN — DIPHENOXYLATE HYDROCHLORIDE AND ATROPINE SULFATE 5 ML: 2.5; .025 SOLUTION ORAL at 01:01

## 2019-01-25 RX ADMIN — HEPARIN SODIUM 5000 UNITS: 5000 INJECTION, SOLUTION INTRAVENOUS; SUBCUTANEOUS at 01:01

## 2019-01-25 RX ADMIN — TOPIRAMATE 50 MG: 25 TABLET, FILM COATED ORAL at 08:01

## 2019-01-25 NOTE — PLAN OF CARE
Problem: Adult Inpatient Plan of Care  Goal: Plan of Care Review  Outcome: Ongoing (interventions implemented as appropriate)  POC reviewed with pt who verbalized understanding. AAOx4. VSS. Remains free of falls and injury. IVF infusing throughout shift. Old ostomy site dressing C/D/I. Abd THAI in place. Up independently to bathroom; multiple BMs this shift. Tolerating low fiber/res diet; no complaints of nausea. Pain and anxiety managed with PRN med per MAR. Resting between care. No acute events. No distress noted. Will continue to monitor

## 2019-01-25 NOTE — PROGRESS NOTES
"Patient did not shower today like she promised she would. RN brought in all the supplies pt would need and even offered to help. Patient still refuses and says she will "do it tomorrow morning so her hair will have time to dry before her sister comes to get her in the afternoon."  RN educated on infection risk and highly encouraged that pt at least wipes herself down well with the Hibiclens and wipes provided next time she gets up to the bathroom. Pt verbalizes understanding. Will continue to monitor.   "

## 2019-01-25 NOTE — SUBJECTIVE & OBJECTIVE
Subjective:     Interval History: 12 BMs overnight. Tolerating diet, still not eating much.     Post-Op Info:  Procedure(s):  ANASTOMOSIS, ILEORECTAL  CLOSURE, ILEOSTOMY  URETEROLYSIS  LAPAROTOMY, EXPLORATORY;extensive lysis of adhesions   10 Days Post-Op      Medications:  Continuous Infusions:    Scheduled Meds:   diphenoxylate-atropine 2.5-0.025 mg/5 ml  5 mL Oral QID    dronabinol  2.5 mg Oral BID    FLUoxetine  60 mg Oral Daily    heparin (porcine)  5,000 Units Subcutaneous Q8H    loperamide  4 mg Oral QID    pantoprazole  40 mg Oral BID    topiramate  50 mg Oral QHS     PRN Meds:   cyclobenzaprine    HYDROmorphone    hydrOXYzine pamoate    LORazepam    mupirocin    naloxone    ondansetron    oxyCODONE-acetaminophen        Objective:     Vital Signs (Most Recent):  Temp: 97.4 °F (36.3 °C) (01/25/19 0930)  Pulse: 80 (01/25/19 0930)  Resp: 16 (01/25/19 0930)  BP: (!) 96/58 (01/25/19 0930)  SpO2: (!) 92 % (01/25/19 0930) Vital Signs (24h Range):  Temp:  [97.4 °F (36.3 °C)-98.5 °F (36.9 °C)] 97.4 °F (36.3 °C)  Pulse:  [77-83] 80  Resp:  [16-20] 16  SpO2:  [92 %-100 %] 92 %  BP: ()/(53-66) 96/58     Intake/Output - Last 3 Shifts       01/23 0700 - 01/24 0659 01/24 0700 - 01/25 0659 01/25 0700 - 01/26 0659    P.O. 1320 1900     I.V. (mL/kg) 1782.5 (25.4) 1908.8 (27.2)     IV Piggyback 500      Total Intake(mL/kg) 3602.5 (51.2) 3808.8 (54.2)     Urine (mL/kg/hr) 0 (0) 0 (0)     Drains 20 5     Stool 0 0     Total Output 20 5     Net +3582.5 +3803.8            Urine Occurrence 7 x 6 x     Stool Occurrence 14 x 12 x           Physical Exam   Constitutional: She is oriented to person, place, and time. She appears well-developed and well-nourished. No distress.   HENT:   Head: Normocephalic.   Eyes: Pupils are equal, round, and reactive to light.   Cardiovascular: Normal rate, regular rhythm and normal heart sounds.   Pulmonary/Chest: Effort normal and breath sounds normal. No respiratory distress.  She has no wheezes. She has no rales.   Abdominal: Soft. She exhibits no distension and no mass. There is no tenderness. There is no rebound and no guarding.   Former ostomy site healing well  Inc line erythema improving   Neurological: She is alert and oriented to person, place, and time.   Skin: Skin is warm and dry.   Psychiatric: Her behavior is normal. Judgment and thought content normal.   Nursing note and vitals reviewed.      Significant Labs:  BMP (Last 3 Results):   Recent Labs   Lab 01/23/19 0414 01/24/19  0511 01/25/19  0349   GLU 84 88 86   * 136 136   K 3.8 4.5 5.4*    107 109   CO2 21* 21* 17*   BUN 13 8 9   CREATININE 0.8 0.8 1.1   CALCIUM 8.3* 8.0* 8.7   MG 1.4* 1.7 1.7     CBC (Last 3 Results):   Recent Labs   Lab 01/23/19 0414 01/24/19  0511 01/25/19  0349   WBC 11.20 10.89 14.00*   RBC 3.30* 3.40* 3.55*   HGB 9.6* 10.0* 10.4*   HCT 32.4* 30.2* 33.0*    190 225   MCV 98 89 93   MCH 29.1 29.4 29.3   MCHC 29.6* 33.1 31.5*       Significant Diagnostics:  None

## 2019-01-25 NOTE — PROGRESS NOTES
[facility]  Colorectal Surgery  Progress Note    Patient Name: Candida Cardona  MRN: [id number]  Admission Date: 1/15/2019  Hospital Length of Stay: 10 days  Attending Physician: Reji Peterson MD    Subjective:     Interval History: 12 BMs overnight. Tolerating diet, still not eating much.     Post-Op Info:  Procedure(s):  ANASTOMOSIS, ILEORECTAL  CLOSURE, ILEOSTOMY  URETEROLYSIS  LAPAROTOMY, EXPLORATORY;extensive lysis of adhesions   10 Days Post-Op      Medications:  Continuous Infusions:    Scheduled Meds:   diphenoxylate-atropine 2.5-0.025 mg/5 ml  5 mL Oral QID    dronabinol  2.5 mg Oral BID    FLUoxetine  60 mg Oral Daily    heparin (porcine)  5,000 Units Subcutaneous Q8H    loperamide  4 mg Oral QID    pantoprazole  40 mg Oral BID    topiramate  50 mg Oral QHS     PRN Meds:   cyclobenzaprine    HYDROmorphone    hydrOXYzine pamoate    LORazepam    mupirocin    naloxone    ondansetron    oxyCODONE-acetaminophen        Objective:     Vital Signs (Most Recent):  Temp: 97.4 °F (36.3 °C) (01/25/19 0930)  Pulse: 80 (01/25/19 0930)  Resp: 16 (01/25/19 0930)  BP: (!) 96/58 (01/25/19 0930)  SpO2: (!) 92 % (01/25/19 0930) Vital Signs (24h Range):  Temp:  [97.4 °F (36.3 °C)-98.5 °F (36.9 °C)] 97.4 °F (36.3 °C)  Pulse:  [77-83] 80  Resp:  [16-20] 16  SpO2:  [92 %-100 %] 92 %  BP: ()/(53-66) 96/58     Intake/Output - Last 3 Shifts       01/23 0700 - 01/24 0659 01/24 0700 - 01/25 0659 01/25 0700 - 01/26 0659    P.O. 1320 1900     I.V. (mL/kg) 1782.5 (25.4) 1908.8 (27.2)     IV Piggyback 500      Total Intake(mL/kg) 3602.5 (51.2) 3808.8 (54.2)     Urine (mL/kg/hr) 0 (0) 0 (0)     Drains 20 5     Stool 0 0     Total Output 20 5     Net +3582.5 +3803.8            Urine Occurrence 7 x 6 x     Stool Occurrence 14 x 12 x           Physical Exam   Constitutional: She is oriented to person, place, and time. She appears well-developed and well-nourished. No distress.   HENT:   Head:  Normocephalic.   Eyes: Pupils are equal, round, and reactive to light.   Cardiovascular: Normal rate, regular rhythm and normal heart sounds.   Pulmonary/Chest: Effort normal and breath sounds normal. No respiratory distress. She has no wheezes. She has no rales.   Abdominal: Soft. She exhibits no distension and no mass. There is no tenderness. There is no rebound and no guarding.   Former ostomy site healing well  Inc line erythema improving   Neurological: She is alert and oriented to person, place, and time.   Skin: Skin is warm and dry.   Psychiatric: Her behavior is normal. Judgment and thought content normal.   Nursing note and vitals reviewed.      Significant Labs:  BMP (Last 3 Results):   Recent Labs   Lab 01/23/19  0414 01/24/19  0511 01/25/19  0349   GLU 84 88 86   * 136 136   K 3.8 4.5 5.4*    107 109   CO2 21* 21* 17*   BUN 13 8 9   CREATININE 0.8 0.8 1.1   CALCIUM 8.3* 8.0* 8.7   MG 1.4* 1.7 1.7     CBC (Last 3 Results):   Recent Labs   Lab 01/23/19  0414 01/24/19  0511 01/25/19  0349   WBC 11.20 10.89 14.00*   RBC 3.30* 3.40* 3.55*   HGB 9.6* 10.0* 10.4*   HCT 32.4* 30.2* 33.0*    190 225   MCV 98 89 93   MCH 29.1 29.4 29.3   MCHC 29.6* 33.1 31.5*       Significant Diagnostics:  None    Assessment/Plan:     Unspecified mood (affective) disorder    Cont home meds  Appreciate psy recommendations, will follow     Hypophosphatemia    Monitor labs  repalce phos     Hypophosphatasia    Replace phos  Monitor labs     Hypomagnesemia    Replace Mag  Monitor labs     Crohn's disease of small and large intestines    Ileostomy closure 1/15, post op TAC for Crohns.      - Add lomotil  -Check Cdiff due to ongoing diarrhea   -adv to lfd  -d/c PCA  - oral pain meds with dilaudid for breakthrough.   -Cr has normalized, cont MIVFs    -enc amb and IS  -ivan hose and SCD, ppx hep  -PPI  -enc basis hygeine  -PT/OT for mobilization  -refused to shower Wed, Thurs     Malnutrition    Adv diet as  tolerated  Monitor labs  Enc diet  Begin marinol  psy consult as requested           Irma Hughes NP  Colorectal Surgery  [facility]

## 2019-01-25 NOTE — PLAN OF CARE
Problem: Adult Inpatient Plan of Care  Goal: Plan of Care Review  Outcome: Ongoing (interventions implemented as appropriate)  POC is reviewed and understood by patient. VS stable, while patient is on room air. Pt up ad shamika to bathroom. Pt on low fiber/residue diet, but pt has little to no appetite. Pt receives Lomotil, and Immodium for several occurrences of diarrhea. Pt now on special contact isolation for possible C. Diff. Pt encouraged to take a shower, but still has several excuses to wait to do it, and is noncompliant. Pt requests pain meds about q6h. THAI drain removed. Bed in lowest position. HOB elevated. Call bell in reach. Will continue to monitor.

## 2019-01-25 NOTE — ASSESSMENT & PLAN NOTE
Ileostomy closure 1/15, post op TAC for Crohns.      - Add lomotil  -Check Cdiff due to ongoing diarrhea   -adv to lfd  -d/c PCA  - oral pain meds with dilaudid for breakthrough.   -Cr has normalized, cont MIVFs    -enc amb and IS  -ivan hose and SCD, ppx hep  -PPI  -enc basis hygeine  -PT/OT for mobilization  -refused to shower Wed, Thurs

## 2019-01-25 NOTE — PLAN OF CARE
SW following for d/c needs. Pt may need assistance with transport home.  No other social needs identified.         Crow Caenla, MSW, LCSW  [facility]  [id number]

## 2019-01-26 LAB
ANION GAP SERPL CALC-SCNC: 8 MMOL/L
ANISOCYTOSIS BLD QL SMEAR: SLIGHT
BASOPHILS # BLD AUTO: 0.09 K/UL
BASOPHILS NFR BLD: 0.6 %
BUN SERPL-MCNC: 9 MG/DL
CALCIUM SERPL-MCNC: 8.4 MG/DL
CHLORIDE SERPL-SCNC: 107 MMOL/L
CO2 SERPL-SCNC: 16 MMOL/L
CREAT SERPL-MCNC: 0.9 MG/DL
CRP SERPL-MCNC: 62.5 MG/L
DIFFERENTIAL METHOD: ABNORMAL
EOSINOPHIL # BLD AUTO: 0.2 K/UL
EOSINOPHIL NFR BLD: 1.3 %
ERYTHROCYTE [DISTWIDTH] IN BLOOD BY AUTOMATED COUNT: 16.7 %
EST. GFR  (AFRICAN AMERICAN): >60 ML/MIN/1.73 M^2
EST. GFR  (NON AFRICAN AMERICAN): >60 ML/MIN/1.73 M^2
GLUCOSE SERPL-MCNC: 83 MG/DL
HCT VFR BLD AUTO: 36.6 %
HGB BLD-MCNC: 10.8 G/DL
HYPOCHROMIA BLD QL SMEAR: ABNORMAL
IMM GRANULOCYTES # BLD AUTO: 0.25 K/UL
IMM GRANULOCYTES NFR BLD AUTO: 1.8 %
LYMPHOCYTES # BLD AUTO: 2.1 K/UL
LYMPHOCYTES NFR BLD: 14.8 %
MAGNESIUM SERPL-MCNC: 1.7 MG/DL
MCH RBC QN AUTO: 29.7 PG
MCHC RBC AUTO-ENTMCNC: 29.5 G/DL
MCV RBC AUTO: 101 FL
MONOCYTES # BLD AUTO: 0.8 K/UL
MONOCYTES NFR BLD: 5.9 %
NEUTROPHILS # BLD AUTO: 10.8 K/UL
NEUTROPHILS NFR BLD: 75.6 %
NRBC BLD-RTO: 0 /100 WBC
PHOSPHATE SERPL-MCNC: 3.5 MG/DL
PLATELET # BLD AUTO: 321 K/UL
PLATELET BLD QL SMEAR: ABNORMAL
PMV BLD AUTO: 10.4 FL
POTASSIUM SERPL-SCNC: 4.6 MMOL/L
RBC # BLD AUTO: 3.64 M/UL
SODIUM SERPL-SCNC: 131 MMOL/L
WBC # BLD AUTO: 14.22 K/UL

## 2019-01-26 PROCEDURE — 83735 ASSAY OF MAGNESIUM: CPT

## 2019-01-26 PROCEDURE — 20600001 HC STEP DOWN PRIVATE ROOM

## 2019-01-26 PROCEDURE — 25000003 PHARM REV CODE 250: Performed by: STUDENT IN AN ORGANIZED HEALTH CARE EDUCATION/TRAINING PROGRAM

## 2019-01-26 PROCEDURE — 86140 C-REACTIVE PROTEIN: CPT

## 2019-01-26 PROCEDURE — 80048 BASIC METABOLIC PNL TOTAL CA: CPT

## 2019-01-26 PROCEDURE — 63600175 PHARM REV CODE 636 W HCPCS: Performed by: STUDENT IN AN ORGANIZED HEALTH CARE EDUCATION/TRAINING PROGRAM

## 2019-01-26 PROCEDURE — 36415 COLL VENOUS BLD VENIPUNCTURE: CPT

## 2019-01-26 PROCEDURE — 63600175 PHARM REV CODE 636 W HCPCS: Performed by: NURSE PRACTITIONER

## 2019-01-26 PROCEDURE — 84100 ASSAY OF PHOSPHORUS: CPT

## 2019-01-26 PROCEDURE — 25000003 PHARM REV CODE 250: Performed by: SURGERY

## 2019-01-26 PROCEDURE — 25000003 PHARM REV CODE 250: Performed by: NURSE PRACTITIONER

## 2019-01-26 PROCEDURE — 85025 COMPLETE CBC W/AUTO DIFF WBC: CPT

## 2019-01-26 RX ORDER — DOXYLAMINE SUCCINATE 25 MG
TABLET ORAL 2 TIMES DAILY
Status: DISCONTINUED | OUTPATIENT
Start: 2019-01-26 | End: 2019-02-01 | Stop reason: HOSPADM

## 2019-01-26 RX ORDER — MICONAZOLE NITRATE 2 %
POWDER (GRAM) TOPICAL 2 TIMES DAILY
Status: DISCONTINUED | OUTPATIENT
Start: 2019-01-26 | End: 2019-02-01 | Stop reason: HOSPADM

## 2019-01-26 RX ORDER — DIPHENOXYLATE HCL/ATROPINE 2.5-.025/5
10 LIQUID (ML) ORAL 4 TIMES DAILY
Status: DISCONTINUED | OUTPATIENT
Start: 2019-01-26 | End: 2019-01-27

## 2019-01-26 RX ADMIN — OXYCODONE AND ACETAMINOPHEN 1 TABLET: 7.5; 325 TABLET ORAL at 10:01

## 2019-01-26 RX ADMIN — MICONAZOLE NITRATE: 20 POWDER TOPICAL at 10:01

## 2019-01-26 RX ADMIN — TOPIRAMATE 50 MG: 25 TABLET, FILM COATED ORAL at 10:01

## 2019-01-26 RX ADMIN — OXYCODONE AND ACETAMINOPHEN 1 TABLET: 7.5; 325 TABLET ORAL at 01:01

## 2019-01-26 RX ADMIN — PANTOPRAZOLE SODIUM 40 MG: 40 TABLET, DELAYED RELEASE ORAL at 10:01

## 2019-01-26 RX ADMIN — LOPERAMIDE HYDROCHLORIDE 4 MG: 1 SOLUTION ORAL at 01:01

## 2019-01-26 RX ADMIN — PANTOPRAZOLE SODIUM 40 MG: 40 TABLET, DELAYED RELEASE ORAL at 08:01

## 2019-01-26 RX ADMIN — MICONAZOLE NITRATE: 20 CREAM TOPICAL at 01:01

## 2019-01-26 RX ADMIN — OXYCODONE AND ACETAMINOPHEN 1 TABLET: 7.5; 325 TABLET ORAL at 11:01

## 2019-01-26 RX ADMIN — LOPERAMIDE HYDROCHLORIDE 4 MG: 2 CAPSULE ORAL at 08:01

## 2019-01-26 RX ADMIN — HEPARIN SODIUM 5000 UNITS: 5000 INJECTION, SOLUTION INTRAVENOUS; SUBCUTANEOUS at 05:01

## 2019-01-26 RX ADMIN — MICONAZOLE NITRATE: 20 CREAM TOPICAL at 08:01

## 2019-01-26 RX ADMIN — DIPHENOXYLATE HYDROCHLORIDE AND ATROPINE SULFATE 10 ML: 2.5; .025 SOLUTION ORAL at 01:01

## 2019-01-26 RX ADMIN — OXYCODONE AND ACETAMINOPHEN 1 TABLET: 7.5; 325 TABLET ORAL at 03:01

## 2019-01-26 RX ADMIN — MICONAZOLE NITRATE: 20 CREAM TOPICAL at 10:01

## 2019-01-26 RX ADMIN — FLUOXETINE HYDROCHLORIDE 60 MG: 20 CAPSULE ORAL at 08:01

## 2019-01-26 RX ADMIN — DIPHENOXYLATE HYDROCHLORIDE AND ATROPINE SULFATE 10 ML: 2.5; .025 SOLUTION ORAL at 05:01

## 2019-01-26 RX ADMIN — OXYCODONE AND ACETAMINOPHEN 1 TABLET: 7.5; 325 TABLET ORAL at 05:01

## 2019-01-26 RX ADMIN — LOPERAMIDE HYDROCHLORIDE 4 MG: 1 SOLUTION ORAL at 05:01

## 2019-01-26 RX ADMIN — HEPARIN SODIUM 5000 UNITS: 5000 INJECTION, SOLUTION INTRAVENOUS; SUBCUTANEOUS at 10:01

## 2019-01-26 RX ADMIN — HEPARIN SODIUM 5000 UNITS: 5000 INJECTION, SOLUTION INTRAVENOUS; SUBCUTANEOUS at 01:01

## 2019-01-26 RX ADMIN — DRONABINOL 2.5 MG: 2.5 CAPSULE ORAL at 08:01

## 2019-01-26 RX ADMIN — DRONABINOL 2.5 MG: 2.5 CAPSULE ORAL at 10:01

## 2019-01-26 RX ADMIN — DIPHENOXYLATE HYDROCHLORIDE AND ATROPINE SULFATE 10 ML: 2.5; .025 SOLUTION ORAL at 10:01

## 2019-01-26 RX ADMIN — MICONAZOLE NITRATE: 20 POWDER TOPICAL at 11:01

## 2019-01-26 NOTE — PROGRESS NOTES
[facility]  Colorectal Surgery  Progress Note    Patient Name: Candida Cardona  MRN: [id number]  Admission Date: 1/15/2019  Hospital Length of Stay: 11 days  Attending Physician: Reji Peterson MD    Subjective:     Interval History: Continued multiple BMs, recorded as 19, self-reporting some improvement after increase in lomotil and loperamide   Post-Op Info:  Procedure(s):  ANASTOMOSIS, ILEORECTAL  CLOSURE, ILEOSTOMY  URETEROLYSIS  LAPAROTOMY, EXPLORATORY;extensive lysis of adhesions   11 Days Post-Op      Medications:  Continuous Infusions:    Scheduled Meds:   diphenoxylate-atropine 2.5-0.025 mg/5 ml  10 mL Oral QID    dronabinol  2.5 mg Oral BID    FLUoxetine  60 mg Oral Daily    heparin (porcine)  5,000 Units Subcutaneous Q8H    loperamide  4 mg Oral QID    miconazole   Topical (Top) BID    pantoprazole  40 mg Oral BID    topiramate  50 mg Oral QHS     PRN Meds:   cyclobenzaprine    HYDROmorphone    hydrOXYzine pamoate    LORazepam    mupirocin    naloxone    ondansetron    oxyCODONE-acetaminophen        Objective:     Vital Signs (Most Recent):  Temp: 97.8 °F (36.6 °C) (01/26/19 0419)  Pulse: 77 (01/26/19 0419)  Resp: 18 (01/26/19 0419)  BP: 97/61 (01/26/19 0419)  SpO2: 100 % (01/26/19 0419) Vital Signs (24h Range):  Temp:  [97.4 °F (36.3 °C)-98.4 °F (36.9 °C)] 97.8 °F (36.6 °C)  Pulse:  [76-80] 77  Resp:  [16-18] 18  SpO2:  [92 %-100 %] 100 %  BP: ()/(56-64) 97/61     Intake/Output - Last 3 Shifts       01/24 0700 - 01/25 0659 01/25 0700 - 01/26 0659    P.O. 1900 2440    I.V. (mL/kg) 1908.8 (27.2) 421.3 (6)    IV Piggyback 1000     Total Intake(mL/kg) 4808.8 (68.4) 2861.3 (40.7)    Urine (mL/kg/hr) 0 (0)     Drains 5     Stool 0     Total Output 5     Net +4803.8 +2861.3          Urine Occurrence 6 x 10 x    Stool Occurrence 12 x 19 x          Physical Exam   Constitutional: She is oriented to person, place, and time. She appears well-developed and  well-nourished. No distress.   HENT:   Head: Normocephalic.   Eyes: Pupils are equal, round, and reactive to light.   Cardiovascular: Normal rate, regular rhythm and normal heart sounds.   Pulmonary/Chest: Effort normal and breath sounds normal. No respiratory distress. She has no wheezes. She has no rales.   Abdominal: Soft. She exhibits no distension and no mass. There is no tenderness. There is no rebound and no guarding.   Former ostomy site healing well  Inc line erythema improving   Neurological: She is alert and oriented to person, place, and time.   Skin: Skin is warm and dry.   Psychiatric: Her behavior is normal. Judgment and thought content normal.   Nursing note and vitals reviewed.      Significant Labs:  BMP (Last 3 Results):   Recent Labs   Lab 01/23/19 0414 01/24/19  0511 01/25/19  0349 01/25/19  1209   GLU 84 88 86 81   * 136 136 133*   K 3.8 4.5 5.4* 4.5    107 109 108   CO2 21* 21* 17* 19*   BUN 13 8 9 8   CREATININE 0.8 0.8 1.1 0.8   CALCIUM 8.3* 8.0* 8.7 8.1*   MG 1.4* 1.7 1.7  --      CBC (Last 3 Results):   Recent Labs   Lab 01/23/19 0414 01/24/19  0511 01/25/19  0349   WBC 11.20 10.89 14.00*   RBC 3.30* 3.40* 3.55*   HGB 9.6* 10.0* 10.4*   HCT 32.4* 30.2* 33.0*    190 225   MCV 98 89 93   MCH 29.1 29.4 29.3   MCHC 29.6* 33.1 31.5*       Significant Diagnostics:  None    Assessment/Plan:     Unspecified mood (affective) disorder    Cont home meds  Appreciate psy recommendations, will follow     Hypophosphatemia    Monitor labs  repalce phos     Hypophosphatasia    Replace phos  Monitor labs     Hypomagnesemia    Replace Mag  Monitor labs     Crohn's disease of small and large intestines    Ileostomy closure 1/15, post op TAC for Crohns.      - on lomotil and loperamide   - c diff neg   -LRD  - oral pain meds with dilaudid for breakthrough.   -Cr has normalized, cont MIVFs    -enc amb and IS  -ivan hose and SCD, ppx hep  -PPI  -enc basis hygeine  -PT/OT for mobilization      Malnutrition    Adv diet as tolerated  Monitor labs  Enc diet  Begin marinol  psy consult as requested           Paola Carballo MD  Colorectal Surgery  [facility]

## 2019-01-26 NOTE — PLAN OF CARE
Problem: Adult Inpatient Plan of Care  Goal: Plan of Care Review  Outcome: Ongoing (interventions implemented as appropriate)  POC reviewed with pt who verbalized understanding. AAOx4. VSS. Remains free of falls and injury. Cdiff negative - removed from contact per MD. Old ostomy site dressing C/D/I. Old THAI side C/D/I. ML with DB. Up independently to bathroom; multiple BMs this shift. Rash under left breast noticed - Miconazole ordered. Tolerating low fiber/res diet; no complaints of nausea. Pain managed with PRN med per MAR. Pt showered with Hibiclens this shift. Resting between care. No acute events. No distress noted. Will continue to monitor.

## 2019-01-26 NOTE — ASSESSMENT & PLAN NOTE
Ileostomy closure 1/15, post op TAC for Crohns.      - on lomotil and loperamide   - c diff neg   -LRD  - oral pain meds with dilaudid for breakthrough.   -Cr has normalized, cont MIVFs    -enc amb and IS  -ivan hose and SCD, ppx hep  -PPI  -enc basis hygeine  -PT/OT for mobilization

## 2019-01-26 NOTE — SUBJECTIVE & OBJECTIVE
Subjective:     Interval History: Continued multiple BMs, recorded as 19, self-reporting some improvement after increase in lomotil and loperamide   Post-Op Info:  Procedure(s):  ANASTOMOSIS, ILEORECTAL  CLOSURE, ILEOSTOMY  URETEROLYSIS  LAPAROTOMY, EXPLORATORY;extensive lysis of adhesions   11 Days Post-Op      Medications:  Continuous Infusions:    Scheduled Meds:   diphenoxylate-atropine 2.5-0.025 mg/5 ml  10 mL Oral QID    dronabinol  2.5 mg Oral BID    FLUoxetine  60 mg Oral Daily    heparin (porcine)  5,000 Units Subcutaneous Q8H    loperamide  4 mg Oral QID    miconazole   Topical (Top) BID    pantoprazole  40 mg Oral BID    topiramate  50 mg Oral QHS     PRN Meds:   cyclobenzaprine    HYDROmorphone    hydrOXYzine pamoate    LORazepam    mupirocin    naloxone    ondansetron    oxyCODONE-acetaminophen        Objective:     Vital Signs (Most Recent):  Temp: 97.8 °F (36.6 °C) (01/26/19 0419)  Pulse: 77 (01/26/19 0419)  Resp: 18 (01/26/19 0419)  BP: 97/61 (01/26/19 0419)  SpO2: 100 % (01/26/19 0419) Vital Signs (24h Range):  Temp:  [97.4 °F (36.3 °C)-98.4 °F (36.9 °C)] 97.8 °F (36.6 °C)  Pulse:  [76-80] 77  Resp:  [16-18] 18  SpO2:  [92 %-100 %] 100 %  BP: ()/(56-64) 97/61     Intake/Output - Last 3 Shifts       01/24 0700 - 01/25 0659 01/25 0700 - 01/26 0659    P.O. 1900 2440    I.V. (mL/kg) 1908.8 (27.2) 421.3 (6)    IV Piggyback 1000     Total Intake(mL/kg) 4808.8 (68.4) 2861.3 (40.7)    Urine (mL/kg/hr) 0 (0)     Drains 5     Stool 0     Total Output 5     Net +4803.8 +2861.3          Urine Occurrence 6 x 10 x    Stool Occurrence 12 x 19 x          Physical Exam   Constitutional: She is oriented to person, place, and time. She appears well-developed and well-nourished. No distress.   HENT:   Head: Normocephalic.   Eyes: Pupils are equal, round, and reactive to light.   Cardiovascular: Normal rate, regular rhythm and normal heart sounds.   Pulmonary/Chest: Effort normal and breath  sounds normal. No respiratory distress. She has no wheezes. She has no rales.   Abdominal: Soft. She exhibits no distension and no mass. There is no tenderness. There is no rebound and no guarding.   Former ostomy site healing well  Inc line erythema improving   Neurological: She is alert and oriented to person, place, and time.   Skin: Skin is warm and dry.   Psychiatric: Her behavior is normal. Judgment and thought content normal.   Nursing note and vitals reviewed.      Significant Labs:  BMP (Last 3 Results):   Recent Labs   Lab 01/23/19  0414 01/24/19  0511 01/25/19  0349 01/25/19  1209   GLU 84 88 86 81   * 136 136 133*   K 3.8 4.5 5.4* 4.5    107 109 108   CO2 21* 21* 17* 19*   BUN 13 8 9 8   CREATININE 0.8 0.8 1.1 0.8   CALCIUM 8.3* 8.0* 8.7 8.1*   MG 1.4* 1.7 1.7  --      CBC (Last 3 Results):   Recent Labs   Lab 01/23/19  0414 01/24/19  0511 01/25/19  0349   WBC 11.20 10.89 14.00*   RBC 3.30* 3.40* 3.55*   HGB 9.6* 10.0* 10.4*   HCT 32.4* 30.2* 33.0*    190 225   MCV 98 89 93   MCH 29.1 29.4 29.3   MCHC 29.6* 33.1 31.5*       Significant Diagnostics:  None

## 2019-01-26 NOTE — PROGRESS NOTES
Patient took a shower.  Linen changed, gown, socks and underwear changed, abd dressings changed.     Pt showed RN that she noticed her left breast was swollen and red underneath, she said it smelled very bad in the shower. Patient cleaned and dried the area well. MD on call notified and he will be at the bedside soon to assess.   Will continue to monitor.

## 2019-01-26 NOTE — PLAN OF CARE
Problem: Adult Inpatient Plan of Care  Goal: Plan of Care Review  Outcome: Ongoing (interventions implemented as appropriate)  POC reviewed with pt, all questions and concerns addressed. VSS on RA. Tolerating low fiber diet with no NVD. Up frequently to toilet with bms, adequate UOP. Midline incision draining serous drainage, pt does not show much interest in keeping surrounding skin clean. Pain managed with PRN percocet. No adverse events this shift. Pt resting with call light in reach, will continue to monitor.

## 2019-01-27 LAB
ANION GAP SERPL CALC-SCNC: 10 MMOL/L
BASOPHILS # BLD AUTO: 0.06 K/UL
BASOPHILS NFR BLD: 0.5 %
BUN SERPL-MCNC: 10 MG/DL
CALCIUM SERPL-MCNC: 8.3 MG/DL
CHLORIDE SERPL-SCNC: 106 MMOL/L
CO2 SERPL-SCNC: 18 MMOL/L
CREAT SERPL-MCNC: 0.9 MG/DL
CRP SERPL-MCNC: 57.2 MG/L
DIFFERENTIAL METHOD: ABNORMAL
EOSINOPHIL # BLD AUTO: 0.1 K/UL
EOSINOPHIL NFR BLD: 0.8 %
ERYTHROCYTE [DISTWIDTH] IN BLOOD BY AUTOMATED COUNT: 16.5 %
EST. GFR  (AFRICAN AMERICAN): >60 ML/MIN/1.73 M^2
EST. GFR  (NON AFRICAN AMERICAN): >60 ML/MIN/1.73 M^2
GLUCOSE SERPL-MCNC: 91 MG/DL
HCT VFR BLD AUTO: 35 %
HGB BLD-MCNC: 10.6 G/DL
IMM GRANULOCYTES # BLD AUTO: 0.13 K/UL
IMM GRANULOCYTES NFR BLD AUTO: 1 %
LYMPHOCYTES # BLD AUTO: 2.2 K/UL
LYMPHOCYTES NFR BLD: 16.5 %
MAGNESIUM SERPL-MCNC: 1.2 MG/DL
MCH RBC QN AUTO: 29.3 PG
MCHC RBC AUTO-ENTMCNC: 30.3 G/DL
MCV RBC AUTO: 97 FL
MONOCYTES # BLD AUTO: 0.8 K/UL
MONOCYTES NFR BLD: 5.8 %
NEUTROPHILS # BLD AUTO: 9.8 K/UL
NEUTROPHILS NFR BLD: 75.4 %
NRBC BLD-RTO: 0 /100 WBC
PHOSPHATE SERPL-MCNC: 3.7 MG/DL
PLATELET # BLD AUTO: 306 K/UL
PMV BLD AUTO: 10.1 FL
POTASSIUM SERPL-SCNC: 4 MMOL/L
RBC # BLD AUTO: 3.62 M/UL
SODIUM SERPL-SCNC: 134 MMOL/L
WBC # BLD AUTO: 13.01 K/UL

## 2019-01-27 PROCEDURE — 85025 COMPLETE CBC W/AUTO DIFF WBC: CPT

## 2019-01-27 PROCEDURE — 25000003 PHARM REV CODE 250: Performed by: STUDENT IN AN ORGANIZED HEALTH CARE EDUCATION/TRAINING PROGRAM

## 2019-01-27 PROCEDURE — 83735 ASSAY OF MAGNESIUM: CPT

## 2019-01-27 PROCEDURE — 25000003 PHARM REV CODE 250: Performed by: SURGERY

## 2019-01-27 PROCEDURE — 86140 C-REACTIVE PROTEIN: CPT

## 2019-01-27 PROCEDURE — 84100 ASSAY OF PHOSPHORUS: CPT

## 2019-01-27 PROCEDURE — 63600175 PHARM REV CODE 636 W HCPCS: Performed by: STUDENT IN AN ORGANIZED HEALTH CARE EDUCATION/TRAINING PROGRAM

## 2019-01-27 PROCEDURE — 25000003 PHARM REV CODE 250: Performed by: COLON & RECTAL SURGERY

## 2019-01-27 PROCEDURE — 20600001 HC STEP DOWN PRIVATE ROOM

## 2019-01-27 PROCEDURE — 63600175 PHARM REV CODE 636 W HCPCS: Performed by: NURSE PRACTITIONER

## 2019-01-27 PROCEDURE — 36415 COLL VENOUS BLD VENIPUNCTURE: CPT

## 2019-01-27 PROCEDURE — 63600175 PHARM REV CODE 636 W HCPCS: Performed by: SURGERY

## 2019-01-27 PROCEDURE — 80048 BASIC METABOLIC PNL TOTAL CA: CPT

## 2019-01-27 RX ORDER — DIPHENOXYLATE HCL/ATROPINE 2.5-.025/5
15 LIQUID (ML) ORAL 4 TIMES DAILY
Status: DISCONTINUED | OUTPATIENT
Start: 2019-01-27 | End: 2019-01-27 | Stop reason: DRUGHIGH

## 2019-01-27 RX ORDER — HYOSCYAMINE SULFATE 0.12 MG/1
0.12 TABLET SUBLINGUAL EVERY 6 HOURS PRN
Status: DISCONTINUED | OUTPATIENT
Start: 2019-01-27 | End: 2019-02-01 | Stop reason: HOSPADM

## 2019-01-27 RX ORDER — DIPHENOXYLATE HCL/ATROPINE 2.5-.025/5
10 LIQUID (ML) ORAL 4 TIMES DAILY
Status: DISCONTINUED | OUTPATIENT
Start: 2019-01-27 | End: 2019-01-29

## 2019-01-27 RX ADMIN — DIPHENOXYLATE HYDROCHLORIDE AND ATROPINE SULFATE 10 ML: 2.5; .025 SOLUTION ORAL at 09:01

## 2019-01-27 RX ADMIN — PSYLLIUM HUSK 2 PACKET: 3.4 POWDER ORAL at 09:01

## 2019-01-27 RX ADMIN — MICONAZOLE NITRATE: 20 POWDER TOPICAL at 09:01

## 2019-01-27 RX ADMIN — DIPHENOXYLATE HYDROCHLORIDE AND ATROPINE SULFATE 10 ML: 2.5; .025 SOLUTION ORAL at 05:01

## 2019-01-27 RX ADMIN — LOPERAMIDE HYDROCHLORIDE 4 MG: 1 SOLUTION ORAL at 10:01

## 2019-01-27 RX ADMIN — DRONABINOL 2.5 MG: 2.5 CAPSULE ORAL at 09:01

## 2019-01-27 RX ADMIN — HYOSCYAMINE SULFATE 0.12 MG: 0.12 TABLET ORAL; SUBLINGUAL at 09:01

## 2019-01-27 RX ADMIN — LOPERAMIDE HYDROCHLORIDE 4 MG: 1 SOLUTION ORAL at 12:01

## 2019-01-27 RX ADMIN — OXYCODONE AND ACETAMINOPHEN 1 TABLET: 7.5; 325 TABLET ORAL at 05:01

## 2019-01-27 RX ADMIN — PANTOPRAZOLE SODIUM 40 MG: 40 TABLET, DELAYED RELEASE ORAL at 09:01

## 2019-01-27 RX ADMIN — HEPARIN SODIUM 5000 UNITS: 5000 INJECTION, SOLUTION INTRAVENOUS; SUBCUTANEOUS at 05:01

## 2019-01-27 RX ADMIN — OXYCODONE AND ACETAMINOPHEN 1 TABLET: 7.5; 325 TABLET ORAL at 07:01

## 2019-01-27 RX ADMIN — HEPARIN SODIUM 5000 UNITS: 5000 INJECTION, SOLUTION INTRAVENOUS; SUBCUTANEOUS at 02:01

## 2019-01-27 RX ADMIN — HYOSCYAMINE SULFATE 0.12 MG: 0.12 TABLET ORAL; SUBLINGUAL at 07:01

## 2019-01-27 RX ADMIN — HEPARIN SODIUM 5000 UNITS: 5000 INJECTION, SOLUTION INTRAVENOUS; SUBCUTANEOUS at 10:01

## 2019-01-27 RX ADMIN — LOPERAMIDE HYDROCHLORIDE 4 MG: 1 SOLUTION ORAL at 02:01

## 2019-01-27 RX ADMIN — MICONAZOLE NITRATE: 20 CREAM TOPICAL at 09:01

## 2019-01-27 RX ADMIN — FLUOXETINE HYDROCHLORIDE 60 MG: 20 CAPSULE ORAL at 09:01

## 2019-01-27 RX ADMIN — TOPIRAMATE 50 MG: 25 TABLET, FILM COATED ORAL at 09:01

## 2019-01-27 RX ADMIN — OXYCODONE AND ACETAMINOPHEN 1 TABLET: 7.5; 325 TABLET ORAL at 09:01

## 2019-01-27 RX ADMIN — DIPHENOXYLATE HYDROCHLORIDE AND ATROPINE SULFATE 10 ML: 2.5; .025 SOLUTION ORAL at 12:01

## 2019-01-27 RX ADMIN — PSYLLIUM HUSK 2 PACKET: 3.4 POWDER ORAL at 02:01

## 2019-01-27 RX ADMIN — OXYCODONE AND ACETAMINOPHEN 1 TABLET: 7.5; 325 TABLET ORAL at 01:01

## 2019-01-27 NOTE — PROGRESS NOTES
[facility]  Colorectal Surgery  Progress Note    Patient Name: Candida Cardona  MRN: [id number]  Admission Date: 1/15/2019  Hospital Length of Stay: 12 days  Attending Physician: Reji Peterson MD    Subjective:     Interval History: No acute events, continued cramping pains with diarrhea, didn't receive orange breeze, asking for regular diet, recorded 16 BM     Post-Op Info:  Procedure(s):  ANASTOMOSIS, ILEORECTAL  CLOSURE, ILEOSTOMY  URETEROLYSIS  LAPAROTOMY, EXPLORATORY;extensive lysis of adhesions   12 Days Post-Op      Medications:  Continuous Infusions:    Scheduled Meds:   diphenoxylate-atropine 2.5-0.025 mg/5 ml  10 mL Oral QID    dronabinol  2.5 mg Oral BID    FLUoxetine  60 mg Oral Daily    heparin (porcine)  5,000 Units Subcutaneous Q8H    loperamide  4 mg Oral QID    miconazole   Topical (Top) BID    miconazole NITRATE 2 %   Topical (Top) BID    pantoprazole  40 mg Oral BID    topiramate  50 mg Oral QHS     PRN Meds:   cyclobenzaprine    HYDROmorphone    hydrOXYzine pamoate    hyoscyamine    LORazepam    mupirocin    naloxone    ondansetron    oxyCODONE-acetaminophen        Objective:     Vital Signs (Most Recent):  Temp: 97.8 °F (36.6 °C) (01/27/19 0355)  Pulse: 75 (01/27/19 0355)  Resp: 18 (01/27/19 0355)  BP: 99/60 (01/27/19 0355)  SpO2: 99 % (01/27/19 0355) Vital Signs (24h Range):  Temp:  [97.4 °F (36.3 °C)-98.7 °F (37.1 °C)] 97.8 °F (36.6 °C)  Pulse:  [67-87] 75  Resp:  [10-20] 18  SpO2:  [99 %-100 %] 99 %  BP: ()/(60-67) 99/60     Intake/Output - Last 3 Shifts       01/25 0700 - 01/26 0659 01/26 0700 - 01/27 0659 01/27 0700 - 01/28 0659    P.O. 2440 865     I.V. (mL/kg) 421.3 (6)      IV Piggyback       Total Intake(mL/kg) 2861.3 (40.7) 865 (12.3)     Urine (mL/kg/hr)       Drains       Stool       Total Output       Net +2861.3 +865            Urine Occurrence 10 x 11 x     Stool Occurrence 19 x 16 x           Physical Exam   Constitutional: She is  oriented to person, place, and time. She appears well-developed and well-nourished. No distress.   HENT:   Head: Normocephalic.   Eyes: Pupils are equal, round, and reactive to light.   Cardiovascular: Normal rate, regular rhythm and normal heart sounds.   Pulmonary/Chest: Effort normal and breath sounds normal. No respiratory distress. She has no wheezes. She has no rales.   Abdominal: Soft. She exhibits no distension and no mass. There is no tenderness. There is no rebound and no guarding.   Former ostomy site healing well  Inc line erythema improving  Able to tolerate palpation    Neurological: She is alert and oriented to person, place, and time.   Skin: Skin is warm and dry.   Psychiatric: Her behavior is normal. Judgment and thought content normal.   Nursing note and vitals reviewed.      Significant Labs:  BMP (Last 3 Results):   Recent Labs   Lab 01/24/19  0511 01/25/19  0349 01/25/19  1209 01/26/19  0425   GLU 88 86 81 83    136 133* 131*   K 4.5 5.4* 4.5 4.6    109 108 107   CO2 21* 17* 19* 16*   BUN 8 9 8 9   CREATININE 0.8 1.1 0.8 0.9   CALCIUM 8.0* 8.7 8.1* 8.4*   MG 1.7 1.7  --  1.7     CBC (Last 3 Results):   Recent Labs   Lab 01/24/19  0511 01/25/19  0349 01/26/19  0425   WBC 10.89 14.00* 14.22*   RBC 3.40* 3.55* 3.64*   HGB 10.0* 10.4* 10.8*   HCT 30.2* 33.0* 36.6*    225 321   MCV 89 93 101*   MCH 29.4 29.3 29.7   MCHC 33.1 31.5* 29.5*       Significant Diagnostics:  None    Assessment/Plan:     Unspecified mood (affective) disorder    Cont home meds  Appreciate psy recommendations, will follow     Hypophosphatemia    Monitor labs  repalce phos     Hypophosphatasia    Replace phos  Monitor labs     Hypomagnesemia    Replace Mag  Monitor labs     Crohn's disease of small and large intestines    Ileostomy closure 1/15, post op TAC for Crohns.      - on lomotil and loperamide, added levsin  - c diff neg   -reg diet   - oral pain meds with dilaudid for breakthrough.   -Cr has  normalized, cont MIVFs    -enc amb and IS  -ivan hose and SCD, ppx hep  -PPI  -enc basis hygeine  -PT/OT for mobilization     Malnutrition    Adv diet as tolerated  Monitor labs  Enc diet  Begin marinol  psy consult as requested           Paola Carballo MD  Colorectal Surgery  [facility]

## 2019-01-27 NOTE — SUBJECTIVE & OBJECTIVE
Subjective:     Interval History: No acute events, continued cramping pains with diarrhea, didn't receive orange breeze, asking for regular diet, recorded 16 BM     Post-Op Info:  Procedure(s):  ANASTOMOSIS, ILEORECTAL  CLOSURE, ILEOSTOMY  URETEROLYSIS  LAPAROTOMY, EXPLORATORY;extensive lysis of adhesions   12 Days Post-Op      Medications:  Continuous Infusions:    Scheduled Meds:   diphenoxylate-atropine 2.5-0.025 mg/5 ml  10 mL Oral QID    dronabinol  2.5 mg Oral BID    FLUoxetine  60 mg Oral Daily    heparin (porcine)  5,000 Units Subcutaneous Q8H    loperamide  4 mg Oral QID    miconazole   Topical (Top) BID    miconazole NITRATE 2 %   Topical (Top) BID    pantoprazole  40 mg Oral BID    topiramate  50 mg Oral QHS     PRN Meds:   cyclobenzaprine    HYDROmorphone    hydrOXYzine pamoate    hyoscyamine    LORazepam    mupirocin    naloxone    ondansetron    oxyCODONE-acetaminophen        Objective:     Vital Signs (Most Recent):  Temp: 97.8 °F (36.6 °C) (01/27/19 0355)  Pulse: 75 (01/27/19 0355)  Resp: 18 (01/27/19 0355)  BP: 99/60 (01/27/19 0355)  SpO2: 99 % (01/27/19 0355) Vital Signs (24h Range):  Temp:  [97.4 °F (36.3 °C)-98.7 °F (37.1 °C)] 97.8 °F (36.6 °C)  Pulse:  [67-87] 75  Resp:  [10-20] 18  SpO2:  [99 %-100 %] 99 %  BP: ()/(60-67) 99/60     Intake/Output - Last 3 Shifts       01/25 0700 - 01/26 0659 01/26 0700 - 01/27 0659 01/27 0700 - 01/28 0659    P.O. 2440 865     I.V. (mL/kg) 421.3 (6)      IV Piggyback       Total Intake(mL/kg) 2861.3 (40.7) 865 (12.3)     Urine (mL/kg/hr)       Drains       Stool       Total Output       Net +2861.3 +865            Urine Occurrence 10 x 11 x     Stool Occurrence 19 x 16 x           Physical Exam   Constitutional: She is oriented to person, place, and time. She appears well-developed and well-nourished. No distress.   HENT:   Head: Normocephalic.   Eyes: Pupils are equal, round, and reactive to light.   Cardiovascular: Normal rate, regular  rhythm and normal heart sounds.   Pulmonary/Chest: Effort normal and breath sounds normal. No respiratory distress. She has no wheezes. She has no rales.   Abdominal: Soft. She exhibits no distension and no mass. There is no tenderness. There is no rebound and no guarding.   Former ostomy site healing well  Inc line erythema improving  Able to tolerate palpation    Neurological: She is alert and oriented to person, place, and time.   Skin: Skin is warm and dry.   Psychiatric: Her behavior is normal. Judgment and thought content normal.   Nursing note and vitals reviewed.      Significant Labs:  BMP (Last 3 Results):   Recent Labs   Lab 01/24/19  0511 01/25/19  0349 01/25/19  1209 01/26/19  0425   GLU 88 86 81 83    136 133* 131*   K 4.5 5.4* 4.5 4.6    109 108 107   CO2 21* 17* 19* 16*   BUN 8 9 8 9   CREATININE 0.8 1.1 0.8 0.9   CALCIUM 8.0* 8.7 8.1* 8.4*   MG 1.7 1.7  --  1.7     CBC (Last 3 Results):   Recent Labs   Lab 01/24/19  0511 01/25/19  0349 01/26/19  0425   WBC 10.89 14.00* 14.22*   RBC 3.40* 3.55* 3.64*   HGB 10.0* 10.4* 10.8*   HCT 30.2* 33.0* 36.6*    225 321   MCV 89 93 101*   MCH 29.4 29.3 29.7   MCHC 33.1 31.5* 29.5*       Significant Diagnostics:  None

## 2019-01-27 NOTE — PLAN OF CARE
Problem: Adult Inpatient Plan of Care  Goal: Plan of Care Review  Outcome: Ongoing (interventions implemented as appropriate)  POC reviewed with pt. VSS on RA. Advanced to regular diet, pt tolerating well. Up to toilet ad shamika with frequent episodes of diarrhea. Refuses IV site change because she does not want to be stuck again. PRN percocet given for pain. No adverse events. Will continue to monitor.

## 2019-01-27 NOTE — ASSESSMENT & PLAN NOTE
Ileostomy closure 1/15, post op TAC for Crohns.      - on lomotil and loperamide, added levsin  - c diff neg   -reg diet   - oral pain meds with dilaudid for breakthrough.   -Cr has normalized, cont MIVFs    -enc amb and IS  -ivan hose and SCD, ppx hep  -PPI  -enc basis hygeine  -PT/OT for mobilization

## 2019-01-27 NOTE — PLAN OF CARE
Problem: Adult Inpatient Plan of Care  Goal: Plan of Care Review  Outcome: Ongoing (interventions implemented as appropriate)  POC reviewed and understood by pt. AAOx4. Pt complains of frequent diarrhea throughout shift. Pt ambulates to the bathRM independently. Low fiber/residue diet. Pain managed by PRN pain meds per MD order. VSS on RM air. Pt refusing IV change throughout shift. No acute events at this time. Call bell within reach. Bed in lowest position. WCTM.

## 2019-01-28 PROBLEM — E44.0 MODERATE MALNUTRITION: Status: ACTIVE | Noted: 2019-01-28

## 2019-01-28 LAB
ANION GAP SERPL CALC-SCNC: 9 MMOL/L
BASOPHILS # BLD AUTO: 0.05 K/UL
BASOPHILS NFR BLD: 0.4 %
BUN SERPL-MCNC: 7 MG/DL
CALCIUM SERPL-MCNC: 8.5 MG/DL
CHLORIDE SERPL-SCNC: 105 MMOL/L
CO2 SERPL-SCNC: 19 MMOL/L
CREAT SERPL-MCNC: 0.9 MG/DL
CRP SERPL-MCNC: 50.2 MG/L
DIFFERENTIAL METHOD: ABNORMAL
EOSINOPHIL # BLD AUTO: 0.1 K/UL
EOSINOPHIL NFR BLD: 0.8 %
ERYTHROCYTE [DISTWIDTH] IN BLOOD BY AUTOMATED COUNT: 16.3 %
EST. GFR  (AFRICAN AMERICAN): >60 ML/MIN/1.73 M^2
EST. GFR  (NON AFRICAN AMERICAN): >60 ML/MIN/1.73 M^2
GLUCOSE SERPL-MCNC: 87 MG/DL
HCT VFR BLD AUTO: 33.5 %
HGB BLD-MCNC: 10.1 G/DL
IMM GRANULOCYTES # BLD AUTO: 0.15 K/UL
IMM GRANULOCYTES NFR BLD AUTO: 1.3 %
LYMPHOCYTES # BLD AUTO: 1.9 K/UL
LYMPHOCYTES NFR BLD: 16.8 %
MAGNESIUM SERPL-MCNC: 1 MG/DL
MCH RBC QN AUTO: 28.8 PG
MCHC RBC AUTO-ENTMCNC: 30.1 G/DL
MCV RBC AUTO: 95 FL
MONOCYTES # BLD AUTO: 0.8 K/UL
MONOCYTES NFR BLD: 7.1 %
NEUTROPHILS # BLD AUTO: 8.3 K/UL
NEUTROPHILS NFR BLD: 73.6 %
NRBC BLD-RTO: 0 /100 WBC
PHOSPHATE SERPL-MCNC: 3.7 MG/DL
PLATELET # BLD AUTO: 335 K/UL
PMV BLD AUTO: 10.6 FL
POTASSIUM SERPL-SCNC: 3.9 MMOL/L
RBC # BLD AUTO: 3.51 M/UL
SODIUM SERPL-SCNC: 133 MMOL/L
WBC # BLD AUTO: 11.33 K/UL

## 2019-01-28 PROCEDURE — 36415 COLL VENOUS BLD VENIPUNCTURE: CPT

## 2019-01-28 PROCEDURE — 25000003 PHARM REV CODE 250: Performed by: NURSE PRACTITIONER

## 2019-01-28 PROCEDURE — 25000003 PHARM REV CODE 250: Performed by: STUDENT IN AN ORGANIZED HEALTH CARE EDUCATION/TRAINING PROGRAM

## 2019-01-28 PROCEDURE — 63600175 PHARM REV CODE 636 W HCPCS: Performed by: SURGERY

## 2019-01-28 PROCEDURE — 84100 ASSAY OF PHOSPHORUS: CPT

## 2019-01-28 PROCEDURE — 63600175 PHARM REV CODE 636 W HCPCS: Performed by: NURSE PRACTITIONER

## 2019-01-28 PROCEDURE — 80048 BASIC METABOLIC PNL TOTAL CA: CPT

## 2019-01-28 PROCEDURE — 25000003 PHARM REV CODE 250: Performed by: COLON & RECTAL SURGERY

## 2019-01-28 PROCEDURE — 63600175 PHARM REV CODE 636 W HCPCS: Performed by: STUDENT IN AN ORGANIZED HEALTH CARE EDUCATION/TRAINING PROGRAM

## 2019-01-28 PROCEDURE — 85025 COMPLETE CBC W/AUTO DIFF WBC: CPT

## 2019-01-28 PROCEDURE — 86140 C-REACTIVE PROTEIN: CPT

## 2019-01-28 PROCEDURE — 25000003 PHARM REV CODE 250: Performed by: SURGERY

## 2019-01-28 PROCEDURE — 83735 ASSAY OF MAGNESIUM: CPT

## 2019-01-28 PROCEDURE — 51798 US URINE CAPACITY MEASURE: CPT

## 2019-01-28 PROCEDURE — 20600001 HC STEP DOWN PRIVATE ROOM

## 2019-01-28 RX ADMIN — LOPERAMIDE HYDROCHLORIDE 4 MG: 1 SOLUTION ORAL at 08:01

## 2019-01-28 RX ADMIN — FLUOXETINE HYDROCHLORIDE 60 MG: 20 CAPSULE ORAL at 08:01

## 2019-01-28 RX ADMIN — HEPARIN SODIUM 5000 UNITS: 5000 INJECTION, SOLUTION INTRAVENOUS; SUBCUTANEOUS at 01:01

## 2019-01-28 RX ADMIN — SODIUM CHLORIDE 500 ML: 0.9 INJECTION, SOLUTION INTRAVENOUS at 03:01

## 2019-01-28 RX ADMIN — HEPARIN SODIUM 5000 UNITS: 5000 INJECTION, SOLUTION INTRAVENOUS; SUBCUTANEOUS at 10:01

## 2019-01-28 RX ADMIN — OXYCODONE AND ACETAMINOPHEN 1 TABLET: 7.5; 325 TABLET ORAL at 11:01

## 2019-01-28 RX ADMIN — PANTOPRAZOLE SODIUM 40 MG: 40 TABLET, DELAYED RELEASE ORAL at 08:01

## 2019-01-28 RX ADMIN — MICONAZOLE NITRATE: 20 POWDER TOPICAL at 08:01

## 2019-01-28 RX ADMIN — LOPERAMIDE HYDROCHLORIDE 4 MG: 1 SOLUTION ORAL at 05:01

## 2019-01-28 RX ADMIN — MICONAZOLE NITRATE: 20 POWDER TOPICAL at 09:01

## 2019-01-28 RX ADMIN — MICONAZOLE NITRATE: 20 CREAM TOPICAL at 09:01

## 2019-01-28 RX ADMIN — LOPERAMIDE HYDROCHLORIDE 4 MG: 1 SOLUTION ORAL at 01:01

## 2019-01-28 RX ADMIN — DIPHENOXYLATE HYDROCHLORIDE AND ATROPINE SULFATE 10 ML: 2.5; .025 SOLUTION ORAL at 08:01

## 2019-01-28 RX ADMIN — OXYCODONE AND ACETAMINOPHEN 1 TABLET: 7.5; 325 TABLET ORAL at 08:01

## 2019-01-28 RX ADMIN — MICONAZOLE NITRATE: 20 CREAM TOPICAL at 08:01

## 2019-01-28 RX ADMIN — OXYCODONE AND ACETAMINOPHEN 1 TABLET: 7.5; 325 TABLET ORAL at 05:01

## 2019-01-28 RX ADMIN — HEPARIN SODIUM 5000 UNITS: 5000 INJECTION, SOLUTION INTRAVENOUS; SUBCUTANEOUS at 05:01

## 2019-01-28 RX ADMIN — DRONABINOL 2.5 MG: 2.5 CAPSULE ORAL at 08:01

## 2019-01-28 RX ADMIN — DIPHENOXYLATE HYDROCHLORIDE AND ATROPINE SULFATE 10 ML: 2.5; .025 SOLUTION ORAL at 01:01

## 2019-01-28 RX ADMIN — TOPIRAMATE 50 MG: 25 TABLET, FILM COATED ORAL at 08:01

## 2019-01-28 RX ADMIN — DIPHENOXYLATE HYDROCHLORIDE AND ATROPINE SULFATE 10 ML: 2.5; .025 SOLUTION ORAL at 10:01

## 2019-01-28 RX ADMIN — HYOSCYAMINE SULFATE 0.12 MG: 0.12 TABLET ORAL; SUBLINGUAL at 03:01

## 2019-01-28 RX ADMIN — DIPHENOXYLATE HYDROCHLORIDE AND ATROPINE SULFATE 10 ML: 2.5; .025 SOLUTION ORAL at 05:01

## 2019-01-28 RX ADMIN — OXYCODONE AND ACETAMINOPHEN 1 TABLET: 7.5; 325 TABLET ORAL at 03:01

## 2019-01-28 NOTE — PLAN OF CARE
Problem: Adult Inpatient Plan of Care  Goal: Plan of Care Review  Outcome: Ongoing (interventions implemented as appropriate)  POC reviewed and understood by pt. AAOx4. IV is due to be changed- pt states she does not want it changed unless the PICC team come to the bedside to change it. Pain managed by PRN pain meds per MD order. Pt ambulates to the [facility] overnight. Regular diet. No acute events at this time. Call bell within reach. Bed in lowest position. WCTM.

## 2019-01-28 NOTE — SUBJECTIVE & OBJECTIVE
Subjective:     Interval History: no acute events overnite, not motivated to do much, eating a little, approx 10 semi solid stools     Post-Op Info:  Procedure(s):  ANASTOMOSIS, ILEORECTAL  CLOSURE, ILEOSTOMY  URETEROLYSIS  LAPAROTOMY, EXPLORATORY;extensive lysis of adhesions   13 Days Post-Op      Medications:  Continuous Infusions:  Scheduled Meds:   diphenoxylate-atropine 2.5-0.025 mg/5 ml  10 mL Oral QID    dronabinol  2.5 mg Oral BID    FLUoxetine  60 mg Oral Daily    heparin (porcine)  5,000 Units Subcutaneous Q8H    loperamide  4 mg Oral QID    miconazole   Topical (Top) BID    miconazole NITRATE 2 %   Topical (Top) BID    pantoprazole  40 mg Oral BID    psyllium husk (aspartame)  2 packet Oral TID    topiramate  50 mg Oral QHS     PRN Meds:   cyclobenzaprine    HYDROmorphone    hydrOXYzine pamoate    hyoscyamine    LORazepam    mupirocin    naloxone    ondansetron    oxyCODONE-acetaminophen        Objective:     Vital Signs (Most Recent):  Temp: 97.9 °F (36.6 °C) (01/28/19 0711)  Pulse: 87 (01/28/19 0711)  Resp: 10 (01/28/19 0711)  BP: 111/74 (01/28/19 0711)  SpO2: 98 % (01/28/19 0711) Vital Signs (24h Range):  Temp:  [97.5 °F (36.4 °C)-98.2 °F (36.8 °C)] 97.9 °F (36.6 °C)  Pulse:  [66-91] 87  Resp:  [10-20] 10  SpO2:  [98 %-100 %] 98 %  BP: ()/(61-79) 111/74     Intake/Output - Last 3 Shifts       01/26 0700 - 01/27 0659 01/27 0700 - 01/28 0659 01/28 0700 - 01/29 0659    P.O. 865  240    I.V. (mL/kg)       Total Intake(mL/kg) 865 (12.3)  240 (3.4)    Net +865  +240           Urine Occurrence 11 x 10 x     Stool Occurrence 16 x 10 x           Physical Exam   Constitutional: She is oriented to person, place, and time. She appears well-developed and well-nourished.   HENT:   Head: Normocephalic.   Eyes: Pupils are equal, round, and reactive to light.   Cardiovascular: Normal rate, regular rhythm and normal heart sounds.   Pulmonary/Chest: Effort normal and breath sounds normal. No  respiratory distress. She has no wheezes. She has no rales.   Abdominal: Soft. She exhibits no mass. There is no rebound and no guarding.   abd inc line healing well     Neurological: She is alert and oriented to person, place, and time.   Skin: Skin is warm and dry.   Psychiatric: She has a normal mood and affect. Her behavior is normal. Judgment and thought content normal.   Nursing note and vitals reviewed.        Significant Labs:  BMP (Last 3 Results):   Recent Labs   Lab 01/26/19  0425 01/27/19  0612 01/28/19  0518   GLU 83 91 87   * 134* 133*   K 4.6 4.0 3.9    106 105   CO2 16* 18* 19*   BUN 9 10 7   CREATININE 0.9 0.9 0.9   CALCIUM 8.4* 8.3* 8.5*   MG 1.7 1.2* 1.0*     CBC (Last 3 Results):   Recent Labs   Lab 01/26/19  0425 01/27/19  0914 01/28/19  0518   WBC 14.22* 13.01* 11.33   RBC 3.64* 3.62* 3.51*   HGB 10.8* 10.6* 10.1*   HCT 36.6* 35.0* 33.5*    306 335   * 97 95   MCH 29.7 29.3 28.8   MCHC 29.5* 30.3* 30.1*       Significant Diagnostics:  None

## 2019-01-28 NOTE — PLAN OF CARE
Problem: Adult Inpatient Plan of Care  Goal: Plan of Care Review      Recommendations    Recommendation/Intervention:     Pt with moderate malnutrition.     1. Continue Regular diet.   2. Recommend adding Boost Plus (strawberry) with meals.   3. Encourage po intake.   4. RD following.     Goals: consume >75% of meals  Nutrition Goal Status: new

## 2019-01-28 NOTE — PROGRESS NOTES
"[facility]  Adult Nutrition  Progress Note    SUMMARY       Recommendations    Recommendation/Intervention:     Pt with moderate malnutrition.     1. Continue Regular diet.   2. Recommend adding Boost Plus (strawberry) with meals.   3. Encourage po intake.   4. RD following.     Goals: consume >75% of meals  Nutrition Goal Status: new  Communication of RD Recs: (POC)    Reason for Assessment    Reason For Assessment: length of stay  Diagnosis: gastrointestinal disease( Ileostomy in place )  Interdisciplinary Rounds: attended  General Information Comments: POD13 ex lap, ileostomy closure. Pt reports appetite is poor 2/2 abdominal cramping and pain. States that she drinks ONS if it is the correct flavor. Denies N/V. States appetite PTA was on and off 2/2 Crohn's. Reports wt loss. NFPE completed. Pt meets moderate malnutrition at this time. Educated and provided handout on IBD Nutrition Therapy. All questions and concerns addressed.   Nutrition Discharge Planning: adequate po intake    Nutrition Risk Screen    Nutrition Risk Screen: other (see comments)(poor appetite. Fear of incontinence)    Nutrition/Diet History    Spiritual, Cultural Beliefs, [employer]ism Practices, Values that Affect Care: no  Factors Affecting Nutritional Intake: decreased appetite, altered gastrointestinal function, pain    Anthropometrics    Temp: 98.2 °F (36.8 °C)  Height Method: Stated  Height: 5' 7" (170.2 cm)  Height (inches): 67 in  Weight Method: Bed Scale  Weight: 70.3 kg (155 lb)  Weight (lb): 155 lb  Ideal Body Weight (IBW), Female: 135 lb  % Ideal Body Weight, Female (lb): 114.81 lb  BMI (Calculated): 24.3  BMI Grade: 18.5-24.9 - normal  Weight Loss: unintentional  Usual Body Weight (UBW), k.9 kg(per chart review 18)  % Usual Body Weight: 89.3  % Weight Change From Usual Weight: -10.89 %     Lab/Procedures/Meds    Pertinent Labs Reviewed: reviewed  Pertinent Labs Comments: Na 133, Mg 1.  Pertinent " Medications Reviewed: reviewed  Pertinent Medications Comments: pantoprazole, psyllium husk     Estimated/Assessed Needs    Weight Used For Calorie Calculations: 70.3 kg (154 lb 15.7 oz)  Energy Calorie Requirements (kcal): 1781 kcal/day  Energy Need Method: [place]-St Jeor(x 1.25)  Protein Requirements: 70-92 gm/day(1.0-1.3 gm/kg)  Weight Used For Protein Calculations: 70.3 kg (154 lb 15.7 oz)  Fluid Requirements (mL): per MD     RDA Method (mL): 1781     Nutrition Prescription Ordered    Current Diet Order: Regular  Oral Nutrition Supplement: Boost Breeze    Evaluation of Received Nutrient/Fluid Intake    I/O: +20.6L since admit  Comments: LBM 1/28  Tolerance: tolerating  % Intake of Estimated Energy Needs: Other: on and off appetite  % Meal Intake: Other: on and off appetite    Nutrition Risk    Level of Risk/Frequency of Follow-up: (f/u 1 x wk)     Assessment and Plan    Moderate malnutrition    Malnutrition in the context of Chronic Illness/Injury    Related to (etiology):  Crohn's Disease    Signs and Symptoms (as evidenced by):  Energy Intake: on and off appetite per pt   Body Fat Depletion: mild depletion of triceps   Muscle Mass Depletion: mild and moderate depletion of temples and lower extremities   Weight Loss: 11% x 4 months per chart review      Interventions/Recommendations (treatment strategy):  Collaboration and Referral of Nutrition Care  Nutrition Supplement Therapy    Nutrition Diagnosis Status:  New            Monitor and Evaluation    Food and Nutrient Intake: energy intake, food and beverage intake  Food and Nutrient Adminstration: diet order  Physical Activity and Function: nutrition-related ADLs and IADLs  Anthropometric Measurements: weight, weight change, body mass index  Biochemical Data, Medical Tests and Procedures: electrolyte and renal panel, gastrointestinal profile, glucose/endocrine profile, inflammatory profile, lipid profile  Nutrition-Focused Physical Findings: overall  appearance     Malnutrition Assessment  Malnutrition Type: chronic illness          Weight Loss (Malnutrition): (11% in 4 months per chart review )  Energy Intake (Malnutrition): (on and off appetite 2/2 Crohn's)   Upper Arm Region (Subcutaneous Fat Loss): mild depletion   [place] (Muscle Loss): mild depletion  Posterior Calf Region (Muscle Loss): moderate depletion                 Nutrition Follow-Up    RD Follow-up?: Yes

## 2019-01-28 NOTE — PROGRESS NOTES
[facility]  Colorectal Surgery  Progress Note    Patient Name: Candida Cardona  MRN: [id number]  Admission Date: 1/15/2019  Hospital Length of Stay: 13 days  Attending Physician: Reji Peterson MD    Subjective:     Interval History: no acute events overnite, not motivated to do much, eating a little, approx 10 semi solid stools     Post-Op Info:  Procedure(s):  ANASTOMOSIS, ILEORECTAL  CLOSURE, ILEOSTOMY  URETEROLYSIS  LAPAROTOMY, EXPLORATORY;extensive lysis of adhesions   13 Days Post-Op      Medications:  Continuous Infusions:  Scheduled Meds:   diphenoxylate-atropine 2.5-0.025 mg/5 ml  10 mL Oral QID    dronabinol  2.5 mg Oral BID    FLUoxetine  60 mg Oral Daily    heparin (porcine)  5,000 Units Subcutaneous Q8H    loperamide  4 mg Oral QID    miconazole   Topical (Top) BID    miconazole NITRATE 2 %   Topical (Top) BID    pantoprazole  40 mg Oral BID    psyllium husk (aspartame)  2 packet Oral TID    topiramate  50 mg Oral QHS     PRN Meds:   cyclobenzaprine    HYDROmorphone    hydrOXYzine pamoate    hyoscyamine    LORazepam    mupirocin    naloxone    ondansetron    oxyCODONE-acetaminophen        Objective:     Vital Signs (Most Recent):  Temp: 97.9 °F (36.6 °C) (01/28/19 0711)  Pulse: 87 (01/28/19 0711)  Resp: 10 (01/28/19 0711)  BP: 111/74 (01/28/19 0711)  SpO2: 98 % (01/28/19 0711) Vital Signs (24h Range):  Temp:  [97.5 °F (36.4 °C)-98.2 °F (36.8 °C)] 97.9 °F (36.6 °C)  Pulse:  [66-91] 87  Resp:  [10-20] 10  SpO2:  [98 %-100 %] 98 %  BP: ()/(61-79) 111/74     Intake/Output - Last 3 Shifts       01/26 0700 - 01/27 0659 01/27 0700 - 01/28 0659 01/28 0700 - 01/29 0659    P.O. 865  240    I.V. (mL/kg)       Total Intake(mL/kg) 865 (12.3)  240 (3.4)    Net +865  +240           Urine Occurrence 11 x 10 x     Stool Occurrence 16 x 10 x           Physical Exam   Constitutional: She is oriented to person, place, and time. She appears well-developed and well-nourished.    HENT:   Head: Normocephalic.   Eyes: Pupils are equal, round, and reactive to light.   Cardiovascular: Normal rate, regular rhythm and normal heart sounds.   Pulmonary/Chest: Effort normal and breath sounds normal. No respiratory distress. She has no wheezes. She has no rales.   Abdominal: Soft. She exhibits no mass. There is no rebound and no guarding.   abd inc line healing well     Neurological: She is alert and oriented to person, place, and time.   Skin: Skin is warm and dry.   Psychiatric: She has a normal mood and affect. Her behavior is normal. Judgment and thought content normal.   Nursing note and vitals reviewed.        Significant Labs:  BMP (Last 3 Results):   Recent Labs   Lab 01/26/19  0425 01/27/19  0612 01/28/19  0518   GLU 83 91 87   * 134* 133*   K 4.6 4.0 3.9    106 105   CO2 16* 18* 19*   BUN 9 10 7   CREATININE 0.9 0.9 0.9   CALCIUM 8.4* 8.3* 8.5*   MG 1.7 1.2* 1.0*     CBC (Last 3 Results):   Recent Labs   Lab 01/26/19  0425 01/27/19  0914 01/28/19  0518   WBC 14.22* 13.01* 11.33   RBC 3.64* 3.62* 3.51*   HGB 10.8* 10.6* 10.1*   HCT 36.6* 35.0* 33.5*    306 335   * 97 95   MCH 29.7 29.3 28.8   MCHC 29.5* 30.3* 30.1*       Significant Diagnostics:  None    Assessment/Plan:     Unspecified mood (affective) disorder    Cont home meds  Appreciate psy recommendations, will follow     Hypophosphatemia    Monitor labs  repalce phos     Hypophosphatasia    Replace phos  Monitor labs     Hypomagnesemia    Replace Mag  Monitor labs     Crohn's disease of small and large intestines    Ileostomy closure 1/15, post op TAC for Crohns.      - on lomotil and loperamide, added levsin  - c diff neg   -reg diet   - oral pain meds with dilaudid for breakthrough.   -Cr has normalized, cont MIVFs    -enc amb and IS  -ivan hose and SCD, ppx hep  -PPI  -enc basis hygeine  -PT/OT for mobilization     Malnutrition    Adv diet as tolerated  Monitor labs  Enc diet  Begin marinol  psy consult as  requested           Vidhi Mathis NP  Colorectal Surgery  [facility]

## 2019-01-28 NOTE — PLAN OF CARE
Problem: Adult Inpatient Plan of Care  Goal: Plan of Care Review  Outcome: Ongoing (interventions implemented as appropriate)  POC reviewed with pt. VSS on ra. Tolerating regular diet. Continues to have frequent episodes of diarrhea. Low UOP this shift, will continue to assess. Dressings placed on midline abd incision and ostomy site due to purulent drainage. Pt showered today. PRN percocet given for pain. L PIV obtained by midline team. No adverse events. Will continue to monitor.

## 2019-01-28 NOTE — ASSESSMENT & PLAN NOTE
Malnutrition in the context of Chronic Illness/Injury    Related to (etiology):  Crohn's Disease    Signs and Symptoms (as evidenced by):  Energy Intake: on and off appetite per pt   Body Fat Depletion: mild depletion of triceps   Muscle Mass Depletion: mild and moderate depletion of temples and lower extremities   Weight Loss: 11% x 4 months per chart review      Interventions/Recommendations (treatment strategy):  Collaboration and Referral of Nutrition Care  Nutrition Supplement Therapy    Nutrition Diagnosis Status:  New

## 2019-01-28 NOTE — NURSING
Encouraged pt to shower today, pt stated that she would later. Will continue encouraged pt to shower.

## 2019-01-29 LAB
ANION GAP SERPL CALC-SCNC: 8 MMOL/L
BASOPHILS # BLD AUTO: 0.06 K/UL
BASOPHILS NFR BLD: 0.5 %
BUN SERPL-MCNC: 7 MG/DL
CALCIUM SERPL-MCNC: 8.2 MG/DL
CHLORIDE SERPL-SCNC: 105 MMOL/L
CO2 SERPL-SCNC: 21 MMOL/L
CREAT SERPL-MCNC: 0.9 MG/DL
DIFFERENTIAL METHOD: ABNORMAL
EOSINOPHIL # BLD AUTO: 0.1 K/UL
EOSINOPHIL NFR BLD: 1 %
ERYTHROCYTE [DISTWIDTH] IN BLOOD BY AUTOMATED COUNT: 16.4 %
EST. GFR  (AFRICAN AMERICAN): >60 ML/MIN/1.73 M^2
EST. GFR  (NON AFRICAN AMERICAN): >60 ML/MIN/1.73 M^2
GLUCOSE SERPL-MCNC: 86 MG/DL
HCT VFR BLD AUTO: 32.5 %
HGB BLD-MCNC: 9.7 G/DL
IMM GRANULOCYTES # BLD AUTO: 0.13 K/UL
IMM GRANULOCYTES NFR BLD AUTO: 1.2 %
LYMPHOCYTES # BLD AUTO: 2.7 K/UL
LYMPHOCYTES NFR BLD: 23.8 %
MAGNESIUM SERPL-MCNC: 1 MG/DL
MCH RBC QN AUTO: 28.8 PG
MCHC RBC AUTO-ENTMCNC: 29.8 G/DL
MCV RBC AUTO: 96 FL
MONOCYTES # BLD AUTO: 0.9 K/UL
MONOCYTES NFR BLD: 8.1 %
NEUTROPHILS # BLD AUTO: 7.4 K/UL
NEUTROPHILS NFR BLD: 65.4 %
NRBC BLD-RTO: 0 /100 WBC
PHOSPHATE SERPL-MCNC: 3.6 MG/DL
PLATELET # BLD AUTO: 340 K/UL
PMV BLD AUTO: 10.7 FL
POTASSIUM SERPL-SCNC: 4 MMOL/L
RBC # BLD AUTO: 3.37 M/UL
SODIUM SERPL-SCNC: 134 MMOL/L
WBC # BLD AUTO: 11.25 K/UL

## 2019-01-29 PROCEDURE — 84100 ASSAY OF PHOSPHORUS: CPT

## 2019-01-29 PROCEDURE — 83735 ASSAY OF MAGNESIUM: CPT

## 2019-01-29 PROCEDURE — 25000003 PHARM REV CODE 250: Performed by: STUDENT IN AN ORGANIZED HEALTH CARE EDUCATION/TRAINING PROGRAM

## 2019-01-29 PROCEDURE — 63600175 PHARM REV CODE 636 W HCPCS: Performed by: NURSE PRACTITIONER

## 2019-01-29 PROCEDURE — 85025 COMPLETE CBC W/AUTO DIFF WBC: CPT

## 2019-01-29 PROCEDURE — 25000003 PHARM REV CODE 250: Performed by: COLON & RECTAL SURGERY

## 2019-01-29 PROCEDURE — 25000003 PHARM REV CODE 250: Performed by: NURSE PRACTITIONER

## 2019-01-29 PROCEDURE — 80048 BASIC METABOLIC PNL TOTAL CA: CPT

## 2019-01-29 PROCEDURE — 25000003 PHARM REV CODE 250: Performed by: SURGERY

## 2019-01-29 PROCEDURE — 36415 COLL VENOUS BLD VENIPUNCTURE: CPT

## 2019-01-29 PROCEDURE — 20600001 HC STEP DOWN PRIVATE ROOM

## 2019-01-29 PROCEDURE — 63600175 PHARM REV CODE 636 W HCPCS: Performed by: STUDENT IN AN ORGANIZED HEALTH CARE EDUCATION/TRAINING PROGRAM

## 2019-01-29 RX ORDER — DIPHENOXYLATE HYDROCHLORIDE AND ATROPINE SULFATE 2.5; .025 MG/1; MG/1
2 TABLET ORAL 4 TIMES DAILY
Status: DISCONTINUED | OUTPATIENT
Start: 2019-01-29 | End: 2019-02-01 | Stop reason: HOSPADM

## 2019-01-29 RX ADMIN — MICONAZOLE NITRATE: 20 POWDER TOPICAL at 09:01

## 2019-01-29 RX ADMIN — FLUOXETINE HYDROCHLORIDE 60 MG: 20 CAPSULE ORAL at 09:01

## 2019-01-29 RX ADMIN — PANTOPRAZOLE SODIUM 40 MG: 40 TABLET, DELAYED RELEASE ORAL at 09:01

## 2019-01-29 RX ADMIN — DIPHENOXYLATE HYDROCHLORIDE AND ATROPINE SULFATE 2 TABLET: 2.5; .025 TABLET ORAL at 02:01

## 2019-01-29 RX ADMIN — HEPARIN SODIUM 5000 UNITS: 5000 INJECTION, SOLUTION INTRAVENOUS; SUBCUTANEOUS at 06:01

## 2019-01-29 RX ADMIN — MICONAZOLE NITRATE: 20 CREAM TOPICAL at 09:01

## 2019-01-29 RX ADMIN — DRONABINOL 2.5 MG: 2.5 CAPSULE ORAL at 09:01

## 2019-01-29 RX ADMIN — HEPARIN SODIUM 5000 UNITS: 5000 INJECTION, SOLUTION INTRAVENOUS; SUBCUTANEOUS at 02:01

## 2019-01-29 RX ADMIN — LOPERAMIDE HYDROCHLORIDE 4 MG: 1 SOLUTION ORAL at 09:01

## 2019-01-29 RX ADMIN — HEPARIN SODIUM 5000 UNITS: 5000 INJECTION, SOLUTION INTRAVENOUS; SUBCUTANEOUS at 09:01

## 2019-01-29 RX ADMIN — LOPERAMIDE HYDROCHLORIDE 4 MG: 1 SOLUTION ORAL at 02:01

## 2019-01-29 RX ADMIN — LOPERAMIDE HYDROCHLORIDE 4 MG: 1 SOLUTION ORAL at 05:01

## 2019-01-29 RX ADMIN — TOPIRAMATE 50 MG: 25 TABLET, FILM COATED ORAL at 09:01

## 2019-01-29 RX ADMIN — DIPHENOXYLATE HYDROCHLORIDE AND ATROPINE SULFATE 2 TABLET: 2.5; .025 TABLET ORAL at 09:01

## 2019-01-29 RX ADMIN — OXYCODONE AND ACETAMINOPHEN 1 TABLET: 7.5; 325 TABLET ORAL at 09:01

## 2019-01-29 RX ADMIN — OXYCODONE AND ACETAMINOPHEN 1 TABLET: 7.5; 325 TABLET ORAL at 08:01

## 2019-01-29 RX ADMIN — OXYCODONE AND ACETAMINOPHEN 1 TABLET: 7.5; 325 TABLET ORAL at 04:01

## 2019-01-29 RX ADMIN — DIPHENOXYLATE HYDROCHLORIDE AND ATROPINE SULFATE 10 ML: 2.5; .025 SOLUTION ORAL at 09:01

## 2019-01-29 RX ADMIN — DIPHENOXYLATE HYDROCHLORIDE AND ATROPINE SULFATE 2 TABLET: 2.5; .025 TABLET ORAL at 05:01

## 2019-01-29 RX ADMIN — OXYCODONE AND ACETAMINOPHEN 1 TABLET: 7.5; 325 TABLET ORAL at 02:01

## 2019-01-29 NOTE — PROGRESS NOTES
[facility]  Colorectal Surgery  Progress Note    Patient Name: Candida Cardona  MRN: [id number]  Admission Date: 1/15/2019  Hospital Length of Stay: 14 days  Attending Physician: Reji Peterson MD    Subjective:     Interval History: no acute evente overnite, stools a little more firm, eating better     Post-Op Info:  Procedure(s):  ANASTOMOSIS, ILEORECTAL  CLOSURE, ILEOSTOMY  URETEROLYSIS  LAPAROTOMY, EXPLORATORY;extensive lysis of adhesions   14 Days Post-Op      Medications:  Continuous Infusions:  Scheduled Meds:   diphenoxylate-atropine 2.5-0.025 mg/5 ml  10 mL Oral QID    dronabinol  2.5 mg Oral BID    FLUoxetine  60 mg Oral Daily    heparin (porcine)  5,000 Units Subcutaneous Q8H    loperamide  4 mg Oral QID    miconazole   Topical (Top) BID    miconazole NITRATE 2 %   Topical (Top) BID    pantoprazole  40 mg Oral BID    psyllium husk (aspartame)  2 packet Oral TID    topiramate  50 mg Oral QHS     PRN Meds:   cyclobenzaprine    HYDROmorphone    hydrOXYzine pamoate    hyoscyamine    LORazepam    mupirocin    naloxone    ondansetron    oxyCODONE-acetaminophen        Objective:     Vital Signs (Most Recent):  Temp: 98.1 °F (36.7 °C) (01/29/19 0825)  Pulse: 75 (01/29/19 0825)  Resp: 16 (01/29/19 0825)  BP: 105/62 (01/29/19 0825)  SpO2: 100 % (01/29/19 0825) Vital Signs (24h Range):  Temp:  [97.7 °F (36.5 °C)-98.4 °F (36.9 °C)] 98.1 °F (36.7 °C)  Pulse:  [72-96] 75  Resp:  [16-20] 16  SpO2:  [98 %-100 %] 100 %  BP: (101-128)/(59-76) 105/62     Intake/Output - Last 3 Shifts       01/27 0700 - 01/28 0659 01/28 0700 - 01/29 0659 01/29 0700 - 01/30 0659    P.O.  2090 480    IV Piggyback  500     Total Intake(mL/kg)  2590 (36.8) 480 (6.8)    Urine (mL/kg/hr)  370 (0.2) 400 (1.3)    Stool  0 0    Total Output  370 400    Net  +2220 +80           Urine Occurrence 10 x      Stool Occurrence 10 x 12 x 2 x          Physical Exam   Constitutional: She is oriented to person, place,  and time. She appears well-developed and well-nourished.   HENT:   Head: Normocephalic.   Eyes: Pupils are equal, round, and reactive to light.   Cardiovascular: Normal rate, regular rhythm and normal heart sounds.   Pulmonary/Chest: Effort normal and breath sounds normal. No respiratory distress. She has no wheezes. She has no rales.   Abdominal: Soft. She exhibits no mass. There is no rebound and no guarding.   Multiple bm's but seem to be firmer   Neurological: She is alert and oriented to person, place, and time.   Skin: Skin is warm and dry.   Psychiatric: She has a normal mood and affect. Her behavior is normal. Judgment and thought content normal.   Nursing note and vitals reviewed.        Significant Labs:  BMP (Last 3 Results):   Recent Labs   Lab 01/27/19  0612 01/28/19  0518 01/29/19  0441   GLU 91 87 86   * 133* 134*   K 4.0 3.9 4.0    105 105   CO2 18* 19* 21*   BUN 10 7 7   CREATININE 0.9 0.9 0.9   CALCIUM 8.3* 8.5* 8.2*   MG 1.2* 1.0* 1.0*     CBC (Last 3 Results):   Recent Labs   Lab 01/27/19  0914 01/28/19  0518 01/29/19  0441   WBC 13.01* 11.33 11.25   RBC 3.62* 3.51* 3.37*   HGB 10.6* 10.1* 9.7*   HCT 35.0* 33.5* 32.5*    335 340   MCV 97 95 96   MCH 29.3 28.8 28.8   MCHC 30.3* 30.1* 29.8*       Significant Diagnostics:  None    Assessment/Plan:     Unspecified mood (affective) disorder    Cont home meds  Appreciate psy recommendations, will follow     Hypophosphatemia    Monitor labs  repalce phos     Hypophosphatasia    Replace phos  Monitor labs     Hypomagnesemia    Replace Mag  Monitor labs     Crohn's disease of small and large intestines    Ileostomy closure 1/15, post op TAC for Crohns.      - on lomotil and loperamide, added levsin  - c diff neg   -reg diet   - oral pain meds with dilaudid for breakthrough.   -Cr has normalized, cont MIVFs    -enc amb and IS  -ivan hose and SCD, ppx hep  -PPI  -enc basis hygeine  -PT/OT for mobilization     Malnutrition    Adv diet as  tolerated  Monitor labs  Enc diet  Begin marinol  psy consult as requested           Vidhi Mathis NP  Colorectal Surgery  [facility]

## 2019-01-29 NOTE — SUBJECTIVE & OBJECTIVE
Subjective:     Interval History: no acute evente overnite, stools a little more firm, eating better     Post-Op Info:  Procedure(s):  ANASTOMOSIS, ILEORECTAL  CLOSURE, ILEOSTOMY  URETEROLYSIS  LAPAROTOMY, EXPLORATORY;extensive lysis of adhesions   14 Days Post-Op      Medications:  Continuous Infusions:  Scheduled Meds:   diphenoxylate-atropine 2.5-0.025 mg/5 ml  10 mL Oral QID    dronabinol  2.5 mg Oral BID    FLUoxetine  60 mg Oral Daily    heparin (porcine)  5,000 Units Subcutaneous Q8H    loperamide  4 mg Oral QID    miconazole   Topical (Top) BID    miconazole NITRATE 2 %   Topical (Top) BID    pantoprazole  40 mg Oral BID    psyllium husk (aspartame)  2 packet Oral TID    topiramate  50 mg Oral QHS     PRN Meds:   cyclobenzaprine    HYDROmorphone    hydrOXYzine pamoate    hyoscyamine    LORazepam    mupirocin    naloxone    ondansetron    oxyCODONE-acetaminophen        Objective:     Vital Signs (Most Recent):  Temp: 98.1 °F (36.7 °C) (01/29/19 0825)  Pulse: 75 (01/29/19 0825)  Resp: 16 (01/29/19 0825)  BP: 105/62 (01/29/19 0825)  SpO2: 100 % (01/29/19 0825) Vital Signs (24h Range):  Temp:  [97.7 °F (36.5 °C)-98.4 °F (36.9 °C)] 98.1 °F (36.7 °C)  Pulse:  [72-96] 75  Resp:  [16-20] 16  SpO2:  [98 %-100 %] 100 %  BP: (101-128)/(59-76) 105/62     Intake/Output - Last 3 Shifts       01/27 0700 - 01/28 0659 01/28 0700 - 01/29 0659 01/29 0700 - 01/30 0659    P.O.  2090 480    IV Piggyback  500     Total Intake(mL/kg)  2590 (36.8) 480 (6.8)    Urine (mL/kg/hr)  370 (0.2) 400 (1.3)    Stool  0 0    Total Output  370 400    Net  +2220 +80           Urine Occurrence 10 x      Stool Occurrence 10 x 12 x 2 x          Physical Exam   Constitutional: She is oriented to person, place, and time. She appears well-developed and well-nourished.   HENT:   Head: Normocephalic.   Eyes: Pupils are equal, round, and reactive to light.   Cardiovascular: Normal rate, regular rhythm and normal heart sounds.    Pulmonary/Chest: Effort normal and breath sounds normal. No respiratory distress. She has no wheezes. She has no rales.   Abdominal: Soft. She exhibits no mass. There is no rebound and no guarding.   Multiple bm's but seem to be firmer   Neurological: She is alert and oriented to person, place, and time.   Skin: Skin is warm and dry.   Psychiatric: She has a normal mood and affect. Her behavior is normal. Judgment and thought content normal.   Nursing note and vitals reviewed.        Significant Labs:  BMP (Last 3 Results):   Recent Labs   Lab 01/27/19  0612 01/28/19  0518 01/29/19  0441   GLU 91 87 86   * 133* 134*   K 4.0 3.9 4.0    105 105   CO2 18* 19* 21*   BUN 10 7 7   CREATININE 0.9 0.9 0.9   CALCIUM 8.3* 8.5* 8.2*   MG 1.2* 1.0* 1.0*     CBC (Last 3 Results):   Recent Labs   Lab 01/27/19  0914 01/28/19  0518 01/29/19  0441   WBC 13.01* 11.33 11.25   RBC 3.62* 3.51* 3.37*   HGB 10.6* 10.1* 9.7*   HCT 35.0* 33.5* 32.5*    335 340   MCV 97 95 96   MCH 29.3 28.8 28.8   MCHC 30.3* 30.1* 29.8*       Significant Diagnostics:  None

## 2019-01-29 NOTE — PLAN OF CARE
Problem: Adult Inpatient Plan of Care  Goal: Plan of Care Review  Outcome: Ongoing (interventions implemented as appropriate)  Patient is alert, awake, and oriented. Complains of pain. PRN pain medicine is available.VS stable. Patient voided 300cc overnight. MD aware. Dr. Bradley will notify primary team regarding urine output. Verbalizes understanding of plan of care. Nurse will continue to monitor.

## 2019-01-29 NOTE — PROGRESS NOTES
Transferred care to Alberto HERRERA VSS on RM air. No acute events at this time. Call bell within reach. Bed in lowest position.

## 2019-01-30 LAB
ANION GAP SERPL CALC-SCNC: 9 MMOL/L
BASOPHILS # BLD AUTO: 0.07 K/UL
BASOPHILS NFR BLD: 0.7 %
BUN SERPL-MCNC: 6 MG/DL
CALCIUM SERPL-MCNC: 8.2 MG/DL
CHLORIDE SERPL-SCNC: 104 MMOL/L
CO2 SERPL-SCNC: 19 MMOL/L
CREAT SERPL-MCNC: 0.9 MG/DL
DIFFERENTIAL METHOD: ABNORMAL
EOSINOPHIL # BLD AUTO: 0.1 K/UL
EOSINOPHIL NFR BLD: 1.2 %
ERYTHROCYTE [DISTWIDTH] IN BLOOD BY AUTOMATED COUNT: 16.2 %
EST. GFR  (AFRICAN AMERICAN): >60 ML/MIN/1.73 M^2
EST. GFR  (NON AFRICAN AMERICAN): >60 ML/MIN/1.73 M^2
GLUCOSE SERPL-MCNC: 74 MG/DL
HCT VFR BLD AUTO: 34.5 %
HGB BLD-MCNC: 10 G/DL
IMM GRANULOCYTES # BLD AUTO: 0.1 K/UL
IMM GRANULOCYTES NFR BLD AUTO: 1.1 %
LYMPHOCYTES # BLD AUTO: 2.8 K/UL
LYMPHOCYTES NFR BLD: 29.5 %
MAGNESIUM SERPL-MCNC: 1 MG/DL
MCH RBC QN AUTO: 28.2 PG
MCHC RBC AUTO-ENTMCNC: 29 G/DL
MCV RBC AUTO: 97 FL
MONOCYTES # BLD AUTO: 0.8 K/UL
MONOCYTES NFR BLD: 8.6 %
NEUTROPHILS # BLD AUTO: 5.6 K/UL
NEUTROPHILS NFR BLD: 58.9 %
NRBC BLD-RTO: 0 /100 WBC
PHOSPHATE SERPL-MCNC: 3.6 MG/DL
PLATELET # BLD AUTO: 364 K/UL
PMV BLD AUTO: 10.4 FL
POTASSIUM SERPL-SCNC: 4 MMOL/L
RBC # BLD AUTO: 3.55 M/UL
SODIUM SERPL-SCNC: 132 MMOL/L
WBC # BLD AUTO: 9.49 K/UL

## 2019-01-30 PROCEDURE — 25000003 PHARM REV CODE 250: Performed by: SURGERY

## 2019-01-30 PROCEDURE — 63600175 PHARM REV CODE 636 W HCPCS: Performed by: NURSE PRACTITIONER

## 2019-01-30 PROCEDURE — 85025 COMPLETE CBC W/AUTO DIFF WBC: CPT

## 2019-01-30 PROCEDURE — 25000003 PHARM REV CODE 250: Performed by: STUDENT IN AN ORGANIZED HEALTH CARE EDUCATION/TRAINING PROGRAM

## 2019-01-30 PROCEDURE — 36415 COLL VENOUS BLD VENIPUNCTURE: CPT

## 2019-01-30 PROCEDURE — 83735 ASSAY OF MAGNESIUM: CPT

## 2019-01-30 PROCEDURE — 84100 ASSAY OF PHOSPHORUS: CPT

## 2019-01-30 PROCEDURE — 80048 BASIC METABOLIC PNL TOTAL CA: CPT

## 2019-01-30 PROCEDURE — 25000003 PHARM REV CODE 250: Performed by: NURSE PRACTITIONER

## 2019-01-30 PROCEDURE — 20600001 HC STEP DOWN PRIVATE ROOM

## 2019-01-30 PROCEDURE — 63600175 PHARM REV CODE 636 W HCPCS: Performed by: STUDENT IN AN ORGANIZED HEALTH CARE EDUCATION/TRAINING PROGRAM

## 2019-01-30 RX ORDER — ACETAMINOPHEN AND CODEINE PHOSPHATE 120; 12 MG/5ML; MG/5ML
5 SOLUTION ORAL 4 TIMES DAILY
Status: DISCONTINUED | OUTPATIENT
Start: 2019-01-30 | End: 2019-01-31

## 2019-01-30 RX ORDER — MAGNESIUM SULFATE HEPTAHYDRATE 40 MG/ML
2 INJECTION, SOLUTION INTRAVENOUS ONCE
Status: COMPLETED | OUTPATIENT
Start: 2019-01-30 | End: 2019-01-30

## 2019-01-30 RX ORDER — SODIUM CHLORIDE 9 MG/ML
INJECTION, SOLUTION INTRAVENOUS CONTINUOUS
Status: DISCONTINUED | OUTPATIENT
Start: 2019-01-30 | End: 2019-02-01 | Stop reason: HOSPADM

## 2019-01-30 RX ORDER — OXYCODONE HYDROCHLORIDE 5 MG/1
5 TABLET ORAL EVERY 4 HOURS PRN
Status: DISCONTINUED | OUTPATIENT
Start: 2019-01-30 | End: 2019-02-01 | Stop reason: HOSPADM

## 2019-01-30 RX ADMIN — LOPERAMIDE HYDROCHLORIDE 4 MG: 1 SOLUTION ORAL at 04:01

## 2019-01-30 RX ADMIN — DIPHENOXYLATE HYDROCHLORIDE AND ATROPINE SULFATE 2 TABLET: 2.5; .025 TABLET ORAL at 08:01

## 2019-01-30 RX ADMIN — MICONAZOLE NITRATE: 20 POWDER TOPICAL at 09:01

## 2019-01-30 RX ADMIN — ACETAMINOPHEN AND CODEINE PHOSPHATE 5 ML: 120; 12 SOLUTION ORAL at 04:01

## 2019-01-30 RX ADMIN — OXYCODONE AND ACETAMINOPHEN 1 TABLET: 7.5; 325 TABLET ORAL at 01:01

## 2019-01-30 RX ADMIN — DRONABINOL 2.5 MG: 2.5 CAPSULE ORAL at 09:01

## 2019-01-30 RX ADMIN — DIPHENOXYLATE HYDROCHLORIDE AND ATROPINE SULFATE 2 TABLET: 2.5; .025 TABLET ORAL at 04:01

## 2019-01-30 RX ADMIN — MICONAZOLE NITRATE: 20 CREAM TOPICAL at 08:01

## 2019-01-30 RX ADMIN — ACETAMINOPHEN AND CODEINE PHOSPHATE 5 ML: 120; 12 SOLUTION ORAL at 08:01

## 2019-01-30 RX ADMIN — OXYCODONE AND ACETAMINOPHEN 1 TABLET: 7.5; 325 TABLET ORAL at 05:01

## 2019-01-30 RX ADMIN — LOPERAMIDE HYDROCHLORIDE 4 MG: 1 SOLUTION ORAL at 08:01

## 2019-01-30 RX ADMIN — FLUOXETINE HYDROCHLORIDE 60 MG: 20 CAPSULE ORAL at 09:01

## 2019-01-30 RX ADMIN — DRONABINOL 2.5 MG: 2.5 CAPSULE ORAL at 08:01

## 2019-01-30 RX ADMIN — ACETAMINOPHEN AND CODEINE PHOSPHATE 5 ML: 120; 12 SOLUTION ORAL at 02:01

## 2019-01-30 RX ADMIN — LOPERAMIDE HYDROCHLORIDE 4 MG: 1 SOLUTION ORAL at 02:01

## 2019-01-30 RX ADMIN — DIPHENOXYLATE HYDROCHLORIDE AND ATROPINE SULFATE 2 TABLET: 2.5; .025 TABLET ORAL at 02:01

## 2019-01-30 RX ADMIN — MICONAZOLE NITRATE: 20 POWDER TOPICAL at 08:01

## 2019-01-30 RX ADMIN — ACETAMINOPHEN AND CODEINE PHOSPHATE 5 ML: 120; 12 SOLUTION ORAL at 09:01

## 2019-01-30 RX ADMIN — LOPERAMIDE HYDROCHLORIDE 4 MG: 1 SOLUTION ORAL at 09:01

## 2019-01-30 RX ADMIN — PANTOPRAZOLE SODIUM 40 MG: 40 TABLET, DELAYED RELEASE ORAL at 09:01

## 2019-01-30 RX ADMIN — DIPHENOXYLATE HYDROCHLORIDE AND ATROPINE SULFATE 2 TABLET: 2.5; .025 TABLET ORAL at 09:01

## 2019-01-30 RX ADMIN — PANTOPRAZOLE SODIUM 40 MG: 40 TABLET, DELAYED RELEASE ORAL at 08:01

## 2019-01-30 RX ADMIN — HEPARIN SODIUM 5000 UNITS: 5000 INJECTION, SOLUTION INTRAVENOUS; SUBCUTANEOUS at 05:01

## 2019-01-30 RX ADMIN — SODIUM CHLORIDE: 0.9 INJECTION, SOLUTION INTRAVENOUS at 09:01

## 2019-01-30 RX ADMIN — TOPIRAMATE 50 MG: 25 TABLET, FILM COATED ORAL at 08:01

## 2019-01-30 RX ADMIN — MAGNESIUM SULFATE IN WATER 2 G: 40 INJECTION, SOLUTION INTRAVENOUS at 10:01

## 2019-01-30 RX ADMIN — HEPARIN SODIUM 5000 UNITS: 5000 INJECTION, SOLUTION INTRAVENOUS; SUBCUTANEOUS at 10:01

## 2019-01-30 RX ADMIN — HEPARIN SODIUM 5000 UNITS: 5000 INJECTION, SOLUTION INTRAVENOUS; SUBCUTANEOUS at 02:01

## 2019-01-30 RX ADMIN — MICONAZOLE NITRATE: 20 CREAM TOPICAL at 09:01

## 2019-01-30 NOTE — SUBJECTIVE & OBJECTIVE
Subjective:     Interval History: no acute events overnite, had 11bm last 24 hours    Post-Op Info:  Procedure(s):  ANASTOMOSIS, ILEORECTAL  CLOSURE, ILEOSTOMY  URETEROLYSIS  LAPAROTOMY, EXPLORATORY;extensive lysis of adhesions   15 Days Post-Op      Medications:  Continuous Infusions:   sodium chloride 0.9% 50 mL/hr at 01/30/19 0959     Scheduled Meds:   acetaminophen-codeine  5 mL Oral QID    diphenoxylate-atropine 2.5-0.025 mg  2 tablet Oral QID    dronabinol  2.5 mg Oral BID    FLUoxetine  60 mg Oral Daily    heparin (porcine)  5,000 Units Subcutaneous Q8H    loperamide  4 mg Oral QID    miconazole   Topical (Top) BID    miconazole NITRATE 2 %   Topical (Top) BID    pantoprazole  40 mg Oral BID    psyllium husk (aspartame)  2 packet Oral TID    topiramate  50 mg Oral QHS     PRN Meds:   cyclobenzaprine    HYDROmorphone    hydrOXYzine pamoate    hyoscyamine    LORazepam    mupirocin    naloxone    ondansetron    oxyCODONE        Objective:     Vital Signs (Most Recent):  Temp: 98.2 °F (36.8 °C) (01/30/19 0902)  Pulse: 87 (01/30/19 0902)  Resp: 16 (01/30/19 0902)  BP: (!) 98/58 (01/30/19 0902)  SpO2: 100 % (01/30/19 0902) Vital Signs (24h Range):  Temp:  [97.6 °F (36.4 °C)-98.7 °F (37.1 °C)] 98.2 °F (36.8 °C)  Pulse:  [76-87] 87  Resp:  [16-20] 16  SpO2:  [98 %-100 %] 100 %  BP: ()/(55-72) 98/58     Intake/Output - Last 3 Shifts       01/28 0700 - 01/29 0659 01/29 0700 - 01/30 0659 01/30 0700 - 01/31 0659    P.O. 2090 1440 480    I.V. (mL/kg)   50 (0.7)    IV Piggyback 500      Total Intake(mL/kg) 2590 (36.8) 1440 (20.5) 530 (7.5)    Urine (mL/kg/hr) 370 (0.2) 1300 (0.8) 300 (0.8)    Stool 0 0 0    Total Output 370 1300 300    Net +2220 +140 +230           Stool Occurrence 12 x 11 x 5 x          Physical Exam   Constitutional: She is oriented to person, place, and time. She appears well-developed and well-nourished. No distress.   HENT:   Head: Normocephalic.   Eyes: Pupils are equal,  round, and reactive to light.   Cardiovascular: Normal rate, regular rhythm and normal heart sounds.   Pulmonary/Chest: Effort normal and breath sounds normal. No respiratory distress. She has no wheezes. She has no rales.   Abdominal: Soft. She exhibits no mass. There is no rebound and no guarding.   Portion of lower abd inc line opened at bedside  Small amt of serous drainage obtained  Packing placed   Neurological: She is alert and oriented to person, place, and time.   Skin: Skin is warm and dry.   Psychiatric: She has a normal mood and affect. Her behavior is normal. Judgment and thought content normal.   Nursing note and vitals reviewed.        Significant Labs:  BMP (Last 3 Results):   Recent Labs   Lab 01/28/19 0518 01/29/19  0441 01/30/19  0407   GLU 87 86 74   * 134* 132*   K 3.9 4.0 4.0    105 104   CO2 19* 21* 19*   BUN 7 7 6   CREATININE 0.9 0.9 0.9   CALCIUM 8.5* 8.2* 8.2*   MG 1.0* 1.0* 1.0*     CBC (Last 3 Results):   Recent Labs   Lab 01/28/19 0518 01/29/19  0441 01/30/19  0407   WBC 11.33 11.25 9.49   RBC 3.51* 3.37* 3.55*   HGB 10.1* 9.7* 10.0*   HCT 33.5* 32.5* 34.5*    340 364*   MCV 95 96 97   MCH 28.8 28.8 28.2   MCHC 30.1* 29.8* 29.0*       Significant Diagnostics:  None

## 2019-01-30 NOTE — ASSESSMENT & PLAN NOTE
Ileostomy closure 1/15, post op TAC for Crohns.      - on lomotil and loperamide, added levsin, add liquid codeine  - c diff neg   -reg diet   - oral pain meds with dilaudid for breakthrough.   -Cr has normalized, cont MIVFs    -enc amb and IS  -ivan hose and SCD, ppx hep  -PPI  -enc basis hygeine  -PT/OT for mobilization

## 2019-01-30 NOTE — PLAN OF CARE
Problem: Adult Inpatient Plan of Care  Goal: Plan of Care Review  Outcome: Ongoing (interventions implemented as appropriate)  Patient is alert and oriented. Able to make needs known to staff. Scheduled Tylenol #3 was started today for pain control. No s/s of respiratory/cardiac distress noted. Abdominal midline dressing and RLQ closure site dressing are clean, dry, and intact. Left arm PIV is patent and flushes well. IV fluids were ordered today with NS running continuously at 50 ml/hr. Patient remains on a regular diet and is tolerating it well. Patient continues to have multiple loose stools throughout the day and remains on scheduled Lomotil and Imodium. VS stable. No issues or concerns at this time. Continue to monitor.

## 2019-01-30 NOTE — PLAN OF CARE
POC reviewed w/ pt, verbalized understanding. Pt AAOx4. VSS on RA. Pain managed with PRN pain meds. Pt ambulates to bathroom, with adequate UOP overnight. Multiple BM overnight. Pt tolerating regular diet with no c/o nausea. Pt slept between care. Frequent rounds made for pt safety. Call light in reach and bed in lowest position. WCTM

## 2019-01-30 NOTE — PROGRESS NOTES
[facility]  Colorectal Surgery  Progress Note    Patient Name: Candida Cardona  MRN: [id number]  Admission Date: 1/15/2019  Hospital Length of Stay: 15 days  Attending Physician: Reji Peterson MD    Subjective:     Interval History: no acute events overnite, had 11bm last 24 hours    Post-Op Info:  Procedure(s):  ANASTOMOSIS, ILEORECTAL  CLOSURE, ILEOSTOMY  URETEROLYSIS  LAPAROTOMY, EXPLORATORY;extensive lysis of adhesions   15 Days Post-Op      Medications:  Continuous Infusions:   sodium chloride 0.9% 50 mL/hr at 01/30/19 0959     Scheduled Meds:   acetaminophen-codeine  5 mL Oral QID    diphenoxylate-atropine 2.5-0.025 mg  2 tablet Oral QID    dronabinol  2.5 mg Oral BID    FLUoxetine  60 mg Oral Daily    heparin (porcine)  5,000 Units Subcutaneous Q8H    loperamide  4 mg Oral QID    miconazole   Topical (Top) BID    miconazole NITRATE 2 %   Topical (Top) BID    pantoprazole  40 mg Oral BID    psyllium husk (aspartame)  2 packet Oral TID    topiramate  50 mg Oral QHS     PRN Meds:   cyclobenzaprine    HYDROmorphone    hydrOXYzine pamoate    hyoscyamine    LORazepam    mupirocin    naloxone    ondansetron    oxyCODONE        Objective:     Vital Signs (Most Recent):  Temp: 98.2 °F (36.8 °C) (01/30/19 0902)  Pulse: 87 (01/30/19 0902)  Resp: 16 (01/30/19 0902)  BP: (!) 98/58 (01/30/19 0902)  SpO2: 100 % (01/30/19 0902) Vital Signs (24h Range):  Temp:  [97.6 °F (36.4 °C)-98.7 °F (37.1 °C)] 98.2 °F (36.8 °C)  Pulse:  [76-87] 87  Resp:  [16-20] 16  SpO2:  [98 %-100 %] 100 %  BP: ()/(55-72) 98/58     Intake/Output - Last 3 Shifts       01/28 0700 - 01/29 0659 01/29 0700 - 01/30 0659 01/30 0700 - 01/31 0659    P.O. 2090 1440 480    I.V. (mL/kg)   50 (0.7)    IV Piggyback 500      Total Intake(mL/kg) 2590 (36.8) 1440 (20.5) 530 (7.5)    Urine (mL/kg/hr) 370 (0.2) 1300 (0.8) 300 (0.8)    Stool 0 0 0    Total Output 370 1300 300    Net +2220 +140 +230           Stool  Occurrence 12 x 11 x 5 x          Physical Exam   Constitutional: She is oriented to person, place, and time. She appears well-developed and well-nourished. No distress.   HENT:   Head: Normocephalic.   Eyes: Pupils are equal, round, and reactive to light.   Cardiovascular: Normal rate, regular rhythm and normal heart sounds.   Pulmonary/Chest: Effort normal and breath sounds normal. No respiratory distress. She has no wheezes. She has no rales.   Abdominal: Soft. She exhibits no mass. There is no rebound and no guarding.   Portion of lower abd inc line opened at bedside  Small amt of serous drainage obtained  Packing placed   Neurological: She is alert and oriented to person, place, and time.   Skin: Skin is warm and dry.   Psychiatric: She has a normal mood and affect. Her behavior is normal. Judgment and thought content normal.   Nursing note and vitals reviewed.        Significant Labs:  BMP (Last 3 Results):   Recent Labs   Lab 01/28/19  0518 01/29/19  0441 01/30/19  0407   GLU 87 86 74   * 134* 132*   K 3.9 4.0 4.0    105 104   CO2 19* 21* 19*   BUN 7 7 6   CREATININE 0.9 0.9 0.9   CALCIUM 8.5* 8.2* 8.2*   MG 1.0* 1.0* 1.0*     CBC (Last 3 Results):   Recent Labs   Lab 01/28/19  0518 01/29/19  0441 01/30/19  0407   WBC 11.33 11.25 9.49   RBC 3.51* 3.37* 3.55*   HGB 10.1* 9.7* 10.0*   HCT 33.5* 32.5* 34.5*    340 364*   MCV 95 96 97   MCH 28.8 28.8 28.2   MCHC 30.1* 29.8* 29.0*       Significant Diagnostics:  None    Assessment/Plan:     Unspecified mood (affective) disorder    Cont home meds  Appreciate psy recommendations, will follow     Hypophosphatemia    Monitor labs  repalce phos     Hypophosphatasia    Replace phos  Monitor labs     Hypomagnesemia    Replace Mag  Monitor labs     Crohn's disease of small and large intestines    Ileostomy closure 1/15, post op TAC for Crohns.      - on lomotil and loperamide, added levsin, add liquid codeine  - c diff neg   -reg diet   - oral pain meds  with dilaudid for breakthrough.   -Cr has normalized, cont MIVFs    -enc amb and IS  -ivan hose and SCD, ppx hep  -PPI  -enc basis hygeine  -PT/OT for mobilization     Malnutrition    Adv diet as tolerated  Monitor labs  Enc diet  Begin marinol  psy consult as requested           Vidhi Mathis NP  Colorectal Surgery  [facility]

## 2019-01-30 NOTE — PLAN OF CARE
Problem: Adult Inpatient Plan of Care  Goal: Plan of Care Review  Outcome: Ongoing (interventions implemented as appropriate)  Patient is alert and oriented. Able to make needs known. PRN Percocet given for pain control. No s/s of respiratory/cardiac distress noted. Abdominal midline incision dressing and RLQ closure site dressing is dry and intact. PIV is patent and flushes well. Patient remains on a regular diet and is tolerating it well. VS stable. No issues or concerns at this time. Continue to monitor.

## 2019-01-30 NOTE — PHYSICIAN QUERY
"PT Name: Candida Cardona  MR #: [id number]    Physician Query Form - Nutrition Clarification     CDS/: Brandy E Capley               Contact information:  Spectralink:  [phone]    This form is a permanent document in the medical record.     Query Date: 2019    By submitting this query, we are merely seeking further clarification of documentation.. Please utilize your independent clinical judgment when addressing the question(s) below.    The Medical record contains the following:   Indicators  Supporting Clinical Findings [facility] in Medical Record    % of Estimated Energy Intake over a time frame from p.o., TF, or TPN      Weight Status over a time frame      Subcutaneous Fat and/or Muscle Loss      Fluid Accumulation or Edema      Reduced  Strength     X Wt / BMI / Usual Body Weight Height Method: Stated  Height: 5' 7" (170.2 cm)  Height (inches): 67 in  Weight Method: Bed Scale  Weight: 70.3 kg (155 lb)  Weight (lb): 155 lb  Ideal Body Weight (IBW), Female: 135 lb  % Ideal Body Weight, Female (lb): 114.81 lb  BMI (Calculated): 24.3  BMI Grade: 18.5-24.9 - normal  Weight Loss: unintentional  Usual Body Weight (UBW), k.9 kg(per chart review 18)  % Usual Body Weight: 89.3  % Weight Change From Usual Weight: -10.89 %   Nutrition progress notes     Delayed Wound Healing / Failure to Thrive     X Acute or Chronic Illness HPI:   Status post total abdominal colectomy with end ileostomy in 2014 for Crohn's disease with colonic obstruction.      Only interval change is that she notes recent increase in ostomy output. States she has mentioned this with Dr. Nieves; his PA is currently running stool samples for workup.     DATE OF PROCEDURE:  01/15/2019     POSTOPERATIVE DIAGNOSIS:  Crohn's disease with unwanted ileostomy.     OPERATIONS:  1.  Ileostomy closure with ileorectal anastomosis.  2.  Exploratory laparotomy with extensive lysis of adhesions.  3.  Ureterolysis.  4.  " Sigmoidoscopy   Interval H&P 1/15                     Op note filed 1/21    Medication      Treatment     X Other Pt with moderate malnutrition.      1. Continue Regular diet.   2. Recommend adding Boost Plus (strawberry) with meals.   3. Encourage po intake.     Reason For Assessment: length of stay  Diagnosis: gastrointestinal disease( Ileostomy in place )  Interdisciplinary Rounds: attended    General Information Comments: POD13 ex lap, ileostomy closure. Pt reports appetite is poor 2/2 abdominal cramping and pain. States that she drinks ONS if it is the correct flavor. Denies N/V. States appetite PTA was on and off 2/2 Crohn's. Reports wt loss. NFPE completed. Pt meets moderate malnutrition at this time. Educated and provided handout on IBD Nutrition Therapy. All questions and concerns addressed.   Nutrition Discharge Planning: adequate po intake    Current Diet Order: Regular  Oral Nutrition Supplement: Boost Breeze    Evaluation of Received Nutrient/Fluid Intake  I/O: +20.6L since admit  Comments: LBM 1/28  Tolerance: tolerating  % Intake of Estimated Energy Needs: Other: on and off appetite  % Meal Intake: Other: on and off appetite    Moderate malnutrition    Malnutrition in the context of Chronic Illness/Injury     Related to (etiology):  Crohn's Disease     Signs and Symptoms (as evidenced by):  Energy Intake: on and off appetite per pt   Body Fat Depletion: mild depletion of triceps   Muscle Mass Depletion: mild and moderate depletion of temples and lower extremities   Weight Loss: 11% x 4 months per chart review       Interventions/Recommendations (treatment strategy):  Collaboration and Referral of Nutrition Care  Nutrition Supplement Therapy    She appears well-developed and well-nourished.     Malnutrition  Await return of bowel function  Monitor labs     Hyponatremia  Hypophosphatasia  Hypomagnesemia    Malnutrition ruled out Nutrition progress notes  1/28                                                                                      Colon and rectal surgery progress  Note 1/16                 Colon and rectal surgery progress notes 1050        Other documentation 1/22     AND / ASPEN Clinical Characteristics (October 2011)  A minimum of two characteristics is recommended for diagnosing either moderate or severe malnutrition   Mild Malnutrition Moderate Malnutrition Severe Malnutrition   Energy Intake from p.o., TF or TPN. < 75% intake of estimated energy needs for less than 7 days < 75% intake of estimated energy needs for greater than 7 days < 50% intake of estimated energy needs for > 5 days   Weight Loss 1-2% in 1 month  5% in 3 months  7.5% in 6 months  10% in 1 year 1-2 % in 1 week  5% in 1 month  7.5% in 3 months  10% in 6 months  20% in 1 year > 2% in 1 week  > 5% in 1 month  > 7.5% in 3 months  > 10% in 6 months  > 20% in 1 year   Physical Findings     None *Mild subcutaneous fat and/or muscle loss  *Mild fluid accumulation  *Stage II decubitus  *Surgical wound or non-healing wound *Mod/severe subcutaneous fat and/or muscle loss  *Mod/severe fluid accumulation  *Stage III or IV decubitus  *Non-healing surgical wound     Provider, please specify diagnosis or diagnoses associated with above clinical findings.  Please clarify nutritional diagnosis due to conflicting documentation.     [  ] Mild Protein-Calorie Malnutrition   [x  ] Moderate Protein-Calorie Malnutrition   [  ] Severe Protein-Calorie Malnutrition   [  ] Malnutrition ruled out   [  ] Abnormal Weight Loss   [  ] Other Nutritional Diagnosis (please specify):    [  ] Other:    [  ] Clinically Undetermined       Please document in your progress notes daily for the duration of treatment until resolved and include in your discharge summary.

## 2019-01-31 LAB
ANION GAP SERPL CALC-SCNC: 8 MMOL/L
BACTERIA #/AREA URNS AUTO: ABNORMAL /HPF
BASOPHILS # BLD AUTO: 0.06 K/UL
BASOPHILS NFR BLD: 0.8 %
BILIRUB UR QL STRIP: NEGATIVE
BUN SERPL-MCNC: 5 MG/DL
CALCIUM SERPL-MCNC: 8.1 MG/DL
CHLORIDE SERPL-SCNC: 108 MMOL/L
CLARITY UR REFRACT.AUTO: ABNORMAL
CO2 SERPL-SCNC: 17 MMOL/L
COLOR UR AUTO: YELLOW
CREAT SERPL-MCNC: 0.8 MG/DL
DIFFERENTIAL METHOD: ABNORMAL
EOSINOPHIL # BLD AUTO: 0.1 K/UL
EOSINOPHIL NFR BLD: 1 %
ERYTHROCYTE [DISTWIDTH] IN BLOOD BY AUTOMATED COUNT: 15.9 %
EST. GFR  (AFRICAN AMERICAN): >60 ML/MIN/1.73 M^2
EST. GFR  (NON AFRICAN AMERICAN): >60 ML/MIN/1.73 M^2
GLUCOSE SERPL-MCNC: 83 MG/DL
GLUCOSE UR QL STRIP: NEGATIVE
HCT VFR BLD AUTO: 31.8 %
HGB BLD-MCNC: 9.3 G/DL
HGB UR QL STRIP: NEGATIVE
HYALINE CASTS UR QL AUTO: 1 /LPF
IMM GRANULOCYTES # BLD AUTO: 0.07 K/UL
IMM GRANULOCYTES NFR BLD AUTO: 0.9 %
KETONES UR QL STRIP: NEGATIVE
LEUKOCYTE ESTERASE UR QL STRIP: ABNORMAL
LYMPHOCYTES # BLD AUTO: 1.8 K/UL
LYMPHOCYTES NFR BLD: 22.9 %
MAGNESIUM SERPL-MCNC: 1.7 MG/DL
MCH RBC QN AUTO: 28.7 PG
MCHC RBC AUTO-ENTMCNC: 29.2 G/DL
MCV RBC AUTO: 98 FL
MICROSCOPIC COMMENT: ABNORMAL
MONOCYTES # BLD AUTO: 0.5 K/UL
MONOCYTES NFR BLD: 6.7 %
NEUTROPHILS # BLD AUTO: 5.4 K/UL
NEUTROPHILS NFR BLD: 67.7 %
NITRITE UR QL STRIP: POSITIVE
NRBC BLD-RTO: 0 /100 WBC
PH UR STRIP: 6 [PH] (ref 5–8)
PHOSPHATE SERPL-MCNC: 3.2 MG/DL
PLATELET # BLD AUTO: 362 K/UL
PMV BLD AUTO: 10.4 FL
POTASSIUM SERPL-SCNC: 4 MMOL/L
PROT UR QL STRIP: NEGATIVE
RBC # BLD AUTO: 3.24 M/UL
RBC #/AREA URNS AUTO: 5 /HPF (ref 0–4)
SODIUM SERPL-SCNC: 133 MMOL/L
SP GR UR STRIP: 1.01 (ref 1–1.03)
SQUAMOUS #/AREA URNS AUTO: 6 /HPF
URN SPEC COLLECT METH UR: ABNORMAL
WBC # BLD AUTO: 7.91 K/UL
WBC #/AREA URNS AUTO: >100 /HPF (ref 0–5)
WBC CLUMPS UR QL AUTO: ABNORMAL

## 2019-01-31 PROCEDURE — 83735 ASSAY OF MAGNESIUM: CPT

## 2019-01-31 PROCEDURE — 80048 BASIC METABOLIC PNL TOTAL CA: CPT

## 2019-01-31 PROCEDURE — 85025 COMPLETE CBC W/AUTO DIFF WBC: CPT

## 2019-01-31 PROCEDURE — 36415 COLL VENOUS BLD VENIPUNCTURE: CPT

## 2019-01-31 PROCEDURE — 63600175 PHARM REV CODE 636 W HCPCS: Performed by: NURSE PRACTITIONER

## 2019-01-31 PROCEDURE — 81001 URINALYSIS AUTO W/SCOPE: CPT

## 2019-01-31 PROCEDURE — 20600001 HC STEP DOWN PRIVATE ROOM

## 2019-01-31 PROCEDURE — 25000003 PHARM REV CODE 250: Performed by: STUDENT IN AN ORGANIZED HEALTH CARE EDUCATION/TRAINING PROGRAM

## 2019-01-31 PROCEDURE — 25000003 PHARM REV CODE 250: Performed by: SURGERY

## 2019-01-31 PROCEDURE — 63600175 PHARM REV CODE 636 W HCPCS: Performed by: STUDENT IN AN ORGANIZED HEALTH CARE EDUCATION/TRAINING PROGRAM

## 2019-01-31 PROCEDURE — 25000003 PHARM REV CODE 250: Performed by: NURSE PRACTITIONER

## 2019-01-31 PROCEDURE — 84100 ASSAY OF PHOSPHORUS: CPT

## 2019-01-31 RX ORDER — SULFAMETHOXAZOLE AND TRIMETHOPRIM 800; 160 MG/1; MG/1
1 TABLET ORAL 2 TIMES DAILY
Status: DISCONTINUED | OUTPATIENT
Start: 2019-01-31 | End: 2019-02-01 | Stop reason: HOSPADM

## 2019-01-31 RX ORDER — ACETAMINOPHEN AND CODEINE PHOSPHATE 120; 12 MG/5ML; MG/5ML
10 SOLUTION ORAL 4 TIMES DAILY
Status: DISCONTINUED | OUTPATIENT
Start: 2019-01-31 | End: 2019-02-01 | Stop reason: HOSPADM

## 2019-01-31 RX ORDER — TAMSULOSIN HYDROCHLORIDE 0.4 MG/1
0.4 CAPSULE ORAL DAILY
Status: DISCONTINUED | OUTPATIENT
Start: 2019-01-31 | End: 2019-02-01 | Stop reason: HOSPADM

## 2019-01-31 RX ADMIN — MICONAZOLE NITRATE: 20 POWDER TOPICAL at 08:01

## 2019-01-31 RX ADMIN — OXYCODONE HYDROCHLORIDE 5 MG: 5 TABLET ORAL at 01:01

## 2019-01-31 RX ADMIN — DIPHENOXYLATE HYDROCHLORIDE AND ATROPINE SULFATE 2 TABLET: 2.5; .025 TABLET ORAL at 09:01

## 2019-01-31 RX ADMIN — DIPHENOXYLATE HYDROCHLORIDE AND ATROPINE SULFATE 2 TABLET: 2.5; .025 TABLET ORAL at 10:01

## 2019-01-31 RX ADMIN — LOPERAMIDE HYDROCHLORIDE 4 MG: 1 SOLUTION ORAL at 12:01

## 2019-01-31 RX ADMIN — SULFAMETHOXAZOLE AND TRIMETHOPRIM 1 TABLET: 800; 160 TABLET ORAL at 09:01

## 2019-01-31 RX ADMIN — ACETAMINOPHEN AND CODEINE PHOSPHATE 10 ML: 120; 12 SOLUTION ORAL at 12:01

## 2019-01-31 RX ADMIN — PANTOPRAZOLE SODIUM 40 MG: 40 TABLET, DELAYED RELEASE ORAL at 09:01

## 2019-01-31 RX ADMIN — SODIUM CHLORIDE: 0.9 INJECTION, SOLUTION INTRAVENOUS at 08:01

## 2019-01-31 RX ADMIN — DRONABINOL 2.5 MG: 2.5 CAPSULE ORAL at 08:01

## 2019-01-31 RX ADMIN — FLUOXETINE HYDROCHLORIDE 60 MG: 20 CAPSULE ORAL at 08:01

## 2019-01-31 RX ADMIN — MICONAZOLE NITRATE: 20 POWDER TOPICAL at 09:01

## 2019-01-31 RX ADMIN — LOPERAMIDE HYDROCHLORIDE 4 MG: 1 SOLUTION ORAL at 08:01

## 2019-01-31 RX ADMIN — DIPHENOXYLATE HYDROCHLORIDE AND ATROPINE SULFATE 2 TABLET: 2.5; .025 TABLET ORAL at 02:01

## 2019-01-31 RX ADMIN — ACETAMINOPHEN AND CODEINE PHOSPHATE 10 ML: 120; 12 SOLUTION ORAL at 08:01

## 2019-01-31 RX ADMIN — ACETAMINOPHEN AND CODEINE PHOSPHATE 10 ML: 120; 12 SOLUTION ORAL at 11:01

## 2019-01-31 RX ADMIN — MICONAZOLE NITRATE: 20 CREAM TOPICAL at 08:01

## 2019-01-31 RX ADMIN — TOPIRAMATE 50 MG: 25 TABLET, FILM COATED ORAL at 09:01

## 2019-01-31 RX ADMIN — MICONAZOLE NITRATE: 20 CREAM TOPICAL at 09:01

## 2019-01-31 RX ADMIN — HEPARIN SODIUM 5000 UNITS: 5000 INJECTION, SOLUTION INTRAVENOUS; SUBCUTANEOUS at 09:01

## 2019-01-31 RX ADMIN — LOPERAMIDE HYDROCHLORIDE 4 MG: 1 SOLUTION ORAL at 09:01

## 2019-01-31 RX ADMIN — DRONABINOL 2.5 MG: 2.5 CAPSULE ORAL at 09:01

## 2019-01-31 RX ADMIN — HEPARIN SODIUM 5000 UNITS: 5000 INJECTION, SOLUTION INTRAVENOUS; SUBCUTANEOUS at 05:01

## 2019-01-31 RX ADMIN — PANTOPRAZOLE SODIUM 40 MG: 40 TABLET, DELAYED RELEASE ORAL at 08:01

## 2019-01-31 RX ADMIN — LOPERAMIDE HYDROCHLORIDE 4 MG: 1 SOLUTION ORAL at 04:01

## 2019-01-31 RX ADMIN — HEPARIN SODIUM 5000 UNITS: 5000 INJECTION, SOLUTION INTRAVENOUS; SUBCUTANEOUS at 01:01

## 2019-01-31 RX ADMIN — TAMSULOSIN HYDROCHLORIDE 0.4 MG: 0.4 CAPSULE ORAL at 08:01

## 2019-01-31 RX ADMIN — ACETAMINOPHEN AND CODEINE PHOSPHATE 10 ML: 120; 12 SOLUTION ORAL at 04:01

## 2019-01-31 NOTE — ASSESSMENT & PLAN NOTE
Ileostomy closure 1/15, post op TAC for Crohns.      - on lomotil and loperamide, added levsin, increase liquid codeine  - c diff neg   -reg diet   - oral pain meds with dilaudid for breakthrough.   -Cr has normalized, cont MIVFs    -enc amb and IS  -ivan hose and SCD, ppx hep  -PPI  -enc basis hygeine  -PT/OT for mobilization

## 2019-01-31 NOTE — PROGRESS NOTES
[facility]  Colorectal Surgery  Progress Note    Patient Name: Candida Cardona  MRN: [id number]  Admission Date: 1/15/2019  Hospital Length of Stay: 16 days  Attending Physician: Reji Peterson MD    Subjective:     Interval History: no acute events overnite, eating well but still having multiple bms    Post-Op Info:  Procedure(s):  ANASTOMOSIS, ILEORECTAL  CLOSURE, ILEOSTOMY  URETEROLYSIS  LAPAROTOMY, EXPLORATORY;extensive lysis of adhesions   16 Days Post-Op      Medications:  Continuous Infusions:   sodium chloride 0.9% 50 mL/hr at 01/31/19 0840     Scheduled Meds:   acetaminophen-codeine  10 mL Oral QID    diphenoxylate-atropine 2.5-0.025 mg  2 tablet Oral QID    dronabinol  2.5 mg Oral BID    FLUoxetine  60 mg Oral Daily    heparin (porcine)  5,000 Units Subcutaneous Q8H    loperamide  4 mg Oral QID    miconazole   Topical (Top) BID    miconazole NITRATE 2 %   Topical (Top) BID    pantoprazole  40 mg Oral BID    psyllium husk (aspartame)  2 packet Oral TID    tamsulosin  0.4 mg Oral Daily    topiramate  50 mg Oral QHS     PRN Meds:   cyclobenzaprine    HYDROmorphone    hydrOXYzine pamoate    hyoscyamine    LORazepam    mupirocin    naloxone    ondansetron    oxyCODONE        Objective:     Vital Signs (Most Recent):  Temp: 98.1 °F (36.7 °C) (01/31/19 0720)  Pulse: 76 (01/31/19 0720)  Resp: 18 (01/31/19 0720)  BP: 108/61 (01/31/19 0720)  SpO2: 99 % (01/31/19 0720) Vital Signs (24h Range):  Temp:  [97.8 °F (36.6 °C)-98.1 °F (36.7 °C)] 98.1 °F (36.7 °C)  Pulse:  [72-86] 76  Resp:  [18-20] 18  SpO2:  [99 %-100 %] 99 %  BP: ()/(55-66) 108/61     Intake/Output - Last 3 Shifts       01/29 0700 - 01/30 0659 01/30 0700 - 01/31 0659 01/31 0700 - 02/01 0659    P.O. 1440 2440 360    I.V. (mL/kg)  984.2 (14)     IV Piggyback       Total Intake(mL/kg) 1440 (20.5) 3424.2 (48.7) 360 (5.1)    Urine (mL/kg/hr) 1300 (0.8) 1500 (0.9) 300 (1)    Stool 0 0     Total Output 1300  1500 300    Net +140 +1924.2 +60           Urine Occurrence   1 x    Stool Occurrence 11 x 21 x           Physical Exam   Constitutional: She is oriented to person, place, and time. She appears well-developed and well-nourished.   HENT:   Head: Normocephalic.   Eyes: Pupils are equal, round, and reactive to light.   Cardiovascular: Normal rate, regular rhythm and normal heart sounds.   Pulmonary/Chest: Effort normal and breath sounds normal. No respiratory distress. She has no wheezes. She has no rales.   Abdominal: Soft. She exhibits no mass. There is no rebound and no guarding.   Wound partiallyopened at bedside  Draining serous   Neurological: She is alert and oriented to person, place, and time.   Skin: Skin is warm and dry.   Psychiatric: She has a normal mood and affect. Her behavior is normal. Judgment and thought content normal.   Nursing note and vitals reviewed.        Significant Labs:  BMP (Last 3 Results):   Recent Labs   Lab 01/29/19  0441 01/30/19  0407 01/31/19  0726   GLU 86 74 83   * 132* 133*   K 4.0 4.0 4.0    104 108   CO2 21* 19* 17*   BUN 7 6 5*   CREATININE 0.9 0.9 0.8   CALCIUM 8.2* 8.2* 8.1*   MG 1.0* 1.0* 1.7     CBC (Last 3 Results):   Recent Labs   Lab 01/29/19 0441 01/30/19  0407 01/31/19  0725   WBC 11.25 9.49 7.91   RBC 3.37* 3.55* 3.24*   HGB 9.7* 10.0* 9.3*   HCT 32.5* 34.5* 31.8*    364* 362*   MCV 96 97 98   MCH 28.8 28.2 28.7   MCHC 29.8* 29.0* 29.2*       Significant Diagnostics:  None    Assessment/Plan:     Unspecified mood (affective) disorder    Cont home meds  Appreciate psy recommendations, will follow     Hypophosphatemia    Monitor labs  repalce phos     Hypophosphatasia    Replace phos  Monitor labs     Hypomagnesemia    Replace Mag  Monitor labs     Crohn's disease of small and large intestines    Ileostomy closure 1/15, post op TAC for Crohns.      - on lomotil and loperamide, added levsin, increase liquid codeine  - c diff neg   -reg diet   - oral  pain meds with dilaudid for breakthrough.   -Cr has normalized, cont MIVFs    -enc amb and IS  -ivan hose and SCD, ppx hep  -PPI  -enc basis hygeine  -PT/OT for mobilization     Malnutrition    Adv diet as tolerated  Monitor labs  Enc diet  Begin marinol  psy consult as requested           Vidhi Mathis NP  Colorectal Surgery  [facility]

## 2019-01-31 NOTE — PROGRESS NOTES
"RN provided patient with all the necessities to take a shower. Patient is refusing to do so tonight and said "I will take one tomorrow".  Educated patient of the risk of infection. Pt verbalized understanding. Will continue to monitor.   "

## 2019-01-31 NOTE — PLAN OF CARE
Problem: Adult Inpatient Plan of Care  Goal: Plan of Care Review  Outcome: Ongoing (interventions implemented as appropriate)  POC reviewed with pt who verbalized understanding. AAOx4. VSS. Remains free of falls and injury. IVF infusing throughout shift. ML and old ostomy site dressings C/D/I. Tolerating diet; no complaints of nausea. Up independently to bathroom. No acute events. No distress noted. Will continue to monitor.

## 2019-01-31 NOTE — PLAN OF CARE
Problem: Adult Inpatient Plan of Care  Goal: Plan of Care Review  Outcome: Ongoing (interventions implemented as appropriate)  Patient ambulatory to bathroom with several episodes of loose stool today, Urine sample sent and results received and new orders. Refused to take shower after several request and stated she would take one tonight. No request of pain medication today. Continue with plan of care.

## 2019-01-31 NOTE — SUBJECTIVE & OBJECTIVE
Subjective:     Interval History: no acute events overnite, eating well but still having multiple bms    Post-Op Info:  Procedure(s):  ANASTOMOSIS, ILEORECTAL  CLOSURE, ILEOSTOMY  URETEROLYSIS  LAPAROTOMY, EXPLORATORY;extensive lysis of adhesions   16 Days Post-Op      Medications:  Continuous Infusions:   sodium chloride 0.9% 50 mL/hr at 01/31/19 0840     Scheduled Meds:   acetaminophen-codeine  10 mL Oral QID    diphenoxylate-atropine 2.5-0.025 mg  2 tablet Oral QID    dronabinol  2.5 mg Oral BID    FLUoxetine  60 mg Oral Daily    heparin (porcine)  5,000 Units Subcutaneous Q8H    loperamide  4 mg Oral QID    miconazole   Topical (Top) BID    miconazole NITRATE 2 %   Topical (Top) BID    pantoprazole  40 mg Oral BID    psyllium husk (aspartame)  2 packet Oral TID    tamsulosin  0.4 mg Oral Daily    topiramate  50 mg Oral QHS     PRN Meds:   cyclobenzaprine    HYDROmorphone    hydrOXYzine pamoate    hyoscyamine    LORazepam    mupirocin    naloxone    ondansetron    oxyCODONE        Objective:     Vital Signs (Most Recent):  Temp: 98.1 °F (36.7 °C) (01/31/19 0720)  Pulse: 76 (01/31/19 0720)  Resp: 18 (01/31/19 0720)  BP: 108/61 (01/31/19 0720)  SpO2: 99 % (01/31/19 0720) Vital Signs (24h Range):  Temp:  [97.8 °F (36.6 °C)-98.1 °F (36.7 °C)] 98.1 °F (36.7 °C)  Pulse:  [72-86] 76  Resp:  [18-20] 18  SpO2:  [99 %-100 %] 99 %  BP: ()/(55-66) 108/61     Intake/Output - Last 3 Shifts       01/29 0700 - 01/30 0659 01/30 0700 - 01/31 0659 01/31 0700 - 02/01 0659    P.O. 1440 2440 360    I.V. (mL/kg)  984.2 (14)     IV Piggyback       Total Intake(mL/kg) 1440 (20.5) 3424.2 (48.7) 360 (5.1)    Urine (mL/kg/hr) 1300 (0.8) 1500 (0.9) 300 (1)    Stool 0 0     Total Output 1300 1500 300    Net +140 +1924.2 +60           Urine Occurrence   1 x    Stool Occurrence 11 x 21 x           Physical Exam   Constitutional: She is oriented to person, place, and time. She appears well-developed and  well-nourished.   HENT:   Head: Normocephalic.   Eyes: Pupils are equal, round, and reactive to light.   Cardiovascular: Normal rate, regular rhythm and normal heart sounds.   Pulmonary/Chest: Effort normal and breath sounds normal. No respiratory distress. She has no wheezes. She has no rales.   Abdominal: Soft. She exhibits no mass. There is no rebound and no guarding.   Wound partiallyopened at bedside  Draining serous   Neurological: She is alert and oriented to person, place, and time.   Skin: Skin is warm and dry.   Psychiatric: She has a normal mood and affect. Her behavior is normal. Judgment and thought content normal.   Nursing note and vitals reviewed.        Significant Labs:  BMP (Last 3 Results):   Recent Labs   Lab 01/29/19 0441 01/30/19  0407 01/31/19  0726   GLU 86 74 83   * 132* 133*   K 4.0 4.0 4.0    104 108   CO2 21* 19* 17*   BUN 7 6 5*   CREATININE 0.9 0.9 0.8   CALCIUM 8.2* 8.2* 8.1*   MG 1.0* 1.0* 1.7     CBC (Last 3 Results):   Recent Labs   Lab 01/29/19  0441 01/30/19  0407 01/31/19  0725   WBC 11.25 9.49 7.91   RBC 3.37* 3.55* 3.24*   HGB 9.7* 10.0* 9.3*   HCT 32.5* 34.5* 31.8*    364* 362*   MCV 96 97 98   MCH 28.8 28.2 28.7   MCHC 29.8* 29.0* 29.2*       Significant Diagnostics:  None

## 2019-02-01 VITALS
SYSTOLIC BLOOD PRESSURE: 121 MMHG | HEIGHT: 67 IN | OXYGEN SATURATION: 99 % | WEIGHT: 155 LBS | RESPIRATION RATE: 18 BRPM | DIASTOLIC BLOOD PRESSURE: 67 MMHG | HEART RATE: 104 BPM | BODY MASS INDEX: 24.33 KG/M2 | TEMPERATURE: 98 F

## 2019-02-01 LAB
ANION GAP SERPL CALC-SCNC: 8 MMOL/L
BASOPHILS # BLD AUTO: 0.05 K/UL
BASOPHILS NFR BLD: 0.7 %
BUN SERPL-MCNC: 5 MG/DL
CALCIUM SERPL-MCNC: 8.3 MG/DL
CHLORIDE SERPL-SCNC: 108 MMOL/L
CO2 SERPL-SCNC: 16 MMOL/L
CREAT SERPL-MCNC: 0.8 MG/DL
DIFFERENTIAL METHOD: ABNORMAL
EOSINOPHIL # BLD AUTO: 0.1 K/UL
EOSINOPHIL NFR BLD: 0.8 %
ERYTHROCYTE [DISTWIDTH] IN BLOOD BY AUTOMATED COUNT: 16.1 %
EST. GFR  (AFRICAN AMERICAN): >60 ML/MIN/1.73 M^2
EST. GFR  (NON AFRICAN AMERICAN): >60 ML/MIN/1.73 M^2
GLUCOSE SERPL-MCNC: 84 MG/DL
HCT VFR BLD AUTO: 31.6 %
HGB BLD-MCNC: 8.7 G/DL
IMM GRANULOCYTES # BLD AUTO: 0.04 K/UL
IMM GRANULOCYTES NFR BLD AUTO: 0.5 %
LYMPHOCYTES # BLD AUTO: 2.1 K/UL
LYMPHOCYTES NFR BLD: 27.9 %
MAGNESIUM SERPL-MCNC: 1.4 MG/DL
MCH RBC QN AUTO: 28.6 PG
MCHC RBC AUTO-ENTMCNC: 27.5 G/DL
MCV RBC AUTO: 104 FL
MONOCYTES # BLD AUTO: 0.6 K/UL
MONOCYTES NFR BLD: 8.3 %
NEUTROPHILS # BLD AUTO: 4.6 K/UL
NEUTROPHILS NFR BLD: 61.8 %
NRBC BLD-RTO: 0 /100 WBC
PHOSPHATE SERPL-MCNC: 2.8 MG/DL
PLATELET # BLD AUTO: 322 K/UL
PMV BLD AUTO: 10.1 FL
POTASSIUM SERPL-SCNC: 4.1 MMOL/L
RBC # BLD AUTO: 3.04 M/UL
SODIUM SERPL-SCNC: 132 MMOL/L
WBC # BLD AUTO: 7.38 K/UL

## 2019-02-01 PROCEDURE — 25000003 PHARM REV CODE 250: Performed by: STUDENT IN AN ORGANIZED HEALTH CARE EDUCATION/TRAINING PROGRAM

## 2019-02-01 PROCEDURE — 25000003 PHARM REV CODE 250: Performed by: NURSE PRACTITIONER

## 2019-02-01 PROCEDURE — 25000003 PHARM REV CODE 250: Performed by: SURGERY

## 2019-02-01 PROCEDURE — 84100 ASSAY OF PHOSPHORUS: CPT

## 2019-02-01 PROCEDURE — 83735 ASSAY OF MAGNESIUM: CPT

## 2019-02-01 PROCEDURE — 63600175 PHARM REV CODE 636 W HCPCS: Performed by: STUDENT IN AN ORGANIZED HEALTH CARE EDUCATION/TRAINING PROGRAM

## 2019-02-01 PROCEDURE — 36415 COLL VENOUS BLD VENIPUNCTURE: CPT

## 2019-02-01 PROCEDURE — 85025 COMPLETE CBC W/AUTO DIFF WBC: CPT

## 2019-02-01 PROCEDURE — 63600175 PHARM REV CODE 636 W HCPCS: Performed by: NURSE PRACTITIONER

## 2019-02-01 PROCEDURE — 80048 BASIC METABOLIC PNL TOTAL CA: CPT

## 2019-02-01 RX ORDER — LANOLIN ALCOHOL/MO/W.PET/CERES
400 CREAM (GRAM) TOPICAL ONCE
Status: COMPLETED | OUTPATIENT
Start: 2019-02-01 | End: 2019-02-01

## 2019-02-01 RX ORDER — SULFAMETHOXAZOLE AND TRIMETHOPRIM 800; 160 MG/1; MG/1
1 TABLET ORAL 2 TIMES DAILY
Qty: 10 TABLET | Refills: 0 | Status: SHIPPED | OUTPATIENT
Start: 2019-02-01 | End: 2019-02-07

## 2019-02-01 RX ORDER — DIPHENOXYLATE HYDROCHLORIDE AND ATROPINE SULFATE 2.5; .025 MG/1; MG/1
2 TABLET ORAL 4 TIMES DAILY
Qty: 80 TABLET | Refills: 2 | Status: SHIPPED | OUTPATIENT
Start: 2019-02-01 | End: 2019-02-11

## 2019-02-01 RX ORDER — ACETAMINOPHEN AND CODEINE PHOSPHATE 120; 12 MG/5ML; MG/5ML
10 SOLUTION ORAL 4 TIMES DAILY
Qty: 200 ML | Refills: 3 | Status: SHIPPED | OUTPATIENT
Start: 2019-02-01 | End: 2019-03-14

## 2019-02-01 RX ORDER — SODIUM BICARBONATE 650 MG/1
650 TABLET ORAL 2 TIMES DAILY
Qty: 10 TABLET | Refills: 0 | Status: SHIPPED | OUTPATIENT
Start: 2019-02-01 | End: 2019-02-07

## 2019-02-01 RX ADMIN — MICONAZOLE NITRATE: 20 CREAM TOPICAL at 09:02

## 2019-02-01 RX ADMIN — PANTOPRAZOLE SODIUM 40 MG: 40 TABLET, DELAYED RELEASE ORAL at 09:02

## 2019-02-01 RX ADMIN — ACETAMINOPHEN AND CODEINE PHOSPHATE 10 ML: 120; 12 SOLUTION ORAL at 09:02

## 2019-02-01 RX ADMIN — SULFAMETHOXAZOLE AND TRIMETHOPRIM 1 TABLET: 800; 160 TABLET ORAL at 09:02

## 2019-02-01 RX ADMIN — OXYCODONE HYDROCHLORIDE 5 MG: 5 TABLET ORAL at 04:02

## 2019-02-01 RX ADMIN — FLUOXETINE HYDROCHLORIDE 60 MG: 20 CAPSULE ORAL at 09:02

## 2019-02-01 RX ADMIN — DIPHENOXYLATE HYDROCHLORIDE AND ATROPINE SULFATE 2 TABLET: 2.5; .025 TABLET ORAL at 01:02

## 2019-02-01 RX ADMIN — SODIUM CHLORIDE: 0.9 INJECTION, SOLUTION INTRAVENOUS at 05:02

## 2019-02-01 RX ADMIN — MICONAZOLE NITRATE: 20 POWDER TOPICAL at 09:02

## 2019-02-01 RX ADMIN — DIPHENOXYLATE HYDROCHLORIDE AND ATROPINE SULFATE 2 TABLET: 2.5; .025 TABLET ORAL at 09:02

## 2019-02-01 RX ADMIN — MAGNESIUM OXIDE TAB 400 MG (241.3 MG ELEMENTAL MG) 400 MG: 400 (241.3 MG) TAB at 09:02

## 2019-02-01 RX ADMIN — DRONABINOL 2.5 MG: 2.5 CAPSULE ORAL at 09:02

## 2019-02-01 RX ADMIN — TAMSULOSIN HYDROCHLORIDE 0.4 MG: 0.4 CAPSULE ORAL at 09:02

## 2019-02-01 RX ADMIN — LOPERAMIDE HYDROCHLORIDE 4 MG: 1 SOLUTION ORAL at 09:02

## 2019-02-01 RX ADMIN — ACETAMINOPHEN AND CODEINE PHOSPHATE 10 ML: 120; 12 SOLUTION ORAL at 01:02

## 2019-02-01 RX ADMIN — LOPERAMIDE HYDROCHLORIDE 4 MG: 1 SOLUTION ORAL at 01:02

## 2019-02-01 RX ADMIN — HEPARIN SODIUM 5000 UNITS: 5000 INJECTION, SOLUTION INTRAVENOUS; SUBCUTANEOUS at 05:02

## 2019-02-01 NOTE — PLAN OF CARE
[facility]    HOME HEALTH ORDERS  FACE TO FACE ENCOUNTER    Patient Name: Candida Cardona  YOB: 1980    PCP: Ted Gómez MD   PCP Address: [address] [place] [address] [address]  [place] [address]  PCP Phone Number: [phone]  PCP Fax: [phone]    Encounter Date: 02/01/2019    Admit to Home Health    Diagnoses:  Active Hospital Problems    Diagnosis  POA    *Crohn's disease of small and large intestines [K50.80]  Yes     Chronic    Moderate malnutrition [E44.0]  Yes    Hypophosphatemia [E83.39]  Unknown    Unspecified mood (affective) disorder [F39]  Unknown    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]  Yes    Hypomagnesemia [E83.42]  No    Malnutrition [E46]  Yes      Resolved Hospital Problems    Diagnosis Date Resolved POA    Acute renal insufficiency [N28.9] 01/20/2019 Yes    Ileostomy in place [Z93.2] 01/20/2019 Not Applicable    Hyponatremia [E87.1] 01/20/2019 Yes       Future Appointments   Date Time Provider Department Center   2/5/2019 11:15 AM Ted G[place]mez [place] [facility] [place]  [facility]   2/14/2019  2:15 PM Reji Peterson MD [facility]     Follow-up Information     Reji Peterson MD In 2 weeks.    Specialty:  Colon and Rectal Surgery  [facility]:  [facility]  [facility] [address]  [phone]                     I have seen and examined this patient face to face today. My clinical findings that support the need for the home health skilled services and home bound status are the following:  Weakness/numbness causing balance and gait disturbance due to Surgery making it taxing to leave home.    Allergies:  Review of patient's allergies indicates:   Allergen Reactions    Ciprofloxacin Shortness Of Breath    Imuran [azathioprine sodium] Diarrhea and Nausea And Vomiting     Pt states that she becomes hot sweaty and passes out also. She states that she has diarrhea and nausea and vomiting     [place]     Adhesive Itching       Diet: regular  diet    Activities: no heavy lifting for 6 weeks    Nursing:   SN to complete comprehensive assessment including routine vital signs. Instruct on disease process and s/s of complications to report to MD. Review/verify medication list sent home with the patient at time of discharge  and instruct patient/caregiver as needed. Frequency may be adjusted depending on start of care date.    Notify MD if SBP > 160 or < 90; DBP > 90 or < 50; HR > 120 or < 50; Temp > 101; Other:         CONSULTS:     to evaluate for community resources/long-range planning.    MISCELLANEOUS CARE:  Wound Care Orders:  yes:  Surgical Wound:  [facility]: Abdomen    Consult ET nurse        Apply the following to wound:   Wet to Damp dressing: three times a week (frequency)    WOUND CARE ORDERS  yes:  Surgical Wound:  [facility]: abdomen    Consult ET nurse        Apply the following to wound:   Wet to Damp dressing: 3 times a week (frequency) and n/a      Medications: Review discharge medications with patient and family and provide education.      Current Discharge Medication List      START taking these medications    Details   acetaminophen with codeine (ACETAMINOPHEN-CODEINE) 120mg 12mg 5mL Soln solution Take 10 mLs by mouth 4 (four) times daily.  Qty: 200 mL, Refills: 3      !! diphenoxylate-atropine 2.5-0.025 mg (LOMOTIL) 2.5-0.025 mg per tablet Take 2 tablets by mouth 4 (four) times daily. for 10 days  Qty: 80 tablet, Refills: 2      loperamide (IMODIUM) 1 mg/5 mL solution Take 20 mLs (4 mg total) by mouth 4 (four) times daily. for 10 days  Qty: 800 mL, Refills: 6      loperamide (IMODIUM) 2 mg capsule Take 2 capsules (4 mg total) by mouth 4 (four) times daily. for 10 days  Qty: 120 capsule, Refills: 2      sodium bicarbonate 650 MG tablet Take 1 tablet (650 mg total) by mouth 2 (two) times daily. for 5 days  Qty: 10 tablet, Refills: 0      sulfamethoxazole-trimethoprim 800-160mg (BACTRIM DS) 800-160 mg Tab Take 1 tablet by mouth 2  (two) times daily. for 5 days  Qty: 10 tablet, Refills: 0       !! - Potential duplicate medications found. Please discuss with provider.      CONTINUE these medications which have CHANGED    Details   oxyCODONE-acetaminophen (PERCOCET) 7.5-325 mg per tablet Take 1 tablet by mouth every 4 (four) hours as needed.  Qty: 30 tablet, Refills: 0         CONTINUE these medications which have NOT CHANGED    Details   !! diphenoxylate-atropine 2.5-0.025 mg (LOMOTIL) 2.5-0.025 mg per tablet take 1 to 2 tablets by mouth four times a day if needed for ILOSTOMY OUTPUT  Qty: 120 tablet, Refills: 4      estradiol (ESTRACE) 2 MG tablet Take 1 tablet (2 mg total) by mouth once daily.  Qty: 30 tablet, Refills: 6    Associated Diagnoses: Vasomotor symptoms due to menopause      FLUoxetine 60 mg Tab Take 60 mg by mouth every morning.       hydrOXYzine pamoate (VISTARIL) 25 MG Cap 25 mg every 8 (eight) hours as needed.       nystatin (MYCOSTATIN) powder Apply topically 2 (two) times daily.  Qty: 60 g, Refills: 0      ondansetron (ZOFRAN) 4 MG tablet Take 1 tablet (4 mg total) by mouth every 6 (six) hours as needed for Nausea.  Qty: 10 tablet, Refills: 0      pantoprazole (PROTONIX) 40 MG tablet take 1 tablet by mouth twice a day  Qty: 60 tablet, Refills: 9      cyclobenzaprine (FLEXERIL) 10 MG tablet Take 1 tablet (10 mg total) by mouth 3 (three) times daily.  Qty: 30 tablet, Refills: 0      methotrexate 25 mg/mL injection 25 mg every 7 days. TAKES ON FRIDAYS      topiramate (TOPAMAX) 50 MG tablet Take 50 mg by mouth every evening.       ustekinumab (STELARA) 90 mg/mL Syrg syringe Inject 90 mg into the skin every 8 weeks.       zinc oxide 20 % ointment Apply topically once daily.  Qty: 56 g, Refills: 1       !! - Potential duplicate medications found. Please discuss with provider.      STOP taking these medications       clindamycin (CLEOCIN) 150 MG capsule Comments:   Reason for Stopping:         metroNIDAZOLE (FLAGYL) 500 MG tablet  Comments:   Reason for Stopping:         neomycin (MYCIFRADIN) 500 mg Tab Comments:   Reason for Stopping:         HYDROcodone-acetaminophen (NORCO) 5-325 mg per tablet Comments:   Reason for Stopping:               I certify that this patient is confined to her home and needs intermittent skilled nursing care.

## 2019-02-01 NOTE — PLAN OF CARE
Problem: Adult Inpatient Plan of Care  Goal: Plan of Care Review  Outcome: Ongoing (interventions implemented as appropriate)  POC reviewed and understood by patient. Patient's AAOx4. Pain managed by prn pain meds per md order. IV remained intact. Patient took shower during the night. Dressings changed. Patient ambulated in the room and BR. Patient had 4 BM throughout the shift. Patient's VSS. Call light WNR. Bed in lowest position. No acute events at this time. BLAZE.

## 2019-02-01 NOTE — PROGRESS NOTES
Patient being discharged to home w/ home health . Patient discharge education completed and patient verbalized understanding. Printed copy of prescriptions given to patient. PIV removed with catheter tip intact. Patient remains free of falls with no distress noted.  Patient left unit via transport w/ brother and sister.

## 2019-02-01 NOTE — PLAN OF CARE
Patient will have [facility] for home health needs.     Pt states that her sister will be transporting her home this afternoon.     Yelena Villa LMSW  [facility]

## 2019-02-02 NOTE — PLAN OF CARE
Pt d/c to [facility] for home health needs.        02/01/19 1833   Final Note   Assessment Type Final Discharge Note   Anticipated Discharge Disposition Home-Health

## 2019-02-04 PROBLEM — E83.39 HYPOPHOSPHATASIA: Status: RESOLVED | Noted: 2019-01-21 | Resolved: 2019-02-04

## 2019-02-04 PROBLEM — E83.39 HYPOPHOSPHATEMIA: Status: RESOLVED | Noted: 2019-01-23 | Resolved: 2019-02-04

## 2019-02-04 PROBLEM — E83.42 HYPOMAGNESEMIA: Status: RESOLVED | Noted: 2018-11-01 | Resolved: 2019-02-04

## 2019-02-04 PROBLEM — E44.0 MODERATE MALNUTRITION: Status: RESOLVED | Noted: 2019-01-28 | Resolved: 2019-02-04

## 2019-02-04 NOTE — HPI
Ms. Cardona is a 38-year-old female who is status   post subtotal colectomy with end ileostomy.  She has been on treatment for   Crohn's disease and most recently has had good clinical response to her   treatment and has no evidence of active Crohn's disease.  She presents today for   ileostomy closure understanding that she may have poor functional results given   the short amount of colon and rectum that she has.

## 2019-02-04 NOTE — HOSPITAL COURSE
Pt underwent above procedure.  She had a somewhat prolonged hospital stay.  PCA was used post op for pain control.  PO 2 she developed an expected post op ileus requiring placement of an NGT, home meds had been begun due to hx of anxiety and depression.  Pt OT consulted for mobilization.  After several day bowel function resumed with multiple stools, NGT removed, PCA weaned off and diet was slowly advanced.  Per pt request psy was consulted and recommendations followed.  Often she would refused to ambulate with PT/OT and refuse to participate in basic hygiene.  She continued to have 16-20 stools in 24 hours, C diff negative, antimotility meds were given along with marinol to stimulate appetite.  She would drink liquids but refuse to eat solid food thus increasing loose stools.  Abd inc line developed some erythema, wound partially opened at bedside, serous fluid obtained and local wound care began.  Daily she was encouraged to showed and amb in pierre but she would often refuse.  Once she began to eat more solid food, stools became more solid but she continued to have over 10 reported stools.  Trial of different anti diarrhea meds given.  Eventually she improved to less then 10 stools in a 24 hour period.  On discharge day she is andrzej most of diet, having stools with flatus, abd wound improving with local care, antibiotics given for UTI, adequate pain control with oral meds, amb in room without diff, VS stable and AF, fu 2 weeks with Sweetie and Dr. Peterson

## 2019-02-04 NOTE — DISCHARGE SUMMARY
[facility]  Colorectal Surgery  Discharge Summary      Patient Name: Candida Cardona  MRN: [id number]  Admission Date: 1/15/2019  Hospital Length of Stay: 17 days  Discharge Date and Time: 2/1/2019  2:39 PM  Attending Physician: No att. providers found   Discharging Provider: Vidhi Mathis NP  Primary Care Provider: Ted Gómez MD     HPI:  Ms. Cardona is a 38-year-old female who is status   post subtotal colectomy with end ileostomy.  She has been on treatment for   Crohn's disease and most recently has had good clinical response to her   treatment and has no evidence of active Crohn's disease.  She presents today for   ileostomy closure understanding that she may have poor functional results given   the short amount of colon and rectum that she has.    Procedure(s):  ANASTOMOSIS, ILEORECTAL  CLOSURE, ILEOSTOMY  URETEROLYSIS  LAPAROTOMY, EXPLORATORY;extensive lysis of adhesions     Hospital Course:  Pt underwent above procedure.  She had a somewhat prolonged hospital stay.  PCA was used post op for pain control.  PO 2 she developed an expected post op ileus requiring placement of an NGT, home meds had been begun due to hx of anxiety and depression.  Pt OT consulted for mobilization.  After several day bowel function resumed with multiple stools, NGT removed, PCA weaned off and diet was slowly advanced.  Per pt request psy was consulted and recommendations followed.  Often she would refused to ambulate with PT/OT and refuse to participate in basic hygiene.  She continued to have 16-20 stools in 24 hours, C diff negative, antimotility meds were given along with marinol to stimulate appetite.  She would drink liquids but refuse to eat solid food thus increasing loose stools.  Abd inc line developed some erythema, wound partially opened at bedside, serous fluid obtained and local wound care began.  Daily she was encouraged to showed and amb in pierre but she would often refuse.  Once she began  to eat more solid food, stools became more solid but she continued to have over 10 reported stools.  Trial of different anti diarrhea meds given.  Eventually she improved to less then 10 stools in a 24 hour period.  On discharge day she is andrzej most of diet, having stools with flatus, abd wound improving with local care, antibiotics given for UTI, adequate pain control with oral meds, amb in room without diff, VS stable and AF, fu 2 weeks with Sweetie and Dr. Peterson            Consults (From admission, onward)        Status Ordering Provider     Inpatient consult to Midline team  Once     Provider:  (Not yet assigned)    Completed SKYLA FANG     Inpatient consult to Midline team  Once     Provider:  (Not yet assigned)    Completed LUPE PETERSON     Inpatient consult to Midline team  Once     Provider:  (Not yet assigned)    Completed LUPE PETERSON     Inpatient consult to Psychiatry  Once     Provider:  (Not yet assigned)    Completed ALBERTO MUKHERJEE          Significant Diagnostic Studies: Labs: BMP: No results for input(s): GLU, NA, K, CL, CO2, BUN, CREATININE, CALCIUM, MG in the last 48 hours. and CBC No results for input(s): WBC, HGB, HCT, PLT in the last 48 hours.    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    Specialty:  Colon and Rectal Surgery  Contact information:  [address]  [facility] [address]  [phone]                 Patient Instructions:      Diet Adult Regular     Lifting restrictions   Order Comments: No lifting over 10lbs     Notify your health care provider if you experience any of the following:  temperature >100.4     Notify your health care provider if you experience any of the following:  persistent nausea and vomiting or diarrhea     Notify your health care provider if you experience any of the following:  severe uncontrolled pain     Notify your health care provider if you experience any of the following:  redness, tenderness, or signs of infection (pain, swelling, redness, odor or green/yellow discharge around incision site)     Notify your health care provider if you experience any of the following:  difficulty breathing or increased cough     No dressing needed     Medications:  Reconciled Home Medications:      Medication List      START taking these medications    acetaminophen-codeine 120mg 12mg 5mL Soln  Take 10 mLs by mouth 4 (four) times daily.     sodium bicarbonate 650 MG tablet  Take 1 tablet (650 mg total) by mouth 2 (two) times daily. for 5 days     sulfamethoxazole-trimethoprim 800-160mg 800-160 mg Tab  Commonly known as:  BACTRIM DS  Take 1 tablet by mouth 2 (two) times daily. for 5 days        CHANGE how you take these medications    cyclobenzaprine 10 MG tablet  Commonly known as:  FLEXERIL  Take 1 tablet (10 mg total) by mouth 3 (three) times daily.  What changed:    · when to take this  · reasons to take this     diphenoxylate-atropine 2.5-0.025 mg 2.5-0.025 mg per tablet  Commonly known as:  LOMOTIL  Take 2 tablets by mouth 4  (four) times daily. for 10 days  What changed:  See the new instructions.     estradiol 2 MG tablet  Commonly known as:  ESTRACE  Take 1 tablet (2 mg total) by mouth once daily.  What changed:  when to take this     oxyCODONE-acetaminophen 7.5-325 mg per tablet  Commonly known as:  PERCOCET  Take 1 tablet by mouth every 4 (four) hours as needed.  What changed:  reasons to take this        CONTINUE taking these medications    FLUoxetine 60 mg Tab  Take 60 mg by mouth every morning.     hydrOXYzine pamoate 25 MG Cap  Commonly known as:  VISTARIL  25 mg every 8 (eight) hours as needed.     methotrexate 25 mg/mL injection  25 mg every 7 days. TAKES ON FRIDAYS     nystatin powder  Commonly known as:  MYCOSTATIN  Apply topically 2 (two) times daily.     ondansetron 4 MG tablet  Commonly known as:  ZOFRAN  Take 1 tablet (4 mg total) by mouth every 6 (six) hours as needed for Nausea.     pantoprazole 40 MG tablet  Commonly known as:  PROTONIX  take 1 tablet by mouth twice a day     STELARA 90 mg/mL Syrg syringe  Generic drug:  ustekinumab  Inject 90 mg into the skin every 8 weeks.     topiramate 50 MG tablet  Commonly known as:  TOPAMAX  Take 50 mg by mouth every evening.     zinc oxide 20 % ointment  Apply topically once daily.        STOP taking these medications    clindamycin 150 MG capsule  Commonly known as:  CLEOCIN     HYDROcodone-acetaminophen 5-325 mg per tablet  Commonly known as:  NORCO     metroNIDAZOLE 500 MG tablet  Commonly known as:  FLAGYL     neomycin 500 mg Tab  Commonly known as:  MYCIFRADIN        ASK your doctor about these medications    loperamide 2 mg capsule  Commonly known as:  IMODIUM  Take 2 capsules (4 mg total) by mouth 4 (four) times daily. for 10 days  Ask about: Should I take this medication?            Vidhi Mathis NP  Colorectal Surgery  [facility]

## 2019-02-05 NOTE — PHYSICIAN QUERY
PT Name: Candida Cardona  MR #: [id number]     Physician Query Form - Documentation Clarification      CDS/: Brandy E Capley               Contact information:  Spectralink:  [phone]    This form is a permanent document in the medical record.     Query Date: February 5, 2019    By submitting this query, we are merely seeking further clarification of documentation. Please utilize your independent clinical judgment when addressing the question(s) below.    The Medical record reflects the following:    Supporting Clinical Findings [facility] in Medical Record     PO 2 she developed an expected post op ileus requiring placement of an NGT, home meds had been begun due to hx of anxiety and depression.  Pt OT consulted for mobilization.  After several day bowel function resumed with multiple stools, NGT removed, PCA weaned off and diet was slowly advanced.    DATE OF PROCEDURE:  01/15/2019  POSTOPERATIVE DIAGNOSIS:  Crohn's disease with unwanted ileostomy.  OPERATIONS:  1.  Ileostomy closure with ileorectal anastomosis.  2.  Exploratory laparotomy with extensive lysis of adhesions.  3.  Ureterolysis.  4.  Sigmoidoscopy      2 BMs overnight.      Discharge summary 2/1, filed 2/4        Op note 1/15, filed 1/21                  Colon and rectal surgery progress note 1/19   FINDINGS:  Injury to fundus.  No obstruction, ileus or perforation seen.   XR abdomen 1/17                                                                            Doctor, Please specify diagnosis or diagnoses associated with above clinical findings.  Please further specify ileus.     Provider Use Only     (x  )  Paralytic, not a complication of surgery (expected and/or routine finding)     (  )  Other (please specify):  ___________________________                                                                                                             [  ] Clinically Undetermined

## 2019-02-15 ENCOUNTER — TELEPHONE (OUTPATIENT)
Dept: ADMINISTRATIVE | Facility: CLINIC | Age: 39
End: 2019-02-15

## 2019-02-28 ENCOUNTER — TELEPHONE (OUTPATIENT)
Dept: SURGERY | Facility: CLINIC | Age: 39
End: 2019-02-28

## 2019-02-28 ENCOUNTER — OFFICE VISIT (OUTPATIENT)
Dept: SURGERY | Facility: CLINIC | Age: 39
End: 2019-02-28
Payer: MEDICARE

## 2019-02-28 VITALS
HEART RATE: 70 BPM | BODY MASS INDEX: 22.84 KG/M2 | DIASTOLIC BLOOD PRESSURE: 83 MMHG | HEIGHT: 67 IN | SYSTOLIC BLOOD PRESSURE: 109 MMHG | WEIGHT: 145.5 LBS

## 2019-02-28 DIAGNOSIS — K50.819 CROHN'S DISEASE OF SMALL AND LARGE INTESTINES WITH COMPLICATION: Primary | ICD-10-CM

## 2019-02-28 DIAGNOSIS — Z09 POSTOP CHECK: ICD-10-CM

## 2019-02-28 PROCEDURE — 99213 OFFICE O/P EST LOW 20 MIN: CPT | Mod: PBBFAC | Performed by: COLON & RECTAL SURGERY

## 2019-02-28 PROCEDURE — 99999 PR PBB SHADOW E&M-EST. PATIENT-LVL III: ICD-10-PCS | Mod: PBBFAC,,, | Performed by: COLON & RECTAL SURGERY

## 2019-02-28 PROCEDURE — 99024 PR POST-OP FOLLOW-UP VISIT: ICD-10-PCS | Mod: POP,,, | Performed by: COLON & RECTAL SURGERY

## 2019-02-28 PROCEDURE — 99024 POSTOP FOLLOW-UP VISIT: CPT | Mod: POP,,, | Performed by: COLON & RECTAL SURGERY

## 2019-02-28 PROCEDURE — 99999 PR PBB SHADOW E&M-EST. PATIENT-LVL III: CPT | Mod: PBBFAC,,, | Performed by: COLON & RECTAL SURGERY

## 2019-02-28 RX ORDER — OXYCODONE AND ACETAMINOPHEN 5; 325 MG/1; MG/1
1-2 TABLET ORAL EVERY 4 HOURS PRN
Qty: 20 TABLET | Refills: 0 | Status: SHIPPED | OUTPATIENT
Start: 2019-02-28 | End: 2019-03-10

## 2019-02-28 RX ORDER — DIPHENOXYLATE HYDROCHLORIDE AND ATROPINE SULFATE 2.5; .025 MG/1; MG/1
TABLET ORAL
Refills: 1 | Status: ON HOLD | COMMUNITY
Start: 2019-02-13 | End: 2019-04-24 | Stop reason: HOSPADM

## 2019-02-28 NOTE — PROGRESS NOTES
"Status post ileostomy closure by ileorectal anastomosis on 15 January 2019.  Overall doing well. Continues to have bowel frequency which is fairly well controlled with Lomotil or Imodium.  No fevers, chills.    Exam:/83 (BP [facility]: Right arm, Patient Position: Sitting, BP Method: Large (Automatic))   Pulse 70   Ht 5' 7" (1.702 m)   Wt 66 kg (145 lb 8 oz)   LMP  (LMP Unknown)   BMI 22.79 kg/m²   Well-appearing  Abdomen soft nontender.  Midline wound heal except for the inferior 2 cm which has some granulation tissue treated with silver nitrate.  Previous ostomy site with eschar.    Assessment plan:  Doing well postop.  Diet and activity ad shamika.  Antimotility agents as needed.  Follow up with Dr. Amaro or his NP for management of CD.  Will change home health to once a week with simple dry dressing. Follow up with me as needed.  "

## 2019-02-28 NOTE — TELEPHONE ENCOUNTER
----- Message from Rosalba Ramachandran sent at 2/28/2019 12:35 PM CST -----  Contact: Pt:[phone]  .Patient Returning Call from [facility]    Who Left Message for Patient:Pt thinks she missed a call from the nurse    Communication Preference:Pt:[phone]    Additional Information:Pt states it may have been a missed called because her pharmacy is having a issues with locating the doctor's JOE number.

## 2019-02-28 NOTE — TELEPHONE ENCOUNTER
----- Message from Rosalba Ramachandran sent at 2/28/2019  2:57 PM CST -----  Contact: Pt:[phone]  .Patient Returning Call from [facility]    Who Left Message for Patient:Kanika Camp RN  Communication Preference:Pt:[phone]  Additional Information:

## 2019-02-28 NOTE — TELEPHONE ENCOUNTER
Spoke with patient.  [facility] is patient's home health agency at [phone].  Home health visits will be changed to once a week per JUANA Mathis NP.

## 2019-03-01 ENCOUNTER — TELEPHONE (OUTPATIENT)
Dept: SURGERY | Facility: CLINIC | Age: 39
End: 2019-03-01

## 2019-03-01 NOTE — TELEPHONE ENCOUNTER
visits changed to weekly.   ----- Message from Kanika Morales RN sent at 2/28/2019  5:05 PM CST -----  Garrett Solis,    Can you get in touch of [facility] and change this patient's visits to once a week per Dr. Peterson starting next week please?  The number in [phone].  Thank you so much.  Kanika Camp  ----- Message -----  From: Vidhi Mathis NP  Sent: 2/28/2019  10:46 AM  To: Kanika Morales RN    Name and phone number?  ----- Message -----  From: Kanika Morales RN  Sent: 2/28/2019  10:09 AM  To: Vidhi Mathis NP    Can you change her home health visits to weekly please?  Thank you,  Kanika Camp

## 2019-04-17 PROBLEM — R56.9 NEW ONSET SEIZURE: Status: ACTIVE | Noted: 2019-04-17

## 2019-04-17 PROBLEM — R63.4 WEIGHT LOSS, UNINTENTIONAL: Status: ACTIVE | Noted: 2019-04-17

## 2019-04-22 PROBLEM — E88.09 HYPOALBUMINEMIA: Status: ACTIVE | Noted: 2019-04-22

## 2019-04-22 PROBLEM — E83.51 HYPOCALCEMIA: Status: ACTIVE | Noted: 2019-04-22

## 2019-05-03 ENCOUNTER — HOSPITAL ENCOUNTER (INPATIENT)
Facility: HOSPITAL | Age: 39
LOS: 3 days | Discharge: HOME | DRG: 885 | End: 2019-05-06
Attending: PSYCHIATRY & NEUROLOGY | Admitting: PSYCHIATRY & NEUROLOGY
Payer: MEDICARE

## 2019-05-03 ENCOUNTER — HOSPITAL ENCOUNTER (EMERGENCY)
Facility: HOSPITAL | Age: 39
Discharge: PSYCHIATRIC OR SUBSTANCE USE FACILITY | End: 2019-05-03
Attending: SURGERY
Payer: MEDICARE

## 2019-05-03 VITALS
RESPIRATION RATE: 18 BRPM | BODY MASS INDEX: 22.65 KG/M2 | DIASTOLIC BLOOD PRESSURE: 85 MMHG | OXYGEN SATURATION: 98 % | WEIGHT: 144.63 LBS | SYSTOLIC BLOOD PRESSURE: 131 MMHG | TEMPERATURE: 98 F | HEART RATE: 78 BPM

## 2019-05-03 DIAGNOSIS — F32.A DEPRESSION WITH SUICIDAL IDEATION: Primary | ICD-10-CM

## 2019-05-03 DIAGNOSIS — R45.851 DEPRESSION WITH SUICIDAL IDEATION: Primary | ICD-10-CM

## 2019-05-03 DIAGNOSIS — F39 MOOD DISORDER: ICD-10-CM

## 2019-05-03 LAB
25(OH)D3+25(OH)D2 SERPL-MCNC: 14 NG/ML (ref 30–96)
ALBUMIN SERPL BCP-MCNC: 3.3 G/DL (ref 3.5–5.2)
ALP SERPL-CCNC: 67 U/L (ref 55–135)
ALT SERPL W/O P-5'-P-CCNC: 7 U/L (ref 10–44)
AMPHET+METHAMPHET UR QL: NEGATIVE
ANION GAP SERPL CALC-SCNC: 8 MMOL/L (ref 8–16)
APAP SERPL-MCNC: <3 UG/ML (ref 10–20)
AST SERPL-CCNC: 10 U/L (ref 10–40)
B-HCG UR QL: NEGATIVE
BACTERIA #/AREA URNS HPF: ABNORMAL /HPF
BARBITURATES UR QL SCN>200 NG/ML: NEGATIVE
BASOPHILS # BLD AUTO: 0.02 K/UL (ref 0–0.2)
BASOPHILS NFR BLD: 0.2 % (ref 0–1.9)
BENZODIAZ UR QL SCN>200 NG/ML: NEGATIVE
BILIRUB SERPL-MCNC: 0.4 MG/DL (ref 0.1–1)
BILIRUB UR QL STRIP: ABNORMAL
BUN SERPL-MCNC: 11 MG/DL (ref 6–20)
BZE UR QL SCN: NEGATIVE
CALCIUM SERPL-MCNC: 8.5 MG/DL (ref 8.7–10.5)
CANNABINOIDS UR QL SCN: NEGATIVE
CHLORIDE SERPL-SCNC: 117 MMOL/L (ref 95–110)
CLARITY UR: ABNORMAL
CO2 SERPL-SCNC: 17 MMOL/L (ref 23–29)
COLOR UR: YELLOW
CREAT SERPL-MCNC: 1.2 MG/DL (ref 0.5–1.4)
CREAT UR-MCNC: 330.4 MG/DL (ref 15–325)
DIFFERENTIAL METHOD: ABNORMAL
EOSINOPHIL # BLD AUTO: 0 K/UL (ref 0–0.5)
EOSINOPHIL NFR BLD: 0 % (ref 0–8)
ERYTHROCYTE [DISTWIDTH] IN BLOOD BY AUTOMATED COUNT: 19.2 % (ref 11.5–14.5)
EST. GFR  (AFRICAN AMERICAN): >60 ML/MIN/1.73 M^2
EST. GFR  (NON AFRICAN AMERICAN): 58 ML/MIN/1.73 M^2
ETHANOL SERPL-MCNC: <10 MG/DL
FOLATE SERPL-MCNC: 10.3 NG/ML (ref 4–24)
GLUCOSE SERPL-MCNC: 115 MG/DL (ref 70–110)
GLUCOSE UR QL STRIP: NEGATIVE
HCT VFR BLD AUTO: 36.1 % (ref 37–48.5)
HGB BLD-MCNC: 11.5 G/DL (ref 12–16)
HGB UR QL STRIP: ABNORMAL
HYALINE CASTS #/AREA URNS LPF: 0 /LPF
KETONES UR QL STRIP: NEGATIVE
LEUKOCYTE ESTERASE UR QL STRIP: NEGATIVE
LYMPHOCYTES # BLD AUTO: 0.9 K/UL (ref 1–4.8)
LYMPHOCYTES NFR BLD: 11 % (ref 18–48)
MCH RBC QN AUTO: 27 PG (ref 27–31)
MCHC RBC AUTO-ENTMCNC: 31.9 G/DL (ref 32–36)
MCV RBC AUTO: 85 FL (ref 82–98)
METHADONE UR QL SCN>300 NG/ML: NEGATIVE
MICROSCOPIC COMMENT: ABNORMAL
MONOCYTES # BLD AUTO: 0.3 K/UL (ref 0.3–1)
MONOCYTES NFR BLD: 3.3 % (ref 4–15)
NEUTROPHILS # BLD AUTO: 7.2 K/UL (ref 1.8–7.7)
NEUTROPHILS NFR BLD: 85.5 % (ref 38–73)
NITRITE UR QL STRIP: NEGATIVE
OPIATES UR QL SCN: ABNORMAL
PCP UR QL SCN>25 NG/ML: NEGATIVE
PH UR STRIP: 6 [PH] (ref 5–8)
PLATELET # BLD AUTO: 346 K/UL (ref 150–350)
PMV BLD AUTO: 10.9 FL (ref 9.2–12.9)
POTASSIUM SERPL-SCNC: 3.5 MMOL/L (ref 3.5–5.1)
PROT SERPL-MCNC: 6.5 G/DL (ref 6–8.4)
PROT UR QL STRIP: ABNORMAL
RBC # BLD AUTO: 4.26 M/UL (ref 4–5.4)
RBC #/AREA URNS HPF: 40 /HPF (ref 0–4)
SALICYLATES SERPL-MCNC: <5 MG/DL (ref 15–30)
SODIUM SERPL-SCNC: 142 MMOL/L (ref 136–145)
SP GR UR STRIP: >=1.03 (ref 1–1.03)
T3FREE SERPL-MCNC: 1.6 PG/ML (ref 2.3–4.2)
T4 FREE SERPL-MCNC: 0.86 NG/DL (ref 0.71–1.51)
TOXICOLOGY INFORMATION: ABNORMAL
TSH SERPL DL<=0.005 MIU/L-ACNC: 0.88 UIU/ML (ref 0.4–4)
URN SPEC COLLECT METH UR: ABNORMAL
UROBILINOGEN UR STRIP-ACNC: NEGATIVE EU/DL
VIT B12 SERPL-MCNC: 173 PG/ML (ref 210–950)
WBC # BLD AUTO: 8.39 K/UL (ref 3.9–12.7)
WBC #/AREA URNS HPF: 2 /HPF (ref 0–5)

## 2019-05-03 PROCEDURE — 84481 FREE ASSAY (FT-3): CPT

## 2019-05-03 PROCEDURE — 82746 ASSAY OF FOLIC ACID SERUM: CPT

## 2019-05-03 PROCEDURE — 81000 URINALYSIS NONAUTO W/SCOPE: CPT | Mod: 59

## 2019-05-03 PROCEDURE — 84443 ASSAY THYROID STIM HORMONE: CPT

## 2019-05-03 PROCEDURE — 80329 ANALGESICS NON-OPIOID 1 OR 2: CPT

## 2019-05-03 PROCEDURE — 11400000 HC PSYCH PRIVATE ROOM

## 2019-05-03 PROCEDURE — 80320 DRUG SCREEN QUANTALCOHOLS: CPT

## 2019-05-03 PROCEDURE — 85025 COMPLETE CBC W/AUTO DIFF WBC: CPT

## 2019-05-03 PROCEDURE — 80061 LIPID PANEL: CPT

## 2019-05-03 PROCEDURE — 80307 DRUG TEST PRSMV CHEM ANLYZR: CPT

## 2019-05-03 PROCEDURE — 82728 ASSAY OF FERRITIN: CPT

## 2019-05-03 PROCEDURE — 80053 COMPREHEN METABOLIC PANEL: CPT

## 2019-05-03 PROCEDURE — 83540 ASSAY OF IRON: CPT

## 2019-05-03 PROCEDURE — 81025 URINE PREGNANCY TEST: CPT

## 2019-05-03 PROCEDURE — 36415 COLL VENOUS BLD VENIPUNCTURE: CPT

## 2019-05-03 PROCEDURE — 82306 VITAMIN D 25 HYDROXY: CPT

## 2019-05-03 PROCEDURE — 99285 EMERGENCY DEPT VISIT HI MDM: CPT

## 2019-05-03 PROCEDURE — 83036 HEMOGLOBIN GLYCOSYLATED A1C: CPT

## 2019-05-03 PROCEDURE — 84439 ASSAY OF FREE THYROXINE: CPT

## 2019-05-03 PROCEDURE — 82607 VITAMIN B-12: CPT

## 2019-05-03 RX ORDER — IBUPROFEN 200 MG
1 TABLET ORAL
Status: DISCONTINUED | OUTPATIENT
Start: 2019-05-03 | End: 2019-05-03 | Stop reason: HOSPADM

## 2019-05-03 RX ORDER — DOCUSATE SODIUM 100 MG/1
100 CAPSULE, LIQUID FILLED ORAL DAILY PRN
Status: DISCONTINUED | OUTPATIENT
Start: 2019-05-03 | End: 2019-05-06 | Stop reason: HOSPADM

## 2019-05-03 RX ORDER — OLANZAPINE 10 MG/2ML
10 INJECTION, POWDER, FOR SOLUTION INTRAMUSCULAR EVERY 4 HOURS PRN
Status: DISCONTINUED | OUTPATIENT
Start: 2019-05-03 | End: 2019-05-06 | Stop reason: HOSPADM

## 2019-05-03 RX ORDER — HALOPERIDOL 5 MG/ML
5 INJECTION INTRAMUSCULAR EVERY 4 HOURS PRN
Status: DISCONTINUED | OUTPATIENT
Start: 2019-05-03 | End: 2019-05-03 | Stop reason: HOSPADM

## 2019-05-03 RX ORDER — ACETAMINOPHEN 325 MG/1
650 TABLET ORAL EVERY 6 HOURS PRN
Status: DISCONTINUED | OUTPATIENT
Start: 2019-05-03 | End: 2019-05-06 | Stop reason: HOSPADM

## 2019-05-03 RX ORDER — HYDROXYZINE PAMOATE 50 MG/1
50 CAPSULE ORAL EVERY 6 HOURS PRN
Status: DISCONTINUED | OUTPATIENT
Start: 2019-05-03 | End: 2019-05-06 | Stop reason: HOSPADM

## 2019-05-03 RX ORDER — OLANZAPINE 10 MG/1
10 TABLET ORAL EVERY 4 HOURS PRN
Status: DISCONTINUED | OUTPATIENT
Start: 2019-05-03 | End: 2019-05-06 | Stop reason: HOSPADM

## 2019-05-03 RX ORDER — METHOTREXATE 25 MG/ML
INJECTION INTRA-ARTERIAL; INTRAMUSCULAR; INTRATHECAL; INTRAVENOUS ONCE
COMMUNITY
End: 2019-05-03

## 2019-05-03 RX ORDER — LORAZEPAM 2 MG/ML
2 INJECTION INTRAMUSCULAR EVERY 4 HOURS PRN
Status: DISCONTINUED | OUTPATIENT
Start: 2019-05-03 | End: 2019-05-03 | Stop reason: HOSPADM

## 2019-05-03 RX ORDER — DIPHENHYDRAMINE HYDROCHLORIDE 50 MG/ML
50 INJECTION INTRAMUSCULAR; INTRAVENOUS EVERY 4 HOURS PRN
Status: DISCONTINUED | OUTPATIENT
Start: 2019-05-03 | End: 2019-05-03 | Stop reason: HOSPADM

## 2019-05-03 RX ORDER — LOPERAMIDE HYDROCHLORIDE 2 MG/1
2 CAPSULE ORAL
Status: DISCONTINUED | OUTPATIENT
Start: 2019-05-03 | End: 2019-05-06 | Stop reason: HOSPADM

## 2019-05-03 RX ORDER — MAG HYDROX/ALUMINUM HYD/SIMETH 200-200-20
30 SUSPENSION, ORAL (FINAL DOSE FORM) ORAL EVERY 6 HOURS PRN
Status: DISCONTINUED | OUTPATIENT
Start: 2019-05-03 | End: 2019-05-06 | Stop reason: HOSPADM

## 2019-05-03 RX ORDER — FOLIC ACID 1 MG/1
1 TABLET ORAL DAILY
Status: DISCONTINUED | OUTPATIENT
Start: 2019-05-04 | End: 2019-05-04

## 2019-05-03 NOTE — ED NOTES
Received verbal report from RIA Olguin.  Pt in ED room #3A lying in stretcher. Stretcher is in low, locked position, side rails up x2.  Jenny Bear, providing direct visual observation and q15 min safety checks.  The patient is resting with eyes closed, easily arousable. Airway is open and patent, respirations are spontaneous, normal respiratory effort and rate noted, skin warm and dry, full ROM in all extremities, appearance: no apparent distress noted, resting comfortably. NAD noted.  Pt able to change position independently.  Calm/cooperative.  Will continue to monitor.

## 2019-05-03 NOTE — ED PROVIDER NOTES
[facility]                                                 Chief Complaint  38 y.o. female with Suicidal    History of Present Illness  Candida Cardona presents to the emergency room with suicidal ideation  Patient is suicidal ideation and depression, long history of Crohn's disease  Patient states that she no longer wants to live with Crohn's disease, depressed  Patient has been severely depressed and anxious states there is no hope now  Patient states she is suicidal today with no definitive suicidal plan reported     The history is provided by the patient   device was not used during this ER visit    Past Medical History:   Diagnosis Date    Anemia     Anxiety     Bilious vomiting with nausea 1/17/2017    Constipation     Coronary artery disease     NO STENT    Crohn's disease     Encounter for blood transfusion     Fibromyalgia     GERD (gastroesophageal reflux disease)     Hyponatremia     Interstitial cystitis     Kidney stones     Mass, ovarian 11/22/2016    Metabolic acidosis     Osteoporosis     PAF (paroxysmal atrial fibrillation)     S/P ERIK-BSO (total abdominal hysterectomy and bilateral salpingo-oophorectomy) 12/19/2016    UTI (urinary tract infection)      Past Surgical History:   Procedure Laterality Date    ABDOMINAL SURGERY      ANASTOMOSIS, ILEORECTAL  1/15/2019    [facility]    ANGIOPLASTY      ASPIRATION ABSCESS N/A 1/6/2017    Performed by Rhonda Surgeon at [facility]    PNYSJZ-CAOHCP-MM N/A 1/7/2017    Performed by Rhonda Surgeon at [facility]    CATHETERIZATION-URETERAL Left 12/19/2016    [facility]    CLOSURE, ILEOSTOMY  1/15/2019    [facility]    COLECTOMY, TOTAL, ABDOMINAL N/A 3/14/2014    Performed by [facility]ji [facility] at [facility] OR [address] [place] [address]    COLON SURGERY      x 2    COLONOSCOPY      multiple     COLONOSCOPY N/A 4/11/2017    Performed by Hussain [facility] at [facility]    CYSTOSCOPY N/A 12/19/2016    [facility]formed by [facility] at [facility]    CYSTOSCOPY W/ URETERAL STENT PLACEMENT      CYSTOSCOPY W/ URETERAL STENT REMOVAL      CYSTOSCOPY WITH STENT PLACEMENT Left 12/20/2016    [facility] at [facility]    EXPLORATORY-LAPAROTOMY N/A 3/14/2014    Per[facility] at [facility]    HYSTERECTOMY      HYSTERECTOMY-TOTAL-ABDOMINAL AND BSO N/A 12/19/2016    Performed by Andrew [facility] at [facility]    ILEOSTOMY      INSERTION, PICC N/A 10/30/2018    Performed by [facility] OR    LAPAROTOMY, EXPLORATORY;extensive lysis of adhesions  1/15/2019    Performed [facility] at [facility]    LYSIS-ADHESION Left 12/19/2016    Performed by Anderw [facility]ira [facility] at [facility]    LYSIS-ADHESION  12/19/2016    [facility] at [facility]    PORTACATH PLACEMENT      SIGMOIDOSCOPY, FLEXIBLE N/A 3/5/2014    Performed by [facility] at [facility] END[facility] ([facility])    TOTAL COLECTOMY  2014    URETEROLYSIS  1/15/2019    Performed by [facility] at [facility]      Review of patient's allergies indicates:   Allergen Reactions    Ciprofloxacin Shortness Of Breath    Imuran [azathioprine sodium] Diarrhea and Nausea And Vomiting     Pt states that she becomes hot sweaty and passes out also. She states that she has diarrhea and nausea and vomiting     [place]     Adhesive Itching      Review of Systems and Physical Exam      Review of Systems  -- Constitution - no fever, denies fatigue, no weakness, no chills  -- Eyes - no tearing or redness, no visual disturbance  -- Ear, Nose - no tinnitus or earache, no nasal congestion or discharge  -- Mouth,Throat - no sore throat, no toothache, normal voice, normal swallowing  -- Respiratory - denies cough and  congestion, no shortness of breath, no KENNEDY  -- Cardiovascular - denies chest pain, no palpitations, denies claudication  -- Gastrointestinal - denies abdominal pain, nausea, vomiting, or diarrhea  -- Genitourinary - no dysuria, denies flank pain, no hematuria, no STD risk  -- Musculoskeletal - denies back pain, negative for trauma or injury   -- Neurological - no headache, denies weakness or seizure; no LOC  -- Skin - denies pallor, rash, or changes in skin. no hives or welts noted  -- Psychiatric - suicidal ideation and depression, no psychosis or fractured thought noted     Vital Signs  Her tympanic temperature is 97.6 °F (36.4 °C).   Her blood pressure is 131/85 and her pulse is 78.   Her respiration is 18 and oxygen saturation is 98%.     Physical Exam  -- Nursing note and vitals reviewed  -- Constitutional: Appears well-developed and well-nourished  -- Head: Atraumatic. Normocephalic. No obvious abnormality  -- Eyes: Pupils are equal and reactive to light. Normal conjunctiva and lids  -- Cardiac: Normal rate, regular rhythm and normal heart sounds  -- Pulmonary: Normal respiratory effort, breath sounds clear to auscultation  -- Abdominal: Soft, no tenderness. Normal bowel sounds. Normal liver edge  -- Musculoskeletal: Normal range of motion, no effusions. Joints stable   -- Neurological: No focal deficits. Showed good interaction with staff  -- Vascular: Posterior tibial, dorsalis pedis and radial pulses 2+ bilaterally    -- Lymphatics: No cervical or peripheral lymphadenopathy. No edema noted  -- Skin: Warm and dry. No evidence of rash or cellulitis    Emergency Room Course      Diagnosis  -- The encounter diagnosis was Depression with suicidal ideation.    Disposition and Plan  -- Disposition: PEC  -- Condition: stable  -- Pt will be placed in a psychiatric facility  -- The patient is a direct observation until placement  -- The patient has been made aware of his or her rights while under PEC in the ER  --  All questions have been answered; will follow ER protocols until placement    Lab work was performed in the ER, this patient is cleared for psychiatric placement     This note is dictated on Dragon Natural Speaking word recognition program.  There are word recognition mistakes that are occasionally missed on review.          Benjamin Dennis MD  05/03/19 [id number]

## 2019-05-04 PROBLEM — E55.9 VITAMIN D DEFICIENCY: Status: ACTIVE | Noted: 2019-05-04

## 2019-05-04 PROBLEM — N39.0 RECURRENT UTI: Status: ACTIVE | Noted: 2019-05-04

## 2019-05-04 LAB
CHOLEST SERPL-MCNC: 130 MG/DL (ref 120–199)
CHOLEST/HDLC SERPL: 2.7 {RATIO} (ref 2–5)
ESTIMATED AVG GLUCOSE: 97 MG/DL (ref 68–131)
FERRITIN SERPL-MCNC: 42 NG/ML (ref 20–300)
HBA1C MFR BLD HPLC: 5 % (ref 4–5.6)
HDLC SERPL-MCNC: 49 MG/DL (ref 40–75)
HDLC SERPL: 37.7 % (ref 20–50)
IRON SERPL-MCNC: 41 UG/DL (ref 30–160)
LDLC SERPL CALC-MCNC: 56.4 MG/DL (ref 63–159)
NONHDLC SERPL-MCNC: 81 MG/DL
SATURATED IRON: 11 % (ref 20–50)
TOTAL IRON BINDING CAPACITY: 376 UG/DL (ref 250–450)
TRANSFERRIN SERPL-MCNC: 254 MG/DL (ref 200–375)
TRIGL SERPL-MCNC: 123 MG/DL (ref 30–150)

## 2019-05-04 PROCEDURE — 63600175 PHARM REV CODE 636 W HCPCS: Performed by: PSYCHIATRY & NEUROLOGY

## 2019-05-04 PROCEDURE — 25000003 PHARM REV CODE 250: Performed by: PSYCHIATRY & NEUROLOGY

## 2019-05-04 PROCEDURE — 99222 1ST HOSP IP/OBS MODERATE 55: CPT | Mod: ,,, | Performed by: INTERNAL MEDICINE

## 2019-05-04 PROCEDURE — 99223 PR INITIAL HOSPITAL CARE,LEVL III: ICD-10-PCS | Mod: AI,,, | Performed by: PSYCHIATRY & NEUROLOGY

## 2019-05-04 PROCEDURE — 99223 1ST HOSP IP/OBS HIGH 75: CPT | Mod: AI,,, | Performed by: PSYCHIATRY & NEUROLOGY

## 2019-05-04 PROCEDURE — 36415 COLL VENOUS BLD VENIPUNCTURE: CPT

## 2019-05-04 PROCEDURE — 90836 PR PSYCHOTHERAPY W/PATIENT W/E&M, 45 MIN (ADD ON): ICD-10-PCS | Mod: ,,, | Performed by: PSYCHIATRY & NEUROLOGY

## 2019-05-04 PROCEDURE — 90836 PSYTX W PT W E/M 45 MIN: CPT | Mod: ,,, | Performed by: PSYCHIATRY & NEUROLOGY

## 2019-05-04 PROCEDURE — 11400000 HC PSYCH PRIVATE ROOM

## 2019-05-04 PROCEDURE — 99222 PR INITIAL HOSPITAL CARE,LEVL II: ICD-10-PCS | Mod: ,,, | Performed by: INTERNAL MEDICINE

## 2019-05-04 RX ORDER — PREDNISONE 20 MG/1
40 TABLET ORAL DAILY
Status: COMPLETED | OUTPATIENT
Start: 2019-05-04 | End: 2019-05-05

## 2019-05-04 RX ORDER — METRONIDAZOLE 500 MG/1
500 TABLET ORAL 3 TIMES DAILY
Status: DISCONTINUED | OUTPATIENT
Start: 2019-05-04 | End: 2019-05-06 | Stop reason: HOSPADM

## 2019-05-04 RX ORDER — LIOTHYRONINE SODIUM 5 UG/1
5 TABLET ORAL
Status: DISCONTINUED | OUTPATIENT
Start: 2019-05-05 | End: 2019-05-06 | Stop reason: HOSPADM

## 2019-05-04 RX ORDER — PANTOPRAZOLE SODIUM 40 MG/1
40 TABLET, DELAYED RELEASE ORAL 2 TIMES DAILY
Status: DISCONTINUED | OUTPATIENT
Start: 2019-05-04 | End: 2019-05-06 | Stop reason: HOSPADM

## 2019-05-04 RX ORDER — PROMETHAZINE HYDROCHLORIDE 25 MG/1
25 TABLET ORAL EVERY 6 HOURS PRN
Status: DISCONTINUED | OUTPATIENT
Start: 2019-05-04 | End: 2019-05-06 | Stop reason: HOSPADM

## 2019-05-04 RX ORDER — HYOSCYAMINE SULFATE 0.125 MG
125 TABLET ORAL 3 TIMES DAILY
Status: DISCONTINUED | OUTPATIENT
Start: 2019-05-04 | End: 2019-05-04

## 2019-05-04 RX ORDER — ASPIRIN 325 MG
50000 TABLET, DELAYED RELEASE (ENTERIC COATED) ORAL
Status: DISCONTINUED | OUTPATIENT
Start: 2019-05-04 | End: 2019-05-06 | Stop reason: HOSPADM

## 2019-05-04 RX ORDER — LEVETIRACETAM 500 MG/1
1000 TABLET ORAL 2 TIMES DAILY
Status: DISCONTINUED | OUTPATIENT
Start: 2019-05-04 | End: 2019-05-06 | Stop reason: HOSPADM

## 2019-05-04 RX ORDER — TOPIRAMATE 25 MG/1
100 TABLET ORAL 2 TIMES DAILY
Status: DISCONTINUED | OUTPATIENT
Start: 2019-05-04 | End: 2019-05-06 | Stop reason: HOSPADM

## 2019-05-04 RX ORDER — DIPHENOXYLATE HYDROCHLORIDE AND ATROPINE SULFATE 2.5; .025 MG/1; MG/1
1 TABLET ORAL 3 TIMES DAILY
Status: DISCONTINUED | OUTPATIENT
Start: 2019-05-04 | End: 2019-05-06 | Stop reason: HOSPADM

## 2019-05-04 RX ORDER — FLUOXETINE HYDROCHLORIDE 20 MG/1
40 CAPSULE ORAL DAILY
Status: DISCONTINUED | OUTPATIENT
Start: 2019-05-05 | End: 2019-05-06 | Stop reason: HOSPADM

## 2019-05-04 RX ORDER — CYANOCOBALAMIN 1000 UG/ML
1000 INJECTION, SOLUTION INTRAMUSCULAR; SUBCUTANEOUS ONCE
Status: COMPLETED | OUTPATIENT
Start: 2019-05-04 | End: 2019-05-04

## 2019-05-04 RX ORDER — HYOSCYAMINE SULFATE 0.12 MG/1
0.12 TABLET SUBLINGUAL 3 TIMES DAILY
Status: DISCONTINUED | OUTPATIENT
Start: 2019-05-04 | End: 2019-05-06 | Stop reason: HOSPADM

## 2019-05-04 RX ORDER — FLUOXETINE HYDROCHLORIDE 20 MG/1
40 CAPSULE ORAL EVERY MORNING
Status: DISCONTINUED | OUTPATIENT
Start: 2019-05-04 | End: 2019-05-04

## 2019-05-04 RX ORDER — ESTRADIOL 0.5 MG/1
2 TABLET ORAL DAILY
Status: DISCONTINUED | OUTPATIENT
Start: 2019-05-05 | End: 2019-05-06 | Stop reason: HOSPADM

## 2019-05-04 RX ORDER — NITROFURANTOIN 25; 75 MG/1; MG/1
100 CAPSULE ORAL 2 TIMES DAILY
Status: COMPLETED | OUTPATIENT
Start: 2019-05-04 | End: 2019-05-05

## 2019-05-04 RX ORDER — ESTRADIOL 0.5 MG/1
2 TABLET ORAL EVERY MORNING
Status: DISCONTINUED | OUTPATIENT
Start: 2019-05-04 | End: 2019-05-04

## 2019-05-04 RX ADMIN — NITROFURANTOIN (MONOHYDRATE/MACROCRYSTALS) 100 MG: 75; 25 CAPSULE ORAL at 08:05

## 2019-05-04 RX ADMIN — THERA TABS 1 TABLET: TAB at 08:05

## 2019-05-04 RX ADMIN — TOPIRAMATE 100 MG: 25 TABLET, FILM COATED ORAL at 08:05

## 2019-05-04 RX ADMIN — DIPHENOXYLATE HYDROCHLORIDE AND ATROPINE SULFATE 1 TABLET: 2.5; .025 TABLET ORAL at 08:05

## 2019-05-04 RX ADMIN — FLUOXETINE 40 MG: 20 CAPSULE ORAL at 11:05

## 2019-05-04 RX ADMIN — PREDNISONE 40 MG: 20 TABLET ORAL at 11:05

## 2019-05-04 RX ADMIN — ESTRADIOL 2 MG: 0.5 TABLET ORAL at 11:05

## 2019-05-04 RX ADMIN — METRONIDAZOLE 500 MG: 500 TABLET ORAL at 11:05

## 2019-05-04 RX ADMIN — HYOSCYAMINE SULFATE 0.12 MG: 0.12 TABLET ORAL; SUBLINGUAL at 03:05

## 2019-05-04 RX ADMIN — METRONIDAZOLE 500 MG: 500 TABLET ORAL at 03:05

## 2019-05-04 RX ADMIN — PANTOPRAZOLE SODIUM 40 MG: 40 TABLET, DELAYED RELEASE ORAL at 11:05

## 2019-05-04 RX ADMIN — NITROFURANTOIN (MONOHYDRATE/MACROCRYSTALS) 100 MG: 75; 25 CAPSULE ORAL at 11:05

## 2019-05-04 RX ADMIN — TOPIRAMATE 100 MG: 25 TABLET, FILM COATED ORAL at 11:05

## 2019-05-04 RX ADMIN — PANTOPRAZOLE SODIUM 40 MG: 40 TABLET, DELAYED RELEASE ORAL at 08:05

## 2019-05-04 RX ADMIN — LEVETIRACETAM 1000 MG: 500 TABLET, FILM COATED ORAL at 11:05

## 2019-05-04 RX ADMIN — Medication 1 TABLET: at 11:05

## 2019-05-04 RX ADMIN — HYOSCYAMINE SULFATE 0.12 MG: 0.12 TABLET ORAL; SUBLINGUAL at 08:05

## 2019-05-04 RX ADMIN — OFLOXACIN 50000 UNITS: 300 TABLET, COATED ORAL at 11:05

## 2019-05-04 RX ADMIN — METRONIDAZOLE 500 MG: 500 TABLET ORAL at 08:05

## 2019-05-04 RX ADMIN — DIPHENOXYLATE HYDROCHLORIDE AND ATROPINE SULFATE 1 TABLET: 2.5; .025 TABLET ORAL at 03:05

## 2019-05-04 RX ADMIN — CYANOCOBALAMIN 1000 MCG: 1000 INJECTION, SOLUTION INTRAMUSCULAR; SUBCUTANEOUS at 11:05

## 2019-05-04 RX ADMIN — LEVETIRACETAM 1000 MG: 500 TABLET, FILM COATED ORAL at 08:05

## 2019-05-04 RX ADMIN — FOLIC ACID 1 MG: 1 TABLET ORAL at 08:05

## 2019-05-04 NOTE — H&P
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 Taylor MD  Psychiatry

## 2019-05-04 NOTE — HPI
I am asked for H/P   For BHU admit    Chief Complaint  38 y.o. female with Suicidal     History of Present Illness  Candida MCKEON Chauvin presented to the emergency room with suicidal ideation  She has long history of Crohn's disease  Patient stateed that she no longer wants to live with Crohn's disease, depressed  Patient has been severely depressed and anxious states there is no hope now  Patient states she is suicidal today with no definitive suicidal plan reported
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
## 2019-05-04 NOTE — PLAN OF CARE
Problem: Adult Behavioral Health Plan of Care  Goal: Plan of Care Review  Outcome: Ongoing (interventions implemented as appropriate)  Pt out on unit all shift.Pt reports poor sleep last night.Trouble falling asleep and staying asleep.Mood sad but currently denies any desire to harm self.Appetite good.Appropriate interaction with others.Medication compliant.

## 2019-05-04 NOTE — ED NOTES
Pt to [facility] via wheel chair with security and tech. All belonging and original PEC with patient. ROZ RUIZ.

## 2019-05-04 NOTE — CONSULTS
[facility] Hospital Medicine  Consult Note    Patient Name: Candida Cardona  MRN: [id number]  Admission Date: 5/3/2019  Hospital Length of Stay: 1 days  Attending Physician: Sterling Taylor MD   Primary Care Provider: Ted Gómez MD           Patient information was obtained from patient, past medical records and ER records.     Consults  Subjective:     Principal Problem: suicidal    Chief Complaint: H/P     HPI: I am asked for H/P   For U admit    Chief Complaint  38 y.o. female with Suicidal     History of Present Illness  Candida Cardona present ed to the emergency room with suicidal ideation  She has long history of Crohn's disease  Patient stateed that she no longer wants to live with Crohn's disease, depressed  Patient has been severely depressed and anxious states there is no hope now  Patient states she is suicidal today with no definitive suicidal plan reported         Past Medical History:   Diagnosis Date    Anemia     Anxiety     Bilious vomiting with nausea 1/17/2017    Bipolar 1 disorder     Constipation     Coronary artery disease     NO STENT    Crohn's disease     Encounter for blood transfusion     Fibromyalgia     GERD (gastroesophageal reflux disease)     Hyponatremia     Interstitial cystitis     Kidney stones     Mass, ovarian 11/22/2016    Metabolic acidosis     Osteoporosis     PAF (paroxysmal atrial fibrillation)     S/P ERIK-BSO (total abdominal hysterectomy and bilateral salpingo-oophorectomy) 12/19/2016    UTI (urinary tract infection)        Past Surgical History:   Procedure Laterality Date    ABDOMINAL SURGERY      ANASTOMOSIS, ILEORECTAL  1/15/2019    Performed by [facility] at [facility] OR 2ND FLR    ANGIOPLASTY      ASPIRATION ABSCESS N/A 1/6/2017    Performed by Rhonda Surgeon at [facility] RHONDA    KNIFXL-[id number]-JK N/A 1/7/2017    Performed by [facility]onda Surgeon at [facility] RHONDA    CATHETERIZATION-URETERAL Left 12/19/2016    Performed  by [facility] at [facility] OR 2ND FLR    CLOSURE, ILEOSTOMY  1/15/2019    Performed by [facility] at [facility] OR 2ND FLR    COLECTOMY, TOTAL, ABDOMINAL N/A 3/14/2014    Performed by [facility] at [facility] OR 2ND FLR    COLON SURGERY      x 2    COLONOSCOPY      multiple    COLONOSCOPY N/A 4/11/2017    [facility] at [facility] ENDO    CYSTOSCOPY N/A 12/19/2016    Performed by [facility] at [facility] OR 2ND FLR    CYSTOSCOPY W/ URETERAL STENT PLACEMENT      CYSTOSCOPY W/ URETERAL STENT REMOVAL      CYSTOSCOPY WITH STENT PLACEMENT Left 12/20/2016    Performed by [facility] at [facility] OR 1ST FLR    EXPLORATORY-LAPAROTOMY N/A 3/14/2014    Performed by [facility] at [facility] OR 2ND FLR    HYSTERECTOMY      HYSTERECTOMY-TOTAL-ABDOMINAL AND BSO N/A 12/19/2016    [facility]    ILEOSTOMY      INSERTION, PICC N/A 10/30/2018    [facility] by [facility] at [facility] OR    LAPAROTOMY, EXPLORATORY;extensive lysis of adhesions  1/15/2019    Performed by [facility]ji [facility] MD at [facility] OR 2ND FLR    LYSIS-ADHESION Left 12/19/2016    Performed by [facility] at [facility] OR [address] [place] [address]    LYSIS-ADHESION  12/19/2016    Performed by [facility]ji [facility] MD at [facility] OR 2ND FLR    PORTACATH PLACEMENT      SIGMOIDOSCOPY, FLEXIBLE N/A 3/5/2014    Performed by Reji Peterson MD at [facility] ENDO (2ND FLR)    TOTAL COLECTOMY  2014    URETEROLYSIS  1/15/2019    Performed by [facility]ji [facility] MD at [facility] OR [address] [place] [address]       Review of patient's allergies indicates:   Allergen Reactions    Ciprofloxacin Shortness Of Breath    Imuran [azathioprine sodium] Diarrhea and Nausea And Vomiting     Pt states that she becomes hot sweaty and passes out also. She states that she has diarrhea and nausea and vomiting     [place]     Imuran [azathioprine] Nausea And Vomiting    Adhesive Itching       Current Facility-Administered  Medications on File Prior to Encounter   Medication    mupirocin 2 % ointment    [DISCONTINUED] diphenhydrAMINE injection 50 mg    [DISCONTINUED] haloperidol lactate injection 5 mg    [DISCONTINUED] lorazepam injection 2 mg    [DISCONTINUED] nicotine 21 mg/24 hr 1 patch     Current Outpatient Medications on File Prior to Encounter   Medication Sig    diphenoxylate-atropine 2.5-0.025 mg (LOMOTIL) 2.5-0.025 mg per tablet Take 1 tablet by mouth 3 (three) times daily as needed for Diarrhea.    estradiol (ESTRACE) 2 MG tablet Take 1 tablet (2 mg total) by mouth once daily. (Patient taking differently: Take 2 mg by mouth every morning. )    FLUoxetine 40 MG capsule Take 1 capsule (40 mg total) by mouth every morning.    levETIRAcetam (KEPPRA) 1000 MG tablet Take 1 tablet (1,000 mg total) by mouth 2 (two) times daily.    loperamide (IMODIUM) 2 mg capsule Take 2 mg by mouth 4 (four) times daily as needed for Diarrhea.    methotrexate 25 mg/mL injection 25 mg every 7 days. TAKES ON FRIDAYS    metroNIDAZOLE (FLAGYL) 500 MG tablet Take 1 tablet (500 mg total) by mouth 3 (three) times daily. for 7 days    nitrofurantoin, macrocrystal-monohydrate, (MACROBID) 100 MG capsule Take 1 capsule (100 mg total) by mouth 2 (two) times daily. for 5 days    oxyCODONE-acetaminophen (PERCOCET)  mg per tablet Take 1 tablet by mouth every 4 (four) hours as needed for Pain.    pantoprazole (PROTONIX) 40 MG tablet take 1 tablet by mouth twice a day    predniSONE (DELTASONE) 20 MG tablet Take 2 tablets (40 mg total) by mouth once daily. for 5 days    promethazine (PHENERGAN) 25 MG tablet Take 1 tablet (25 mg total) by mouth every 6 (six) hours as needed.    topiramate (TOPAMAX) 50 MG tablet Take 100 mg by mouth 2 (two) times daily.     cyclobenzaprine (FLEXERIL) 10 MG tablet Take 1 tablet (10 mg total) by mouth 3 (three) times daily. (Patient taking differently: Take 10 mg by mouth 3 (three) times daily as needed. )     hyoscyamine (ANASPAZ,LEVSIN) 0.125 mg Tab Take 1 tablet (125 mcg total) by mouth every 4 (four) hours as needed (Cramping or Diarrhea). (Patient taking differently: Take 125 mcg by mouth 3 (three) times daily. )    nystatin (MYCOSTATIN) powder Apply topically 2 (two) times daily.    ondansetron (ZOFRAN) 4 MG tablet Take 1 tablet (4 mg total) by mouth every 6 (six) hours as needed for Nausea.    ustekinumab (STELARA) 90 mg/mL Syrg syringe Inject 90 mg into the skin every 8 weeks.      Family History     Problem Relation (Age of Onset)    Cancer Father    Diabetes Father    Hypertension Mother    Joint hypermobility Mother    No Known Problems Brother, Brother    Physical abuse Mother    Schizophrenia Father    Stroke Paternal Grandmother        Tobacco Use    Smoking status: Never Smoker    Smokeless tobacco: Never Used   Substance and Sexual Activity    Alcohol use: No    Drug use: No    Sexual activity: Yes     Partners: Male     Review of Systems   Constitutional: Negative for chills and fever.   HENT: Negative for congestion, hearing loss, sinus pressure and sore throat.    Eyes: Negative for photophobia.   Respiratory: Negative for cough, choking, chest tightness and wheezing.    Cardiovascular: Negative for chest pain and palpitations.   Gastrointestinal: Positive for abdominal pain and blood in stool. Negative for nausea and vomiting.        Recent flare up .  Recent ileostomy closure   Genitourinary: Negative for dysuria and hematuria.   Musculoskeletal: Negative for arthralgias and myalgias.   Skin: Negative for pallor.   Neurological: Negative for dizziness and numbness.   Hematological: Does not bruise/bleed easily.   Psychiatric/Behavioral: Positive for decreased concentration and sleep disturbance. Negative for confusion and suicidal ideas. The patient is nervous/anxious.      Objective:     Vital Signs (Most Recent):  Temp: 96.9 °F (36.1 °C) (05/04/19 0748)  Pulse: (!) 59 (05/04/19 0748)  Resp:  18 (05/04/19 0748)  BP: 126/76 (05/04/19 0748) Vital Signs (24h Range):  Temp:  [96.4 °F (35.8 °C)-97.6 °F (36.4 °C)] 96.9 °F (36.1 °C)  Pulse:  [59-78] 59  Resp:  [18] 18  SpO2:  [98 %] 98 %  BP: (116-131)/(74-85) 126/76     Weight: 64.8 kg (142 lb 13.7 oz)  Body mass index is 22.37 kg/m².    Physical Exam   Constitutional: She is oriented to person, place, and time. She appears well-developed and well-nourished.   HENT:   Head: Normocephalic and atraumatic.   Mouth/Throat: Abnormal dentition.   Cardiovascular: Normal rate, regular rhythm and normal heart sounds.   Pulmonary/Chest: Effort normal and breath sounds normal.   Abdominal: Soft. Bowel sounds are normal. There is no tenderness.   Musculoskeletal: She exhibits no edema.   Neurological: She is alert and oriented to person, place, and time.   Skin: Skin is warm and dry.   Psychiatric: She has a normal mood and affect. Her behavior is normal. Judgment and thought content normal.   Nursing note and vitals reviewed.      Significant Labs:   CBC:   Recent Labs   Lab 05/03/19  1647   WBC 8.39   HGB 11.5*   HCT 36.1*        CMP:   Recent Labs   Lab 05/03/19  1645      K 3.5   *   CO2 17*   *   BUN 11   CREATININE 1.2   CALCIUM 8.5*   PROT 6.5   ALBUMIN 3.3*   BILITOT 0.4   ALKPHOS 67   AST 10   ALT 7*   ANIONGAP 8   EGFRNONAA 58*     Troponin: No results for input(s): TROPONINI in the last 48 hours.  TSH:   Recent Labs   Lab 05/03/19  1645   TSH 0.880     Urine Studies:   Recent Labs   Lab 05/03/19  1739   COLORU Yellow   APPEARANCEUA Hazy*   PHUR 6.0   SPECGRAV >=1.030*   PROTEINUA 1+*   GLUCUA Negative   KETONESU Negative   BILIRUBINUA 1+*   OCCULTUA 3+*   NITRITE Negative   UROBILINOGEN Negative   LEUKOCYTESUR Negative   RBCUA 40*   WBCUA 2   BACTERIA Few*   HYALINECASTS 0     Being treated for UTI       Assessment/Plan:     Vitamin D deficiency  Stay on 50,000 units vitamin daily       Recurrent UTI  She is on Macrobid ; continue        Depression with suicidal ideation  Treatment per psych team      Crohn's disease of small and large intestines  She is on prednisone and flagyl for her recent flare up           VTE Risk Mitigation (From admission, onward)    None              Thank you for your consult. I will sign off. Please contact us if you have any additional questions.    Marnie Peña MD  Department of Hospital Medicine   [facility]

## 2019-05-04 NOTE — CONSULTS
"  [facility]  Adult Nutrition  Consult Note    SUMMARY   Interventions: general healthful diet    Recommendations    1)Continue regular diet to promote PO intake- will need low fiber during Chron's flares  2) Will consider adding Cardiac dietary restriction @ f/u for optimal heart health if PO intake consistently >/=50%  3) Weigh pt weekly  4) replete vitamin D, B12 as needed    Goals: Nutritional goal for pt to consume >/=75% EEN consistently by f/u  Nutrition Goal Status: New        Reason for Assessment     Reason For Assessment: Consult  Diagnosis: (Dehydration)  Relevant Medical History: Crohn' s, GERD, Constipation, Anemia, Metabolic acidosis, Hyponatremia, CAD  Interdisciplinary Rounds: did not attend    General Information Comments: RD remote assessment: 39 y/o female readmitted in < 1.5 weeks with suicide ideation. Depressed about her illness and family history. Last admission pt experiencing abdominal pain with diarrhea. Tested C. Diff negative. Was tolerating liquids with some solid food, appetite slightly improved today but with history of fluctuating PO intake, consuming 25-75%  last admission.  NFPE to be done by inpatient RD @ f/u.    Nutrition Discharge Planning: Cardiac if PO intakes 75% of most meals by d/c + ONS as needed     Nutrition Risk Screen     Nutrition Risk Screen: no indicators present     Nutrition/Diet History     Spiritual, Cultural Beliefs, Restoration Practices, Values that Affect Care: yes  Food Allergies: other (see comments)(Almonds)  Factors Affecting Nutritional Intake: None at this time     Anthropometrics     Height Method: Measured  Height: 5' 7" (170.2 cm)  Weight Method: Standard Scale  Weight: 64.8 kg (5/4/19)  Weight (lb): 142 lb  Ideal Body Weight (IBW), Female: 135 lb  % Ideal Body Weight, Female (lb): 99.94 lb  BMI (Calculated): 22 kg/m2  BMI Grade: 18.5-24.9 - normal  Weight History: 61 kg 4/22/19        Lab/Procedures/Meds     Pertinent Labs " Reviewed: reviewed  BMP  Lab Results   Component Value Date     05/03/2019    K 3.5 05/03/2019     (H) 05/03/2019    CO2 17 (L) 05/03/2019    BUN 11 05/03/2019    CREATININE 1.2 05/03/2019    CALCIUM 8.5 (L) 05/03/2019    ANIONGAP 8 05/03/2019    ESTGFRAFRICA >60 05/03/2019    EGFRNONAA 58 (A) 05/03/2019     Lab Results   Component Value Date    CHOL 130 05/03/2019    CHOL 147 06/06/2017     Lab Results   Component Value Date    HDL 49 05/03/2019    HDL 64 06/06/2017     Lab Results   Component Value Date    LDLCALC 56.4 (L) 05/03/2019    LDLCALC 46 06/06/2017     Lab Results   Component Value Date    TRIG 123 05/03/2019    TRIG 153 (H) 06/06/2017     Lab Results   Component Value Date    CHOLHDL 37.7 05/03/2019    CHOLHDL 43.5 06/06/2017     Lab Results   Component Value Date    LAILKWNK96 173 (L) 05/03/2019     Vitamin D depleted  Lab Results   Component Value Date    FOLATE 10.3 05/03/2019       Pertinent Medications Reviewed: reviewed  Pertinent Medications Comments: B12, Vitamin D, Folic acid, MVI, prednisone, promethazine     Estimated/Assessed Needs     Weight Used For Calorie Calculations: 64.8 kg  Energy Calorie Requirements (kcal): 1780 kcal/day([place] X 1.3)  Energy Need Method: [place]-St Jeor  Protein Requirements: 51-65  g/day(0.8-1.0 g/kg)  Weight Used For Protein Calculations: 64.8 kg  Fluid Requirements (mL): 1 mL/kcal  Estimated Fluid Requirement Method: RDA Method  RDA Method (mL): 1780      Nutrition Prescription Ordered     Current Diet Order: Regular      Evaluation of Received Nutrient/Fluid Intake     Energy Needs: Meeting  Protein Needs: Meeting  Fluid Needs: Meeting  % Intake of Estimated Energy Needs: 80%  % Meal Intake: 75% x 1 meal recorded     Nutrition Risk     Level of Risk/Frequency of Follow-up: moderate 2 x weekly     Assessment and Plan     Inadequate dietary energy intake  Contributing Nutrition Diagnosis  Inadequate dietary energy intake     Related to (etiology):    Increased ability to consume sufficient energy secondary to diarrhea, abdominal pain     Signs and Symptoms (as evidenced by):   Pt consuming <50% EEN x > 1 week     Interventions/Recommendations (treatment strategy):  Regular diet to promote PO intake and ONS as needed     Nutrition Diagnosis Status:   improving       Monitor and Evaluation     Food and Nutrient Intake: food and beverage intake, energy intake  Food and Nutrient Adminstration: diet order  Physical Activity and Function: nutrition-related ADLs and IADLs  Biochemical Data, Medical Tests and Procedures: gastrointestinal profile,  electrolyte and renal panel, lipid profile  Nutrition-Focused Physical Findings: overall appearance      Malnutrition Assessment     To be assessed by in house RD @ f/u     Nutrition Follow-Up     RD Follow-up?: Yes

## 2019-05-04 NOTE — PLAN OF CARE
"Problem: Adult Behavioral Health Plan of Care  Goal: Plan of Care Review  Outcome: Ongoing (interventions implemented as appropriate)  Pt has slept 5 hours and has been awake in bed since 0400.  Pt stated, "I'm bored."  Encouraged to pt to rest for now and then she may come out of room at 0600 and sit down the pierre.  Pt stated, "Ok."  Pt has slept 5 hours.        "

## 2019-05-04 NOTE — PLAN OF CARE
Problem: Adult Behavioral Health Plan of Care  Goal: Patient-Specific Goal (Individualization)  MD will assess/ prescribe/ monitor efficacy of medications during daily rounds; Nursing will administer medications per MD order and provide education for compliance.
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
## 2019-05-04 NOTE — PLAN OF CARE
Problem: Adult Behavioral Health Plan of Care  Goal: Patient-Specific Goal (Individualization)  Interventions: general healthful diet     Recommendations     1)Continue regular diet to promote PO intake- will need low fiber during Chron's flares  2) Will consider adding Cardiac dietary restriction @ f/u for optimal heart health if PO intake consistently >/=50%  3) Weigh pt weekly  4) replete vitamin D, B12 as needed     Goals: Nutritional goal for pt to consume >/=75% EEN consistently by f/u  Nutrition Goal Status: New

## 2019-05-05 PROCEDURE — 11400000 HC PSYCH PRIVATE ROOM

## 2019-05-05 PROCEDURE — 63600175 PHARM REV CODE 636 W HCPCS: Performed by: PSYCHIATRY & NEUROLOGY

## 2019-05-05 PROCEDURE — 25000003 PHARM REV CODE 250: Performed by: PSYCHIATRY & NEUROLOGY

## 2019-05-05 PROCEDURE — 90833 PSYTX W PT W E/M 30 MIN: CPT | Mod: ,,, | Performed by: PSYCHIATRY & NEUROLOGY

## 2019-05-05 PROCEDURE — 99233 PR SUBSEQUENT HOSPITAL CARE,LEVL III: ICD-10-PCS | Mod: ,,, | Performed by: PSYCHIATRY & NEUROLOGY

## 2019-05-05 PROCEDURE — 99233 SBSQ HOSP IP/OBS HIGH 50: CPT | Mod: ,,, | Performed by: PSYCHIATRY & NEUROLOGY

## 2019-05-05 PROCEDURE — 90833 PR PSYCHOTHERAPY W/PATIENT W/E&M, 30 MIN (ADD ON): ICD-10-PCS | Mod: ,,, | Performed by: PSYCHIATRY & NEUROLOGY

## 2019-05-05 RX ADMIN — METRONIDAZOLE 500 MG: 500 TABLET ORAL at 08:05

## 2019-05-05 RX ADMIN — NITROFURANTOIN (MONOHYDRATE/MACROCRYSTALS) 100 MG: 75; 25 CAPSULE ORAL at 08:05

## 2019-05-05 RX ADMIN — LEVETIRACETAM 1000 MG: 500 TABLET, FILM COATED ORAL at 08:05

## 2019-05-05 RX ADMIN — PREDNISONE 40 MG: 20 TABLET ORAL at 08:05

## 2019-05-05 RX ADMIN — PANTOPRAZOLE SODIUM 40 MG: 40 TABLET, DELAYED RELEASE ORAL at 08:05

## 2019-05-05 RX ADMIN — HYOSCYAMINE SULFATE 0.12 MG: 0.12 TABLET ORAL; SUBLINGUAL at 08:05

## 2019-05-05 RX ADMIN — TOPIRAMATE 100 MG: 25 TABLET, FILM COATED ORAL at 08:05

## 2019-05-05 RX ADMIN — DIPHENOXYLATE HYDROCHLORIDE AND ATROPINE SULFATE 1 TABLET: 2.5; .025 TABLET ORAL at 03:05

## 2019-05-05 RX ADMIN — THERA TABS 1 TABLET: TAB at 08:05

## 2019-05-05 RX ADMIN — ESTRADIOL 2 MG: 0.5 TABLET ORAL at 08:05

## 2019-05-05 RX ADMIN — METRONIDAZOLE 500 MG: 500 TABLET ORAL at 03:05

## 2019-05-05 RX ADMIN — Medication 1 TABLET: at 08:05

## 2019-05-05 RX ADMIN — FLUOXETINE 40 MG: 20 CAPSULE ORAL at 08:05

## 2019-05-05 RX ADMIN — DIPHENOXYLATE HYDROCHLORIDE AND ATROPINE SULFATE 1 TABLET: 2.5; .025 TABLET ORAL at 08:05

## 2019-05-05 RX ADMIN — HYOSCYAMINE SULFATE 0.12 MG: 0.12 TABLET ORAL; SUBLINGUAL at 03:05

## 2019-05-05 RX ADMIN — LIOTHYRONINE SODIUM 5 MCG: 5 TABLET ORAL at 05:05
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
## 2019-05-05 NOTE — PLAN OF CARE
"Problem: Adult Behavioral Health Plan of Care  Goal: Plan of Care Review  Outcome: Ongoing (interventions implemented as appropriate)  Pt. Out and about on the unit at start of the shift, report mood as, " better" wider range of affect. Pt. Took a shower this shift, watch TV, had HS snack. Currently contracts for safety. Med compliant.  Pt.  Slept 6 Hours  so far this shift without problems noted. q 15 min safety checks done this shift. Safety and comfort maintained. Cont. Plan.      "

## 2019-05-05 NOTE — PLAN OF CARE
Problem: Adult Behavioral Health Plan of Care  Goal: Plan of Care Review  Outcome: Ongoing (interventions implemented as appropriate)  Pt out on unit all shift.Pt reports her mood is improved.No suicidal thoughts.Appetite and hygiene adequate.Pt looking forward to discharge soon and voices she is ready.Medication compliant.

## 2019-05-06 VITALS
WEIGHT: 142 LBS | HEART RATE: 61 BPM | BODY MASS INDEX: 22.29 KG/M2 | RESPIRATION RATE: 18 BRPM | SYSTOLIC BLOOD PRESSURE: 103 MMHG | TEMPERATURE: 96 F | HEIGHT: 67 IN | DIASTOLIC BLOOD PRESSURE: 62 MMHG

## 2019-05-06 PROBLEM — F60.3 BORDERLINE PERSONALITY DISORDER: Status: ACTIVE | Noted: 2019-05-06

## 2019-05-06 PROBLEM — F39 MOOD DISORDER: Status: ACTIVE | Noted: 2019-05-03

## 2019-05-06 PROBLEM — N39.0 RECURRENT UTI: Status: RESOLVED | Noted: 2019-05-04 | Resolved: 2019-05-06

## 2019-05-06 PROCEDURE — 25000003 PHARM REV CODE 250: Performed by: PSYCHIATRY & NEUROLOGY

## 2019-05-06 PROCEDURE — 99239 HOSP IP/OBS DSCHRG MGMT >30: CPT | Mod: ,,, | Performed by: PSYCHIATRY & NEUROLOGY

## 2019-05-06 PROCEDURE — 99239 PR HOSPITAL DISCHARGE DAY,>30 MIN: ICD-10-PCS | Mod: ,,, | Performed by: PSYCHIATRY & NEUROLOGY

## 2019-05-06 RX ORDER — ASPIRIN 325 MG
50000 TABLET, DELAYED RELEASE (ENTERIC COATED) ORAL
Qty: 7 CAPSULE | Refills: 0 | Status: SHIPPED | OUTPATIENT
Start: 2019-05-11 | End: 2019-07-01 | Stop reason: SDUPTHER

## 2019-05-06 RX ORDER — LIOTHYRONINE SODIUM 5 UG/1
5 TABLET ORAL
Qty: 30 TABLET | Refills: 1 | Status: SHIPPED | OUTPATIENT
Start: 2019-05-07 | End: 2019-07-01 | Stop reason: SDUPTHER

## 2019-05-06 RX ORDER — DIPHENOXYLATE HYDROCHLORIDE AND ATROPINE SULFATE 2.5; .025 MG/1; MG/1
1 TABLET ORAL 3 TIMES DAILY PRN
Qty: 9 TABLET | Refills: 0
Start: 2019-05-06 | End: 2019-05-09

## 2019-05-06 RX ADMIN — TOPIRAMATE 100 MG: 25 TABLET, FILM COATED ORAL at 08:05

## 2019-05-06 RX ADMIN — DIPHENOXYLATE HYDROCHLORIDE AND ATROPINE SULFATE 1 TABLET: 2.5; .025 TABLET ORAL at 08:05

## 2019-05-06 RX ADMIN — LIOTHYRONINE SODIUM 5 MCG: 5 TABLET ORAL at 05:05

## 2019-05-06 RX ADMIN — PANTOPRAZOLE SODIUM 40 MG: 40 TABLET, DELAYED RELEASE ORAL at 08:05

## 2019-05-06 RX ADMIN — HYOSCYAMINE SULFATE 0.12 MG: 0.12 TABLET ORAL; SUBLINGUAL at 08:05

## 2019-05-06 RX ADMIN — ESTRADIOL 2 MG: 0.5 TABLET ORAL at 08:05

## 2019-05-06 RX ADMIN — METRONIDAZOLE 500 MG: 500 TABLET ORAL at 08:05

## 2019-05-06 RX ADMIN — FLUOXETINE 40 MG: 20 CAPSULE ORAL at 08:05

## 2019-05-06 RX ADMIN — THERA TABS 1 TABLET: TAB at 08:05

## 2019-05-06 RX ADMIN — Medication 1 TABLET: at 08:05

## 2019-05-06 RX ADMIN — LEVETIRACETAM 1000 MG: 500 TABLET, FILM COATED ORAL at 08:05

## 2019-05-06 NOTE — PROGRESS NOTES
PSYCHOTHERAPY ADD-ON +[id number]   30 (16-37*) minutes     Time: 20 minutes  Participants: Met with patient     Therapeutic Intervention Type: behavior modifying psychotherapy, supportive psychotherapy  Why chosen therapy is appropriate versus another modality: relevant to diagnosis, patient responds to this modality, evidence based practice     Target symptoms: depression, anxiety   Primary focus: psychosocial stressors  Psychotherapeutic techniques: supportive and insight-oriented techniques; psycho-education; safety plan and wrap up session     Outcome monitoring methods: self-report, observation     Patient's response to intervention:  The patient's response to intervention is accepting.     Progress toward goals:  The patient's progress toward goals is good.    Sterling Taylor MD  Psychiatry
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
## 2019-05-06 NOTE — PLAN OF CARE
Problem: Adult Behavioral Health Plan of Care  Goal: Plan of Care Review  Outcome: Ongoing (interventions implemented as appropriate)  Pt. Noted out and about at start of the shift, on phone several times. Report she is going home tomorrow and states she feels ready, denies self harm thoughts.  Pt.  Slept 5.5  Hours  so far this shift without problems noted.  But noted awaken early, stating she can not sleep too long due to her neck and other general body aches. Pt. Then went to end of [place] to read. q 15 min safety checks done this shift. Safety and comfort maintained. Cont. Plan.

## 2019-05-06 NOTE — PSYCH
Patient will be following up with [facility] at [address] in [place], [phone].  Appointment will be on 5/6/2019 at 1:15 pm with KAUSHAL Ware.  Patient does not use tobacco products but will receive substance abuse therapy due to a positive UDS on admit.  AVS faxed on 5/6/2019 at 12:42 pm.

## 2019-05-22 PROBLEM — E53.8 VITAMIN B12 DEFICIENCY: Status: ACTIVE | Noted: 2019-05-22

## 2019-05-22 PROBLEM — F31.9 BIPOLAR 1 DISORDER: Status: ACTIVE | Noted: 2019-05-22

## 2019-08-21 PROBLEM — N39.0 ACUTE UTI: Status: ACTIVE | Noted: 2019-08-21

## 2019-08-21 PROBLEM — K82.8 GALLBLADDER DILATATION: Status: ACTIVE | Noted: 2019-08-21

## 2019-09-11 ENCOUNTER — HOSPITAL ENCOUNTER (EMERGENCY)
Facility: HOSPITAL | Age: 39
Discharge: PSYCHIATRIC OR SUBSTANCE USE FACILITY | End: 2019-09-11
Attending: SURGERY
Payer: MEDICARE

## 2019-09-11 VITALS
OXYGEN SATURATION: 98 % | SYSTOLIC BLOOD PRESSURE: 123 MMHG | HEART RATE: 70 BPM | DIASTOLIC BLOOD PRESSURE: 76 MMHG | RESPIRATION RATE: 18 BRPM | TEMPERATURE: 97 F

## 2019-09-11 DIAGNOSIS — R45.851 SUICIDAL IDEATION: Primary | ICD-10-CM

## 2019-09-11 LAB
ALBUMIN SERPL BCP-MCNC: 3.3 G/DL (ref 3.5–5.2)
ALP SERPL-CCNC: 98 U/L (ref 55–135)
ALT SERPL W/O P-5'-P-CCNC: 18 U/L (ref 10–44)
AMPHET+METHAMPHET UR QL: NEGATIVE
ANION GAP SERPL CALC-SCNC: 10 MMOL/L (ref 8–16)
AST SERPL-CCNC: 28 U/L (ref 10–40)
BARBITURATES UR QL SCN>200 NG/ML: NEGATIVE
BASOPHILS # BLD AUTO: 0.02 K/UL (ref 0–0.2)
BASOPHILS NFR BLD: 0.3 % (ref 0–1.9)
BENZODIAZ UR QL SCN>200 NG/ML: NEGATIVE
BILIRUB SERPL-MCNC: 0.6 MG/DL (ref 0.1–1)
BILIRUB UR QL STRIP: NEGATIVE
BUN SERPL-MCNC: 14 MG/DL (ref 6–20)
BZE UR QL SCN: NEGATIVE
CALCIUM SERPL-MCNC: 8.8 MG/DL (ref 8.7–10.5)
CANNABINOIDS UR QL SCN: NEGATIVE
CHLORIDE SERPL-SCNC: 116 MMOL/L (ref 95–110)
CLARITY UR: CLEAR
CO2 SERPL-SCNC: 16 MMOL/L (ref 23–29)
COLOR UR: YELLOW
CREAT SERPL-MCNC: 1.2 MG/DL (ref 0.5–1.4)
CREAT UR-MCNC: 205.3 MG/DL (ref 15–325)
DIFFERENTIAL METHOD: ABNORMAL
EOSINOPHIL # BLD AUTO: 0.1 K/UL (ref 0–0.5)
EOSINOPHIL NFR BLD: 1.5 % (ref 0–8)
ERYTHROCYTE [DISTWIDTH] IN BLOOD BY AUTOMATED COUNT: 14.9 % (ref 11.5–14.5)
EST. GFR  (AFRICAN AMERICAN): >60 ML/MIN/1.73 M^2
EST. GFR  (NON AFRICAN AMERICAN): 58 ML/MIN/1.73 M^2
GLUCOSE SERPL-MCNC: 79 MG/DL (ref 70–110)
GLUCOSE UR QL STRIP: NEGATIVE
HCT VFR BLD AUTO: 33.6 % (ref 37–48.5)
HGB BLD-MCNC: 10.1 G/DL (ref 12–16)
HGB UR QL STRIP: NEGATIVE
IMM GRANULOCYTES # BLD AUTO: 0.01 K/UL (ref 0–0.04)
IMM GRANULOCYTES NFR BLD AUTO: 0.2 % (ref 0–0.5)
KETONES UR QL STRIP: NEGATIVE
LEUKOCYTE ESTERASE UR QL STRIP: NEGATIVE
LYMPHOCYTES # BLD AUTO: 1.7 K/UL (ref 1–4.8)
LYMPHOCYTES NFR BLD: 28.4 % (ref 18–48)
MCH RBC QN AUTO: 25.1 PG (ref 27–31)
MCHC RBC AUTO-ENTMCNC: 30.1 G/DL (ref 32–36)
MCV RBC AUTO: 83 FL (ref 82–98)
METHADONE UR QL SCN>300 NG/ML: NEGATIVE
MONOCYTES # BLD AUTO: 0.3 K/UL (ref 0.3–1)
MONOCYTES NFR BLD: 5.6 % (ref 4–15)
NEUTROPHILS # BLD AUTO: 3.9 K/UL (ref 1.8–7.7)
NEUTROPHILS NFR BLD: 64 % (ref 38–73)
NITRITE UR QL STRIP: NEGATIVE
NRBC BLD-RTO: 0 /100 WBC
OPIATES UR QL SCN: NORMAL
PCP UR QL SCN>25 NG/ML: NEGATIVE
PH UR STRIP: 6 [PH] (ref 5–8)
PLATELET # BLD AUTO: 325 K/UL (ref 150–350)
PMV BLD AUTO: 10.7 FL (ref 9.2–12.9)
POTASSIUM SERPL-SCNC: 3.7 MMOL/L (ref 3.5–5.1)
PROT SERPL-MCNC: 6.6 G/DL (ref 6–8.4)
PROT UR QL STRIP: NEGATIVE
RBC # BLD AUTO: 4.03 M/UL (ref 4–5.4)
SODIUM SERPL-SCNC: 142 MMOL/L (ref 136–145)
SP GR UR STRIP: >=1.03 (ref 1–1.03)
TOXICOLOGY INFORMATION: NORMAL
URN SPEC COLLECT METH UR: ABNORMAL
UROBILINOGEN UR STRIP-ACNC: NEGATIVE EU/DL
WBC # BLD AUTO: 6.12 K/UL (ref 3.9–12.7)

## 2019-09-11 PROCEDURE — 80053 COMPREHEN METABOLIC PANEL: CPT

## 2019-09-11 PROCEDURE — 36415 COLL VENOUS BLD VENIPUNCTURE: CPT

## 2019-09-11 PROCEDURE — 99285 EMERGENCY DEPT VISIT HI MDM: CPT | Mod: 25

## 2019-09-11 PROCEDURE — 85025 COMPLETE CBC W/AUTO DIFF WBC: CPT

## 2019-09-11 PROCEDURE — 96372 THER/PROPH/DIAG INJ SC/IM: CPT

## 2019-09-11 PROCEDURE — 80307 DRUG TEST PRSMV CHEM ANLYZR: CPT

## 2019-09-11 PROCEDURE — 81003 URINALYSIS AUTO W/O SCOPE: CPT | Mod: 59

## 2019-09-11 PROCEDURE — 63600175 PHARM REV CODE 636 W HCPCS: Performed by: SURGERY

## 2019-09-11 RX ORDER — HALOPERIDOL 5 MG/ML
5 INJECTION INTRAMUSCULAR EVERY 4 HOURS PRN
Status: DISCONTINUED | OUTPATIENT
Start: 2019-09-11 | End: 2019-09-12 | Stop reason: HOSPADM

## 2019-09-11 RX ORDER — LORAZEPAM 2 MG/ML
2 INJECTION INTRAMUSCULAR EVERY 4 HOURS PRN
Status: DISCONTINUED | OUTPATIENT
Start: 2019-09-11 | End: 2019-09-12 | Stop reason: HOSPADM

## 2019-09-11 RX ORDER — DIPHENHYDRAMINE HYDROCHLORIDE 50 MG/ML
50 INJECTION INTRAMUSCULAR; INTRAVENOUS EVERY 4 HOURS PRN
Status: DISCONTINUED | OUTPATIENT
Start: 2019-09-11 | End: 2019-09-12 | Stop reason: HOSPADM

## 2019-09-11 RX ADMIN — LORAZEPAM 2 MG: 2 INJECTION INTRAMUSCULAR; INTRAVENOUS at 09:09

## 2019-09-11 NOTE — ED PROVIDER NOTES
Encounter Date: 9/11/2019       History     Chief Complaint   Patient presents with    Psychiatric Evaluation     Patient is 38-year-old female sent in by her mental health provider for suicidal ideation.  Patient voiced intent to hang herself.  She has been placed in PEC status, and is brought to the emergency department for medical clearance.        Review of patient's allergies indicates:   Allergen Reactions    Ciprofloxacin Shortness Of Breath    Imuran [azathioprine sodium] Diarrhea and Nausea And Vomiting     Pt states that she becomes hot sweaty and passes out also. She states that she has diarrhea and nausea and vomiting     [place]     Imuran [azathioprine] Nausea And Vomiting    Adhesive Itching     Past Medical History:   Diagnosis Date    Anemia     Anxiety     Bilious vomiting with nausea 1/17/2017    Bipolar 1 disorder     Constipation     Coronary artery disease     NO STENT    Crohn's disease     Encounter for blood transfusion     Epilepsy     Fibromyalgia     GERD (gastroesophageal reflux disease)     Hyponatremia     Interstitial cystitis     Kidney stones     Mass, ovarian 11/22/2016    Metabolic acidosis     Osteoporosis     PAF (paroxysmal atrial fibrillation)     S/P ERIK-BSO (total abdominal hysterectomy and bilateral salpingo-oophorectomy) 12/19/2016    Seizures     UTI (urinary tract infection)      Past Surgical History:   Procedure Laterality Date    ABDOMINAL SURGERY      ANASTOMOSIS, ILEORECTAL  1/15/2019    [facility] by [facility] at [facility] OR 2ND FLR    ANGIOPLASTY      ASPIRATION ABSCESS N/A 1/6/2017    Performed by Rhonda Surgeon at [facility] RHONDA    [id number]-GW N/A 1/7/2017    Performed by Rhonda Surgeon at [facility] RHONDA    CATHETERIZATION-URETERAL Left 12/19/2016    Performed by Matti [facility]frey [facility] at [facility] OR 2ND FLR    CLOSURE, ILEOSTOMY  1/15/2019    [facility] by [facility] at [facility] OR 2ND FLR    COLECTOMY, TOTAL, ABDOMINAL N/A  3/14/2014    [facility] by [facility] at [facility] OR 2ND FLR    COLON SURGERY      x 2    COLONOSCOPY      multiple    COLONOSCOPY N/A 4/11/2017    Performed by Hussain Amaro MD at [facility] ENDO    CYSTOSCOPY N/A 12/19/2016    Performed by Matti [facility]frey MD at [facility] OR 2ND FLR    CYSTOSCOPY W/ URETERAL STENT PLACEMENT      CYSTOSCOPY W/ URETERAL STENT REMOVAL      CYSTOSCOPY WITH STENT PLACEMENT Left 12/20/2016    Performed by [facility]i [facility]frey MD at [facility] OR 1ST FLR    CYSTOSCOPY, WITH RETROGRADE PYELOGRAM AND URETERAL STENT INSERTION Right 8/22/2019    Performed by Dano Mathis [facility] at [facility] OR    CYSTOSCOPY, WITH URETERAL STENT REMOVAL Right 9/5/2019    Performed by Dano Mathis MD at [facility] OR    EXPLORATORY-LAPAROTOMY N/A 3/14/2014    Performed by Reji Peterson MD at [facility] OR 2ND FLR    HYSTERECTOMY      HYSTERECTOMY-TOTAL-ABDOMINAL AND BSO N/A 12/19/2016    Performed by Andrew Silveira MD at [facility] OR 2ND FLR    ILEOSTOMY      INSERTION, PICC N/A 8/23/2019    Performed by D[facility] Diagnostic Provider at [facility] OR    INSERTION, PICC N/A 10/30/2018    Performed by [facility] Provider at [facility] OR    LAPAROTOMY, EXPLORATORY;extensive lysis of adhesions  1/15/2019    Performed by Reji Peterson [facility] at [facility] OR 2[facility]    LITHOTRIPSY-EXTRACORPOREAL SHOCK WAVE Right 9/5/2019    Performed by Dano Mathis MD at [facility] OR    LITHOTRIPSY-EXTRACORPOREAL SHOCK WAVE Right 8/22/2019    Performed by Dano Mathis MD at [facility] OR    LYSIS-ADHESION Left 12/19/2016    Performed by Andrew Sil[facility]ira MD at [facility] OR 2ND FLR    LYSIS-ADHESION  12/19/2016    Performed by Reji Peterson MD at [facility] OR 2ND FLR    PORTACATH PLACEMENT      SIGMOIDOSCOPY, FLEXIBLE N/A 3/5/2014    Performed by Reji Peterson MD at [facility] ENDO (2ND FLR)    TOTAL COLECTOMY  2014    URETEROLYSIS  1/15/2019    Performed by Reji Peterson MD at [facility] OR 2ND FLR     Family History   Problem Relation  Age of Onset    Hypertension Mother     Joint hypermobility Mother     Physical abuse Mother     Cancer Father         brain    Diabetes Father     Schizophrenia Father     No Known Problems Brother     No Known Problems Brother     Stroke Paternal Grandmother     Ovarian cancer Neg Hx     Uterine cancer Neg Hx     Breast cancer Neg Hx     Colon cancer Neg Hx     Anesthesia problems Neg Hx      Social History     Tobacco Use    Smoking status: Never Smoker    Smokeless tobacco: Never Used   Substance Use Topics    Alcohol use: No    Drug use: No     Review of Systems   Constitutional: Negative for fever.   HENT: Negative for congestion, ear pain, rhinorrhea, sore throat and trouble swallowing.    Eyes: Negative for pain.   Respiratory: Negative for cough, shortness of breath and wheezing.    Cardiovascular: Negative for chest pain, palpitations and leg swelling.   Gastrointestinal: Negative for abdominal pain, constipation, diarrhea and nausea.   Genitourinary: Negative for difficulty urinating, dysuria, flank pain, frequency, hematuria and urgency.   Musculoskeletal: Negative for arthralgias, back pain, myalgias and neck pain.   Skin: Negative for rash and wound.   Neurological: Negative for speech difficulty, weakness and headaches.   Hematological: Does not bruise/bleed easily.       Physical Exam     Initial Vitals [09/11/19 1619]   BP Pulse Resp Temp SpO2   118/79 76 16 96.2 °F (35.7 °C) 98 %      MAP       --         Physical Exam    Constitutional: No distress.   HENT:   Head: Normocephalic and atraumatic.   Nose: Nose normal.   Mouth/Throat: Oropharynx is clear and moist.   Eyes: Conjunctivae and EOM are normal. Pupils are equal, round, and reactive to light.   Neck: Normal range of motion. Neck supple.   Cardiovascular: Normal rate, regular rhythm, normal heart sounds and intact distal pulses.   Pulmonary/Chest: Breath sounds normal. No respiratory distress. She has no wheezes.    Abdominal: Soft. Bowel sounds are normal. There is no tenderness.   Musculoskeletal: Normal range of motion.   Neurological: She is alert and oriented to person, place, and time. She has normal strength.   Skin: Skin is warm and dry.   Psychiatric:   Behavior abnormal, thoughts of suicide prevalent         ED Course   Procedures  Labs Reviewed   COMPREHENSIVE METABOLIC PANEL - Abnormal; Notable for the following components:       Result Value    Chloride 116 (*)     CO2 16 (*)     Albumin 3.3 (*)     eGFR if non  58 (*)     All other components within normal limits   CBC W/ AUTO DIFFERENTIAL - Abnormal; Notable for the following components:    Hemoglobin 10.1 (*)     Hematocrit 33.6 (*)     Mean Corpuscular Hemoglobin 25.1 (*)     Mean Corpuscular Hemoglobin Conc 30.1 (*)     RDW 14.9 (*)     All other components within normal limits   URINALYSIS, REFLEX TO URINE CULTURE - Abnormal; Notable for the following components:    Specific Gravity, UA >=1.030 (*)     All other components within normal limits    Narrative:     Preferred Collection Type->Urine, Clean Catch   DRUG SCREEN PANEL, URINE EMERGENCY    Narrative:     Preferred Collection Type->Urine, Clean Catch   ACETAMINOPHEN LEVEL   SALICYLATE LEVEL   TSH   ALCOHOL,MEDICAL (ETHANOL)          Imaging Results    None                               Clinical Impression:       ICD-10-CM ICD-9-CM   1. Suicidal ideation R45.851 V62.84         Disposition:   Disposition: Transferred  Condition: Stable                        Benjamin Dennis MD  09/11/19 [id number]

## 2019-09-12 NOTE — PROVIDER PROGRESS NOTES - EMERGENCY DEPT.
Encounter Date: 9/11/2019    ED Physician Progress Notes           I reviewed the patient's labs and she is medically cleared for placement       Benjamin Dennis M.D. 10:19 PM 9/11/2019

## 2019-09-12 NOTE — ED NOTES
Care assumed. The patient is awake, alert and cooperative with a calm affect, patient is aware of environment. Airway is open and patent, respirations are spontaneous, normal respiratory effort and rate noted, skin warm and dry, full ROM in all extremities, appearance: NAD noted.  Bed in low, locked position. Sitter at bedside.  Will continue to monitor.

## 2019-09-12 NOTE — ED NOTES
Patient transported to [place] in [place] via [facility]. Belongings sent with patient. Paperwork and original PEC sent with patient.

## 2019-09-12 NOTE — ED NOTES
Patient lying on stretcher. No acute distress noted. Sitter continued. Awaiting [facility] transport.

## 2019-11-22 PROBLEM — N39.0 ACUTE LOWER UTI: Status: ACTIVE | Noted: 2019-11-22

## 2019-11-22 PROBLEM — N32.89 BLADDER SPASMS: Status: ACTIVE | Noted: 2019-11-22

## 2020-01-03 ENCOUNTER — OFFICE VISIT (OUTPATIENT)
Dept: URGENT CARE | Facility: CLINIC | Age: 40
End: 2020-01-03
Payer: MEDICARE

## 2020-01-03 VITALS
SYSTOLIC BLOOD PRESSURE: 132 MMHG | OXYGEN SATURATION: 99 % | DIASTOLIC BLOOD PRESSURE: 83 MMHG | WEIGHT: 145 LBS | HEIGHT: 67 IN | TEMPERATURE: 99 F | RESPIRATION RATE: 20 BRPM | BODY MASS INDEX: 22.76 KG/M2 | HEART RATE: 83 BPM

## 2020-01-03 DIAGNOSIS — R68.89 FLU-LIKE SYMPTOMS: Primary | ICD-10-CM

## 2020-01-03 DIAGNOSIS — R03.0 ELEVATED BLOOD PRESSURE READING: ICD-10-CM

## 2020-01-03 DIAGNOSIS — R50.9 FEVER, UNSPECIFIED FEVER CAUSE: ICD-10-CM

## 2020-01-03 LAB
CTP QC/QA: YES
FLUAV AG NPH QL: NEGATIVE
FLUBV AG NPH QL: NEGATIVE

## 2020-01-03 PROCEDURE — 99203 OFFICE O/P NEW LOW 30 MIN: CPT | Mod: S$GLB,,, | Performed by: NURSE PRACTITIONER

## 2020-01-03 PROCEDURE — 87804 INFLUENZA ASSAY W/OPTIC: CPT | Mod: QW,S$GLB,, | Performed by: NURSE PRACTITIONER

## 2020-01-03 PROCEDURE — 99203 PR OFFICE/OUTPT VISIT, NEW, LEVL III, 30-44 MIN: ICD-10-PCS | Mod: S$GLB,,, | Performed by: NURSE PRACTITIONER

## 2020-01-03 PROCEDURE — 87804 POCT INFLUENZA A/B: ICD-10-PCS | Mod: 59,QW,S$GLB, | Performed by: NURSE PRACTITIONER

## 2020-01-03 RX ORDER — BENZONATATE 100 MG/1
100 CAPSULE ORAL EVERY 6 HOURS PRN
Qty: 30 CAPSULE | Refills: 0 | Status: SHIPPED | OUTPATIENT
Start: 2020-01-03 | End: 2020-02-11 | Stop reason: CLARIF

## 2020-01-04 NOTE — PROGRESS NOTES
"Subjective:       Patient ID: Candida Cardona is a 39 y.o. female.    Vitals:  height is 5' 7" (1.702 m) and weight is 65.8 kg (145 lb). Her oral temperature is 98.7 °F (37.1 °C). Her blood pressure is 132/83 and her pulse is 83. Her respiration is 20 and oxygen saturation is 99%.     Chief Complaint: Sinus Problem    T max 102F through yesterday, highest today 100F.    Sinus Problem   This is a new problem. The current episode started in the past 7 days (x3days). The problem has been gradually worsening since onset. The maximum temperature recorded prior to her arrival was 102 - 102.9 F. Associated symptoms include congestion, coughing, ear pain, headaches, sinus pressure, sneezing and a sore throat. Pertinent negatives include no chills, diaphoresis, hoarse voice, neck pain, shortness of breath or swollen glands. Past treatments include nothing.       Constitution: Positive for fever and generalized weakness. Negative for chills, sweating and fatigue.   HENT: Positive for ear pain, congestion, postnasal drip, sinus pressure and sore throat. Negative for sinus pain, trouble swallowing and voice change.    Neck: Negative for neck pain, neck stiffness and painful lymph nodes.   Cardiovascular: Negative for chest pain and sob on exertion.   Eyes: Positive for eye discharge (watery). Negative for eye redness.   Respiratory: Positive for cough. Negative for chest tightness, sputum production, bloody sputum, COPD, shortness of breath, stridor, wheezing and asthma.    Gastrointestinal: Positive for diarrhea (pt has crohns, chronic diarrhea, has had mucus type diarrhea 4-5 times past two days she feels has a lot of congestion in stool). Negative for abdominal pain, nausea, vomiting, bright red blood in stool, dark colored stools, rectal bleeding and rectal pain.   Genitourinary: Negative for dysuria, frequency and urgency.   Musculoskeletal: Positive for muscle ache. Negative for joint pain and joint swelling.   Skin: " Negative for pale, rash and erythema.   Allergic/Immunologic: Positive for sneezing. Negative for seasonal allergies and asthma.   Neurological: Positive for headaches and seizures (pt has focal seizures, epilepsy; last seizure yesterday morning, has a few a month she states).   Hematologic/Lymphatic: Negative for swollen lymph nodes and easy bruising/bleeding. Does not bruise/bleed easily.       Objective:      Physical Exam   Constitutional: She is oriented to person, place, and time. Vital signs are normal. She appears well-developed and well-nourished. She is cooperative.  Non-toxic appearance. She does not have a sickly appearance. She does not appear ill. No distress.   HENT:   Head: Normocephalic and atraumatic.   Right Ear: Hearing, tympanic membrane, external ear and ear canal normal. No mastoid tenderness. Tympanic membrane is not erythematous. No middle ear effusion.   Left Ear: Hearing, tympanic membrane, external ear and ear canal normal. No mastoid tenderness. Tympanic membrane is not erythematous.  No middle ear effusion.   Nose: Rhinorrhea present. No mucosal edema or nasal deformity. No epistaxis. Right sinus exhibits no maxillary sinus tenderness and no frontal sinus tenderness. Left sinus exhibits no maxillary sinus tenderness and no frontal sinus tenderness.   Mouth/Throat: Uvula is midline and mucous membranes are normal. Mucous membranes are not pale. No trismus in the jaw. Normal dentition. No uvula swelling. Posterior oropharyngeal erythema present. No oropharyngeal exudate or posterior oropharyngeal edema. Tonsils are 0 on the right. Tonsils are 0 on the left. No tonsillar exudate.   Eyes: Conjunctivae and lids are normal. Right eye exhibits discharge (watery). Left eye exhibits discharge (watery). No scleral icterus.   Neck: Trachea normal, normal range of motion, full passive range of motion without pain and phonation normal. Neck supple. No neck rigidity. No tracheal deviation, no edema  and no erythema present.   Cardiovascular: Normal rate, regular rhythm, normal heart sounds, intact distal pulses and normal pulses.   No murmur heard.  Pulmonary/Chest: Effort normal and breath sounds normal. No respiratory distress. She has no decreased breath sounds. She has no wheezes. She has no rhonchi. She exhibits no tenderness.   Abdominal: Soft. Normal appearance and bowel sounds are normal. She exhibits no distension. There is no tenderness.   Musculoskeletal: Normal range of motion. She exhibits no edema or deformity.   Lymphadenopathy:     She has no cervical adenopathy.   Neurological: She is alert and oriented to person, place, and time. She exhibits normal muscle tone. Coordination normal.   Skin: Skin is warm, dry, intact, not diaphoretic, not pale and no rash. Capillary refill takes less than 2 seconds. erythema  Psychiatric: She has a normal mood and affect. Her speech is normal and behavior is normal. Judgment and thought content normal. Cognition and memory are normal.   Nursing note and vitals reviewed.        Assessment:       1. Flu-like symptoms    2. Fever, unspecified fever cause    3. Elevated blood pressure reading        Plan:     Alert, nontoxic and in NAD.  Afebrile currently.  Patient on exam has flu-like symptoms, acute URI viral syndrome.  No antibiotics indicated.  No evidence of otitis media, strep throat, sinusitis, and based on exam in HPI no concern for pneumonia or bronchitis.  No evidence of respiratory distress, pt states symptoms are more upper respiratory, she only has a mild dry occasional cough.  Abdomen exam is benign.  No evidence of dehydration.  Symptomatic management.  She is out of the time frame for onset of symptoms to start Tamiflu, negative flu testing clinic exam is consistent.    Flu-like symptoms  -     benzonatate (TESSALON PERLES) 100 MG capsule; Take 1 capsule (100 mg total) by mouth every 6 (six) hours as needed for Cough.  Dispense: 30 capsule; Refill:  0    Fever, unspecified fever cause  -     POCT Influenza A/B    Elevated blood pressure reading          Office Visit on 01/03/2020   Component Date Value Ref Range Status    Rapid Influenza A Ag 01/03/2020 Negative  Negative Final    Rapid Influenza B Ag 01/03/2020 Negative  Negative Final     Acceptable 01/03/2020 Yes   Final       Patient Instructions   Recheck in 3-5 days if not improving, sooner any worsening. Seek emergency care immediately any shortness of breath, difficulty breathing, weakness, chest pain or any other concerning or worsening symptoms including increased seizure occurrence.      You have been diagnosed with Influenza or flu like symptoms.   You are contagious for 24 hours after your last fever.  Please drink plenty of fluids.  Please get plenty of rest.    Please return here or go to the Emergency Department for any concerns or worsening of condition.    Good handwashing. No sharing food or drinks. Cover mouth with kleenex when cough or sneeze and immediately throw then wash hands. Wash bed sheets daily for next 3 days. New toothbrush as discussed. If anyone else in household with toothbrushes all in same place, these should be discarded as well.     Rotate tylenol (acetominophen) and Motrin (Ibuprofen) every 4-6 hours for fever, chills, pain or inflammation as needed.    · Use an antihistmine such as claritin or zyrtec to dry you out.  Use mucinex (guaifenisin) to break up mucous.  Coricidrin HBP is an option for cold symptoms if you have high blood pressure.    ·   ·   · Follow up with your primary care in 2-5 days if symptoms have not improved, or you may return here.  · If you were referred to a specialist, please follow up with that specialty.  · If you were prescribed antibiotics, please take them to completion.  · If you were prescribed a narcotic or any medication with sedative effects, do not drive or operate heavy equipment or machinery while taking these  medications.  · You must understand that you have received treatment at an Urgent Care facility only, and that you may be released before all of your medical problems are known or treated. Urgent Care facilities are not equipped to handle life threatening emergencies. It is recommended that you go to an Emergency Department for further evaluation of worsening or concerning symptoms, or possibly life threatening conditions as discussed.                                        If you  smoke, please stop smoking

## 2020-01-04 NOTE — PATIENT INSTRUCTIONS
Recheck in 3-5 days if not improving, sooner any worsening. Seek emergency care immediately any shortness of breath, difficulty breathing, weakness, chest pain or any other concerning or worsening symptoms including increased seizure occurrence.      You have been diagnosed with Influenza or flu like symptoms.   You are contagious for 24 hours after your last fever.  Please drink plenty of fluids.  Please get plenty of rest.    Please return here or go to the Emergency Department for any concerns or worsening of condition.    Good handwashing. No sharing food or drinks. Cover mouth with kleenex when cough or sneeze and immediately throw then wash hands. Wash bed sheets daily for next 3 days. New toothbrush as discussed. If anyone else in household with toothbrushes all in same place, these should be discarded as well.     Rotate tylenol (acetominophen) and Motrin (Ibuprofen) every 4-6 hours for fever, chills, pain or inflammation as needed.    · Use an antihistmine such as claritin or zyrtec to dry you out.  Use mucinex (guaifenisin) to break up mucous.  Coricidrin HBP is an option for cold symptoms if you have high blood pressure.    ·   ·   · Follow up with your primary care in 2-5 days if symptoms have not improved, or you may return here.  · If you were referred to a specialist, please follow up with that specialty.  · If you were prescribed antibiotics, please take them to completion.  · If you were prescribed a narcotic or any medication with sedative effects, do not drive or operate heavy equipment or machinery while taking these medications.  · You must understand that you have received treatment at an Urgent Care facility only, and that you may be released before all of your medical problems are known or treated. Urgent Care facilities are not equipped to handle life threatening emergencies. It is recommended that you go to an Emergency Department for further evaluation of worsening or concerning symptoms,  or possibly life threatening conditions as discussed.                                        If you  smoke, please stop smoking

## 2020-03-30 ENCOUNTER — OFFICE VISIT (OUTPATIENT)
Dept: URGENT CARE | Facility: CLINIC | Age: 40
End: 2020-03-30
Payer: MEDICARE

## 2020-03-30 VITALS
DIASTOLIC BLOOD PRESSURE: 70 MMHG | OXYGEN SATURATION: 98 % | HEART RATE: 93 BPM | TEMPERATURE: 98 F | HEIGHT: 67 IN | WEIGHT: 140 LBS | BODY MASS INDEX: 21.97 KG/M2 | SYSTOLIC BLOOD PRESSURE: 97 MMHG

## 2020-03-30 DIAGNOSIS — R06.02 SHORTNESS OF BREATH: ICD-10-CM

## 2020-03-30 DIAGNOSIS — R05.9 COUGH WITH FEVER: Primary | ICD-10-CM

## 2020-03-30 DIAGNOSIS — R50.9 COUGH WITH FEVER: Primary | ICD-10-CM

## 2020-03-30 DIAGNOSIS — Z20.822 SUSPECTED COVID-19 VIRUS INFECTION: ICD-10-CM

## 2020-03-30 PROCEDURE — U0002 COVID-19 LAB TEST NON-CDC: HCPCS

## 2020-03-30 PROCEDURE — 99214 PR OFFICE/OUTPT VISIT, EST, LEVL IV, 30-39 MIN: ICD-10-PCS | Mod: S$GLB,,, | Performed by: NURSE PRACTITIONER

## 2020-03-30 PROCEDURE — 99214 OFFICE O/P EST MOD 30 MIN: CPT | Mod: S$GLB,,, | Performed by: NURSE PRACTITIONER

## 2020-03-30 PROCEDURE — 71046 XR CHEST PA AND LATERAL: ICD-10-PCS | Mod: S$GLB,,, | Performed by: RADIOLOGY

## 2020-03-30 PROCEDURE — 71046 X-RAY EXAM CHEST 2 VIEWS: CPT | Mod: S$GLB,,, | Performed by: RADIOLOGY

## 2020-03-30 RX ORDER — AZITHROMYCIN 250 MG/1
TABLET, FILM COATED ORAL
Qty: 6 TABLET | Refills: 0 | Status: SHIPPED | OUTPATIENT
Start: 2020-03-30 | End: 2020-04-04

## 2020-03-30 RX ORDER — ONDANSETRON 4 MG/1
4 TABLET, ORALLY DISINTEGRATING ORAL EVERY 8 HOURS PRN
Qty: 10 TABLET | Refills: 0 | Status: SHIPPED | OUTPATIENT
Start: 2020-03-30 | End: 2020-05-07

## 2020-03-30 NOTE — LETTER
March 30, 2020      [facility]  [address], [address]  [place] [address]  Phone: [phone]  Fax: [phone]       Patient: Candida Cardona   YOB: 1980  Date of Visit: 03/30/2020    To Whom It May Concern:    Carlos Cardona  was at [facility] on 03/30/2020. She may return to work/school on 4/7/2020 if symptoms have improved and no fever for 72 hr without the use of fever reducing medications such as Tylenol or ibuprofen. If you have any questions or concerns, or if I can be of further assistance, please do not hesitate to contact me.    Sincerely,    Bertha Ford NP

## 2020-03-30 NOTE — PROGRESS NOTES
"Subjective:       Patient ID: Candida Cardona is a 39 y.o. female.    Vitals:  height is 5' 7" (1.702 m) and weight is 63.5 kg (140 lb). Her temperature is 98.3 °F (36.8 °C). Her blood pressure is 97/70 and her pulse is 93. Her oxygen saturation is 98%.     Chief Complaint: Cough    40 y/o female presents with fever, chills, cough, SOB/heaviness with cough, headaches and generalized weakness.  Patient immunocompromised.  Notified PCP and sent for evaluation.     has a past medical history of Anemia, Anxiety, Bilious vomiting with nausea, Bipolar 1 disorder, Constipation, Coronary artery disease, Crohn's disease, Encounter for blood transfusion, Epilepsy, Fibromyalgia, Gall stones, GERD (gastroesophageal reflux disease), Hyponatremia, Interstitial cystitis, Kidney stones, Mass, ovarian, Metabolic acidosis, Osteoporosis, PAF (paroxysmal atrial fibrillation), Pancreatitis, S/P ERIK-BSO (total abdominal hysterectomy and bilateral salpingo-oophorectomy), Seizures, and UTI (urinary tract infection).    Cough   This is a new problem. Episode onset: 2 days ago  The problem has been gradually worsening. The cough is non-productive. Associated symptoms include chest pain, chills, headaches, nasal congestion and shortness of breath. Pertinent negatives include no ear congestion, ear pain, eye redness, fever, heartburn, hemoptysis, myalgias, postnasal drip, rash, rhinorrhea, sore throat, sweats, weight loss or wheezing. Nothing aggravates the symptoms. She has tried nothing for the symptoms. Her past medical history is significant for bronchitis. There is no history of asthma, bronchiectasis, COPD, emphysema, environmental allergies or pneumonia.       Constitution: Positive for chills, sweating, fatigue and generalized weakness. Negative for activity change, appetite change, fever, unexpected weight change and international travel in last 60 days.   HENT: Positive for sinus pain and sinus pressure. Negative for ear pain, ear " discharge, congestion, postnasal drip, sore throat, trouble swallowing and voice change.    Cardiovascular: Positive for chest pain.   Eyes: Negative for eye discharge and eye redness.   Respiratory: Positive for chest tightness, cough and shortness of breath. Negative for sputum production, bloody sputum and wheezing.    Gastrointestinal: Positive for nausea, vomiting and diarrhea. Negative for constipation and heartburn.   Genitourinary: Negative for dysuria, frequency and urgency.   Musculoskeletal: Negative for muscle ache.   Skin: Negative for rash.   Allergic/Immunologic: Positive for immunocompromised state and sneezing. Negative for environmental allergies.   Neurological: Positive for headaches.       Objective:      Physical Exam   Constitutional: She is oriented to person, place, and time. She appears well-developed and well-nourished. She is cooperative.  Non-toxic appearance. She appears ill. No distress.   HENT:   Head: Normocephalic and atraumatic.   Right Ear: Hearing and external ear normal.   Left Ear: Hearing and external ear normal.   Nose: Nose normal. No mucosal edema or nasal deformity. No epistaxis.   Mouth/Throat: Uvula is midline and mucous membranes are normal. No trismus in the jaw. Normal dentition. No uvula swelling.   Eyes: Conjunctivae and lids are normal. No scleral icterus.   Neck: Trachea normal, full passive range of motion without pain and phonation normal. Neck supple. No neck rigidity. No edema and no erythema present.   Cardiovascular: Normal rate, regular rhythm, normal heart sounds, intact distal pulses and normal pulses.   Pulmonary/Chest: Effort normal and breath sounds normal. No respiratory distress. She has no decreased breath sounds. She has no rhonchi.   Abdominal: Normal appearance.   Musculoskeletal: Normal range of motion. She exhibits no edema or deformity.   Neurological: She is alert and oriented to person, place, and time. She exhibits normal muscle tone.  Coordination normal.   Skin: Skin is warm, dry, intact, not diaphoretic and not pale.   Psychiatric: She has a normal mood and affect. Her speech is normal and behavior is normal. Judgment and thought content normal. Cognition and memory are normal.   Nursing note and vitals reviewed.        Assessment:       1. Cough with fever    2. Shortness of breath    3. Suspected Covid-19 Virus Infection        Plan:         Cough with fever  -     azithromycin (Z-TASHIA) 250 MG tablet; Take 2 tablets by mouth on day 1; Take 1 tablet by mouth on days 2-5  Dispense: 6 tablet; Refill: 0    Shortness of breath  -     X-Ray Chest PA And Lateral; Future; Expected date: 03/30/2020  -     SARS- CoV-2 (COVID-19) QUALITATIVE PCR    Suspected Covid-19 Virus Infection  -     X-Ray Chest PA And Lateral; Future; Expected date: 03/30/2020  -     SARS- CoV-2 (COVID-19) QUALITATIVE PCR    X-ray Chest Pa And Lateral    Result Date: 3/30/2020  EXAMINATION: XR CHEST PA AND LATERAL CLINICAL HISTORY: Other general symptoms and signs TECHNIQUE: PA and lateral views of the chest were performed. COMPARISON: 08/23/2019.  02/09/2017. FINDINGS: Hemostasis clips overlie the right hilum on PA view.  Vascular catheter again identified in the anterior chest wall extending into the mediastinum or medial right pleural space.  These findings are similar to prior chest radiographs dating back to February 2017. Pulmonary apices are incompletely included on the frontal view due to low centering of the image.  Within the limits of the study I detect no pulmonary disease and specifically no pneumonia, noting that mild viral pneumonia may be radiographically occult. Lung volumes are normal and symmetric.  Mediastinal structures are midline.  Cardiomediastinal silhouette is normal. I detect no pleural fluid, pneumothorax, lymph node enlargement, cardiac decompensation, pneumomediastinum, pneumoperitoneum or significant osseous abnormality.     I detect no radiographic  evidence of pneumonia or other source of cough, noting that early or mild viral pneumonia may be radiographically occult. Electronically signed by: Pratibha Ureña MD Date:    03/30/2020 Time:    16:22    Results reviewed discussed with patient      Patient Instructions   Instructions for Patients Awaiting COVID-19 Test Results    You will either be called with your test result or it will be released to the patient portal.  If you have any questions about your test, please visit [email or web address] or call our COVID-19 information line at [phone].    Prevention steps for patients with confirmed or suspected COVID-19     Stay home except to get medical care.   Separate yourself from other people and animals in your home   Call ahead before visiting your doctor.   Wear a facemask.   Cover your coughs and sneezes.   Clean your hands often.   Avoid sharing personal household items.   Clean all high-touch surfaces every day.   Monitor your symptoms. Seek prompt medical attention if your illness is worsening (e.g., difficulty breathing).    Before seeking care, call your healthcare provider.   If you have a medical emergency and need to call 911, notify the dispatch personnel that you have, or are being evaluated for COVID-19. If possible, put on a facemask before emergency medical services arrive.   Please stay home and self-quarantine. Per CDC guidance, you can leave home after these three things have happened: You have had no fever for at least 72 hours (that is three full days of no fever without the use ofmedicine that reduces fevers) AND other symptoms have improved (for example, when your cough or shortness of breath have improved) AND at least 7 days have passed since your symptoms first appeared.      Recommended precautions for household members, intimate partners, and caregivers in a nonhealthcare setting of a patient with symptomatic laboratory-confirmed COVID-19 or a patient under  investigation.  Household members, intimate partners, and caregivers in a nonhealthcare setting may have close contact with a person with symptomatic, laboratory-confirmed COVID-19 or a person under investigation. Close contacts should monitor their health; they should call their healthcare provider right away if they develop symptoms suggestive of COVID-19 (e.g., fever, cough, shortness of breath).    Close contacts should also follow these recommendations:   Make sure that you understand and can help the patient follow their healthcare provider's instructions for medication(s) and care. You should help the patient with basic needs in the home and provide support for getting groceries, prescriptions, and other personal needs.   Monitor the patient's symptoms. If the patient is getting sicker, call his or her healthcare provider and tell them that the patient has laboratory-confirmed COVID-19. This will help the healthcare provider's office take steps to keep other people in the office or waiting room from getting infected. Ask the healthcare provider to call the local or [facility] health department for additional guidance. If the patient has a medical emergency and you need to call 911, notify the dispatch personnel that the patient has, or is being evaluated for COVID-19.   Household members should stay in another room or be  from the patient as much as possible. Household members should use a separate bedroom and bathroom, if available.   Prohibit visitors who do not have an essential need to be in the home.   Household members should care for any pets in the home. Do not handle pets or other animals while sick.   Make sure that shared spaces in the home have good air flow, such as by an air conditioner or an opened window, weather permitting.   Perform hand hygiene frequently. Wash your hands often with soap and water for at least 20 seconds or use an alcohol-based hand  that contains 60 to  95% alcohol, covering all surfaces of your hands and rubbing them together until they feel dry. Soap and water should be used preferentially if hands are visibly dirty.   Avoid touching your eyes, nose, and mouth with unwashed hands.   The patient should wear a facemask when you are around other people. If the patient is not able to wear a facemask (for example, because it causes trouble breathing), you, as the caregiver should wear a mask when you are in the same room as the patient.   Wear a disposable facemask and gloves when you touch or have contact with the patient's blood, stool, or body fluids, such as saliva, sputum, nasal mucus, vomit, urine.  o Throw out disposable facemasks and gloves after using them. Do not reuse.  o When removing personal protective equipment, first remove and dispose of gloves. Then, immediately clean your hands with soap and water or alcohol-based hand . Next, remove and dispose of facemask, and immediately clean your hands again with soap and water or alcohol-based hand .   Avoid sharing household items with the patient. You should not share dishes, drinking glasses, cups, eating utensils, towels, bedding, or other items. After the patient uses these items, you should wash them thoroughly (see below Wash laundry thoroughly).   Clean all high-touch surfaces, such as counters, tabletops, doorknobs, bathroom fixtures, toilets, phones, keyboards, tablets, and bedside tables, every day. Also, clean any surfaces that may have blood, stool, or body fluids on them.   Use a household cleaning spray or wipe, according to the label instructions. Labels contain instructions for safe and effective use of the cleaning product including precautions you should take when applying the product, such as wearing gloves and making sure you have good ventilation during use of the product.   Wash laundry thoroughly.  o Immediately remove and wash clothes or bedding that have  blood, stool, or body fluids on them.  o Wear disposable gloves while handling soiled items and keep soiled items away from your body. Clean your hands (with soap and water or an alcohol-based hand ) immediately after removing your gloves.  o Read and follow directions on labels of laundry or clothing items and detergent. In general, using a normal laundry detergent according to washing machine instructions and dry thoroughly using the warmest temperatures recommended on the clothing label.   Place all used disposable gloves, facemasks, and other contaminated items in a lined container before disposing of them with other household waste. Clean your hands (with soap and water or an alcohol-based hand ) immediately after handling these items. Soap and water should be used preferentially if hands are visibly dirty.   Discuss any additional questions with your state or local health department or healthcare provider. Check available hours when contacting your local health department.    For more information see CDC link below.      https://www.cdc.gov/coronavirus/2019-ncov/hcp/guidance-prevent-spread.html#precautions        Sources:  [facility] [place], [facility]     1.  Take all medications as directed. If you have been prescribed antibiotics, make sure to complete them.   2.  Rest and keep yourself/patient well hydrated. For adults, it is recommended to drink at least 8-10 glasses of water daily.   3.  For patients above 6 months of age who are not allergic to and are not on anticoagulants, you can alternate Tylenol and Motrin every 4-6 hours for fever above 100.4F and/or pain.  For patients less than 6 months of age, allergic to or intolerant to NSAIDS, have gastritis, gastric ulcers, or history of GI bleeds, are pregnant, or are on anticoagulant therapy, you can take Tylenol every 4 hours as needed for fever above 100.4F and/or pain.   4. You should schedule a follow-up  appointment with your Primary Care Provider/Pediatrician for recheck in 2-3 days or as directed at this visit.   5.  If your condition fails to improve in a timely manner, you should receive another evaluation by your Primary Care Provider/Pediatrician to discuss your concerns or return to urgent care for a recheck.  If your condition worsens at any time, you should report immediately to your nearest Emergency Department for further evaluation. **You must understand that you have received Urgent Care treatment only and that you may be released before all of your medical problems are known or treated. You, the patient, are responsible to arrange for follow-up care as instructed.

## 2020-03-30 NOTE — PATIENT INSTRUCTIONS
Instructions for Patients Awaiting COVID-19 Test Results    You will either be called with your test result or it will be released to the patient portal.  If you have any questions about your test, please visit [email or web address] or call our COVID-19 information line at [phone].    Prevention steps for patients with confirmed or suspected COVID-19     Stay home except to get medical care.   Separate yourself from other people and animals in your home   Call ahead before visiting your doctor.   Wear a facemask.   Cover your coughs and sneezes.   Clean your hands often.   Avoid sharing personal household items.   Clean all high-touch surfaces every day.   Monitor your symptoms. Seek prompt medical attention if your illness is worsening (e.g., difficulty breathing).    Before seeking care, call your healthcare provider.   If you have a medical emergency and need to call 911, notify the dispatch personnel that you have, or are being evaluated for COVID-19. If possible, put on a facemask before emergency medical services arrive.   Please stay home and self-quarantine. Per CDC guidance, you can leave home after these three things have happened: You have had no fever for at least 72 hours (that is three full days of no fever without the use ofmedicine that reduces fevers) AND other symptoms have improved (for example, when your cough or shortness of breath have improved) AND at least 7 days have passed since your symptoms first appeared.      Recommended precautions for household members, intimate partners, and caregivers in a nonhealthcare setting of a patient with symptomatic laboratory-confirmed COVID-19 or a patient under investigation.  Household members, intimate partners, and caregivers in a nonhealthcare setting may have close contact with a person with symptomatic, laboratory-confirmed COVID-19 or a person under investigation. Close contacts should monitor their health; they should  call their healthcare provider right away if they develop symptoms suggestive of COVID-19 (e.g., fever, cough, shortness of breath).    Close contacts should also follow these recommendations:   Make sure that you understand and can help the patient follow their healthcare provider's instructions for medication(s) and care. You should help the patient with basic needs in the home and provide support for getting groceries, prescriptions, and other personal needs.   Monitor the patient's symptoms. If the patient is getting sicker, call his or her healthcare provider and tell them that the patient has laboratory-confirmed COVID-19. This will help the healthcare provider's office take steps to keep other people in the office or waiting room from getting infected. Ask the healthcare provider to call the local or state health department for additional guidance. If the patient has a medical emergency and you need to call 911, notify the dispatch personnel that the patient has, or is being evaluated for COVID-19.   Household members should stay in another room or be  from the patient as much as possible. Household members should use a separate bedroom and bathroom, if available.   Prohibit visitors who do not have an essential need to be in the home.   Household members should care for any pets in the home. Do not handle pets or other animals while sick.   Make sure that shared spaces in the home have good air flow, such as by an air conditioner or an opened window, weather permitting.   Perform hand hygiene frequently. Wash your hands often with soap and water for at least 20 seconds or use an alcohol-based hand  that contains 60 to 95% alcohol, covering all surfaces of your hands and rubbing them together until they feel dry. Soap and water should be used preferentially if hands are visibly dirty.   Avoid touching your eyes, nose, and mouth with unwashed hands.   The patient should wear a  facemask when you are around other people. If the patient is not able to wear a facemask (for example, because it causes trouble breathing), you, as the caregiver should wear a mask when you are in the same room as the patient.   Wear a disposable facemask and gloves when you touch or have contact with the patient's blood, stool, or body fluids, such as saliva, sputum, nasal mucus, vomit, urine.  o Throw out disposable facemasks and gloves after using them. Do not reuse.  o When removing personal protective equipment, first remove and dispose of gloves. Then, immediately clean your hands with soap and water or alcohol-based hand . Next, remove and dispose of facemask, and immediately clean your hands again with soap and water or alcohol-based hand .   Avoid sharing household items with the patient. You should not share dishes, drinking glasses, cups, eating utensils, towels, bedding, or other items. After the patient uses these items, you should wash them thoroughly (see below Wash laundry thoroughly).   Clean all high-touch surfaces, such as counters, tabletops, doorknobs, bathroom fixtures, toilets, phones, keyboards, tablets, and bedside tables, every day. Also, clean any surfaces that may have blood, stool, or body fluids on them.   Use a household cleaning spray or wipe, according to the label instructions. Labels contain instructions for safe and effective use of the cleaning product including precautions you should take when applying the product, such as wearing gloves and making sure you have good ventilation during use of the product.   Wash laundry thoroughly.  o Immediately remove and wash clothes or bedding that have blood, stool, or body fluids on them.  o Wear disposable gloves while handling soiled items and keep soiled items away from your body. Clean your hands (with soap and water or an alcohol-based hand ) immediately after removing your gloves.  o Read and  follow directions on labels of laundry or clothing items and detergent. In general, using a normal laundry detergent according to washing machine instructions and dry thoroughly using the warmest temperatures recommended on the clothing label.   Place all used disposable gloves, facemasks, and other contaminated items in a lined container before disposing of them with other household waste. Clean your hands (with soap and water or an alcohol-based hand ) immediately after handling these items. Soap and water should be used preferentially if hands are visibly dirty.   Discuss any additional questions with your [facility] or local health department or healthcare provider. Check available hours when contacting your local health department.    For more information see CDC link below.      https://www.cdc.gov/coronavirus/2019-ncov/hcp/guidance-prevent-spread.html#precautions        Sources:  [facility], [facility]     1.  Take all medications as directed. If you have been prescribed antibiotics, make sure to complete them.   2.  Rest and keep yourself/patient well hydrated. For adults, it is recommended to drink at least 8-10 glasses of water daily.   3.  For patients above 6 months of age who are not allergic to and are not on anticoagulants, you can alternate Tylenol and Motrin every 4-6 hours for fever above 100.4F and/or pain.  For patients less than 6 months of age, allergic to or intolerant to NSAIDS, have gastritis, gastric ulcers, or history of GI bleeds, are pregnant, or are on anticoagulant therapy, you can take Tylenol every 4 hours as needed for fever above 100.4F and/or pain.   4. You should schedule a follow-up appointment with your Primary Care Provider/Pediatrician for recheck in 2-3 days or as directed at this visit.   5.  If your condition fails to improve in a timely manner, you should receive another evaluation by your Primary Care Provider/Pediatrician to discuss your  concerns or return to urgent care for a recheck.  If your condition worsens at any time, you should report immediately to your nearest Emergency Department for further evaluation. **You must understand that you have received Urgent Care treatment only and that you may be released before all of your medical problems are known or treated. You, the patient, are responsible to arrange for follow-up care as instructed.

## 2020-04-01 LAB — SARS-COV-2 RNA RESP QL NAA+PROBE: NOT DETECTED

## 2020-04-02 ENCOUNTER — TELEPHONE (OUTPATIENT)
Dept: URGENT CARE | Facility: CLINIC | Age: 40
End: 2020-04-02

## 2020-04-02 NOTE — TELEPHONE ENCOUNTER
Informed patient test was NEGATIVE for COVID-19 (coronavirus).  Instructed patient to continue to stay home unless it is absolutely necessary to go to work or get necessities, to practice social distancing, cover cough, proper hand washing technique, and other recommended precautions to minimize the risk of spread.       Discussed with patient if symptoms worsen or has any other concerns, please contact [facility] at [phone] or connect with [email or web address] for a virtual visit.

## 2020-05-25 PROBLEM — K80.10 CALCULUS OF GALLBLADDER WITH CHRONIC CHOLECYSTITIS WITHOUT OBSTRUCTION: Status: ACTIVE | Noted: 2020-05-25

## 2020-10-10 ENCOUNTER — HOSPITAL ENCOUNTER (EMERGENCY)
Facility: HOSPITAL | Age: 40
Discharge: HOME | End: 2020-10-10
Attending: SURGERY
Payer: MEDICARE

## 2020-10-10 VITALS
RESPIRATION RATE: 18 BRPM | HEART RATE: 63 BPM | WEIGHT: 144.94 LBS | BODY MASS INDEX: 23.4 KG/M2 | OXYGEN SATURATION: 99 % | SYSTOLIC BLOOD PRESSURE: 100 MMHG | DIASTOLIC BLOOD PRESSURE: 59 MMHG | TEMPERATURE: 98 F

## 2020-10-10 DIAGNOSIS — R45.851 DEPRESSION WITH SUICIDAL IDEATION: Primary | ICD-10-CM

## 2020-10-10 DIAGNOSIS — F32.A DEPRESSION WITH SUICIDAL IDEATION: Primary | ICD-10-CM

## 2020-10-10 LAB
25(OH)D3+25(OH)D2 SERPL-MCNC: 29 NG/ML (ref 30–96)
ALBUMIN SERPL BCP-MCNC: 3.4 G/DL (ref 3.5–5.2)
ALP SERPL-CCNC: 107 U/L (ref 55–135)
ALT SERPL W/O P-5'-P-CCNC: 28 U/L (ref 10–44)
AMPHET+METHAMPHET UR QL: NEGATIVE
ANION GAP SERPL CALC-SCNC: 10 MMOL/L (ref 8–16)
APAP SERPL-MCNC: <3 UG/ML (ref 10–20)
AST SERPL-CCNC: 23 U/L (ref 10–40)
B-HCG UR QL: NEGATIVE
BACTERIA #/AREA URNS HPF: ABNORMAL /HPF
BARBITURATES UR QL SCN>200 NG/ML: NEGATIVE
BASOPHILS # BLD AUTO: 0.03 K/UL (ref 0–0.2)
BASOPHILS NFR BLD: 0.4 % (ref 0–1.9)
BENZODIAZ UR QL SCN>200 NG/ML: NORMAL
BILIRUB SERPL-MCNC: 0.5 MG/DL (ref 0.1–1)
BILIRUB UR QL STRIP: NEGATIVE
BUN SERPL-MCNC: 12 MG/DL (ref 6–20)
BZE UR QL SCN: NEGATIVE
CALCIUM SERPL-MCNC: 9.1 MG/DL (ref 8.7–10.5)
CANNABINOIDS UR QL SCN: NEGATIVE
CHLORIDE SERPL-SCNC: 108 MMOL/L (ref 95–110)
CLARITY UR: CLEAR
CO2 SERPL-SCNC: 22 MMOL/L (ref 23–29)
COLOR UR: YELLOW
CREAT SERPL-MCNC: 1.1 MG/DL (ref 0.5–1.4)
CREAT UR-MCNC: 176.4 MG/DL (ref 15–325)
DIFFERENTIAL METHOD: ABNORMAL
EOSINOPHIL # BLD AUTO: 0.2 K/UL (ref 0–0.5)
EOSINOPHIL NFR BLD: 2.3 % (ref 0–8)
ERYTHROCYTE [DISTWIDTH] IN BLOOD BY AUTOMATED COUNT: 21.2 % (ref 11.5–14.5)
EST. GFR  (AFRICAN AMERICAN): >60 ML/MIN/1.73 M^2
EST. GFR  (NON AFRICAN AMERICAN): >60 ML/MIN/1.73 M^2
ETHANOL SERPL-MCNC: <10 MG/DL
FOLATE SERPL-MCNC: 15.9 NG/ML (ref 4–24)
GLUCOSE SERPL-MCNC: 87 MG/DL (ref 70–110)
GLUCOSE UR QL STRIP: NEGATIVE
HCT VFR BLD AUTO: 38.9 % (ref 37–48.5)
HGB BLD-MCNC: 12 G/DL (ref 12–16)
HGB UR QL STRIP: ABNORMAL
IMM GRANULOCYTES # BLD AUTO: 0.01 K/UL (ref 0–0.04)
IMM GRANULOCYTES NFR BLD AUTO: 0.1 % (ref 0–0.5)
KETONES UR QL STRIP: NEGATIVE
LEUKOCYTE ESTERASE UR QL STRIP: NEGATIVE
LYMPHOCYTES # BLD AUTO: 2.5 K/UL (ref 1–4.8)
LYMPHOCYTES NFR BLD: 35 % (ref 18–48)
MCH RBC QN AUTO: 24.5 PG (ref 27–31)
MCHC RBC AUTO-ENTMCNC: 30.8 G/DL (ref 32–36)
MCV RBC AUTO: 79 FL (ref 82–98)
METHADONE UR QL SCN>300 NG/ML: NEGATIVE
MICROSCOPIC COMMENT: ABNORMAL
MONOCYTES # BLD AUTO: 0.4 K/UL (ref 0.3–1)
MONOCYTES NFR BLD: 5.1 % (ref 4–15)
NEUTROPHILS # BLD AUTO: 4.1 K/UL (ref 1.8–7.7)
NEUTROPHILS NFR BLD: 57.1 % (ref 38–73)
NITRITE UR QL STRIP: NEGATIVE
NRBC BLD-RTO: 0 /100 WBC
OPIATES UR QL SCN: NEGATIVE
PCP UR QL SCN>25 NG/ML: NEGATIVE
PH UR STRIP: 6 [PH] (ref 5–8)
PLATELET # BLD AUTO: 320 K/UL (ref 150–350)
PMV BLD AUTO: 9.4 FL (ref 9.2–12.9)
POTASSIUM SERPL-SCNC: 3.9 MMOL/L (ref 3.5–5.1)
PROT SERPL-MCNC: 7.2 G/DL (ref 6–8.4)
PROT UR QL STRIP: NEGATIVE
RBC # BLD AUTO: 4.9 M/UL (ref 4–5.4)
RBC #/AREA URNS HPF: 10 /HPF (ref 0–4)
SALICYLATES SERPL-MCNC: <5 MG/DL (ref 15–30)
SARS-COV-2 RDRP RESP QL NAA+PROBE: NEGATIVE
SODIUM SERPL-SCNC: 140 MMOL/L (ref 136–145)
SP GR UR STRIP: >=1.03 (ref 1–1.03)
SQUAMOUS #/AREA URNS HPF: 30 /HPF
T3FREE SERPL-MCNC: 2.7 PG/ML (ref 2.3–4.2)
T4 FREE SERPL-MCNC: 0.83 NG/DL (ref 0.71–1.51)
TOXICOLOGY INFORMATION: NORMAL
TSH SERPL DL<=0.005 MIU/L-ACNC: 1.99 UIU/ML (ref 0.4–4)
URN SPEC COLLECT METH UR: ABNORMAL
UROBILINOGEN UR STRIP-ACNC: NEGATIVE EU/DL
VIT B12 SERPL-MCNC: 435 PG/ML (ref 210–950)
WBC # BLD AUTO: 7.11 K/UL (ref 3.9–12.7)
WBC #/AREA URNS HPF: 15 /HPF (ref 0–5)

## 2020-10-10 PROCEDURE — 87086 URINE CULTURE/COLONY COUNT: CPT

## 2020-10-10 PROCEDURE — 99285 EMERGENCY DEPT VISIT HI MDM: CPT | Mod: 25

## 2020-10-10 PROCEDURE — 85025 COMPLETE CBC W/AUTO DIFF WBC: CPT

## 2020-10-10 PROCEDURE — 87186 SC STD MICRODIL/AGAR DIL: CPT

## 2020-10-10 PROCEDURE — 96372 THER/PROPH/DIAG INJ SC/IM: CPT

## 2020-10-10 PROCEDURE — 84443 ASSAY THYROID STIM HORMONE: CPT

## 2020-10-10 PROCEDURE — 81025 URINE PREGNANCY TEST: CPT

## 2020-10-10 PROCEDURE — 87088 URINE BACTERIA CULTURE: CPT

## 2020-10-10 PROCEDURE — 63600175 PHARM REV CODE 636 W HCPCS: Performed by: SURGERY

## 2020-10-10 PROCEDURE — 82746 ASSAY OF FOLIC ACID SERUM: CPT

## 2020-10-10 PROCEDURE — U0002 COVID-19 LAB TEST NON-CDC: HCPCS

## 2020-10-10 PROCEDURE — 80307 DRUG TEST PRSMV CHEM ANLYZR: CPT

## 2020-10-10 PROCEDURE — 84481 FREE ASSAY (FT-3): CPT

## 2020-10-10 PROCEDURE — 82607 VITAMIN B-12: CPT

## 2020-10-10 PROCEDURE — 36415 COLL VENOUS BLD VENIPUNCTURE: CPT

## 2020-10-10 PROCEDURE — 81000 URINALYSIS NONAUTO W/SCOPE: CPT

## 2020-10-10 PROCEDURE — 80053 COMPREHEN METABOLIC PANEL: CPT

## 2020-10-10 PROCEDURE — 80329 ANALGESICS NON-OPIOID 1 OR 2: CPT

## 2020-10-10 PROCEDURE — 80320 DRUG SCREEN QUANTALCOHOLS: CPT

## 2020-10-10 PROCEDURE — 82306 VITAMIN D 25 HYDROXY: CPT

## 2020-10-10 PROCEDURE — 84439 ASSAY OF FREE THYROXINE: CPT

## 2020-10-10 PROCEDURE — 87077 CULTURE AEROBIC IDENTIFY: CPT

## 2020-10-10 RX ORDER — LORAZEPAM 2 MG/ML
2 INJECTION INTRAMUSCULAR EVERY 4 HOURS PRN
Status: DISCONTINUED | OUTPATIENT
Start: 2020-10-10 | End: 2020-10-10 | Stop reason: HOSPADM

## 2020-10-10 RX ORDER — HALOPERIDOL 5 MG/ML
5 INJECTION INTRAMUSCULAR EVERY 4 HOURS PRN
Status: DISCONTINUED | OUTPATIENT
Start: 2020-10-10 | End: 2020-10-10 | Stop reason: HOSPADM

## 2020-10-10 RX ORDER — DIPHENHYDRAMINE HYDROCHLORIDE 50 MG/ML
50 INJECTION INTRAMUSCULAR; INTRAVENOUS EVERY 4 HOURS PRN
Status: DISCONTINUED | OUTPATIENT
Start: 2020-10-10 | End: 2020-10-10 | Stop reason: HOSPADM

## 2020-10-10 RX ADMIN — LORAZEPAM 2 MG: 2 INJECTION INTRAMUSCULAR; INTRAVENOUS at 03:10

## 2020-10-10 NOTE — ED PROVIDER NOTES
[facility]                                                 Chief Complaint  39 y.o. female with Psychiatric Evaluation     History of Present Illness  Candida Cardona presents to the emergency room with suicidal ideation  Mother  1 month ago with severe grief reaction since that time per HX  Patient is alert appropriate, patient is not psychotic on ER evaluation now  Patient states she wants to die peaceful death with no plan noted this morning    The history is provided by the patient   device was not used during this ER visit    Past Medical History:   Diagnosis Date    Anemia     Anxiety     Bilious vomiting with nausea 2017    Bipolar 1 disorder     Constipation     Coronary artery disease     NO STENT    Crohn's disease     Depression     Encounter for blood transfusion     Epilepsy     Fibromyalgia     Gall stones     GERD (gastroesophageal reflux disease)     Hyponatremia     Interstitial cystitis     Kidney stones     Mass, ovarian 2016    Metabolic acidosis     Osteoporosis     PAF (paroxysmal atrial fibrillation)     Pancreatitis     S/P ERIK-BSO (total abdominal hysterectomy and bilateral salpingo-oophorectomy) 2016    Seizures     UTI (urinary tract infection)      Past Surgical History:   Procedure Laterality Date    ABDOMINAL SURGERY      ANASTOMOSIS OF ILEUM TO RECTUM  1/15/2019    Procedure: ANASTOMOSIS, ILEORECTAL;  Surgeon: Reji Peterson MD;  [facility]: [address];  Service: Colon and Rectal;;    ANGIOPLASTY      CHOLECYSTECTOMY N/A 2020    Procedure: CHOLECYSTECTOMY;  Surgeon: Ozzie Sanches MD;  [facility]: [facility] OR;  Service: General;  Laterality: N/A;  COVID NEG      COLON SURGERY      x 2    COLONOSCOPY      multiple    COLONOSCOPY N/A 2017    Procedure: COLONOSCOPY;  Surgeon: Hussain Amaro MD;  [facility]: [facility] ENDO;  Service: Endoscopy;  Laterality: N/A;    CYSTOSCOPY W/  URETERAL STENT PLACEMENT      CYSTOSCOPY W/ URETERAL STENT REMOVAL      CYSTOSCOPY W/ URETERAL STENT REMOVAL Right 9/5/2019    Procedure: CYSTOSCOPY, WITH URETERAL STENT REMOVAL;  Surgeon: Dano Mathis MD;  [facility]: [facility] OR;  Service: Urology;  Laterality: Right;    CYSTOSCOPY WITH URETEROSCOPY, RETROGRADE PYELOGRAPHY, AND INSERTION OF STENT Right 8/22/2019    Procedure: CYSTOSCOPY, WITH RETROGRADE PYELOGRAM AND URETERAL STENT INSERTION;  Surgeon: Dano Mathis MD;  [facility]: [facility] OR;  Service: Urology;  Laterality: Right;    EXTRACORPOREAL SHOCK WAVE LITHOTRIPSY Right 8/22/2019    Procedure: LITHOTRIPSY-EXTRACORPOREAL SHOCK WAVE;  Surgeon: Dano Mathis MD;  [facility]: [facility] OR;  Service: Urology;  Laterality: Right;    EXTRACORPOREAL SHOCK WAVE LITHOTRIPSY Right 9/5/2019    Procedure: LITHOTRIPSY-EXTRACORPOREAL SHOCK WAVE;  Surgeon: Dano Mathis MD;  [facility]: [facility] OR;  Service: Urology;  Laterality: Right;    HYSTERECTOMY      ILEOSTOMY      LAPAROTOMY, EXPLORATORY  1/15/2019    Procedure: LAPAROTOMY, EXPLORATORY;extensive lysis of adhesions;  Surgeon: Reji Peterson MD;  [facility] 2[facility] F[facility];  Service: Colon and Rectal;;    LYSIS OF ADHESIONS OF URETER  1/15/2019    Procedure: URETEROLYSIS;  Surgeon: Reji Peterson MD;  [facility]: [facility] OR 2ND FLR;  Service: Colon and Rectal;;    PERIPHERALLY INSERTED CENTRAL CATHETER INSERTION N/A 10/30/2018    Procedure: INSERTION, PICC;  Surgeon: [facility] Diagnostic Provider;  [facility]: [facility] OR;  Service: General;  Laterality: N/A;    PERIPHERALLY INSERTED CENTRAL CATHETER INSERTION N/A 8/23/2019    Procedure: INSERTION, PICC;  Surgeon: Dos Diagnostic Provider;  [facility]: [facility] OR;  Service: General;  Laterality: N/A;    PORTACATH PLACEMENT      TOTAL COLECTOMY  2014      Review of patient's allergies indicates:   Allergen Reactions    Ciprofloxacin Shortness Of Breath    Keppra [levetiracetam] Other (See Comments)     Increased  depression    Imuran [azathioprine sodium] Diarrhea and Nausea And Vomiting     Pt states that she becomes hot sweaty and passes out also. She states that she has diarrhea and nausea and vomiting     [place]     Imuran [azathioprine] Nausea And Vomiting    Toradol [ketorolac] Itching    Adhesive Itching      I have reviewed all of this patient's past medical, surgical, family, and social   histories as well as active allergies and medications documented in the  electronic medical record    Review of Systems and Physical Exam      Review of Systems  -- Constitution - no fever, denies fatigue, no weakness, no chills  -- Eyes - no tearing or redness, no visual disturbance  -- Ear, Nose - no tinnitus or earache, no nasal congestion or discharge  -- Mouth,Throat - no sore throat, no toothache, normal voice, normal swallowing  -- Respiratory - denies cough and congestion, no shortness of breath, no KENNEDY  -- Cardiovascular - denies chest pain, no palpitations, denies claudication  -- Gastrointestinal - denies abdominal pain, nausea, vomiting, or diarrhea  -- Genitourinary - no dysuria, denies flank pain, no hematuria, no STD risk  -- Musculoskeletal - denies back pain, negative for trauma or injury   -- Neurological - no headache, denies weakness or seizure; no LOC  -- Skin - denies pallor, rash, or changes in skin. no hives or welts noted  -- Psychiatric - suicidal ideation and depression, no psychosis     Vital Signs  Her oral temperature is 98.7 °F (37.1 °C).   Her blood pressure is 120/74 and her pulse is 66.   Her respiration is 20 and oxygen saturation is 97%.     Physical Exam  -- Nursing note and vitals reviewed  -- Constitutional: Appears well-developed and well-nourished  -- Head: Atraumatic. Normocephalic. No obvious abnormality  -- Eyes: Pupils are equal and reactive to light. Normal conjunctiva and lids  -- Cardiac: Normal rate, regular rhythm and normal heart sounds  -- Pulmonary: Normal respiratory  effort, breath sounds clear to auscultation  -- Abdominal: Soft, no tenderness. Normal bowel sounds. Normal liver edge  -- Musculoskeletal: Normal range of motion, no effusions. Joints stable   -- Neurological: No focal deficits. Showed good interaction with staff  -- Vascular: Posterior tibial, dorsalis pedis and radial pulses 2+ bilaterally    -- Lymphatics: No cervical or peripheral lymphadenopathy. No edema noted  -- Skin: Warm and dry. No evidence of rash or cellulitis    Emergency Room Course      Diagnosis  [F32.9, R45.851] Depression with suicidal ideation (Primary)    Disposition and Plan  -- Disposition: PEC  -- Condition: stable  -- Pt will be placed in a psychiatric facility  -- The patient is a direct observation until placement  -- The patient has been made aware of his or her rights while under PEC in the ER  -- All questions have been answered; will follow ER protocols until placement    Lab work was performed in the ER, this patient is cleared for psychiatric placement     This note is dictated on Dragon Natural Speaking word recognition program.  There are word recognition mistakes that are occasionally missed on review.          Benjamin Dennis MD  10/10/20 1469

## 2020-10-10 NOTE — ED TRIAGE NOTES
"39 y.o. female presents to ER ED 03 /ED 03B   Chief Complaint   Patient presents with    Psychiatric Evaluation     Pt states mother  1 month ago,  and " I have having a hard time  "I want to drown I think it would be really peacefull, I feel like I am worthless and will never amount to anything, I want to be a normal person"    . No acute distress noted.  "

## 2020-10-10 NOTE — ED NOTES
PEC sitter at bedside for visual monitoring. Patient resting with eyes closed. NAD noted. Will continue to monitor.

## 2020-10-10 NOTE — ED NOTES
PEC sitter escorted patient to restroom to dress into hospital gown. Patient belongings secured in locked cabinet and medications secured in security locked box.

## 2020-10-10 NOTE — ED NOTES
Patient requesting medication for anxiety. Ativan IM given as ordered. PEC sitter at bedside for visual monitoring.

## 2020-10-11 NOTE — ED NOTES
PEC sitter at bedside for visual monitoring. Patient calm and cooperative. NAD noted. Will continue to monitor.

## 2020-10-11 NOTE — ED NOTES
Patient resting on stretcher resting with even and unlabored respirations. PEC sitter at bedside.

## 2020-10-13 LAB — BACTERIA UR CULT: ABNORMAL

## 2022-01-07 ENCOUNTER — PATIENT MESSAGE (OUTPATIENT)
Dept: ADMINISTRATIVE | Facility: OTHER | Age: 42
End: 2022-01-07
Payer: MEDICARE

## 2022-07-22 PROBLEM — K56.0 ADYNAMIC ILEUS: Status: ACTIVE | Noted: 2022-07-22

## 2022-07-22 PROBLEM — R11.2 NAUSEA VOMITING AND DIARRHEA: Status: ACTIVE | Noted: 2022-07-22

## 2022-07-22 PROBLEM — R19.7 NAUSEA VOMITING AND DIARRHEA: Status: ACTIVE | Noted: 2022-07-22

## 2022-07-22 PROBLEM — K62.89 PROCTITIS: Status: ACTIVE | Noted: 2022-07-22

## 2023-09-18 ENCOUNTER — TELEPHONE (OUTPATIENT)
Dept: SURGERY | Facility: CLINIC | Age: 43
End: 2023-09-18
Payer: MEDICARE

## 2023-09-18 NOTE — TELEPHONE ENCOUNTER
Called Dr Lama's office, Ana has gone for the day. Spoke with patient, appt scheduled. Patient reports that she needs balloon- dilation for a Crohn's stricture. Explained to patient that this may not be an option at the [facility] location. Patient states that she has epilepsy and relies on [employer] for transportation and has no family. Instructed patient to bring/ mail CT disc and report. Patient verbalized understanding.

## 2023-09-19 ENCOUNTER — TELEPHONE (OUTPATIENT)
Dept: SURGERY | Facility: CLINIC | Age: 43
End: 2023-09-19
Payer: MEDICARE

## 2023-09-22 ENCOUNTER — TELEPHONE (OUTPATIENT)
Dept: SURGERY | Facility: CLINIC | Age: 43
End: 2023-09-22
Payer: MEDICARE

## 2023-09-22 NOTE — TELEPHONE ENCOUNTER
Called pt back to let her know that Dr. Atkins would have to perform any balloon dilation procedures in [facility] instead of [facility].  RN will request a copy of pt's latest ct scan from 9/12 on next week to have it here before pt's visit on 10/26.  RN will let Dr. Atkins know.  Pt will expect to see Dr. Atkins on 10/26.  Appt will be placed in the mail to her.  Pt verbalized understanding to all.

## 2023-10-10 ENCOUNTER — TELEPHONE (OUTPATIENT)
Dept: SURGERY | Facility: CLINIC | Age: 43
End: 2023-10-10
Payer: MEDICARE

## 2023-10-10 NOTE — TELEPHONE ENCOUNTER
Called pt 10/9 at 1700 to let her know that our  team will coordinate a LYFT for her from her home so that she can get to [facility] for her procedure when scheduled with Dr. Atkins. Pt will meet with MD on 10/26 and will bring her records from [facility] including her Ct scan.  RN will check back in with pt after finding out if she will be provided a ride back home as well.  Pt verbalized understanding to all.

## 2023-10-25 NOTE — PROGRESS NOTES
[facility]  Colon and Rectal Surgery    [address]  [place], [place]  Tel: [phone]  Fax: [phone]  [email or web address]   MD Julio Cesar White MD Brian Kann, MD W. Forrest Johnston, MD Matthew Giglia, MD Jennifer Paruch, MD William Kethman, MD Danielle Kay, MD     Patient name: Candida Cardona   YOB: 1980   MRN: [id number]    It was a pleasure seeing Ms. Cardona in the Colon and Rectal Surgery clinic here at [facility].     As you know, Ms. Cardona is a 42 year old woman with a history of Crohn's disease, GERD, anxiety, CAD, Bipolar disorder, and pAF  who presents for evaluation of ileocolic stricture . She has an extensive and complex surgical history - summarized below. Her Crohn's disease is managed by Dr. Lama - she is on Skyrizi every 8 weeks. Since her last dose of Skyrizi she has been going to the bathroom about 6-8 times per day but the week before this dose 10-12 bowel movements per day. She has been on Skyrizi since December of 2022 - prior to this she was on Stelara (apparently unable to get therapeutic or clinical benefit from levels). In September she had an episode of increased abdominal pain, nausea, vomiting which was eventually relieved with frequent bowel movements. She had significant issues with her ostomy and wants to avoid this at all cost. She has not had any perianal procedure - no abscesses or fistulas. She does not recall having any recent disease activity or therapeutic drug monitoring recently. She has no fevers or chills. She is trying Canasa suppositories (not currently using them) for perianal pain but she doesn't feel like this helps. Warm baths have improved her perineal pain. She is not on Prednisone, last dose was a few weeks ago - 9 day taper.    Past surgeries:  5/25/2020 - Laparoscopic, converted to open cholecystectomy  1/15/2019 - Ileostomy closure and ileorectal anastomosis, extensive  lysis of adhesions, ureterolysis  12/22/2016 - Exploratory laparotomy with lysis of adhesions, 1 hr - ERIK, BSO at that time  3/17/2014 - TAC with EI  Prior ileocolic resections ~2007, 2010    Flexible sigmoidoscopy - 8/2022 - proctitis, stricture appreciated - not dilated    CT AP reviewed - appears to have a relative short-segment stricture at her ileorectal anastomosis    The patient was informed of the availability of a certified  without charge. A certified  was not necessary for this visit.    Review of Systems  See pertinent review of systems above    Past Medical History:   Diagnosis Date    Anemia     Anxiety     Bilious vomiting with nausea 1/17/2017    Bipolar 1 disorder     Constipation     Coronary artery disease     NO STENT    Crohn's disease     Depression     Encounter for blood transfusion     Epilepsy     Fibromyalgia     Gall stones     GERD (gastroesophageal reflux disease)     Hyponatremia     Interstitial cystitis     Kidney stones     Mass, ovarian 11/22/2016    Metabolic acidosis     Osteoporosis     PAF (paroxysmal atrial fibrillation)     Pancreatitis     S/P ERIK-BSO (total abdominal hysterectomy and bilateral salpingo-oophorectomy) 12/19/2016    Seizures     UTI (urinary tract infection)      Past Surgical History:   Procedure Laterality Date    ABDOMINAL SURGERY      ANASTOMOSIS OF ILEUM TO RECTUM  1/15/2019    Procedure: ANASTOMOSIS, ILEORECTAL;  Surgeon: Reji Peterson MD;  [facility]: [address];  Service: Colon and Rectal;;    ANGIOPLASTY      CHOLECYSTECTOMY N/A 5/25/2020    Procedure: CHOLECYSTECTOMY;  Surgeon: Ozzie Sanches MD;  [facility]: [facility] OR;  Service: General;  Laterality: N/A;  COVID NEG      COLON SURGERY      x 2    COLONOSCOPY      multiple    COLONOSCOPY N/A 4/11/2017    Procedure: COLONOSCOPY;  Surgeon: Hussain Amaro MD;  [facility]: [facility] ENDO;  Service: Endoscopy;  Laterality: N/A;    CYSTOSCOPY W/ URETERAL STENT PLACEMENT       CYSTOSCOPY W/ URETERAL STENT REMOVAL      CYSTOSCOPY W/ URETERAL STENT REMOVAL Right 9/5/2019    Procedure: CYSTOSCOPY, WITH URETERAL STENT REMOVAL;  Surgeon: Dano Mathis MD;  [facility]: [facility] OR;  Service: Urology;  Laterality: Right;    CYSTOSCOPY WITH URETEROSCOPY, RETROGRADE PYELOGRAPHY, AND INSERTION OF STENT Right 8/22/2019    Procedure: CYSTOSCOPY, WITH RETROGRADE PYELOGRAM AND URETERAL STENT INSERTION;  Surgeon: Dano Mathis MD;  [facility]: [facility] OR;  Service: Urology;  Laterality: Right;    EXTRACORPOREAL SHOCK WAVE LITHOTRIPSY Right 8/22/2019    Procedure: LITHOTRIPSY-EXTRACORPOREAL SHOCK WAVE;  Surgeon: Dano Mathis MD;  [facility]: [facility] OR;  Service: Urology;  Laterality: Right;    EXTRACORPOREAL SHOCK WAVE LITHOTRIPSY Right 9/5/2019    Procedure: LITHOTRIPSY-EXTRACORPOREAL SHOCK WAVE;  Surgeon: Dano Mathis MD;  [facility]: [facility] OR;  Service: Urology;  Laterality: Right;    HYSTERECTOMY      ILEOSTOMY      LAPAROTOMY, EXPLORATORY  1/15/2019    Procedure: LAPAROTOMY, EXPLORATORY;extensive lysis of adhesions;  Surgeon: Reji Peterson MD;  [facility] OR 2ND FLR;  Service: Colon and Rectal;;    LYSIS OF ADHESIONS OF URETER  1/15/2019    Procedure: URETEROLYSIS;  Surgeon: Reji Peterson MD;  [facility]: [facility] OR 2ND FLR;  Service: Colon and Rectal;;    PERIPHERALLY INSERTED CENTRAL CATHETER INSERTION N/A 10/30/2018    Procedure: INSERTION, PICC;  Surgeon: [facility] Diagnostic Provider;  [facility]: [facility] OR;  Service: General;  Laterality: N/A;    PERIPHERALLY INSERTED CENTRAL CATHETER INSERTION N/A 8/23/2019    Procedure: INSERTION, PICC;  Surgeon: Dos Diagnostic Provider;  [facility]: [facility] OR;  Service: General;  Laterality: N/A;    PORTACATH PLACEMENT      TOTAL COLECTOMY  2014     Family History   Problem Relation Age of Onset    Hypertension Mother     Joint hypermobility Mother     Physical abuse Mother     Cancer Father         brain    Diabetes Father     Schizophrenia Father      Stroke Father     No Known Problems Brother     No Known Problems Brother     Stroke Paternal Grandmother     Ovarian cancer Neg Hx     Uterine cancer Neg Hx     Breast cancer Neg Hx     Colon cancer Neg Hx     Anesthesia problems Neg Hx      Social History     Tobacco Use    Smoking status: Never    Smokeless tobacco: Never   Substance Use Topics    Alcohol use: No    Drug use: No     Review of patient's allergies indicates:   Allergen Reactions    Ciprofloxacin Shortness Of Breath    Keppra [levetiracetam] Other (See Comments)     Increased depression    Imuran [azathioprine sodium] Diarrhea and Nausea And Vomiting     Pt states that she becomes hot sweaty and passes out also. She states that she has diarrhea and nausea and vomiting     Colp     Imuran [azathioprine] Nausea And Vomiting    Toradol [ketorolac] Itching    Adhesive Itching    Iodinated contrast media Hives       Current Outpatient Medications on File Prior to Visit   Medication Sig Dispense Refill    EScitalopram oxalate (LEXAPRO) 10 MG tablet Take by mouth.      hydrocortisone 2.5 % cream 2 (two) times daily.      ondansetron (ZOFRAN-ODT) 4 MG TbDL Take 1 tablet (4 mg total) by mouth every 6 (six) hours as needed (Nausea vomiting). 12 tablet 0    pantoprazole (PROTONIX) 40 MG tablet Take 1 tablet (40 mg total) by mouth 2 (two) times daily. (Patient taking differently: Take 80 mg by mouth once daily.) 60 tablet 9    risankizumab-rzaa (SKYRIZI SUBQ) Inject into the skin.      sertraline (ZOLOFT) 50 MG tablet Take 50 mg by mouth once daily.      budesonide (ENTOCORT EC) 3 mg capsule Take 9 mg by mouth once daily.      clonazePAM (KLONOPIN) 1 MG tablet Take 1 mg by mouth as needed.      diphenoxylate-atropine 2.5-0.025 mg (LOMOTIL) 2.5-0.025 mg per tablet Take 1 tablet by mouth 4 (four) times daily as needed for Diarrhea. (Patient not taking: Reported on 10/26/2023) 20 tablet 0    estradioL (ESTRACE) 2 MG tablet Take 2 mg by mouth.       "HYDROcodone-acetaminophen (NORCO) 5-325 mg per tablet Take 1 tablet by mouth every 8 (eight) hours as needed.      liothyronine (CYTOMEL) 5 MCG Tab TAKE 1 TABLET(5 MCG) BY MOUTH BEFORE BREAKFAST (Patient not taking: Reported on 10/26/2023) 90 tablet 1    mesalamine (CANASA) 1000 MG Supp Place 1,000 mg rectally every evening.      oxyCODONE-acetaminophen (PERCOCET) 5-325 mg per tablet Take 1 tablet by mouth every 8 (eight) hours as needed for Pain. (Patient not taking: Reported on 10/26/2023) 12 tablet 0    predniSONE (DELTASONE) 10 MG tablet Take by mouth.      STELARA 90 mg/mL Syrg syringe every 8 weeks.        Current Facility-Administered Medications on File Prior to Visit   Medication Dose Route Frequency Provider Last Rate Last Admin    mupirocin 2 % ointment   Nasal On Call Procedure Vidhi Mathis NP   Given at 01/15/19 0642     Physical Examination  /76   Pulse 84   Ht 5' 7" (1.702 m)   Wt 71.9 kg (158 lb 8.2 oz)   LMP  (LMP Unknown) Comment: complete hysterectomy  BMI 24.83 kg/m²      A chaperone was present for the physical examination.    Constitutional: well developed, no cough, no dyspnea, alert, and no acute distress    Head: Normocephalic, no lesions, without obvious abnormality  Eye: Normal external eye, conjunctiva, and lids  Cardiovascular: regular rate and regular rhythm  Respiratory: normal air entry  Gastrointestinal: soft, non-tender      Perineum:  Circumferential moisture related rash - Calmoseptine applied    Musculoskeletal: full range of motion without pain  Neurologic: alert, oriented, normal speech, no focal findings or movement disorder noted  Psychiatric: appropriate, normal mood    Assessment and Plan of Care    Thank you again for referring Ms. Cardona to my care. In summary, Ms. Cardona is a 42 year old woman presenting with complicated Crohn's disease. We discussed treatment options and have provided the following recommendations:    Discussed my concerns that at " some point she will require completion proctectomy and permanent ileostomy - she is understandably resistant to this   Obtain fecal Calprotectin to assess disease activity, will biopsy at time of endoscopic evaluation  Discussed perineal hygiene and barrier options - recommended stopping Hydrocortisone (prescribed during her last hospitalization)  Message sent to be scheduled for flexible sigmoidoscopy, possible dilation in [place]  Discussed importance of medical optimization - she has transportation issues making second opinion in [place] difficult    Please do not hesitate to contact me if you have any questions.      Robin Atkins MD, FACS, FASCRS  Department of Colon & Rectal Surgery  [facility]

## 2023-10-26 ENCOUNTER — OFFICE VISIT (OUTPATIENT)
Dept: SURGERY | Facility: CLINIC | Age: 43
End: 2023-10-26
Payer: MEDICARE

## 2023-10-26 ENCOUNTER — TELEPHONE (OUTPATIENT)
Dept: SURGERY | Facility: CLINIC | Age: 43
End: 2023-10-26
Payer: MEDICARE

## 2023-10-26 ENCOUNTER — LAB VISIT (OUTPATIENT)
Dept: LAB | Facility: HOSPITAL | Age: 43
End: 2023-10-26
Attending: STUDENT IN AN ORGANIZED HEALTH CARE EDUCATION/TRAINING PROGRAM
Payer: MEDICARE

## 2023-10-26 VITALS
HEIGHT: 67 IN | WEIGHT: 158.5 LBS | DIASTOLIC BLOOD PRESSURE: 76 MMHG | HEART RATE: 84 BPM | BODY MASS INDEX: 24.88 KG/M2 | SYSTOLIC BLOOD PRESSURE: 120 MMHG

## 2023-10-26 DIAGNOSIS — K50.012 CROHN'S DISEASE OF SMALL INTESTINE WITH INTESTINAL OBSTRUCTION: Primary | ICD-10-CM

## 2023-10-26 DIAGNOSIS — K50.012 CROHN'S DISEASE OF SMALL INTESTINE WITH INTESTINAL OBSTRUCTION: ICD-10-CM

## 2023-10-26 PROCEDURE — 99214 OFFICE O/P EST MOD 30 MIN: CPT | Mod: S$PBB,,, | Performed by: STUDENT IN AN ORGANIZED HEALTH CARE EDUCATION/TRAINING PROGRAM

## 2023-10-26 PROCEDURE — 99999 PR PBB SHADOW E&M-EST. PATIENT-LVL III: CPT | Mod: PBBFAC,,, | Performed by: STUDENT IN AN ORGANIZED HEALTH CARE EDUCATION/TRAINING PROGRAM

## 2023-10-26 PROCEDURE — 83993 ASSAY FOR CALPROTECTIN FECAL: CPT | Performed by: STUDENT IN AN ORGANIZED HEALTH CARE EDUCATION/TRAINING PROGRAM

## 2023-10-26 PROCEDURE — 99213 OFFICE O/P EST LOW 20 MIN: CPT | Mod: PBBFAC | Performed by: STUDENT IN AN ORGANIZED HEALTH CARE EDUCATION/TRAINING PROGRAM

## 2023-10-26 PROCEDURE — 99999 PR PBB SHADOW E&M-EST. PATIENT-LVL III: ICD-10-PCS | Mod: PBBFAC,,, | Performed by: STUDENT IN AN ORGANIZED HEALTH CARE EDUCATION/TRAINING PROGRAM

## 2023-10-26 PROCEDURE — 99214 PR OFFICE/OUTPT VISIT, EST, LEVL IV, 30-39 MIN: ICD-10-PCS | Mod: S$PBB,,, | Performed by: STUDENT IN AN ORGANIZED HEALTH CARE EDUCATION/TRAINING PROGRAM

## 2023-10-26 RX ORDER — PREDNISONE 10 MG/1
TABLET ORAL
COMMUNITY
Start: 2023-09-19

## 2023-10-26 RX ORDER — ESCITALOPRAM OXALATE 10 MG/1
TABLET ORAL
COMMUNITY
Start: 2023-10-15

## 2023-10-26 RX ORDER — ESTRADIOL 2 MG/1
2 TABLET ORAL
COMMUNITY
Start: 2023-06-21

## 2023-10-26 RX ORDER — MESALAMINE 1000 MG/1
1000 SUPPOSITORY RECTAL NIGHTLY
COMMUNITY
Start: 2023-10-09

## 2023-10-26 RX ORDER — HYDROCODONE BITARTRATE AND ACETAMINOPHEN 5; 325 MG/1; MG/1
1 TABLET ORAL EVERY 8 HOURS PRN
COMMUNITY
Start: 2023-09-12

## 2023-10-26 RX ORDER — HYDROCORTISONE 25 MG/G
CREAM TOPICAL 2 TIMES DAILY
COMMUNITY
Start: 2023-09-21

## 2023-10-26 NOTE — TELEPHONE ENCOUNTER
Called pt to make sure she was bringing her CT to her appt today, and to let her know that she would have to be admitted to receive transportation services from the hospital.  Will discuss with Dr. Atkins during the visit.  Pt verbalized understanding to all.

## 2023-10-27 ENCOUNTER — TELEPHONE (OUTPATIENT)
Dept: ENDOSCOPY | Facility: HOSPITAL | Age: 43
End: 2023-10-27
Payer: MEDICARE

## 2023-10-27 DIAGNOSIS — K50.919 CROHN'S DISEASE WITH COMPLICATION, UNSPECIFIED GASTROINTESTINAL TRACT LOCATION: Primary | ICD-10-CM

## 2023-10-27 NOTE — TELEPHONE ENCOUNTER
Spoke to patient to schedule procedure(s) Flexible Sigmoidoscopy (Flex Sig)       Physician to perform procedure(s) Dr. MARQUES Atkins  Date of Procedure (s) 1/23/2024  Arrival Time 6:00 AM  Time of Procedure(s) 7:00 Am    [facility] of Procedure(s) [address]   Prep sent to patient: Enema  Instructions provided to patient via [facility]    Patient was informed on the following information and verbalized understanding. Screening questionnaire reviewed with patient and complete. If procedure requires anesthesia, a responsible adult needs to be present to accompany the patient home, patient cannot drive after receiving anesthesia. Appointment details are tentative, especially check-in time. Patient will receive a prep-op call 4 days prior to confirm check-in time for procedure. If applicable the patient should contact their pharmacy to verify Rx for procedure prep is ready for pick-up. Patient was advised to call the scheduling department at [phone] if pharmacy states no Rx is available. Patient was advised to call the endoscopy scheduling department if any questions or concerns arise.      [facility] Scheduling Department

## 2023-10-27 NOTE — TELEPHONE ENCOUNTER
"----- Message from Helena Dior sent at 10/27/2023 12:00 PM CDT -----    ----- Message -----  From: Robin Atkins MD  Sent: 10/26/2023   1:51 PM CDT  To: Pepe Bingham RN; #    Procedure: Flexible sigmoidoscopy with possible dilation    Diagnosis: Crohn's disease    Procedure Timin-4 weeks    #If within 4 weeks selected, please vargas as high priority#    #If greater than 12 weeks, please select "5-12 weeks" and delay sending until 3 months prior to requested date#     Provider: Myself    [facility]: [address]    Additional Scheduling Information: She requires transportation to be arranged for the procedure    Prep Specifications: Two days of clears, and enemas (2) before procedure    Is the patient taking a GLP-1 Agonist: no    Have you attached a patient to this message: yes        "

## 2023-11-01 LAB — CALPROTECTIN STL-MCNT: 540.2 MCG/G

## 2023-11-02 DIAGNOSIS — K50.012 CROHN'S DISEASE OF SMALL INTESTINE WITH INTESTINAL OBSTRUCTION: Primary | ICD-10-CM

## 2023-12-18 NOTE — PHYSICIAN QUERY
"PT Name: Candida Cardona  MR #: [id number]    Physician Query Form - Nutrition Clarification     CDS/: Brandy E Capley               Contact information: Spectralink:  [phone]    This form is a permanent document in the medical record.     Query Date: January 22, 2019    By submitting this query, we are merely seeking further clarification of documentation.. Please utilize your independent clinical judgment when addressing the question(s) below.    The Medical record contains the following:   Indicators  Supporting Clinical Findings [facility] in Medical Record    % of Estimated Energy Intake over a time frame from p.o., TF, or TPN      Weight Status over a time frame      Subcutaneous Fat and/or Muscle Loss      Fluid Accumulation or Edema      Reduced  Strength     X Wt / BMI / Usual Body Weight Height 5' 7" (1.702 m)    Weight 71.7 kg (158lb)  BMI (Calculated) 24.8    Anthropometric flowsheet 1/17    Delayed Wound Healing / Failure to Thrive     X Acute or Chronic Illness HPI:   Status post total abdominal colectomy with end ileostomy in March of 2014 for Crohn's disease with colonic obstruction.     Only interval change is that she notes recent increase in ostomy output. States she has mentioned this with Dr. Nieves; his PA is currently running stool samples for workup.    DATE OF PROCEDURE:  01/15/2019     POSTOPERATIVE DIAGNOSIS:  Crohn's disease with unwanted ileostomy.     OPERATIONS:  1.  Ileostomy closure with ileorectal anastomosis.  2.  Exploratory laparotomy with extensive lysis of adhesions.  3.  Ureterolysis.  4.  Sigmoidoscopy   Interval H&P 1/15                Op note filed 1/21    Medication      Treatment     X Other She appears well-developed and well-nourished.    Malnutrition  Await return of bowel function  Monitor labs    Hyponatremia  Hypophosphatasia  Hypomagnesemia   Colon and rectal surgery progress  Note 1/16            Colon and rectal surgery progress notes 1050     AND / ASPEN " solids Clinical Characteristics (October 2011)  A minimum of two characteristics is recommended for diagnosing either moderate or severe malnutrition   Mild Malnutrition Moderate Malnutrition Severe Malnutrition   Energy Intake from p.o., TF or TPN. < 75% intake of estimated energy needs for less than 7 days < 75% intake of estimated energy needs for greater than 7 days < 50% intake of estimated energy needs for > 5 days   Weight Loss 1-2% in 1 month  5% in 3 months  7.5% in 6 months  10% in 1 year 1-2 % in 1 week  5% in 1 month  7.5% in 3 months  10% in 6 months  20% in 1 year > 2% in 1 week  > 5% in 1 month  > 7.5% in 3 months  > 10% in 6 months  > 20% in 1 year   Physical Findings     None *Mild subcutaneous fat and/or muscle loss  *Mild fluid accumulation  *Stage II decubitus  *Surgical wound or non-healing wound *Mod/severe subcutaneous fat and/or muscle loss  *Mod/severe fluid accumulation  *Stage III or IV decubitus  *Non-healing surgical wound     Provider, please specify diagnosis or diagnoses associated with above clinical findings.    [  ] Mild Protein-Calorie Malnutrition   [  ] Other Nutritional Diagnosis (please specify):    [ x ] Malnutrition ruled out   [  ] Other:    [  ] Clinically Undetermined       Please document in your progress notes daily for the duration of treatment until resolved and include in your discharge summary.       Small sip of water with medications./clears clears

## 2024-01-16 ENCOUNTER — TELEPHONE (OUTPATIENT)
Dept: SURGERY | Facility: CLINIC | Age: 44
End: 2024-01-16
Payer: MEDICARE

## 2024-01-16 NOTE — TELEPHONE ENCOUNTER
Called pt to check in with her re: transportation for her endo procedure on 1/23.  Pt will get a room at the [facility] and come in the day before her procedure and she will leave the day after her procedure.  No need for any assistance with transportation or admission to [facility].  Pt would also like Dr. Atkins to know that she was severely dehydrated In the beginning of Dec and had another CT scan performed and Dr. Lama told her that he saw less inflammation which is good.  RN will relay message to Dr. Atkins.  Pt verbalized understanding to all.

## 2024-01-18 ENCOUNTER — TELEPHONE (OUTPATIENT)
Dept: ENDOSCOPY | Facility: HOSPITAL | Age: 44
End: 2024-01-18
Payer: MEDICARE

## 2024-01-18 NOTE — TELEPHONE ENCOUNTER
----- Message from Robin Atkins MD sent at 1/17/2024  4:06 PM CST -----  That's fine    ----- Message -----  From: Seipel, Mindy B, RN  Sent: 1/17/2024  12:36 PM CST  To: Robin Atkins MD    Pt is scheduled for Flex sig on 1/23/24.  Pt states that she doesn't have a ride and was told by your staff that she could stay at [facility] the night before and stay the night after her procedure by herself.  Is this approved by you?  Please advise.  Thanks

## 2024-01-19 ENCOUNTER — TELEPHONE (OUTPATIENT)
Dept: ENDOSCOPY | Facility: HOSPITAL | Age: 44
End: 2024-01-19
Payer: MEDICARE

## 2024-01-19 NOTE — TELEPHONE ENCOUNTER
Returned pt call. Ok to stay at [place] per dr person see 1/18/24 encounter.the patient verbalized understanding

## 2024-01-23 ENCOUNTER — ANESTHESIA (OUTPATIENT)
Dept: ENDOSCOPY | Facility: HOSPITAL | Age: 44
End: 2024-01-23
Payer: MEDICARE

## 2024-01-23 ENCOUNTER — HOSPITAL ENCOUNTER (OUTPATIENT)
Facility: HOSPITAL | Age: 44
Discharge: HOME | End: 2024-01-23
Attending: STUDENT IN AN ORGANIZED HEALTH CARE EDUCATION/TRAINING PROGRAM | Admitting: STUDENT IN AN ORGANIZED HEALTH CARE EDUCATION/TRAINING PROGRAM
Payer: MEDICARE

## 2024-01-23 ENCOUNTER — ANESTHESIA EVENT (OUTPATIENT)
Dept: ENDOSCOPY | Facility: HOSPITAL | Age: 44
End: 2024-01-23
Payer: MEDICARE

## 2024-01-23 VITALS
BODY MASS INDEX: 24.8 KG/M2 | SYSTOLIC BLOOD PRESSURE: 108 MMHG | DIASTOLIC BLOOD PRESSURE: 67 MMHG | TEMPERATURE: 98 F | HEIGHT: 67 IN | OXYGEN SATURATION: 97 % | WEIGHT: 158 LBS | RESPIRATION RATE: 18 BRPM | HEART RATE: 73 BPM

## 2024-01-23 DIAGNOSIS — K62.89 PROCTITIS: Primary | ICD-10-CM

## 2024-01-23 DIAGNOSIS — K50.90 CROHN DISEASE: ICD-10-CM

## 2024-01-23 PROCEDURE — 88305 TISSUE EXAM BY PATHOLOGIST: CPT | Mod: 26,,, | Performed by: PATHOLOGY

## 2024-01-23 PROCEDURE — 27201012 HC FORCEPS, HOT/COLD, DISP: Performed by: STUDENT IN AN ORGANIZED HEALTH CARE EDUCATION/TRAINING PROGRAM

## 2024-01-23 PROCEDURE — D9220A PRA ANESTHESIA: Mod: ANES,,, | Performed by: ANESTHESIOLOGY

## 2024-01-23 PROCEDURE — 63600175 PHARM REV CODE 636 W HCPCS: Performed by: NURSE ANESTHETIST, CERTIFIED REGISTERED

## 2024-01-23 PROCEDURE — D9220A PRA ANESTHESIA: Mod: CRNA,,, | Performed by: NURSE ANESTHETIST, CERTIFIED REGISTERED

## 2024-01-23 PROCEDURE — 37000008 HC ANESTHESIA 1ST 15 MINUTES: Performed by: STUDENT IN AN ORGANIZED HEALTH CARE EDUCATION/TRAINING PROGRAM

## 2024-01-23 PROCEDURE — 88342 IMHCHEM/IMCYTCHM 1ST ANTB: CPT | Mod: 26,,, | Performed by: PATHOLOGY

## 2024-01-23 PROCEDURE — 45331 SIGMOIDOSCOPY AND BIOPSY: CPT | Mod: ,,, | Performed by: STUDENT IN AN ORGANIZED HEALTH CARE EDUCATION/TRAINING PROGRAM

## 2024-01-23 PROCEDURE — 88342 IMHCHEM/IMCYTCHM 1ST ANTB: CPT | Mod: 59 | Performed by: PATHOLOGY

## 2024-01-23 PROCEDURE — 25000003 PHARM REV CODE 250: Performed by: NURSE ANESTHETIST, CERTIFIED REGISTERED

## 2024-01-23 PROCEDURE — 45331 SIGMOIDOSCOPY AND BIOPSY: CPT | Performed by: STUDENT IN AN ORGANIZED HEALTH CARE EDUCATION/TRAINING PROGRAM

## 2024-01-23 PROCEDURE — 37000009 HC ANESTHESIA EA ADD 15 MINS: Performed by: STUDENT IN AN ORGANIZED HEALTH CARE EDUCATION/TRAINING PROGRAM

## 2024-01-23 PROCEDURE — 88305 TISSUE EXAM BY PATHOLOGIST: CPT | Performed by: PATHOLOGY

## 2024-01-23 RX ORDER — PROPOFOL 10 MG/ML
VIAL (ML) INTRAVENOUS CONTINUOUS PRN
Status: DISCONTINUED | OUTPATIENT
Start: 2024-01-23 | End: 2024-01-23

## 2024-01-23 RX ORDER — SODIUM CHLORIDE 9 MG/ML
INJECTION, SOLUTION INTRAVENOUS CONTINUOUS
Status: DISCONTINUED | OUTPATIENT
Start: 2024-01-23 | End: 2024-01-23 | Stop reason: HOSPADM

## 2024-01-23 RX ORDER — FENTANYL CITRATE 50 UG/ML
25 INJECTION, SOLUTION INTRAMUSCULAR; INTRAVENOUS EVERY 5 MIN PRN
Status: DISCONTINUED | OUTPATIENT
Start: 2024-01-23 | End: 2024-01-23 | Stop reason: HOSPADM

## 2024-01-23 RX ORDER — DEXMEDETOMIDINE HYDROCHLORIDE 100 UG/ML
INJECTION, SOLUTION INTRAVENOUS
Status: DISCONTINUED | OUTPATIENT
Start: 2024-01-23 | End: 2024-01-23

## 2024-01-23 RX ORDER — ONDANSETRON HYDROCHLORIDE 2 MG/ML
4 INJECTION, SOLUTION INTRAVENOUS DAILY PRN
Status: DISCONTINUED | OUTPATIENT
Start: 2024-01-23 | End: 2024-01-23 | Stop reason: HOSPADM

## 2024-01-23 RX ORDER — LIDOCAINE HYDROCHLORIDE 20 MG/ML
INJECTION INTRAVENOUS
Status: DISCONTINUED | OUTPATIENT
Start: 2024-01-23 | End: 2024-01-23

## 2024-01-23 RX ADMIN — LIDOCAINE HYDROCHLORIDE 50 MG: 20 INJECTION INTRAVENOUS at 06:01

## 2024-01-23 RX ADMIN — SODIUM CHLORIDE: 0.9 INJECTION, SOLUTION INTRAVENOUS at 06:01

## 2024-01-23 RX ADMIN — PROPOFOL 60 MG: 10 INJECTION, EMULSION INTRAVENOUS at 07:01

## 2024-01-23 RX ADMIN — DEXMEDETOMIDINE 8 MCG: 100 INJECTION, SOLUTION, CONCENTRATE INTRAVENOUS at 06:01

## 2024-01-23 RX ADMIN — PROPOFOL 40 MG: 10 INJECTION, EMULSION INTRAVENOUS at 07:01

## 2024-01-23 RX ADMIN — PROPOFOL 150 MCG/KG/MIN: 10 INJECTION, EMULSION INTRAVENOUS at 06:01

## 2024-01-23 NOTE — PROGRESS NOTES
Called Dr. KIN Doshi to come to BS in [facility] 30 to evaluate pt prior to pt discharge to [facility].

## 2024-01-23 NOTE — PROGRESS NOTES
RN walked pt to the restroom. Pt ambulated without difficulty, steady gait. Voided without difficulty.

## 2024-01-23 NOTE — PLAN OF CARE
Dr. Atkins at  to speak to pt. Dr aware that Pt is going to [facility] after recovery alone. Pt to spend extra time in [facility] for recovery.

## 2024-01-23 NOTE — PLAN OF CARE
..Pt alert and orientated. No family or friends with pt at bs. VSS. No distress noted. Pt denies N/V/D. Pt steady gait. No respiratory distress noted. Pt ambulated and voided without difficulty. Pt states they are ready for discharge to [facility] room 550. Anesthesia release provided.

## 2024-01-23 NOTE — H&P
[facility]  Colon and Rectal Surgery    [address]  [place], [place]  Tel: [phone]  Fax: [phone]  [email or web address]   MD Julio Cesar White MD Brian Kann, MD W. Forrest Johnston, MD Matthew Giglia, MD Jennifer Paruch, MD William Kethman, MD Danielle Kay, MD     Patient name: Candida Cardona   YOB: 1980   MRN: [id number]  Date of procedure: 01/23/2024    Procedure: Colonoscopy  Indications: Crohn's disease    Ms. Cardona is a 42 year old woman with a history of Crohn's disease, GERD, anxiety, CAD, Bipolar disorder, and pAF  who presents for evaluation of ileocolic stricture . She has an extensive and complex surgical history - summarized below. Her Crohn's disease is managed by Dr. Lama - she is on Skyrizi every 8 weeks. Since her last dose of Skyrizi she has been going to the bathroom about 6-8 times per day but the week before this dose 10-12 bowel movements per day. She has been on Skyrizi since December of 2022 - prior to this she was on Stelara (apparently unable to get therapeutic or clinical benefit from levels). In September she had an episode of increased abdominal pain, nausea, vomiting which was eventually relieved with frequent bowel movements. She had significant issues with her ostomy and wants to avoid this at all cost. She has not had any perianal procedure - no abscesses or fistulas. She does not recall having any recent disease activity or therapeutic drug monitoring recently. She has no fevers or chills. She is trying Canasa suppositories (not currently using them) for perianal pain but she doesn't feel like this helps. Warm baths have improved her perineal pain. She is not on Prednisone, last dose was a few weeks ago - 9 day taper.     Past surgeries:  5/25/2020 - Laparoscopic, converted to open cholecystectomy  1/15/2019 - Ileostomy closure and ileorectal anastomosis, extensive lysis of adhesions,  ureterolysis  12/22/2016 - Exploratory laparotomy with lysis of adhesions, 1 hr - ERIK, BSO at that time  3/17/2014 - TAC with EI  Prior ileocolic resections ~2007, 2010     Flexible sigmoidoscopy - 8/2022 - proctitis, stricture appreciated - not dilated     CT AP reviewed - appears to have a relative short-segment stricture at her ileorectal anastomosis    1/23/2024  Doing better overall - was admitted for hydration but is having more like 3-5 bowel movements daily, less nausea. She had one episode of vomiting she attributes to not taking her GERD medication. Calprotectin improved.    The patient was informed of the availability of a certified  without charge. A certified  was not necessary for this visit.    Sedation plan: MAC  ASA: ASA 3 - Patient with moderate systemic disease with functional limitations    Review of Systems  See above    Past Medical History:   Diagnosis Date    Anemia     Anxiety     Bilious vomiting with nausea 1/17/2017    Bipolar 1 disorder     Constipation     Coronary artery disease     NO STENT    Crohn's disease     Depression     Encounter for blood transfusion     Epilepsy     Fibromyalgia     Gall stones     GERD (gastroesophageal reflux disease)     Hyponatremia     Interstitial cystitis     Kidney stones     Mass, ovarian 11/22/2016    Metabolic acidosis     Osteoporosis     PAF (paroxysmal atrial fibrillation)     Pancreatitis     S/P ERIK-BSO (total abdominal hysterectomy and bilateral salpingo-oophorectomy) 12/19/2016    Seizures     UTI (urinary tract infection)      Past Surgical History:   Procedure Laterality Date    ABDOMINAL SURGERY      ANASTOMOSIS OF ILEUM TO RECTUM  1/15/2019    Procedure: ANASTOMOSIS, ILEORECTAL;  Surgeon: Reji Peterson MD;  [facility]: [facility] OR [address] [place] [address];  Service: Colon and Rectal;;    ANGIOPLASTY      CHOLECYSTECTOMY N/A 5/25/2020    Procedure: CHOLECYSTECTOMY;  Surgeon: Ozzie Sanches MD;  [facility]: [facility] OR;  Service:  General;  Laterality: N/A;  COVID NEG      COLON SURGERY      x 2    COLONOSCOPY      multiple    COLONOSCOPY N/A 4/11/2017    Procedure: COLONOSCOPY;  Surgeon: Hussain Amaro MD;  [facility]: [facility];  Service: Endoscopy;  Laterality: N/A;    CYSTOSCOPY W/ URETERAL STENT PLACEMENT      CYSTOSCOPY W/ URETERAL STENT REMOVAL      CYSTOSCOPY W/ URETERAL STENT REMOVAL Right 9/5/2019    Procedure: CYSTOSCOPY, WITH URETERAL STENT REMOVAL;  Surgeon: Dano Mathis MD;  [facility]: [facility] OR;  Service: Urology;  Laterality: Right;    CYSTOSCOPY WITH URETEROSCOPY, RETROGRADE PYELOGRAPHY, AND INSERTION OF STENT Right 8/22/2019    Procedure: CYSTOSCOPY, WITH RETROGRADE PYELOGRAM AND URETERAL STENT INSERTION;  Surgeon: Dano Mathis MD;  [facility]: [facility] OR;  Service: Urology;  Laterality: Right;    EXTRACORPOREAL SHOCK WAVE LITHOTRIPSY Right 8/22/2019    Procedure: LITHOTRIPSY-EXTRACORPOREAL SHOCK WAVE;  Surgeon: Dano Mathis MD;  [facility]: [facility];  Service: Urology;  Laterality: Right;    EXTRACORPOREAL SHOCK WAVE LITHOTRIPSY Right 9/5/2019    Procedure: LITHOTRIPSY-EXTRACORPOREAL SHOCK WAVE;  Surgeon: Dano Mathis MD;  [facility]: [facility] OR;  Service: Urology;  Laterality: Right;    HYSTERECTOMY      ILEOSTOMY      LAPAROTOMY, EXPLORATORY  1/15/2019    Procedure: LAPAROTOMY, EXPLORATORY;extensive lysis of adhesions;  Surgeon: Reji Peterson MD;  [facility]: [facility] OR 2ND FLR;  Service: Colon and Rectal;;    LYSIS OF ADHESIONS OF URETER  1/15/2019    Procedure: URETEROLYSIS;  Surgeon: Reji Peterson MD;  [facility]: [facility] OR 2ND FLR;  Service: Colon and Rectal;;    PERIPHERALLY INSERTED CENTRAL CATHETER INSERTION N/A 10/30/2018    Procedure: INSERTION, PICC;  Surgeon: Dos Diagnostic Provider;  [facility]: [facility];  Service: General;  Laterality: N/A;    PERIPHERALLY INSERTED CENTRAL CATHETER INSERTION N/A 8/23/2019    Procedure: INSERTION, PICC;  Surgeon: Dosxavier Diagnostic Provider;  [facility]: [facility];   Service: General;  Laterality: N/A;    PORTACATH PLACEMENT      TOTAL COLECTOMY  2014     Family History   Problem Relation Age of Onset    Hypertension Mother     Joint hypermobility Mother     Physical abuse Mother     Cancer Father         brain    Diabetes Father     Schizophrenia Father     Stroke Father     No Known Problems Brother     No Known Problems Brother     Stroke Paternal Grandmother     Ovarian cancer Neg Hx     Uterine cancer Neg Hx     Breast cancer Neg Hx     Colon cancer Neg Hx     Anesthesia problems Neg Hx      Social History     Tobacco Use    Smoking status: Never    Smokeless tobacco: Never   Substance Use Topics    Alcohol use: No    Drug use: No     Review of patient's allergies indicates:   Allergen Reactions    Ciprofloxacin Shortness Of Breath    Keppra [levetiracetam] Other (See Comments)     Increased depression    Imuran [azathioprine sodium] Diarrhea and Nausea And Vomiting     Pt states that she becomes hot sweaty and passes out also. She states that she has diarrhea and nausea and vomiting     [place]     Imuran [azathioprine] Nausea And Vomiting    Toradol [ketorolac] Itching    Adhesive Itching    Iodinated contrast media Hives       Prior to Admission medications    Medication Sig Start Date End Date Taking? Authorizing Provider   pantoprazole (PROTONIX) 40 MG tablet Take 1 tablet (40 mg total) by mouth 2 (two) times daily.  Patient taking differently: Take 80 mg by mouth once daily. 5/22/19  Yes Ted Gómez MD   budesonide (ENTOCORT EC) 3 mg capsule Take 9 mg by mouth once daily. 5/3/22   Provider, Historical   clonazePAM (KLONOPIN) 1 MG tablet Take 1 mg by mouth as needed. 3/21/22   Provider, Historical   diphenoxylate-atropine 2.5-0.025 mg (LOMOTIL) 2.5-0.025 mg per tablet Take 1 tablet by mouth 4 (four) times daily as needed for Diarrhea.  Patient not taking: Reported on 10/26/2023 3/13/22   Julio Cesar Carter, NP   EScitalopram oxalate (LEXAPRO) 10 MG tablet Take by  "mouth. 10/15/23   Provider, Historical   estradioL (ESTRACE) 2 MG tablet Take 2 mg by mouth. 6/21/23   Provider, Historical   HYDROcodone-acetaminophen (NORCO) 5-325 mg per tablet Take 1 tablet by mouth every 8 (eight) hours as needed. 9/12/23   Provider, Historical   hydrocortisone 2.5 % cream 2 (two) times daily. 9/21/23   Provider, Historical   liothyronine (CYTOMEL) 5 MCG Tab TAKE 1 TABLET(5 MCG) BY MOUTH BEFORE BREAKFAST  Patient not taking: Reported on 10/26/2023 7/1/19   Ted Gómez MD   mesalamine (CANASA) 1000 MG Supp Place 1,000 mg rectally every evening. 10/9/23   Provider, Historical   ondansetron (ZOFRAN-ODT) 4 MG TbDL Take 1 tablet (4 mg total) by mouth every 6 (six) hours as needed (Nausea vomiting). 7/22/22   Shalom Jean NP   oxyCODONE-acetaminophen (PERCOCET) 5-325 mg per tablet Take 1 tablet by mouth every 8 (eight) hours as needed for Pain.  Patient not taking: Reported on 10/26/2023 12/3/22   Bethany Zhang NP   predniSONE (DELTASONE) 10 MG tablet Take by mouth. 9/19/23   Provider, Historical   risankizumab-rzaa (SKYRIZI SUBQ) Inject into the skin.    Provider, Historical   sertraline (ZOLOFT) 50 MG tablet Take 50 mg by mouth once daily. 5/9/22   Provider, Historical   STELARA 90 mg/mL Syrg syringe every 8 weeks.  10/3/19   Provider, Historical       Physical Examination  /79 (Patient Position: Lying)   Pulse 99   Temp 98 °F (36.7 °C) (Temporal)   Resp 16   Ht 5' 7" (1.702 m)   Wt 71.7 kg (158 lb)   LMP  (LMP Unknown) Comment: complete hysterectomy  SpO2 97%   Breastfeeding No   BMI 24.75 kg/m²      Constitutional: well developed, no cough, no dyspnea, alert, and no acute distress    Head: Normocephalic, no lesions, without obvious abnormality  Eye: Normal external eye, conjunctiva, and lids, PERRL  Cardiovascular: regular rate and regular rhythm  Respiratory: normal air entry  Gastrointestinal: soft, non-tender  Neurologic: alert, oriented, normal speech, no focal " findings or movement disorder noted  Psychiatric: appropriate, normal mood    Plan of Care    It was a pleasure meeting Ms. Cardona today - we will plan to perform a colonoscopy with monitored anesthesia care. The details of the procedure, the possible need for biopsy or polypectomy and the potential risks including bleeding, perforation, missed polyps were discussed in detail and they consented to undergo the procedure.      Robin Atkins MD, FACS, FASCRS  Department of Colon & Rectal Surgery  [facility]

## 2024-01-23 NOTE — PROVATION PATIENT INSTRUCTIONS
Discharge Summary/Instructions after an Endoscopic Procedure  Patient Name: Candida Cardona  Patient MRN: [id number]  Patient YOB: 1980 Tuesday, January 23, 2024  Robin Atkins MD  Dear patient,  As a result of recent federal legislation (The Federal Cures Act), you may   receive lab or pathology results from your procedure in your [facility]   account before your physician is able to contact you. Your physician or   their representative will relay the results to you with their   recommendations at their soonest availability.  Thank you,  RESTRICTIONS:  During your procedure today, you received medications for sedation.  These   medications may affect your judgment, balance and coordination.  Therefore,   for 24 hours, you have the following restrictions:   - DO NOT drive a car, operate machinery, make legal/financial decisions,   sign important papers or drink alcohol.    ACTIVITY:  Today: no heavy lifting, straining or running due to procedural   sedation/anesthesia.  The following day: return to full activity including work.  DIET:  Eat and drink normally unless instructed otherwise.     TREATMENT FOR COMMON SIDE EFFECTS:  - Mild abdominal pain, nausea, belching, bloating or excessive gas:  rest,   eat lightly and use a heating pad.  - Sore Throat: treat with throat lozenges and/or gargle with warm salt   water.  - Because air was used during the procedure, expelling large amounts of air   from your rectum or belching is normal.  - If a bowel prep was taken, you may not have a bowel movement for 1-3 days.    This is normal.  SYMPTOMS TO WATCH FOR AND REPORT TO YOUR PHYSICIAN:  1. Abdominal pain or bloating, other than gas cramps.  2. Chest pain.  3. Back pain.  4. Signs of infection such as: chills or fever occurring within 24 hours   after the procedure.  5. Rectal bleeding, which would show as bright red, maroon, or black stools.   (A tablespoon of blood from the rectum is not serious,  especially if   hemorrhoids are present.)  6. Vomiting.  7. Weakness or dizziness.  GO DIRECTLY TO THE NEAREST EMERGENCY ROOM IF YOU HAVE ANY OF THE FOLLOWING:      Difficulty breathing              Chills and/or fever over 101 F   Persistent vomiting and/or vomiting blood   Severe abdominal pain   Severe chest pain   Black, tarry stools   Bleeding- more than one tablespoon   Any other symptom or condition that you feel may need urgent attention  Your doctor recommends these additional instructions:  If any biopsies were taken, your doctors clinic will contact you in 1 to 2   weeks with any results.  - Discharge patient to home.   - Continue present medications.   - Await pathology results.   - Refer to a gastroenterologist at appointment to be scheduled. Message sent   to our IBD GI specialists to arrange eConsult given patient transportation   issues.  For questions, problems or results please call your physician - Robin Atkins MD at Work:  [phone].  [facility], EMERGENCY ROOM PHONE NUMBER: [phone]  IF A COMPLICATION OR EMERGENCY SITUATION ARISES AND YOU ARE UNABLE TO REACH   YOUR PHYSICIAN - GO DIRECTLY TO THE EMERGENCY ROOM.  MD Robin Long MD  1/23/2024 7:25:43 AM  This report has been verified and signed electronically.  Dear patient,  As a result of recent federal legislation (The Federal Cures Act), you may   receive lab or pathology results from your procedure in your [facility]   account before your physician is able to contact you. Your physician or   their representative will relay the results to you with their   recommendations at their soonest availability.  Thank you,  PROVATION

## 2024-01-23 NOTE — TRANSFER OF CARE
"Anesthesia Transfer of Care Note    Patient: Candida Cardona    Procedure(s) Performed: Procedure(s) (LRB):  SIGMOIDOSCOPY, FLEXIBLE (N/A)    Patient location: PACU    Anesthesia Type: MAC    Transport from OR: Transported from OR on 2-3 L/min O2 by NC with adequate spontaneous ventilation    Post pain: adequate analgesia    Post assessment: no apparent anesthetic complications and tolerated procedure well    Post vital signs: stable    Level of consciousness: awake, alert and oriented    Nausea/Vomiting: no nausea/vomiting    Complications: none    Transfer of care protocol was followed      Last vitals: Visit Vitals  /79 (Patient Position: Lying)   Pulse 99   Temp 36.7 °C (98 °F) (Temporal)   Resp 16   Ht 5' 7" (1.702 m)   Wt 71.7 kg (158 lb)   LMP  (LMP Unknown)   SpO2 97%   Breastfeeding No   BMI 24.75 kg/m²     "

## 2024-01-23 NOTE — CARE UPDATE
As previously arranged due to inability to arrange transportation, she will be discharged once stable to stay tonight at the [facility] (connected hotel) so that she can safely drive home tomorrow.      Robin Atkins MD, FACS, [facility]  Department of Colon & Rectal Surgery  [facility]

## 2024-01-23 NOTE — DISCHARGE SUMMARY
Brief Operative Note    Date of surgery: 01/23/2024    Pre-operative Diagnosis:  Crohn's disease with complication, unspecified gastrointestinal tract location [K50.857]     Post-operative Diagnosis:   Same as above    Surgeon: Robin Atkins M.D.    Procedure:  Flexible sigmoidoscopy     Anesthesia: Monitored anesthesia care     Findings:  See operative note    Estimated blood loss: Minimal         Specimens:   See operative note    Discharge Note    Outcome:   Patient tolerated treatment/procedure well without complication and is now ready for discharge.    Disposition:   Home or Self Care    Final diagnosis:    Crohn's disease with complication, unspecified gastrointestinal tract location [K50.91]    Follow-up:   With primary care provider    Discharge Procedure Orders   Diet Adult Regular     No dressing needed     Notify your health care provider if you experience any of the following:  temperature >100.4     Notify your health care provider if you experience any of the following:  persistent nausea and vomiting or diarrhea     Notify your health care provider if you experience any of the following:  severe uncontrolled pain     Notify your health care provider if you experience any of the following:  redness, tenderness, or signs of infection (pain, swelling, redness, odor or green/yellow discharge around incision site)     Notify your health care provider if you experience any of the following:  difficulty breathing or increased cough     Notify your health care provider if you experience any of the following:  severe persistent headache     Notify your health care provider if you experience any of the following:  worsening rash     Notify your health care provider if you experience any of the following:  persistent dizziness, light-headedness, or visual disturbances     Notify your health care provider if you experience any of the following:  increased confusion or weakness     Activity as tolerated        [id number]     Robin Atkins MD

## 2024-01-23 NOTE — ANESTHESIA POSTPROCEDURE EVALUATION
Anesthesia Post Evaluation    Patient: Candida Cardona    Procedure(s) Performed: Procedure(s) (LRB):  SIGMOIDOSCOPY, FLEXIBLE (N/A)    Final Anesthesia Type: general      Patient location during evaluation: PACU  Patient participation: Yes- Able to Participate  Level of consciousness: awake and alert  Post-procedure vital signs: reviewed and stable  Pain management: adequate  Airway patency: patent    PONV status at discharge: No PONV  Anesthetic complications: no      Cardiovascular status: hemodynamically stable  Respiratory status: spontaneous ventilation  Follow-up not needed.              Vitals Value Taken Time   /67 01/23/24 0817   Temp 36.7 °C (98.1 °F) 01/23/24 0720   Pulse 74 01/23/24 0822   Resp 18 01/23/24 0800   SpO2 92 % 01/23/24 0822   Vitals shown include unvalidated device data.      No case tracking events are documented in the log.      Pain/Enrrique Score: Enrrique Score: 10 (1/23/2024  7:45 AM)

## 2024-01-23 NOTE — ANESTHESIA RELEASE NOTE
"Anesthesia Release from PACU Note    Patient: Candida Cardona    Procedure(s) Performed: Procedure(s) (LRB):  SIGMOIDOSCOPY, FLEXIBLE (N/A)    Anesthesia type: general    Post pain: Adequate analgesia    Post assessment: no apparent anesthetic complications    Last Vitals: Visit Vitals  /67 (Patient Position: Lying)   Pulse 76   Temp 36.7 °C (98.1 °F) (Temporal)   Resp 18   Ht 5' 7" (1.702 m)   Wt 71.7 kg (158 lb)   LMP  (LMP Unknown)   SpO2 96%   Breastfeeding No   BMI 24.75 kg/m²       Post vital signs: stable    Level of consciousness: awake    Nausea/Vomiting: no nausea/no vomiting    Complications: none    Airway Patency: patent    Respiratory: unassisted    Cardiovascular: stable and blood pressure at baseline    Hydration: euvolemic  "

## 2024-01-23 NOTE — ANESTHESIA PREPROCEDURE EVALUATION
01/23/2024  Candida Cardona is a 43 y.o., female.  Pre-operative evaluation for Procedure(s) (LRB):  SIGMOIDOSCOPY, FLEXIBLE (N/A)    Candida Cardona is a 43 y.o. female       Patient Active Problem List   Diagnosis    Malnutrition    Attention to ileostomy    Crohn's disease of small and large intestines    Latent tuberculosis by blood test    S/P ERIK-BSO (total abdominal hysterectomy and bilateral salpingo-oophorectomy)    Pelvic abscess in female    Nephrolithiasis    Diarrhea    Anemia    Hypotension    Proteinuria    Anxiety    GERD (gastroesophageal reflux disease)    Interstitial cystitis    PAF (paroxysmal atrial fibrillation)    UTI due to Klebsiella species    History of recent steroid use    Abdominal pain    Dehydration, moderate    Other specified anemias    Immunosuppressed status    Left upper extremity swelling    Narcotic bowel syndrome    Dehydration    Vasomotor symptoms due to menopause    Cough    Upper respiratory tract infection    Candidal intertrigo    Elevated LFTs    Inadequate dietary energy intake    Metabolic acidosis    Physical deconditioning    Unspecified mood (affective) disorder    Seizures    Weight loss, unintentional    Hypoalbuminemia    Hypocalcemia    Mood disorder    Vitamin D deficiency    Borderline personality disorder    Bipolar 1 disorder    Vitamin B12 deficiency    Complicated UTI (urinary tract infection)    Gallbladder dilatation    Acute lower UTI    Bladder spasms    Calculus of gallbladder with chronic cholecystitis without obstruction    Adynamic ileus    Nausea vomiting and diarrhea    Proctitis       Past Surgical History:   Procedure Laterality Date    ABDOMINAL SURGERY      ANASTOMOSIS OF ILEUM TO RECTUM  1/15/2019    Procedure: ANASTOMOSIS, ILEORECTAL;  Surgeon: Reji Peterson MD;  [facility]: [facility] OR [address] [place] [address];  Service: Colon and Rectal;;     ANGIOPLASTY      CHOLECYSTECTOMY N/A 5/25/2020    Procedure: CHOLECYSTECTOMY;  Surgeon: Ozzie Sanches MD;  [facility]: [facility] OR;  Service: General;  Laterality: N/A;  COVID NEG      COLON SURGERY      x 2    COLONOSCOPY      multiple    COLONOSCOPY N/A 4/11/2017    Procedure: COLONOSCOPY;  Surgeon: Hussain Amaro MD;  [facility]: [facility] ENDO;  Service: Endoscopy;  Laterality: N/A;    CYSTOSCOPY W/ URETERAL STENT PLACEMENT      CYSTOSCOPY W/ URETERAL STENT REMOVAL      CYSTOSCOPY W/ URETERAL STENT REMOVAL Right 9/5/2019    Procedure: CYSTOSCOPY, WITH URETERAL STENT REMOVAL;  Surgeon: Dano Mathis MD;  [facility]: [facility] OR;  Service: Urology;  Laterality: Right;    CYSTOSCOPY WITH URETEROSCOPY, RETROGRADE PYELOGRAPHY, AND INSERTION OF STENT Right 8/22/2019    Procedure: CYSTOSCOPY, WITH RETROGRADE PYELOGRAM AND URETERAL STENT INSERTION;  Surgeon: Dano Mathis MD;  [facility]: [facility] OR;  Service: Urology;  Laterality: Right;    EXTRACORPOREAL SHOCK WAVE LITHOTRIPSY Right 8/22/2019    Procedure: LITHOTRIPSY-EXTRACORPOREAL SHOCK WAVE;  Surgeon: Dano Mathis MD;  [facility]: [facility] OR;  Service: Urology;  Laterality: Right;    EXTRACORPOREAL SHOCK WAVE LITHOTRIPSY Right 9/5/2019    Procedure: LITHOTRIPSY-EXTRACORPOREAL SHOCK WAVE;  Surgeon: Dano Mathis MD;  [facility]: [facility] OR;  Service: Urology;  Laterality: Right;    HYSTERECTOMY      ILEOSTOMY      LAPAROTOMY, EXPLORATORY  1/15/2019    Procedure: LAPAROTOMY, EXPLORATORY;extensive lysis of adhesions;  Surgeon: Reji Peterson MD;  [facility]: [facility] OR 2ND FLR;  Service: Colon and Rectal;;    LYSIS OF ADHESIONS OF URETER  1/15/2019    Procedure: URETEROLYSIS;  Surgeon: Reji Peterson MD;  [facility]: [facility] OR 2ND FLR;  Service: Colon and Rectal;;    PERIPHERALLY INSERTED CENTRAL CATHETER INSERTION N/A 10/30/2018    Procedure: INSERTION, PICC;  Surgeon: Dosc Diagnostic Provider;  [facility]: [facility] OR;  Service: General;  Laterality: N/A;    PERIPHERALLY  INSERTED CENTRAL CATHETER INSERTION N/A 8/23/2019    Procedure: INSERTION, PICC;  Surgeon: Liz Diagnostic Provider;  [facility]: [facility] OR;  Service: General;  Laterality: N/A;    PORTACATH PLACEMENT      TOTAL COLECTOMY  2014       Social History     Socioeconomic History    Marital status: Single    Number of children: 0   Tobacco Use    Smoking status: Never    Smokeless tobacco: Never   Substance and Sexual Activity    Alcohol use: No    Drug use: No    Sexual activity: Yes     Partners: Male   Social History Narrative    Lives with significant other     Social Determinants of Health     Stress: No Stress Concern Present (8/20/2019)    [facility] [employer] [facility] - Occupational Stress Questionnaire     Feeling of Stress : Only a little       Current Facility-Administered Medications on File Prior to Encounter   Medication Dose Route Frequency Provider Last Rate Last Admin    mupirocin 2 % ointment   Nasal On Call Procedure Vidhi Mathis NP   Given at 01/15/19 0642     Current Outpatient Medications on File Prior to Encounter   Medication Sig Dispense Refill    pantoprazole (PROTONIX) 40 MG tablet Take 1 tablet (40 mg total) by mouth 2 (two) times daily. (Patient taking differently: Take 80 mg by mouth once daily.) 60 tablet 9    budesonide (ENTOCORT EC) 3 mg capsule Take 9 mg by mouth once daily.      clonazePAM (KLONOPIN) 1 MG tablet Take 1 mg by mouth as needed.      diphenoxylate-atropine 2.5-0.025 mg (LOMOTIL) 2.5-0.025 mg per tablet Take 1 tablet by mouth 4 (four) times daily as needed for Diarrhea. (Patient not taking: Reported on 10/26/2023) 20 tablet 0    EScitalopram oxalate (LEXAPRO) 10 MG tablet Take by mouth.      estradioL (ESTRACE) 2 MG tablet Take 2 mg by mouth.      HYDROcodone-acetaminophen (NORCO) 5-325 mg per tablet Take 1 tablet by mouth every 8 (eight) hours as needed.      hydrocortisone 2.5 % cream 2 (two) times daily.      liothyronine (CYTOMEL) 5 MCG Tab TAKE 1 TABLET(5  "MCG) BY MOUTH BEFORE BREAKFAST (Patient not taking: Reported on 10/26/2023) 90 tablet 1    mesalamine (CANASA) 1000 MG Supp Place 1,000 mg rectally every evening.      ondansetron (ZOFRAN-ODT) 4 MG TbDL Take 1 tablet (4 mg total) by mouth every 6 (six) hours as needed (Nausea vomiting). 12 tablet 0    oxyCODONE-acetaminophen (PERCOCET) 5-325 mg per tablet Take 1 tablet by mouth every 8 (eight) hours as needed for Pain. (Patient not taking: Reported on 10/26/2023) 12 tablet 0    predniSONE (DELTASONE) 10 MG tablet Take by mouth.      risankizumab-rzaa (SKYRIZI SUBQ) Inject into the skin.      sertraline (ZOLOFT) 50 MG tablet Take 50 mg by mouth once daily.      STELARA 90 mg/mL Syrg syringe every 8 weeks.          Review of patient's allergies indicates:   Allergen Reactions    Ciprofloxacin Shortness Of Breath    Keppra [levetiracetam] Other (See Comments)     Increased depression    Imuran [azathioprine sodium] Diarrhea and Nausea And Vomiting     Pt states that she becomes hot sweaty and passes out also. She states that she has diarrhea and nausea and vomiting     [place]     Imuran [azathioprine] Nausea And Vomiting    Toradol [ketorolac] Itching    Adhesive Itching    Iodinated contrast media Hives         CBC: No results for input(s): "WBC", "RBC", "HGB", "HCT", "PLT", "MCV", "MCH", "MCHC" in the last 72 hours.    CMP: No results for input(s): "NA", "K", "CL", "CO2", "BUN", "CREATININE", "GLU", "MG", "PHOS", "CALCIUM", "ALBUMIN", "PROT", "ALKPHOS", "ALT", "AST", "BILITOT" in the last 72 hours.    INR  No results for input(s): "PT", "INR", "PROTIME", "APTT" in the last 72 hours.      Diagnostic Studies:    EKG:   Results for orders placed or performed during the hospital encounter of 08/19/21   EKG 12-lead    Collection Time: 08/19/21  7:35 PM    Narrative    Test Reason : R10.9,    Vent. Rate : 109 BPM     Atrial Rate : 109 BPM     P-R Int : 126 ms          QRS Dur : 068 ms      QT Int : 342 ms       P-R-T Axes " ": 067 063 066 degrees     QTc Int : 460 ms    Sinus tachycardia  Right atrial enlargement  Indeterminate axis  Pulmonary disease pattern  Abnormal ECG  When compared with ECG of 01-OCT-2020 12:31,  No significant change was found  Confirmed by Nalini Alberto MD ([id number]) on 8/20/2021 12:47:13 PM    Referred By: BERNICE BENAVIDES MD           Confirmed By:Nalini Alberto MD       TTE:  No results found for this or any previous visit.  No results found for: "EF"   Results for orders placed or performed during the hospital encounter of 02/28/14   2D echo with color flow doppler   Result Value Ref Range    EF + QEF 60     Diastolic Dysfunction No        CLIFTON:  No results found for this or any previous visit.    Stress Test:  No results found for this or any previous visit.       LHC:  No results found for this or any previous visit.       PFT:  No results found for: "FEV1", "FVC", "SGO8SEL", "TLC", "DLCO"     ALLERGIES:     Review of patient's allergies indicates:   Allergen Reactions    Ciprofloxacin Shortness Of Breath    Keppra [levetiracetam] Other (See Comments)     Increased depression    Imuran [azathioprine sodium] Diarrhea and Nausea And Vomiting     Pt states that she becomes hot sweaty and passes out also. She states that she has diarrhea and nausea and vomiting     [place]     Imuran [azathioprine] Nausea And Vomiting    Toradol [ketorolac] Itching    Adhesive Itching    Iodinated contrast media Hives     LDA:          Lines/Drains/Airways       None                  Anesthesia Evaluation      Airway   Mallampati: II  TM distance: > 6 cm  Neck ROM: Normal ROM  Dental      Pulmonary    Cardiovascular     Rate: Normal    Neuro/Psych      GI/Hepatic/Renal      Endo/Other    Abdominal                           Pre-op Assessment    I have reviewed the Patient Summary Reports.     I have reviewed the Nursing Notes. I have reviewed the NPO Status.   I have reviewed the Medications.     Review of Systems  Anesthesia " Hx:  No problems with previous Anesthesia             Denies Family Hx of Anesthesia complications.    Denies Personal Hx of Anesthesia complications.                    Cardiovascular:  Cardiovascular Normal                                            Pulmonary:  Pulmonary Normal                       Renal/:  Renal/ Normal                 Hepatic/GI:  Hepatic/GI Normal                 Neurological:  Neurology Normal                                      Endocrine:  Endocrine Normal                Physical Exam  General: Well nourished    Airway:  Mallampati: II   Mouth Opening: Normal  TM Distance: > 6 cm  Tongue: Normal  Neck ROM: Normal ROM    Dental:  Severe periodontal disease; pt denies loose/chipped/broken teeth; understands risk of further dental trauma and agrees to proceed  Chest/Lungs:  Normal Respiratory Rate    Heart:  Rate: Normal        Anesthesia Plan  Type of Anesthesia, risks & benefits discussed:    Anesthesia Type: Gen Natural Airway, MAC  Intra-op Monitoring Plan: Standard ASA Monitors  Post Op Pain Control Plan: multimodal analgesia and IV/PO Opioids PRN  Induction:  IV  Airway Plan: Direct, Post-Induction  Informed Consent: Informed consent signed with the Patient and all parties understand the risks and agree with anesthesia plan.  All questions answered. Patient consented to blood products? Yes  ASA Score: 3  Day of Surgery Review of History & Physical: H&P Update referred to the surgeon/provider.    Ready For Surgery From Anesthesia Perspective.     .

## 2024-01-26 LAB
FINAL PATHOLOGIC DIAGNOSIS: NORMAL
GROSS: NORMAL
Lab: NORMAL
MICROSCOPIC EXAM: NORMAL

## 2024-07-08 ENCOUNTER — TELEPHONE (OUTPATIENT)
Dept: SURGERY | Facility: CLINIC | Age: 44
End: 2024-07-08
Payer: MEDICARE

## 2024-07-08 NOTE — TELEPHONE ENCOUNTER
----- Message from Pamela Azevedo sent at 7/8/2024  3:09 PM CDT -----  Regarding: appt access  Contact: pt [phone]  Pt calling to advise Dr. Lama from [facility]  would like pt to schedule follow up visit with Dr. Atkins regarding medical. Condition.

## 2024-07-08 NOTE — TELEPHONE ENCOUNTER
Spoke with patient regarding scheduling a follow up with Dr. Atkins and IBD provider at the same time. Patient also request to have dilation done at same time, if needed. Explained to patient that I will send a message to Dr. Atkins to advise and call her back. Patient verbalizes understanding.

## 2024-07-23 ENCOUNTER — TELEPHONE (OUTPATIENT)
Dept: SURGERY | Facility: CLINIC | Age: 44
End: 2024-07-23
Payer: MEDICARE

## 2024-07-24 ENCOUNTER — OFFICE VISIT (OUTPATIENT)
Dept: SURGERY | Facility: CLINIC | Age: 44
End: 2024-07-24
Payer: MEDICARE

## 2024-07-24 DIAGNOSIS — K50.012 CROHN'S DISEASE OF SMALL INTESTINE WITH INTESTINAL OBSTRUCTION: Primary | ICD-10-CM

## 2024-07-24 NOTE — PROGRESS NOTES
Established Patient - Audio Only Telehealth Visit     The patient location is: [facility]  The chief complaint leading to consultation is: Crohn's disease  Visit type: Virtual visit with audio only (telephone)  Total time spent with patient: 20 minutes    The reason for the audio only service rather than synchronous audio and video virtual visit was related to technical difficulties or patient preference/necessity.     Each patient to whom I provide medical services by telemedicine is:  (1) informed of the relationship between the physician and patient and the respective role of any other health care provider with respect to management of the patient; and (2) notified that they may decline to receive medical services by telemedicine and may withdraw from such care at any time. Patient verbally consented to receive this service via voice-only telephone call.    Ms. Cardona is a 43 year old woman with a history of Crohn's disease, GERD, anxiety, CAD, Bipolar disorder, and pAF  who presents for evaluation of ileocolic stricture . She has an extensive and complex surgical history - summarized below. Her Crohn's disease is managed by Dr. Lama - she is on Skyrizi every 8 weeks. Since her last dose of Skyrizi she has been going to the bathroom about 6-8 times per day but the week before this dose 10-12 bowel movements per day. She has been on Skyrizi since December of 2022 - prior to this she was on Stelara (apparently unable to get therapeutic or clinical benefit from levels). In September she had an episode of increased abdominal pain, nausea, vomiting which was eventually relieved with frequent bowel movements. She had significant issues with her ostomy and wants to avoid this at all cost. She has not had any perianal procedure - no abscesses or fistulas. She does not recall having any recent disease activity or therapeutic drug monitoring recently. She has no fevers or chills. She is trying Canasa suppositories  (not currently using them) for perianal pain but she doesn't feel like this helps. Warm baths have improved her perineal pain. She is not on Prednisone, last dose was a few weeks ago - 9 day taper.     Past surgeries:  5/25/2020 - Laparoscopic, converted to open cholecystectomy  1/15/2019 - Ileostomy closure and ileorectal anastomosis, extensive lysis of adhesions, ureterolysis  12/22/2016 - Exploratory laparotomy with lysis of adhesions, 1 hr - ERIK, BSO at that time  3/17/2014 - TAC with EI  Prior ileocolic resections ~2007, 2010     Flexible sigmoidoscopy - 8/2022 - proctitis, stricture appreciated - not dilated     3/26/2023 - CT AP reviewed - appears to have a relative short-segment stricture at her ileorectal anastomosis    Flexible sigmoidoscopy - 1/2024  The perianal exam findings include a perianal rash.   Inflammation characterized by congestion (edema) and friability was found as patches surrounded by normal mucosa in the terminal ileum.   The inflammation was mild in severity overall and mainly just 1-2 cm proximal to anastomosis, more proximal to this the mucosa appeared rather normal. Biopsies were taken with a cold forceps for histology. Verification of patient identification for the specimen was done. Estimated blood loss was minimal.   Bleeding ulcerated mucosa was present at the anastomosis. This was not narrowed and could be easily traversed - I did not feel that dilation would improve clinical symptoms. The anastomosis was at 18 cm from AV.   Inflammation characterized by congestion (edema), erythema, friability, linear erosions and scarring was found in a continuous and circumferential pattern in the rectum. The inflammation was moderate in severity. Biopsies were taken with a cold forceps for histology.     Last fecal Calprotectin - 11/6/2023 - 47    March/April 2024 - June 2024 another two tapers of Prednisone. Her last taper of steroids was in July.     Prior UTI - urine is clear - no evidence  of fistula on CT. She is currently on Skyrizi for her Crohn's disease - she is taking it every 8 weeks, she does feel like it is improving.    Need to coordinate carefully because of transportation.    She seems stable but is getting more frequent steroid dosing from her local GI - soft bowel movements, about 4 on a good day and a bad day up to 10. More recently, no nausea or vomiting.    1) Needs repeat flexible sigmoidoscopy under sedation  2) Needs MRE - she has had enumerous lifetime CT evaluations, need to limit radiation (may be able to do locally at an [facility] facility)  3) Needs to be seen by IBD-GI and likely labs to evaluate current medical therapy - discussed care with Dr. Loera, will work to coordinate scope and appointments, eConsult placed    She would like to do all of this in September, will try a 2-3 day visit and will stay at [facility]     This service was not originating from a related E/M service provided within the previous 7 days nor will  to an E/M service or procedure within the next 24 hours or my soonest available appointment.  Prevailing standard of care was able to be met in this audio-only visit.

## 2024-07-26 ENCOUNTER — TELEPHONE (OUTPATIENT)
Dept: SURGERY | Facility: CLINIC | Age: 44
End: 2024-07-26
Payer: MEDICARE

## 2024-07-26 ENCOUNTER — E-CONSULT (OUTPATIENT)
Dept: GASTROENTEROLOGY | Facility: CLINIC | Age: 44
End: 2024-07-26
Payer: MEDICARE

## 2024-07-26 DIAGNOSIS — K50.818 CROHN'S DISEASE OF BOTH SMALL AND LARGE INTESTINE WITH OTHER COMPLICATION: Primary | Chronic | ICD-10-CM

## 2024-07-26 NOTE — CONSULTS
[facility] - Gastro/Inflammatory Bowel Disease  Response for E-Consult     Patient Name: Candida Cardona  MRN: [id number]  Primary Care Provider: Matti Conklin MD   Requesting Provider: Robin Atkins MD  E-Consult to Gastroenterology  Consult performed by: Evelina Loera MD  Consult ordered by: Robin Atkins MD          Recommendation:   Crohn's disease with ileorectal anastomosis and anastomotic narrowing with ulceration along with proctitis.  Pt currently on skyrizi.      Plan:  - MRE and scope with Dr Atkins  - will coordinate consultation visit with me day after due to transportation issues    Contingency that warrants a repeat eConsult or referral: NA    Total time of Consultation: 10 minute    I did speak to the requesting provider verbally about this.     *This eConsult is based on the clinical data available to me and is furnished without benefit of a physical examination. The eConsult will need to be interpreted in light of any clinical issues or changes in patient status not available to me at the time of filing this eConsults. Significant changes in patient condition or level of acuity should result in immediate formal consultation and reevaluation. Please alert me if you have further questions.    Thank you for this eConsult referral.     MD Andrei [facility]oya - Gastro/Inflammatory Bowel Disease

## 2024-07-26 NOTE — TELEPHONE ENCOUNTER
RN called pt to go over her appt dates with her for her trip to [facility] in Sept to have a MRI on 9/16 and Flex sig and appt with Dr. Loera on 9/17. RN left pt a vmail to give us a call back.

## 2024-07-29 ENCOUNTER — TELEPHONE (OUTPATIENT)
Dept: ENDOSCOPY | Facility: HOSPITAL | Age: 44
End: 2024-07-29
Payer: MEDICARE

## 2024-07-29 DIAGNOSIS — K50.919 CROHN'S DISEASE WITH COMPLICATION, UNSPECIFIED GASTROINTESTINAL TRACT LOCATION: Primary | ICD-10-CM

## 2024-07-29 NOTE — TELEPHONE ENCOUNTER
Spoke to pt to schedule procedure(s) Flexible Sigmoidoscopy (Flex Sig)       Physician to perform procedure(s) Dr. MARQUES Atkins  Date of Procedure (s) 9/17/24  Arrival Time 10:40 AM  Time of Procedure(s) 11:40 AM   [facility] of Procedure(s) [address]  Type of Rx Prep sent to patient: Enema  Instructions provided to patient via [facility]    Patient was informed on the following information and verbalized understanding. Screening questionnaire reviewed with patient and complete. If procedure requires anesthesia, a responsible adult needs to be present to accompany the patient home, patient cannot drive after receiving anesthesia. Appointment details are tentative, especially check-in time. Patient will receive a prep-op call 7 days prior to confirm check-in time for procedure. If applicable the patient should contact their pharmacy to verify Rx for procedure prep is ready for pick-up. Patient was advised to call the scheduling department at [phone] if pharmacy states no Rx is available. Patient was advised to call the endoscopy scheduling department if any questions or concerns arise.      [facility] Endoscopy Scheduling Department

## 2024-07-29 NOTE — TELEPHONE ENCOUNTER
"----- Message from Pepe Bingham RN sent at 7/26/2024 11:42 AM CDT -----  Regarding: Please schedule for flex sig  Procedure: Flex Sig    Diagnosis: Crohn's disease    Procedure Timing: Tuesday Sept 17 last morning case with Dr. Atkins please, (Dr. Loera would also like to be present)    #If within 4 weeks selected, please vargas as high priority#    #If greater than 12 weeks, please select "5-12 weeks" and delay sending until 3 months prior to requested date#     [facility]: [facility] ENDO 4th floor    Additional Scheduling Information: No scheduling concerns    Prep Specifications:Standard prep    Is the patient taking a GLP-1 Agonist:no    Have you attached a patient to this message: yes      Thank you!  "

## 2024-07-30 ENCOUNTER — TELEPHONE (OUTPATIENT)
Dept: SURGERY | Facility: CLINIC | Age: 44
End: 2024-07-30
Payer: MEDICARE

## 2024-08-20 ENCOUNTER — TELEPHONE (OUTPATIENT)
Dept: SURGERY | Facility: CLINIC | Age: 44
End: 2024-08-20
Payer: MEDICARE

## 2024-08-20 NOTE — TELEPHONE ENCOUNTER
RN confirmed pt's prep and all of her appts for Sept visit 16/17. Pt verbalized understanding to all.

## 2024-08-20 NOTE — TELEPHONE ENCOUNTER
RN called and spoke with pt to let her know that her MRE had to be completed here in [place] and cannot be performed in [place]. Pt is able to get Medicaid transport for her appts and procedure on 9/17 but she is wondering if she must schedule transport twice and have the MRE completed beforehand.  RN will ask Dr. Atkins and get back with pt.  Pt verbalized understanding to all.

## 2024-08-21 ENCOUNTER — TELEPHONE (OUTPATIENT)
Dept: SURGERY | Facility: CLINIC | Age: 44
End: 2024-08-21
Payer: MEDICARE

## 2024-08-21 NOTE — TELEPHONE ENCOUNTER
RN confirmed with pt that the MRE needs to be performed here in [place] as discussed previously.  Pt will set up two transport trips once she is able to with her new insurance plan after Sept 1.  All questions and concerns addressed.  Pt verbalized understanding to all.

## 2024-09-12 ENCOUNTER — PATIENT MESSAGE (OUTPATIENT)
Dept: ENDOSCOPY | Facility: HOSPITAL | Age: 44
End: 2024-09-12
Payer: MEDICARE

## 2024-09-12 ENCOUNTER — TELEPHONE (OUTPATIENT)
Dept: ENDOSCOPY | Facility: HOSPITAL | Age: 44
End: 2024-09-12
Payer: MEDICARE

## 2024-09-12 NOTE — TELEPHONE ENCOUNTER
Spoke to pt for pre-call to confirm scheduled Flexible Sigmoidoscopy (Flex Sig) and patient verbalized understanding of the following:       Date of Procedure (s)  verified 9/17/24  Arrival Time 10:25 AM verified.  [facility] of Procedure(s) [place] 4th Floor verified.  NPO status reinforced. Ok to continue clear liquids up until 4 hours prior to the Endoscopy procedure.     patient denies taking blood thinning or glp1 medications    Pt confirmed receipt of prep instructions and Rx prep (if applicable).  Instructions provided to patient via [facility]  Pt confirmed ride home after procedure if procedure requires anesthesia.   Pre-call screening questionnaire reviewed and completed with patient.   Appointment details are tentative, including check-in time.  If the patient begins taking any blood thinning medications, injectable weight loss/diabetes medications (other than insulin), or Adipex (phentermine) patient was instructed to contact the endoscopy scheduling department as soon as possible.  Patient was advised to call the endoscopy scheduling department if any questions or concerns arise.       [facility] Endoscopy Scheduling Department

## 2024-09-16 ENCOUNTER — HOSPITAL ENCOUNTER (OUTPATIENT)
Dept: RADIOLOGY | Facility: HOSPITAL | Age: 44
Discharge: HOME | End: 2024-09-16
Attending: STUDENT IN AN ORGANIZED HEALTH CARE EDUCATION/TRAINING PROGRAM
Payer: MEDICARE

## 2024-09-16 DIAGNOSIS — K50.012 CROHN'S DISEASE OF SMALL INTESTINE WITH INTESTINAL OBSTRUCTION: ICD-10-CM

## 2024-09-16 PROCEDURE — A9585 GADOBUTROL INJECTION: HCPCS | Performed by: STUDENT IN AN ORGANIZED HEALTH CARE EDUCATION/TRAINING PROGRAM

## 2024-09-16 PROCEDURE — 74183 MRI ABD W/O CNTR FLWD CNTR: CPT | Mod: 26,,, | Performed by: RADIOLOGY

## 2024-09-16 PROCEDURE — 72197 MRI PELVIS W/O & W/DYE: CPT | Mod: 26,,, | Performed by: RADIOLOGY

## 2024-09-16 PROCEDURE — 72197 MRI PELVIS W/O & W/DYE: CPT | Mod: TC

## 2024-09-16 PROCEDURE — 25500020 PHARM REV CODE 255: Performed by: STUDENT IN AN ORGANIZED HEALTH CARE EDUCATION/TRAINING PROGRAM

## 2024-09-16 RX ORDER — GADOBUTROL 604.72 MG/ML
10 INJECTION INTRAVENOUS
Status: COMPLETED | OUTPATIENT
Start: 2024-09-16 | End: 2024-09-16

## 2024-09-16 RX ADMIN — GADOBUTROL 10 ML: 604.72 INJECTION INTRAVENOUS at 05:09

## 2024-09-17 ENCOUNTER — HOSPITAL ENCOUNTER (OUTPATIENT)
Facility: HOSPITAL | Age: 44
Discharge: HOME | End: 2024-09-17
Attending: STUDENT IN AN ORGANIZED HEALTH CARE EDUCATION/TRAINING PROGRAM | Admitting: STUDENT IN AN ORGANIZED HEALTH CARE EDUCATION/TRAINING PROGRAM
Payer: MEDICARE

## 2024-09-17 ENCOUNTER — ANESTHESIA EVENT (OUTPATIENT)
Dept: ENDOSCOPY | Facility: HOSPITAL | Age: 44
End: 2024-09-17
Payer: MEDICARE

## 2024-09-17 ENCOUNTER — OFFICE VISIT (OUTPATIENT)
Dept: GASTROENTEROLOGY | Facility: CLINIC | Age: 44
End: 2024-09-17
Payer: MEDICARE

## 2024-09-17 ENCOUNTER — ANESTHESIA (OUTPATIENT)
Dept: ENDOSCOPY | Facility: HOSPITAL | Age: 44
End: 2024-09-17
Payer: MEDICARE

## 2024-09-17 VITALS
OXYGEN SATURATION: 100 % | TEMPERATURE: 99 F | HEIGHT: 67 IN | WEIGHT: 154 LBS | HEART RATE: 75 BPM | DIASTOLIC BLOOD PRESSURE: 69 MMHG | BODY MASS INDEX: 24.17 KG/M2 | SYSTOLIC BLOOD PRESSURE: 115 MMHG | RESPIRATION RATE: 16 BRPM

## 2024-09-17 VITALS
HEIGHT: 67 IN | BODY MASS INDEX: 24.29 KG/M2 | OXYGEN SATURATION: 95 % | TEMPERATURE: 98 F | WEIGHT: 154.75 LBS | SYSTOLIC BLOOD PRESSURE: 113 MMHG | HEART RATE: 71 BPM | DIASTOLIC BLOOD PRESSURE: 85 MMHG

## 2024-09-17 DIAGNOSIS — K50.818 CROHN'S DISEASE OF BOTH SMALL AND LARGE INTESTINE WITH OTHER COMPLICATION: Primary | Chronic | ICD-10-CM

## 2024-09-17 DIAGNOSIS — Z79.899 IMMUNODEFICIENCY DUE TO LONG TERM IMMUNOSUPPRESSIVE DRUG THERAPY: ICD-10-CM

## 2024-09-17 DIAGNOSIS — T45.1X5A IMMUNODEFICIENCY DUE TO LONG TERM IMMUNOSUPPRESSIVE DRUG THERAPY: ICD-10-CM

## 2024-09-17 DIAGNOSIS — K21.9 GASTROESOPHAGEAL REFLUX DISEASE WITHOUT ESOPHAGITIS: ICD-10-CM

## 2024-09-17 DIAGNOSIS — D84.821 IMMUNODEFICIENCY DUE TO LONG TERM IMMUNOSUPPRESSIVE DRUG THERAPY: ICD-10-CM

## 2024-09-17 DIAGNOSIS — Z12.11 ENCOUNTER FOR SCREENING COLONOSCOPY: ICD-10-CM

## 2024-09-17 PROBLEM — F39 MOOD DISORDER: Status: RESOLVED | Noted: 2019-05-03 | Resolved: 2024-09-17

## 2024-09-17 PROBLEM — R19.7 NAUSEA VOMITING AND DIARRHEA: Status: RESOLVED | Noted: 2022-07-22 | Resolved: 2024-09-17

## 2024-09-17 PROBLEM — J06.9 UPPER RESPIRATORY TRACT INFECTION: Status: RESOLVED | Noted: 2018-01-18 | Resolved: 2024-09-17

## 2024-09-17 PROBLEM — R80.9 PROTEINURIA: Chronic | Status: RESOLVED | Noted: 2017-02-12 | Resolved: 2024-09-17

## 2024-09-17 PROBLEM — R10.9 ABDOMINAL PAIN: Status: RESOLVED | Noted: 2017-06-05 | Resolved: 2024-09-17

## 2024-09-17 PROBLEM — N39.0 COMPLICATED UTI (URINARY TRACT INFECTION): Status: RESOLVED | Noted: 2019-08-21 | Resolved: 2024-09-17

## 2024-09-17 PROBLEM — K62.89 PROCTITIS: Status: RESOLVED | Noted: 2022-07-22 | Resolved: 2024-09-17

## 2024-09-17 PROBLEM — D64.89 OTHER SPECIFIED ANEMIAS: Status: RESOLVED | Noted: 2017-06-06 | Resolved: 2024-09-17

## 2024-09-17 PROBLEM — R63.4 WEIGHT LOSS, UNINTENTIONAL: Status: RESOLVED | Noted: 2019-04-17 | Resolved: 2024-09-17

## 2024-09-17 PROBLEM — E83.51 HYPOCALCEMIA: Status: RESOLVED | Noted: 2019-04-22 | Resolved: 2024-09-17

## 2024-09-17 PROBLEM — N73.9 PELVIC ABSCESS IN FEMALE: Status: RESOLVED | Noted: 2017-01-05 | Resolved: 2024-09-17

## 2024-09-17 PROBLEM — N32.89 BLADDER SPASMS: Status: RESOLVED | Noted: 2019-11-22 | Resolved: 2024-09-17

## 2024-09-17 PROBLEM — K63.89 NARCOTIC BOWEL SYNDROME: Status: RESOLVED | Noted: 2017-06-06 | Resolved: 2024-09-17

## 2024-09-17 PROBLEM — Z92.241 HISTORY OF RECENT STEROID USE: Status: RESOLVED | Noted: 2017-05-17 | Resolved: 2024-09-17

## 2024-09-17 PROBLEM — R79.89 ELEVATED LFTS: Status: RESOLVED | Noted: 2018-10-25 | Resolved: 2024-09-17

## 2024-09-17 PROBLEM — B37.2 CANDIDAL INTERTRIGO: Status: RESOLVED | Noted: 2018-07-02 | Resolved: 2024-09-17

## 2024-09-17 PROBLEM — R05.9 COUGH: Status: RESOLVED | Noted: 2018-01-18 | Resolved: 2024-09-17

## 2024-09-17 PROBLEM — I95.9 HYPOTENSION: Status: RESOLVED | Noted: 2017-02-12 | Resolved: 2024-09-17

## 2024-09-17 PROBLEM — E87.20 METABOLIC ACIDOSIS: Status: RESOLVED | Noted: 2018-11-06 | Resolved: 2024-09-17

## 2024-09-17 PROBLEM — R11.2 NAUSEA VOMITING AND DIARRHEA: Status: RESOLVED | Noted: 2022-07-22 | Resolved: 2024-09-17

## 2024-09-17 PROBLEM — R19.7 DIARRHEA: Chronic | Status: RESOLVED | Noted: 2017-02-11 | Resolved: 2024-09-17

## 2024-09-17 PROBLEM — E88.09 HYPOALBUMINEMIA: Status: RESOLVED | Noted: 2019-04-22 | Resolved: 2024-09-17

## 2024-09-17 PROBLEM — M79.89 LEFT UPPER EXTREMITY SWELLING: Status: RESOLVED | Noted: 2017-06-06 | Resolved: 2024-09-17

## 2024-09-17 PROBLEM — K56.0 ADYNAMIC ILEUS: Status: RESOLVED | Noted: 2022-07-22 | Resolved: 2024-09-17

## 2024-09-17 PROBLEM — Z79.60 IMMUNODEFICIENCY DUE TO LONG TERM IMMUNOSUPPRESSIVE DRUG THERAPY: Status: ACTIVE | Noted: 2024-09-17

## 2024-09-17 PROBLEM — E86.0 DEHYDRATION, MODERATE: Status: RESOLVED | Noted: 2017-06-06 | Resolved: 2024-09-17

## 2024-09-17 PROBLEM — E86.0 DEHYDRATION: Status: RESOLVED | Noted: 2017-06-21 | Resolved: 2024-09-17

## 2024-09-17 PROBLEM — R53.81 PHYSICAL DECONDITIONING: Status: RESOLVED | Noted: 2018-11-07 | Resolved: 2024-09-17

## 2024-09-17 PROBLEM — T40.601A NARCOTIC BOWEL SYNDROME: Status: RESOLVED | Noted: 2017-06-06 | Resolved: 2024-09-17

## 2024-09-17 PROCEDURE — 1160F RVW MEDS BY RX/DR IN RCRD: CPT | Mod: CPTII,S$GLB,, | Performed by: INTERNAL MEDICINE

## 2024-09-17 PROCEDURE — 3079F DIAST BP 80-89 MM HG: CPT | Mod: CPTII,S$GLB,, | Performed by: INTERNAL MEDICINE

## 2024-09-17 PROCEDURE — 37000009 HC ANESTHESIA EA ADD 15 MINS: Performed by: STUDENT IN AN ORGANIZED HEALTH CARE EDUCATION/TRAINING PROGRAM

## 2024-09-17 PROCEDURE — 3074F SYST BP LT 130 MM HG: CPT | Mod: CPTII,S$GLB,, | Performed by: INTERNAL MEDICINE

## 2024-09-17 PROCEDURE — 1159F MED LIST DOCD IN RCRD: CPT | Mod: CPTII,S$GLB,, | Performed by: INTERNAL MEDICINE

## 2024-09-17 PROCEDURE — 88342 IMHCHEM/IMCYTCHM 1ST ANTB: CPT | Mod: 26,,, | Performed by: PATHOLOGY

## 2024-09-17 PROCEDURE — 37000008 HC ANESTHESIA 1ST 15 MINUTES: Performed by: STUDENT IN AN ORGANIZED HEALTH CARE EDUCATION/TRAINING PROGRAM

## 2024-09-17 PROCEDURE — 88342 IMHCHEM/IMCYTCHM 1ST ANTB: CPT | Performed by: PATHOLOGY

## 2024-09-17 PROCEDURE — 45331 SIGMOIDOSCOPY AND BIOPSY: CPT | Mod: PT | Performed by: STUDENT IN AN ORGANIZED HEALTH CARE EDUCATION/TRAINING PROGRAM

## 2024-09-17 PROCEDURE — 63600175 PHARM REV CODE 636 W HCPCS

## 2024-09-17 PROCEDURE — 88305 TISSUE EXAM BY PATHOLOGIST: CPT | Mod: 59 | Performed by: PATHOLOGY

## 2024-09-17 PROCEDURE — G2211 COMPLEX E/M VISIT ADD ON: HCPCS | Mod: S$GLB,,, | Performed by: INTERNAL MEDICINE

## 2024-09-17 PROCEDURE — 27201012 HC FORCEPS, HOT/COLD, DISP: Performed by: STUDENT IN AN ORGANIZED HEALTH CARE EDUCATION/TRAINING PROGRAM

## 2024-09-17 PROCEDURE — 3008F BODY MASS INDEX DOCD: CPT | Mod: CPTII,S$GLB,, | Performed by: INTERNAL MEDICINE

## 2024-09-17 PROCEDURE — 45331 SIGMOIDOSCOPY AND BIOPSY: CPT | Mod: PT,,, | Performed by: STUDENT IN AN ORGANIZED HEALTH CARE EDUCATION/TRAINING PROGRAM

## 2024-09-17 PROCEDURE — 99205 OFFICE O/P NEW HI 60 MIN: CPT | Mod: S$GLB,,, | Performed by: INTERNAL MEDICINE

## 2024-09-17 PROCEDURE — 88305 TISSUE EXAM BY PATHOLOGIST: CPT | Mod: 26,ICN,, | Performed by: PATHOLOGY

## 2024-09-17 PROCEDURE — 25000003 PHARM REV CODE 250

## 2024-09-17 RX ORDER — PANTOPRAZOLE SODIUM 40 MG/1
40 TABLET, DELAYED RELEASE ORAL DAILY
Qty: 90 TABLET | Refills: 3 | Status: SHIPPED | OUTPATIENT
Start: 2024-09-17 | End: 2025-09-17

## 2024-09-17 RX ORDER — PROPOFOL 10 MG/ML
VIAL (ML) INTRAVENOUS CONTINUOUS PRN
Status: DISCONTINUED | OUTPATIENT
Start: 2024-09-17 | End: 2024-09-17

## 2024-09-17 RX ORDER — LIDOCAINE HYDROCHLORIDE 20 MG/ML
INJECTION INTRAVENOUS
Status: DISCONTINUED | OUTPATIENT
Start: 2024-09-17 | End: 2024-09-17

## 2024-09-17 RX ADMIN — PROPOFOL 100 MG: 10 INJECTION, EMULSION INTRAVENOUS at 11:09

## 2024-09-17 RX ADMIN — LIDOCAINE HYDROCHLORIDE 100 MG: 20 INJECTION INTRAVENOUS at 11:09

## 2024-09-17 RX ADMIN — SODIUM CHLORIDE: 0.9 INJECTION, SOLUTION INTRAVENOUS at 11:09

## 2024-09-17 NOTE — TRANSFER OF CARE
"Anesthesia Transfer of Care Note    Patient: Candida Cardona    Procedure(s) Performed: Procedure(s) (LRB):  SIGMOIDOSCOPY, FLEXIBLE (N/A)    Patient location: GI    Anesthesia Type: general    Transport from OR: Transported from OR on room air with adequate spontaneous ventilation    Post pain: adequate analgesia    Post assessment: no apparent anesthetic complications and tolerated procedure well    Post vital signs: stable    Level of consciousness: responds to stimulation and awake    Nausea/Vomiting: no nausea/vomiting    Complications: none    Transfer of care protocol was followed      Last vitals: Visit Vitals  /64 (BP [facility]: Left arm, Patient Position: Lying)   Pulse 87   Temp 37 °C (98.6 °F) (Oral)   Resp 16   Ht 5' 7" (1.702 m)   Wt 69.9 kg (154 lb)   LMP  (LMP Unknown)   SpO2 98%   Breastfeeding No   BMI 24.12 kg/m²     "

## 2024-09-17 NOTE — CARE UPDATE
Ms. Cardona was evaluated in the post-recovery unit and is stable and safe for discharge to home with medical transport.      Robin Atkins MD, FACS, FASCRS  Department of Colon & Rectal Surgery  [facility]
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
## 2024-09-17 NOTE — H&P
[facility]  Colon and Rectal Surgery    [address]  [place], [place]  Tel: [phone]  Fax: [phone]  [email or web address]   MD Julio Cesar White MD Brian Kann, MD W. Forrest Johnston, MD Matthew Giglia, MD Jennifer Paruch, MD William Kethman, MD Danielle Kay, MD     Patient name: Candida Cardona   YOB: 1980   MRN: [id number]  Date of procedure: 09/17/2024    Procedure: Pouchoscopy, possible dilation  Indications: Crohn's disease, denies fecaluria or pneumaturia - recently her symptoms have been relatively well-controlled, Skyrizi and Protonix    The patient was informed of the availability of a certified  without charge. A certified  was not necessary for this visit.    Sedation plan: MAC  ASA: ASA 2 - Patient with mild systemic disease with no functional limitations    Review of Systems  See above    Past Medical History:   Diagnosis Date    Anemia     Anxiety     Bilious vomiting with nausea 1/17/2017    Bipolar 1 disorder     Constipation     Coronary artery disease     NO STENT    Crohn's disease     Depression     Encounter for blood transfusion     Epilepsy     Fibromyalgia     Gall stones     GERD (gastroesophageal reflux disease)     Hyponatremia     Interstitial cystitis     Kidney stones     Mass, ovarian 11/22/2016    Metabolic acidosis     Osteoporosis     PAF (paroxysmal atrial fibrillation)     Pancreatitis     S/P ERIK-BSO (total abdominal hysterectomy and bilateral salpingo-oophorectomy) 12/19/2016    Seizures     UTI (urinary tract infection)      Past Surgical History:   Procedure Laterality Date    ABDOMINAL SURGERY      ANASTOMOSIS OF ILEUM TO RECTUM  1/15/2019    Procedure: ANASTOMOSIS, ILEORECTAL;  Surgeon: Reji Peterson MD;  [facility]: [facility] OR [address] [place] [address];  Service: Colon and Rectal;;    ANGIOPLASTY      CHOLECYSTECTOMY N/A 5/25/2020    Procedure: CHOLECYSTECTOMY;  Surgeon: Ozzie LYNNE  MD Myron;  [facility];  Service: General;  Laterality: N/A;  COVID NEG      COLON SURGERY      x 2    COLONOSCOPY      multiple    COLONOSCOPY N/A 4/11/2017    Procedure: COLONOSCOPY;  Surgeon: Hussain Amaro MD;  [facility]: [facility];  Service: Endoscopy;  Laterality: N/A;    CYSTOSCOPY W/ URETERAL STENT PLACEMENT      CYSTOSCOPY W/ URETERAL STENT REMOVAL      CYSTOSCOPY W/ URETERAL STENT REMOVAL Right 9/5/2019    Procedure: CYSTOSCOPY, WITH URETERAL STENT REMOVAL;  Surgeon: Dano Mathis MD;  [facility];  Service: Urology;  Laterality: Right;    CYSTOSCOPY WITH URETEROSCOPY, RETROGRADE PYELOGRAPHY, AND INSERTION OF STENT Right 8/22/2019    Procedure: CYSTOSCOPY, WITH RETROGRADE PYELOGRAM AND URETERAL STENT INSERTION;  Surgeon: Dano Mathis MD;  [facility]: [facility] OR;  Service: Urology;  Laterality: Right;    EXTRACORPOREAL SHOCK WAVE LITHOTRIPSY Right 8/22/2019    Procedure: LITHOTRIPSY-EXTRACORPOREAL SHOCK WAVE;  Surgeon: Dano Mathis MD;  [facility]: [facility] OR;  Service: Urology;  Laterality: Right;    EXTRACORPOREAL SHOCK WAVE LITHOTRIPSY Right 9/5/2019    Procedure: LITHOTRIPSY-EXTRACORPOREAL SHOCK WAVE;  Surgeon: Dano Mathis MD;  [facility];  Service: Urology;  Laterality: Right;    FLEXIBLE SIGMOIDOSCOPY N/A 1/23/2024    Procedure: SIGMOIDOSCOPY, FLEXIBLE;  Surgeon: Robin Atkins MD;  [facility]: [facility] ([address] [place] [address]);  Service: Endoscopy;  Laterality: N/A;  Ref By:  Dr. Madera  Prep Specifications: 2 days of clear and 2 enema before procedure  1/1-precall complete-pt approved to stay at [facility] night before and following the procedure without a ride per Dr. Atkins-see encounter dated 1/18-MS    HYSTERECTOMY      ILEOSTOMY      LAPAROTOMY, EXPLORATORY  1/15/2019    Procedure: LAPAROTOMY, EXPLORATORY;extensive lysis of adhesions;  Surgeon: Reji Peterson MD;  [facility]: [facility] 2ND FLR;  Service: Colon and Rectal;;    LYSIS OF ADHESIONS OF URETER  1/15/2019     Procedure: URETEROLYSIS;  Surgeon: Reji Peterson MD;  [facility]: [facility] OR 2ND FLR;  Service: Colon and Rectal;;    PERIPHERALLY INSERTED CENTRAL CATHETER INSERTION N/A 10/30/2018    Procedure: INSERTION, PICC;  Surgeon: Liz Diagnostic Provider;  [facility]: [facility] OR;  Service: General;  Laterality: N/A;    PERIPHERALLY INSERTED CENTRAL CATHETER INSERTION N/A 8/23/2019    Procedure: INSERTION, PICC;  Surgeon: Liz Diagnostic Provider;  [facility]: [facility] OR;  Service: General;  Laterality: N/A;    PORTACATH PLACEMENT      TOTAL COLECTOMY  2014     Family History   Problem Relation Name Age of Onset    Hypertension Mother      Joint hypermobility Mother      Physical abuse Mother      Cancer Father          brain    Diabetes Father      Schizophrenia Father      Stroke Father      No Known Problems Brother 1     No Known Problems Brother      Stroke Paternal Grandmother      Ovarian cancer Neg Hx      Uterine cancer Neg Hx      Breast cancer Neg Hx      Colon cancer Neg Hx      Anesthesia problems Neg Hx       Social History     Tobacco Use    Smoking status: Never    Smokeless tobacco: Never   Substance Use Topics    Alcohol use: No    Drug use: No     Review of patient's allergies indicates:   Allergen Reactions    Ciprofloxacin Shortness Of Breath    Keppra [levetiracetam] Other (See Comments)     Increased depression    Mercaptopurine analogues (thiopurines) Hives, Diarrhea and Nausea And Vomiting    Imuran [azathioprine sodium] Diarrhea and Nausea And Vomiting     Pt states that she becomes hot sweaty and passes out also. She states that she has diarrhea and nausea and vomiting     [place]     Imuran [azathioprine] Nausea And Vomiting    Toradol [ketorolac] Itching    Adhesive Itching    Iodinated contrast media Hives       Prior to Admission medications    Medication Sig Start Date End Date Taking? Authorizing Provider   budesonide (ENTOCORT EC) 3 mg capsule Take 9 mg by mouth once daily. 5/3/22   Provider,  Historical   clonazePAM (KLONOPIN) 1 MG tablet Take 1 mg by mouth as needed. 3/21/22   Provider, Historical   diphenoxylate-atropine 2.5-0.025 mg (LOMOTIL) 2.5-0.025 mg per tablet Take 1 tablet by mouth 4 (four) times daily as needed for Diarrhea.  Patient not taking: Reported on 10/26/2023 3/13/22   Julio Cesar Carter NP   EScitalopram oxalate (LEXAPRO) 10 MG tablet Take by mouth. 10/15/23   Provider, Historical   estradioL (ESTRACE) 2 MG tablet Take 2 mg by mouth. 6/21/23   Provider, Historical   HYDROcodone-acetaminophen (NORCO) 5-325 mg per tablet Take 1 tablet by mouth every 8 (eight) hours as needed. 9/12/23   Provider, Historical   hydrocortisone 2.5 % cream 2 (two) times daily. 9/21/23   Provider, Historical   liothyronine (CYTOMEL) 5 MCG Tab TAKE 1 TABLET(5 MCG) BY MOUTH BEFORE BREAKFAST  Patient not taking: Reported on 10/26/2023 7/1/19   Ted Gómez MD   mesalamine (CANASA) 1000 MG Supp Place 1,000 mg rectally every evening. 10/9/23   Provider, Historical   ondansetron (ZOFRAN-ODT) 4 MG TbDL Take 1 tablet (4 mg total) by mouth every 6 (six) hours as needed (Nausea vomiting).  Patient not taking: Reported on 9/17/2024 7/22/22   Shalom Jean NP   oxyCODONE-acetaminophen (PERCOCET) 5-325 mg per tablet Take 1 tablet by mouth every 8 (eight) hours as needed for Pain.  Patient not taking: Reported on 10/26/2023 12/3/22   Bethany Zhang NP   pantoprazole (PROTONIX) 40 MG tablet Take 1 tablet (40 mg total) by mouth once daily. 9/17/24 9/17/25  Evelina Loera MD   predniSONE (DELTASONE) 10 MG tablet Take by mouth. 9/19/23   Provider, Historical   risankizumab-rzaa (SKYRIZI SUBQ) Inject into the skin.    Provider, Historical   sertraline (ZOLOFT) 50 MG tablet Take 50 mg by mouth once daily. 5/9/22   Provider, Historical   STELARA 90 mg/mL Syrg syringe every 8 weeks.  10/3/19   Provider, Historical   pantoprazole (PROTONIX) 40 MG tablet Take 1 tablet (40 mg total) by mouth 2 (two) times daily.  Patient  taking differently: Take 80 mg by mouth once daily. 5/22/19 9/17/24  Ted Gómez MD       Physical Examination  LMP  (LMP Unknown) Comment: complete hysterectomy  Breastfeeding No      Constitutional: well developed, no cough, no dyspnea, alert, and no acute distress    Head: Normocephalic, no lesions, without obvious abnormality  Eye: Normal external eye, conjunctiva, and lids, PERRL  Cardiovascular: regular rate and regular rhythm  Respiratory: normal air entry  Gastrointestinal: soft, non-tender, without masses or organomegaly  Neurologic: alert, oriented, normal speech, no focal findings or movement disorder noted  Psychiatric: appropriate, normal mood    Plan of Care    It was a pleasure meeting Ms. Cardona today - we will plan to perform a colonoscopy with monitored anesthesia care. The details of the procedure, the possible need for biopsy or polypectomy and the potential risks including bleeding, perforation, missed polyps were discussed in detail and they consented to undergo the procedure.      Robin Atkins MD, FACS, FASCRS  Department of Colon & Rectal Surgery  [facility]

## 2024-09-17 NOTE — ANESTHESIA POSTPROCEDURE EVALUATION
Anesthesia Post Evaluation    Patient: Candida Cardona    Procedure(s) Performed: Procedure(s) (LRB):  SIGMOIDOSCOPY, FLEXIBLE (N/A)    Final Anesthesia Type: general      Patient location during evaluation: GI PACU  Patient participation: Yes- Able to Participate  Level of consciousness: awake and alert and oriented  Post-procedure vital signs: reviewed and stable  Pain management: adequate  Airway patency: patent  RIGO mitigation strategies: Multimodal analgesia and Intraoperative administration of CPAP, nasopharyngeal airway, or oral appliance during sedation  PONV status at discharge: No PONV  Anesthetic complications: no      Cardiovascular status: blood pressure returned to baseline and hemodynamically stable  Respiratory status: unassisted, spontaneous ventilation and room air  Hydration status: euvolemic  Follow-up not needed.              Vitals Value Taken Time   /69 09/17/24 1217   Temp 37 °C (98.6 °F) 09/17/24 1154   Pulse 75 09/17/24 1217   Resp 16 09/17/24 1217   SpO2 100 % 09/17/24 1217         No case tracking events are documented in the log.      Pain/Enrrique Score: Enrrique Score: 10 (9/17/2024 12:06 PM)

## 2024-09-17 NOTE — ANESTHESIA PREPROCEDURE EVALUATION
09/17/2024  Candida Cardona is a 43 y.o., female.      Pre-op Assessment    I have reviewed the Patient Summary Reports.          Review of Systems  Anesthesia Hx:  No problems with previous Anesthesia                Social:  Non-Smoker       Hematology/Oncology:  Hematology Normal   Oncology Normal                                   EENT/Dental:  EENT/Dental Normal           Cardiovascular:        CAD    Dysrhythmias atrial fibrillation                                   Pulmonary:  Pulmonary Normal                       Renal/:  Renal/ Normal                 Hepatic/GI:     GERD             Musculoskeletal:  Musculoskeletal Normal                Neurological:    Neuromuscular Disease, (Fibromyalgia)   Seizures                                Endocrine:  Endocrine Normal            Dermatological:  Skin Normal    Psych:   anxiety depression            Physical Exam  General: Alert and Oriented    Airway:  Mallampati: II / II  Mouth Opening: Normal  TM Distance: Normal  Tongue: Normal  Neck ROM: Normal ROM    Dental:  Periodontal disease    Chest/Lungs:  Clear to auscultation, Normal Respiratory Rate    Heart:  Rate: Normal  Rhythm: Regular Rhythm  Sounds: Normal    Anesthesia Plan  Type of Anesthesia, risks & benefits discussed:    Anesthesia Type: Gen Natural Airway  Intra-op Monitoring Plan: Standard ASA Monitors  Post Op Pain Control Plan: multimodal analgesia  Airway Plan: Direct  Informed Consent: Informed consent signed with the Patient and all parties understand the risks and agree with anesthesia plan.  All questions answered.   ASA Score: 3    Ready For Surgery From Anesthesia Perspective.   .

## 2024-09-17 NOTE — PROVATION PATIENT INSTRUCTIONS
Discharge Summary/Instructions after an Endoscopic Procedure  Patient Name: Candida Cardona  Patient MRN: [id number]  Patient YOB: 1980 Tuesday, September 17, 2024  Robin Atkins MD  Dear patient,  As a result of recent federal legislation (The Federal Cures Act), you may   receive lab or pathology results from your procedure in your [facility]   account before your physician is able to contact you. Your physician or   their representative will relay the results to you with their   recommendations at their soonest availability.  Thank you,  RESTRICTIONS:  During your procedure today, you received medications for sedation.  These   medications may affect your judgment, balance and coordination.  Therefore,   for 24 hours, you have the following restrictions:   - DO NOT drive a car, operate machinery, make legal/financial decisions,   sign important papers or drink alcohol.    ACTIVITY:  Today: no heavy lifting, straining or running due to procedural   sedation/anesthesia.  The following day: return to full activity including work.  DIET:  Eat and drink normally unless instructed otherwise.     TREATMENT FOR COMMON SIDE EFFECTS:  - Mild abdominal pain, nausea, belching, bloating or excessive gas:  rest,   eat lightly and use a heating pad.  - Sore Throat: treat with throat lozenges and/or gargle with warm salt   water.  - Because air was used during the procedure, expelling large amounts of air   from your rectum or belching is normal.  - If a bowel prep was taken, you may not have a bowel movement for 1-3 days.    This is normal.  SYMPTOMS TO WATCH FOR AND REPORT TO YOUR PHYSICIAN:  1. Abdominal pain or bloating, other than gas cramps.  2. Chest pain.  3. Back pain.  4. Signs of infection such as: chills or fever occurring within 24 hours   after the procedure.  5. Rectal bleeding, which would show as bright red, maroon, or black stools.   (A tablespoon of blood from the rectum is not serious,  especially if   hemorrhoids are present.)  6. Vomiting.  7. Weakness or dizziness.  GO DIRECTLY TO THE NEAREST EMERGENCY ROOM IF YOU HAVE ANY OF THE FOLLOWING:      Difficulty breathing              Chills and/or fever over 101 F   Persistent vomiting and/or vomiting blood   Severe abdominal pain   Severe chest pain   Black, tarry stools   Bleeding- more than one tablespoon   Any other symptom or condition that you feel may need urgent attention  Your doctor recommends these additional instructions:  If any biopsies were taken, your doctors clinic will contact you in 1 to 2   weeks with any results.  - Discharge patient to home (ambulatory).   - Resume previous diet.   - Await pathology results.   - Repeat flexible sigmoidoscopy in 6 months for surveillance.  For questions, problems or results please call your physician - Robin Atkins MD at Work:  [phone].  [facility] EMERGENCY ROOM PHONE NUMBER: [phone]  IF A COMPLICATION OR EMERGENCY SITUATION ARISES AND YOU ARE UNABLE TO REACH   YOUR PHYSICIAN - GO DIRECTLY TO THE EMERGENCY ROOM.  MD Robin Long MD  9/17/2024 11:54:19 AM  This report has been verified and signed electronically.  Dear patient,  As a result of recent federal legislation (The Federal Cures Act), you may   receive lab or pathology results from your procedure in your [facility]   account before your physician is able to contact you. Your physician or   their representative will relay the results to you with their   recommendations at their soonest availability.  Thank you,  PROVATION

## 2024-09-17 NOTE — Clinical Note
Please schedule pt for labs- may have forgotten after colonoscopy- she can try to do at [facility].

## 2024-09-18 ENCOUNTER — TELEPHONE (OUTPATIENT)
Dept: GASTROENTEROLOGY | Facility: CLINIC | Age: 44
End: 2024-09-18
Payer: MEDICARE

## 2024-09-18 NOTE — TELEPHONE ENCOUNTER
Spoke with pt to r/s labs.   - r/s to 9/25      ----- Message from Katharina Gastelum RN sent at 9/17/2024  4:49 PM CDT -----  Pls call pt and schedule labs. She didn't go to the lab after her scope today. Thx  ----- Message -----  From: Evelina Loera MD  Sent: 9/17/2024   4:41 PM CDT  To: [facility] Staff    Please schedule pt for labs- may have forgotten after colonoscopy- she can try to do at [facility].

## 2024-09-19 LAB
FINAL PATHOLOGIC DIAGNOSIS: NORMAL
GROSS: NORMAL
Lab: NORMAL

## 2024-09-23 LAB
FINAL PATHOLOGIC DIAGNOSIS: NORMAL
GROSS: NORMAL
Lab: NORMAL
SUPPLEMENTAL DIAGNOSIS: NORMAL

## 2024-09-26 ENCOUNTER — TELEPHONE (OUTPATIENT)
Dept: GASTROENTEROLOGY | Facility: CLINIC | Age: 44
End: 2024-09-26
Payer: MEDICARE

## 2024-09-26 NOTE — TELEPHONE ENCOUNTER
Spoke with pt and relayed message from Dr. Loera.       ----- Message from Sol Morse RN sent at 9/26/2024  8:36 AM CDT -----    ----- Message -----  From: Evelina Loera MD  Sent: 9/25/2024   3:55 PM CDT  To: [facility] Staff    Let pt know B12 is really low and if she is not taking B12 she will need to start over the counter B12 sublingual or drops 1000 mcg daily    SS

## 2024-10-25 ENCOUNTER — OFFICE VISIT (OUTPATIENT)
Dept: GASTROENTEROLOGY | Facility: CLINIC | Age: 44
End: 2024-10-25
Payer: MEDICARE

## 2024-10-25 ENCOUNTER — LAB VISIT (OUTPATIENT)
Dept: LAB | Facility: HOSPITAL | Age: 44
End: 2024-10-25
Attending: INTERNAL MEDICINE
Payer: MEDICARE

## 2024-10-25 VITALS
TEMPERATURE: 98 F | BODY MASS INDEX: 24.26 KG/M2 | HEIGHT: 67 IN | HEART RATE: 75 BPM | OXYGEN SATURATION: 91 % | SYSTOLIC BLOOD PRESSURE: 122 MMHG | DIASTOLIC BLOOD PRESSURE: 86 MMHG | WEIGHT: 154.56 LBS

## 2024-10-25 DIAGNOSIS — K50.812 CROHN'S DISEASE OF BOTH SMALL AND LARGE INTESTINE WITH INTESTINAL OBSTRUCTION: Chronic | ICD-10-CM

## 2024-10-25 DIAGNOSIS — M81.0 OSTEOPOROSIS, UNSPECIFIED OSTEOPOROSIS TYPE, UNSPECIFIED PATHOLOGICAL FRACTURE PRESENCE: ICD-10-CM

## 2024-10-25 DIAGNOSIS — R19.7 DIARRHEA, UNSPECIFIED TYPE: Primary | ICD-10-CM

## 2024-10-25 PROCEDURE — 86480 TB TEST CELL IMMUN MEASURE: CPT | Performed by: INTERNAL MEDICINE

## 2024-10-25 RX ORDER — ONDANSETRON 4 MG/1
4 TABLET, FILM COATED ORAL 2 TIMES DAILY PRN
COMMUNITY
Start: 2024-07-09

## 2024-10-25 RX ORDER — RISANKIZUMAB-RZAA 360 MG/2.4
360 WEARABLE INJECTOR SUBCUTANEOUS
Qty: 2.4 ML | Refills: 2 | Status: SHIPPED | OUTPATIENT
Start: 2024-10-25

## 2024-10-25 RX ORDER — CHOLESTYRAMINE 4 G/9G
4 POWDER, FOR SUSPENSION ORAL DAILY
Qty: 90 PACKET | Refills: 3 | Status: SHIPPED | OUTPATIENT
Start: 2024-10-25 | End: 2025-10-25

## 2024-10-25 RX ORDER — CHOLESTYRAMINE 4 G/9G
POWDER, FOR SUSPENSION ORAL
COMMUNITY
Start: 2024-09-20 | End: 2024-10-25

## 2024-10-25 RX ORDER — ERGOCALCIFEROL 1.25 MG/1
50000 CAPSULE ORAL
COMMUNITY

## 2024-10-26 LAB
GAMMA INTERFERON BACKGROUND BLD IA-ACNC: 0.19 IU/ML
M TB IFN-G CD4+ BCKGRND COR BLD-ACNC: -0.05 IU/ML
M TB IFN-G CD4+ BCKGRND COR BLD-ACNC: -0.06 IU/ML
MITOGEN IGNF BCKGRD COR BLD-ACNC: 9.59 IU/ML
TB GOLD PLUS: NEGATIVE

## 2024-11-01 ENCOUNTER — TELEPHONE (OUTPATIENT)
Dept: GASTROENTEROLOGY | Facility: CLINIC | Age: 44
End: 2024-11-01
Payer: MEDICARE

## 2024-12-05 ENCOUNTER — TELEPHONE (OUTPATIENT)
Dept: SURGERY | Facility: CLINIC | Age: 44
End: 2024-12-05
Payer: MEDICARE

## 2024-12-05 NOTE — TELEPHONE ENCOUNTER
Returned pt calls regarding a morning appt with Dr. Atkins in [place]. Call back number provided.

## 2024-12-06 ENCOUNTER — TELEPHONE (OUTPATIENT)
Dept: SURGERY | Facility: CLINIC | Age: 44
End: 2024-12-06
Payer: MEDICARE

## 2024-12-06 NOTE — TELEPHONE ENCOUNTER
Called pt following call back request. Pt originally wanted a morning appt in [place]. Informed pt that there are no morning appt in [facility] only afternoon appts and that morning appts are at [facility]. Pt is ok with appt at [facility]. PT requested that all testing is scheduled for another day because she uses medical transport for transportation and was left behind by them while at another appt.

## 2024-12-11 NOTE — PROGRESS NOTES
[facility]  Colon and Rectal Surgery    [address]  [place], [place]  Tel: [phone]  Fax: [phone]  [email or web address]   MD Julio Cesar White MD Brian Kann, MD W. Forrest Johnston, MD Matthew Giglia, MD Jennifer Paruch, MD William Kethman, MD Danielle Kay, MD     Patient name: Candida Cardona   YOB: 1980   MRN: [id number]    It was a pleasure seeing Ms. Cardona in the Colon and Rectal Surgery clinic here at [facility].     As you know, Ms. Cardona is a 44 year old woman with a history of Crohn's disease, GERD, anxiety, CAD, Bipolar disorder, and pAF  who presents for evaluation of ileocolic stricture . She has an extensive and complex surgical history - summarized below. Her Crohn's disease is managed by Dr. Lama - she is on Skyrizi every 8 weeks. Since her last dose of Skyrizi she has been going to the bathroom about 6-8 times per day but the week before this dose 10-12 bowel movements per day. She has been on Skyrizi since December of 2022 - prior to this she was on Stelara (apparently unable to get therapeutic or clinical benefit from levels). In September she had an episode of increased abdominal pain, nausea, vomiting which was eventually relieved with frequent bowel movements. She had significant issues with her ostomy and wants to avoid this at all cost. She has not had any perianal procedure - no abscesses or fistulas. She does not recall having any recent disease activity or therapeutic drug monitoring recently. She has no fevers or chills. She is trying Canasa suppositories (not currently using them) for perianal pain but she doesn't feel like this helps. Warm baths have improved her perineal pain. She is not on Prednisone, last dose was a few weeks ago - 9 day taper.    Past surgeries:  5/25/2020 - Laparoscopic, converted to open cholecystectomy  1/15/2019 - Ileostomy closure and ileorectal anastomosis, extensive  lysis of adhesions, ureterolysis  12/22/2016 - Exploratory laparotomy with lysis of adhesions, 1 hr - ERIK, BSO at that time  3/17/2014 - TAC with EI  Prior ileocolic resections ~2007, 2010    Flexible sigmoidoscopy - 8/2022 - proctitis, stricture appreciated - not dilated    CT AP reviewed - appears to have a relative short-segment stricture at her ileorectal anastomosis    12/12/2024  Last seen in September by me at time of flexible sigmoidoscopy, seen by Dr. Loera on 10/25/2024, on Skyrizi. Transfer request was placed and accepted by Dr. Lang - she was ultimately not transferred due to improvement in symptoms. Of note, we have discussed completion proctectomy and end ileostomy many times, she has previously declined this option. She went to the ED because she was told to go to the ED by Dr. Lama's office. We do not have the results of the CT she had completed. She was treated with IV Dilaudid. She eventually began to have bowel movements in the ED which resolved her nausea and vomiting. Since then she has been eating regular and having normal bowel movements without pain.     9/17/2024 - Flexible sigmoidoscopy  The perianal exam was abnormal. Scarring circumferentially likely related to long-standing Crohn's disease and frequent bowel movements, no evidence of perianal abscesses or fistulae.   The ileum appeared normal. Biopsies were taken with a cold forceps for histology.   There was evidence of a prior end-to-end ileo-rectal anastomosis in the proximal rectum. This was patent and was characterized by congestion, edema, friable mucosa and mild stenosis. The anastomosis was traversed.   A single (solitary) ulcer was found in the distal rectum. No bleeding was present. Stigmata of recent bleeding were present. Biopsies were taken with a cold forceps for histology.   A polypoid lesion was found in the distal rectum - this appeared to be an inflammatory polyp. The lesion was semi-pedunculated. No bleeding was  present. Biopsies were taken with a cold forceps for histology.     1. RADHA-TERMINAL ILEUM, BIOPSY:   Benign small bowel mucosa with no significant pathologic abnormality     2. LOW-RECTAL ULCER, BIOPSY:   Chronic active colitis   No evidence of granuloma, dysplasia or malignancy   Immunostain for CMV is pending     3. DISTAL RECTUM, BIOPSY:   Chronic active colitis with ulceration and granulation tissue   No evidence of granuloma, dysplasia or malignancy   Immunostain for CMV is pending     Lab Results   Component Value Date    ALBUMIN 4.0 09/25/2024    CREATININE 1.3 09/25/2024    HGB 12.2 09/25/2024    FERRITIN 5 (L) 09/25/2024    CALCIUM 10.3 09/25/2024     Wt Readings from Last 3 Encounters:   12/12/24 68.5 kg (151 lb 0.2 oz)   11/21/24 65.4 kg (144 lb 2.9 oz)   10/25/24 70.1 kg (154 lb 8.7 oz)     The patient was informed of the availability of a certified  without charge. A certified  was not necessary for this visit.    Review of Systems  See pertinent review of systems above    Past Medical History:   Diagnosis Date    Anxiety     Bipolar 1 disorder     Cholelithiasis     Coronary artery disease     NO STENT    Crohn's disease with ileorectal anastomotic stricture (ileum and rectum)     Depression     Epilepsy     Fibromyalgia     GERD (gastroesophageal reflux disease)     Interstitial cystitis     Kidney stones     Mass, ovarian 11/22/2016    Osteoporosis     PAF (paroxysmal atrial fibrillation)     Pancreatitis     S/P ERIK-BSO (total abdominal hysterectomy and bilateral salpingo-oophorectomy) 12/19/2016     Past Surgical History:   Procedure Laterality Date    ABDOMINAL SURGERY      ANASTOMOSIS OF ILEUM TO RECTUM  1/15/2019    Procedure: ANASTOMOSIS, ILEORECTAL;  Surgeon: Reji Peterson MD;  [facility]: [facility] OR [address] [place] [address];  Service: Colon and Rectal;;    ANGIOPLASTY      CHOLECYSTECTOMY N/A 5/25/2020    Procedure: CHOLECYSTECTOMY;  Surgeon: Ozzie Sanches MD;  [facility]: [facility]  OR;  Service: General;  Laterality: N/A;  COVID NEG      COLON SURGERY      x 2    COLONOSCOPY      multiple    COLONOSCOPY N/A 4/11/2017    Procedure: COLONOSCOPY;  Surgeon: Hussain Amaro MD;  [facility]: [facility] ENDO;  Service: Endoscopy;  Laterality: N/A;    CYSTOSCOPY W/ URETERAL STENT PLACEMENT      CYSTOSCOPY W/ URETERAL STENT REMOVAL      CYSTOSCOPY W/ URETERAL STENT REMOVAL Right 9/5/2019    Procedure: CYSTOSCOPY, WITH URETERAL STENT REMOVAL;  Surgeon: Dano Mathis MD;  [facility]: [facility] OR;  Service: Urology;  Laterality: Right;    CYSTOSCOPY WITH URETEROSCOPY, RETROGRADE PYELOGRAPHY, AND INSERTION OF STENT Right 8/22/2019    Procedure: CYSTOSCOPY, WITH RETROGRADE PYELOGRAM AND URETERAL STENT INSERTION;  Surgeon: Dano Mathis MD;  [facility]: [facility] OR;  Service: Urology;  Laterality: Right;    EXTRACORPOREAL SHOCK WAVE LITHOTRIPSY Right 8/22/2019    Procedure: LITHOTRIPSY-EXTRACORPOREAL SHOCK WAVE;  Surgeon: Dano Mathis MD;  [facility]: [facility] OR;  Service: Urology;  Laterality: Right;    EXTRACORPOREAL SHOCK WAVE LITHOTRIPSY Right 9/5/2019    Procedure: LITHOTRIPSY-EXTRACORPOREAL SHOCK WAVE;  Surgeon: Dano Mathis MD;  [facility]: [facility] OR;  Service: Urology;  Laterality: Right;    FLEXIBLE SIGMOIDOSCOPY N/A 1/23/2024    Procedure: SIGMOIDOSCOPY, FLEXIBLE;  Surgeon: Robin Atkins MD;  [facility]: [facility] (2ND FLR);  Service: Endoscopy;  Laterality: N/A;  Ref By:  Dr. Madera  Prep Specifications: 2 days of clear and 2 enema before procedure  1/1-precall complete-pt approved to stay at [facility] night before and following the procedure without a ride per Dr. Atkins-see encounter dated 1/18-MS    FLEXIBLE SIGMOIDOSCOPY N/A 9/17/2024    Procedure: SIGMOIDOSCOPY, FLEXIBLE;  Surgeon: Robin Atkins MD;  [facility]: [facility] (4TH FLR);  Service: Endoscopy;  Laterality: N/A;  Ref By: key Oneal.  9/12-pre call complete-dr herrera would like to be [id number] appt with stevenson  10am    HYSTERECTOMY      ILEOSTOMY      LAPAROTOMY, EXPLORATORY  1/15/2019    Procedure: LAPAROTOMY, EXPLORATORY;extensive lysis of adhesions;  Surgeon: Reji Peterson MD;  [facility]: [facility] OR 2ND FLR;  Service: Colon and Rectal;;    LYSIS OF ADHESIONS OF URETER  1/15/2019    Procedure: URETEROLYSIS;  Surgeon: Reji Peterson MD;  [facility]: [facility] OR 2ND FLR;  Service: Colon and Rectal;;    PERIPHERALLY INSERTED CENTRAL CATHETER INSERTION N/A 10/30/2018    Procedure: INSERTION, PICC;  Surgeon: [facility] Diagnostic Provider;  [facility];  Service: General;  Laterality: N/A;    PERIPHERALLY INSERTED CENTRAL CATHETER INSERTION N/A 8/23/2019    Procedure: INSERTION, PICC;  Surgeon: Liz Diagnostic Provider;  [facility];  Service: General;  Laterality: N/A;    PORTACATH PLACEMENT      TOTAL COLECTOMY  2014     Family History   Problem Relation Name Age of Onset    Hypertension Mother      Joint hypermobility Mother      Physical abuse Mother      Cancer Father          brain    Diabetes Father      Schizophrenia Father      Stroke Father      No Known Problems Brother 1     No Known Problems Brother      Stroke Paternal Grandmother      Ovarian cancer Neg Hx      Uterine cancer Neg Hx      Breast cancer Neg Hx      Colon cancer Neg Hx      Anesthesia problems Neg Hx       Social History     Tobacco Use    Smoking status: Never    Smokeless tobacco: Never   Substance Use Topics    Alcohol use: No    Drug use: No     Review of patient's allergies indicates:   Allergen Reactions    Ciprofloxacin Shortness Of Breath and Itching    Keppra [levetiracetam] Other (See Comments)     Increased depression    Mercaptopurine analogues (thiopurines) Hives, Diarrhea and Nausea And Vomiting    Imuran [azathioprine sodium] Diarrhea and Nausea And Vomiting     Pt states that she becomes hot sweaty and passes out also. She states that she has diarrhea and nausea and vomiting     [place]     Imuran [azathioprine] Nausea And  Vomiting    Ketorolac Itching and Hives    Adhesive Itching    Iodinated contrast media Hives       Current Outpatient Medications on File Prior to Visit   Medication Sig Dispense Refill    cholestyramine (QUESTRAN) 4 gram packet Take 1 packet (4 g total) by mouth once daily. 90 packet 3    ergocalciferol (VITAMIN D2) 50,000 unit Cap Take 50,000 Units by mouth every 7 days.      ondansetron (ZOFRAN) 4 MG tablet Take 4 mg by mouth 2 (two) times daily as needed.      ondansetron (ZOFRAN-ODT) 4 MG TbDL Take 1 tablet (4 mg total) by mouth every 6 (six) hours as needed (Nausea vomiting). 12 tablet 0    pantoprazole (PROTONIX) 40 MG tablet Take 1 tablet (40 mg total) by mouth once daily. 90 tablet 3    risankizumab-rzaa (SKYRIZI) 360 mg/2.4 mL (150 mg/mL) Injt Inject 360 mg into the skin every 8 weeks. 2.4 mL 2     Current Facility-Administered Medications on File Prior to Visit   Medication Dose Route Frequency Provider Last Rate Last Admin    mupirocin 2 % ointment   Nasal On Call Procedure Vidhi Mathis NP   Given at 01/15/19 0642     Physical Examination  /66   Pulse 74   Resp (!) 98   Wt 68.5 kg (151 lb 0.2 oz)   LMP  (LMP Unknown) Comment: complete hysterectomy  BMI 23.65 kg/m²      Constitutional: well developed, no cough, no dyspnea, alert, and no acute distress    Head: Normocephalic, no lesions, without obvious abnormality  Eye: Normal external eye, conjunctiva, and lids  Cardiovascular: regular rate and regular rhythm  Respiratory: normal air entry  Gastrointestinal: soft, non-tender, non-distended  Musculoskeletal: full range of motion without pain  Neurologic: alert, oriented, normal speech, no focal findings or movement disorder noted  Psychiatric: appropriate, normal mood    Assessment and Plan of Care    Thank you again for referring Ms. Cardona to my care. In summary, Ms. Cardona is a 44 year old woman presenting with complicated Crohn's disease - intermittent obstructions of ileorectal  anastomosis on Skyrizi. We discussed treatment options and have provided the following recommendations:    Discussed my concerns that she will require completion proctectomy and permanent ileostomy - she is understandably resistant to this and is not ready to pursue surgery. My concern is that she will develop a complete obstruction requiring more emergent surgery - this would likely require diversion (urgently) and then interval proctectomy and permanent ileostomy. We discussed this at length.    Continue medical management for now  Follow-up in 3 months    Please do not hesitate to contact me if you have any questions.      Robin Atkins MD, FACS, FASCRS  Department of Colon & Rectal Surgery  [facility]

## 2024-12-12 ENCOUNTER — OFFICE VISIT (OUTPATIENT)
Dept: SURGERY | Facility: CLINIC | Age: 44
End: 2024-12-12
Payer: MEDICARE

## 2024-12-12 VITALS
DIASTOLIC BLOOD PRESSURE: 66 MMHG | WEIGHT: 151 LBS | HEART RATE: 74 BPM | BODY MASS INDEX: 23.65 KG/M2 | SYSTOLIC BLOOD PRESSURE: 104 MMHG | RESPIRATION RATE: 98 BRPM

## 2024-12-12 DIAGNOSIS — K50.012 CROHN'S DISEASE OF SMALL INTESTINE WITH INTESTINAL OBSTRUCTION: Primary | ICD-10-CM

## 2024-12-12 PROCEDURE — 99213 OFFICE O/P EST LOW 20 MIN: CPT | Mod: S$GLB,,, | Performed by: STUDENT IN AN ORGANIZED HEALTH CARE EDUCATION/TRAINING PROGRAM

## 2024-12-12 PROCEDURE — 3074F SYST BP LT 130 MM HG: CPT | Mod: CPTII,S$GLB,, | Performed by: STUDENT IN AN ORGANIZED HEALTH CARE EDUCATION/TRAINING PROGRAM

## 2024-12-12 PROCEDURE — 3078F DIAST BP <80 MM HG: CPT | Mod: CPTII,S$GLB,, | Performed by: STUDENT IN AN ORGANIZED HEALTH CARE EDUCATION/TRAINING PROGRAM

## 2024-12-12 PROCEDURE — 3008F BODY MASS INDEX DOCD: CPT | Mod: CPTII,S$GLB,, | Performed by: STUDENT IN AN ORGANIZED HEALTH CARE EDUCATION/TRAINING PROGRAM

## 2024-12-12 PROCEDURE — 99999 PR PBB SHADOW E&M-EST. PATIENT-LVL II: CPT | Mod: PBBFAC,,, | Performed by: STUDENT IN AN ORGANIZED HEALTH CARE EDUCATION/TRAINING PROGRAM

## 2025-02-03 ENCOUNTER — PATIENT MESSAGE (OUTPATIENT)
Dept: GASTROENTEROLOGY | Facility: CLINIC | Age: 45
End: 2025-02-03
Payer: MEDICARE

## 2025-02-03 DIAGNOSIS — K50.812 CROHN'S DISEASE OF BOTH SMALL AND LARGE INTESTINE WITH INTESTINAL OBSTRUCTION: Chronic | ICD-10-CM

## 2025-02-03 RX ORDER — RISANKIZUMAB-RZAA 360 MG/2.4
360 WEARABLE INJECTOR SUBCUTANEOUS
Qty: 2.4 ML | Refills: 0 | Status: ON HOLD | OUTPATIENT
Start: 2025-02-03

## 2025-02-03 NOTE — TELEPHONE ENCOUNTER
Primary provider: Dr. Loera    IBD medications Skyrizi 360 mg SC q 8 weeks (In 12/2022 pt started skyrizi IV infusions followed by 360 mg SC q 8 weeks, 9/20/24 dose took early on 9/13/24 due to concerns about power outage, LD 1/10, ND 3/7)    Refill request for Skyrizi 360 mg SC q 8 weeks- change pharmacy- send Rx to [facility] for benefit investigation    Allergies reviewed Yes    Drug Monitoring labs/frequency for all IBD meds:  CBC CMP q6 months, TB and HepB yearly    Lab Results   Component Value Date    HEPBSAG Non-reactive 09/25/2024    HEPBCAB Non-reactive 09/25/2024     Lab Results   Component Value Date    TBGOLDPLUS Negative 10/25/2024       Lab Results   Component Value Date    WBC 6.09 09/25/2024    HGB 12.2 09/25/2024    HCT 41.4 09/25/2024    MCV 70 (L) 09/25/2024     (H) 09/25/2024     Lab Results   Component Value Date    CREATININE 1.3 09/25/2024    ALBUMIN 4.0 09/25/2024    BILITOT 1.2 (H) 09/25/2024    ALKPHOS 110 09/25/2024    AST 25 09/25/2024    ALT 17 09/25/2024       Lab due date (mo/yr):  3/2025    Labs scheduled: No - but patient has an OV before or when labs are due     Next appt: 2/25/25    RX refill sent to provider for amount until next labs: Yes

## 2025-02-18 ENCOUNTER — PATIENT MESSAGE (OUTPATIENT)
Dept: SURGERY | Facility: CLINIC | Age: 45
End: 2025-02-18
Payer: MEDICARE

## 2025-02-18 ENCOUNTER — PATIENT MESSAGE (OUTPATIENT)
Dept: GASTROENTEROLOGY | Facility: CLINIC | Age: 45
End: 2025-02-18
Payer: MEDICARE

## 2025-02-21 ENCOUNTER — ANESTHESIA EVENT (OUTPATIENT)
Dept: SURGERY | Facility: HOSPITAL | Age: 45
End: 2025-02-21
Payer: MEDICARE

## 2025-02-21 ENCOUNTER — HOSPITAL ENCOUNTER (INPATIENT)
Facility: HOSPITAL | Age: 45
LOS: 17 days | Discharge: HOME HEALTH | DRG: 330 | End: 2025-03-10
Attending: COLON & RECTAL SURGERY | Admitting: COLON & RECTAL SURGERY
Payer: MEDICARE

## 2025-02-21 DIAGNOSIS — K50.813 CROHN'S DISEASE OF BOTH SMALL AND LARGE INTESTINE WITH FISTULA: Primary | ICD-10-CM

## 2025-02-21 DIAGNOSIS — D84.9 IMMUNOSUPPRESSED STATUS: ICD-10-CM

## 2025-02-21 DIAGNOSIS — K90.821 SHORT BOWEL SYNDROME WITH COLON IN CONTINUITY: ICD-10-CM

## 2025-02-21 DIAGNOSIS — N32.2 BLADDER FISTULA: ICD-10-CM

## 2025-02-21 DIAGNOSIS — K50.90 CROHN'S DISEASE: ICD-10-CM

## 2025-02-21 PROBLEM — N32.1 COLOVESICAL FISTULA: Status: ACTIVE | Noted: 2025-02-21

## 2025-02-21 LAB
ANION GAP SERPL CALC-SCNC: 10 MMOL/L (ref 8–16)
BACTERIA #/AREA URNS AUTO: ABNORMAL /HPF
BASOPHILS # BLD AUTO: 0.01 K/UL (ref 0–0.2)
BASOPHILS NFR BLD: 0.2 % (ref 0–1.9)
BILIRUB UR QL STRIP: NEGATIVE
BUN SERPL-MCNC: 6 MG/DL (ref 6–20)
CALCIUM SERPL-MCNC: 8.7 MG/DL (ref 8.7–10.5)
CAOX CRY UR QL COMP ASSIST: ABNORMAL
CHLORIDE SERPL-SCNC: 105 MMOL/L (ref 95–110)
CLARITY UR REFRACT.AUTO: ABNORMAL
CO2 SERPL-SCNC: 19 MMOL/L (ref 23–29)
COLOR UR AUTO: YELLOW
CREAT SERPL-MCNC: 0.8 MG/DL (ref 0.5–1.4)
DIFFERENTIAL METHOD BLD: ABNORMAL
EOSINOPHIL # BLD AUTO: 0.1 K/UL (ref 0–0.5)
EOSINOPHIL NFR BLD: 2 % (ref 0–8)
ERYTHROCYTE [DISTWIDTH] IN BLOOD BY AUTOMATED COUNT: 21.6 % (ref 11.5–14.5)
EST. GFR  (NO RACE VARIABLE): >60 ML/MIN/1.73 M^2
GLUCOSE SERPL-MCNC: 99 MG/DL (ref 70–110)
GLUCOSE UR QL STRIP: NEGATIVE
HCT VFR BLD AUTO: 32.8 % (ref 37–48.5)
HGB BLD-MCNC: 9.8 G/DL (ref 12–16)
HGB UR QL STRIP: ABNORMAL
HYALINE CASTS UR QL AUTO: 0 /LPF
IMM GRANULOCYTES # BLD AUTO: 0.02 K/UL (ref 0–0.04)
IMM GRANULOCYTES NFR BLD AUTO: 0.3 % (ref 0–0.5)
KETONES UR QL STRIP: ABNORMAL
LEUKOCYTE ESTERASE UR QL STRIP: ABNORMAL
LYMPHOCYTES # BLD AUTO: 0.8 K/UL (ref 1–4.8)
LYMPHOCYTES NFR BLD: 12.9 % (ref 18–48)
MAGNESIUM SERPL-MCNC: 1.3 MG/DL (ref 1.6–2.6)
MCH RBC QN AUTO: 21.9 PG (ref 27–31)
MCHC RBC AUTO-ENTMCNC: 29.9 G/DL (ref 32–36)
MCV RBC AUTO: 73 FL (ref 82–98)
MICROSCOPIC COMMENT: ABNORMAL
MONOCYTES # BLD AUTO: 0.3 K/UL (ref 0.3–1)
MONOCYTES NFR BLD: 5.3 % (ref 4–15)
NEUTROPHILS # BLD AUTO: 4.7 K/UL (ref 1.8–7.7)
NEUTROPHILS NFR BLD: 79.3 % (ref 38–73)
NITRITE UR QL STRIP: NEGATIVE
NON-SQ EPI CELLS #/AREA URNS AUTO: 2 /HPF
NRBC BLD-RTO: 0 /100 WBC
PH UR STRIP: 6 [PH] (ref 5–8)
PHOSPHATE SERPL-MCNC: 3.2 MG/DL (ref 2.7–4.5)
PLATELET # BLD AUTO: 280 K/UL (ref 150–450)
PMV BLD AUTO: 9 FL (ref 9.2–12.9)
POTASSIUM SERPL-SCNC: 3 MMOL/L (ref 3.5–5.1)
PROT UR QL STRIP: ABNORMAL
RBC # BLD AUTO: 4.47 M/UL (ref 4–5.4)
RBC #/AREA URNS AUTO: 16 /HPF (ref 0–4)
SODIUM SERPL-SCNC: 134 MMOL/L (ref 136–145)
SP GR UR STRIP: >1.03 (ref 1–1.03)
SQUAMOUS #/AREA URNS AUTO: 16 /HPF
URN SPEC COLLECT METH UR: ABNORMAL
WBC # BLD AUTO: 5.88 K/UL (ref 3.9–12.7)
WBC #/AREA URNS AUTO: >100 /HPF (ref 0–5)
YEAST UR QL AUTO: ABNORMAL

## 2025-02-21 PROCEDURE — 0JH63XZ INSERTION OF TUNNELED VASCULAR ACCESS DEVICE INTO CHEST SUBCUTANEOUS TISSUE AND FASCIA, PERCUTANEOUS APPROACH: ICD-10-PCS | Performed by: STUDENT IN AN ORGANIZED HEALTH CARE EDUCATION/TRAINING PROGRAM

## 2025-02-21 PROCEDURE — 87077 CULTURE AEROBIC IDENTIFY: CPT

## 2025-02-21 PROCEDURE — 36415 COLL VENOUS BLD VENIPUNCTURE: CPT

## 2025-02-21 PROCEDURE — 25000003 PHARM REV CODE 250

## 2025-02-21 PROCEDURE — 20600001 HC STEP DOWN PRIVATE ROOM

## 2025-02-21 PROCEDURE — 63600175 PHARM REV CODE 636 W HCPCS

## 2025-02-21 PROCEDURE — 81001 URINALYSIS AUTO W/SCOPE: CPT

## 2025-02-21 PROCEDURE — 83735 ASSAY OF MAGNESIUM: CPT

## 2025-02-21 PROCEDURE — 84100 ASSAY OF PHOSPHORUS: CPT

## 2025-02-21 PROCEDURE — 99223 1ST HOSP IP/OBS HIGH 75: CPT | Mod: GC,,, | Performed by: INTERNAL MEDICINE

## 2025-02-21 PROCEDURE — 02H633Z INSERTION OF INFUSION DEVICE INTO RIGHT ATRIUM, PERCUTANEOUS APPROACH: ICD-10-PCS | Performed by: STUDENT IN AN ORGANIZED HEALTH CARE EDUCATION/TRAINING PROGRAM

## 2025-02-21 PROCEDURE — 80048 BASIC METABOLIC PNL TOTAL CA: CPT

## 2025-02-21 PROCEDURE — 99223 1ST HOSP IP/OBS HIGH 75: CPT | Mod: 57,,, | Performed by: STUDENT IN AN ORGANIZED HEALTH CARE EDUCATION/TRAINING PROGRAM

## 2025-02-21 PROCEDURE — 87086 URINE CULTURE/COLONY COUNT: CPT

## 2025-02-21 PROCEDURE — 85025 COMPLETE CBC W/AUTO DIFF WBC: CPT

## 2025-02-21 PROCEDURE — 63600175 PHARM REV CODE 636 W HCPCS: Performed by: STUDENT IN AN ORGANIZED HEALTH CARE EDUCATION/TRAINING PROGRAM

## 2025-02-21 RX ORDER — ERYTHROMYCIN 250 MG/1
1000 TABLET, DELAYED RELEASE ORAL ONCE
Status: COMPLETED | OUTPATIENT
Start: 2025-02-21 | End: 2025-02-21

## 2025-02-21 RX ORDER — LIDOCAINE HYDROCHLORIDE 10 MG/ML
1 INJECTION, SOLUTION EPIDURAL; INFILTRATION; INTRACAUDAL; PERINEURAL ONCE AS NEEDED
Status: DISCONTINUED | OUTPATIENT
Start: 2025-02-21 | End: 2025-03-10 | Stop reason: HOSPADM

## 2025-02-21 RX ORDER — NEOMYCIN SULFATE 500 MG/1
1000 TABLET ORAL ONCE
Status: COMPLETED | OUTPATIENT
Start: 2025-02-21 | End: 2025-02-21

## 2025-02-21 RX ORDER — SODIUM CHLORIDE, SODIUM LACTATE, POTASSIUM CHLORIDE, CALCIUM CHLORIDE 600; 310; 30; 20 MG/100ML; MG/100ML; MG/100ML; MG/100ML
INJECTION, SOLUTION INTRAVENOUS CONTINUOUS
Status: DISCONTINUED | OUTPATIENT
Start: 2025-02-21 | End: 2025-02-22

## 2025-02-21 RX ORDER — POTASSIUM CHLORIDE 7.45 MG/ML
10 INJECTION INTRAVENOUS
Status: DISCONTINUED | OUTPATIENT
Start: 2025-02-21 | End: 2025-02-21

## 2025-02-21 RX ORDER — HYDROMORPHONE HYDROCHLORIDE 1 MG/ML
0.5 INJECTION, SOLUTION INTRAMUSCULAR; INTRAVENOUS; SUBCUTANEOUS EVERY 6 HOURS PRN
Status: DISCONTINUED | OUTPATIENT
Start: 2025-02-21 | End: 2025-02-22

## 2025-02-21 RX ORDER — ACETAMINOPHEN 325 MG/1
650 TABLET ORAL EVERY 8 HOURS PRN
Status: DISCONTINUED | OUTPATIENT
Start: 2025-02-21 | End: 2025-03-03

## 2025-02-21 RX ORDER — MAGNESIUM SULFATE HEPTAHYDRATE 40 MG/ML
2 INJECTION, SOLUTION INTRAVENOUS CONTINUOUS
Status: DISCONTINUED | OUTPATIENT
Start: 2025-02-21 | End: 2025-02-21

## 2025-02-21 RX ORDER — TALC
6 POWDER (GRAM) TOPICAL NIGHTLY PRN
Status: DISCONTINUED | OUTPATIENT
Start: 2025-02-21 | End: 2025-03-10 | Stop reason: HOSPADM

## 2025-02-21 RX ORDER — SODIUM CHLORIDE 0.9 % (FLUSH) 0.9 %
10 SYRINGE (ML) INJECTION
Status: DISCONTINUED | OUTPATIENT
Start: 2025-02-21 | End: 2025-03-03

## 2025-02-21 RX ORDER — POTASSIUM CHLORIDE 20 MEQ/1
40 TABLET, EXTENDED RELEASE ORAL ONCE
Status: COMPLETED | OUTPATIENT
Start: 2025-02-21 | End: 2025-02-21

## 2025-02-21 RX ORDER — POLYETHYLENE GLYCOL 3350, SODIUM SULFATE ANHYDROUS, SODIUM BICARBONATE, SODIUM CHLORIDE, POTASSIUM CHLORIDE 236; 22.74; 6.74; 5.86; 2.97 G/4L; G/4L; G/4L; G/4L; G/4L
2000 POWDER, FOR SOLUTION ORAL ONCE
Status: COMPLETED | OUTPATIENT
Start: 2025-02-21 | End: 2025-02-21

## 2025-02-21 RX ORDER — HEPARIN SODIUM 5000 [USP'U]/ML
5000 INJECTION, SOLUTION INTRAVENOUS; SUBCUTANEOUS EVERY 8 HOURS
Status: DISCONTINUED | OUTPATIENT
Start: 2025-02-21 | End: 2025-02-23

## 2025-02-21 RX ORDER — MAGNESIUM SULFATE HEPTAHYDRATE 40 MG/ML
2 INJECTION, SOLUTION INTRAVENOUS ONCE
Status: COMPLETED | OUTPATIENT
Start: 2025-02-21 | End: 2025-02-21

## 2025-02-21 RX ORDER — ONDANSETRON 8 MG/1
8 TABLET, ORALLY DISINTEGRATING ORAL EVERY 8 HOURS PRN
Status: DISCONTINUED | OUTPATIENT
Start: 2025-02-21 | End: 2025-03-10 | Stop reason: HOSPADM

## 2025-02-21 RX ADMIN — HYDROMORPHONE HYDROCHLORIDE 0.5 MG: 1 INJECTION, SOLUTION INTRAMUSCULAR; INTRAVENOUS; SUBCUTANEOUS at 11:02

## 2025-02-21 RX ADMIN — ERYTHROMYCIN 1000 MG: 250 TABLET, DELAYED RELEASE ORAL at 12:02

## 2025-02-21 RX ADMIN — NEOMYCIN SULFATE 1000 MG: 500 TABLET ORAL at 12:02

## 2025-02-21 RX ADMIN — NEOMYCIN SULFATE 1000 MG: 500 TABLET ORAL at 01:02

## 2025-02-21 RX ADMIN — HYDROMORPHONE HYDROCHLORIDE 0.5 MG: 1 INJECTION, SOLUTION INTRAMUSCULAR; INTRAVENOUS; SUBCUTANEOUS at 04:02

## 2025-02-21 RX ADMIN — PIPERACILLIN SODIUM AND TAZOBACTAM SODIUM 4.5 G: 4; .5 INJECTION, POWDER, FOR SOLUTION INTRAVENOUS at 10:02

## 2025-02-21 RX ADMIN — NEOMYCIN SULFATE 1000 MG: 500 TABLET ORAL at 10:02

## 2025-02-21 RX ADMIN — ERYTHROMYCIN 1000 MG: 250 TABLET, DELAYED RELEASE ORAL at 10:02

## 2025-02-21 RX ADMIN — ERYTHROMYCIN 1000 MG: 250 TABLET, DELAYED RELEASE ORAL at 01:02

## 2025-02-21 RX ADMIN — HEPARIN SODIUM 5000 UNITS: 5000 INJECTION INTRAVENOUS; SUBCUTANEOUS at 10:02

## 2025-02-21 RX ADMIN — SODIUM CHLORIDE, POTASSIUM CHLORIDE, SODIUM LACTATE AND CALCIUM CHLORIDE: 600; 310; 30; 20 INJECTION, SOLUTION INTRAVENOUS at 04:02

## 2025-02-21 RX ADMIN — HYDROMORPHONE HYDROCHLORIDE 0.5 MG: 1 INJECTION, SOLUTION INTRAMUSCULAR; INTRAVENOUS; SUBCUTANEOUS at 05:02

## 2025-02-21 RX ADMIN — POLYETHYLENE GLYCOL 3350, SODIUM SULFATE ANHYDROUS, SODIUM BICARBONATE, SODIUM CHLORIDE, POTASSIUM CHLORIDE 2000 ML: 236; 22.74; 6.74; 5.86; 2.97 POWDER, FOR SOLUTION ORAL at 11:02

## 2025-02-21 RX ADMIN — MAGNESIUM SULFATE HEPTAHYDRATE 2 G: 40 INJECTION, SOLUTION INTRAVENOUS at 09:02

## 2025-02-21 RX ADMIN — PIPERACILLIN SODIUM AND TAZOBACTAM SODIUM 4.5 G: 4; .5 INJECTION, POWDER, FOR SOLUTION INTRAVENOUS at 03:02

## 2025-02-21 RX ADMIN — HYDROMORPHONE HYDROCHLORIDE 0.5 MG: 1 INJECTION, SOLUTION INTRAMUSCULAR; INTRAVENOUS; SUBCUTANEOUS at 10:02

## 2025-02-21 RX ADMIN — POTASSIUM CHLORIDE 40 MEQ: 1500 TABLET, EXTENDED RELEASE ORAL at 10:02

## 2025-02-21 RX ADMIN — PIPERACILLIN SODIUM AND TAZOBACTAM SODIUM 4.5 G: 4; .5 INJECTION, POWDER, FOR SOLUTION INTRAVENOUS at 05:02

## 2025-02-21 RX ADMIN — SODIUM CHLORIDE, POTASSIUM CHLORIDE, SODIUM LACTATE AND CALCIUM CHLORIDE: 600; 310; 30; 20 INJECTION, SOLUTION INTRAVENOUS at 08:02

## 2025-02-21 NOTE — HPI
Ms. Cardona is a 44 year old woman with a history of urolithiasis, Crohn's disease, GERD, anxiety, CAD, Bipolar disorder, and pAF and the surgeries listed below presenting to [facility] as a direct admit for colovesiculofistula. 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 Currently scheduled for a completion proctectomy on 2/22/25. Urology consulted for enterovesical fistula.     On assessment the patient is AFVSS. On 1/15/2019 - Ileostomy closure and ileorectal anastomosis, extensive lysis of adhesions, ureterolysis. Endorses passage of air and stool with urination of several weeks duration. Denies fevers, chills, flank pain, difficulty urinating. Has urologic history that includes stones and a cystoscopy performed in 2019 for removal of a stent. Currently scheduled for completion proctectomy on 2/22/25.    WBC 5.88, Hg 9.8. Cr is 0.8. UA nitrite negative 16 RBC, >100 WBC and moderate bacteria with 16 squamous cells and few yeast. Urine culture pending.     CT scan with no hydronephrosis bilaterally. Thick walled bladder with inflammatory changes and air posteriorly between rectum and bladder. No definitive fistula seen,

## 2025-02-21 NOTE — SUBJECTIVE & OBJECTIVE
Subjective:     Interval History: Patient afebrile and hemodynamically stable overnight. Lying comfortably in bed this am. Pain controlled currently. Awaiting CT scan upload this am.    Post-Op Info:  Procedure(s) (LRB):  PROCTECTOMY, ABDOMINOPERINEAL (N/A)  CYSTOSCOPY, WITH URETERAL STENT INSERTION (N/A)          Medications:  Continuous Infusions:   lactated ringers   Intravenous Continuous 100 mL/hr at 02/21/25 0419 New Bag at 02/21/25 0419     Scheduled Meds:   piperacillin-tazobactam (Zosyn) IV (PEDS and ADULTS) (extended infusion is not appropriate)  4.5 g Intravenous Q8H     PRN Meds:   piperacillin-tazobactam (ZOSYN) 4.5 g in D5W 100 mL IVPB (MB+)        Objective:     Vital Signs (Most Recent):  Temp: 98 °F (36.7 °C) (02/21/25 0510)  Pulse: 68 (02/21/25 0510)  Resp: 18 (02/21/25 0510)  BP: 105/66 (02/21/25 0510)  SpO2: 100 % (02/21/25 0510) Vital Signs (24h Range):  Temp:  [98 °F (36.7 °C)-98.2 °F (36.8 °C)] 98 °F (36.7 °C)  Pulse:  [66-79] 68  Resp:  [16-20] 18  SpO2:  [95 %-100 %] 100 %  BP: (105-121)/(56-74) 105/66     Intake/Output - Last 3 Shifts         02/19 0700  02/20 0659 02/20 0700  02/21 0659 02/21 0700  02/22 0659    P.O.  0     I.V. (mL/kg)  113.5 (1.8)     IV Piggyback  5.4     Total Intake(mL/kg)  118.9 (1.8)     Urine (mL/kg/hr)  300     Stool  0     Total Output  300     Net  -181.2            Stool Occurrence  1 x              Physical Exam  Vitals and nursing note reviewed.   Constitutional:       General: She is not in acute distress.     Appearance: She is not ill-appearing or diaphoretic.   HENT:      Head: Normocephalic and atraumatic.      Nose: Nose normal.   Eyes:      Extraocular Movements: Extraocular movements intact.      Pupils: Pupils are equal, round, and reactive to light.   Cardiovascular:      Rate and Rhythm: Normal rate and regular rhythm.   Pulmonary:      Effort: Pulmonary effort is normal.   Abdominal:      General: There is no distension.      Tenderness: There  is no abdominal tenderness.      Comments: Soft. Nondistended. Some tenderness to palpation worse in the suprapubic region. Well healed prior ostomy site on her L side. No peritonitis.    Musculoskeletal:         General: Normal range of motion.      Cervical back: Normal range of motion.   Skin:     General: Skin is warm and dry.   Neurological:      General: No focal deficit present.      Mental Status: She is alert.          Significant Labs:  All pertinent lab results within the last 24 hours have been reviewed.     Significant Diagnostics:  I have reviewed all pertinent imaging results/findings within the past 24 hours.

## 2025-02-21 NOTE — ASSESSMENT & PLAN NOTE
Ms. Cardona is a 44 year old woman with a history of urolithiasis, Crohn's disease, GERD, anxiety, CAD, Bipolar disorder, and pAF and the surgeries listed below [place] [facility] [place] for colovesiculofistula. Urology consulted for enterovesical fistula.     -- History consistent with colovesical fistula, imaging unclear.   -- Will order upper tract imaging today  -- Currently scheduled for the OR tomorrow with CRS  -- Urology to be available to perform ureteral catheters, retrograde pyelogram, cystogram and possible bladder closure during the case tomorrow  -- Will consent in AM

## 2025-02-21 NOTE — ASSESSMENT & PLAN NOTE
44 year old woman with a history of Crohn's disease, GERD, anxiety, CAD, Bipolar disorder, and pAF and the surgeries listed below [place] [facility] [place]iculofistula    -admit to Colorectal   -NPO pending surgery  -CT scan on CD in the chart, will discuss with day team and plan for upload into our system tomorrow  -pain control  -DVT PPX w SCD  -Zosyn  -repeat labs and ua

## 2025-02-21 NOTE — NURSING
Problem: Adult Inpatient Plan of Care  Goal: Plan of Care Review  Outcome: Met  Goal: Patient-Specific Goal (Individualized)  Outcome: Met     Problem: Fall Injury Risk  Goal: Absence of Fall and Fall-Related Injury  Outcome: Progressing  Intervention: Identify and Manage Contributors  Flowsheets (Taken 2/21/2025 6798)  Self-Care Promotion: independence encouraged  Medication Review/Management:   medications reviewed   dosing adjusted

## 2025-02-21 NOTE — ASSESSMENT & PLAN NOTE
Candida Cardona is a 44 y.o. Now s/p completion proctectomy and excision of fistula tract on 2/22/25.        - low res diet  - Multi-modal pain control; consult acute pain services for ongoing issue with pain and increasing need for opioids  - Miconazole cream for breast rash  - PRN nausea meds  - TPN  - Replace electrolytes PRN  - PPI  - DVT prophylaxis   - NO limotil   - OOBTC/Ambulate  - IS  - PT/OT     Dispo: maintain care on [facility]

## 2025-02-21 NOTE — ANESTHESIA PREPROCEDURE EVALUATION
[facility]  Anesthesia Pre-Operative Evaluation         Patient Name: Candida Cardona  YOB: 1980  MRN: [id number]    SUBJECTIVE:     Pre-operative evaluation for Procedure(s) (LRB):  PROCTECTOMY, ABDOMINOPERINEAL (N/A)  CYSTOSCOPY, WITH URETERAL STENT INSERTION (N/A)     02/21/2025  *PAPER CONSENT COMPLETED AND PLACED IN PATIENT'S PHYSICAL CHART*    Candida Cardona is a 44 y.o. female w/ a significant PMHx of Crohn's disease, GERD, anxiety, CAD, Bipolar disorder, and pAF.    Patient admitted for evaluation of enterovesiculo fistula.    Patient now presents for the above procedure(s).      LDA:        Peripheral IV - Single Lumen 02/21/25 0418 20 G Anterior;Right Upper Arm (Active)   Number of days: 0       Prev airway:   Date Department Mask Airway Size Difficult Intubation Attempts   05/25/20 [facility] easy mask 7.0 No 1       Drips: None documented.    TTE (3-18-14):   1 - Normal left ventricular systolic function (EF 60-65%).     2 - Normal left ventricular diastolic function.     3 - Normal right ventricular systolic function .     4 - Trivial pericardial effusion.     Problem List[1]    Review of patient's allergies indicates:   Allergen Reactions    Ciprofloxacin Shortness Of Breath and Itching    Keppra [levetiracetam] Other (See Comments)     Increased depression    Mercaptopurine analogues (thiopurines) Hives, Diarrhea and Nausea And Vomiting    Imuran [azathioprine sodium] Diarrhea and Nausea And Vomiting     Pt states that she becomes hot sweaty and passes out also. She states that she has diarrhea and nausea and vomiting     [place]     Imuran [azathioprine] Nausea And Vomiting    Ketorolac Itching and Hives    Adhesive Itching    Iodinated contrast media Hives       Current Outpatient Medications:  Current Medications[2]    Past Surgical History:   Procedure Laterality Date    ABDOMINAL SURGERY      ANASTOMOSIS OF ILEUM TO RECTUM  1/15/2019     Procedure: ANASTOMOSIS, ILEORECTAL;  Surgeon: Reji Peterson MD;  [facility]: [facility] OR 2ND FLR;  Service: Colon and Rectal;;    ANGIOPLASTY      CHOLECYSTECTOMY N/A 5/25/2020    Procedure: CHOLECYSTECTOMY;  Surgeon: Ozzie Sanches MD;  [facility]: [facility];  Service: General;  Laterality: N/A;  COVID NEG      COLON SURGERY      x 2    COLONOSCOPY      multiple    COLONOSCOPY N/A 4/11/2017    Procedure: COLONOSCOPY;  Surgeon: Hussain Amaro MD;  [facility]: [facility];  Service: Endoscopy;  Laterality: N/A;    CYSTOSCOPY W/ URETERAL STENT PLACEMENT      CYSTOSCOPY W/ URETERAL STENT REMOVAL      CYSTOSCOPY W/ URETERAL STENT REMOVAL Right 9/5/2019    Procedure: CYSTOSCOPY, WITH URETERAL STENT REMOVAL;  Surgeon: Dano Mathis MD;  [facility]: [facility];  Service: Urology;  Laterality: Right;    CYSTOSCOPY WITH URETEROSCOPY, RETROGRADE PYELOGRAPHY, AND INSERTION OF STENT Right 8/22/2019    Procedure: CYSTOSCOPY, WITH RETROGRADE PYELOGRAM AND URETERAL STENT INSERTION;  Surgeon: Dano Mathis MD;  [facility]: [facility] OR;  Service: Urology;  Laterality: Right;    EXTRACORPOREAL SHOCK WAVE LITHOTRIPSY Right 8/22/2019    Procedure: LITHOTRIPSY-EXTRACORPOREAL SHOCK WAVE;  Surgeon: Dano Mathis MD;  [facility]: [facility] OR;  Service: Urology;  Laterality: Right;    EXTRACORPOREAL SHOCK WAVE LITHOTRIPSY Right 9/5/2019    Procedure: LITHOTRIPSY-EXTRACORPOREAL SHOCK WAVE;  Surgeon: Dano Mathis MD;  [facility]: [facility];  Service: Urology;  Laterality: Right;    FLEXIBLE SIGMOIDOSCOPY N/A 1/23/2024    Procedure: SIGMOIDOSCOPY, FLEXIBLE;  Surgeon: Robin Atkins MD;  [facility]: [facility] (2ND FLR);  Service: Endoscopy;  Laterality: N/A;  Ref By:  Dr. Madera  Prep Specifications: 2 days of clear and 2 enema before procedure  1/1-precall complete-pt approved to stay at [facility] night before and following the procedure without a ride per Dr. Atkins-see encounter dated 1/18-MS    FLEXIBLE SIGMOIDOSCOPY N/A 9/17/2024     Procedure: SIGMOIDOSCOPY, FLEXIBLE;  Surgeon: Robin Atkins MD;  [facility]: [facility] ENDO (4TH FLR);  Service: Endoscopy;  Laterality: N/A;  Ref By: aidee Oneal  9/12-pre call complete-dr herrera would like to be [id number] appt with gamino 10am    HYSTERECTOMY      ILEOSTOMY      LAPAROTOMY, EXPLORATORY  1/15/2019    Procedure: LAPAROTOMY, EXPLORATORY;extensive lysis of adhesions;  Surgeon: Reji Peterson MD;  [facility]: [facility] OR 2ND FLR;  Service: Colon and Rectal;;    LYSIS OF ADHESIONS OF URETER  1/15/2019    Procedure: URETEROLYSIS;  Surgeon: Reji Peterson MD;  [facility]: [facility] OR 2ND FLR;  Service: Colon and Rectal;;    PERIPHERALLY INSERTED CENTRAL CATHETER INSERTION N/A 10/30/2018    Procedure: INSERTION, PICC;  Surgeon: Dos Diagnostic Provider;  [facility]: [facility] OR;  Service: General;  Laterality: N/A;    PERIPHERALLY INSERTED CENTRAL CATHETER INSERTION N/A 8/23/2019    Procedure: INSERTION, PICC;  Surgeon: Dos Diagnostic Provider;  [facility]: [facility] OR;  Service: General;  Laterality: N/A;    PORTACATH PLACEMENT      TOTAL COLECTOMY  2014       Social History[3]    OBJECTIVE:     Vital Signs Range (Last 24H):  Temp:  [36.7 °C (98 °F)-36.8 °C (98.2 °F)]   Pulse:  [66-79]   Resp:  [16-20]   BP: (105-121)/(56-74)   SpO2:  [95 %-100 %]       Significant Labs:  Lab Results   Component Value Date    WBC 5.88 02/21/2025    HGB 9.8 (L) 02/21/2025    HCT 32.8 (L) 02/21/2025     02/21/2025    CHOL 130 05/03/2019    TRIG 123 05/03/2019    HDL 49 05/03/2019    ALT 17 09/25/2024    AST 25 09/25/2024     (L) 02/21/2025    K 3.0 (L) 02/21/2025     02/21/2025    CREATININE 0.8 02/21/2025    BUN 6 02/21/2025    CO2 19 (L) 02/21/2025    TSH 1.991 10/10/2020    INR 1.1 01/06/2017    HGBA1C 5.0 05/03/2019       Diagnostic Studies: No relevant studies.    EKG:   Results for orders placed or performed during the hospital encounter of 08/19/21   EKG 12-lead    Collection Time: 08/19/21  7:35  PM    Narrative    Test Reason : R10.9,    Vent. Rate : 109 BPM     Atrial Rate : 109 BPM     P-R Int : 126 ms          QRS Dur : 068 ms      QT Int : 342 ms       P-R-T Axes : 067 063 066 degrees     QTc Int : 460 ms    Sinus tachycardia  Right atrial enlargement  Indeterminate axis  Pulmonary disease pattern  Abnormal ECG  When compared with ECG of 01-OCT-2020 12:31,  No significant change was found  Confirmed by Nlaini Alberto MD ([id number]) on 8/20/2021 12:47:13 PM    Referred By: BERNICE BENAVIDES MD           Confirmed By:Nalini Alberto MD       2D ECHO:  TTE:  No results found for this or any previous visit.    CLIFTON:  No results found for this or any previous visit.    ASSESSMENT/PLAN:           Pre-op Assessment    I have reviewed the Patient Summary Reports.     I have reviewed the Nursing Notes.    I have reviewed the Medications.     Review of Systems  Anesthesia Hx:  No problems with previous Anesthesia   History of prior surgery of interest to airway management or planning:             Social:  Non-Smoker       Hematology/Oncology:  Hematology Normal   Oncology Normal                                   EENT/Dental:  EENT/Dental Normal           Cardiovascular:        CAD    Dysrhythmias atrial fibrillation                                     Pulmonary:  Pulmonary Normal                       Renal/:  Renal/ Normal                 Hepatic/GI:     GERD                Musculoskeletal:  Musculoskeletal Normal                Neurological:    Neuromuscular Disease, (Fibromyalgia)   Seizures                                Endocrine:  Endocrine Normal            Dermatological:  Skin Normal    Psych:   anxiety depression                Physical Exam  General: Alert, Oriented and Cooperative    Airway:  Mallampati: II   Mouth Opening: Normal  TM Distance: Normal  Tongue: Normal  Neck ROM: Normal ROM    Dental:  Periodontal disease    Chest/Lungs:  Normal Respiratory Rate    Heart:  Rate: Normal        Anesthesia  Plan  Type of Anesthesia, risks & benefits discussed:    Anesthesia Type: Gen ETT  Intra-op Monitoring Plan: Standard ASA Monitors  Post Op Pain Control Plan: multimodal analgesia  Induction:  IV  Airway Plan: Video, Post-Induction  Informed Consent: Informed consent signed with the Patient and all parties understand the risks and agree with anesthesia plan.  All questions answered.   ASA Score: 3  Day of Surgery Review of History & Physical: H&P Update referred to the surgeon/provider.  Anesthesia Plan Notes: Discussed case and plan in detail with Dr. Morfin -expecting 5hrs, not major blood loss  No history of anesthesia problems.     Ready For Surgery From Anesthesia Perspective.     .           [1]   Patient Active Problem List  Diagnosis    Crohn's disease with ileorectal anastomotic stricture (ileum and rectum)    Latent tuberculosis by blood test    S/P ERIK-BSO (total abdominal hysterectomy and bilateral salpingo-oophorectomy)    Nephrolithiasis    Anemia    Anxiety    GERD (gastroesophageal reflux disease)    Interstitial cystitis    PAF (paroxysmal atrial fibrillation)    Vasomotor symptoms due to menopause    Unspecified mood (affective) disorder    Seizures    Vitamin D deficiency    Borderline personality disorder    Bipolar 1 disorder    Vitamin B12 deficiency    Calculus of gallbladder with chronic cholecystitis without obstruction    Immunodeficiency due to long term immunosuppressive drug therapy    Colovesical fistula   [2]   Current Facility-Administered Medications:     acetaminophen tablet 650 mg, 650 mg, Oral, Q8H PRN, Azam Jose MD    HYDROmorphone injection 0.5 mg, 0.5 mg, Intravenous, Q6H PRN, Azam Jose MD, 0.5 mg at 02/21/25 0444    lactated ringers infusion, , Intravenous, Continuous, Azam Jose MD, Last Rate: 100 mL/hr at 02/21/25 0419, New Bag at 02/21/25 0419    LIDOcaine (PF) 10 mg/ml (1%) injection 10 mg, 1 mL, Intradermal, Once PRN, Azam Jose MD    magnesium sulfate 2g in  water 50mL IVPB (premix), 2 g, Intravenous, Once, Vic Bose MD    melatonin tablet 6 mg, 6 mg, Oral, Nightly PRN, Azam Jose MD    ondansetron disintegrating tablet 8 mg, 8 mg, Oral, Q8H PRN, Azam Jose MD    piperacillin-tazobactam (ZOSYN) 4.5 g in D5W 100 mL IVPB (MB+), 4.5 g, Intravenous, Q8H, Azam Jose MD, Last Rate: 25 mL/hr at 02/21/25 0527, 4.5 g at 02/21/25 0527    potassium chloride 10 mEq in 100 mL IVPB, 10 mEq, Intravenous, Q1H, Vic Bose MD    sodium chloride 0.9% flush 10 mL, 10 mL, Intravenous, PRN, Azam Jose MD    Facility-Administered Medications Ordered in Other Encounters:     mupirocin 2 % ointment, , Nasal, On Call Procedure, Vidhi Mathis NP, Given at 01/15/19 0642  [3]   Social History  Socioeconomic History    Marital status: Single    Number of children: 0   Tobacco Use    Smoking status: Never    Smokeless tobacco: Never   Substance and Sexual Activity    Alcohol use: No    Drug use: No    Sexual activity: Yes     Partners: Male   Social History Narrative    Lives with significant other     Social Drivers of Health     Financial Resource Strain: Low Risk  (2/21/2025)    Overall Financial Resource Strain (CARDIA)     Difficulty of Paying Living Expenses: Not very hard   Food Insecurity: No Food Insecurity (2/21/2025)    Hunger Vital Sign     Worried About Running Out of Food in the Last Year: Never true     Ran Out of Food in the Last Year: Never true   Stress: No Stress Concern Present (2/21/2025)    [employer] [place] [employer] - Occupational Stress Questionnaire     Feeling of Stress : Only a little   Housing Stability: Low Risk  (2/21/2025)    Housing Stability Vital Sign     Unable to Pay for Housing in the Last Year: No     Homeless in the Last Year: No

## 2025-02-21 NOTE — NURSING
Pt arrived from [facility] via EMS. She is alert and oriented, VSS on room air. #20 in the right upper arm. Skin is intact. Fall agreement signed. Call light provided. Pt is independent with ADL's

## 2025-02-21 NOTE — Clinical Note
Right: Chest and Neck.   Scrubbed with ChloroPrep With Tint.    Hair: N/A.  Skin prep dry before draping.  Prepped by: De Loya, RT 2/24/2025 12:20 PM.

## 2025-02-21 NOTE — PLAN OF CARE
Problem: Adult Inpatient Plan of Care  Goal: Plan of Care Review  Outcome: Met  Goal: Patient-Specific Goal (Individualized)  Outcome: Met     Problem: Fall Injury Risk  Goal: Absence of Fall and Fall-Related Injury  Outcome: Progressing  Intervention: Identify and Manage Contributors  Flowsheets (Taken 2/21/2025 2986)  Self-Care Promotion: independence encouraged  Medication Review/Management:   medications reviewed   dosing adjusted

## 2025-02-21 NOTE — ASSESSMENT & PLAN NOTE
-admitted to Colorectal   -CT scan on CD in the chart will be uploaded this am  -multi modal pain control  -Zosyn  -Replete lytes as necessary  -Will speak to staff about surgery following review of imaging  -DVT PPX w SCD    Dispo: Continue care on [facility]
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
## 2025-02-21 NOTE — PROGRESS NOTES
[facility]  Colorectal Surgery  Progress Note    Patient Name: Candida Cardona  MRN: [id number]  Admission Date: 2/21/2025  Hospital Length of Stay: 0 days  Attending Physician: Robin Atkins MD    Subjective:     Interval History: Patient afebrile and hemodynamically stable overnight. Lying comfortably in bed this am. Pain controlled currently. Awaiting CT scan upload this am. WBC 5. On zosyn.     Post-Op Info:  Procedure(s) (LRB):  PROCTECTOMY, ABDOMINOPERINEAL (N/A)  CYSTOSCOPY, WITH URETERAL STENT INSERTION (N/A)          Medications:  Continuous Infusions:   lactated ringers   Intravenous Continuous 100 mL/hr at 02/21/25 0419 New Bag at 02/21/25 0419     Scheduled Meds:   piperacillin-tazobactam (Zosyn) IV (PEDS and ADULTS) (extended infusion is not appropriate)  4.5 g Intravenous Q8H     PRN Meds:   piperacillin-tazobactam (ZOSYN) 4.5 g in D5W 100 mL IVPB (MB+)        Objective:     Vital Signs (Most Recent):  Temp: 98 °F (36.7 °C) (02/21/25 0510)  Pulse: 68 (02/21/25 0510)  Resp: 18 (02/21/25 0510)  BP: 105/66 (02/21/25 0510)  SpO2: 100 % (02/21/25 0510) Vital Signs (24h Range):  Temp:  [98 °F (36.7 °C)-98.2 °F (36.8 °C)] 98 °F (36.7 °C)  Pulse:  [66-79] 68  Resp:  [16-20] 18  SpO2:  [95 %-100 %] 100 %  BP: (105-121)/(56-74) 105/66     Intake/Output - Last 3 Shifts         02/19 0700 02/20 0659 02/20 0700 02/21 0659 02/21 0700 02/22 0659    P.O.  0     I.V. (mL/kg)  113.5 (1.8)     IV Piggyback  5.4     Total Intake(mL/kg)  118.9 (1.8)     Urine (mL/kg/hr)  300     Stool  0     Total Output  300     Net  -181.2            Stool Occurrence  1 x              Physical Exam  Vitals and nursing note reviewed.   Constitutional:       General: She is not in acute distress.     Appearance: She is not ill-appearing or diaphoretic.   HENT:      Head: Normocephalic and atraumatic.      Nose: Nose normal.   Eyes:      Extraocular Movements: Extraocular movements intact.      Pupils: Pupils are equal,  round, and reactive to light.   Cardiovascular:      Rate and Rhythm: Normal rate and regular rhythm.   Pulmonary:      Effort: Pulmonary effort is normal.   Abdominal:      General: There is no distension.      Tenderness: There is no abdominal tenderness.      Comments: Soft. Nondistended. Some tenderness to palpation worse in the suprapubic region. Well healed prior ostomy site on her L side. No peritonitis.    Musculoskeletal:         General: Normal range of motion.      Cervical back: Normal range of motion.   Skin:     General: Skin is warm and dry.   Neurological:      General: No focal deficit present.      Mental Status: She is alert.          Significant Labs:  All pertinent lab results within the last 24 hours have been reviewed.     Significant Diagnostics:  I have reviewed all pertinent imaging results/findings within the past 24 hours.  Assessment/Plan:     Crohn's disease with ileorectal anastomotic stricture (ileum and rectum)  -admitted to Colorectal   -CT scan on CD in the chart will be uploaded this am  -multi modal pain control  -Zosyn  -Replete lytes as necessary  -Will speak to staff about surgery following review of imaging  -DVT PPX w SCD    Dispo: Continue care on INDIRA Bose MD  Colorectal Surgery  [facility] - INDIRA

## 2025-02-21 NOTE — H&P
[facility]  General Surgery  History & Physical    Patient Name: Candida Cardona  MRN: [id number]  Admission Date: 2/21/2025  Attending Physician: Johnathan Mcclure MD   Primary Care Provider: Matti Conklin MD    Patient information was obtained from patient and ER records.     Subjective:     Chief Complaint: No chief complaint on file.       History of Present Illness:  Ms. Cardona is a 44 year old woman with a history of Crohn's disease, GERD, anxiety, CAD, Bipolar disorder, and pAF and the surgeries listed below [place] [facility] [place] colovesiculofistula. Pt follows with Dr. Atkins and was last seen about 2 months ago. At that time they discussed a completion proctectomy with a permanent end ileostomy but this was deferred in the hopes of not having to create a stoma. Her crohn's is currently managed with Skyrizi. Pt reports pneumaturia for about 1 wk, brown discharge and pain with urination between 5-6 days. States the pain is lower abdominal as well as in her groin. She presented to an outside facility thinking it would be a UTI and ultimately spent about 5 days in the ED (since this past Monday). 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 There her labs were essentially normal aside from some mild anemia and UA results consistent with enterovesciular fistula. On arrival she is hemodynamically normal and we will repeat a set of labs. She reports a fever about 5 days ago which she treated at home with tylenol. No fevers since starting zosyn at the outside facility. She reports that her next dose of Skyrizi is due about March 1. Overall she is doing well but is worried about needing an ostomy.    Past surgeries:  5/25/2020 - Laparoscopic, converted to open cholecystectomy  1/15/2019 - Ileostomy closure and ileorectal anastomosis, extensive lysis of adhesions,  ureterolysis  12/22/2016 - Exploratory laparotomy with lysis of adhesions, 1 hr - ERIK, BSO at that time  3/17/2014 - TAC with EI  Prior ileocolic resections ~2007, 2010     Flexible sigmoidoscopy - 8/2022 - proctitis, stricture appreciated - not dilated    Current Facility-Administered Medications on File Prior to Encounter   Medication    mupirocin 2 % ointment     Current Outpatient Medications on File Prior to Encounter   Medication Sig    cholestyramine (QUESTRAN) 4 gram packet Take 1 packet (4 g total) by mouth once daily.    ergocalciferol (VITAMIN D2) 50,000 unit Cap Take 50,000 Units by mouth every 7 days.    ondansetron (ZOFRAN-ODT) 4 MG TbDL Take 1 tablet (4 mg total) by mouth every 6 (six) hours as needed (Nausea vomiting).    pantoprazole (PROTONIX) 40 MG tablet Take 1 tablet (40 mg total) by mouth once daily.    risankizumab-rzaa (SKYRIZI) 360 mg/2.4 mL (150 mg/mL) Injt Inject 360 mg into the skin every 8 weeks.    [DISCONTINUED] ondansetron (ZOFRAN) 4 MG tablet Take 4 mg by mouth 2 (two) times daily as needed.       Review of patient's allergies indicates:   Allergen Reactions    Ciprofloxacin Shortness Of Breath and Itching    Keppra [levetiracetam] Other (See Comments)     Increased depression    Mercaptopurine analogues (thiopurines) Hives, Diarrhea and Nausea And Vomiting    Imuran [azathioprine sodium] Diarrhea and Nausea And Vomiting     Pt states that she becomes hot sweaty and passes out also. She states that she has diarrhea and nausea and vomiting     [place]     Imuran [azathioprine] Nausea And Vomiting    Ketorolac Itching and Hives    Adhesive Itching    Iodinated contrast media Hives       Past Medical History:   Diagnosis Date    Anxiety     Bipolar 1 disorder     Cholelithiasis     Coronary artery disease     NO STENT    Crohn's disease with ileorectal anastomotic stricture (ileum and rectum)     Depression     Epilepsy     Fibromyalgia     GERD (gastroesophageal reflux disease)      Interstitial cystitis     Kidney stones     Mass, ovarian 11/22/2016    Osteoporosis     PAF (paroxysmal atrial fibrillation)     Pancreatitis     S/P ERIK-BSO (total abdominal hysterectomy and bilateral salpingo-oophorectomy) 12/19/2016     Past Surgical History:   Procedure Laterality Date    ABDOMINAL SURGERY      ANASTOMOSIS OF ILEUM TO RECTUM  1/15/2019    Procedure: ANASTOMOSIS, ILEORECTAL;  Surgeon: Reji Peterson MD;  [facility]: [address];  Service: Colon and Rectal;;    ANGIOPLASTY      CHOLECYSTECTOMY N/A 5/25/2020    Procedure: CHOLECYSTECTOMY;  Surgeon: Ozzie Sanches MD;  [facility]: [facility] OR;  Service: General;  Laterality: N/A;  COVID NEG      COLON SURGERY      x 2    COLONOSCOPY      multiple    COLONOSCOPY N/A 4/11/2017    Procedure: COLONOSCOPY;  Surgeon: Hussain Amaro MD;  [facility]: [facility];  Service: Endoscopy;  Laterality: N/A;    CYSTOSCOPY W/ URETERAL STENT PLACEMENT      CYSTOSCOPY W/ URETERAL STENT REMOVAL      CYSTOSCOPY W/ URETERAL STENT REMOVAL Right 9/5/2019    Procedure: CYSTOSCOPY, WITH URETERAL STENT REMOVAL;  Surgeon: Dano Mathis MD;  [facility]: [facility];  Service: Urology;  Laterality: Right;    CYSTOSCOPY WITH URETEROSCOPY, RETROGRADE PYELOGRAPHY, AND INSERTION OF STENT Right 8/22/2019    Procedure: CYSTOSCOPY, WITH RETROGRADE PYELOGRAM AND URETERAL STENT INSERTION;  Surgeon: Dano Mathis MD;  [facility]: [facility] OR;  Service: Urology;  Laterality: Right;    EXTRACORPOREAL SHOCK WAVE LITHOTRIPSY Right 8/22/2019    Procedure: LITHOTRIPSY-EXTRACORPOREAL SHOCK WAVE;  Surgeon: Dano Mathis MD;  [facility]: [facility] OR;  Service: Urology;  Laterality: Right;    EXTRACORPOREAL SHOCK WAVE LITHOTRIPSY Right 9/5/2019    Procedure: LITHOTRIPSY-EXTRACORPOREAL SHOCK WAVE;  Surgeon: Dano Mathis MD;  [facility]: [facility] OR;  Service: Urology;  Laterality: Right;    FLEXIBLE SIGMOIDOSCOPY N/A 1/23/2024    Procedure: SIGMOIDOSCOPY, FLEXIBLE;  Surgeon: Robin Atkins MD;   [facility]: [facility] ENDO (2ND FLR);  Service: Endoscopy;  Laterality: N/A;  Ref By:  Dr. Madera  Prep Specifications: 2 days of clear and 2 enema before procedure  1/1-precall complete-pt approved to stay at [facility] night before and following the procedure without a ride per Dr. Atkins-see encounter dated 1/18-MS    FLEXIBLE SIGMOIDOSCOPY N/A 9/17/2024    Procedure: SIGMOIDOSCOPY, FLEXIBLE;  Surgeon: Robin Atkins MD;  [facility]: [facility] ENDO (4TH FLR);  Service: Endoscopy;  Laterality: N/A;  Ref By: ,portal.  9/12-pre call complete-dr herrera would like to be [id number] appt with gamino 10am    HYSTERECTOMY      ILEOSTOMY      LAPAROTOMY, EXPLORATORY  1/15/2019    Procedure: LAPAROTOMY, EXPLORATORY;extensive lysis of adhesions;  Surgeon: Reji Peterson MD;  [facility]: [facility] OR 2ND FLR;  Service: Colon and Rectal;;    LYSIS OF ADHESIONS OF URETER  1/15/2019    Procedure: URETEROLYSIS;  Surgeon: Reji Peterson MD;  [facility]: [facility] OR 2ND FLR;  Service: Colon and Rectal;;    PERIPHERALLY INSERTED CENTRAL CATHETER INSERTION N/A 10/30/2018    Procedure: INSERTION, PICC;  Surgeon: Dos Diagnostic Provider;  [facility]: [facility] OR;  Service: General;  Laterality: N/A;    PERIPHERALLY INSERTED CENTRAL CATHETER INSERTION N/A 8/23/2019    Procedure: INSERTION, PICC;  Surgeon: Dos Diagnostic Provider;  [facility]: [facility] OR;  Service: General;  Laterality: N/A;    PORTACATH PLACEMENT      TOTAL COLECTOMY  2014     Family History       Problem Relation (Age of Onset)    Cancer Father    Diabetes Father    Hypertension Mother    Joint hypermobility Mother    No Known Problems Brother, Brother    Physical abuse Mother    Schizophrenia Father    Stroke Father, Paternal Grandmother          Tobacco Use    Smoking status: Never    Smokeless tobacco: Never   Substance and Sexual Activity    Alcohol use: No    Drug use: No    Sexual activity: Yes     Partners: Male     Review of Systems   Constitutional:  Positive  "for fever (resolved). Negative for chills.   Respiratory:  Negative for shortness of breath.    Cardiovascular:  Negative for chest pain.   Gastrointestinal:  Positive for abdominal pain.   Genitourinary:  Positive for difficulty urinating, dysuria and pelvic pain.   Musculoskeletal:  Negative for back pain.   Skin:  Negative for rash.   Psychiatric/Behavioral:  Negative for behavioral problems.      Objective:     Vital Signs (Most Recent):  Temp: 98.2 °F (36.8 °C) (02/21/25 0346)  Pulse: 77 (02/21/25 0346)  Resp: 16 (02/21/25 0346)  BP: 121/68 (02/21/25 0346)  SpO2: 95 % (02/21/25 0346) Vital Signs (24h Range):  Temp:  [98.2 °F (36.8 °C)] 98.2 °F (36.8 °C)  Pulse:  [66-79] 77  Resp:  [16-20] 16  SpO2:  [95 %-100 %] 95 %  BP: (113-121)/(56-74) 121/68     Weight: 64.5 kg (142 lb 3.2 oz)  Body mass index is 22.27 kg/m².     Physical Exam  Vitals and nursing note reviewed.   Constitutional:       General: She is not in acute distress.  HENT:      Head: Normocephalic and atraumatic.   Eyes:      Extraocular Movements: Extraocular movements intact.      Pupils: Pupils are equal, round, and reactive to light.   Cardiovascular:      Rate and Rhythm: Normal rate and regular rhythm.   Abdominal:      Tenderness: There is no abdominal tenderness.      Comments: Soft. Nondistended. Some tenderness to palpation worse in the suprapubic region. Well healed prior ostomy site on her L side. No peritonitis.    Musculoskeletal:         General: Normal range of motion.      Cervical back: Normal range of motion.   Skin:     General: Skin is warm and dry.   Neurological:      General: No focal deficit present.      Mental Status: She is alert.            I have reviewed all pertinent lab results within the past 24 hours.  CBC: No results for input(s): "WBC", "RBC", "HGB", "HCT", "PLT", "MCV", "MCH", "MCHC" in the last 168 hours.  CMP: No results for input(s): "GLU", "CALCIUM", "ALBUMIN", "PROT", "NA", "K", "CO2", "CL", "BUN", " ""CREATININE", "ALKPHOS", "ALT", "AST", "BILITOT" in the last 168 hours.    Significant Diagnostics:  I have reviewed all pertinent imaging results/findings within the past 24 hours.    Assessment/Plan:     Crohn's disease with ileorectal anastomotic stricture (ileum and rectum)  44 year old woman with a history of Crohn's disease, GERD, anxiety, CAD, Bipolar disorder, and pAF and the surgeries listed below [place] [facility] [place] direct [place] for enterovesiculofistula    -admit to Colorectal   -NPO pending surgery  -CT scan on CD in the chart, will discuss with day team and plan for upload into our system tomorrow  -pain control  -DVT PPX w SCD  -Zosyn  -repeat labs and ua      VTE Risk Mitigation (From admission, onward)           Ordered     IP VTE LOW RISK PATIENT  Once         02/21/25 0347     Place sequential compression device  Until discontinued         02/21/25 0347                    Azam Jose MD  General Surgery  [facility]  "

## 2025-02-21 NOTE — HPI
Ms. Cardona is a 44 year old woman with a history of Crohn's disease, GERD, anxiety, CAD, Bipolar disorder, and pAF and the surgeries listed below presenting to [facility] [place] as a direct admit for colovesiculofistula. Pt follows with Dr. Atkins and was last seen about 2 months ago. At that time they discussed a completion proctectomy with a permanent end ileostomy but this was deferred in the hopes of not having to create a stoma. Her crohn's is currently managed with Skyrizi. Pt reports pneumaturia for about 1 wk, brown discharge and pain with urination between 5-6 days. States the pain is lower abdominal as well as in her groin. She presented to an outside facility thinking it would be a UTI and ultimately spent about 5 days in the ED (since this past Monday). 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 There her labs were essentially normal aside from some mild anemia and UA results consistent with colovesciular fistula. On arrival she is hemodynamically normal and we will repeat a set of labs. She reports a fever about 5 days ago which she treated at home with tylenol. No fevers since starting zosyn at the outside facility. She reports that her next dose of Skyrizi is due about March 1. Overall she is doing well but is worried about needing an ostomy.    Past surgeries:  5/25/2020 - Laparoscopic, converted to open cholecystectomy  1/15/2019 - Ileostomy closure and ileorectal anastomosis, extensive lysis of adhesions, ureterolysis  12/22/2016 - Exploratory laparotomy with lysis of adhesions, 1 hr - ERIK, BSO at that time  3/17/2014 - TAC with EI  Prior ileocolic resections ~2007, 2010     Flexible sigmoidoscopy - 8/2022 - proctitis, stricture appreciated - not dilated

## 2025-02-21 NOTE — PLAN OF CARE
[address] [facility]  Initial Discharge Assessment       Primary Care Provider: Matti Conklin MD    Admission Diagnosis: Crohn's disease [K50.90]    Admission Date: 2/21/2025  Expected Discharge Date: 2/23/2025    Transition of Care Barriers: None    Payor: [employer] [facility] / Plan: [employer] / Product Type: Medicare Advantage /     Extended Emergency Contact Information  Primary Emergency Contact: DulceApril  Mobile Phone: [phone]  Relation: Step parent  Preferred language: English   needed? No    Discharge Plan A: Home  Discharge Plan B: Home with family      [facility] - [place], [place] - [address]  [address]  [place] [address]  Phone: [phone] Fax: [phone]    [facility] [place] [address] [address]  Phone: [phone] Fax: [phone]      Initial Assessment (most recent)       Adult Discharge Assessment - 02/21/25 1502          Discharge Assessment    Assessment Type Discharge Planning Assessment     Confirmed/corrected address, phone number and insurance Yes     Confirmed Demographics Correct on Facesheet     Source of Information patient     Does patient/caregiver understand observation status Yes     Communicated IBJAN with patient/caregiver Yes     Reason For Admission chrons flare     People in Home alone     Facility Arrived From: Home     Do you expect to return to your current living situation? Yes     Do you have help at home or someone to help you manage your care at home? Yes     Who are your caregiver(s) and their phone number(s)? Stepparent April [phone]     Prior to hospitilization cognitive status: Alert/Oriented;No Deficits     Current cognitive status: Alert/Oriented;No Deficits     Walking or Climbing Stairs Difficulty no     Dressing/Bathing Difficulty no     Equipment Currently Used at Home none     Readmission within 30  days? No     Patient currently being followed by outpatient case management? No     Do you currently have service(s) that help you manage your care at home? No     Do you take prescription medications? Yes     Do you have prescription coverage? Yes     Do you have any problems affording any of your prescribed medications? No     Is the patient taking medications as prescribed? yes     Who is going to help you get home at discharge? Will need transport home at [place]     How do you get to doctors appointments? public transportation;health plan transportation     Are you on dialysis? No     Do you take coumadin? No     Discharge Plan A Home     Discharge Plan B Home with family     DME Needed Upon Discharge  none     Discharge Plan discussed with: Patient     Transition of Care Barriers None        Physical Activity    On average, how many days per week do you engage in moderate to strenuous exercise (like a brisk walk)? 0 days     On average, how many minutes do you engage in exercise at this level? 0 min        Financial Resource Strain    How hard is it for you to pay for the very basics like food, housing, medical care, and heating? Not very hard        Housing Stability    In the last 12 months, was there a time when you were not able to pay the mortgage or rent on time? No     At any time in the past 12 months, were you homeless or living in a shelter (including now)? No        Transportation Needs    Has the lack of transportation kept you from medical appointments, meetings, work or from getting things needed for daily living? No        Food Insecurity    Within the past 12 months, you worried that your food would run out before you got the money to buy more. Never true     Within the past 12 months, the food you bought just didn't last and you didn't have money to get more. Never true        Stress    Do you feel stress - tense, restless, nervous, or anxious, or unable to sleep at night because your mind is  troubled all the time - these days? To some extent        Social Isolation    How often do you feel lonely or isolated from those around you?  Rarely        Alcohol Use    Q1: How often do you have a drink containing alcohol? Never     Q2: How many drinks containing alcohol do you have on a typical day when you are drinking? Patient does not drink     Q3: How often do you have six or more drinks on one occasion? Never        Utilities    In the past 12 months has the electric, gas, oil, or water company threatened to shut off services in your home? No        Health Literacy    How often do you need to have someone help you when you read instructions, pamphlets, or other written material from your doctor or pharmacy? Never                   Discharge Plan A and Plan B have been determined by review of patient's clinical status, future medical and therapeutic needs, and coverage/benefits for post-acute care in coordination with multidisciplinary team members.

## 2025-02-21 NOTE — CONSULTS
[facility]  Urology  Consult Note    Patient Name: Candida Cardona  MRN: [id number]  Admission Date: 2/21/2025  Hospital Length of Stay: 0   Code Status: Full Code   Attending Provider: Robin Atkins MD   Consulting Provider: Luciano Barrow DO  Primary Care Physician: Matti Conklin MD  Principal Problem:<principal problem not specified>    Inpatient consult to Urology  Consult performed by: Luciano Barrow DO  Consult ordered by: Robin Atkins MD  Reason for consult: Enterovesicular Fistula        Subjective:     HPI:  Ms. Cardona is a 44 year old woman with a history of urolithiasis, Crohn's disease, GERD, anxiety, CAD, Bipolar disorder, and pAF and the surgeries listed below presenting to [facility] as a direct admit for colovesiculofistula. 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 Currently scheduled for a completion proctectomy on 2/22/25. Urology consulted for enterovesical fistula.     On assessment the patient is AFVSS. On 1/15/2019 - Ileostomy closure and ileorectal anastomosis, extensive lysis of adhesions, ureterolysis. Endorses passage of air and stool with urination of several weeks duration. Denies fevers, chills, flank pain, difficulty urinating. Has urologic history that includes stones and a cystoscopy performed in 2019 for removal of a stent. Currently scheduled for completion proctectomy on 2/22/25.    WBC 5.88, Hg 9.8. Cr is 0.8. UA nitrite negative 16 RBC, >100 WBC and moderate bacteria with 16 squamous cells and few yeast. Urine culture pending.     CT scan with no hydronephrosis bilaterally. Thick walled bladder with inflammatory changes and air posteriorly between rectum and bladder. No definitive fistula seen,                Past Medical History:   Diagnosis Date    Anxiety     Bipolar 1 disorder      Cholelithiasis     Coronary artery disease     NO STENT    Crohn's disease with ileorectal anastomotic stricture (ileum and rectum)     Depression     Epilepsy     Fibromyalgia     GERD (gastroesophageal reflux disease)     Interstitial cystitis     Kidney stones     Mass, ovarian 11/22/2016    Osteoporosis     PAF (paroxysmal atrial fibrillation)     Pancreatitis     S/P ERIK-BSO (total abdominal hysterectomy and bilateral salpingo-oophorectomy) 12/19/2016       Past Surgical History:   Procedure Laterality Date    ABDOMINAL SURGERY      ANASTOMOSIS OF ILEUM TO RECTUM  1/15/2019    Procedure: ANASTOMOSIS, ILEORECTAL;  Surgeon: Reji Peterson MD;  [facility]: [facility] OR [address] [place] [address];  Service: Colon and Rectal;;    ANGIOPLASTY      CHOLECYSTECTOMY N/A 5/25/2020    Procedure: CHOLECYSTECTOMY;  Surgeon: Ozzie Sanches MD;  [facility]: [facility] OR;  Service: General;  Laterality: N/A;  COVID NEG      COLON SURGERY      x 2    COLONOSCOPY      multiple    COLONOSCOPY N/A 4/11/2017    Procedure: COLONOSCOPY;  Surgeon: Hussain Amaro MD;  [facility]: [facility];  Service: Endoscopy;  Laterality: N/A;    CYSTOSCOPY W/ URETERAL STENT PLACEMENT      CYSTOSCOPY W/ URETERAL STENT REMOVAL      CYSTOSCOPY W/ URETERAL STENT REMOVAL Right 9/5/2019    Procedure: CYSTOSCOPY, WITH URETERAL STENT REMOVAL;  Surgeon: Dano Mathis MD;  [facility]: [facility] OR;  Service: Urology;  Laterality: Right;    CYSTOSCOPY WITH URETEROSCOPY, RETROGRADE PYELOGRAPHY, AND INSERTION OF STENT Right 8/22/2019    Procedure: CYSTOSCOPY, WITH RETROGRADE PYELOGRAM AND URETERAL STENT INSERTION;  Surgeon: Dano Mathis MD;  [facility]: [facility] OR;  Service: Urology;  Laterality: Right;    EXTRACORPOREAL SHOCK WAVE LITHOTRIPSY Right 8/22/2019    Procedure: LITHOTRIPSY-EXTRACORPOREAL SHOCK WAVE;  Surgeon: Dano Mathis MD;  [facility]: [facility] OR;  Service: Urology;  Laterality: Right;    EXTRACORPOREAL SHOCK WAVE LITHOTRIPSY Right 9/5/2019     Procedure: LITHOTRIPSY-EXTRACORPOREAL SHOCK WAVE;  Surgeon: Dano Mathis MD;  [facility]: [facility] OR;  Service: Urology;  Laterality: Right;    FLEXIBLE SIGMOIDOSCOPY N/A 1/23/2024    Procedure: SIGMOIDOSCOPY, FLEXIBLE;  Surgeon: Robin Atkins MD;  [facility]: [facility] ENDO (2ND FLR);  Service: Endoscopy;  Laterality: N/A;  Ref By:  Dr. Madera  Prep Specifications: 2 days of clear and 2 enema before procedure  1/1-precall complete-pt approved to stay at [facility] night before and following the procedure without a ride per Dr. Atkins-see encounter dated 1/18-MS    FLEXIBLE SIGMOIDOSCOPY N/A 9/17/2024    Procedure: SIGMOIDOSCOPY, FLEXIBLE;  Surgeon: Robin Atkins MD;  [facility]: [facility] (4TH FLR);  Service: Endoscopy;  Laterality: N/A;  Ref By: ,McGill.  9/12-pre call complete-dr herrera would like to be gcjuysg-ur-acrask appt with gamino 10am    HYSTERECTOMY      ILEOSTOMY      LAPAROTOMY, EXPLORATORY  1/15/2019    Procedure: LAPAROTOMY, EXPLORATORY;extensive lysis of adhesions;  Surgeon: Reji Peterson MD;  [facility]: [facility] OR 2ND FLR;  Service: Colon and Rectal;;    LYSIS OF ADHESIONS OF URETER  1/15/2019    Procedure: URETEROLYSIS;  Surgeon: Reji Peterson MD;  [facility]: [facility] OR 2ND FLR;  Service: Colon and Rectal;;    PERIPHERALLY INSERTED CENTRAL CATHETER INSERTION N/A 10/30/2018    Procedure: INSERTION, PICC;  Surgeon: Dosc Diagnostic Provider;  [facility]: [facility] OR;  Service: General;  Laterality: N/A;    PERIPHERALLY INSERTED CENTRAL CATHETER INSERTION N/A 8/23/2019    Procedure: INSERTION, PICC;  Surgeon: Liz Diagnostic Provider;  [facility]: [facility] OR;  Service: General;  Laterality: N/A;    PORTACATH PLACEMENT      TOTAL COLECTOMY  2014       Review of patient's allergies indicates:   Allergen Reactions    Ciprofloxacin Shortness Of Breath and Itching    Keppra [levetiracetam] Other (See Comments)     Increased depression    Mercaptopurine analogues (thiopurines) Hives,  Diarrhea and Nausea And Vomiting    Imuran [azathioprine sodium] Diarrhea and Nausea And Vomiting     Pt states that she becomes hot sweaty and passes out also. She states that she has diarrhea and nausea and vomiting     [place]     Imuran [azathioprine] Nausea And Vomiting    Ketorolac Itching and Hives    Adhesive Itching    Iodinated contrast media Hives       Family History       Problem Relation (Age of Onset)    Cancer Father    Diabetes Father    Hypertension Mother    Joint hypermobility Mother    No Known Problems Brother, Brother    Physical abuse Mother    Schizophrenia Father    Stroke Father, Paternal Grandmother            Tobacco Use    Smoking status: Never    Smokeless tobacco: Never   Substance and Sexual Activity    Alcohol use: No    Drug use: No    Sexual activity: Yes     Partners: Male       Review of Systems   Constitutional:  Negative for chills and fever.   Gastrointestinal:  Negative for nausea and vomiting.   Genitourinary:  Negative for difficulty urinating and flank pain.       Objective:     Temp:  [97.3 °F (36.3 °C)-98.5 °F (36.9 °C)] 97.3 °F (36.3 °C)  Pulse:  [66-79] 72  Resp:  [16-20] 18  SpO2:  [95 %-100 %] 99 %  BP: ()/(55-74) 97/55  Weight: 64.5 kg (142 lb 3.2 oz)  Body mass index is 22.27 kg/m².    Date 02/21/25 0700 - 02/22/25 0659   Shift 4075-6690 6045-2165 5462-4110 24 Hour Total   INTAKE   IV Piggyback 87.3   87.3   Shift Total(mL/kg) 87.3(1.4)   87.3(1.4)   OUTPUT   Shift Total(mL/kg)       Weight (kg) 64.5 64.5 64.5 64.5          Drains       None                    Physical Exam  Constitutional:       General: She is not in acute distress.     Appearance: Normal appearance.   HENT:      Head: Normocephalic.      Nose: Nose normal.   Eyes:      Pupils: Pupils are equal, round, and reactive to light.   Cardiovascular:      Rate and Rhythm: Normal rate.   Pulmonary:      Effort: Pulmonary effort is normal. No respiratory distress.   Abdominal:       "General: Abdomen is flat. There is no distension.      Tenderness: There is no abdominal tenderness. There is no right CVA tenderness or left CVA tenderness.   Musculoskeletal:         General: Normal range of motion.   Skin:     General: Skin is warm.      Coloration: Skin is not jaundiced.   Neurological:      General: No focal deficit present.      Mental Status: She is alert and oriented to person, place, and time.   Psychiatric:         Mood and Affect: Mood normal.         Behavior: Behavior normal.          Significant Labs:    BMP:  Recent Labs   Lab 02/21/25  0500   *   K 3.0*      CO2 19*   BUN 6   CREATININE 0.8   CALCIUM 8.7       CBC:  Recent Labs   Lab 02/21/25  0500   WBC 5.88   HGB 9.8*   HCT 32.8*          Blood Culture: No results for input(s): "LABBLOO" in the last 168 hours.  CMP:   Recent Labs   Lab 02/21/25  0500   GLU 99   *   K 3.0*      CO2 19*   BUN 6   CREATININE 0.8   CALCIUM 8.7   MG 1.3*     Urine Culture: No results for input(s): "LABURIN" in the last 168 hours.  Urine Studies:   Recent Labs   Lab 02/21/25  0448   COLORU Yellow   APPEARANCEUA Hazy*   PHUR 6.0   SPECGRAV >1.030*   PROTEINUA 1+*   GLUCUA Negative   KETONESU Trace*   BILIRUBINUA Negative   OCCULTUA 2+*   NITRITE Negative   LEUKOCYTESUR 3+*   RBCUA 16*   WBCUA >100*   BACTERIA Moderate*   SQUAMEPITHEL 16   HYALINECASTS 0       Significant Imaging:  CT: I have reviewed all results within the past 24 hours and my personal findings are:  as noted in HPI                     Assessment and Plan:     Colovesical fistula  Ms. Cardona is a 44 year old woman with a history of urolithiasis, Crohn's disease, GERD, anxiety, CAD, Bipolar disorder, and pAF and the surgeries listed below presenting [place] [facility] [place] a direct admit for colovesiculofistula. Urology consulted for enterovesical fistula.     -- History consistent with colovesical fistula, imaging unclear.   -- Will order upper tract imaging today  -- " Currently scheduled for the OR tomorrow with CRS  -- Urology to be available to perform ureteral catheters, bilateral retrograde pyelogram, cystogram and possible bladder closure during the case tomorrow  -- Will consent in AM             VTE Risk Mitigation (From admission, onward)           Ordered     IP VTE LOW RISK PATIENT  Once         02/21/25 0347     Place sequential compression device  Until discontinued         02/21/25 0347                    Thank you for your consult. I will follow-up with patient. Please contact us if you have any additional questions.    Luciano Barrow, DO  Urology  [facility]

## 2025-02-21 NOTE — SUBJECTIVE & OBJECTIVE
Current Facility-Administered Medications on File Prior to Encounter   Medication    mupirocin 2 % ointment     Current Outpatient Medications on File Prior to Encounter   Medication Sig    cholestyramine (QUESTRAN) 4 gram packet Take 1 packet (4 g total) by mouth once daily.    ergocalciferol (VITAMIN D2) 50,000 unit Cap Take 50,000 Units by mouth every 7 days.    ondansetron (ZOFRAN-ODT) 4 MG TbDL Take 1 tablet (4 mg total) by mouth every 6 (six) hours as needed (Nausea vomiting).    pantoprazole (PROTONIX) 40 MG tablet Take 1 tablet (40 mg total) by mouth once daily.    risankizumab-rzaa (SKYRIZI) 360 mg/2.4 mL (150 mg/mL) Injt Inject 360 mg into the skin every 8 weeks.    [DISCONTINUED] ondansetron (ZOFRAN) 4 MG tablet Take 4 mg by mouth 2 (two) times daily as needed.       Review of patient's allergies indicates:   Allergen Reactions    Ciprofloxacin Shortness Of Breath and Itching    Keppra [levetiracetam] Other (See Comments)     Increased depression    Mercaptopurine analogues (thiopurines) Hives, Diarrhea and Nausea And Vomiting    Imuran [azathioprine sodium] Diarrhea and Nausea And Vomiting     Pt states that she becomes hot sweaty and passes out also. She states that she has diarrhea and nausea and vomiting     [place]     Imuran [azathioprine] Nausea And Vomiting    Ketorolac Itching and Hives    Adhesive Itching    Iodinated contrast media Hives       Past Medical History:   Diagnosis Date    Anxiety     Bipolar 1 disorder     Cholelithiasis     Coronary artery disease     NO STENT    Crohn's disease with ileorectal anastomotic stricture (ileum and rectum)     Depression     Epilepsy     Fibromyalgia     GERD (gastroesophageal reflux disease)     Interstitial cystitis     Kidney stones     Mass, ovarian 11/22/2016    Osteoporosis     PAF (paroxysmal atrial fibrillation)     Pancreatitis     S/P ERIK-BSO (total abdominal hysterectomy and bilateral salpingo-oophorectomy) 12/19/2016     Past Surgical  History:   Procedure Laterality Date    ABDOMINAL SURGERY      ANASTOMOSIS OF ILEUM TO RECTUM  1/15/2019    Procedure: ANASTOMOSIS, ILEORECTAL;  Surgeon: Reji Peterson MD;  [facility]: [facility] OR 2ND FLR;  Service: Colon and Rectal;;    ANGIOPLASTY      CHOLECYSTECTOMY N/A 5/25/2020    Procedure: CHOLECYSTECTOMY;  Surgeon: Ozzie Sanches MD;  [facility]: [facility];  Service: General;  Laterality: N/A;  COVID NEG      COLON SURGERY      x 2    COLONOSCOPY      multiple    COLONOSCOPY N/A 4/11/2017    Procedure: COLONOSCOPY;  Surgeon: Hussain Amaro MD;  [facility]: [facility];  Service: Endoscopy;  Laterality: N/A;    CYSTOSCOPY W/ URETERAL STENT PLACEMENT      CYSTOSCOPY W/ URETERAL STENT REMOVAL      CYSTOSCOPY W/ URETERAL STENT REMOVAL Right 9/5/2019    Procedure: CYSTOSCOPY, WITH URETERAL STENT REMOVAL;  Surgeon: Dano Mathis MD;  [facility]: [facility] OR;  Service: Urology;  Laterality: Right;    CYSTOSCOPY WITH URETEROSCOPY, RETROGRADE PYELOGRAPHY, AND INSERTION OF STENT Right 8/22/2019    Procedure: CYSTOSCOPY, WITH RETROGRADE PYELOGRAM AND URETERAL STENT INSERTION;  Surgeon: Dano Mathis MD;  [facility]: [facility] OR;  Service: Urology;  Laterality: Right;    EXTRACORPOREAL SHOCK WAVE LITHOTRIPSY Right 8/22/2019    Procedure: LITHOTRIPSY-EXTRACORPOREAL SHOCK WAVE;  Surgeon: Dano Mathis MD;  [facility]: [facility] OR;  Service: Urology;  Laterality: Right;    EXTRACORPOREAL SHOCK WAVE LITHOTRIPSY Right 9/5/2019    Procedure: LITHOTRIPSY-EXTRACORPOREAL SHOCK WAVE;  Surgeon: Dano Mathis MD;  [facility]: [facility] OR;  Service: Urology;  Laterality: Right;    FLEXIBLE SIGMOIDOSCOPY N/A 1/23/2024    Procedure: SIGMOIDOSCOPY, FLEXIBLE;  Surgeon: Robin Atkins MD;  [facility]: [facility] (2ND FLR);  Service: Endoscopy;  Laterality: N/A;  Ref By:  Dr. Madera  Prep Specifications: 2 days of clear and 2 enema before procedure  1/1-precall complete-pt approved to stay at [facility] night before and following the  procedure without a ride per Dr. Atkins-see encounter dated 1/18-MS    FLEXIBLE SIGMOIDOSCOPY N/A 9/17/2024    Procedure: SIGMOIDOSCOPY, FLEXIBLE;  Surgeon: Robin Atkins MD;  [facility]: [facility] ENDO (4TH FLR);  Service: Endoscopy;  Laterality: N/A;  Ref By: key Oneal.  9/12-pre call complete-dr herrera would like to be pnivhvv-yo-dqikyo appt with gamino 10am    HYSTERECTOMY      ILEOSTOMY      LAPAROTOMY, EXPLORATORY  1/15/2019    Procedure: LAPAROTOMY, EXPLORATORY;extensive lysis of adhesions;  Surgeon: Reji Peterson MD;  [facility]: [facility] OR 2ND FLR;  Service: Colon and Rectal;;    LYSIS OF ADHESIONS OF URETER  1/15/2019    Procedure: URETEROLYSIS;  Surgeon: Reji Peterson MD;  [facility]: [facility] OR 2ND FLR;  Service: Colon and Rectal;;    PERIPHERALLY INSERTED CENTRAL CATHETER INSERTION N/A 10/30/2018    Procedure: INSERTION, PICC;  Surgeon: Dosc Diagnostic Provider;  [facility]: [facility] OR;  Service: General;  Laterality: N/A;    PERIPHERALLY INSERTED CENTRAL CATHETER INSERTION N/A 8/23/2019    Procedure: INSERTION, PICC;  Surgeon: Dosc Diagnostic Provider;  [facility]: [facility] OR;  Service: General;  Laterality: N/A;    PORTACATH PLACEMENT      TOTAL COLECTOMY  2014     Family History       Problem Relation (Age of Onset)    Cancer Father    Diabetes Father    Hypertension Mother    Joint hypermobility Mother    No Known Problems Brother, Brother    Physical abuse Mother    Schizophrenia Father    Stroke Father, Paternal Grandmother          Tobacco Use    Smoking status: Never    Smokeless tobacco: Never   Substance and Sexual Activity    Alcohol use: No    Drug use: No    Sexual activity: Yes     Partners: Male     Review of Systems   Constitutional:  Positive for fever (resolved). Negative for chills.   Respiratory:  Negative for shortness of breath.    Cardiovascular:  Negative for chest pain.   Gastrointestinal:  Positive for abdominal pain.   Genitourinary:  Positive for difficulty urinating, dysuria  "and pelvic pain.   Musculoskeletal:  Negative for back pain.   Skin:  Negative for rash.   Psychiatric/Behavioral:  Negative for behavioral problems.      Objective:     Vital Signs (Most Recent):  Temp: 98.2 °F (36.8 °C) (02/21/25 0346)  Pulse: 77 (02/21/25 0346)  Resp: 16 (02/21/25 0346)  BP: 121/68 (02/21/25 0346)  SpO2: 95 % (02/21/25 0346) Vital Signs (24h Range):  Temp:  [98.2 °F (36.8 °C)] 98.2 °F (36.8 °C)  Pulse:  [66-79] 77  Resp:  [16-20] 16  SpO2:  [95 %-100 %] 95 %  BP: (113-121)/(56-74) 121/68     Weight: 64.5 kg (142 lb 3.2 oz)  Body mass index is 22.27 kg/m².     Physical Exam  Vitals and nursing note reviewed.   Constitutional:       General: She is not in acute distress.  HENT:      Head: Normocephalic and atraumatic.   Eyes:      Extraocular Movements: Extraocular movements intact.      Pupils: Pupils are equal, round, and reactive to light.   Cardiovascular:      Rate and Rhythm: Normal rate and regular rhythm.   Abdominal:      Tenderness: There is no abdominal tenderness.      Comments: Soft. Nondistended. Some tenderness to palpation worse in the suprapubic region. Well healed prior ostomy site on her L side. No peritonitis.    Musculoskeletal:         General: Normal range of motion.      Cervical back: Normal range of motion.   Skin:     General: Skin is warm and dry.   Neurological:      General: No focal deficit present.      Mental Status: She is alert.            I have reviewed all pertinent lab results within the past 24 hours.  CBC: No results for input(s): "WBC", "RBC", "HGB", "HCT", "PLT", "MCV", "MCH", "MCHC" in the last 168 hours.  CMP: No results for input(s): "GLU", "CALCIUM", "ALBUMIN", "PROT", "NA", "K", "CO2", "CL", "BUN", "CREATININE", "ALKPHOS", "ALT", "AST", "BILITOT" in the last 168 hours.    Significant Diagnostics:  I have reviewed all pertinent imaging results/findings within the past 24 hours.    "

## 2025-02-21 NOTE — SUBJECTIVE & OBJECTIVE
Past Medical History:   Diagnosis Date    Anxiety     Bipolar 1 disorder     Cholelithiasis     Coronary artery disease     NO STENT    Crohn's disease with ileorectal anastomotic stricture (ileum and rectum)     Depression     Epilepsy     Fibromyalgia     GERD (gastroesophageal reflux disease)     Interstitial cystitis     Kidney stones     Mass, ovarian 11/22/2016    Osteoporosis     PAF (paroxysmal atrial fibrillation)     Pancreatitis     S/P ERIK-BSO (total abdominal hysterectomy and bilateral salpingo-oophorectomy) 12/19/2016       Past Surgical History:   Procedure Laterality Date    ABDOMINAL SURGERY      ANASTOMOSIS OF ILEUM TO RECTUM  1/15/2019    Procedure: ANASTOMOSIS, ILEORECTAL;  Surgeon: Reji Peterson MD;  [facility]: [address];  Service: Colon and Rectal;;    ANGIOPLASTY      CHOLECYSTECTOMY N/A 5/25/2020    Procedure: CHOLECYSTECTOMY;  Surgeon: Ozzie Sanches MD;  [facility]: [facility] OR;  Service: General;  Laterality: N/A;  COVID NEG      COLON SURGERY      x 2    COLONOSCOPY      multiple    COLONOSCOPY N/A 4/11/2017    Procedure: COLONOSCOPY;  Surgeon: Hussain Amaro MD;  [facility]: [facility];  Service: Endoscopy;  Laterality: N/A;    CYSTOSCOPY W/ URETERAL STENT PLACEMENT      CYSTOSCOPY W/ URETERAL STENT REMOVAL      CYSTOSCOPY W/ URETERAL STENT REMOVAL Right 9/5/2019    Procedure: CYSTOSCOPY, WITH URETERAL STENT REMOVAL;  Surgeon: Dano Mathis MD;  [facility]: [facility] OR;  Service: Urology;  Laterality: Right;    CYSTOSCOPY WITH URETEROSCOPY, RETROGRADE PYELOGRAPHY, AND INSERTION OF STENT Right 8/22/2019    Procedure: CYSTOSCOPY, WITH RETROGRADE PYELOGRAM AND URETERAL STENT INSERTION;  Surgeon: Dano Mathis MD;  [facility]: [facility] OR;  Service: Urology;  Laterality: Right;    EXTRACORPOREAL SHOCK WAVE LITHOTRIPSY Right 8/22/2019    Procedure: LITHOTRIPSY-EXTRACORPOREAL SHOCK WAVE;  Surgeon: Dano Mathis MD;  [facility]: [facility] OR;  Service: Urology;  Laterality: Right;     EXTRACORPOREAL SHOCK WAVE LITHOTRIPSY Right 9/5/2019    Procedure: LITHOTRIPSY-EXTRACORPOREAL SHOCK WAVE;  Surgeon: Dano Mathis MD;  [facility]: [facility] OR;  Service: Urology;  Laterality: Right;    FLEXIBLE SIGMOIDOSCOPY N/A 1/23/2024    Procedure: SIGMOIDOSCOPY, FLEXIBLE;  Surgeon: Robin Atkins MD;  [facility]: [facility] ENDO (2ND FLR);  Service: Endoscopy;  Laterality: N/A;  Ref By:  Dr. Madera  Prep Specifications: 2 days of clear and 2 enema before procedure  1/1-precall complete-pt approved to stay at [facility] night before and following the procedure without a ride per Dr. Atkins-see encounter dated 1/18-MS    FLEXIBLE SIGMOIDOSCOPY N/A 9/17/2024    Procedure: SIGMOIDOSCOPY, FLEXIBLE;  Surgeon: Robin Atkins MD;  [facility]: [facility] (4TH FLR);  Service: Endoscopy;  Laterality: N/A;  Ref By: Dr.Kethmanportal.  9/12-pre call complete-dr herrera would like to be [id number] appt with stevenson 10am    HYSTERECTOMY      ILEOSTOMY      LAPAROTOMY, EXPLORATORY  1/15/2019    Procedure: LAPAROTOMY, EXPLORATORY;extensive lysis of adhesions;  Surgeon: Reji Peterson MD;  [facility]: [facility] OR 2ND FLR;  Service: Colon and Rectal;;    LYSIS OF ADHESIONS OF URETER  1/15/2019    Procedure: URETEROLYSIS;  Surgeon: Reji Peterson MD;  [facility]: [facility] OR 2ND FLR;  Service: Colon and Rectal;;    PERIPHERALLY INSERTED CENTRAL CATHETER INSERTION N/A 10/30/2018    Procedure: INSERTION, PICC;  Surgeon: Dosxavier Diagnostic Provider;  [facility]: [facility] OR;  Service: General;  Laterality: N/A;    PERIPHERALLY INSERTED CENTRAL CATHETER INSERTION N/A 8/23/2019    Procedure: INSERTION, PICC;  Surgeon: Liz Diagnostic Provider;  [facility]: [facility] OR;  Service: General;  Laterality: N/A;    PORTACATH PLACEMENT      TOTAL COLECTOMY  2014       Review of patient's allergies indicates:   Allergen Reactions    Ciprofloxacin Shortness Of Breath and Itching    Keppra [levetiracetam] Other (See Comments)     Increased depression     Mercaptopurine analogues (thiopurines) Hives, Diarrhea and Nausea And Vomiting    Imuran [azathioprine sodium] Diarrhea and Nausea And Vomiting     Pt states that she becomes hot sweaty and passes out also. She states that she has diarrhea and nausea and vomiting     Bucks     Imuran [azathioprine] Nausea And Vomiting    Ketorolac Itching and Hives    Adhesive Itching    Iodinated contrast media Hives       Family History       Problem Relation (Age of Onset)    Cancer Father    Diabetes Father    Hypertension Mother    Joint hypermobility Mother    No Known Problems Brother, Brother    Physical abuse Mother    Schizophrenia Father    Stroke Father, Paternal Grandmother            Tobacco Use    Smoking status: Never    Smokeless tobacco: Never   Substance and Sexual Activity    Alcohol use: No    Drug use: No    Sexual activity: Yes     Partners: Male       Review of Systems   Constitutional:  Negative for chills and fever.   Gastrointestinal:  Negative for nausea and vomiting.   Genitourinary:  Negative for difficulty urinating and flank pain.       Objective:     Temp:  [97.3 °F (36.3 °C)-98.5 °F (36.9 °C)] 97.3 °F (36.3 °C)  Pulse:  [66-79] 72  Resp:  [16-20] 18  SpO2:  [95 %-100 %] 99 %  BP: ()/(55-74) 97/55  Weight: 64.5 kg (142 lb 3.2 oz)  Body mass index is 22.27 kg/m².    Date 02/21/25 0700 - 02/22/25 0659   Shift 2737-0622 2269-4601 8365-6882 24 Hour Total   INTAKE   IV Piggyback 87.3   87.3   Shift Total(mL/kg) 87.3(1.4)   87.3(1.4)   OUTPUT   Shift Total(mL/kg)       Weight (kg) 64.5 64.5 64.5 64.5          Drains       None                    Physical Exam  Constitutional:       General: She is not in acute distress.     Appearance: Normal appearance.   HENT:      Head: Normocephalic.      Nose: Nose normal.   Eyes:      Pupils: Pupils are equal, round, and reactive to light.   Cardiovascular:      Rate and Rhythm: Normal rate.   Pulmonary:      Effort: Pulmonary effort is normal. No respiratory  "distress.   Abdominal:      General: Abdomen is flat. There is no distension.      Tenderness: There is no abdominal tenderness. There is no right CVA tenderness or left CVA tenderness.   Musculoskeletal:         General: Normal range of motion.   Skin:     General: Skin is warm.      Coloration: Skin is not jaundiced.   Neurological:      General: No focal deficit present.      Mental Status: She is alert and oriented to person, place, and time.   Psychiatric:         Mood and Affect: Mood normal.         Behavior: Behavior normal.          Significant Labs:    BMP:  Recent Labs   Lab 02/21/25  0500   *   K 3.0*      CO2 19*   BUN 6   CREATININE 0.8   CALCIUM 8.7       CBC:  Recent Labs   Lab 02/21/25  0500   WBC 5.88   HGB 9.8*   HCT 32.8*          Blood Culture: No results for input(s): "LABBLOO" in the last 168 hours.  CMP:   Recent Labs   Lab 02/21/25  0500   GLU 99   *   K 3.0*      CO2 19*   BUN 6   CREATININE 0.8   CALCIUM 8.7   MG 1.3*     Urine Culture: No results for input(s): "LABURIN" in the last 168 hours.  Urine Studies:   Recent Labs   Lab 02/21/25  0448   COLORU Yellow   APPEARANCEUA Hazy*   PHUR 6.0   SPECGRAV >1.030*   PROTEINUA 1+*   GLUCUA Negative   KETONESU Trace*   BILIRUBINUA Negative   OCCULTUA 2+*   NITRITE Negative   LEUKOCYTESUR 3+*   RBCUA 16*   WBCUA >100*   BACTERIA Moderate*   SQUAMEPITHEL 16   HYALINECASTS 0       Significant Imaging:  CT: I have reviewed all results within the past 24 hours and my personal findings are:  as noted in HPI                   "

## 2025-02-22 ENCOUNTER — ANESTHESIA (OUTPATIENT)
Dept: SURGERY | Facility: HOSPITAL | Age: 45
End: 2025-02-22
Payer: MEDICARE

## 2025-02-22 LAB
ABO + RH BLD: NORMAL
ALBUMIN SERPL BCP-MCNC: 2.7 G/DL (ref 3.5–5.2)
ALP SERPL-CCNC: 77 U/L (ref 40–150)
ALT SERPL W/O P-5'-P-CCNC: 34 U/L (ref 10–44)
ANION GAP SERPL CALC-SCNC: 10 MMOL/L (ref 8–16)
ANION GAP SERPL CALC-SCNC: 18 MMOL/L (ref 8–16)
AST SERPL-CCNC: 40 U/L (ref 10–40)
BACTERIA UR CULT: ABNORMAL
BASOPHILS # BLD AUTO: 0.03 K/UL (ref 0–0.2)
BASOPHILS # BLD AUTO: 0.05 K/UL (ref 0–0.2)
BASOPHILS NFR BLD: 0.3 % (ref 0–1.9)
BASOPHILS NFR BLD: 0.6 % (ref 0–1.9)
BILIRUB SERPL-MCNC: 1.2 MG/DL (ref 0.1–1)
BLD GP AB SCN CELLS X3 SERPL QL: NORMAL
BUN SERPL-MCNC: 6 MG/DL (ref 6–20)
BUN SERPL-MCNC: 7 MG/DL (ref 6–20)
CALCIUM SERPL-MCNC: 7.3 MG/DL (ref 8.7–10.5)
CALCIUM SERPL-MCNC: 8.7 MG/DL (ref 8.7–10.5)
CHLORIDE SERPL-SCNC: 104 MMOL/L (ref 95–110)
CHLORIDE SERPL-SCNC: 99 MMOL/L (ref 95–110)
CO2 SERPL-SCNC: 15 MMOL/L (ref 23–29)
CO2 SERPL-SCNC: 24 MMOL/L (ref 23–29)
CREAT SERPL-MCNC: 0.8 MG/DL (ref 0.5–1.4)
CREAT SERPL-MCNC: 0.9 MG/DL (ref 0.5–1.4)
DIFFERENTIAL METHOD BLD: ABNORMAL
DIFFERENTIAL METHOD BLD: ABNORMAL
EOSINOPHIL # BLD AUTO: 0 K/UL (ref 0–0.5)
EOSINOPHIL # BLD AUTO: 0.2 K/UL (ref 0–0.5)
EOSINOPHIL NFR BLD: 0.1 % (ref 0–8)
EOSINOPHIL NFR BLD: 3.1 % (ref 0–8)
ERYTHROCYTE [DISTWIDTH] IN BLOOD BY AUTOMATED COUNT: 21.8 % (ref 11.5–14.5)
ERYTHROCYTE [DISTWIDTH] IN BLOOD BY AUTOMATED COUNT: 22.1 % (ref 11.5–14.5)
EST. GFR  (NO RACE VARIABLE): >60 ML/MIN/1.73 M^2
EST. GFR  (NO RACE VARIABLE): >60 ML/MIN/1.73 M^2
GLUCOSE SERPL-MCNC: 148 MG/DL (ref 70–110)
GLUCOSE SERPL-MCNC: 92 MG/DL (ref 70–110)
HCT VFR BLD AUTO: 29.3 % (ref 37–48.5)
HCT VFR BLD AUTO: 33.3 % (ref 37–48.5)
HGB BLD-MCNC: 10.1 G/DL (ref 12–16)
HGB BLD-MCNC: 8.8 G/DL (ref 12–16)
IMM GRANULOCYTES # BLD AUTO: 0.02 K/UL (ref 0–0.04)
IMM GRANULOCYTES # BLD AUTO: 0.16 K/UL (ref 0–0.04)
IMM GRANULOCYTES NFR BLD AUTO: 0.4 % (ref 0–0.5)
IMM GRANULOCYTES NFR BLD AUTO: 1 % (ref 0–0.5)
LYMPHOCYTES # BLD AUTO: 0.8 K/UL (ref 1–4.8)
LYMPHOCYTES # BLD AUTO: 1.2 K/UL (ref 1–4.8)
LYMPHOCYTES NFR BLD: 14.6 % (ref 18–48)
LYMPHOCYTES NFR BLD: 7.4 % (ref 18–48)
MAGNESIUM SERPL-MCNC: 1.8 MG/DL (ref 1.6–2.6)
MAGNESIUM SERPL-MCNC: 2 MG/DL (ref 1.6–2.6)
MCH RBC QN AUTO: 21.8 PG (ref 27–31)
MCH RBC QN AUTO: 22 PG (ref 27–31)
MCHC RBC AUTO-ENTMCNC: 30 G/DL (ref 32–36)
MCHC RBC AUTO-ENTMCNC: 30.3 G/DL (ref 32–36)
MCV RBC AUTO: 72 FL (ref 82–98)
MCV RBC AUTO: 73 FL (ref 82–98)
MONOCYTES # BLD AUTO: 0.4 K/UL (ref 0.3–1)
MONOCYTES # BLD AUTO: 0.7 K/UL (ref 0.3–1)
MONOCYTES NFR BLD: 4.4 % (ref 4–15)
MONOCYTES NFR BLD: 6.8 % (ref 4–15)
NEUTROPHILS # BLD AUTO: 13.9 K/UL (ref 1.8–7.7)
NEUTROPHILS # BLD AUTO: 3.8 K/UL (ref 1.8–7.7)
NEUTROPHILS NFR BLD: 74.5 % (ref 38–73)
NEUTROPHILS NFR BLD: 86.8 % (ref 38–73)
NRBC BLD-RTO: 0 /100 WBC
NRBC BLD-RTO: 0 /100 WBC
PHOSPHATE SERPL-MCNC: 3.3 MG/DL (ref 2.7–4.5)
PHOSPHATE SERPL-MCNC: 4.3 MG/DL (ref 2.7–4.5)
PLATELET # BLD AUTO: 277 K/UL (ref 150–450)
PLATELET # BLD AUTO: 288 K/UL (ref 150–450)
PMV BLD AUTO: 9.1 FL (ref 9.2–12.9)
PMV BLD AUTO: 9.4 FL (ref 9.2–12.9)
POTASSIUM SERPL-SCNC: 3.4 MMOL/L (ref 3.5–5.1)
POTASSIUM SERPL-SCNC: 4.7 MMOL/L (ref 3.5–5.1)
PROT SERPL-MCNC: 5.3 G/DL (ref 6–8.4)
RBC # BLD AUTO: 4 M/UL (ref 4–5.4)
RBC # BLD AUTO: 4.63 M/UL (ref 4–5.4)
SODIUM SERPL-SCNC: 133 MMOL/L (ref 136–145)
SODIUM SERPL-SCNC: 137 MMOL/L (ref 136–145)
SPECIMEN OUTDATE: NORMAL
WBC # BLD AUTO: 15.98 K/UL (ref 3.9–12.7)
WBC # BLD AUTO: 5.15 K/UL (ref 3.9–12.7)

## 2025-02-22 PROCEDURE — 71000015 HC POSTOP RECOV 1ST HR: Performed by: STUDENT IN AN ORGANIZED HEALTH CARE EDUCATION/TRAINING PROGRAM

## 2025-02-22 PROCEDURE — 0DN80ZZ RELEASE SMALL INTESTINE, OPEN APPROACH: ICD-10-PCS | Performed by: STUDENT IN AN ORGANIZED HEALTH CARE EDUCATION/TRAINING PROGRAM

## 2025-02-22 PROCEDURE — 63600175 PHARM REV CODE 636 W HCPCS: Mod: JZ,TB

## 2025-02-22 PROCEDURE — 20600001 HC STEP DOWN PRIVATE ROOM

## 2025-02-22 PROCEDURE — 85025 COMPLETE CBC W/AUTO DIFF WBC: CPT

## 2025-02-22 PROCEDURE — 63600175 PHARM REV CODE 636 W HCPCS

## 2025-02-22 PROCEDURE — 88307 TISSUE EXAM BY PATHOLOGIST: CPT | Mod: 26,,, | Performed by: PATHOLOGY

## 2025-02-22 PROCEDURE — 0TN70ZZ RELEASE LEFT URETER, OPEN APPROACH: ICD-10-PCS | Performed by: STUDENT IN AN ORGANIZED HEALTH CARE EDUCATION/TRAINING PROGRAM

## 2025-02-22 PROCEDURE — 63600175 PHARM REV CODE 636 W HCPCS: Mod: JZ,TB | Performed by: STUDENT IN AN ORGANIZED HEALTH CARE EDUCATION/TRAINING PROGRAM

## 2025-02-22 PROCEDURE — C1758 CATHETER, URETERAL: HCPCS | Performed by: STUDENT IN AN ORGANIZED HEALTH CARE EDUCATION/TRAINING PROGRAM

## 2025-02-22 PROCEDURE — 25000003 PHARM REV CODE 250

## 2025-02-22 PROCEDURE — 88304 TISSUE EXAM BY PATHOLOGIST: CPT | Performed by: PATHOLOGY

## 2025-02-22 PROCEDURE — 27201423 OPTIME MED/SURG SUP & DEVICES STERILE SUPPLY: Performed by: STUDENT IN AN ORGANIZED HEALTH CARE EDUCATION/TRAINING PROGRAM

## 2025-02-22 PROCEDURE — 36000709 HC OR TIME LEV III EA ADD 15 MIN: Performed by: STUDENT IN AN ORGANIZED HEALTH CARE EDUCATION/TRAINING PROGRAM

## 2025-02-22 PROCEDURE — 0D1B0Z4 BYPASS ILEUM TO CUTANEOUS, OPEN APPROACH: ICD-10-PCS | Performed by: STUDENT IN AN ORGANIZED HEALTH CARE EDUCATION/TRAINING PROGRAM

## 2025-02-22 PROCEDURE — 71000033 HC RECOVERY, INTIAL HOUR: Performed by: STUDENT IN AN ORGANIZED HEALTH CARE EDUCATION/TRAINING PROGRAM

## 2025-02-22 PROCEDURE — 25000003 PHARM REV CODE 250: Performed by: STUDENT IN AN ORGANIZED HEALTH CARE EDUCATION/TRAINING PROGRAM

## 2025-02-22 PROCEDURE — 37000008 HC ANESTHESIA 1ST 15 MINUTES: Performed by: STUDENT IN AN ORGANIZED HEALTH CARE EDUCATION/TRAINING PROGRAM

## 2025-02-22 PROCEDURE — 63600175 PHARM REV CODE 636 W HCPCS: Mod: JZ,TB | Performed by: ANESTHESIOLOGY

## 2025-02-22 PROCEDURE — 0DNW0ZZ RELEASE PERITONEUM, OPEN APPROACH: ICD-10-PCS | Performed by: STUDENT IN AN ORGANIZED HEALTH CARE EDUCATION/TRAINING PROGRAM

## 2025-02-22 PROCEDURE — 63600175 PHARM REV CODE 636 W HCPCS: Performed by: STUDENT IN AN ORGANIZED HEALTH CARE EDUCATION/TRAINING PROGRAM

## 2025-02-22 PROCEDURE — 63600175 PHARM REV CODE 636 W HCPCS: Performed by: NURSE ANESTHETIST, CERTIFIED REGISTERED

## 2025-02-22 PROCEDURE — 71000016 HC POSTOP RECOV ADDL HR: Performed by: STUDENT IN AN ORGANIZED HEALTH CARE EDUCATION/TRAINING PROGRAM

## 2025-02-22 PROCEDURE — 36415 COLL VENOUS BLD VENIPUNCTURE: CPT | Mod: XB | Performed by: STUDENT IN AN ORGANIZED HEALTH CARE EDUCATION/TRAINING PROGRAM

## 2025-02-22 PROCEDURE — C1765 ADHESION BARRIER: HCPCS | Performed by: STUDENT IN AN ORGANIZED HEALTH CARE EDUCATION/TRAINING PROGRAM

## 2025-02-22 PROCEDURE — 36000708 HC OR TIME LEV III 1ST 15 MIN: Performed by: STUDENT IN AN ORGANIZED HEALTH CARE EDUCATION/TRAINING PROGRAM

## 2025-02-22 PROCEDURE — 80053 COMPREHEN METABOLIC PANEL: CPT | Performed by: STUDENT IN AN ORGANIZED HEALTH CARE EDUCATION/TRAINING PROGRAM

## 2025-02-22 PROCEDURE — 52005 CYSTO W/URTRL CATHJ: CPT | Mod: ,,, | Performed by: UROLOGY

## 2025-02-22 PROCEDURE — 80048 BASIC METABOLIC PNL TOTAL CA: CPT

## 2025-02-22 PROCEDURE — 37000009 HC ANESTHESIA EA ADD 15 MINS: Performed by: STUDENT IN AN ORGANIZED HEALTH CARE EDUCATION/TRAINING PROGRAM

## 2025-02-22 PROCEDURE — 25000003 PHARM REV CODE 250: Performed by: NURSE ANESTHETIST, CERTIFIED REGISTERED

## 2025-02-22 PROCEDURE — 83735 ASSAY OF MAGNESIUM: CPT

## 2025-02-22 PROCEDURE — 63600175 PHARM REV CODE 636 W HCPCS: Mod: JZ,TB | Performed by: COMMUNITY HEALTH WORKER

## 2025-02-22 PROCEDURE — P9045 ALBUMIN (HUMAN), 5%, 250 ML: HCPCS | Mod: JZ,TB | Performed by: NURSE ANESTHETIST, CERTIFIED REGISTERED

## 2025-02-22 PROCEDURE — 36415 COLL VENOUS BLD VENIPUNCTURE: CPT

## 2025-02-22 PROCEDURE — 0DB80ZZ EXCISION OF SMALL INTESTINE, OPEN APPROACH: ICD-10-PCS | Performed by: STUDENT IN AN ORGANIZED HEALTH CARE EDUCATION/TRAINING PROGRAM

## 2025-02-22 PROCEDURE — 88307 TISSUE EXAM BY PATHOLOGIST: CPT | Mod: 59 | Performed by: PATHOLOGY

## 2025-02-22 PROCEDURE — 27100025 HC TUBING, SET FLUID WARMER: Performed by: ANESTHESIOLOGY

## 2025-02-22 PROCEDURE — 74420 UROGRAPHY RTRGR +-KUB: CPT | Mod: 26,,, | Performed by: UROLOGY

## 2025-02-22 PROCEDURE — 25500020 PHARM REV CODE 255: Performed by: STUDENT IN AN ORGANIZED HEALTH CARE EDUCATION/TRAINING PROGRAM

## 2025-02-22 PROCEDURE — C1769 GUIDE WIRE: HCPCS | Performed by: STUDENT IN AN ORGANIZED HEALTH CARE EDUCATION/TRAINING PROGRAM

## 2025-02-22 PROCEDURE — 86901 BLOOD TYPING SEROLOGIC RH(D): CPT | Performed by: STUDENT IN AN ORGANIZED HEALTH CARE EDUCATION/TRAINING PROGRAM

## 2025-02-22 PROCEDURE — 84100 ASSAY OF PHOSPHORUS: CPT

## 2025-02-22 PROCEDURE — 84100 ASSAY OF PHOSPHORUS: CPT | Mod: 91 | Performed by: STUDENT IN AN ORGANIZED HEALTH CARE EDUCATION/TRAINING PROGRAM

## 2025-02-22 PROCEDURE — 86920 COMPATIBILITY TEST SPIN: CPT | Performed by: STUDENT IN AN ORGANIZED HEALTH CARE EDUCATION/TRAINING PROGRAM

## 2025-02-22 PROCEDURE — 85025 COMPLETE CBC W/AUTO DIFF WBC: CPT | Mod: 91 | Performed by: STUDENT IN AN ORGANIZED HEALTH CARE EDUCATION/TRAINING PROGRAM

## 2025-02-22 PROCEDURE — 83735 ASSAY OF MAGNESIUM: CPT | Mod: 91 | Performed by: STUDENT IN AN ORGANIZED HEALTH CARE EDUCATION/TRAINING PROGRAM

## 2025-02-22 PROCEDURE — 0TN60ZZ RELEASE RIGHT URETER, OPEN APPROACH: ICD-10-PCS | Performed by: STUDENT IN AN ORGANIZED HEALTH CARE EDUCATION/TRAINING PROGRAM

## 2025-02-22 PROCEDURE — 36415 COLL VENOUS BLD VENIPUNCTURE: CPT | Performed by: STUDENT IN AN ORGANIZED HEALTH CARE EDUCATION/TRAINING PROGRAM

## 2025-02-22 DEVICE — BARRIER SEPRAFILM ADHESION: Type: IMPLANTABLE DEVICE | Site: ABDOMEN | Status: FUNCTIONAL

## 2025-02-22 RX ORDER — HYDROMORPHONE HYDROCHLORIDE 1 MG/ML
0.5 INJECTION, SOLUTION INTRAMUSCULAR; INTRAVENOUS; SUBCUTANEOUS EVERY 6 HOURS PRN
Status: DISCONTINUED | OUTPATIENT
Start: 2025-02-22 | End: 2025-02-22

## 2025-02-22 RX ORDER — ACETAMINOPHEN 10 MG/ML
INJECTION, SOLUTION INTRAVENOUS
Status: DISCONTINUED | OUTPATIENT
Start: 2025-02-22 | End: 2025-02-22

## 2025-02-22 RX ORDER — TRAMADOL HYDROCHLORIDE 50 MG/1
50 TABLET ORAL EVERY 6 HOURS PRN
Status: DISCONTINUED | OUTPATIENT
Start: 2025-02-22 | End: 2025-02-22

## 2025-02-22 RX ORDER — NALOXONE HCL 0.4 MG/ML
0.02 VIAL (ML) INJECTION
Status: DISCONTINUED | OUTPATIENT
Start: 2025-02-22 | End: 2025-02-27

## 2025-02-22 RX ORDER — ROCURONIUM BROMIDE 10 MG/ML
INJECTION, SOLUTION INTRAVENOUS
Status: DISCONTINUED | OUTPATIENT
Start: 2025-02-22 | End: 2025-02-22

## 2025-02-22 RX ORDER — PIPERACILLIN SODIUM, TAZOBACTAM SODIUM 4; .5 G/20ML; G/20ML
INJECTION, POWDER, LYOPHILIZED, FOR SOLUTION INTRAVENOUS
Status: DISCONTINUED | OUTPATIENT
Start: 2025-02-22 | End: 2025-02-22

## 2025-02-22 RX ORDER — ACETAMINOPHEN 10 MG/ML
1000 INJECTION, SOLUTION INTRAVENOUS EVERY 8 HOURS
Status: COMPLETED | OUTPATIENT
Start: 2025-02-22 | End: 2025-02-23

## 2025-02-22 RX ORDER — OXYCODONE HYDROCHLORIDE 5 MG/1
5 TABLET ORAL EVERY 6 HOURS PRN
Refills: 0 | Status: DISCONTINUED | OUTPATIENT
Start: 2025-02-22 | End: 2025-02-22

## 2025-02-22 RX ORDER — METHYLENE BLUE 5 MG/ML
INJECTION INTRAVENOUS
Status: DISCONTINUED | OUTPATIENT
Start: 2025-02-22 | End: 2025-02-22 | Stop reason: HOSPADM

## 2025-02-22 RX ORDER — PROCHLORPERAZINE EDISYLATE 5 MG/ML
2.5 INJECTION INTRAMUSCULAR; INTRAVENOUS EVERY 4 HOURS PRN
Status: DISCONTINUED | OUTPATIENT
Start: 2025-02-22 | End: 2025-03-10 | Stop reason: HOSPADM

## 2025-02-22 RX ORDER — MUPIROCIN 20 MG/G
OINTMENT TOPICAL 2 TIMES DAILY
Status: COMPLETED | OUTPATIENT
Start: 2025-02-22 | End: 2025-02-27

## 2025-02-22 RX ORDER — GABAPENTIN 300 MG/1
300 CAPSULE ORAL 3 TIMES DAILY
Status: DISCONTINUED | OUTPATIENT
Start: 2025-02-22 | End: 2025-03-10 | Stop reason: HOSPADM

## 2025-02-22 RX ORDER — FAMOTIDINE 10 MG/ML
INJECTION INTRAVENOUS
Status: DISCONTINUED | OUTPATIENT
Start: 2025-02-22 | End: 2025-02-22

## 2025-02-22 RX ORDER — HYDROMORPHONE HYDROCHLORIDE 1 MG/ML
0.2 INJECTION, SOLUTION INTRAMUSCULAR; INTRAVENOUS; SUBCUTANEOUS EVERY 5 MIN PRN
Refills: 0 | Status: DISCONTINUED | OUTPATIENT
Start: 2025-02-22 | End: 2025-02-22 | Stop reason: HOSPADM

## 2025-02-22 RX ORDER — GLUCAGON 1 MG
1 KIT INJECTION
Status: DISCONTINUED | OUTPATIENT
Start: 2025-02-22 | End: 2025-02-22 | Stop reason: HOSPADM

## 2025-02-22 RX ORDER — LIDOCAINE HYDROCHLORIDE 20 MG/ML
INJECTION INTRAVENOUS
Status: DISCONTINUED | OUTPATIENT
Start: 2025-02-22 | End: 2025-02-22

## 2025-02-22 RX ORDER — SODIUM CHLORIDE 0.9 % (FLUSH) 0.9 %
3 SYRINGE (ML) INJECTION
Status: DISCONTINUED | OUTPATIENT
Start: 2025-02-22 | End: 2025-02-22 | Stop reason: HOSPADM

## 2025-02-22 RX ORDER — HYDROMORPHONE HYDROCHLORIDE 2 MG/ML
0.2 INJECTION, SOLUTION INTRAMUSCULAR; INTRAVENOUS; SUBCUTANEOUS EVERY 5 MIN PRN
Status: DISCONTINUED | OUTPATIENT
Start: 2025-02-22 | End: 2025-02-22 | Stop reason: HOSPADM

## 2025-02-22 RX ORDER — DEXMEDETOMIDINE HYDROCHLORIDE 100 UG/ML
INJECTION, SOLUTION INTRAVENOUS
Status: DISCONTINUED | OUTPATIENT
Start: 2025-02-22 | End: 2025-02-22

## 2025-02-22 RX ORDER — OXYCODONE HYDROCHLORIDE 10 MG/1
10 TABLET ORAL EVERY 4 HOURS PRN
Refills: 0 | Status: DISCONTINUED | OUTPATIENT
Start: 2025-02-22 | End: 2025-02-22

## 2025-02-22 RX ORDER — PROPOFOL 10 MG/ML
VIAL (ML) INTRAVENOUS
Status: DISCONTINUED | OUTPATIENT
Start: 2025-02-22 | End: 2025-02-22

## 2025-02-22 RX ORDER — FENTANYL CITRATE 50 UG/ML
INJECTION, SOLUTION INTRAMUSCULAR; INTRAVENOUS
Status: DISCONTINUED | OUTPATIENT
Start: 2025-02-22 | End: 2025-02-22

## 2025-02-22 RX ORDER — HALOPERIDOL 5 MG/ML
INJECTION INTRAMUSCULAR
Status: DISCONTINUED | OUTPATIENT
Start: 2025-02-22 | End: 2025-02-22

## 2025-02-22 RX ORDER — ACETAMINOPHEN 500 MG
1000 TABLET ORAL EVERY 8 HOURS
Status: DISCONTINUED | OUTPATIENT
Start: 2025-02-23 | End: 2025-03-10 | Stop reason: HOSPADM

## 2025-02-22 RX ORDER — METHOCARBAMOL 500 MG/1
500 TABLET, FILM COATED ORAL 4 TIMES DAILY
Status: DISCONTINUED | OUTPATIENT
Start: 2025-02-22 | End: 2025-02-22

## 2025-02-22 RX ORDER — HYDROMORPHONE HCL IN 0.9% NACL 6 MG/30 ML
PATIENT CONTROLLED ANALGESIA SYRINGE INTRAVENOUS CONTINUOUS
Refills: 0 | Status: DISCONTINUED | OUTPATIENT
Start: 2025-02-22 | End: 2025-02-27

## 2025-02-22 RX ORDER — KETAMINE HCL IN 0.9 % NACL 50 MG/5 ML
SYRINGE (ML) INTRAVENOUS
Status: DISCONTINUED | OUTPATIENT
Start: 2025-02-22 | End: 2025-02-22

## 2025-02-22 RX ORDER — ONDANSETRON HYDROCHLORIDE 2 MG/ML
INJECTION, SOLUTION INTRAVENOUS
Status: DISCONTINUED | OUTPATIENT
Start: 2025-02-22 | End: 2025-02-22

## 2025-02-22 RX ORDER — SODIUM CHLORIDE 0.9 % (FLUSH) 0.9 %
10 SYRINGE (ML) INJECTION
Status: DISCONTINUED | OUTPATIENT
Start: 2025-02-22 | End: 2025-03-10 | Stop reason: HOSPADM

## 2025-02-22 RX ORDER — MIDAZOLAM HYDROCHLORIDE 1 MG/ML
INJECTION, SOLUTION INTRAMUSCULAR; INTRAVENOUS
Status: DISCONTINUED | OUTPATIENT
Start: 2025-02-22 | End: 2025-02-22

## 2025-02-22 RX ORDER — DEXTROSE MONOHYDRATE AND SODIUM CHLORIDE 5; .9 G/100ML; G/100ML
INJECTION, SOLUTION INTRAVENOUS CONTINUOUS
Status: ACTIVE | OUTPATIENT
Start: 2025-02-22 | End: 2025-02-25

## 2025-02-22 RX ORDER — DEXAMETHASONE SODIUM PHOSPHATE 4 MG/ML
INJECTION, SOLUTION INTRA-ARTICULAR; INTRALESIONAL; INTRAMUSCULAR; INTRAVENOUS; SOFT TISSUE
Status: DISCONTINUED | OUTPATIENT
Start: 2025-02-22 | End: 2025-02-22

## 2025-02-22 RX ORDER — HYDROMORPHONE HYDROCHLORIDE 2 MG/ML
INJECTION, SOLUTION INTRAMUSCULAR; INTRAVENOUS; SUBCUTANEOUS
Status: DISCONTINUED | OUTPATIENT
Start: 2025-02-22 | End: 2025-02-22

## 2025-02-22 RX ORDER — METHOCARBAMOL 100 MG/ML
500 INJECTION, SOLUTION INTRAMUSCULAR; INTRAVENOUS EVERY 6 HOURS
Status: COMPLETED | OUTPATIENT
Start: 2025-02-22 | End: 2025-02-25

## 2025-02-22 RX ORDER — HYDROMORPHONE HYDROCHLORIDE 1 MG/ML
INJECTION, SOLUTION INTRAMUSCULAR; INTRAVENOUS; SUBCUTANEOUS
Status: COMPLETED
Start: 2025-02-22 | End: 2025-02-22

## 2025-02-22 RX ORDER — ALBUMIN HUMAN 50 G/1000ML
SOLUTION INTRAVENOUS
Status: DISCONTINUED | OUTPATIENT
Start: 2025-02-22 | End: 2025-02-22

## 2025-02-22 RX ADMIN — DEXMEDETOMIDINE 12 MCG: 100 INJECTION, SOLUTION, CONCENTRATE INTRAVENOUS at 01:02

## 2025-02-22 RX ADMIN — DEXAMETHASONE SODIUM PHOSPHATE 4 MG: 4 INJECTION, SOLUTION INTRAMUSCULAR; INTRAVENOUS at 08:02

## 2025-02-22 RX ADMIN — DEXMEDETOMIDINE 12 MCG: 100 INJECTION, SOLUTION, CONCENTRATE INTRAVENOUS at 08:02

## 2025-02-22 RX ADMIN — ROCURONIUM BROMIDE 50 MG: 10 INJECTION, SOLUTION INTRAVENOUS at 09:02

## 2025-02-22 RX ADMIN — PIPERACILLIN SODIUM AND TAZOBACTAM SODIUM 4.5 G: 4; .5 INJECTION, POWDER, FOR SOLUTION INTRAVENOUS at 03:02

## 2025-02-22 RX ADMIN — HALOPERIDOL LACTATE 1 MG: 5 INJECTION, SOLUTION INTRAMUSCULAR at 08:02

## 2025-02-22 RX ADMIN — DEXMEDETOMIDINE 8 MCG: 100 INJECTION, SOLUTION, CONCENTRATE INTRAVENOUS at 03:02

## 2025-02-22 RX ADMIN — OXYCODONE HYDROCHLORIDE 10 MG: 10 TABLET ORAL at 05:02

## 2025-02-22 RX ADMIN — METHOCARBAMOL 500 MG: 100 INJECTION, SOLUTION INTRAMUSCULAR; INTRAVENOUS at 11:02

## 2025-02-22 RX ADMIN — HYDROMORPHONE HYDROCHLORIDE 0.2 MG: 1 INJECTION, SOLUTION INTRAMUSCULAR; INTRAVENOUS; SUBCUTANEOUS at 05:02

## 2025-02-22 RX ADMIN — PROPOFOL 75 MCG/KG/MIN: 10 INJECTION, EMULSION INTRAVENOUS at 02:02

## 2025-02-22 RX ADMIN — HYDROMORPHONE HYDROCHLORIDE 0.5 MG: 1 INJECTION, SOLUTION INTRAMUSCULAR; INTRAVENOUS; SUBCUTANEOUS at 09:02

## 2025-02-22 RX ADMIN — MIDAZOLAM HYDROCHLORIDE 2 MG: 2 INJECTION, SOLUTION INTRAMUSCULAR; INTRAVENOUS at 07:02

## 2025-02-22 RX ADMIN — TRAMADOL HYDROCHLORIDE 50 MG: 50 TABLET, COATED ORAL at 08:02

## 2025-02-22 RX ADMIN — FENTANYL CITRATE 25 MCG: 50 INJECTION, SOLUTION INTRAMUSCULAR; INTRAVENOUS at 02:02

## 2025-02-22 RX ADMIN — GABAPENTIN 300 MG: 300 CAPSULE ORAL at 08:02

## 2025-02-22 RX ADMIN — ROCURONIUM BROMIDE 30 MG: 10 INJECTION, SOLUTION INTRAVENOUS at 12:02

## 2025-02-22 RX ADMIN — FENTANYL CITRATE 50 MCG: 50 INJECTION, SOLUTION INTRAMUSCULAR; INTRAVENOUS at 09:02

## 2025-02-22 RX ADMIN — Medication 25 MG: at 11:02

## 2025-02-22 RX ADMIN — ROCURONIUM BROMIDE 50 MG: 10 INJECTION, SOLUTION INTRAVENOUS at 08:02

## 2025-02-22 RX ADMIN — ACETAMINOPHEN 1000 MG: 10 INJECTION, SOLUTION INTRAVENOUS at 10:02

## 2025-02-22 RX ADMIN — ONDANSETRON 4 MG: 2 INJECTION INTRAMUSCULAR; INTRAVENOUS at 08:02

## 2025-02-22 RX ADMIN — Medication 25 MG: at 08:02

## 2025-02-22 RX ADMIN — SUGAMMADEX 200 MG: 100 INJECTION, SOLUTION INTRAVENOUS at 04:02

## 2025-02-22 RX ADMIN — ROCURONIUM BROMIDE 20 MG: 10 INJECTION, SOLUTION INTRAVENOUS at 11:02

## 2025-02-22 RX ADMIN — LIDOCAINE HYDROCHLORIDE 100 MG: 20 INJECTION INTRAVENOUS at 08:02

## 2025-02-22 RX ADMIN — SODIUM CHLORIDE: 0.9 INJECTION, SOLUTION INTRAVENOUS at 07:02

## 2025-02-22 RX ADMIN — PROCHLORPERAZINE EDISYLATE 2.5 MG: 5 INJECTION INTRAMUSCULAR; INTRAVENOUS at 05:02

## 2025-02-22 RX ADMIN — ALBUMIN (HUMAN) 500 ML: 12.5 SOLUTION INTRAVENOUS at 12:02

## 2025-02-22 RX ADMIN — PIPERACILLIN SODIUM AND TAZOBACTAM SODIUM 4.5 G: 4; .5 INJECTION, POWDER, FOR SOLUTION INTRAVENOUS at 08:02

## 2025-02-22 RX ADMIN — PROPOFOL 140 MG: 10 INJECTION, EMULSION INTRAVENOUS at 08:02

## 2025-02-22 RX ADMIN — ONDANSETRON 8 MG: 8 TABLET, ORALLY DISINTEGRATING ORAL at 02:02

## 2025-02-22 RX ADMIN — FENTANYL CITRATE 50 MCG: 50 INJECTION, SOLUTION INTRAMUSCULAR; INTRAVENOUS at 08:02

## 2025-02-22 RX ADMIN — GLYCOPYRROLATE 0.2 MG: 0.2 INJECTION, SOLUTION INTRAMUSCULAR; INTRAVENOUS at 02:02

## 2025-02-22 RX ADMIN — ACETAMINOPHEN 1000 MG: 10 INJECTION, SOLUTION INTRAVENOUS at 08:02

## 2025-02-22 RX ADMIN — DEXAMETHASONE SODIUM PHOSPHATE 4 MG: 4 INJECTION, SOLUTION INTRAMUSCULAR; INTRAVENOUS at 02:02

## 2025-02-22 RX ADMIN — METHOCARBAMOL 500 MG: 100 INJECTION, SOLUTION INTRAMUSCULAR; INTRAVENOUS at 05:02

## 2025-02-22 RX ADMIN — ONDANSETRON 4 MG: 2 INJECTION INTRAMUSCULAR; INTRAVENOUS at 03:02

## 2025-02-22 RX ADMIN — ROCURONIUM BROMIDE 20 MG: 10 INJECTION, SOLUTION INTRAVENOUS at 02:02

## 2025-02-22 RX ADMIN — HEPARIN SODIUM 5000 UNITS: 5000 INJECTION INTRAVENOUS; SUBCUTANEOUS at 08:02

## 2025-02-22 RX ADMIN — ROCURONIUM BROMIDE 30 MG: 10 INJECTION, SOLUTION INTRAVENOUS at 01:02

## 2025-02-22 RX ADMIN — FAMOTIDINE 20 MG: 10 INJECTION, SOLUTION INTRAVENOUS at 08:02

## 2025-02-22 RX ADMIN — DEXTROSE AND SODIUM CHLORIDE: 5; 900 INJECTION, SOLUTION INTRAVENOUS at 08:02

## 2025-02-22 RX ADMIN — HEPARIN SODIUM 5000 UNITS: 5000 INJECTION INTRAVENOUS; SUBCUTANEOUS at 05:02

## 2025-02-22 RX ADMIN — SODIUM CHLORIDE, SODIUM ACETATE ANHYDROUS, SODIUM GLUCONATE, POTASSIUM CHLORIDE, AND MAGNESIUM CHLORIDE: 526; 222; 502; 37; 30 INJECTION, SOLUTION INTRAVENOUS at 07:02

## 2025-02-22 RX ADMIN — HYDROMORPHONE HYDROCHLORIDE 0.5 MG: 1 INJECTION, SOLUTION INTRAMUSCULAR; INTRAVENOUS; SUBCUTANEOUS at 04:02

## 2025-02-22 RX ADMIN — Medication: at 10:02

## 2025-02-22 RX ADMIN — DEXMEDETOMIDINE 8 MCG: 100 INJECTION, SOLUTION, CONCENTRATE INTRAVENOUS at 04:02

## 2025-02-22 RX ADMIN — PIPERACILLIN SODIUM AND TAZOBACTAM SODIUM 4.5 G: 4; .5 INJECTION, POWDER, FOR SOLUTION INTRAVENOUS at 11:02

## 2025-02-22 RX ADMIN — HYDROMORPHONE HYDROCHLORIDE 0.2 MG: 2 INJECTION INTRAMUSCULAR; INTRAVENOUS; SUBCUTANEOUS at 04:02

## 2025-02-22 RX ADMIN — SODIUM CHLORIDE, POTASSIUM CHLORIDE, SODIUM LACTATE AND CALCIUM CHLORIDE: 600; 310; 30; 20 INJECTION, SOLUTION INTRAVENOUS at 05:02

## 2025-02-22 RX ADMIN — MUPIROCIN: 20 OINTMENT TOPICAL at 09:02

## 2025-02-22 RX ADMIN — FENTANYL CITRATE 25 MCG: 50 INJECTION, SOLUTION INTRAMUSCULAR; INTRAVENOUS at 01:02

## 2025-02-22 NOTE — ASSESSMENT & PLAN NOTE
Ms. Cardona is a 44 year old woman with a history of urolithiasis, Crohn's disease, GERD, anxiety, CAD, Bipolar disorder, and pAF and the surgeries listed below presenting to [facility] as a direct admit for colovesiculofistula. Urology consulted for enterovesical fistula.     -- History consistent with colovesical fistula, imaging unclear.   -- Currently scheduled for the OR todaywith CRS  -- Urology to be available to perform ureteral catheters, retrograde pyelogram, cystogram and possible bladder closure during the case tomorrow  -- Consented

## 2025-02-22 NOTE — TRANSFER OF CARE
"Anesthesia Transfer of Care Note    Patient: Candida Cardona    Procedure(s) Performed: Procedure(s) (LRB):  PROCTECTOMY, ABDOMINOPERINEAL (N/A)  CYSTOSCOPY, WITH RETROGRADE PYELOGRAM AND URETERAL STENT INSERTION (Bilateral)  CYSTOGRAM  EXCISION, SMALL INTESTINE  CREATION, ILEOSTOMY    Patient location: PACU    Anesthesia Type: general    Transport from OR: Transported from OR on 6-10 L/min O2 by face mask with adequate spontaneous ventilation    Post pain: adequate analgesia    Post assessment: no apparent anesthetic complications and tolerated procedure well    Post vital signs: stable    Level of consciousness: sedated    Nausea/Vomiting: no nausea/vomiting    Complications: none    Transfer of care protocol was followedComments: Report given to recovery, RN. All questions answered.      Last vitals: Visit Vitals  /67 (BP [facility]: Right arm, Patient Position: Sitting)   Pulse 77   Temp 36.7 °C (98.1 °F) (Oral)   Resp 19   Ht 5' 7" (1.702 m)   Wt 64.5 kg (142 lb 3.2 oz)   LMP  (LMP Unknown)   SpO2 100%   Breastfeeding No   BMI 22.27 kg/m²     "

## 2025-02-22 NOTE — ANESTHESIA POSTPROCEDURE EVALUATION
Anesthesia Post Evaluation    Patient: Candida Cardona    Procedure(s) Performed: Procedure(s) (LRB):  PROCTECTOMY, ABDOMINOPERINEAL (N/A)  CYSTOSCOPY, WITH RETROGRADE PYELOGRAM AND URETERAL STENT INSERTION (Bilateral)  CYSTOGRAM  EXCISION, SMALL INTESTINE  CREATION, ILEOSTOMY    Final Anesthesia Type: general      Patient location during evaluation: PACU  Patient participation: Yes- Able to Participate  Level of consciousness: awake and alert  Post-procedure vital signs: reviewed and stable  Pain management: adequate  Airway patency: patent    PONV status at discharge: No PONV  Anesthetic complications: no      Cardiovascular status: blood pressure returned to baseline  Respiratory status: unassisted  Hydration status: euvolemic  Follow-up not needed.              Vitals Value Taken Time   BP 96/42 02/22/25 17:32   Temp 98 02/22/25 17:34   Pulse 82 02/22/25 17:34   Resp 15 02/22/25 17:34   SpO2 63 % 02/22/25 17:34   Vitals shown include unfiled device data.      No case tracking events are documented in the log.      Pain/Enrrique Score: Pain Rating Prior to Med Admin: 8 (2/22/2025  4:34 AM)  Pain Rating Post Med Admin: 4 (2/22/2025  5:04 AM)

## 2025-02-22 NOTE — PLAN OF CARE
"[facility] Plan of Care Note    Dx:   Crohn's disease [K50.90]    Shift Events: CHG bath completed. No distress noted. Surgery today. Emesis occ x2. Called on-call MD Urmila for another PRN for nausea. Pt states nausea has improved post compazine administration.     Goals of Care: Goals of care ongoing and progressing.     Neuro: A/Ox4    Vital Signs: /67 (BP [facility]: Left arm, Patient Position: Lying)   Pulse 77   Temp 98 °F (36.7 °C) (Oral)   Resp 16   Ht 5' 7" (1.702 m)   Wt 64.5 kg (142 lb 3.2 oz)   LMP  (LMP Unknown) Comment: complete hysterectomy  SpO2 98%   Breastfeeding No   BMI 22.27 kg/m²     Respiratory: RA    Diet: Diet NPO      Is patient tolerating current diet? Yes    GTTS: LR 100ml/hr    Urine Output/Bowel Movement:   Up to toilet  Last Bowel Movement: 02/21/25      Drains/Tubes/Tube Feeds (include total output/shift):   NA      Lines: PIV x1      Accuchecks: NA    Skin: Intact    Fall Risk Score: Low    Activity level? Independent     Any scheduled procedures?   2/22/2025   -ureteral catheters, bilateral retrograde pyelogram, cystogram and possible bladder closure     Any safety concerns? NA    Problem: Adult Inpatient Plan of Care  Goal: Absence of Hospital-Acquired Illness or Injury  Outcome: Progressing  Goal: Optimal Comfort and Wellbeing  Outcome: Progressing  Goal: Readiness for Transition of Care  Outcome: Progressing     Problem: Pain Acute  Goal: Optimal Pain Control and Function  Outcome: Progressing     Problem: Fall Injury Risk  Goal: Absence of Fall and Fall-Related Injury  Outcome: Progressing     " [facility] Internal Medicine      PREOPERATIVE EVALUATION     Ayanna Farmer is a 72 [facility], LYMPHOSCINTIGRAPHY;  Surgeon: Luke Gonzales at [facility] and 2005 Right at [address] [place] [address]           Family History   Problem Relation Age of Onset   Ardeth Needs Breast cancer Mother 48    Olivia Akhtar MD

## 2025-02-22 NOTE — INTERVAL H&P NOTE
The patient has been examined and the H&P has been reviewed:    I concur with the findings and no changes have occurred since H&P was written.    Surgery risks, benefits and alternative options discussed and understood by patient/family.      Plan for Ex lap, possible completion proctectomy and end ileostomy creation, possible ileorectal anastomotic resection, possible loop ileostomy creation, and all indicated procedures    Active Hospital Problems    Diagnosis  POA    Colovesical fistula [N32.1]  Yes    Crohn's disease with ileorectal anastomotic stricture (ileum and rectum) [K50.812]  Yes     Chronic      Resolved Hospital Problems   No resolved problems to display.

## 2025-02-22 NOTE — SUBJECTIVE & OBJECTIVE
Interval History: AFVSS. NAEO. NPO since midnight.       Objective:     Temp:  [97.3 °F (36.3 °C)-98.3 °F (36.8 °C)] 98.1 °F (36.7 °C)  Pulse:  [] 77  Resp:  [16-20] 19  SpO2:  [98 %-100 %] 100 %  BP: ()/(55-81) 118/67     Body mass index is 22.27 kg/m².    Date 02/22/25 0700 - 02/23/25 0659   Shift 6845-8731 5593-7997 9304-9601 24 Hour Total   INTAKE   Shift Total(mL/kg)       OUTPUT   Urine(mL/kg/hr) 5   5   Shift Total(mL/kg) 5(0.1)   5(0.1)   Weight (kg) 64.5 64.5 64.5 64.5          Drains       None                    Physical Exam  Constitutional:       General: She is not in acute distress.     Appearance: Normal appearance.   HENT:      Head: Normocephalic.      Nose: Nose normal.   Eyes:      Pupils: Pupils are equal, round, and reactive to light.   Cardiovascular:      Rate and Rhythm: Normal rate.   Pulmonary:      Effort: Pulmonary effort is normal. No respiratory distress.   Abdominal:      General: Abdomen is flat. There is no distension.      Tenderness: There is no abdominal tenderness. There is no right CVA tenderness or left CVA tenderness.   Musculoskeletal:         General: Normal range of motion.   Skin:     General: Skin is warm.      Coloration: Skin is not jaundiced.   Neurological:      General: No focal deficit present.      Mental Status: She is alert and oriented to person, place, and time.   Psychiatric:         Mood and Affect: Mood normal.         Behavior: Behavior normal.           Significant Labs:    BMP:  Recent Labs   Lab 02/21/25  0500 02/22/25  0500   * 133*   K 3.0* 3.4*    99   CO2 19* 24   BUN 6 6   CREATININE 0.8 0.8   CALCIUM 8.7 8.7       CBC:   Recent Labs   Lab 02/21/25  0500 02/22/25  0500   WBC 5.88 5.15   HGB 9.8* 10.1*   HCT 32.8* 33.3*    288       All pertinent labs results from the past 24 hours have been reviewed.    Significant Imaging:  All pertinent imaging results/findings from the past 24 hours have been  reviewed.

## 2025-02-22 NOTE — PROGRESS NOTES
Andrei Lawrence - Surgery ([facility])  Urology  Progress Note    Patient Name: Candida Cardona  MRN: [id number]  Admission Date: 2/21/2025  Hospital Length of Stay: 1 days  Code Status: Full Code   Attending Provider: Robin Atkins MD   Primary Care Physician: Matti Conklin MD    Subjective:     HPI:  Ms. Cardona is a 44 year old woman with a history of urolithiasis, Crohn's disease, GERD, anxiety, CAD, Bipolar disorder, and pAF and the surgeries listed below presenting to [facility] [place] as a direct admit for colovesiculofistula. 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 Currently scheduled for a completion proctectomy on 2/22/25. Urology consulted for enterovesical fistula.     On assessment the patient is AFVSS. On 1/15/2019 - Ileostomy closure and ileorectal anastomosis, extensive lysis of adhesions, ureterolysis. Endorses passage of air and stool with urination of several weeks duration. Denies fevers, chills, flank pain, difficulty urinating. Has urologic history that includes stones and a cystoscopy performed in 2019 for removal of a stent. Currently scheduled for completion proctectomy on 2/22/25.    WBC 5.88, Hg 9.8. Cr is 0.8. UA nitrite negative 16 RBC, >100 WBC and moderate bacteria with 16 squamous cells and few yeast. Urine culture pending.     CT scan with no hydronephrosis bilaterally. Thick walled bladder with inflammatory changes and air posteriorly between rectum and bladder. No definitive fistula seen,                Interval History: AFVSS. NAEO. NPO since midnight.       Objective:     Temp:  [97.3 °F (36.3 °C)-98.3 °F (36.8 °C)] 98.1 °F (36.7 °C)  Pulse:  [] 77  Resp:  [16-20] 19  SpO2:  [98 %-100 %] 100 %  BP: ()/(55-81) 118/67     Body mass index is 22.27 kg/m².    Date 02/22/25 0700 - 02/23/25 0659   Shift  4522-1084 9224-4409 0125-1933 24 Hour Total   INTAKE   Shift Total(mL/kg)       OUTPUT   Urine(mL/kg/hr) 5   5   Shift Total(mL/kg) 5(0.1)   5(0.1)   Weight (kg) 64.5 64.5 64.5 64.5          Drains       None                    Physical Exam  Constitutional:       General: She is not in acute distress.     Appearance: Normal appearance.   HENT:      Head: Normocephalic.      Nose: Nose normal.   Eyes:      Pupils: Pupils are equal, round, and reactive to light.   Cardiovascular:      Rate and Rhythm: Normal rate.   Pulmonary:      Effort: Pulmonary effort is normal. No respiratory distress.   Abdominal:      General: Abdomen is flat. There is no distension.      Tenderness: There is no abdominal tenderness. There is no right CVA tenderness or left CVA tenderness.   Musculoskeletal:         General: Normal range of motion.   Skin:     General: Skin is warm.      Coloration: Skin is not jaundiced.   Neurological:      General: No focal deficit present.      Mental Status: She is alert and oriented to person, place, and time.   Psychiatric:         Mood and Affect: Mood normal.         Behavior: Behavior normal.           Significant Labs:    BMP:  Recent Labs   Lab 02/21/25  0500 02/22/25  0500   * 133*   K 3.0* 3.4*    99   CO2 19* 24   BUN 6 6   CREATININE 0.8 0.8   CALCIUM 8.7 8.7       CBC:   Recent Labs   Lab 02/21/25  0500 02/22/25  0500   WBC 5.88 5.15   HGB 9.8* 10.1*   HCT 32.8* 33.3*    288       All pertinent labs results from the past 24 hours have been reviewed.    Significant Imaging:  All pertinent imaging results/findings from the past 24 hours have been reviewed.                  Assessment/Plan:     Colovesical fistula  Ms. Cardona is a 44 year old woman with a history of urolithiasis, Crohn's disease, GERD, anxiety, CAD, Bipolar disorder, and pAF and the surgeries listed below presenting to [facility] as a direct admit for colovesiculofistula. Urology consulted for enterovesical fistula.      -- History consistent with colovesical fistula, imaging unclear.   -- Currently scheduled for the OR todaywith CRS  -- Urology to be available to perform ureteral catheters, retrograde pyelogram, cystogram and possible bladder closure during the case tomorrow  -- Consented            VTE Risk Mitigation (From admission, onward)           Ordered     heparin (porcine) injection 5,000 Units  Every 8 hours         02/21/25 1838     IP VTE LOW RISK PATIENT  Once         02/21/25 0347     Place sequential compression device  Until discontinued         02/21/25 0347                    Luciano Barrow DO  Urology  [facility] - Surgery (2nd Fl)

## 2025-02-22 NOTE — OP NOTE
[facility]  Operative Note    Date: 02/22/2025    Pre-Op Diagnosis: colovesical fistula    Active Problem List with Overview Notes    Diagnosis Date Noted    Colovesical fistula 02/21/2025    Immunodeficiency due to long term immunosuppressive drug therapy 09/17/2024    Calculus of gallbladder with chronic cholecystitis without obstruction 05/25/2020    Bipolar 1 disorder 05/22/2019    Vitamin B12 deficiency 05/22/2019    Borderline personality disorder 05/06/2019    Vitamin D deficiency 05/04/2019    Seizures 04/17/2019    Unspecified mood (affective) disorder 01/23/2019    Vasomotor symptoms due to menopause 01/18/2018    Anxiety     GERD (gastroesophageal reflux disease)     Interstitial cystitis     PAF (paroxysmal atrial fibrillation)     Anemia 02/11/2017    Nephrolithiasis 02/10/2017    S/P ERIK-BSO (total abdominal hysterectomy and bilateral salpingo-oophorectomy) 12/19/2016    Latent tuberculosis by blood test 05/24/2016    Crohn's disease with ileorectal anastomotic stricture (ileum and rectum)        Post-Op Diagnosis: same    Procedure(s) Performed:   1.  Cystoscopy with bilateral ureteral catheter placement  2.  Cystogram  3.  Bilateral retrograde pyelogram    Specimen(s): none    Staff Surgeon: Kerwin Rachel MD    Assistant Surgeon: Jhonny Mata MD    Anesthesia: General endotracheal anesthesia    Indications: Candida Cardona is a 44 y.o. female with Crohn's disease and colovesical fistula.  Dr. Atkins has requested intra-operative ureteral catheters to allow for early intra-operative identification and repair of any injuries.      Findings:   - Small bladder capacity, about 150 cc on passive filling.  - Bladder erythematous and inflamed. Poor visibility, unable to visualize fistula.  - Bilateral retrograde pyelogram showed delicate calices with no evidence of hydronephrosis, no filling defects, and ureter normal in course and caliber  - Bilateral ureteral catheters  placed in normal fashion over a wire without issue.    Estimated Blood Loss: min    Drains:   1.  bilateral 5 Fr ureteral catheters  2.  16 Fr barakat catheter    Procedure in Detail: Upon entering the room the patient was under general anesthesia.  The patient was then placed in the dorsal lithotomy position and prepped and draped in the usual sterile fashion. Preoperative antibiotics were administered per the primary surgeon preference.  Timeout was performed.      A 16 Amharic Barakat catheter was placed in standard fashion.  A cystogram was performed.  Passive filling bladder capacity was 150 cc.  The bladder had a smooth contour and no reflux, unable to visualize fistula on AP or lateral view.  The bladder was drained and the Barakat was removed.    A 22 Fr cystoscope was inserted into the urethra and formal cystourethroscopy was performed. The urethra was normal.  The right and left ureteral orifices were in the normal anatomic position. The bladder was erythematous and inflamed.  There was a poor visibility, unable to visualize fistula.     A 5 Fr open ended ureteral catheter was inserted into the left UO. Using fluoroscopy, a retrograde pyelogram was performed which showed the above findings. This was then repeated on the right side, revealing the above findings.     A 5 Fr ureteral catheter was then inserted into the left ureteral orifice and advanced up to the level of the renal pelvis. The cystoscope was then removed leaving the ureteral catheter in place.     The cystoscope was then reinserted alongside the ureteral catheter and a 5 Fr ureteral catheter was advanced into the right ureteral orifice and advanced to the level of the right renal pelvis. The cystoscope was then removed, leaving the ureteral catheter in place.    A 16 Fr barakat catheter was inserted and the balloon was filled with 10mL of sterile water. The ureteral catheters were secured in the standard fashion. There were no complications with the  procedure and the patient tolerated our procedure well.     The case was then turned over to the primary surgeon.     Jhonny Mata MD

## 2025-02-22 NOTE — OP NOTE
[facility]  Colon and Rectal Surgery    [address]  [place], [place]  Tel: [phone]  Fax: [phone]  [email or web address]   MD Julio Cesar White MD Brian Kann, MD W. Forrest Johnston, MD Matthew Giglia, MD Jennifer Paruch, MD William Kethman, MD Danielle Kay, MD     Patient name: Candida Cardona   YOB: 1980   MRN: [id number]  Date of surgery: 02/22/2025    Operative Report    Pre-operative diagnosis: Fistulizing Crohn's disease  Post-operative diagnosis: Same as above    Procedure:  Exploratory laparotomy, extensive lysis of adhesions for 5 hours (Modifier-22)  Completion proctectomy, resection of ileorectal anastomosis takedown of enterovesical and vaginal fistulae  Small bowel resection  End ileostomy  Bilateral ureterolysis    Findings:  Dense adhesions of small bowel to retroperitoneum, abdominal wall, bladder, liver, stomach and other loops of small bowel - in order to preserve bowel length a short segment small bowel resection was performed, two serosal tears were over sewn with 3-0 Vicryl, no full thickness enterotomies left in-situ  Bilateral ureteral catheters intact and identified throughout our dissection, bladder distended with methylene blue without leak  Hemostatic agent placed in pelvis with 19 Fr Rufus drain, Seprafilm placed throughout the abdomen      Top left - short-segment small bowel containing entero-entero fistula  Bottom left - ileorectal anastomosis and associated fistulae  Right - rectum and anus    Surgeon: Robin Atkins MD  Co-Surgeon: Reji Peterson MD - due to difficulty of pelvic dissection another staff surgeon was required  Assistant:  Merary Ramos MD, Terell Shepherd MD    Indication: Ms. Cardona is a 44 year old woman with a history of complex fistulizing Crohn's disease, GERD, anxiety, CAD, Bipolar disorder, and pAF not on anticoagulation - she presented as a transfer with about 1 week of stool  drainage from her vagina as well as pneumaturia. She has had a known ileorectal stricture and had been counseled over the last several months about my concern that this may progress and become more complicated. We discussed numerous options, including completion proctectomy and resection of her ileorectal anastomosis with end ileostomy, resection of her ileorectal anastomosis with end ileostomy and delayed proctectomy, or diverting loop ileostomy, whichever was possible. In addition, one risk that we discussed in detail was my concern with intestinal malabsorption due to her extensive past surgical history, unknown length of small bowel, and previously high-output ostomy. Unfortunately, the fistulae are quite symptomatic for her. The benefits, risks, and alternatives were discussed with the patient, they were given the opportunity to ask questions and they elected to proceed with operative intervention after signing written consent.    Past surgeries:  5/25/2020 - Laparoscopic, converted to open cholecystectomy  1/15/2019 - Ileostomy closure and ileorectal anastomosis, extensive lysis of adhesions, ureterolysis  12/22/2016 - Exploratory laparotomy with lysis of adhesions, 1 hr - ERIK, BSO at that time  3/17/2014 - TAC with EI  Prior ileocolic resections ~2007, 2010    Procedure:  After pre-operative assessment and review of informed consent, the patient was taken to the operating room and received general anesthesia. Pre-operative antibiotics were administered if indicated and the patient was placed in lithotomy position. The abdomen and perineum was prepped and draped in the usual sterile fashion and a timeout was performed according to [facility] Quality and Safety guidelines.      Urology (Dr. Mata) first performed a cystoscopy, cystogram, bilateral retrograde pyelogram, and bilateral ureteral catheter placement.    We performed an upper midline laparotomy, entering the abdomen sharply. We performed an extensive  systematic lysis of adhesions beginning first within the midline cephalad to pubic symphysis and then bilateral to the lateral abdominal wall and retroperitoneum. This was quite difficult lasting approximately 5-6 hours. Once performed we were able to clearly identify the left ureter and obtain access to the presacral space posterior to the rectum along the left pelvic sidewall. The fistulae were mostly on along the right anterior aspect of the pelvis and so this dissection was left last. Due to prior pelvic dissections these planes were not anatomic. We were able to carefully dissect posterior to the small bowel mesentery which allowed us to identify and preserve the right ureter coursing along the pelvic sidewall. This left the fistulae to bladder and vagina to last. We performed a detailed assessment of the vagina and rectum with sizers and performed this dissection mostly sharply. Dr. Peterson was asked to assist due to nature of the disease. We were unable to safely dissect this free and so turned our attention to the prior left-sided dissection. Utilizing the posterior presacral plane, we dissected down to the levator ani posteriorly and wrapped this dissection along the left pelvic sidewall and eventually into the left anterior distal rectovaginal septum. This gave us a distal target in our attempt to preserve the right ureter and successfully takedown the bladder and vagina fistulae without injury. During this dissection, the bilateral distal ureters were dissected in order to clearly identify their course. While I performed the perineal portion of the case, Dr. Peterson did isolate the proximal rectum just below the anastomosis. This was divided with a Contour stapler so that we could invert our specimen.    Our perineal landmarks were outlined based on location of external sphincter complex, coccyx, and planned resection. An elliptical incision was made with electrocautery approximately 2 cm anterior and  posterior to the AV and approximately 2 cm bilaterally. This was taken down through the ischiorectal fat until reaching the external sphincter complex which was then followed laterally and cephalad toward the levator ani. We utilized the coccyx posteriorly to aid our dissection - the anococcygeal ligament was divided and with electrocautery were able to meet our abdominal dissection. The levator ani was then grasped and divided circumferentially leaving the anterior prostatic plane for last. The specimen was flipped out of the abdomen through the perineum. The remaining attachments anteriorly were divided with electrocautery taking care not to injure the vagina. The specimen was then removed and sent for review.     Once the abdominopelvic portion of our resection was complete the bladder was filled with 100-150 mL of dilute methylene blue - there was no leak. The perineal wound was then inspected and hemostasis insured. The levator ani were approximated with interrupted 0-Vicryl figure-of-eight sutures. The subcutaneous tissue was then thoroughly irrigated and approximated in several layers with 2-0 Vicryl. Finally the skin was secured with several interrupted mattress 3-0 Vicryl suture. We removed the intact bilateral ureteral catheters and took her legs out of lithotomy (due to length of case).     At this point, we were better able to assess the anastomosis and complex multi-loop fistulae. We performed an extensive interloop adhesiolysis to better evaluate her anatomy and that of the fistulae. We identified an approximately 30-40 cm segment of small bowel that in order to preserve would require a short-segment small bowel resection. We felt that this was necessary to preserve as much intestinal length as possible. We performed this resection and reconstruction in side-to-side fashion with a 75 mm CHRISSIE blue load and 60 mm green load TA. The common channel was over sewn with 3-0 Vicryl and an anastomotic securing  stitch was placed at the base of the anastomosis. We then measured her remaining bowel - it measured 115 cm with an umbilical tape. We ran the small bowel twice to ensure there was no evidence of missed enterotomies or serosal tears - there were none remaining.     A circular disc of skin was excised at the previously marked ostomy site in the right abdomen. The subcutaneous tissue was spread down to the anterior fascia - this was incised and rectus spread. The posterior sheath was incised and the defect accommodated two fingerbreadths. The ileum was brought through the defect. A 19 Fr left sided Rufus drain was placed in the pelvis after ensuring hemostasis and additional hemostatic agent placed. Seprafilm was then placed throughout the abdomen.    The midline fascia was approximated with looped 1-PDS. The subcutaneous tissue thoroughly irrigated and midline skin approximated with interrupted 3-0 Vicryl suture. The ileostomy was matured in standard fashion with 3-0 Vicryl.    Prior to closure and after final closure instrument, needle, and sponge counts were correct.    The procedure was completed without complication and was well-tolerated. The patient was then brought to the post-anesthesia care unit in stable condition. I was present for the entire operation and discussed her care with her family (April at [phone]).    This procedure was not performed to treat rectal cancer.     Complications: None  Estimated blood loss: 200 mL  Disposition: PACU      Robin Atkins MD, FACS, FASCRS  Department of Colon & Rectal Surgery  [facility]

## 2025-02-22 NOTE — BRIEF OP NOTE
Andrei Lawrence - Surgery ([facility])  Brief Operative Note    SUMMARY     Surgery Date: 2/22/2025     Surgeons and Role:  Panel 1:     * Robin Atkins MD - Primary     * Merary Ramos MD - Fellow     * Reji Peterson MD     * Terell Shepherd MD  Panel 2:     * Kerwin Rachel MD - Primary     * Jhonny Mata MD - Resident - Chief    Assisting Surgeon: None    Pre-op Diagnosis:  Crohn's disease [K50.90]    Post-op Diagnosis:  Post-Op Diagnosis Codes:     * Crohn's disease [K50.90]    Procedure(s) (LRB):  PROCTECTOMY, ABDOMINOPERINEAL (N/A)  CYSTOSCOPY, WITH RETROGRADE PYELOGRAM AND URETERAL STENT INSERTION (Bilateral)  CYSTOGRAM  EXCISION, SMALL INTESTINE  CREATION, ILEOSTOMY    Anesthesia: General    Implants:  Implant Name Type Inv. Item Serial No.  Lot No. LRB No. Used Action   BARRIER SEPRAFILM ADHESION - [id number]  BARRIER SEPRAFILM ADHESION  [employer] [id number]  1 Implanted       Operative Findings: significant adhesions throughout the abdomen and between loops of bowels, small bowel tethered densely to the pelvis, performed completion proctectomy, no injury to bladder on methylene blue instillation, no injury to the vagina on palpation, side to side small bowel anastomosis, 115cm bowel remaining, permanent end ileotomy    Estimated Blood Loss: 100 mL    Estimated Blood Loss has been documented.         Specimens:   Specimen (24h ago, onward)       Start     Ordered    02/22/25 1615  Specimen to Pathology, Surgery General Surgery  Once        Comments: Pre-op Diagnosis: Crohn's disease [K50.90]Procedure(s):PROCTECTOMY, ABDOMINOPERINEALCYSTOSCOPY, WITH RETROGRADE PYELOGRAM AND URETERAL STENT INSERTIONCYSTOGRAM Number of specimens: 3Name of specimens: 1) ANUS AND RECTUM- PERMANENT2. SMALL BOWEL AND ILEORECTAL ANASTOMOSIS- PERMANENT3. SMALL BOWEL AND FISTULA- PERMANENT     References:    Click here for ordering Quick Tip   Question Answer Comment   Procedure Type: General Surgery     Release to patient Immediate        02/22/25 1615                    [id number]

## 2025-02-23 LAB
ANION GAP SERPL CALC-SCNC: 12 MMOL/L (ref 8–16)
ANION GAP SERPL CALC-SCNC: 13 MMOL/L (ref 8–16)
BASOPHILS # BLD AUTO: 0.01 K/UL (ref 0–0.2)
BASOPHILS NFR BLD: 0.1 % (ref 0–1.9)
BUN SERPL-MCNC: 11 MG/DL (ref 6–20)
BUN SERPL-MCNC: 9 MG/DL (ref 6–20)
CALCIUM SERPL-MCNC: 7.5 MG/DL (ref 8.7–10.5)
CALCIUM SERPL-MCNC: 8 MG/DL (ref 8.7–10.5)
CHLORIDE SERPL-SCNC: 104 MMOL/L (ref 95–110)
CHLORIDE SERPL-SCNC: 106 MMOL/L (ref 95–110)
CO2 SERPL-SCNC: 19 MMOL/L (ref 23–29)
CO2 SERPL-SCNC: 20 MMOL/L (ref 23–29)
CREAT SERPL-MCNC: 1.3 MG/DL (ref 0.5–1.4)
CREAT SERPL-MCNC: 1.3 MG/DL (ref 0.5–1.4)
DIFFERENTIAL METHOD BLD: ABNORMAL
EOSINOPHIL # BLD AUTO: 0 K/UL (ref 0–0.5)
EOSINOPHIL NFR BLD: 0 % (ref 0–8)
ERYTHROCYTE [DISTWIDTH] IN BLOOD BY AUTOMATED COUNT: 21.9 % (ref 11.5–14.5)
ERYTHROCYTE [DISTWIDTH] IN BLOOD BY AUTOMATED COUNT: 22.5 % (ref 11.5–14.5)
EST. GFR  (NO RACE VARIABLE): 52.7 ML/MIN/1.73 M^2
EST. GFR  (NO RACE VARIABLE): 52.7 ML/MIN/1.73 M^2
GLUCOSE SERPL-MCNC: 124 MG/DL (ref 70–110)
GLUCOSE SERPL-MCNC: 141 MG/DL (ref 70–110)
HCT VFR BLD AUTO: 25.8 % (ref 37–48.5)
HCT VFR BLD AUTO: 26.3 % (ref 37–48.5)
HGB BLD-MCNC: 7.8 G/DL (ref 12–16)
HGB BLD-MCNC: 7.9 G/DL (ref 12–16)
IMM GRANULOCYTES # BLD AUTO: 0.04 K/UL (ref 0–0.04)
IMM GRANULOCYTES NFR BLD AUTO: 0.4 % (ref 0–0.5)
LYMPHOCYTES # BLD AUTO: 0.8 K/UL (ref 1–4.8)
LYMPHOCYTES NFR BLD: 6.9 % (ref 18–48)
MAGNESIUM SERPL-MCNC: 1.8 MG/DL (ref 1.6–2.6)
MCH RBC QN AUTO: 22.2 PG (ref 27–31)
MCH RBC QN AUTO: 22.2 PG (ref 27–31)
MCHC RBC AUTO-ENTMCNC: 30 G/DL (ref 32–36)
MCHC RBC AUTO-ENTMCNC: 30.2 G/DL (ref 32–36)
MCV RBC AUTO: 74 FL (ref 82–98)
MCV RBC AUTO: 74 FL (ref 82–98)
MONOCYTES # BLD AUTO: 0.6 K/UL (ref 0.3–1)
MONOCYTES NFR BLD: 5.8 % (ref 4–15)
NEUTROPHILS # BLD AUTO: 9.5 K/UL (ref 1.8–7.7)
NEUTROPHILS NFR BLD: 86.8 % (ref 38–73)
NRBC BLD-RTO: 0 /100 WBC
PHOSPHATE SERPL-MCNC: 5.1 MG/DL (ref 2.7–4.5)
PLATELET # BLD AUTO: 275 K/UL (ref 150–450)
PLATELET # BLD AUTO: 311 K/UL (ref 150–450)
PMV BLD AUTO: 9.3 FL (ref 9.2–12.9)
PMV BLD AUTO: 9.4 FL (ref 9.2–12.9)
POTASSIUM SERPL-SCNC: 3.7 MMOL/L (ref 3.5–5.1)
POTASSIUM SERPL-SCNC: 3.8 MMOL/L (ref 3.5–5.1)
RBC # BLD AUTO: 3.51 M/UL (ref 4–5.4)
RBC # BLD AUTO: 3.56 M/UL (ref 4–5.4)
SODIUM SERPL-SCNC: 136 MMOL/L (ref 136–145)
SODIUM SERPL-SCNC: 138 MMOL/L (ref 136–145)
WBC # BLD AUTO: 10.94 K/UL (ref 3.9–12.7)
WBC # BLD AUTO: 9.13 K/UL (ref 3.9–12.7)

## 2025-02-23 PROCEDURE — C1751 CATH, INF, PER/CENT/MIDLINE: HCPCS

## 2025-02-23 PROCEDURE — 80048 BASIC METABOLIC PNL TOTAL CA: CPT | Mod: 91 | Performed by: STUDENT IN AN ORGANIZED HEALTH CARE EDUCATION/TRAINING PROGRAM

## 2025-02-23 PROCEDURE — 85025 COMPLETE CBC W/AUTO DIFF WBC: CPT

## 2025-02-23 PROCEDURE — 25000003 PHARM REV CODE 250

## 2025-02-23 PROCEDURE — 85027 COMPLETE CBC AUTOMATED: CPT | Performed by: STUDENT IN AN ORGANIZED HEALTH CARE EDUCATION/TRAINING PROGRAM

## 2025-02-23 PROCEDURE — 99900035 HC TECH TIME PER 15 MIN (STAT)

## 2025-02-23 PROCEDURE — 63600175 PHARM REV CODE 636 W HCPCS: Performed by: STUDENT IN AN ORGANIZED HEALTH CARE EDUCATION/TRAINING PROGRAM

## 2025-02-23 PROCEDURE — 25000003 PHARM REV CODE 250: Performed by: STUDENT IN AN ORGANIZED HEALTH CARE EDUCATION/TRAINING PROGRAM

## 2025-02-23 PROCEDURE — 83735 ASSAY OF MAGNESIUM: CPT

## 2025-02-23 PROCEDURE — 63600175 PHARM REV CODE 636 W HCPCS

## 2025-02-23 PROCEDURE — 36415 COLL VENOUS BLD VENIPUNCTURE: CPT | Performed by: STUDENT IN AN ORGANIZED HEALTH CARE EDUCATION/TRAINING PROGRAM

## 2025-02-23 PROCEDURE — 63600175 PHARM REV CODE 636 W HCPCS: Mod: JZ,TB | Performed by: COMMUNITY HEALTH WORKER

## 2025-02-23 PROCEDURE — 36573 INSJ PICC RS&I 5 YR+: CPT

## 2025-02-23 PROCEDURE — 20600001 HC STEP DOWN PRIVATE ROOM

## 2025-02-23 PROCEDURE — 84100 ASSAY OF PHOSPHORUS: CPT

## 2025-02-23 PROCEDURE — 36415 COLL VENOUS BLD VENIPUNCTURE: CPT

## 2025-02-23 PROCEDURE — 76937 US GUIDE VASCULAR ACCESS: CPT

## 2025-02-23 PROCEDURE — 80048 BASIC METABOLIC PNL TOTAL CA: CPT

## 2025-02-23 RX ORDER — HEPARIN SODIUM 5000 [USP'U]/ML
5000 INJECTION, SOLUTION INTRAVENOUS; SUBCUTANEOUS EVERY 8 HOURS
Status: DISCONTINUED | OUTPATIENT
Start: 2025-02-24 | End: 2025-03-10 | Stop reason: HOSPADM

## 2025-02-23 RX ORDER — PANTOPRAZOLE SODIUM 40 MG/10ML
40 INJECTION, POWDER, LYOPHILIZED, FOR SOLUTION INTRAVENOUS DAILY
Status: DISCONTINUED | OUTPATIENT
Start: 2025-02-23 | End: 2025-02-24

## 2025-02-23 RX ADMIN — GABAPENTIN 300 MG: 300 CAPSULE ORAL at 08:02

## 2025-02-23 RX ADMIN — MUPIROCIN: 20 OINTMENT TOPICAL at 09:02

## 2025-02-23 RX ADMIN — METHOCARBAMOL 500 MG: 100 INJECTION, SOLUTION INTRAMUSCULAR; INTRAVENOUS at 11:02

## 2025-02-23 RX ADMIN — PIPERACILLIN SODIUM AND TAZOBACTAM SODIUM 4.5 G: 4; .5 INJECTION, POWDER, FOR SOLUTION INTRAVENOUS at 06:02

## 2025-02-23 RX ADMIN — ONDANSETRON 8 MG: 8 TABLET, ORALLY DISINTEGRATING ORAL at 02:02

## 2025-02-23 RX ADMIN — ACETAMINOPHEN 1000 MG: 10 INJECTION, SOLUTION INTRAVENOUS at 04:02

## 2025-02-23 RX ADMIN — ACETAMINOPHEN 1000 MG: 500 TABLET ORAL at 09:02

## 2025-02-23 RX ADMIN — Medication: at 06:02

## 2025-02-23 RX ADMIN — SODIUM CHLORIDE 125 MG: 9 INJECTION, SOLUTION INTRAVENOUS at 09:02

## 2025-02-23 RX ADMIN — GABAPENTIN 300 MG: 300 CAPSULE ORAL at 03:02

## 2025-02-23 RX ADMIN — PIPERACILLIN SODIUM AND TAZOBACTAM SODIUM 4.5 G: 4; .5 INJECTION, POWDER, FOR SOLUTION INTRAVENOUS at 03:02

## 2025-02-23 RX ADMIN — METHOCARBAMOL 500 MG: 100 INJECTION, SOLUTION INTRAMUSCULAR; INTRAVENOUS at 05:02

## 2025-02-23 RX ADMIN — Medication 6 MG: at 08:02

## 2025-02-23 RX ADMIN — GABAPENTIN 300 MG: 300 CAPSULE ORAL at 09:02

## 2025-02-23 RX ADMIN — Medication: at 03:02

## 2025-02-23 RX ADMIN — SODIUM CHLORIDE, POTASSIUM CHLORIDE, SODIUM LACTATE AND CALCIUM CHLORIDE 500 ML: 600; 310; 30; 20 INJECTION, SOLUTION INTRAVENOUS at 11:02

## 2025-02-23 RX ADMIN — PANTOPRAZOLE SODIUM 40 MG: 40 INJECTION, POWDER, LYOPHILIZED, FOR SOLUTION INTRAVENOUS at 11:02

## 2025-02-23 RX ADMIN — Medication: at 08:02

## 2025-02-23 RX ADMIN — ACETAMINOPHEN 1000 MG: 10 INJECTION, SOLUTION INTRAVENOUS at 02:02

## 2025-02-23 RX ADMIN — PIPERACILLIN SODIUM AND TAZOBACTAM SODIUM 4.5 G: 4; .5 INJECTION, POWDER, FOR SOLUTION INTRAVENOUS at 11:02

## 2025-02-23 RX ADMIN — METHOCARBAMOL 500 MG: 100 INJECTION, SOLUTION INTRAMUSCULAR; INTRAVENOUS at 04:02

## 2025-02-23 NOTE — CONSULTS
JACQUES consulted for PICC placement for TPN.   Left side attempt unable to pass axillary.   Right side, the only vein big enough was incompressible likely d/t ill-placed PIV in basilic vein.   Dr Shepherd and bedside nurse notified.

## 2025-02-23 NOTE — PROGRESS NOTES
[facility]  Colorectal Surgery  Progress Note    Patient Name: Candida Cardona  MRN: [id number]  Admission Date: 2/21/2025  Hospital Length of Stay: 2 days  Attending Physician: Robin Atkins MD    Subjective:     Interval History: POD1 from LAR. Resting comfortably in bed, no complaints over night. 245 cc serosanginous output in drain. H/H stable. PCA in place. Urine dark red colored. Cr bumped from .9 to 1.3 this am.    Post-Op Info:  Procedure(s) (LRB):  PROCTECTOMY, ABDOMINOPERINEAL (N/A)  CYSTOSCOPY, WITH RETROGRADE PYELOGRAM AND URETERAL STENT INSERTION (Bilateral)  CYSTOGRAM  EXCISION, SMALL INTESTINE  CREATION, ILEOSTOMY   1 Day Post-Op      Medications:  Continuous Infusions:   D5 and 0.9% NaCl   Intravenous Continuous 100 mL/hr at 02/22/25 2057 New Bag at 02/22/25 2057    hydromorphone in 0.9 % NaCl 6 mg/30 ml   Intravenous Continuous   New Syringe/Bag at 02/23/25 0620     Scheduled Meds:   acetaminophen  1,000 mg Intravenous Q8H    Followed by    acetaminophen  1,000 mg Oral Q8H    gabapentin  300 mg Oral TID    [START ON 2/24/2025] heparin (porcine)  5,000 Units Subcutaneous Q8H    lactated ringers  500 mL Intravenous Once    methocarbamol injection  500 mg Intravenous Q6H    mupirocin   Nasal BID    pantoprazole  40 mg Intravenous Daily    piperacillin-tazobactam (Zosyn) IV (PEDS and ADULTS) (extended infusion is not appropriate)  4.5 g Intravenous Q8H     PRN Meds:   acetaminophen 1,000 mg/100 mL (10 mg/mL) injection 1,000 mg    Followed by    acetaminophen tablet 1,000 mg    gabapentin capsule 300 mg    [START ON 2/24/2025] heparin (porcine) injection 5,000 Units    lactated ringers bolus 500 mL    methocarbamoL injection 500 mg    mupirocin 2 % ointment    pantoprazole injection 40 mg    piperacillin-tazobactam (ZOSYN) 4.5 g in D5W 100 mL IVPB (MB+)        Objective:     Vital Signs (Most Recent):  Temp: 97.5 °F (36.4 °C) (02/23/25 0753)  Pulse: 73 (02/23/25 0753)  Resp: 18 (02/23/25  0753)  BP: 120/71 (02/23/25 0753)  SpO2: 100 % (02/23/25 0753) Vital Signs (24h Range):  Temp:  [97.3 °F (36.3 °C)-97.7 °F (36.5 °C)] 97.5 °F (36.4 °C)  Pulse:  [62-87] 73  Resp:  [10-20] 18  SpO2:  [94 %-100 %] 100 %  BP: ()/(42-76) 120/71     Intake/Output - Last 3 Shifts         02/21 0700  02/22 0659 02/22 0700  02/23 0659 02/23 0700  02/24 0659    P.O.       I.V. (mL/kg) 45.3 (0.7) 3500 (54.3)     IV Piggyback 87.3 1100     Total Intake(mL/kg) 132.6 (2.1) 4600 (71.3)     Urine (mL/kg/hr)  520 (0.3)     Emesis/NG output  200     Drains  305     Stool       Blood  100     Total Output  1125     Net +132.6 +3475            Urine Occurrence 3 x      Stool Occurrence 7 x      Emesis Occurrence 2 x               Physical Exam  Vitals and nursing note reviewed.   Constitutional:       General: She is not in acute distress.     Appearance: She is not ill-appearing or diaphoretic.   HENT:      Head: Normocephalic and atraumatic.      Nose: Nose normal.      Mouth/Throat:      Comments: Missing teeth, poor oral hygiene   Eyes:      Extraocular Movements: Extraocular movements intact.      Pupils: Pupils are equal, round, and reactive to light.   Cardiovascular:      Rate and Rhythm: Normal rate and regular rhythm.   Pulmonary:      Effort: Pulmonary effort is normal.   Abdominal:      General: There is no distension.      Palpations: Abdomen is soft.      Tenderness: There is no abdominal tenderness.      Comments: Incision site c/d/I. Drain in place with serosang output.   Genitourinary:     Comments: Kaiser draining dark red urine  Musculoskeletal:         General: Normal range of motion.      Cervical back: Normal range of motion.   Skin:     General: Skin is warm and dry.   Neurological:      General: No focal deficit present.      Mental Status: She is alert.              Significant Labs:  All pertinent lab results within the last 24 hours have been reviewed.     Significant Diagnostics:  I have reviewed all  pertinent imaging results/findings within the past 24 hours.  Assessment/Plan:     Crohn's disease with ileorectal anastomotic stricture (ileum and rectum)  Candida Cardona is a 44 y.o. Now s/p completion proctectomy and excision of fistula tract on 2/22/25.       - Ice chips  - Maintain IVF for IRIS  - Multi-modal pain control, continue PCA  - PRN nausea meds  - Ordered PICC line, if placed will order TPN  - Bowel regimen  - Replace electrolytes PRN  - DVT prophylaxis   - OOBTC/Ambulate  - IS  - PT/OT  - Maintain barakat    Dispo: maintain care on [facility] Bose MD  Colorectal Surgery  [facility] - [facility]

## 2025-02-23 NOTE — SUBJECTIVE & OBJECTIVE
Interval History: AFVSS. NAEO. S/p ex-lap and completion proctectomy on 2/22/25. Incision c/d/I. Kaiser in place draining dark red urine. Cr 1.3 from 0.9.       Objective:     Temp:  [97.3 °F (36.3 °C)-97.7 °F (36.5 °C)] 97.5 °F (36.4 °C)  Pulse:  [62-87] 73  Resp:  [10-20] 18  SpO2:  [94 %-100 %] 100 %  BP: ()/(42-76) 120/71     Body mass index is 22.27 kg/m².           Drains       Drain  Duration                  Urethral Catheter 02/22/25 0830 Non-latex;Straight-tip 16 Fr. 1 day         Closed/Suction Drain 02/22/25 1619 Tube - 1 Left Abdomen Bulb 19 Fr. <1 day         Ileostomy 02/22/25 1618 other (see comments) RUQ <1 day                     Physical Exam  Constitutional:       General: She is not in acute distress.     Appearance: Normal appearance.   HENT:      Head: Normocephalic.      Nose: Nose normal.   Eyes:      Pupils: Pupils are equal, round, and reactive to light.   Cardiovascular:      Rate and Rhythm: Normal rate.   Pulmonary:      Effort: Pulmonary effort is normal. No respiratory distress.   Abdominal:      General: Abdomen is flat. There is no distension.      Tenderness: There is no abdominal tenderness. There is no right CVA tenderness or left CVA tenderness.      Comments: Incision c/d/I.    THAI SS   Genitourinary:     Comments: Kaiser in place draining dark red urine.   Musculoskeletal:         General: Normal range of motion.   Skin:     General: Skin is warm.      Coloration: Skin is not jaundiced.   Neurological:      General: No focal deficit present.      Mental Status: She is alert and oriented to person, place, and time.   Psychiatric:         Mood and Affect: Mood normal.         Behavior: Behavior normal.           Significant Labs:    BMP:  Recent Labs   Lab 02/22/25  0500 02/22/25  1735 02/23/25  0349   * 137 138   K 3.4* 4.7 3.7   CL 99 104 106   CO2 24 15* 19*   BUN 6 7 9   CREATININE 0.8 0.9 1.3   CALCIUM 8.7 7.3* 7.5*       CBC:   Recent Labs   Lab 02/22/25  0500  02/22/25  1735 02/23/25  0349   WBC 5.15 15.98* 10.94   HGB 10.1* 8.8* 7.8*   HCT 33.3* 29.3* 25.8*    277 275       All pertinent labs results from the past 24 hours have been reviewed.    Significant Imaging:  All pertinent imaging results/findings from the past 24 hours have been reviewed.

## 2025-02-23 NOTE — SUBJECTIVE & OBJECTIVE
Subjective:     Interval History: POD1 from LAR. Resting comfortably in bed, no complaints over night. 245 cc serosanginous output in drain. H/H stable. PCA in place. Urine dark red colored. Cr bumped from .9 to 1.3 this am.    Post-Op Info:  Procedure(s) (LRB):  PROCTECTOMY, ABDOMINOPERINEAL (N/A)  CYSTOSCOPY, WITH RETROGRADE PYELOGRAM AND URETERAL STENT INSERTION (Bilateral)  CYSTOGRAM  EXCISION, SMALL INTESTINE  CREATION, ILEOSTOMY   1 Day Post-Op      Medications:  Continuous Infusions:   D5 and 0.9% NaCl   Intravenous Continuous 100 mL/hr at 02/22/25 2057 New Bag at 02/22/25 2057    hydromorphone in 0.9 % NaCl 6 mg/30 ml   Intravenous Continuous   New Syringe/Bag at 02/23/25 0620     Scheduled Meds:   acetaminophen  1,000 mg Intravenous Q8H    Followed by    acetaminophen  1,000 mg Oral Q8H    gabapentin  300 mg Oral TID    [START ON 2/24/2025] heparin (porcine)  5,000 Units Subcutaneous Q8H    lactated ringers  500 mL Intravenous Once    methocarbamol injection  500 mg Intravenous Q6H    mupirocin   Nasal BID    pantoprazole  40 mg Intravenous Daily    piperacillin-tazobactam (Zosyn) IV (PEDS and ADULTS) (extended infusion is not appropriate)  4.5 g Intravenous Q8H     PRN Meds:   acetaminophen 1,000 mg/100 mL (10 mg/mL) injection 1,000 mg    Followed by    acetaminophen tablet 1,000 mg    gabapentin capsule 300 mg    [START ON 2/24/2025] heparin (porcine) injection 5,000 Units    lactated ringers bolus 500 mL    methocarbamoL injection 500 mg    mupirocin 2 % ointment    pantoprazole injection 40 mg    piperacillin-tazobactam (ZOSYN) 4.5 g in D5W 100 mL IVPB (MB+)        Objective:     Vital Signs (Most Recent):  Temp: 97.5 °F (36.4 °C) (02/23/25 0753)  Pulse: 73 (02/23/25 0753)  Resp: 18 (02/23/25 0753)  BP: 120/71 (02/23/25 0753)  SpO2: 100 % (02/23/25 0753) Vital Signs (24h Range):  Temp:  [97.3 °F (36.3 °C)-97.7 °F (36.5 °C)] 97.5 °F (36.4 °C)  Pulse:  [62-87] 73  Resp:  [10-20] 18  SpO2:  [94 %-100 %]  100 %  BP: ()/(42-76) 120/71     Intake/Output - Last 3 Shifts         02/21 0700  02/22 0659 02/22 0700  02/23 0659 02/23 0700  02/24 0659    P.O.       I.V. (mL/kg) 45.3 (0.7) 3500 (54.3)     IV Piggyback 87.3 1100     Total Intake(mL/kg) 132.6 (2.1) 4600 (71.3)     Urine (mL/kg/hr)  520 (0.3)     Emesis/NG output  200     Drains  305     Stool       Blood  100     Total Output  1125     Net +132.6 +3475            Urine Occurrence 3 x      Stool Occurrence 7 x      Emesis Occurrence 2 x               Physical Exam  Vitals and nursing note reviewed.   Constitutional:       General: She is not in acute distress.     Appearance: She is not ill-appearing or diaphoretic.   HENT:      Head: Normocephalic and atraumatic.      Nose: Nose normal.      Mouth/Throat:      Comments: Missing teeth, poor oral hygiene   Eyes:      Extraocular Movements: Extraocular movements intact.      Pupils: Pupils are equal, round, and reactive to light.   Cardiovascular:      Rate and Rhythm: Normal rate and regular rhythm.   Pulmonary:      Effort: Pulmonary effort is normal.   Abdominal:      General: There is no distension.      Palpations: Abdomen is soft.      Tenderness: There is no abdominal tenderness.      Comments: Incision site c/d/I. Drain in place with serosang output.   Genitourinary:     Comments: Kaiser draining dark red urine  Musculoskeletal:         General: Normal range of motion.      Cervical back: Normal range of motion.   Skin:     General: Skin is warm and dry.   Neurological:      General: No focal deficit present.      Mental Status: She is alert.              Significant Labs:  All pertinent lab results within the last 24 hours have been reviewed.     Significant Diagnostics:  I have reviewed all pertinent imaging results/findings within the past 24 hours.

## 2025-02-23 NOTE — PROGRESS NOTES
[facility]  Urology  Progress Note    Patient Name: Candida Cardona  MRN: [id number]  Admission Date: 2/21/2025  Hospital Length of Stay: 2 days  Code Status: Full Code   Attending Provider: Robin Atkins MD   Primary Care Physician: Matti Conklin MD    Subjective:     HPI:  Ms. Cardona is a 44 year old woman with a history of urolithiasis, Crohn's disease, GERD, anxiety, CAD, Bipolar disorder, and pAF and the surgeries listed below presenting to [facility] [place] as a direct admit for colovesiculofistula. 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 Currently scheduled for a completion proctectomy on 2/22/25. Urology consulted for enterovesical fistula.     On assessment the patient is AFVSS. On 1/15/2019 - Ileostomy closure and ileorectal anastomosis, extensive lysis of adhesions, ureterolysis. Endorses passage of air and stool with urination of several weeks duration. Denies fevers, chills, flank pain, difficulty urinating. Has urologic history that includes stones and a cystoscopy performed in 2019 for removal of a stent. Currently scheduled for completion proctectomy on 2/22/25.    WBC 5.88, Hg 9.8. Cr is 0.8. UA nitrite negative 16 RBC, >100 WBC and moderate bacteria with 16 squamous cells and few yeast. Urine culture pending.     CT scan with no hydronephrosis bilaterally. Thick walled bladder with inflammatory changes and air posteriorly between rectum and bladder. No definitive fistula seen,                Interval History: AFVSS. NAEO. S/p ex-lap and completion proctectomy on 2/22/25. Incision c/d/I. Kaiser in place draining dark red urine. Cr 1.3 from 0.9.       Objective:     Temp:  [97.3 °F (36.3 °C)-97.7 °F (36.5 °C)] 97.5 °F (36.4 °C)  Pulse:  [62-87] 73  Resp:  [10-20] 18  SpO2:  [94 %-100 %] 100 %  BP: ()/(42-76)  120/71     Body mass index is 22.27 kg/m².           Drains       Drain  Duration                  Urethral Catheter 02/22/25 0830 Non-latex;Straight-tip 16 Fr. 1 day         Closed/Suction Drain 02/22/25 1619 Tube - 1 Left Abdomen Bulb 19 Fr. <1 day         Ileostomy 02/22/25 1618 other (see comments) RUQ <1 day                     Physical Exam  Constitutional:       General: She is not in acute distress.     Appearance: Normal appearance.   HENT:      Head: Normocephalic.      Nose: Nose normal.   Eyes:      Pupils: Pupils are equal, round, and reactive to light.   Cardiovascular:      Rate and Rhythm: Normal rate.   Pulmonary:      Effort: Pulmonary effort is normal. No respiratory distress.   Abdominal:      General: Abdomen is flat. There is no distension.      Tenderness: There is no abdominal tenderness. There is no right CVA tenderness or left CVA tenderness.      Comments: Incision c/d/I.    THAI SS   Genitourinary:     Comments: Kaiser in place draining dark red urine.   Musculoskeletal:         General: Normal range of motion.   Skin:     General: Skin is warm.      Coloration: Skin is not jaundiced.   Neurological:      General: No focal deficit present.      Mental Status: She is alert and oriented to person, place, and time.   Psychiatric:         Mood and Affect: Mood normal.         Behavior: Behavior normal.           Significant Labs:    BMP:  Recent Labs   Lab 02/22/25  0500 02/22/25  1735 02/23/25  0349   * 137 138   K 3.4* 4.7 3.7   CL 99 104 106   CO2 24 15* 19*   BUN 6 7 9   CREATININE 0.8 0.9 1.3   CALCIUM 8.7 7.3* 7.5*       CBC:   Recent Labs   Lab 02/22/25  0500 02/22/25  1735 02/23/25  0349   WBC 5.15 15.98* 10.94   HGB 10.1* 8.8* 7.8*   HCT 33.3* 29.3* 25.8*    277 275       All pertinent labs results from the past 24 hours have been reviewed.    Significant Imaging:  All pertinent imaging results/findings from the past 24 hours have been  reviewed.                  Assessment/Plan:     Colovesical fistula  Ms. Cardona is a 44 year old woman with a history of urolithiasis, Crohn's disease, GERD, anxiety, CAD, Bipolar disorder, and pAF and the surgeries listed below presenting to [facility] [place] as a direct admit for colovesiculofistula. Urology consulted for enterovesical fistula.     -- History consistent with colovesical fistula, imaging unclear. Now s/p completion proctectomy and excision of fistula tract on 2/22/25. No concern for cystotomy at that time.   -- Recommend maintain barakat given patients IRIS.  -- Rest of care per primary.             VTE Risk Mitigation (From admission, onward)           Ordered     heparin (porcine) injection 5,000 Units  Every 8 hours         02/21/25 1838     IP VTE LOW RISK PATIENT  Once         02/21/25 0347     Place sequential compression device  Until discontinued         02/21/25 0347                    Luciano Barrow DO  Urology  [facility]

## 2025-02-23 NOTE — PLAN OF CARE
Problem: Adult Inpatient Plan of Care  Goal: Absence of Hospital-Acquired Illness or Injury  Outcome: Progressing  Goal: Optimal Comfort and Wellbeing  Outcome: Progressing  Goal: Readiness for Transition of Care  Outcome: Progressing     Problem: Pain Acute  Goal: Optimal Pain Control and Function  Outcome: Progressing     Problem: Fall Injury Risk  Goal: Absence of Fall and Fall-Related Injury  Outcome: Progressing     Problem: Infection  Goal: Absence of Infection Signs and Symptoms  Outcome: Progressing     Problem: Skin Injury Risk Increased  Goal: Skin Health and Integrity  Outcome: Progressing

## 2025-02-23 NOTE — ASSESSMENT & PLAN NOTE
Candida Cardona is a 44 y.o. Now s/p completion proctectomy and excision of fistula tract on 2/22/25.       - Ice chips  - Maintain IVF for IRIS  - Multi-modal pain control  - PRN nausea meds  - Ordered PICC line, if placed will order TPN  - Bowel regimen  - Replace electrolytes PRN  - DVT prophylaxis   - OOBTC/Ambulate  - IS  - PT/OT  - Maintain barakat    Dispo: maintain care on [facility]SU

## 2025-02-23 NOTE — NURSING TRANSFER
Nursing Transfer Note      2/22/2025   7:46 PM    Nurse giving handoff:RIA Alan PACU  Nurse receiving handoff:RIA Mena    Reason patient is being transferred: post anesthesia    Transfer To: 1049    Transfer via bed    Transported by RN x 2    Transfer Vital Signs:  Blood Pressure:103/69  Heart Rate:62  O2:99%-- RA  Temperature:  Respirations:14    Additional Lines: Kaiser Catheter    Medicines sent: n/a    Any special needs or follow-up needed: n/a    Patient belongings transferred with patient: Yes and glasses    Chart send with patient: Yes    Notified: family    Patient reassessed at: 2/22/25@ 1930     Upon arrival to floor: patient oriented to room, call bell in reach, and bed in lowest position

## 2025-02-23 NOTE — ASSESSMENT & PLAN NOTE
Ms. Cardona is a 44 year old woman with a history of urolithiasis, Crohn's disease, GERD, anxiety, CAD, Bipolar disorder, and pAF and the surgeries listed below presenting to [facility] [place] as a direct admit for colovesiculofistula. Urology consulted for enterovesical fistula.     -- History consistent with colovesical fistula, imaging unclear. Now s/p completion proctectomy and excision of fistula tract on 2/22/25. No concern for cystotomy at that time.   -- Recommend maintain barakat given patients IRIS.  -- Rest of care per primary.

## 2025-02-23 NOTE — PROCEDURES
"Candida Cardona is a 44 y.o. female patient.    Temp: 97.5 °F (36.4 °C) (02/23/25 0753)  Pulse: 73 (02/23/25 0753)  Resp: 18 (02/23/25 0753)  BP: 120/71 (02/23/25 0753)  SpO2: 100 % (02/23/25 0753)  Weight: 64.5 kg (142 lb 3.2 oz) (02/21/25 0346)  Height: 5' 7" (170.2 cm) (02/21/25 0346)    PICC  Date/Time: 2/23/2025 10:00 AM  [facility] procedure was performed: [facility] PICC LINE PLACEMENT  Performed by: Rena Novak RN  Assisting provider: Lauri Azevedo LPN  Consent Done: Yes  Time out: Immediately prior to procedure a time out was called to verify the correct patient, procedure, equipment, support staff and site/side marked as required  Indications: med administration  Anesthesia: local infiltration  Local anesthetic: lidocaine 1% without epinephrine  Anesthetic Total (mL): 3  Preparation: skin prepped with ChloraPrep  Skin prep agent dried: skin prep agent completely dried prior to procedure  Sterile barriers: all five maximum sterile barriers used - cap, mask, sterile gown, sterile gloves, and large sterile sheet  Hand hygiene: hand hygiene performed prior to central venous catheter insertion  [facility] details: left brachial  Catheter type: double lumen  Catheter size: 5 Fr  Catheter Length: 40cm    Ultrasound guidance: yes  Vessel Caliber: medium and patent, compressibility normal  Vascular Doppler: not done  Needle advanced into vessel with real time Ultrasound guidance.  Guidewire confirmed in vessel.  Image recorded and saved.  Sterile sheath used.  no esophageal manometryNumber of attempts: 1  Technical procedures used: 3CG  Specimens: No  Implants: No  Other complications: RN unable to advance line beyond left armpit. Right brachial vein isn't suitable for placement.  Comments: RN to notify MD.           Name Lauri Azevedo LPN   2/23/2025    "

## 2025-02-24 PROBLEM — D84.9 IMMUNOSUPPRESSED STATUS: Status: ACTIVE | Noted: 2025-02-24

## 2025-02-24 PROBLEM — K90.821 SHORT BOWEL SYNDROME WITH COLON IN CONTINUITY: Status: ACTIVE | Noted: 2025-02-24

## 2025-02-24 PROBLEM — N32.2 BLADDER FISTULA: Status: ACTIVE | Noted: 2025-02-24

## 2025-02-24 LAB
ANION GAP SERPL CALC-SCNC: 11 MMOL/L (ref 8–16)
ANION GAP SERPL CALC-SCNC: 12 MMOL/L (ref 8–16)
BASOPHILS # BLD AUTO: 0.03 K/UL (ref 0–0.2)
BASOPHILS NFR BLD: 0.3 % (ref 0–1.9)
BODY FLUID SOURCE, CREATININE: NORMAL
BUN SERPL-MCNC: 15 MG/DL (ref 6–20)
BUN SERPL-MCNC: 17 MG/DL (ref 6–20)
CALCIUM SERPL-MCNC: 8 MG/DL (ref 8.7–10.5)
CALCIUM SERPL-MCNC: 8.3 MG/DL (ref 8.7–10.5)
CHLORIDE SERPL-SCNC: 103 MMOL/L (ref 95–110)
CHLORIDE SERPL-SCNC: 104 MMOL/L (ref 95–110)
CO2 SERPL-SCNC: 17 MMOL/L (ref 23–29)
CO2 SERPL-SCNC: 21 MMOL/L (ref 23–29)
CREAT FLD-MCNC: 1.4 MG/DL
CREAT SERPL-MCNC: 1.2 MG/DL (ref 0.5–1.4)
CREAT SERPL-MCNC: 1.5 MG/DL (ref 0.5–1.4)
CREAT UR-MCNC: 113 MG/DL (ref 15–325)
DIFFERENTIAL METHOD BLD: ABNORMAL
EOSINOPHIL # BLD AUTO: 0 K/UL (ref 0–0.5)
EOSINOPHIL NFR BLD: 0.1 % (ref 0–8)
ERYTHROCYTE [DISTWIDTH] IN BLOOD BY AUTOMATED COUNT: 22.3 % (ref 11.5–14.5)
EST. GFR  (NO RACE VARIABLE): 44.3 ML/MIN/1.73 M^2
EST. GFR  (NO RACE VARIABLE): 58 ML/MIN/1.73 M^2
GLUCOSE SERPL-MCNC: 112 MG/DL (ref 70–110)
GLUCOSE SERPL-MCNC: 75 MG/DL (ref 70–110)
HCT VFR BLD AUTO: 24.9 % (ref 37–48.5)
HGB BLD-MCNC: 7.5 G/DL (ref 12–16)
IMM GRANULOCYTES # BLD AUTO: 0.05 K/UL (ref 0–0.04)
IMM GRANULOCYTES NFR BLD AUTO: 0.5 % (ref 0–0.5)
LYMPHOCYTES # BLD AUTO: 0.5 K/UL (ref 1–4.8)
LYMPHOCYTES NFR BLD: 5 % (ref 18–48)
MAGNESIUM SERPL-MCNC: 1.9 MG/DL (ref 1.6–2.6)
MCH RBC QN AUTO: 22.6 PG (ref 27–31)
MCHC RBC AUTO-ENTMCNC: 30.1 G/DL (ref 32–36)
MCV RBC AUTO: 75 FL (ref 82–98)
MONOCYTES # BLD AUTO: 0.4 K/UL (ref 0.3–1)
MONOCYTES NFR BLD: 3.8 % (ref 4–15)
NEUTROPHILS # BLD AUTO: 9.7 K/UL (ref 1.8–7.7)
NEUTROPHILS NFR BLD: 90.3 % (ref 38–73)
NRBC BLD-RTO: 0 /100 WBC
PHOSPHATE SERPL-MCNC: 4.2 MG/DL (ref 2.7–4.5)
PLATELET # BLD AUTO: 260 K/UL (ref 150–450)
PMV BLD AUTO: 9.2 FL (ref 9.2–12.9)
POTASSIUM SERPL-SCNC: 3.4 MMOL/L (ref 3.5–5.1)
POTASSIUM SERPL-SCNC: 4.1 MMOL/L (ref 3.5–5.1)
RBC # BLD AUTO: 3.32 M/UL (ref 4–5.4)
SODIUM SERPL-SCNC: 133 MMOL/L (ref 136–145)
SODIUM SERPL-SCNC: 135 MMOL/L (ref 136–145)
SODIUM UR-SCNC: 14 MMOL/L (ref 20–250)
WBC # BLD AUTO: 10.74 K/UL (ref 3.9–12.7)

## 2025-02-24 PROCEDURE — 63600175 PHARM REV CODE 636 W HCPCS: Performed by: STUDENT IN AN ORGANIZED HEALTH CARE EDUCATION/TRAINING PROGRAM

## 2025-02-24 PROCEDURE — 36415 COLL VENOUS BLD VENIPUNCTURE: CPT | Performed by: STUDENT IN AN ORGANIZED HEALTH CARE EDUCATION/TRAINING PROGRAM

## 2025-02-24 PROCEDURE — 63600175 PHARM REV CODE 636 W HCPCS

## 2025-02-24 PROCEDURE — 84300 ASSAY OF URINE SODIUM: CPT | Performed by: STUDENT IN AN ORGANIZED HEALTH CARE EDUCATION/TRAINING PROGRAM

## 2025-02-24 PROCEDURE — 25000003 PHARM REV CODE 250: Performed by: STUDENT IN AN ORGANIZED HEALTH CARE EDUCATION/TRAINING PROGRAM

## 2025-02-24 PROCEDURE — 80048 BASIC METABOLIC PNL TOTAL CA: CPT | Mod: 91 | Performed by: STUDENT IN AN ORGANIZED HEALTH CARE EDUCATION/TRAINING PROGRAM

## 2025-02-24 PROCEDURE — 84100 ASSAY OF PHOSPHORUS: CPT

## 2025-02-24 PROCEDURE — 82570 ASSAY OF URINE CREATININE: CPT | Performed by: STUDENT IN AN ORGANIZED HEALTH CARE EDUCATION/TRAINING PROGRAM

## 2025-02-24 PROCEDURE — 99232 SBSQ HOSP IP/OBS MODERATE 35: CPT | Mod: GC,,, | Performed by: INTERNAL MEDICINE

## 2025-02-24 PROCEDURE — 25000003 PHARM REV CODE 250

## 2025-02-24 PROCEDURE — 99223 1ST HOSP IP/OBS HIGH 75: CPT | Mod: 25,,, | Performed by: PHYSICIAN ASSISTANT

## 2025-02-24 PROCEDURE — 82570 ASSAY OF URINE CREATININE: CPT | Mod: 91 | Performed by: STUDENT IN AN ORGANIZED HEALTH CARE EDUCATION/TRAINING PROGRAM

## 2025-02-24 PROCEDURE — 80048 BASIC METABOLIC PNL TOTAL CA: CPT

## 2025-02-24 PROCEDURE — 85025 COMPLETE CBC W/AUTO DIFF WBC: CPT

## 2025-02-24 PROCEDURE — 63600175 PHARM REV CODE 636 W HCPCS: Mod: JZ,TB | Performed by: COMMUNITY HEALTH WORKER

## 2025-02-24 PROCEDURE — 83735 ASSAY OF MAGNESIUM: CPT

## 2025-02-24 PROCEDURE — 36415 COLL VENOUS BLD VENIPUNCTURE: CPT

## 2025-02-24 PROCEDURE — 02JY3ZZ INSPECTION OF GREAT VESSEL, PERCUTANEOUS APPROACH: ICD-10-PCS | Performed by: STUDENT IN AN ORGANIZED HEALTH CARE EDUCATION/TRAINING PROGRAM

## 2025-02-24 PROCEDURE — 20600001 HC STEP DOWN PRIVATE ROOM

## 2025-02-24 PROCEDURE — 99900035 HC TECH TIME PER 15 MIN (STAT)

## 2025-02-24 RX ORDER — PANTOPRAZOLE SODIUM 40 MG/10ML
40 INJECTION, POWDER, LYOPHILIZED, FOR SOLUTION INTRAVENOUS DAILY
Status: DISCONTINUED | OUTPATIENT
Start: 2025-02-24 | End: 2025-03-10 | Stop reason: HOSPADM

## 2025-02-24 RX ORDER — POTASSIUM CHLORIDE 7.45 MG/ML
10 INJECTION INTRAVENOUS
Status: COMPLETED | OUTPATIENT
Start: 2025-02-24 | End: 2025-02-24

## 2025-02-24 RX ORDER — PANTOPRAZOLE SODIUM 40 MG/10ML
40 INJECTION, POWDER, LYOPHILIZED, FOR SOLUTION INTRAVENOUS DAILY
Status: CANCELLED | OUTPATIENT
Start: 2025-02-24

## 2025-02-24 RX ORDER — FENTANYL CITRATE 50 UG/ML
INJECTION, SOLUTION INTRAMUSCULAR; INTRAVENOUS
Status: COMPLETED | OUTPATIENT
Start: 2025-02-24 | End: 2025-02-24

## 2025-02-24 RX ORDER — LIDOCAINE HYDROCHLORIDE 10 MG/ML
INJECTION, SOLUTION INFILTRATION; PERINEURAL
Status: COMPLETED | OUTPATIENT
Start: 2025-02-24 | End: 2025-02-24

## 2025-02-24 RX ORDER — CEFAZOLIN SODIUM 1 G/3ML
INJECTION, POWDER, FOR SOLUTION INTRAMUSCULAR; INTRAVENOUS
Status: COMPLETED | OUTPATIENT
Start: 2025-02-24 | End: 2025-02-24

## 2025-02-24 RX ORDER — MIDAZOLAM HYDROCHLORIDE 1 MG/ML
INJECTION, SOLUTION INTRAMUSCULAR; INTRAVENOUS
Status: COMPLETED | OUTPATIENT
Start: 2025-02-24 | End: 2025-02-24

## 2025-02-24 RX ADMIN — POTASSIUM CHLORIDE 10 MEQ: 7.46 INJECTION, SOLUTION INTRAVENOUS at 09:02

## 2025-02-24 RX ADMIN — GABAPENTIN 300 MG: 300 CAPSULE ORAL at 08:02

## 2025-02-24 RX ADMIN — CEFAZOLIN 1 G: 330 INJECTION, POWDER, FOR SOLUTION INTRAMUSCULAR; INTRAVENOUS at 12:02

## 2025-02-24 RX ADMIN — Medication: at 04:02

## 2025-02-24 RX ADMIN — MIDAZOLAM 1 MG: 1 INJECTION INTRAMUSCULAR; INTRAVENOUS at 12:02

## 2025-02-24 RX ADMIN — SODIUM CHLORIDE 1000 ML: 9 INJECTION, SOLUTION INTRAVENOUS at 09:02

## 2025-02-24 RX ADMIN — MIDAZOLAM 0.5 MG: 1 INJECTION INTRAMUSCULAR; INTRAVENOUS at 12:02

## 2025-02-24 RX ADMIN — ONDANSETRON 8 MG: 8 TABLET, ORALLY DISINTEGRATING ORAL at 08:02

## 2025-02-24 RX ADMIN — HEPARIN SODIUM 5000 UNITS: 5000 INJECTION INTRAVENOUS; SUBCUTANEOUS at 02:02

## 2025-02-24 RX ADMIN — ACETAMINOPHEN 1000 MG: 500 TABLET ORAL at 02:02

## 2025-02-24 RX ADMIN — Medication: at 10:02

## 2025-02-24 RX ADMIN — METHOCARBAMOL 500 MG: 100 INJECTION, SOLUTION INTRAMUSCULAR; INTRAVENOUS at 05:02

## 2025-02-24 RX ADMIN — PIPERACILLIN SODIUM AND TAZOBACTAM SODIUM 4.5 G: 4; .5 INJECTION, POWDER, FOR SOLUTION INTRAVENOUS at 03:02

## 2025-02-24 RX ADMIN — LIDOCAINE HYDROCHLORIDE 5 ML: 10 INJECTION, SOLUTION INFILTRATION; PERINEURAL at 12:02

## 2025-02-24 RX ADMIN — PANTOPRAZOLE SODIUM 40 MG: 40 INJECTION, POWDER, FOR SOLUTION INTRAVENOUS at 08:02

## 2025-02-24 RX ADMIN — HEPARIN SODIUM 5000 UNITS: 5000 INJECTION INTRAVENOUS; SUBCUTANEOUS at 09:02

## 2025-02-24 RX ADMIN — METHOCARBAMOL 500 MG: 100 INJECTION, SOLUTION INTRAMUSCULAR; INTRAVENOUS at 11:02

## 2025-02-24 RX ADMIN — HEPARIN SODIUM 5000 UNITS: 5000 INJECTION INTRAVENOUS; SUBCUTANEOUS at 06:02

## 2025-02-24 RX ADMIN — ACETAMINOPHEN 1000 MG: 500 TABLET ORAL at 06:02

## 2025-02-24 RX ADMIN — POTASSIUM CHLORIDE 10 MEQ: 7.46 INJECTION, SOLUTION INTRAVENOUS at 03:02

## 2025-02-24 RX ADMIN — Medication 6 MG: at 09:02

## 2025-02-24 RX ADMIN — Medication: at 06:02

## 2025-02-24 RX ADMIN — MUPIROCIN: 20 OINTMENT TOPICAL at 10:02

## 2025-02-24 RX ADMIN — ONDANSETRON 8 MG: 8 TABLET, ORALLY DISINTEGRATING ORAL at 05:02

## 2025-02-24 RX ADMIN — ACETAMINOPHEN 1000 MG: 500 TABLET ORAL at 09:02

## 2025-02-24 RX ADMIN — PIPERACILLIN SODIUM AND TAZOBACTAM SODIUM 4.5 G: 4; .5 INJECTION, POWDER, FOR SOLUTION INTRAVENOUS at 06:02

## 2025-02-24 RX ADMIN — GABAPENTIN 300 MG: 300 CAPSULE ORAL at 02:02

## 2025-02-24 RX ADMIN — MUPIROCIN: 20 OINTMENT TOPICAL at 09:02

## 2025-02-24 RX ADMIN — POTASSIUM CHLORIDE 10 MEQ: 7.46 INJECTION, SOLUTION INTRAVENOUS at 10:02

## 2025-02-24 RX ADMIN — FENTANYL CITRATE 50 MCG: 0.05 INJECTION, SOLUTION INTRAMUSCULAR; INTRAVENOUS at 12:02

## 2025-02-24 RX ADMIN — METHOCARBAMOL 500 MG: 100 INJECTION, SOLUTION INTRAMUSCULAR; INTRAVENOUS at 03:02

## 2025-02-24 RX ADMIN — PIPERACILLIN SODIUM AND TAZOBACTAM SODIUM 4.5 G: 4; .5 INJECTION, POWDER, FOR SOLUTION INTRAVENOUS at 10:02

## 2025-02-24 RX ADMIN — GABAPENTIN 300 MG: 300 CAPSULE ORAL at 09:02

## 2025-02-24 RX ADMIN — POTASSIUM CHLORIDE 10 MEQ: 7.46 INJECTION, SOLUTION INTRAVENOUS at 11:02

## 2025-02-24 RX ADMIN — METHOCARBAMOL 500 MG: 100 INJECTION, SOLUTION INTRAMUSCULAR; INTRAVENOUS at 06:02

## 2025-02-24 RX ADMIN — Medication: at 01:02

## 2025-02-24 NOTE — NURSING
Patient arrived to MPU bay 5 s/p attempted tunneled line placement. Dressing to right neck is clean and dry. Cardiac monitoring continued. Fall precautions reviewed. Bed in lowest, locked position. Call light within reach. PCA infusing. etCO2 in place.    Vitals:    02/24/25 1310   BP: (!) 108/56   Pulse: 69   Resp: 10   Temp: 98 °F (36.7 °C)

## 2025-02-24 NOTE — SUBJECTIVE & OBJECTIVE
Subjective:     Interval History: Resting comfortably in bed this morning, energetic. Using PCA pump to maximum capacity. Green leakage/residue on her abdomen and under her breasts. Complaining of acid reflux symptoms. Dressing changed at bedside. SS output in drain. Hgb slightly downtrending to 7.5 from 7.9. Cr continues to rise to 1.5, baseline .9 to 1.    Post-Op Info:  Procedure(s) (LRB):  PROCTECTOMY, ABDOMINOPERINEAL (N/A)  CYSTOSCOPY, WITH RETROGRADE PYELOGRAM AND URETERAL STENT INSERTION (Bilateral)  CYSTOGRAM  EXCISION, SMALL INTESTINE  CREATION, ILEOSTOMY   2 Days Post-Op      Medications:  Continuous Infusions:   D5 and 0.9% NaCl   Intravenous Continuous 100 mL/hr at 02/22/25 2057 New Bag at 02/22/25 2057    hydromorphone in 0.9 % NaCl 6 mg/30 ml   Intravenous Continuous   New Syringe/Bag at 02/24/25 0624     Scheduled Meds:   acetaminophen  1,000 mg Oral Q8H    gabapentin  300 mg Oral TID    heparin (porcine)  5,000 Units Subcutaneous Q8H    methocarbamol injection  500 mg Intravenous Q6H    mupirocin   Nasal BID    pantoprazole  40 mg Intravenous Daily    piperacillin-tazobactam (Zosyn) IV (PEDS and ADULTS) (extended infusion is not appropriate)  4.5 g Intravenous Q8H    potassium chloride  10 mEq Intravenous Q1H     PRN Meds:   acetaminophen tablet 1,000 mg    gabapentin capsule 300 mg    heparin (porcine) injection 5,000 Units    methocarbamoL injection 500 mg    mupirocin 2 % ointment    pantoprazole injection 40 mg    piperacillin-tazobactam (ZOSYN) 4.5 g in D5W 100 mL IVPB (MB+)    potassium chloride 10 mEq in 100 mL IVPB        Objective:     Vital Signs (Most Recent):  Temp: 97.5 °F (36.4 °C) (02/24/25 0425)  Pulse: 82 (02/24/25 0425)  Resp: 18 (02/24/25 0624)  BP: 99/66 (02/24/25 0425)  SpO2: 95 % (02/24/25 0425) Vital Signs (24h Range):  Temp:  [97.3 °F (36.3 °C)-97.6 °F (36.4 °C)] 97.5 °F (36.4 °C)  Pulse:  [71-96] 82  Resp:  [16-18] 18  SpO2:  [90 %-100 %] 95 %  BP: ()/(66-82) 99/66      Intake/Output - Last 3 Shifts         02/22 0700  02/23 0659 02/23 0700  02/24 0659    P.O.  50    I.V. (mL/kg) 3500 (54.3)     IV Piggyback 1100 840.8    Total Intake(mL/kg) 4600 (71.3) 890.8 (13.8)    Urine (mL/kg/hr) 520 (0.3) 1180 (0.8)    Emesis/NG output 200     Drains 305 195    Stool  475    Blood 100     Total Output 1125 1850    Net +3475 -959.2          Stool Occurrence  1 x             Physical Exam  Vitals and nursing note reviewed.   Constitutional:       General: She is not in acute distress.     Appearance: She is not ill-appearing or diaphoretic.   HENT:      Head: Normocephalic and atraumatic.      Nose: Nose normal.      Mouth/Throat:      Comments: Missing teeth, poor oral hygiene   Eyes:      Extraocular Movements: Extraocular movements intact.      Pupils: Pupils are equal, round, and reactive to light.   Cardiovascular:      Rate and Rhythm: Normal rate and regular rhythm.   Pulmonary:      Effort: Pulmonary effort is normal.   Abdominal:      General: There is no distension.      Palpations: Abdomen is soft.      Tenderness: There is no abdominal tenderness.      Comments: Incision site c/d/I. Drain in place with serosang output.   Genitourinary:     Comments: Kaiser draining clear yellow urine with sediment  Musculoskeletal:         General: Normal range of motion.      Cervical back: Normal range of motion.   Skin:     General: Skin is warm and dry.   Neurological:      General: No focal deficit present.      Mental Status: She is alert.   Psychiatric:      Comments: High fluctuation in moods          Significant Labs:  All pertinent lab results within the last 24 hours have been reviewed.     Significant Diagnostics:  I have reviewed all pertinent imaging results/findings within the past 24 hours.

## 2025-02-24 NOTE — SUBJECTIVE & OBJECTIVE
Interval History: AFVSS. NAEON. Patient states her pain has been well controlled. [facility] output clear yellow urine with mild sediment. States she has not gotten out of bed yet. Admits to some nausea and vomiting overnight. Has had some vaginal itching and redness.     Objective:     Temp:  [97.3 °F (36.3 °C)-97.6 °F (36.4 °C)] 97.5 °F (36.4 °C)  Pulse:  [71-96] 82  Resp:  [16-18] 18  SpO2:  [90 %-100 %] 95 %  BP: ()/(66-82) 99/66     Body mass index is 22.27 kg/m².           Drains       Drain  Duration                  Urethral Catheter 02/22/25 0830 Non-latex;Straight-tip 16 Fr. 1 day         Closed/Suction Drain 02/22/25 1619 Tube - 1 Left Abdomen Bulb 19 Fr. <1 day         Ileostomy 02/22/25 1618 other (see comments) RUQ <1 day                     Physical Exam  Constitutional:       General: She is not in acute distress.     Appearance: Normal appearance.   HENT:      Head: Normocephalic.      Nose: Nose normal.   Eyes:      Pupils: Pupils are equal, round, and reactive to light.   Cardiovascular:      Rate and Rhythm: Normal rate.   Pulmonary:      Effort: Pulmonary effort is normal. No respiratory distress.   Abdominal:      General: Abdomen is flat. There is no distension.      Tenderness: There is no abdominal tenderness. There is no right CVA tenderness or left CVA tenderness.      Comments: Incision c/d/I.    THAI SS   Genitourinary:     Comments: [facility] in place draining clear yellow urine with mild sediment  Vaginal redness present  Musculoskeletal:         General: Normal range of motion.   Skin:     General: Skin is warm.      Coloration: Skin is not jaundiced.   Neurological:      General: No focal deficit present.      Mental Status: She is alert and oriented to person, place, and time.   Psychiatric:         Mood and Affect: Mood normal.         Behavior: Behavior normal.           Significant Labs:    BMP:  Recent Labs   Lab 02/23/25  0349 02/23/25  1446 02/24/25  0253    136 135*   K 3.7  3.8 3.4*    104 103   CO2 19* 20* 21*   BUN 9 11 17   CREATININE 1.3 1.3 1.5*   CALCIUM 7.5* 8.0* 8.3*       CBC:   Recent Labs   Lab 02/23/25  0349 02/23/25  1445 02/24/25  0253   WBC 10.94 9.13 10.74   HGB 7.8* 7.9* 7.5*   HCT 25.8* 26.3* 24.9*    311 260       All pertinent labs results from the past 24 hours have been reviewed.    Significant Imaging:  All pertinent imaging results/findings from the past 24 hours have been reviewed.

## 2025-02-24 NOTE — ASSESSMENT & PLAN NOTE
Ms. Cardona is a 44 year old woman with a history of urolithiasis, Crohn's disease, GERD, anxiety, CAD, Bipolar disorder, and pAF and the surgeries listed below presenting to [facility] as a direct admit for colovesiculofistula. Urology consulted for enterovesical fistula.     -- Now s/p completion proctectomy and excision of fistula tract on 2/22/25. No concern for cystotomy at that time.   -- Recommend maintain barakat given patients IRIS.  -- Continue to trend Cr. Creatine 1.5 from 1.3 this AM.   -- Rest of care per primary.

## 2025-02-24 NOTE — PROGRESS NOTES
[facility]  Urology  Progress Note    Patient Name: Candida Cardona  MRN: [id number]  Admission Date: 2/21/2025  Hospital Length of Stay: 3 days  Code Status: Full Code   Attending Provider: Robin Atkins MD   Primary Care Physician: Matti Conklin MD    Subjective:     HPI:  Ms. Cardona is a 44 year old woman with a history of urolithiasis, Crohn's disease, GERD, anxiety, CAD, Bipolar disorder, and pAF and the surgeries listed below presenting [place] [facility] [place] a direct admit for colovesiculofistula. 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 Currently scheduled for a completion proctectomy on 2/22/25. Urology consulted for enterovesical fistula.     On assessment the patient is AFVSS. On 1/15/2019 - Ileostomy closure and ileorectal anastomosis, extensive lysis of adhesions, ureterolysis. Endorses passage of air and stool with urination of several weeks duration. Denies fevers, chills, flank pain, difficulty urinating. Has urologic history that includes stones and a cystoscopy performed in 2019 for removal of a stent. Currently scheduled for completion proctectomy on 2/22/25.    WBC 5.88, Hg 9.8. Cr is 0.8. UA nitrite negative 16 RBC, >100 WBC and moderate bacteria with 16 squamous cells and few yeast. Urine culture pending.     CT scan with no hydronephrosis bilaterally. Thick walled bladder with inflammatory changes and air posteriorly between rectum and bladder. No definitive fistula seen,              Interval History: AFVSS. NAEON. Patient states her pain has been well controlled. [facility] output clear yellow urine with mild sediment. States she has not gotten out of bed yet. Admits to some nausea and vomiting overnight. Has had some vaginal itching and redness.     Objective:     Temp:  [97.3 °F (36.3 °C)-97.6 °F (36.4 °C)] 97.5  °F (36.4 °C)  Pulse:  [71-96] 82  Resp:  [16-18] 18  SpO2:  [90 %-100 %] 95 %  BP: ()/(66-82) 99/66     Body mass index is 22.27 kg/m².           Drains       Drain  Duration                  Urethral Catheter 02/22/25 0830 Non-latex;Straight-tip 16 Fr. 1 day         Closed/Suction Drain 02/22/25 1619 Tube - 1 Left Abdomen Bulb 19 Fr. <1 day         Ileostomy 02/22/25 1618 other (see comments) RUQ <1 day                     Physical Exam  Constitutional:       General: She is not in acute distress.     Appearance: Normal appearance.   HENT:      Head: Normocephalic.      Nose: Nose normal.   Eyes:      Pupils: Pupils are equal, round, and reactive to light.   Cardiovascular:      Rate and Rhythm: Normal rate.   Pulmonary:      Effort: Pulmonary effort is normal. No respiratory distress.   Abdominal:      General: Abdomen is flat. There is no distension.      Tenderness: There is no abdominal tenderness. There is no right CVA tenderness or left CVA tenderness.      Comments: Incision c/d/I.    THAI SS   Genitourinary:     Comments: [facility] in place draining clear yellow urine with mild sediment  Vaginal redness present  Musculoskeletal:         General: Normal range of motion.   Skin:     General: Skin is warm.      Coloration: Skin is not jaundiced.   Neurological:      General: No focal deficit present.      Mental Status: She is alert and oriented to person, place, and time.   Psychiatric:         Mood and Affect: Mood normal.         Behavior: Behavior normal.           Significant Labs:    BMP:  Recent Labs   Lab 02/23/25  0349 02/23/25  1446 02/24/25  0253    136 135*   K 3.7 3.8 3.4*    104 103   CO2 19* 20* 21*   BUN 9 11 17   CREATININE 1.3 1.3 1.5*   CALCIUM 7.5* 8.0* 8.3*       CBC:   Recent Labs   Lab 02/23/25  0349 02/23/25  1445 02/24/25  0253   WBC 10.94 9.13 10.74   HGB 7.8* 7.9* 7.5*   HCT 25.8* 26.3* 24.9*    311 260       All pertinent labs results from the past 24 hours have  been reviewed.    Significant Imaging:  All pertinent imaging results/findings from the past 24 hours have been reviewed.      Assessment/Plan:     Colovesical fistula  Ms. Cardona is a 44 year old woman with a history of urolithiasis, Crohn's disease, GERD, anxiety, CAD, Bipolar disorder, and pAF and the surgeries listed below [place] [facility] for colovesiculofistula. Urology consulted for enterovesical fistula.     -- Now s/p completion proctectomy and excision of fistula tract on 2/22/25. No concern for cystotomy at that time.   -- Recommend maintain barakat given patients IRIS.  -- Continue to trend Cr. Creatine 1.5 from 1.3 this AM.   -- Rest of care per primary.           Norm Frankel MD  Urology  [facility]

## 2025-02-24 NOTE — CONSULTS
Interventional Radiology  Consult/History & Physical Note    Consult Requested By:  Vic Bose MD  Reason for Consult: Tunneled line placement, failed picc line by picc line team    SUBJECTIVE:     Chief Complaint:  crohn's disease    History of Present Illness:  Candida Cardona is a 44 y.o. female with a PMHx of Crohn's disease with extensive surgical history, GERD, anxiety, CAD, Bipolar disorder, and pAF who was transferred from OSH on 2/21/25 for enterovaginal and enterovesical fistula which has formed from her ileorectal anastomosis. Hospital course notable for completion proctectomy and excision of fistula tract on 2/22/25. Interventional Radiology has been consulted for tunneled PICC placement for  TPN administration . Plan for initiation of TPN due to concerns for intestinal malabsorption per CRS note. Pt is not a candidate for PICC placement due to inadequate vein size in E. WBC is 10.74, no recent blood cultures. Pt is afebrile and hemodynamically stable. She denies a history of RIGO requiring nightly CPAP or difficulty breathing when lying flat. She has been receiving SQ heparin while admitted. She has been NPO since midnight.    Review of Systems   Constitutional:  Negative for chills and fever.       Scheduled Meds:   acetaminophen  1,000 mg Oral Q8H    fat emulsion 20%  250 mL Intravenous Daily    gabapentin  300 mg Oral TID    heparin (porcine)  5,000 Units Subcutaneous Q8H    methocarbamol injection  500 mg Intravenous Q6H    mupirocin   Nasal BID    pantoprazole  40 mg Intravenous Daily    piperacillin-tazobactam (Zosyn) IV (PEDS and ADULTS) (extended infusion is not appropriate)  4.5 g Intravenous Q8H    potassium chloride  10 mEq Intravenous Q1H     Continuous Infusions:   D5 and 0.9% NaCl   Intravenous Continuous 100 mL/hr at 02/22/25 2057 New Bag at 02/22/25 2057    hydromorphone in 0.9 % NaCl 6 mg/30 ml   Intravenous Continuous   New Syringe/Bag at 02/24/25 0624    Standard Custom Day One  ADULT TPN for patient WITH electrolyte abnormality or renal dysfunction (CENTRAL)   Intravenous Continuous         PRN Meds:  Current Facility-Administered Medications:     acetaminophen, 650 mg, Oral, Q8H PRN    LIDOcaine (PF) 10 mg/ml (1%), 1 mL, Intradermal, Once PRN    melatonin, 6 mg, Oral, Nightly PRN    naloxone, 0.02 mg, Intravenous, PRN    ondansetron, 8 mg, Oral, Q8H PRN    prochlorperazine, 2.5 mg, Intravenous, Q4H PRN    sodium chloride 0.9%, 10 mL, Intravenous, PRN    sodium chloride 0.9%, 10 mL, Intra-Catheter, PRN    Review of patient's allergies indicates:   Allergen Reactions    Ciprofloxacin Shortness Of Breath and Itching    Keppra [levetiracetam] Other (See Comments)     Increased depression    Mercaptopurine analogues (thiopurines) Hives, Diarrhea and Nausea And Vomiting    Imuran [azathioprine sodium] Diarrhea and Nausea And Vomiting     Pt states that she becomes hot sweaty and passes out also. She states that she has diarrhea and nausea and vomiting     Ilfeld     Imuran [azathioprine] Nausea And Vomiting    Ketorolac Itching and Hives    Adhesive Itching    Iodinated contrast media Hives       Past Medical History:   Diagnosis Date    Anxiety     Bipolar 1 disorder     Cholelithiasis     Coronary artery disease     NO STENT    Crohn's disease with ileorectal anastomotic stricture (ileum and rectum)     Depression     Epilepsy     Fibromyalgia     GERD (gastroesophageal reflux disease)     Interstitial cystitis     Kidney stones     Mass, ovarian 11/22/2016    Osteoporosis     PAF (paroxysmal atrial fibrillation)     Pancreatitis     S/P ERIK-BSO (total abdominal hysterectomy and bilateral salpingo-oophorectomy) 12/19/2016     Past Surgical History:   Procedure Laterality Date    ABDOMINAL SURGERY      ABDOMINOPERINEAL RESECTION OF RECTUM N/A 2/22/2025    Procedure: PROCTECTOMY, ABDOMINOPERINEAL;  Surgeon: Robin Atkins MD;  [facility]: [facility] OR [address] [place] [address];  Service: Colon and Rectal;   Laterality: N/A;    ANASTOMOSIS OF ILEUM TO RECTUM  01/15/2019    Procedure: ANASTOMOSIS, ILEORECTAL;  Surgeon: Reji Peterson MD;  [facility]: [facility] OR 2ND FLR;  Service: Colon and Rectal;;    ANGIOPLASTY      CHOLECYSTECTOMY N/A 05/25/2020    Procedure: CHOLECYSTECTOMY;  Surgeon: Ozzie Sanches MD;  [facility]: [facility] OR;  Service: General;  Laterality: N/A;  COVID NEG      COLON SURGERY      x 2    COLONOSCOPY      multiple    COLONOSCOPY N/A 04/11/2017    Procedure: COLONOSCOPY;  Surgeon: Hussain Amaro MD;  [facility]: [facility] ENDO;  Service: Endoscopy;  Laterality: N/A;    CYSTOGRAM  2/22/2025    Procedure: CYSTOGRAM;  Surgeon: Kerwin Rachel MD;  [facility];  Service: Urology;;    CYSTOSCOPY W/ URETERAL STENT PLACEMENT      CYSTOSCOPY W/ URETERAL STENT REMOVAL      CYSTOSCOPY W/ URETERAL STENT REMOVAL Right 09/05/2019    Procedure: CYSTOSCOPY, WITH URETERAL STENT REMOVAL;  Surgeon: Dano Mathis MD;  [facility]: [facility] OR;  Service: Urology;  Laterality: Right;    CYSTOSCOPY WITH URETEROSCOPY, RETROGRADE PYELOGRAPHY, AND INSERTION OF STENT Right 08/22/2019    Procedure: CYSTOSCOPY, WITH RETROGRADE PYELOGRAM AND URETERAL STENT INSERTION;  Surgeon: Dano Mathis MD;  [facility]: [facility] OR;  Service: Urology;  Laterality: Right;    CYSTOSCOPY WITH URETEROSCOPY, RETROGRADE PYELOGRAPHY, AND INSERTION OF STENT Bilateral 2/22/2025    Procedure: CYSTOSCOPY, WITH RETROGRADE PYELOGRAM AND URETERAL STENT INSERTION;  Surgeon: Kerwin Rachel MD;  [facility];  Service: Urology;  Laterality: Bilateral;    EXCISION, SMALL INTESTINE  2/22/2025    Procedure: EXCISION, SMALL INTESTINE;  Surgeon: Robin Atkins MD;  [facility];  Service: Colon and Rectal;;    EXTRACORPOREAL SHOCK WAVE LITHOTRIPSY Right 08/22/2019    Procedure: LITHOTRIPSY-EXTRACORPOREAL SHOCK WAVE;  Surgeon: Dano Mathis MD;  Location: [facility] OR;  Service: Urology;  Laterality: Right;     EXTRACORPOREAL SHOCK WAVE LITHOTRIPSY Right 09/05/2019    Procedure: LITHOTRIPSY-EXTRACORPOREAL SHOCK WAVE;  Surgeon: Dano Mathis MD;  Location: [facility] OR;  Service: Urology;  Laterality: Right;    FLEXIBLE SIGMOIDOSCOPY N/A 01/23/2024    Procedure: SIGMOIDOSCOPY, FLEXIBLE;  Surgeon: Robin Atkins MD;  Location: [facility] ENDO (2ND FLR);  Service: Endoscopy;  Laterality: N/A;  Ref By:  Dr. Madera  Prep Specifications: 2 days of clear and 2 enema before procedure  1/1-precall complete-[place] approved [place] before and following the procedure without a ride per Dr. Atkins-see encounter dated 1/18-MS    FLEXIBLE SIGMOIDOSCOPY N/A 09/17/2024    Procedure: SIGMOIDOSCOPY, FLEXIBLE;  Surgeon: Robin Atkins MD;  Location: [facility] ENDO (4TH FLR);  Service: Endoscopy;  Laterality: N/A;  Ref By: Dr.Kethmanportal.  9/12-pre call complete-dr herrera would like to be tonnlur-on-eykcxa appt with stevenson 10am    HYSTERECTOMY      ILEOSTOMY      ILEOSTOMY  2/22/2025    Procedure: CREATION, ILEOSTOMY;  Surgeon: Robin Atkins MD;  Location: [facility] OR 2ND FLR;  Service: Colon and Rectal;;    LAPAROTOMY, EXPLORATORY  01/15/2019    Procedure: LAPAROTOMY, EXPLORATORY;extensive lysis of adhesions;  Surgeon: Reji Peterson MD;  Location: [facility] OR 2ND FLR;  Service: Colon and Rectal;;    LYSIS OF ADHESIONS OF URETER  01/15/2019    Procedure: URETEROLYSIS;  Surgeon: Reji Peterson MD;  Location: [facility] OR 2ND FLR;  Service: Colon and Rectal;;    PERIPHERALLY INSERTED CENTRAL CATHETER INSERTION N/A 10/30/2018    Procedure: INSERTION, PICC;  Surgeon: Liz Diagnostic Provider;  Location: [facility] OR;  Service: General;  Laterality: N/A;    PERIPHERALLY INSERTED CENTRAL CATHETER INSERTION N/A 08/23/2019    Procedure: INSERTION, PICC;  Surgeon: Liz Diagnostic Provider;  Location: [facility] OR;  Service: General;  Laterality: N/A;    PORTACATH PLACEMENT      TOTAL COLECTOMY  2014     Family History   Problem Relation  Name Age of Onset    Hypertension Mother      Joint hypermobility Mother      Physical abuse Mother      Cancer Father          brain    Diabetes Father      Schizophrenia Father      Stroke Father      No Known Problems Brother 1     No Known Problems Brother      Stroke Paternal Grandmother      Ovarian cancer Neg Hx      Uterine cancer Neg Hx      Breast cancer Neg Hx      Colon cancer Neg Hx      Anesthesia problems Neg Hx       Social History[1]    OBJECTIVE:     Vital Signs (Most Recent)  Temp: 97.5 °F (36.4 °C) (02/24/25 0849)  Pulse: 70 (02/24/25 1048)  Resp: 14 (02/24/25 1048)  BP: (!) 100/53 (02/24/25 0849)  SpO2: 98 % (02/24/25 1048)    Physical Exam:  Physical Exam  Vitals and nursing note reviewed.   Constitutional:       Appearance: Normal appearance. She is ill-appearing.   HENT:      Head: Normocephalic and atraumatic.      Right Ear: External ear normal.      Left Ear: External ear normal.   Eyes:      Extraocular Movements: Extraocular movements intact.      Conjunctiva/sclera: Conjunctivae normal.      Pupils: Pupils are equal, round, and reactive to light.   Cardiovascular:      Rate and Rhythm: Normal rate.   Pulmonary:      Effort: Pulmonary effort is normal. No respiratory distress.   Abdominal:      General: Abdomen is flat.   Skin:     General: Skin is warm and dry.      Coloration: Skin is not jaundiced.   Neurological:      General: No focal deficit present.      Mental Status: She is alert and oriented to person, place, and time.   Psychiatric:         Mood and Affect: Mood normal.         Behavior: Behavior normal.         Thought Content: Thought content normal.         Judgment: Judgment normal.         Laboratory  I have reviewed all pertinent lab results within the past 24 hours.  CBC:   Recent Labs   Lab 02/24/25  0253   WBC 10.74   RBC 3.32*   HGB 7.5*   HCT 24.9*      MCV 75*   MCH 22.6*   MCHC 30.1*     BMP:   Recent Labs   Lab 02/24/25  0253   *   *   K 3.4*  "     CO2 21*   BUN 17   CREATININE 1.5*   CALCIUM 8.3*   MG 1.9     CMP:   Recent Labs   Lab 02/22/25  1735 02/23/25  0349 02/24/25  0253   *   < > 112*   CALCIUM 7.3*   < > 8.3*   ALBUMIN 2.7*  --   --    PROT 5.3*  --   --       < > 135*   K 4.7   < > 3.4*   CO2 15*   < > 21*      < > 103   BUN 7   < > 17   CREATININE 0.9   < > 1.5*   ALKPHOS 77  --   --    ALT 34  --   --    AST 40  --   --    BILITOT 1.2*  --   --     < > = values in this interval not displayed.     LFTs:   Recent Labs   Lab 02/22/25  1735   ALT 34   AST 40   ALKPHOS 77   BILITOT 1.2*   PROT 5.3*   ALBUMIN 2.7*     Coagulation: No results for input(s): "LABPROT", "INR", "APTT" in the last 168 hours.  Microbiology Results (last 7 days)       Procedure Component Value Units Date/Time    Urine culture [[id number]]  (Abnormal) Collected: 02/21/25 0448    Order Status: Completed Specimen: Urine Updated: 02/22/25 1326     Urine Culture, Routine KRISHNA ALBICANS  10,000 - 49,999 cfu/ml  Treatment of asymptomatic candiduria is not recommended (except for   specific populations). Candida isolated in the urine typically   represents colonization. If an indwelling urinary catheter is present  it should be removed or replaced.  Susceptibility testing not routinely performed      Narrative:      Specimen Source->Urine            ASA/Mallampati  ASA: 3  Mallampati: 2    Imaging:  Recent imaging studies reviewed.     ASSESSMENT/PLAN:     Assessment:  44 y.o. female with a PMHx of Crohn's disease with extensive surgical history s/p completion proctectomy and excision of fistula tract on 2/22/25 who has been referred to IR for tunneled PICC placement for  TPN administration . The procedure was discussed in great detail with the patient including thorough explanations of the potential risks and benefits of tunneled PICC placement. Risks include bleeding at the puncture site, infection, catheter related thrombus, catheter dysfunction, " central vein stenosis and need for additional procedures. The patient is a candidate for tunneled PICC placement under moderate sedation. Plan discussed with ordering physician and pt who verbalized understanding of the plan and would like to proceed.    Plan:  Will proceed with tunneled PICC placement under moderate sedation on 2/24/25.   Please keep pt NPO   Anticoagulation history reviewed. Pt takes SQ heparin, ok to proceed per SIR  Coagulation labs reviewed- not routinely recommended per SIR  Thank you for the consult. Please contact with questions via [employer] secure chat or spectra.    Time spent during patient care today was 79 minutes. This includes time spent before the visit reviewing the chart, discussing case with staff physician and ordering provider, time spent during the face to face patient visit, and time spent after the visit on documentation. Time excludes procedure time.     Shae Zuniga PA-C  Interventional Radiology  Spectra: [id number]            [1]   Social History  Tobacco Use    Smoking status: Never    Smokeless tobacco: Never   Substance Use Topics    Alcohol use: No    Drug use: No

## 2025-02-24 NOTE — PLAN OF CARE
Unsuccessful attempt to place tunneled PICC, please refer to Dr Ferris's note on 2/24. Pt tolerated well. No s/s of complications noted. Dressing applied CDI. Pt to be transferred to mpu for 30 mins recovery per Dr Ferris. Report to be given at bedside to RN.    Report called to floor nurse, [place].

## 2025-02-24 NOTE — ASSESSMENT & PLAN NOTE
Candida Cardona is a 44 y.o. Now s/p completion proctectomy and excision of fistula tract on 2/22/25.       - Ice chips  - Maintain IVF for IRIS  - Multi-modal pain control  - PRN nausea meds  - Ordered PICC line, picc line team was unsuccessful. Order placed to IR for placement of picc line. They believe it can go in the afternoon today  - Bowel regimen  - Replace electrolytes PRN  - PPI  - DVT prophylaxis   - OOBTC/Ambulate  - IS  - PT/OT  - Maintain barakat    Dispo: maintain care on GISSU

## 2025-02-24 NOTE — CONSULTS
"  Andrei Melinda - [facility]  Adult Nutrition  Consult Note    SUMMARY     Recommendations  --Recommend TPN: 240 g D, 75 g AA, 500 kcal Lipids, GIR 2.58 to provide 1616 kcal    Goals: Meet % EEN/EPN  Nutrition Goal Status: new  Communication of RD Recs:  (POC)    Nutrition Discharge Planning     Nutrition Discharge Planning: Too early to determine, pending clinical course    Assessment and Plan    Nutrition Problem  Inadequate parenteral nutrition infusion     Related to (etiology):   Infusion volume not reached    Signs and Symptoms (as evidenced by):   Inadequate PN volume compared to estimated requirements     Interventions/Recommendations (treatment strategy):  Collaboration of nutrition care with other providers   TPN    Nutrition Diagnosis Status:   New         Malnutrition Assessment    NPFE at follow-up    Reason for Assessment    Reason For Assessment: consult (TPN recs)  Diagnosis:  (No active principal problem)  General Information Comments: 44 year old woman with a history of Crohn's disease, GERD, anxiety, CAD, Bipolar disorder, and pAF and the surgeries listed below presenting to C as a direct admit for colovesiculofistula. RD consult for TPN recs. Pt s/p completion proctectomy and excision of fistula tract on 2/22. PICC line was ordered, team unsuccessful. Order placed to IR for the placement of tunneled lined - believe it can go in this afternoon. Current TPN order less than EEN - see above for TPN recommendations. 7.8% weight loss x 1 month. NFPE at follow-up.     Food Insecurity: No Food Insecurity (2/21/2025)    Hunger Vital Sign     Worried About Running Out of Food in the Last Year: Never true     Ran Out of Food in the Last Year: Never true       Nutrition/Diet History    Spiritual, Cultural Beliefs, Orthodox Practices, Values that Affect Care: no  Food Allergies:  ([place])  Factors Affecting Nutritional Intake: NPO    Anthropometrics    Height: 5' 7" (170.2 cm)  Height (inches): 67 in  Height " Method: Stated  Weight: 64.5 kg (142 lb 3.2 oz)  Weight (lb): 142.2 lb  Weight Method: Bed Scale  Ideal Body Weight (IBW), Female: 135 lb  % Ideal Body Weight, Female (lb): 105.33 %  BMI (Calculated): 22.3  BMI Grade: 18.5-24.9 - normal       Lab/Procedures/Meds    Pertinent Labs Reviewed: reviewed - hemoglobin 7.5, hematocrit 24.9, MCV 75, MCH 22.6, MCHC 30.1, Na 135, K 3.4, creatinine 1.5, eGFR 44.3, glucose 112, Ca 8.3    Pertinent Medications Reviewed: reviewed - lipids, gabapentin, heparin, pantoprazole, potassium chloride, sodium chloride    Estimated/Assessed Needs    Weight Used For Calorie Calculations: 64.5 kg (142 lb 3.2 oz)  Energy Calorie Requirements (kcal): 4761-6428 kcal/day (20-30 kcal/kg)  Energy Need Method: Kcal/kg  Protein Requirements: 52-97 g/day (0.8-1.5 g/kg)  Weight Used For Protein Calculations: 64.5 kg (142 lb 3.2 oz)     Estimated Fluid Requirement Method: RDA Method  RDA Method (mL): 1290         Nutrition Prescription Ordered    Current Diet Order: NPO  Nutrition Order Comments: TPN runnin.2 g D, 50.4 g AA + IV lipids; GIR: 1.63    Evaluation of Received Nutrient/Fluid Intake    Parenteral Calories (kcal): 716  Parenteral Protein (gm): 50  Lipid Calories (kcals): 500 kcals  GIR (Glucose Infusion Rate) (mg/kg/min): 1.63 mg/kg/min  % Kcal Needs: 56  % Protein Needs: 96  Energy Calories Required: not meeting needs  Protein Required: meeting needs  Comments: LBM 2/23  % Intake of Estimated Energy Needs: NPO  % Meal Intake: NPO    Nutrition Risk    Level of Risk/Frequency of Follow-up: high       Monitor and Evaluation    Monitor and Evaluation: Enteral and parenteral nutrition administration, Weight, Electrolyte and renal panel, Gastrointestinal profile, Glucose/endocrine profile, Inflammatory profile, Lipid profile, Nutrition focused physical findings, Skin, Food and nutrition knowledge       Nutrition Follow-Up    RD Follow-up?: Yes    Poonam Ding, Registration Eligible,  Provisional LDN

## 2025-02-24 NOTE — PLAN OF CARE
Recommendations  --Recommend TPN: 240 g D, 75 g AA, 500 kcal Lipids, GIR 2.58 to provide 1616 kcal    Goals: Meet % EEN/EPN  Nutrition Goal Status: new  Communication of RD Recs:  (POC)

## 2025-02-24 NOTE — RESPIRATORY THERAPY
"RAPID RESPONSE RESPIRATORY THERAPY ETCO2 CHECK         Time of visit: 730     Code Status: Full Code   : 1980  Bed: 1049/1049 A:   MRN: [id number]  Time spent at the bedside: < 15 min    SITUATION    Evaluated patient for: ETCo2 compliance    BACKGROUND    Why is the patient in the hospital?: <principal problem not specified>    Patient has a past medical history of Anxiety, Bipolar 1 disorder, Cholelithiasis, Coronary artery disease, Crohn's disease with ileorectal anastomotic stricture (ileum and rectum), Depression, Epilepsy, Fibromyalgia, GERD (gastroesophageal reflux disease), Interstitial cystitis, Kidney stones, Mass, ovarian, Osteoporosis, PAF (paroxysmal atrial fibrillation), Pancreatitis, and S/P ERIK-BSO (total abdominal hysterectomy and bilateral salpingo-oophorectomy).    24 Hours Vitals Range:  Temp:  [97.5 °F (36.4 °C)-97.6 °F (36.4 °C)]   Pulse:  [67-96]   Resp:  [14-20]   BP: ()/(53-82)   SpO2:  [94 %-99 %]     Labs:    Recent Labs     25  1735 25  0349 25  1446 25  0253    138 136 135*   K 4.7 3.7 3.8 3.4*    106 104 103   CO2 15* 19* 20* 21*   BUN 7 9 11 17   CREATININE 0.9 1.3 1.3 1.5*   * 141* 124* 112*   PHOS 4.3 5.1*  --  4.2   MG 2.0 1.8  --  1.9        No results for input(s): "PH", "PCO2", "PO2", "HCO3", "POCSATURATED", "BE" in the last 72 hours.    ASSESSMENT/INTERVENTIONS         Last VS   Temp: 97.5 °F (36.4 °C) (849)  Pulse: 70 (1048)  Resp: 14 (1048)  BP: 100/53 (849)  SpO2: 98 % (1048)    Level of Consciousness: Level of Consciousness (AVPU): alert  Respiratory Effort: Respiratory Effort: Normal, Unlabored Expansion/Accessory Muscle Usage: Expansion/Accessory Muscles/Retractions: no retractions, no use of accessory muscles  All Lung Field Breath Sounds:    Is the ETCO2 monitor on? Yes  Is the patient wearing a cannula? Yes  Are ETCO2 orders placed? Yes  Is the patient on a PCA pump? Yes  ETCO2 " monitored: ETCO2 (mmHg): 36 mmHg  Ambu at bedside: y    Active Orders   Respiratory Care    END TIDAL CO2 MONITOR Q12H     Frequency: Q12H     Number of Occurrences: Until Specified    Incentive spirometry     Frequency: Q4H     Number of Occurrences: Until Specified     Order Comments: Q4 while awake until discharge.  Educate patient in use; Keep incentive spirometer within reach; and Document incentive spirometer volume every 4 hours while awake.    ((10 sets per hour (3-5 inspirations per set)) on original order)      Oxygen Continuous     Frequency: Continuous     Number of Occurrences: Until Specified     Order Questions:      Device type: Low flow      Device: Nasal Cannula (1- 5 Liters)      LPM: 2      Titrate O2 per Oxygen Titration Protocol: Yes      To maintain SpO2 goal of: >= 90%      Notify MD of: Inability to achieve desired SpO2; Sudden change in patient status and requires 20% increase in FiO2; Patient requires >60% FiO2    Pulse Oximetry Continuous     Frequency: Continuous     Number of Occurrences: Until Specified       RECOMMENDATIONS    We recommend: RRT Recs: Continue POC per primary team.    FOLLOW-UP    Please call back the Rapid Response RT, Armida Powers RRT at x [phone] for any questions or concerns.

## 2025-02-24 NOTE — PLAN OF CARE
Pt arrived to IR for tunneled PICC line placement. Pt oriented to unit and staff. Plan of care reviewed with patient, patient verbalizes understanding. Comfort measures utilized. Pt safely transferred from stretcher to procedural table. Fall risk reviewed with patient, fall risk interventions maintained. Safety strap applied, positioner pillows utilized to minimize pressure points. Blankets applied. Pt prepped and draped utilizing standard sterile technique. Patient placed on continuous monitoring, as required by sedation policy. Timeouts completed utilizing standard universal time-out, per department and facility policy. RN to remain at bedside, continuous monitoring maintained. Pt resting comfortably. Denies pain/discomfort. Will continue to monitor. See flow sheets for monitoring, medication administration, and updates.

## 2025-02-24 NOTE — NURSING
"...[facility] of Care Note    Dx:   Crohn's disease [K50.90]    Shift Events: no significant changes noted. Patient states she no longer want to be NPO.    Goals of Care: Effective Pain Management     Neuro: A&Ox4     Vital Signs: /82   Pulse 96   Temp 97.6 °F (36.4 °C)   Resp 18   Ht 5' 7" (1.702 m)   Wt 64.5 kg (142 lb 3.2 oz)   LMP  (LMP Unknown) Comment: complete hysterectomy  SpO2 96%   Breastfeeding No   BMI 22.27 kg/m²     Respiratory: Room Air     Diet: Diet NPO Except for: Medication, Ice Chips, Sips with Medication      Is patient tolerating current diet? She is tolerating but do not like it     GTTS: see MAR     Urine Output/Bowel Movement:   I/O this shift:  In: 840.8 [IV Piggyback:840.8]  Out: 385 [Urine:230; Drains:5; Stool:150]  Last Bowel Movement: 02/23/25      Drains/Tubes/Tube Feeds (include total output/shift):   I/O this shift:  In: 840.8 [IV Piggyback:840.8]  Out: 385 [Urine:230; Drains:5; Stool:150]      Lines: see LDA      Accuchecks: no    Skin: see flow sheet     Fall Risk Score: 12    Activity level? Stand by assist for ambulation    Any scheduled procedures? None noted    Any safety concerns? no    Other: no  "

## 2025-02-24 NOTE — CONSULTS
Andrei [facility]    Wound Care     Patient Name:  Candida Cardona  MRN:  [id number]  Date: 2/24/2025  Diagnosis: <principal problem not specified>     History:  Past Medical History:   Diagnosis Date    Anxiety     Bipolar 1 disorder     Cholelithiasis     Coronary artery disease     NO STENT    Crohn's disease with ileorectal anastomotic stricture (ileum and rectum)     Depression     Epilepsy     Fibromyalgia     GERD (gastroesophageal reflux disease)     Interstitial cystitis     Kidney stones     Mass, ovarian 11/22/2016    Osteoporosis     PAF (paroxysmal atrial fibrillation)     Pancreatitis     S/P ERIK-BSO (total abdominal hysterectomy and bilateral salpingo-oophorectomy) 12/19/2016     Social History[1]  Precautions:  Allergies as of 02/17/2025 - Reviewed 12/12/2024   Allergen Reaction Noted    Ciprofloxacin Shortness Of Breath and Itching 02/09/2017    Keppra [levetiracetam] Other (See Comments) 09/25/2019    Mercaptopurine analogues (thiopurines) Hives, Diarrhea, and Nausea And Vomiting 09/17/2024    Imuran [azathioprine sodium] Diarrhea and Nausea And Vomiting 02/28/2014    [place]  03/05/2014    Imuran [azathioprine] Nausea And Vomiting 05/03/2019    Ketorolac Itching and Hives 05/20/2020    Adhesive Itching 11/03/2018    Iodinated contrast media Hives 07/08/2021       [facility] Assessment Details / Treatment:    Patient seen for wound care: New Consult   Chart reviewed for this encounter.   Labs:   WBC (K/uL)   Date Value   02/24/2025 10.74   02/23/2025 9.13     Glucose   Date Value   02/24/2025 112 mg/dL (H)   02/23/2025 124 mg/dL (H)   02/03/2017 100 MG/DL   02/01/2017 98 MG/DL     Albumin   Date Value   02/22/2025 2.7 g/dL (L)   09/25/2024 4.0 g/dL   02/03/2017 4.3 G/DL   02/01/2017 3.5 G/DL     Vito Score: 16    Narrative:  Pt seen for ostomy consultation and agreed to assessment  Chart reviewed for this encounter.   See Flow Sheet for additional documentation and media.    Patient seen for new ostomy  consult. Initial ostomy education begun.  Goals of education include:    Pouch emptying, sizing/cuting pouch, and applying pouch  Stoma and earline-stomal care  Food choices and hydration  Obtaining supplies    Discussed and demonstrated cutting ostomy pouch and emptying pouch of stool and gas.  Patient returned demonstration without difficulty.  Discussed meal planning with particular foods to avoid.  Discussed importance of hydration and increasing fluid intake.  Explained process of ordering supplies to be delivered to patients home.   Discussed process of ordering supplies to be delivered to patients home and placed order with [employer], [employer], and [employer].  Provided reading materials regarding todays training and will follow up with patient tomorrow.  Offered patient to have any friend or family to attend next training session.    Supplies left at bedside.      RECOMMENDATIONS:  Bedside nurse assess for acute changes (purulence, increased redness/swelling, increased drainage, malodor, increased pain, pallor, necrosis) please contact physician on any acute changes.    Discussed POC with patient and primary nurse.   See EMR for orders & patient education.    Bedside nursing to continue care & monitoring.  Bedside nursing to maintain pressure injury prevention interventions    Thank you for the consult. Wound Care will continue to follow.       02/24/25 0700   WOCN Assessment   WOCN Total Time (mins) 20   Visit Date 02/24/25   Visit Time 0700   Consult Type New   WOCN Speciality Ostomy;Wound   WOCN List ileostomy   Wound surgical;At risk for pressure Injury   Ostomy Type Ileostomy   Intervention chart review;assessed        Ileostomy 02/22/25 1618 other (see comments) RUQ   Placement Date/Time: 02/22/25 1618   Present Prior to Hospital Arrival?: No  Inserted by: MD  Ileostomy Type: (c) other (see comments)  Location: RUQ   Wound Image     Stoma Appearance round;moist                  [1]   Social  History  Socioeconomic History    Marital status: Single    Number of children: 0   Tobacco Use    Smoking status: Never    Smokeless tobacco: Never   Substance and Sexual Activity    Alcohol use: No    Drug use: No    Sexual activity: Yes     Partners: Male   Social History Narrative    Lives with significant other     Social Drivers of Health     Financial Resource Strain: Low Risk  (2/21/2025)    Overall Financial Resource Strain (CARDIA)     Difficulty of Paying Living Expenses: Not very hard   Food Insecurity: No Food Insecurity (2/21/2025)    Hunger Vital Sign     Worried About Running Out of Food in the Last Year: Never true     Ran Out of Food in the Last Year: Never true   Physical Activity: Inactive (2/21/2025)    Exercise Vital Sign     Days of Exercise per Week: 0 days     Minutes of Exercise per Session: 0 min   Stress: Stress Concern Present (2/21/2025)    [place] [employer] - Occupational Stress Questionnaire     Feeling of Stress : To some extent   Housing Stability: Low Risk  (2/21/2025)    Housing Stability Vital Sign     Unable to Pay for Housing in the Last Year: No     Homeless in the Last Year: No

## 2025-02-24 NOTE — PROGRESS NOTES
[facility]  Colorectal Surgery  Progress Note    Patient Name: Candida Cardona  MRN: [id number]  Admission Date: 2/21/2025  Hospital Length of Stay: 3 days  Attending Physician: Robin Atkins MD    Subjective:     Interval History: Resting comfortably in bed this morning, energetic. Using PCA pump to maximum capacity. Green leakage/residue on her abdomen and under her breasts. Complaining of acid reflux symptoms. Dressing changed at bedside. SS output in drain. Hgb slightly downtrending to 7.5 from 7.9. Cr continues to rise to 1.5, baseline .9 to 1.    Post-Op Info:  Procedure(s) (LRB):  PROCTECTOMY, ABDOMINOPERINEAL (N/A)  CYSTOSCOPY, WITH RETROGRADE PYELOGRAM AND URETERAL STENT INSERTION (Bilateral)  CYSTOGRAM  EXCISION, SMALL INTESTINE  CREATION, ILEOSTOMY   2 Days Post-Op      Medications:  Continuous Infusions:   D5 and 0.9% NaCl   Intravenous Continuous 100 mL/hr at 02/22/25 2057 New Bag at 02/22/25 2057    hydromorphone in 0.9 % NaCl 6 mg/30 ml   Intravenous Continuous   New Syringe/Bag at 02/24/25 0624     Scheduled Meds:   acetaminophen  1,000 mg Oral Q8H    gabapentin  300 mg Oral TID    heparin (porcine)  5,000 Units Subcutaneous Q8H    methocarbamol injection  500 mg Intravenous Q6H    mupirocin   Nasal BID    pantoprazole  40 mg Intravenous Daily    piperacillin-tazobactam (Zosyn) IV (PEDS and ADULTS) (extended infusion is not appropriate)  4.5 g Intravenous Q8H    potassium chloride  10 mEq Intravenous Q1H     PRN Meds:   acetaminophen tablet 1,000 mg    gabapentin capsule 300 mg    heparin (porcine) injection 5,000 Units    methocarbamoL injection 500 mg    mupirocin 2 % ointment    pantoprazole injection 40 mg    piperacillin-tazobactam (ZOSYN) 4.5 g in D5W 100 mL IVPB (MB+)    potassium chloride 10 mEq in 100 mL IVPB        Objective:     Vital Signs (Most Recent):  Temp: 97.5 °F (36.4 °C) (02/24/25 0425)  Pulse: 82 (02/24/25 0425)  Resp: 18 (02/24/25 0624)  BP: 99/66 (02/24/25  0425)  SpO2: 95 % (02/24/25 0425) Vital Signs (24h Range):  Temp:  [97.3 °F (36.3 °C)-97.6 °F (36.4 °C)] 97.5 °F (36.4 °C)  Pulse:  [71-96] 82  Resp:  [16-18] 18  SpO2:  [90 %-100 %] 95 %  BP: ()/(66-82) 99/66     Intake/Output - Last 3 Shifts         02/22 0700  02/23 0659 02/23 0700  02/24 0659    P.O.  50    I.V. (mL/kg) 3500 (54.3)     IV Piggyback 1100 840.8    Total Intake(mL/kg) 4600 (71.3) 890.8 (13.8)    Urine (mL/kg/hr) 520 (0.3) 1180 (0.8)    Emesis/NG output 200     Drains 305 195    Stool  475    Blood 100     Total Output 1125 1850    Net +3475 -959.2          Stool Occurrence  1 x             Physical Exam  Vitals and nursing note reviewed.   Constitutional:       General: She is not in acute distress.     Appearance: She is not ill-appearing or diaphoretic.   HENT:      Head: Normocephalic and atraumatic.      Nose: Nose normal.      Mouth/Throat:      Comments: Missing teeth, poor oral hygiene   Eyes:      Extraocular Movements: Extraocular movements intact.      Pupils: Pupils are equal, round, and reactive to light.   Cardiovascular:      Rate and Rhythm: Normal rate and regular rhythm.   Pulmonary:      Effort: Pulmonary effort is normal.   Abdominal:      General: There is no distension.      Palpations: Abdomen is soft.      Tenderness: There is no abdominal tenderness.      Comments: Incision site c/d/I. Drain in place with serosang output.   Genitourinary:     Comments: [facility] draining clear yellow urine with sediment  Musculoskeletal:         General: Normal range of motion.      Cervical back: Normal range of motion.   Skin:     General: Skin is warm and dry.   Neurological:      General: No focal deficit present.      Mental Status: She is alert.   Psychiatric:      Comments: High fluctuation in moods          Significant Labs:  All pertinent lab results within the last 24 hours have been reviewed.     Significant Diagnostics:  I have reviewed all pertinent imaging results/findings  within the past 24 hours.  Assessment/Plan:     Crohn's disease with ileorectal anastomotic stricture (ileum and rectum)  Candida Cardona is a 44 y.o. Now s/p completion proctectomy and excision of fistula tract on 2/22/25.       - Ice chips  - Maintain IVF for IRIS  - Urine studies ordered for rising Cr  - Multi-modal pain control  - PRN nausea meds  - Ordered PICC line, picc line team was unsuccessful. Order placed to IR for the placement of tunneled line. They believe it can go in the afternoon today.   - TPN + lipids  - Bowel regimen  - Replace electrolytes PRN  - PPI  - DVT prophylaxis   - OOBTC/Ambulate  - IS  - PT/OT  - Maintain barakat    Dispo: maintain care on [facility] Bose MD  Colorectal Surgery  [facility] - [facility]

## 2025-02-24 NOTE — PROCEDURES
Interventional Radiology Post-Procedure Note    Pre Op Diagnosis: Crohn's disease  Post Op Diagnosis: Same    Procedure: attempted CVC placement    Procedure performed by: Adelina Ferris MD    Written Informed Consent Obtained: Yes  Specimen Removed: NO  Estimated Blood Loss: Minimal    Findings:   Apparent central venous stenosis, could not pass wire into RA. Unable to further evaluate due to contrast allergy.     Recommend steroid premedication and CT Chest with contrast (Venogram) for further evaluation of central venous patency.     Adelina Ferris MD  Interventional Radiology

## 2025-02-25 DIAGNOSIS — K90.821 SHORT BOWEL SYNDROME WITH COLON IN CONTINUITY: Primary | ICD-10-CM

## 2025-02-25 LAB
AMYLASE SERPL-CCNC: 17 U/L (ref 20–110)
ANION GAP SERPL CALC-SCNC: 12 MMOL/L (ref 8–16)
ANISOCYTOSIS BLD QL SMEAR: SLIGHT
BASOPHILS # BLD AUTO: ABNORMAL K/UL (ref 0–0.2)
BASOPHILS NFR BLD: 0 % (ref 0–1.9)
BUN SERPL-MCNC: 14 MG/DL (ref 6–20)
CALCIUM SERPL-MCNC: 8 MG/DL (ref 8.7–10.5)
CHLORIDE SERPL-SCNC: 104 MMOL/L (ref 95–110)
CO2 SERPL-SCNC: 17 MMOL/L (ref 23–29)
CREAT SERPL-MCNC: 1.1 MG/DL (ref 0.5–1.4)
DIFFERENTIAL METHOD BLD: ABNORMAL
EOSINOPHIL # BLD AUTO: ABNORMAL K/UL (ref 0–0.5)
EOSINOPHIL NFR BLD: 1 % (ref 0–8)
ERYTHROCYTE [DISTWIDTH] IN BLOOD BY AUTOMATED COUNT: 22.4 % (ref 11.5–14.5)
EST. GFR  (NO RACE VARIABLE): >60 ML/MIN/1.73 M^2
GLUCOSE SERPL-MCNC: 63 MG/DL (ref 70–110)
HCT VFR BLD AUTO: 22.1 % (ref 37–48.5)
HGB BLD-MCNC: 6.9 G/DL (ref 12–16)
HYPOCHROMIA BLD QL SMEAR: ABNORMAL
IMM GRANULOCYTES # BLD AUTO: ABNORMAL K/UL (ref 0–0.04)
IMM GRANULOCYTES NFR BLD AUTO: ABNORMAL % (ref 0–0.5)
LIPASE SERPL-CCNC: 14 U/L (ref 4–60)
LYMPHOCYTES # BLD AUTO: ABNORMAL K/UL (ref 1–4.8)
LYMPHOCYTES NFR BLD: 13 % (ref 18–48)
MAGNESIUM SERPL-MCNC: 1.6 MG/DL (ref 1.6–2.6)
MCH RBC QN AUTO: 22.3 PG (ref 27–31)
MCHC RBC AUTO-ENTMCNC: 31.2 G/DL (ref 32–36)
MCV RBC AUTO: 72 FL (ref 82–98)
MONOCYTES # BLD AUTO: ABNORMAL K/UL (ref 0.3–1)
MONOCYTES NFR BLD: 0 % (ref 4–15)
NEUTROPHILS # BLD AUTO: ABNORMAL K/UL (ref 1.8–7.7)
NEUTROPHILS NFR BLD: 86 % (ref 38–73)
NRBC BLD-RTO: 0 /100 WBC
OVALOCYTES BLD QL SMEAR: ABNORMAL
PHOSPHATE SERPL-MCNC: 2.9 MG/DL (ref 2.7–4.5)
PLATELET # BLD AUTO: 263 K/UL (ref 150–450)
PMV BLD AUTO: 9.5 FL (ref 9.2–12.9)
POIKILOCYTOSIS BLD QL SMEAR: SLIGHT
POLYCHROMASIA BLD QL SMEAR: ABNORMAL
POTASSIUM SERPL-SCNC: 3.8 MMOL/L (ref 3.5–5.1)
RBC # BLD AUTO: 3.09 M/UL (ref 4–5.4)
SODIUM SERPL-SCNC: 133 MMOL/L (ref 136–145)
SPHEROCYTES BLD QL SMEAR: ABNORMAL
TRIGL SERPL-MCNC: 122 MG/DL (ref 30–150)
WBC # BLD AUTO: 9.44 K/UL (ref 3.9–12.7)

## 2025-02-25 PROCEDURE — 84100 ASSAY OF PHOSPHORUS: CPT

## 2025-02-25 PROCEDURE — 36430 TRANSFUSION BLD/BLD COMPNT: CPT

## 2025-02-25 PROCEDURE — 63600175 PHARM REV CODE 636 W HCPCS: Performed by: STUDENT IN AN ORGANIZED HEALTH CARE EDUCATION/TRAINING PROGRAM

## 2025-02-25 PROCEDURE — 84478 ASSAY OF TRIGLYCERIDES: CPT | Performed by: STUDENT IN AN ORGANIZED HEALTH CARE EDUCATION/TRAINING PROGRAM

## 2025-02-25 PROCEDURE — P9021 RED BLOOD CELLS UNIT: HCPCS | Performed by: STUDENT IN AN ORGANIZED HEALTH CARE EDUCATION/TRAINING PROGRAM

## 2025-02-25 PROCEDURE — 25000003 PHARM REV CODE 250: Performed by: STUDENT IN AN ORGANIZED HEALTH CARE EDUCATION/TRAINING PROGRAM

## 2025-02-25 PROCEDURE — 36415 COLL VENOUS BLD VENIPUNCTURE: CPT

## 2025-02-25 PROCEDURE — 63600175 PHARM REV CODE 636 W HCPCS

## 2025-02-25 PROCEDURE — 85025 COMPLETE CBC W/AUTO DIFF WBC: CPT

## 2025-02-25 PROCEDURE — 63600175 PHARM REV CODE 636 W HCPCS: Mod: JZ,TB | Performed by: COMMUNITY HEALTH WORKER

## 2025-02-25 PROCEDURE — 83735 ASSAY OF MAGNESIUM: CPT

## 2025-02-25 PROCEDURE — 83690 ASSAY OF LIPASE: CPT

## 2025-02-25 PROCEDURE — 99900035 HC TECH TIME PER 15 MIN (STAT)

## 2025-02-25 PROCEDURE — 80048 BASIC METABOLIC PNL TOTAL CA: CPT

## 2025-02-25 PROCEDURE — 25000003 PHARM REV CODE 250

## 2025-02-25 PROCEDURE — 82150 ASSAY OF AMYLASE: CPT

## 2025-02-25 PROCEDURE — 3E0436Z INTRODUCTION OF NUTRITIONAL SUBSTANCE INTO CENTRAL VEIN, PERCUTANEOUS APPROACH: ICD-10-PCS | Performed by: STUDENT IN AN ORGANIZED HEALTH CARE EDUCATION/TRAINING PROGRAM

## 2025-02-25 PROCEDURE — A4217 STERILE WATER/SALINE, 500 ML: HCPCS | Performed by: STUDENT IN AN ORGANIZED HEALTH CARE EDUCATION/TRAINING PROGRAM

## 2025-02-25 PROCEDURE — 20600001 HC STEP DOWN PRIVATE ROOM

## 2025-02-25 PROCEDURE — 94761 N-INVAS EAR/PLS OXIMETRY MLT: CPT

## 2025-02-25 RX ORDER — LORAZEPAM 0.5 MG/1
2 TABLET ORAL ONCE
Status: COMPLETED | OUTPATIENT
Start: 2025-02-25 | End: 2025-02-25

## 2025-02-25 RX ORDER — DIPHENHYDRAMINE HCL 25 MG
50 CAPSULE ORAL ONCE
Status: DISCONTINUED | OUTPATIENT
Start: 2025-02-26 | End: 2025-02-25

## 2025-02-25 RX ORDER — DIPHENHYDRAMINE HYDROCHLORIDE 50 MG/ML
25 INJECTION, SOLUTION INTRAMUSCULAR; INTRAVENOUS ONCE
Status: COMPLETED | OUTPATIENT
Start: 2025-02-26 | End: 2025-02-26

## 2025-02-25 RX ORDER — HYDROCODONE BITARTRATE AND ACETAMINOPHEN 500; 5 MG/1; MG/1
TABLET ORAL
Status: DISCONTINUED | OUTPATIENT
Start: 2025-02-25 | End: 2025-03-03

## 2025-02-25 RX ORDER — OXYCODONE HYDROCHLORIDE 10 MG/1
10 TABLET ORAL EVERY 6 HOURS PRN
Refills: 0 | Status: DISCONTINUED | OUTPATIENT
Start: 2025-02-25 | End: 2025-03-03

## 2025-02-25 RX ORDER — HYDROMORPHONE HYDROCHLORIDE 1 MG/ML
1 INJECTION, SOLUTION INTRAMUSCULAR; INTRAVENOUS; SUBCUTANEOUS ONCE
Status: DISCONTINUED | OUTPATIENT
Start: 2025-02-25 | End: 2025-02-27

## 2025-02-25 RX ORDER — TEDUGLUTIDE 5 MG/.5ML
0.05 INJECTION, POWDER, LYOPHILIZED, FOR SOLUTION SUBCUTANEOUS DAILY
Qty: 1 KIT | Refills: 5 | Status: ACTIVE | OUTPATIENT
Start: 2025-02-25

## 2025-02-25 RX ORDER — OXYCODONE HYDROCHLORIDE 5 MG/1
5 TABLET ORAL EVERY 6 HOURS PRN
Refills: 0 | Status: DISCONTINUED | OUTPATIENT
Start: 2025-02-25 | End: 2025-03-03

## 2025-02-25 RX ADMIN — GABAPENTIN 300 MG: 300 CAPSULE ORAL at 03:02

## 2025-02-25 RX ADMIN — ASCORBIC ACID, VITAMIN A PALMITATE, CHOLECALCIFEROL, THIAMINE HYDROCHLORIDE, RIBOFLAVIN-5 PHOSPHATE SODIUM, PYRIDOXINE HYDROCHLORIDE, NIACINAMIDE, DEXPANTHENOL, ALPHA-TOCOPHEROL ACETATE, VITAMIN K1, FOLIC ACID, BIOTIN, CYANOCOBALAMIN: 200; 3300; 200; 6; 3.6; 6; 40; 15; 10; 150; 600; 60; 5 INJECTION, SOLUTION INTRAVENOUS at 10:02

## 2025-02-25 RX ADMIN — MUPIROCIN: 20 OINTMENT TOPICAL at 10:02

## 2025-02-25 RX ADMIN — DEXTROSE AND SODIUM CHLORIDE: 5; 900 INJECTION, SOLUTION INTRAVENOUS at 03:02

## 2025-02-25 RX ADMIN — MUPIROCIN: 20 OINTMENT TOPICAL at 08:02

## 2025-02-25 RX ADMIN — OXYCODONE HYDROCHLORIDE 10 MG: 10 TABLET ORAL at 08:02

## 2025-02-25 RX ADMIN — HEPARIN SODIUM 5000 UNITS: 5000 INJECTION INTRAVENOUS; SUBCUTANEOUS at 05:02

## 2025-02-25 RX ADMIN — PIPERACILLIN SODIUM AND TAZOBACTAM SODIUM 4.5 G: 4; .5 INJECTION, POWDER, FOR SOLUTION INTRAVENOUS at 10:02

## 2025-02-25 RX ADMIN — LORAZEPAM 2 MG: 0.5 TABLET ORAL at 12:02

## 2025-02-25 RX ADMIN — GABAPENTIN 300 MG: 300 CAPSULE ORAL at 08:02

## 2025-02-25 RX ADMIN — METHOCARBAMOL 500 MG: 100 INJECTION, SOLUTION INTRAMUSCULAR; INTRAVENOUS at 05:02

## 2025-02-25 RX ADMIN — ACETAMINOPHEN 1000 MG: 500 TABLET ORAL at 05:02

## 2025-02-25 RX ADMIN — HEPARIN SODIUM 5000 UNITS: 5000 INJECTION INTRAVENOUS; SUBCUTANEOUS at 12:02

## 2025-02-25 RX ADMIN — PIPERACILLIN SODIUM AND TAZOBACTAM SODIUM 4.5 G: 4; .5 INJECTION, POWDER, FOR SOLUTION INTRAVENOUS at 05:02

## 2025-02-25 RX ADMIN — METHOCARBAMOL 500 MG: 100 INJECTION, SOLUTION INTRAMUSCULAR; INTRAVENOUS at 12:02

## 2025-02-25 RX ADMIN — ACETAMINOPHEN 1000 MG: 500 TABLET ORAL at 12:02

## 2025-02-25 RX ADMIN — Medication: at 05:02

## 2025-02-25 NOTE — SUBJECTIVE & OBJECTIVE
Subjective:     Interval History: Patient drank soda yesterday against instructions, had emesis. Other than that afebrile. BP soft ranging from 94 to 108. Decent output in ostomy bag. 75 cc of SS drain output. Midline dressing changed at bedside.     Post-Op Info:  Procedure(s) (LRB):  PROCTECTOMY, ABDOMINOPERINEAL (N/A)  CYSTOSCOPY, WITH RETROGRADE PYELOGRAM AND URETERAL STENT INSERTION (Bilateral)  CYSTOGRAM  EXCISION, SMALL INTESTINE  CREATION, ILEOSTOMY   3 Days Post-Op      Medications:  Continuous Infusions:   D5 and 0.9% NaCl   Intravenous Continuous 100 mL/hr at 02/22/25 2057 New Bag at 02/22/25 2057    hydromorphone in 0.9 % NaCl 6 mg/30 ml   Intravenous Continuous   New Syringe/Bag at 02/25/25 0506    Standard Custom Day One ADULT TPN for patient WITH electrolyte abnormality or renal dysfunction (PERIPHERAL)   Intravenous Continuous         Scheduled Meds:   acetaminophen  1,000 mg Oral Q8H    fat emulsion 20%  250 mL Intravenous Daily    gabapentin  300 mg Oral TID    heparin (porcine)  5,000 Units Subcutaneous Q8H    HYDROmorphone  1 mg Intravenous Once    methocarbamol injection  500 mg Intravenous Q6H    mupirocin   Nasal BID    pantoprazole  40 mg Intravenous Daily    piperacillin-tazobactam (Zosyn) IV (PEDS and ADULTS) (extended infusion is not appropriate)  4.5 g Intravenous Q8H    sodium chloride 0.9%  1,000 mL Intravenous Once     PRN Meds:   acetaminophen tablet 1,000 mg    fat emulsion 20% infusion 250 mL    gabapentin capsule 300 mg    heparin (porcine) injection 5,000 Units    HYDROmorphone injection 1 mg    methocarbamoL injection 500 mg    mupirocin 2 % ointment    pantoprazole injection 40 mg    piperacillin-tazobactam (ZOSYN) 4.5 g in D5W 100 mL IVPB (MB+)    sodium chloride 0.9% bolus 1,000 mL 1,000 mL        Objective:     Vital Signs (Most Recent):  Temp: 97.9 °F (36.6 °C) (02/25/25 0452)  Pulse: 66 (02/25/25 0452)  Resp: 18 (02/25/25 0452)  BP: 103/66 (02/25/25 0452)  SpO2: 98 %  (02/25/25 2431) Vital Signs (24h Range):  Temp:  [97.5 °F (36.4 °C)-98 °F (36.7 °C)] 97.9 °F (36.6 °C)  Pulse:  [60-75] 66  Resp:  [10-20] 18  SpO2:  [95 %-100 %] 98 %  BP: ()/(53-68) 103/66     Intake/Output - Last 3 Shifts         02/23 0700  02/24 0659 02/24 0700 02/25 0659    P.O. 50 350    IV Piggyback 840.8     Total Intake(mL/kg) 890.8 (13.8) 350 (5.4)    Urine (mL/kg/hr) 1180 (0.8) 375 (0.2)    Drains 195 75    Stool 475 1485    Total Output 1850 1935    Net -959.2 -1585          Stool Occurrence 1 x 1 x             Physical Exam  Vitals and nursing note reviewed.   Constitutional:       General: She is not in acute distress.     Appearance: She is not ill-appearing or diaphoretic.   HENT:      Head: Normocephalic and atraumatic.      Nose: Nose normal.      Mouth/Throat:      Comments: Missing teeth, poor oral hygiene   Eyes:      Extraocular Movements: Extraocular movements intact.      Pupils: Pupils are equal, round, and reactive to light.   Cardiovascular:      Rate and Rhythm: Normal rate and regular rhythm.   Pulmonary:      Effort: Pulmonary effort is normal.   Abdominal:      General: There is no distension.      Palpations: Abdomen is soft.      Tenderness: There is no abdominal tenderness.      Comments: Incision site c/d/I. Drain in place with serosang output. Dressing changed   Genitourinary:     Comments: [facility] draining clear yellow urine with sediment  Perineal incision c/d/i  Musculoskeletal:         General: Normal range of motion.      Cervical back: Normal range of motion.   Skin:     General: Skin is warm and dry.   Neurological:      General: No focal deficit present.      Mental Status: She is alert.   Psychiatric:      Comments: High fluctuation in moods                Significant Labs:  All pertinent lab results within the last 24 hours have been reviewed.     Significant Diagnostics:  I have reviewed all pertinent imaging results/findings within the past 24 hours.

## 2025-02-25 NOTE — PLAN OF CARE
02/25/25 1329   Post-Acute Status   Post-Acute Authorization Other   Other Status No Post-Acute Service Needs   Hospital Resources/Appts/Education Provided Appointments scheduled and added to AVS   Discharge Delays None known at this time   Discharge Plan   Discharge Plan A Home   Discharge Plan B Home with family;Home Health

## 2025-02-25 NOTE — PLAN OF CARE
Problem: Adult Inpatient Plan of Care  Goal: Absence of Hospital-Acquired Illness or Injury  Outcome: Progressing  Goal: Optimal Comfort and Wellbeing  Outcome: Progressing  Goal: Readiness for Transition of Care  Outcome: Progressing     Problem: Pain Acute  Goal: Optimal Pain Control and Function  Outcome: Progressing     Problem: Fall Injury Risk  Goal: Absence of Fall and Fall-Related Injury  Outcome: Progressing     Problem: Infection  Goal: Absence of Infection Signs and Symptoms  Outcome: Progressing     Problem: Skin Injury Risk Increased  Goal: Skin Health and Integrity  Outcome: Progressing     Problem: Wound  Goal: Optimal Coping  Outcome: Progressing  Goal: Optimal Functional Ability  Outcome: Progressing  Goal: Absence of Infection Signs and Symptoms  Outcome: Progressing  Goal: Improved Oral Intake  Outcome: Progressing  Goal: Optimal Pain Control and Function  Outcome: Progressing  Goal: Skin Health and Integrity  Outcome: Progressing  Goal: Optimal Wound Healing  Outcome: Progressing

## 2025-02-25 NOTE — SUBJECTIVE & OBJECTIVE
Interval History: AFVSS. NAEO. Foely in place draining light pink urine. Cr consistent with baseline this AM.       Objective:     Temp:  [97.5 °F (36.4 °C)-98 °F (36.7 °C)] 97.9 °F (36.6 °C)  Pulse:  [60-75] 66  Resp:  [10-20] 18  SpO2:  [95 %-100 %] 98 %  BP: ()/(53-68) 103/66     Body mass index is 22.27 kg/m².           Drains       Drain  Duration                  Closed/Suction Drain 02/22/25 1619 Tube - 1 Left Abdomen Bulb 19 Fr. 2 days         Ileostomy 02/22/25 1618 other (see comments) RUQ 2 days         Urethral Catheter 02/22/25 0830 Non-latex;Straight-tip 16 Fr. 2 days                     Physical Exam  Constitutional:       General: She is not in acute distress.     Appearance: Normal appearance.   HENT:      Head: Normocephalic.      Nose: Nose normal.   Eyes:      Pupils: Pupils are equal, round, and reactive to light.   Cardiovascular:      Rate and Rhythm: Normal rate.   Pulmonary:      Effort: Pulmonary effort is normal. No respiratory distress.   Abdominal:      General: Abdomen is flat. There is no distension.      Tenderness: There is no abdominal tenderness. There is no right CVA tenderness or left CVA tenderness.      Comments: Incision c/d/I.    THAI SS   Genitourinary:     Comments: [facility] in place draining clear pink urine with mild sediment  Vaginal redness present  Musculoskeletal:         General: Normal range of motion.   Skin:     General: Skin is warm.      Coloration: Skin is not jaundiced.   Neurological:      General: No focal deficit present.      Mental Status: She is alert and oriented to person, place, and time.   Psychiatric:         Mood and Affect: Mood normal.         Behavior: Behavior normal.           Significant Labs:    BMP:  Recent Labs   Lab 02/24/25  0253 02/24/25  1805 02/25/25  0311   * 133* 133*   K 3.4* 4.1 3.8    104 104   CO2 21* 17* 17*   BUN 17 15 14   CREATININE 1.5* 1.2 1.1   CALCIUM 8.3* 8.0* 8.0*       CBC:   Recent Labs   Lab  02/23/25  1445 02/24/25  0253 02/25/25  0311   WBC 9.13 10.74 9.44   HGB 7.9* 7.5* 6.9*   HCT 26.3* 24.9* 22.1*    260 263       All pertinent labs results from the past 24 hours have been reviewed.    Significant Imaging:  All pertinent imaging results/findings from the past 24 hours have been reviewed.

## 2025-02-25 NOTE — NURSING
"...[facility] Plan of Care Note    Dx:   Crohn's disease [K50.90]    Shift Events: Patient educated on the importance of following fluid restriction.  Patient excited about moving around more independently.  Patient euphoric talking a lot about her procedure and the devices she now have attached to her.  Patient linen changed dur to her disconnecting her THAI bulb. Patient educated to not detach her bulb from the tubing. Patient verbalized understanding. Patient had one episode of nausea relieved by prn medication. Patient tolerating PCS pump.     Goals of Care: Effective pain management.                             Free from falls this admission    Neuro: A&Ox4    Vital Signs: /68   Pulse 75   Temp 97.6 °F (36.4 °C) (Oral)   Resp 16   Ht 5' 7" (1.702 m)   Wt 64.5 kg (142 lb 3.2 oz)   LMP  (LMP Unknown) Comment: complete hysterectomy  SpO2 98%   Breastfeeding No   BMI 22.27 kg/m²     Respiratory:  Room Air    Diet: Diet Clear Liquid No Soft Drinks; Fluid - 1500mL      Is patient tolerating current diet? Yes, however patient verbalize the desire for more fluids     GTTS: see mar    Urine Output/Bowel Movement:   I/O this shift:  In: 100 [P.O.:100]  Out: 905 [Urine:220; Stool:685]  Last Bowel Movement: 02/24/25      Drains/Tubes/Tube Feeds (include total output/shift):   I/O this shift:  In: 100 [P.O.:100]  Out: 905 [Urine:220; Stool:685]      Lines: see LDA      Accuchecks: no    Skin: see flowsheet and LDA    Fall Risk Score: 10    Activity level? Stand by    Any scheduled procedures? no    Any safety concerns? no    Other: no  "
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
## 2025-02-25 NOTE — PLAN OF CARE
"[facility] Plan of Care Note    Dx: Crohn's disease [K50.90]    Shift Events: Unsuccessful attempt to place tunneled PICC    Goals of Care: Pain mgmt, [facility] care, THAI care, Ostomy care    Neuro: AOx4    Vital Signs: /66   Pulse 65   Temp 97.5 °F (36.4 °C)   Resp 18   Ht 5' 7" (1.702 m)   Wt 64.5 kg (142 lb 3.2 oz)   LMP  (LMP Unknown) Comment: complete hysterectomy  SpO2 95%   Breastfeeding No   BMI 22.27 kg/m²     Respiratory: RA    Diet: Diet Clear Liquid No Soft Drinks; Fluid - 1500mL      Is patient tolerating current diet? yes    GTTS: PCA    Urine Output/Bowel Movement:     Intake/Output Summary (Last 24 hours) at 2/24/2025 1857  Last data filed at 2/24/2025 0701  Gross per 24 hour   Intake 890.78 ml   Output 1375 ml   Net -484.22 ml          Drains/Tubes/Tube Feeds (include total output/shift):   Output by Drain (mL) 02/22/25 0701 - 02/22/25 1900 02/22/25 1901 - 02/23/25 0700 02/23/25 0701 - 02/23/25 1900 02/23/25 1901 - 02/24/25 0700 02/24/25 0701 - 02/24/25 1857        Closed/Suction Drain 02/22/25 1619 Tube - 1 Left Abdomen Bulb 19 Fr. 60 245 140 55 40          Lines:       Accuchecks:NA    Skin: Surgical sites    Fall Risk Score: See flowsheets    Activity level? See flowsheets    Any scheduled procedures? Retry on tunneled cath    Any safety concerns? Falls    Other: AN   "

## 2025-02-25 NOTE — ASSESSMENT & PLAN NOTE
Candida Cardona is a 44 y.o. Now s/p completion proctectomy and excision of fistula tract on 2/22/25.       - Ice chips  - Maintain IVF for IRIS  - Multi-modal pain control; wean PCA  - PRN nausea meds  - CT chest with contrast and tunneled line with IR on Thursday after pre-medication  - NO limotil   - Replace electrolytes PRN  - PPI  - DVT prophylaxis   - OOBTC/Ambulate  - IS  - PT/OT  - Will dc barakat per urology recommendation  - No sodas or carbonated beverages.     Dispo: continue floor care    Dispo: maintain care on [facility]

## 2025-02-25 NOTE — PROGRESS NOTES
[facility]  Colorectal Surgery  Progress Note    Patient Name: Candida Cardona  MRN: [id number]  Admission Date: 2/21/2025  Hospital Length of Stay: 4 days  Attending Physician: Robin Atkins MD    Subjective:     Interval History: Patient drank soda yesterday against instructions, had emesis. Other than that afebrile. BP soft ranging from 94 to 108. Decent output in ostomy bag. 75 cc of SS drain output. Midline dressing changed at bedside.     Post-Op Info:  Procedure(s) (LRB):  PROCTECTOMY, ABDOMINOPERINEAL (N/A)  CYSTOSCOPY, WITH RETROGRADE PYELOGRAM AND URETERAL STENT INSERTION (Bilateral)  CYSTOGRAM  EXCISION, SMALL INTESTINE  CREATION, ILEOSTOMY   3 Days Post-Op      Medications:  Continuous Infusions:   D5 and 0.9% NaCl   Intravenous Continuous 100 mL/hr at 02/22/25 2057 New Bag at 02/22/25 2057    hydromorphone in 0.9 % NaCl 6 mg/30 ml   Intravenous Continuous   New Syringe/Bag at 02/25/25 0506    Standard Custom Day One ADULT TPN for patient WITH electrolyte abnormality or renal dysfunction (PERIPHERAL)   Intravenous Continuous         Scheduled Meds:   acetaminophen  1,000 mg Oral Q8H    fat emulsion 20%  250 mL Intravenous Daily    gabapentin  300 mg Oral TID    heparin (porcine)  5,000 Units Subcutaneous Q8H    HYDROmorphone  1 mg Intravenous Once    methocarbamol injection  500 mg Intravenous Q6H    mupirocin   Nasal BID    pantoprazole  40 mg Intravenous Daily    piperacillin-tazobactam (Zosyn) IV (PEDS and ADULTS) (extended infusion is not appropriate)  4.5 g Intravenous Q8H    sodium chloride 0.9%  1,000 mL Intravenous Once     PRN Meds:   acetaminophen tablet 1,000 mg    fat emulsion 20% infusion 250 mL    gabapentin capsule 300 mg    heparin (porcine) injection 5,000 Units    HYDROmorphone injection 1 mg    methocarbamoL injection 500 mg    mupirocin 2 % ointment    pantoprazole injection 40 mg    piperacillin-tazobactam (ZOSYN) 4.5 g in D5W 100 mL IVPB (MB+)    sodium chloride 0.9%  bolus 1,000 mL 1,000 mL        Objective:     Vital Signs (Most Recent):  Temp: 97.9 °F (36.6 °C) (02/25/25 0452)  Pulse: 66 (02/25/25 0452)  Resp: 18 (02/25/25 0452)  BP: 103/66 (02/25/25 0452)  SpO2: 98 % (02/25/25 0452) Vital Signs (24h Range):  Temp:  [97.5 °F (36.4 °C)-98 °F (36.7 °C)] 97.9 °F (36.6 °C)  Pulse:  [60-75] 66  Resp:  [10-20] 18  SpO2:  [95 %-100 %] 98 %  BP: ()/(53-68) 103/66     Intake/Output - Last 3 Shifts         02/23 0700  02/24 0659 02/24 0700  02/25 0659    P.O. 50 350    IV Piggyback 840.8     Total Intake(mL/kg) 890.8 (13.8) 350 (5.4)    Urine (mL/kg/hr) 1180 (0.8) 375 (0.2)    Drains 195 75    Stool 475 1485    Total Output 1850 1935    Net -959.2 -1585          Stool Occurrence 1 x 1 x             Physical Exam  Vitals and nursing note reviewed.   Constitutional:       General: She is not in acute distress.     Appearance: She is not ill-appearing or diaphoretic.   HENT:      Head: Normocephalic and atraumatic.      Nose: Nose normal.      Mouth/Throat:      Comments: Missing teeth, poor oral hygiene   Eyes:      Extraocular Movements: Extraocular movements intact.      Pupils: Pupils are equal, round, and reactive to light.   Cardiovascular:      Rate and Rhythm: Normal rate and regular rhythm.   Pulmonary:      Effort: Pulmonary effort is normal.   Abdominal:      General: There is no distension.      Palpations: Abdomen is soft.      Tenderness: There is no abdominal tenderness.      Comments: Incision site c/d/I. Drain in place with serosang output. Dressing changed   Genitourinary:     Comments: [facility] draining blood-tinged urine with sediment  Perineal incision c/d/i  Musculoskeletal:         General: Normal range of motion.      Cervical back: Normal range of motion.   Skin:     General: Skin is warm and dry.   Neurological:      General: No focal deficit present.      Mental Status: She is alert.   Psychiatric:      Comments: High fluctuation in moods                 Significant Labs:  All pertinent lab results within the last 24 hours have been reviewed.     Significant Diagnostics:  I have reviewed all pertinent imaging results/findings within the past 24 hours.  Assessment/Plan:     Crohn's disease of both small and large intestine with fistula  Candida Cardona is a 44 y.o. Now s/p completion proctectomy and excision of fistula tract on 2/22/25.       - CLD w/ strawberry Boost. -No sodas or carbonated beverages.   - PPN today  - Maintain IVF for IRIS. Can DC after PPN is initiated  - Multi-modal pain control; wean PCA  - PRN nausea meds  - CT chest with contrast and tunneled line with IR on Thursday after pre-medication  - NO limotil   - Replace electrolytes PRN  - PPI  - DVT prophylaxis   - IS  - PT/OT  - Will dc barakat per urology recommendation    Dispo: maintain care on INDIRA Bose MD  Colorectal Surgery  [facility] - INDIRA

## 2025-02-25 NOTE — RESPIRATORY THERAPY
"RAPID RESPONSE RESPIRATORY THERAPY ETCO2 CHECK         Time of visit: 930     Code Status: Full Code   : 1980  Bed: 1049/1049 A:   MRN: [id number]  Time spent at the bedside: < 15 min    SITUATION    Evaluated patient for: ETCo2 compliance    BACKGROUND    Why is the patient in the hospital?: <principal problem not specified>    Patient has a past medical history of Anxiety, Bipolar 1 disorder, Cholelithiasis, Coronary artery disease, Crohn's disease with ileorectal anastomotic stricture (ileum and rectum), Depression, Epilepsy, Fibromyalgia, GERD (gastroesophageal reflux disease), Interstitial cystitis, Kidney stones, Mass, ovarian, Osteoporosis, PAF (paroxysmal atrial fibrillation), Pancreatitis, and S/P ERIK-BSO (total abdominal hysterectomy and bilateral salpingo-oophorectomy).    24 Hours Vitals Range:  Temp:  [97.5 °F (36.4 °C)-98.1 °F (36.7 °C)]   Pulse:  [60-89]   Resp:  [10-18]   BP: ()/(57-68)   SpO2:  [95 %-100 %]     Labs:    Recent Labs     25  0349 25  1446 25  0253 25  1805 25  0311      < > 135* 133* 133*   K 3.7   < > 3.4* 4.1 3.8      < > 103 104 104   CO2 19*   < > 21* 17* 17*   BUN 9   < > 17 15 14   CREATININE 1.3   < > 1.5* 1.2 1.1   *   < > 112* 75 63*   PHOS 5.1*  --  4.2  --  2.9   MG 1.8  --  1.9  --  1.6    < > = values in this interval not displayed.        No results for input(s): "PH", "PCO2", "PO2", "HCO3", "POCSATURATED", "BE" in the last 72 hours.    ASSESSMENT/INTERVENTIONS    No respiratory concerns at this time.     Last VS   Temp: 98.1 °F (36.7 °C) ( 1233)  Pulse: 78 ( 1233)  Resp: 18 ( 1233)  BP: 99/57 ( 1233)  SpO2: 100 % ( 1233)    Level of Consciousness: Level of Consciousness (AVPU): alert  Respiratory Effort: Respiratory Effort: Normal, Unlabored Expansion/Accessory Muscle Usage: Expansion/Accessory Muscles/Retractions: no use of accessory muscles, no retractions  All Lung Field Breath " Sounds: All Lung Fields Breath Sounds: Anterior:, equal bilaterally  Is the ETCO2 monitor on? No  Is the patient wearing a cannula? No  Are ETCO2 orders placed? Yes  Is the patient on a PCA pump? No  ETCO2 monitored: ETCO2 (mmHg): 30 mmHg  Ambu at bedside: Yes    Active Orders   Respiratory Care    END TIDAL CO2 MONITOR Q12H     Frequency: Q12H     Number of Occurrences: Until Specified    Incentive spirometry     Frequency: Q4H     Number of Occurrences: Until Specified     Order Comments: Q4 while awake until discharge.  Educate patient in use; Keep incentive spirometer within reach; and Document incentive spirometer volume every 4 hours while awake.    ((10 sets per hour (3-5 inspirations per set)) on original order)      Oxygen Continuous     Frequency: Continuous     Number of Occurrences: Until Specified     Order Questions:      Device type: Low flow      Device: Nasal Cannula (1- 5 Liters)      LPM: 2      Titrate O2 per Oxygen Titration Protocol: Yes      To maintain SpO2 goal of: >= 90%      Notify MD of: Inability to achieve desired SpO2; Sudden change in patient status and requires 20% increase in FiO2; Patient requires >60% FiO2    Pulse Oximetry Continuous     Frequency: Continuous     Number of Occurrences: Until Specified       RECOMMENDATIONS    We recommend: RRT Recs: Continue POC per primary team.    FOLLOW-UP    Please call back the Rapid Response RTLuci RRT at x [phone] for any questions or concerns.

## 2025-02-25 NOTE — CONSULTS
VASCULAR ACCESS NOTE       Bed:1049/1049 A    20G x 1.75IN PIV placed in Left Upper Arm by JACQUES using Ultrasound Guidance.    Indication: PVA  Attempts: 1      Sterling Coto RN    VASCULAR ACCESS NOTE       Bed:1049/1049 A    22G x 1.75IN PIV placed in Left Forearm by JACQUES using Ultrasound Guidance.    Indication: PVA  Attempts: 1      Sterling Coto RN

## 2025-02-25 NOTE — PROGRESS NOTES
- patient currently hospitalized s/p proctectomy with end ileostomy  - IBD team consulted inpatient  - plan of care is to add gattex to current regimen due to concerns for SBS given pt only has 110 of short bowel left post surgery  - risks and benefits of gattex discussed with pt in the hospital   - she will start TPN inpatient while we get approval for gattex outpatient  - most recent eGFR >60 and most recent weight of 64.5 kg  - script for gattex 3 mg (0.3mL) SC daily sent to [facility]  - baseline labs collected inpatient  - case discussed with Dr. Go

## 2025-02-25 NOTE — PROGRESS NOTES
[facility]  Urology  Progress Note    Patient Name: Candida Cardona  MRN: [id number]  Admission Date: 2/21/2025  Hospital Length of Stay: 4 days  Code Status: Full Code   Attending Provider: Robin Atkins MD   Primary Care Physician: Matti Conklin MD    Subjective:     HPI:  Ms. Cardona is a 44 year old woman with a history of urolithiasis, Crohn's disease, GERD, anxiety, CAD, Bipolar disorder, and pAF and the surgeries listed below presenting to [facility] as a direct admit for colovesiculofistula. 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 Currently scheduled for a completion proctectomy on 2/22/25. Urology consulted for enterovesical fistula.     On assessment the patient is AFVSS. On 1/15/2019 - Ileostomy closure and ileorectal anastomosis, extensive lysis of adhesions, ureterolysis. Endorses passage of air and stool with urination of several weeks duration. Denies fevers, chills, flank pain, difficulty urinating. Has urologic history that includes stones and a cystoscopy performed in 2019 for removal of a stent. Currently scheduled for completion proctectomy on 2/22/25.    WBC 5.88, Hg 9.8. Cr is 0.8. UA nitrite negative 16 RBC, >100 WBC and moderate bacteria with 16 squamous cells and few yeast. Urine culture pending.     CT scan with no hydronephrosis bilaterally. Thick walled bladder with inflammatory changes and air posteriorly between rectum and bladder. No definitive fistula seen,                Interval History: AFVSS. NAEO. Foely in place draining light pink urine. Cr consistent with baseline this AM.       Objective:     Temp:  [97.5 °F (36.4 °C)-98 °F (36.7 °C)] 97.9 °F (36.6 °C)  Pulse:  [60-75] 66  Resp:  [10-20] 18  SpO2:  [95 %-100 %] 98 %  BP: ()/(53-68) 103/66     Body mass index is 22.27 kg/m².            Drains       Drain  Duration                  Closed/Suction Drain 02/22/25 1619 Tube - 1 Left Abdomen Bulb 19 Fr. 2 days         Ileostomy 02/22/25 1618 other (see comments) RUQ 2 days         Urethral Catheter 02/22/25 0830 Non-latex;Straight-tip 16 Fr. 2 days                     Physical Exam  Constitutional:       General: She is not in acute distress.     Appearance: Normal appearance.   HENT:      Head: Normocephalic.      Nose: Nose normal.   Eyes:      Pupils: Pupils are equal, round, and reactive to light.   Cardiovascular:      Rate and Rhythm: Normal rate.   Pulmonary:      Effort: Pulmonary effort is normal. No respiratory distress.   Abdominal:      General: Abdomen is flat. There is no distension.      Tenderness: There is no abdominal tenderness. There is no right CVA tenderness or left CVA tenderness.      Comments: Incision c/d/I.    THAI SS   Genitourinary:     Comments: [facility] in place draining clear pink urine with mild sediment  Vaginal redness present  Musculoskeletal:         General: Normal range of motion.   Skin:     General: Skin is warm.      Coloration: Skin is not jaundiced.   Neurological:      General: No focal deficit present.      Mental Status: She is alert and oriented to person, place, and time.   Psychiatric:         Mood and Affect: Mood normal.         Behavior: Behavior normal.           Significant Labs:    BMP:  Recent Labs   Lab 02/24/25  0253 02/24/25  1805 02/25/25  0311   * 133* 133*   K 3.4* 4.1 3.8    104 104   CO2 21* 17* 17*   BUN 17 15 14   CREATININE 1.5* 1.2 1.1   CALCIUM 8.3* 8.0* 8.0*       CBC:   Recent Labs   Lab 02/23/25  1445 02/24/25  0253 02/25/25  0311   WBC 9.13 10.74 9.44   HGB 7.9* 7.5* 6.9*   HCT 26.3* 24.9* 22.1*    260 263       All pertinent labs results from the past 24 hours have been reviewed.    Significant Imaging:  All pertinent imaging results/findings from the past 24 hours have been  reviewed.                  Assessment/Plan:     Colovesical fistula  Ms. Cardona is a 44 year old woman with a history of urolithiasis, Crohn's disease, GERD, anxiety, CAD, Bipolar disorder, and pAF and the surgeries listed below [place] [facility] [place] for colovesiculofistula. Urology consulted for enterovesical fistula.     -- Now s/p completion proctectomy and excision of fistula tract on 2/22/25. No concern for cystotomy at that time.   -- Cr down trended to baseline. Recommend removal of barakat catheter.  -- Vaginal irritation likely from passage of stool. Wound care consult place for evaluation  -- Rest of care per primary.   -- Urology will sign off            VTE Risk Mitigation (From admission, onward)           Ordered     heparin (porcine) injection 5,000 Units  Every 8 hours         02/23/25 0938     IP VTE LOW RISK PATIENT  Once         02/21/25 0347     Place sequential compression device  Until discontinued         02/21/25 0347                  Azam Maldonado MD  Urology PGY-3  [facility]

## 2025-02-25 NOTE — PLAN OF CARE
02/25/25 1331   Discharge Reassessment   Assessment Type Discharge Planning Reassessment   Did the patient's condition or plan change since previous assessment? No   Discharge Plan discussed with: Patient   Communicated BIJAN with patient/caregiver Yes   Discharge Plan A Home   Discharge Plan B Home with family;Home Health   DME Needed Upon Discharge  none   Transition of Care Barriers None   Why the patient remains in the hospital Requires continued medical care   Post-Acute Status   Post-Acute Authorization Other   Other Status No Post-Acute Service Needs   Hospital Resources/Appts/Education Provided Appointments scheduled and added to AVS   Discharge Delays None known at this time

## 2025-02-25 NOTE — CONSULTS
[facility]    Wound Care     Patient Name:  Candida Cardona  MRN:  [id number]  Date: 2/25/2025  Diagnosis: <principal problem not specified>     History:  Past Medical History:   Diagnosis Date    Anxiety     Bipolar 1 disorder     Cholelithiasis     Coronary artery disease     NO STENT    Crohn's disease with ileorectal anastomotic stricture (ileum and rectum)     Depression     Epilepsy     Fibromyalgia     GERD (gastroesophageal reflux disease)     Interstitial cystitis     Kidney stones     Mass, ovarian 11/22/2016    Osteoporosis     PAF (paroxysmal atrial fibrillation)     Pancreatitis     S/P ERIK-BSO (total abdominal hysterectomy and bilateral salpingo-oophorectomy) 12/19/2016     Social History[1]  Precautions:  Allergies as of 02/17/2025 - Reviewed 12/12/2024   Allergen Reaction Noted    Ciprofloxacin Shortness Of Breath and Itching 02/09/2017    Keppra [levetiracetam] Other (See Comments) 09/25/2019    Mercaptopurine analogues (thiopurines) Hives, Diarrhea, and Nausea And Vomiting 09/17/2024    Imuran [azathioprine sodium] Diarrhea and Nausea And Vomiting 02/28/2014    [place]  03/05/2014    Imuran [azathioprine] Nausea And Vomiting 05/03/2019    Ketorolac Itching and Hives 05/20/2020    Adhesive Itching 11/03/2018    Iodinated contrast media Hives 07/08/2021       [facility] Assessment Details / Treatment:    Patient seen for wound care: New Consult   Chart reviewed for this encounter.   Labs:   WBC (K/uL)   Date Value   02/25/2025 9.44   02/24/2025 10.74     Glucose   Date Value   02/25/2025 63 mg/dL (L)   02/24/2025 75 mg/dL   02/03/2017 100 MG/DL   02/01/2017 98 MG/DL     Albumin   Date Value   02/22/2025 2.7 g/dL (L)   09/25/2024 4.0 g/dL   02/03/2017 4.3 G/DL   02/01/2017 3.5 G/DL     Vito Score: 19    Narrative:  Pt seen for WC consultation / follow-up and agreed to assessment  Chart reviewed for this encounter.   See Flow Sheet for additional documentation and media.    Pt laying in bed.   Pt  reports she felt there is a laceration to her vaginal area.   Assessment reveals pink area, pt reports it is bothering her. No s/s of infection, pt has no reports of pain.   Would recommend Triad.   Pt emptied 125mL thin green output independently, she reports she had ileostomy approximately 6 years ago and needs a refresher.   Plan at this time is phyllis continue education and change bag tomorrow.     RECOMMENDATIONS:  Bedside nurse assess for acute changes (purulence, increased redness/swelling, increased drainage, malodor, increased pain, pallor, necrosis) please contact physician on any acute changes.    Discussed POC with patient and primary nurse.   See EMR for orders & patient education.    Bedside nursing to continue care, dressing changes, & continue monitoring.  Bedside nursing to maintain pressure injury prevention interventions, (PIP).     Recommendations made to primary team for above plan.    Thank you for the consult. Wound Care will continue to follow.     02/25/25 1215   WOCN Assessment   WOCN Total Time (mins) 30   Visit Date 02/25/25   Visit Time 1215   Consult Type New;Follow Up   WOCN Speciality Wound;Ostomy   WOCN List ileostomy   Wound surgical;At risk for pressure Injury   Ostomy Type Ileostomy   Intervention chart review;assessed;orders   Teaching on-going        Ileostomy 02/22/25 1618 other (see comments) RUQ   Placement Date/Time: 02/22/25 1618   Present Prior to Hospital Arrival?: No  Inserted by: MD  Ileostomy Type: (c) other (see comments)  Location: RUQ   Stoma Appearance round;rosebud appearance;moist;protruding above skin level   Treatment Other (Comment)  (Emptied output)   Stoma Function stool;flatus;green;thin liquid   Output (mL) 125 mL                        [1]   Social History  Socioeconomic History    Marital status: Single    Number of children: 0   Tobacco Use    Smoking status: Never    Smokeless tobacco: Never   Substance and Sexual Activity    Alcohol use: No    Drug use:  No    Sexual activity: Yes     Partners: Male   Social History Narrative    Lives with significant other     Social Drivers of Health     Financial Resource Strain: Low Risk  (2/21/2025)    Overall Financial Resource Strain (CARDIA)     Difficulty of Paying Living Expenses: Not very hard   Food Insecurity: No Food Insecurity (2/21/2025)    Hunger Vital Sign     Worried About Running Out of Food in the Last Year: Never true     Ran Out of Food in the Last Year: Never true   Physical Activity: Inactive (2/21/2025)    Exercise Vital Sign     Days of Exercise per Week: 0 days     Minutes of Exercise per Session: 0 min   Stress: Stress Concern Present (2/21/2025)    [employer] - Occupational Stress Questionnaire     Feeling of Stress : To some extent   Housing Stability: Low Risk  (2/21/2025)    Housing Stability Vital Sign     Unable to Pay for Housing in the Last Year: No     Homeless in the Last Year: No

## 2025-02-26 PROBLEM — I87.1 SVC OBSTRUCTION: Status: ACTIVE | Noted: 2025-02-26

## 2025-02-26 LAB
ANION GAP SERPL CALC-SCNC: 8 MMOL/L (ref 8–16)
BASOPHILS # BLD AUTO: 0.02 K/UL (ref 0–0.2)
BASOPHILS NFR BLD: 0.3 % (ref 0–1.9)
BLD PROD TYP BPU: NORMAL
BLD PROD TYP BPU: NORMAL
BLOOD UNIT EXPIRATION DATE: NORMAL
BLOOD UNIT EXPIRATION DATE: NORMAL
BLOOD UNIT TYPE CODE: 6200
BLOOD UNIT TYPE CODE: 6200
BLOOD UNIT TYPE: NORMAL
BLOOD UNIT TYPE: NORMAL
BUN SERPL-MCNC: 9 MG/DL (ref 6–20)
CALCIUM SERPL-MCNC: 7.5 MG/DL (ref 8.7–10.5)
CHLORIDE SERPL-SCNC: 107 MMOL/L (ref 95–110)
CO2 SERPL-SCNC: 19 MMOL/L (ref 23–29)
CODING SYSTEM: NORMAL
CODING SYSTEM: NORMAL
CREAT SERPL-MCNC: 0.7 MG/DL (ref 0.5–1.4)
CROSSMATCH INTERPRETATION: NORMAL
CROSSMATCH INTERPRETATION: NORMAL
DIFFERENTIAL METHOD BLD: ABNORMAL
DISPENSE STATUS: NORMAL
DISPENSE STATUS: NORMAL
EOSINOPHIL # BLD AUTO: 0.3 K/UL (ref 0–0.5)
EOSINOPHIL NFR BLD: 3.7 % (ref 0–8)
ERYTHROCYTE [DISTWIDTH] IN BLOOD BY AUTOMATED COUNT: 22.4 % (ref 11.5–14.5)
EST. GFR  (NO RACE VARIABLE): >60 ML/MIN/1.73 M^2
GLUCOSE SERPL-MCNC: 82 MG/DL (ref 70–110)
HCT VFR BLD AUTO: 23.4 % (ref 37–48.5)
HGB BLD-MCNC: 7.2 G/DL (ref 12–16)
IMM GRANULOCYTES # BLD AUTO: 0.04 K/UL (ref 0–0.04)
IMM GRANULOCYTES NFR BLD AUTO: 0.6 % (ref 0–0.5)
LYMPHOCYTES # BLD AUTO: 0.8 K/UL (ref 1–4.8)
LYMPHOCYTES NFR BLD: 12.3 % (ref 18–48)
MAGNESIUM SERPL-MCNC: 1.3 MG/DL (ref 1.6–2.6)
MCH RBC QN AUTO: 23.7 PG (ref 27–31)
MCHC RBC AUTO-ENTMCNC: 30.8 G/DL (ref 32–36)
MCV RBC AUTO: 77 FL (ref 82–98)
MONOCYTES # BLD AUTO: 0.3 K/UL (ref 0.3–1)
MONOCYTES NFR BLD: 4.6 % (ref 4–15)
NEUTROPHILS # BLD AUTO: 5.3 K/UL (ref 1.8–7.7)
NEUTROPHILS NFR BLD: 78.5 % (ref 38–73)
NRBC BLD-RTO: 0 /100 WBC
PHOSPHATE SERPL-MCNC: 2 MG/DL (ref 2.7–4.5)
PLATELET # BLD AUTO: 229 K/UL (ref 150–450)
PMV BLD AUTO: 9.2 FL (ref 9.2–12.9)
POTASSIUM SERPL-SCNC: 2.9 MMOL/L (ref 3.5–5.1)
RBC # BLD AUTO: 3.04 M/UL (ref 4–5.4)
SODIUM SERPL-SCNC: 134 MMOL/L (ref 136–145)
TRANS ERYTHROCYTES VOL PATIENT: NORMAL ML
TRANS ERYTHROCYTES VOL PATIENT: NORMAL ML
WBC # BLD AUTO: 6.73 K/UL (ref 3.9–12.7)

## 2025-02-26 PROCEDURE — 27000221 HC OXYGEN, UP TO 24 HOURS

## 2025-02-26 PROCEDURE — 25500020 PHARM REV CODE 255: Performed by: STUDENT IN AN ORGANIZED HEALTH CARE EDUCATION/TRAINING PROGRAM

## 2025-02-26 PROCEDURE — 63600175 PHARM REV CODE 636 W HCPCS: Mod: JZ,TB | Performed by: STUDENT IN AN ORGANIZED HEALTH CARE EDUCATION/TRAINING PROGRAM

## 2025-02-26 PROCEDURE — 83735 ASSAY OF MAGNESIUM: CPT

## 2025-02-26 PROCEDURE — 80048 BASIC METABOLIC PNL TOTAL CA: CPT

## 2025-02-26 PROCEDURE — 63600175 PHARM REV CODE 636 W HCPCS: Performed by: STUDENT IN AN ORGANIZED HEALTH CARE EDUCATION/TRAINING PROGRAM

## 2025-02-26 PROCEDURE — 25000003 PHARM REV CODE 250

## 2025-02-26 PROCEDURE — 36415 COLL VENOUS BLD VENIPUNCTURE: CPT

## 2025-02-26 PROCEDURE — 99223 1ST HOSP IP/OBS HIGH 75: CPT | Mod: ,,, | Performed by: SURGERY

## 2025-02-26 PROCEDURE — A4217 STERILE WATER/SALINE, 500 ML: HCPCS

## 2025-02-26 PROCEDURE — 85025 COMPLETE CBC W/AUTO DIFF WBC: CPT

## 2025-02-26 PROCEDURE — B4185 PARENTERAL SOL 10 GM LIPIDS: HCPCS

## 2025-02-26 PROCEDURE — 20600001 HC STEP DOWN PRIVATE ROOM

## 2025-02-26 PROCEDURE — 94761 N-INVAS EAR/PLS OXIMETRY MLT: CPT

## 2025-02-26 PROCEDURE — 63600175 PHARM REV CODE 636 W HCPCS

## 2025-02-26 PROCEDURE — 99900035 HC TECH TIME PER 15 MIN (STAT)

## 2025-02-26 PROCEDURE — 25000003 PHARM REV CODE 250: Performed by: STUDENT IN AN ORGANIZED HEALTH CARE EDUCATION/TRAINING PROGRAM

## 2025-02-26 PROCEDURE — 25000003 PHARM REV CODE 250: Performed by: NURSE PRACTITIONER

## 2025-02-26 PROCEDURE — 84100 ASSAY OF PHOSPHORUS: CPT

## 2025-02-26 RX ORDER — MAGNESIUM SULFATE HEPTAHYDRATE 40 MG/ML
2 INJECTION, SOLUTION INTRAVENOUS
Status: COMPLETED | OUTPATIENT
Start: 2025-02-26 | End: 2025-02-26

## 2025-02-26 RX ORDER — POTASSIUM CHLORIDE 20 MEQ/1
40 TABLET, EXTENDED RELEASE ORAL ONCE
Status: DISCONTINUED | OUTPATIENT
Start: 2025-02-26 | End: 2025-02-26

## 2025-02-26 RX ORDER — POTASSIUM CHLORIDE 20 MEQ/1
40 TABLET, EXTENDED RELEASE ORAL
Status: COMPLETED | OUTPATIENT
Start: 2025-02-26 | End: 2025-02-26

## 2025-02-26 RX ORDER — FENTANYL CITRATE 50 UG/ML
INJECTION, SOLUTION INTRAMUSCULAR; INTRAVENOUS
Status: COMPLETED | OUTPATIENT
Start: 2025-02-26 | End: 2025-02-26

## 2025-02-26 RX ORDER — DIPHENHYDRAMINE HCL 12.5MG/5ML
12.5 ELIXIR ORAL ONCE AS NEEDED
Status: COMPLETED | OUTPATIENT
Start: 2025-02-26 | End: 2025-02-26

## 2025-02-26 RX ORDER — CALCIUM CARBONATE 200(500)MG
500 TABLET,CHEWABLE ORAL ONCE
Status: COMPLETED | OUTPATIENT
Start: 2025-02-26 | End: 2025-02-26

## 2025-02-26 RX ORDER — DIPHENHYDRAMINE HCL 25 MG
25 CAPSULE ORAL ONCE
Status: COMPLETED | OUTPATIENT
Start: 2025-02-26 | End: 2025-02-26

## 2025-02-26 RX ORDER — MIDAZOLAM HYDROCHLORIDE 1 MG/ML
INJECTION, SOLUTION INTRAMUSCULAR; INTRAVENOUS
Status: COMPLETED | OUTPATIENT
Start: 2025-02-26 | End: 2025-02-26

## 2025-02-26 RX ORDER — CALCIUM CARBONATE 200(500)MG
500 TABLET,CHEWABLE ORAL ONCE
Status: DISCONTINUED | OUTPATIENT
Start: 2025-02-26 | End: 2025-02-26

## 2025-02-26 RX ORDER — CEFAZOLIN SODIUM 1 G/3ML
INJECTION, POWDER, FOR SOLUTION INTRAMUSCULAR; INTRAVENOUS
Status: COMPLETED | OUTPATIENT
Start: 2025-02-26 | End: 2025-02-26

## 2025-02-26 RX ADMIN — MUPIROCIN: 20 OINTMENT TOPICAL at 08:02

## 2025-02-26 RX ADMIN — GABAPENTIN 300 MG: 300 CAPSULE ORAL at 10:02

## 2025-02-26 RX ADMIN — FENTANYL CITRATE 25 MCG: 0.05 INJECTION, SOLUTION INTRAMUSCULAR; INTRAVENOUS at 04:02

## 2025-02-26 RX ADMIN — GABAPENTIN 300 MG: 300 CAPSULE ORAL at 08:02

## 2025-02-26 RX ADMIN — MAGNESIUM SULFATE HEPTAHYDRATE 2 G: 40 INJECTION, SOLUTION INTRAVENOUS at 11:02

## 2025-02-26 RX ADMIN — HEPARIN SODIUM 5000 UNITS: 5000 INJECTION INTRAVENOUS; SUBCUTANEOUS at 01:02

## 2025-02-26 RX ADMIN — POTASSIUM CHLORIDE 40 MEQ: 1500 TABLET, EXTENDED RELEASE ORAL at 10:02

## 2025-02-26 RX ADMIN — DIPHENHYDRAMINE HYDROCHLORIDE 12.5 MG: 25 SOLUTION ORAL at 10:02

## 2025-02-26 RX ADMIN — ACETAMINOPHEN 1000 MG: 500 TABLET ORAL at 01:02

## 2025-02-26 RX ADMIN — ACETAMINOPHEN 1000 MG: 500 TABLET ORAL at 05:02

## 2025-02-26 RX ADMIN — POTASSIUM CHLORIDE 40 MEQ: 1500 TABLET, EXTENDED RELEASE ORAL at 08:02

## 2025-02-26 RX ADMIN — DIPHENHYDRAMINE HYDROCHLORIDE 25 MG: 50 INJECTION, SOLUTION INTRAMUSCULAR; INTRAVENOUS at 12:02

## 2025-02-26 RX ADMIN — I.V. FAT EMULSION 250 ML: 20 EMULSION INTRAVENOUS at 10:02

## 2025-02-26 RX ADMIN — MUPIROCIN: 20 OINTMENT TOPICAL at 09:02

## 2025-02-26 RX ADMIN — DIPHENHYDRAMINE HYDROCHLORIDE 25 MG: 25 CAPSULE ORAL at 05:02

## 2025-02-26 RX ADMIN — MAGNESIUM SULFATE HEPTAHYDRATE 2 G: 40 INJECTION, SOLUTION INTRAVENOUS at 02:02

## 2025-02-26 RX ADMIN — PANTOPRAZOLE SODIUM 40 MG: 40 INJECTION, POWDER, FOR SOLUTION INTRAVENOUS at 08:02

## 2025-02-26 RX ADMIN — SODIUM PHOSPHATE, MONOBASIC, MONOHYDRATE AND SODIUM PHOSPHATE, DIBASIC, ANHYDROUS 30 MMOL: 142; 276 INJECTION, SOLUTION INTRAVENOUS at 11:02

## 2025-02-26 RX ADMIN — ASCORBIC ACID, VITAMIN A PALMITATE, CHOLECALCIFEROL, THIAMINE HYDROCHLORIDE, RIBOFLAVIN-5 PHOSPHATE SODIUM, PYRIDOXINE HYDROCHLORIDE, NIACINAMIDE, DEXPANTHENOL, ALPHA-TOCOPHEROL ACETATE, VITAMIN K1, FOLIC ACID, BIOTIN, CYANOCOBALAMIN: 200; 3300; 200; 6; 3.6; 6; 40; 15; 10; 150; 600; 60; 5 INJECTION, SOLUTION INTRAVENOUS at 10:02

## 2025-02-26 RX ADMIN — GABAPENTIN 300 MG: 300 CAPSULE ORAL at 02:02

## 2025-02-26 RX ADMIN — IOHEXOL 20 ML: 350 INJECTION, SOLUTION INTRAVENOUS at 11:02

## 2025-02-26 RX ADMIN — Medication: at 11:02

## 2025-02-26 RX ADMIN — MIDAZOLAM 0.5 MG: 1 INJECTION INTRAMUSCULAR; INTRAVENOUS at 04:02

## 2025-02-26 RX ADMIN — POTASSIUM CHLORIDE 40 MEQ: 1500 TABLET, EXTENDED RELEASE ORAL at 11:02

## 2025-02-26 RX ADMIN — IOHEXOL 100 ML: 350 INJECTION, SOLUTION INTRAVENOUS at 11:02

## 2025-02-26 RX ADMIN — HEPARIN SODIUM 5000 UNITS: 5000 INJECTION INTRAVENOUS; SUBCUTANEOUS at 10:02

## 2025-02-26 RX ADMIN — CEFAZOLIN 1 G: 330 INJECTION, POWDER, FOR SOLUTION INTRAMUSCULAR; INTRAVENOUS at 04:02

## 2025-02-26 RX ADMIN — ACETAMINOPHEN 1000 MG: 500 TABLET ORAL at 10:02

## 2025-02-26 RX ADMIN — CALCIUM CARBONATE (ANTACID) CHEW TAB 500 MG 500 MG: 500 CHEW TAB at 08:02

## 2025-02-26 NOTE — SUBJECTIVE & OBJECTIVE
Subjective:     Interval History: NAEON. Patient slept through most of the night. This morning she has a CT Chest with contrast, she has been premedicated. She is to go to IR afterwards for the possible placement of a tunneled line. This am her K is 2.9, Ca 7.5, phos 2. Cr downtrending further to .7 from 1.     Post-Op Info:  Procedure(s) (LRB):  PROCTECTOMY, ABDOMINOPERINEAL (N/A)  CYSTOSCOPY, WITH RETROGRADE PYELOGRAM AND URETERAL STENT INSERTION (Bilateral)  CYSTOGRAM  EXCISION, SMALL INTESTINE  CREATION, ILEOSTOMY   4 Days Post-Op      Medications:  Continuous Infusions:   hydromorphone in 0.9 % NaCl 6 mg/30 ml   Intravenous Continuous   Rate Change at 02/25/25 1244    TPN ADULT PERIPHERAL CUSTOM   Intravenous Continuous 60 mL/hr at 02/26/25 0426 Rate Verify at 02/26/25 0426     Scheduled Meds:   acetaminophen  1,000 mg Oral Q8H    gabapentin  300 mg Oral TID    heparin (porcine)  5,000 Units Subcutaneous Q8H    HYDROmorphone  1 mg Intravenous Once    sodium phosphate 30 mmol in D5W 250 mL IVPB  30 mmol Intravenous Once    Followed by    magnesium sulfate 2 g IVPB  2 g Intravenous Q2H    mupirocin   Nasal BID    pantoprazole  40 mg Intravenous Daily    potassium chloride  40 mEq Oral Q2H    sodium chloride 0.9%  1,000 mL Intravenous Once     PRN Meds:   acetaminophen tablet 1,000 mg    gabapentin capsule 300 mg    heparin (porcine) injection 5,000 Units    HYDROmorphone injection 1 mg    sodium phosphate 30 mmol in D5W 250 mL IVPB    Followed by    magnesium sulfate 2g in water 50mL IVPB (premix)    mupirocin 2 % ointment    pantoprazole injection 40 mg    potassium chloride SA CR tablet 40 mEq    sodium chloride 0.9% bolus 1,000 mL 1,000 mL        Objective:     Vital Signs (Most Recent):  Temp: 97.9 °F (36.6 °C) (02/26/25 0803)  Pulse: 70 (02/26/25 0803)  Resp: 19 (02/26/25 0803)  BP: (!) 96/52 (02/26/25 0803)  SpO2: 98 % (02/26/25 0803) Vital Signs (24h Range):  Temp:  [97.4 °F (36.3 °C)-98.4 °F (36.9 °C)]  97.9 °F (36.6 °C)  Pulse:  [59-78] 70  Resp:  [14-19] 19  SpO2:  [96 %-100 %] 98 %  BP: ()/(50-64) 96/52     Intake/Output - Last 3 Shifts         02/24 0700  02/25 0659 02/25 0700 02/26 0659 02/26 0700 02/27 0659    P.O. 350      I.V. (mL/kg)  2000 (31)     IV Piggyback  1113.9     TPN  348     Total Intake(mL/kg) 350 (5.4) 3461.9 (53.7)     Urine (mL/kg/hr) 675 (0.4) 575 (0.4)     Drains 75 95     Stool 1485 1075     Total Output 2235 1745     Net -1885 +1716.9            Stool Occurrence 1 x 1 x              Physical Exam  Vitals and nursing note reviewed.   Constitutional:       General: She is not in acute distress.     Appearance: She is not ill-appearing or diaphoretic.   HENT:      Head: Normocephalic and atraumatic.      Nose: Nose normal.      Mouth/Throat:      Comments: Missing teeth, poor oral hygiene   Eyes:      Extraocular Movements: Extraocular movements intact.      Pupils: Pupils are equal, round, and reactive to light.   Cardiovascular:      Rate and Rhythm: Normal rate and regular rhythm.   Pulmonary:      Effort: Pulmonary effort is normal.   Abdominal:      General: There is no distension.      Palpations: Abdomen is soft.      Tenderness: There is no abdominal tenderness.      Comments: Belly remains soft. Incision site c/d/I. Drain with dressing in place with serosang output.    Genitourinary:     Comments: Perineal incision c/d/i  Musculoskeletal:         General: Normal range of motion.      Cervical back: Normal range of motion.   Skin:     General: Skin is warm and dry.      Capillary Refill: Capillary refill takes less than 2 seconds.   Neurological:      General: No focal deficit present.      Mental Status: She is alert.   Psychiatric:         Thought Content: Thought content normal.      Comments: High fluctuation in moods          Significant Labs:  All pertinent lab results within the last 24 hours have been reviewed.     Significant Diagnostics:  I have reviewed all  pertinent imaging results/findings within the past 24 hours.

## 2025-02-26 NOTE — CONSULTS
Andrei Lawrence - [facility]  Vascular Surgery  Consult Note    Consults  Subjective:     Chief Complaint/Reason for Admission: TPN needs     History of Present Illness:   Ms. Cardona is a 44 year old woman with a history of urolithiasis, Crohn's disease, GERD, anxiety, CAD, Bipolar disorder, and pAF admitted for colovesiculofistula, underwent xxploratory laparotomy, extensive lysis of adhesions, Completion proctectomy, resection of ileorectal anastomosis takedown of enterovesical and vaginal fistulae, Small bowel resection, End ileostomy    Postoperative course notable delayed bowel function requiring use of TPN.  Vascular surgery consulted for safety and efficacy of lower extremity access for peripheral nutrition.    Of note, she underwent recent attempted upper extremity PICC placement, it was noted to have occluded SVC, confirmed on CT chest.  She has had multiple ports in the past, 3 scars on the chest, states that previously they have been infected as well requiring removal.     No history of lower extremity DVTs, or leg swelling.    Prescriptions Prior to Admission[1]    Review of patient's allergies indicates:   Allergen Reactions    Ciprofloxacin Shortness Of Breath and Itching    Keppra [levetiracetam] Other (See Comments)     Increased depression    Mercaptopurine analogues (thiopurines) Hives, Diarrhea and Nausea And Vomiting    Imuran [azathioprine sodium] Diarrhea and Nausea And Vomiting     Pt states that she becomes hot sweaty and passes out also. She states that she has diarrhea and nausea and vomiting     [place]     Imuran [azathioprine] Nausea And Vomiting    Ketorolac Itching and Hives    Adhesive Itching    Iodinated contrast media Hives       Past Medical History:   Diagnosis Date    Anxiety     Bipolar 1 disorder     Cholelithiasis     Coronary artery disease     NO STENT    Crohn's disease with ileorectal anastomotic stricture (ileum and rectum)     Depression     Epilepsy     Fibromyalgia     GERD  (gastroesophageal reflux disease)     Interstitial cystitis     Kidney stones     Mass, ovarian 11/22/2016    Osteoporosis     PAF (paroxysmal atrial fibrillation)     Pancreatitis     S/P ERIK-BSO (total abdominal hysterectomy and bilateral salpingo-oophorectomy) 12/19/2016     Past Surgical History:   Procedure Laterality Date    ABDOMINAL SURGERY      ABDOMINOPERINEAL RESECTION OF RECTUM N/A 2/22/2025    Procedure: PROCTECTOMY, ABDOMINOPERINEAL;  Surgeon: Robin Atkins MD;  Location: [facility] OR 2ND FLR;  Service: Colon and Rectal;  Laterality: N/A;    ANASTOMOSIS OF ILEUM TO RECTUM  01/15/2019    Procedure: ANASTOMOSIS, ILEORECTAL;  Surgeon: Reji Peterson MD;  Location: [facility] OR 2ND FLR;  Service: Colon and Rectal;;    ANGIOPLASTY      CHOLECYSTECTOMY N/A 05/25/2020    Procedure: CHOLECYSTECTOMY;  Surgeon: Ozzie Sanches MD;  Location: [facility] OR;  Service: General;  Laterality: N/A;  COVID NEG      COLON SURGERY      x 2    COLONOSCOPY      multiple    COLONOSCOPY N/A 04/11/2017    Procedure: COLONOSCOPY;  Surgeon: Hussain Amaro MD;  Location: [facility] ENDO;  Service: Endoscopy;  Laterality: N/A;    CYSTOGRAM  2/22/2025    Procedure: CYSTOGRAM;  Surgeon: Kerwin Rachel MD;  Location: [facility] OR 2ND FLR;  Service: Urology;;    CYSTOSCOPY W/ URETERAL STENT PLACEMENT      CYSTOSCOPY W/ URETERAL STENT REMOVAL      CYSTOSCOPY W/ URETERAL STENT REMOVAL Right 09/05/2019    Procedure: CYSTOSCOPY, WITH URETERAL STENT REMOVAL;  Surgeon: Dano Mathis MD;  Location: [facility] OR;  Service: Urology;  Laterality: Right;    CYSTOSCOPY WITH URETEROSCOPY, RETROGRADE PYELOGRAPHY, AND INSERTION OF STENT Right 08/22/2019    Procedure: CYSTOSCOPY, WITH RETROGRADE PYELOGRAM AND URETERAL STENT INSERTION;  Surgeon: Dano Mathis MD;  Location: [facility] OR;  Service: Urology;  Laterality: Right;    CYSTOSCOPY WITH URETEROSCOPY, RETROGRADE PYELOGRAPHY, AND INSERTION OF STENT Bilateral 2/22/2025    Procedure: CYSTOSCOPY,  WITH RETROGRADE PYELOGRAM AND URETERAL STENT INSERTION;  Surgeon: Kerwin Rachel MD;  Location: [facility] OR 2ND FLR;  Service: Urology;  Laterality: Bilateral;    EXCISION, SMALL INTESTINE  2/22/2025    Procedure: EXCISION, SMALL INTESTINE;  Surgeon: Robin Atkins MD;  Location: [facility] OR 2ND FLR;  Service: Colon and Rectal;;    EXTRACORPOREAL SHOCK WAVE LITHOTRIPSY Right 08/22/2019    Procedure: LITHOTRIPSY-EXTRACORPOREAL SHOCK WAVE;  Surgeon: Dano Mathis MD;  Location: [facility] OR;  Service: Urology;  Laterality: Right;    EXTRACORPOREAL SHOCK WAVE LITHOTRIPSY Right 09/05/2019    Procedure: LITHOTRIPSY-EXTRACORPOREAL SHOCK WAVE;  Surgeon: Dano Mathis MD;  Location: [facility] OR;  Service: Urology;  Laterality: Right;    FLEXIBLE SIGMOIDOSCOPY N/A 01/23/2024    Procedure: SIGMOIDOSCOPY, FLEXIBLE;  Surgeon: Robin Atkins MD;  Location: [facility] (2ND FLR);  Service: Endoscopy;  Laterality: N/A;  Ref By:  Dr. Madera  Prep Specifications: 2 days of clear and 2 enema before procedure  1/1-precall complete-pt approved to stay at [facility] night before and following the procedure without a ride per Dr. Atkins-see encounter dated 1/18-MS    FLEXIBLE SIGMOIDOSCOPY N/A 09/17/2024    Procedure: SIGMOIDOSCOPY, FLEXIBLE;  Surgeon: Robin Atkins MD;  Location: [facility] (4TH FLR);  Service: Endoscopy;  Laterality: N/A;  Ref By: Dr.Kethmanportal.  9/12-pre call complete-dr herrera would like to be kyxbpkq-ht-[id number] appt with gamino 10am    HYSTERECTOMY      ILEOSTOMY      ILEOSTOMY  2/22/2025    Procedure: CREATION, ILEOSTOMY;  Surgeon: Robin Atkins MD;  Location: [facility] OR 2ND FLR;  Service: Colon and Rectal;;    LAPAROTOMY, EXPLORATORY  01/15/2019    Procedure: LAPAROTOMY, EXPLORATORY;extensive lysis of adhesions;  Surgeon: Reji Peterson MD;  Location: [facility] OR 2ND FLR;  Service: Colon and Rectal;;    LYSIS OF ADHESIONS OF URETER  01/15/2019    Procedure: URETEROLYSIS;  Surgeon: Reji KELLY  MD Kristen;  [facility];  Service: Colon and Rectal;;    PERIPHERALLY INSERTED CENTRAL CATHETER INSERTION N/A 10/30/2018    Procedure: INSERTION, PICC;  Surgeon: Dos Diagnostic Provider;  Location: [facility] OR;  Service: General;  Laterality: N/A;    PERIPHERALLY INSERTED CENTRAL CATHETER INSERTION N/A 08/23/2019    Procedure: INSERTION, PICC;  Surgeon: Liz Diagnostic Provider;  Location: [facility] OR;  Service: General;  Laterality: N/A;    PORTACATH PLACEMENT      TOTAL COLECTOMY  2014     Family History       Problem Relation (Age of Onset)    Cancer Father    Diabetes Father    Hypertension Mother    Joint hypermobility Mother    No Known Problems Brother, Brother    Physical abuse Mother    Schizophrenia Father    Stroke Father, Paternal Grandmother          Tobacco Use    Smoking status: Never    Smokeless tobacco: Never   Substance and Sexual Activity    Alcohol use: No    Drug use: No    Sexual activity: Yes     Partners: Male     Review of Systems   All other systems reviewed and are negative.    Objective:     Vital Signs (Most Recent):  Temp: 98 °F (36.7 °C) (02/26/25 1322)  Pulse: 80 (02/26/25 1615)  Resp: 14 (02/26/25 1615)  BP: (!) 101/55 (02/26/25 1615)  SpO2: 100 % (02/26/25 1615) Vital Signs (24h Range):  Temp:  [97.4 °F (36.3 °C)-98.4 °F (36.9 °C)] 98 °F (36.7 °C)  Pulse:  [59-81] 80  Resp:  [13-20] 14  SpO2:  [95 %-100 %] 100 %  BP: ()/(50-64) 101/55     Weight: 64.5 kg (142 lb 3.2 oz)  Body mass index is 22.27 kg/m².      Physical Exam  HENT:      Head: Normocephalic.      Mouth/Throat:      Mouth: Mucous membranes are moist.   Eyes:      Pupils: Pupils are equal, round, and reactive to light.   Neck:      Comments: Two incisions present in the right upper chest, 1 on the left corresponding to prior port placements  Neurological:      Mental Status: She is alert.          Significant Labs:  All pertinent labs from the last 24 hours have been reviewed.    Significant  Diagnostics:  MRI: No results found in the last 24 hours.  I have reviewed all pertinent imaging results/findings within the past 24 hours.    Assessment/Plan:     SVC obstruction  44 year old woman with a history of urolithiasis, Crohn's disease, GERD, anxiety, CAD, Bipolar disorder, and pAF admitted for colovesiculofistula, underwent exploratory laparotomy, extensive lysis of adhesions, Completion proctectomy, resection of ileorectal anastomosis takedown of enterovesical and vaginal fistulae, Small bowel resection, End ileostomy, Postoperative course notable delayed bowel function requiring use of TPN.    - Given SVC occlusions, lower extremity likely only valid access.   - Lower extremity vascular access with increased infection risk, however can consider thigh or lower leg saphenous vein access to reduce risk of infection, avoid groin.         Thank you for your consult.     Brian Crisostomo MD  Vascular Surgery  [facility]rei Mel[facility]  [facility]       [1]   Medications Prior to Admission   Medication Sig Dispense Refill Last Dose/Taking    cholestyramine (QUESTRAN) 4 gram packet Take 1 packet (4 g total) by mouth once daily. 90 packet 3     ergocalciferol (VITAMIN D2) 50,000 unit Cap Take 50,000 Units by mouth every 7 days.       ondansetron (ZOFRAN-ODT) 4 MG TbDL Take 1 tablet (4 mg total) by mouth every 6 (six) hours as needed (Nausea vomiting). 12 tablet 0     pantoprazole (PROTONIX) 40 MG tablet Take 1 tablet (40 mg total) by mouth once daily. 90 tablet 3     risankizumab-rzaa (SKYRIZI) 360 mg/2.4 mL (150 mg/mL) Injt Inject 360 mg into the skin every 8 weeks. 2.4 mL 0     teduglutide (GATTEX 30-VIAL) 5 mg Kit Inject 0.05 mg/kg into the skin once daily. Inject 3mg (0.3mL) under the skin daily 1 kit 5

## 2025-02-26 NOTE — PLAN OF CARE
"[facility] Plan of Care Note    Dx: Crohn's disease [K50.90]    Shift Events: Received 1U PRBC. 2 new PIV's, PCA adjusted    Goals of Care: Pain mgmt, barakat care, THAI care    Neuro: AOx4    Vital Signs: BP (!) 89/51   Pulse 65   Temp 98 °F (36.7 °C)   Resp 16   Ht 5' 7" (1.702 m)   Wt 64.5 kg (142 lb 3.2 oz)   LMP  (LMP Unknown) Comment: complete hysterectomy  SpO2 96%   Breastfeeding No   BMI 22.27 kg/m²     Respiratory: RA    Diet: Diet Clear Liquid No Soft Drinks; Fluid - 1500mL  Dietary nutrition supplements Boost Breeze - [place]    Is patient tolerating current diet? yes    GTTS: PCA, d5 0.9% NACL    Urine Output/Bowel Movement:     Intake/Output Summary (Last 24 hours) at 2/25/2025 1957  Last data filed at 2/25/2025 1755  Gross per 24 hour   Intake 350 ml   Output 1840 ml   Net -1490 ml          Drains/Tubes/Tube Feeds (include total output/shift):   Output by Drain (mL) 02/23/25 0701 - 02/23/25 1900 02/23/25 1901 - 02/24/25 0700 02/24/25 0701 - 02/24/25 1900 02/24/25 1901 - 02/25/25 0700 02/25/25 0701 - 02/25/25 1900 02/25/25 1901 - 02/25/25 1957        Closed/Suction Drain 02/22/25 1619 Tube - 1 Left Abdomen Bulb 19 Fr. 140 55 40 35 20           Lines:       Accuchecks:NA    Skin: Surgical sites    Fall Risk Score: See flowsheets    Activity level? See flowsheets    Any scheduled procedures? PICC placement 227    Any safety concerns? Falls    Other: NA   "

## 2025-02-26 NOTE — NURSING
Procedure recovery complete. VSS. Procedure site dressing to right groin is clean, dry, and intact. Report to Scotty ROLLINS.

## 2025-02-26 NOTE — PLAN OF CARE
Tunneled line placement procedure completed. Pt tolerated procedure well.Patient AAOx3, no distress noted, respirations even and unlabored, VSS. Pt to be transported to MPU for 1 hour post-procedural sedation recovery per MD. Report to be given at bedside to RN.

## 2025-02-26 NOTE — SUBJECTIVE & OBJECTIVE
Prescriptions Prior to Admission[1]    Review of patient's allergies indicates:   Allergen Reactions    Ciprofloxacin Shortness Of Breath and Itching    Keppra [levetiracetam] Other (See Comments)     Increased depression    Mercaptopurine analogues (thiopurines) Hives, Diarrhea and Nausea And Vomiting    Imuran [azathioprine sodium] Diarrhea and Nausea And Vomiting     Pt states that she becomes hot sweaty and passes out also. She states that she has diarrhea and nausea and vomiting     [place]     Imuran [azathioprine] Nausea And Vomiting    Ketorolac Itching and Hives    Adhesive Itching    Iodinated contrast media Hives       Past Medical History:   Diagnosis Date    Anxiety     Bipolar 1 disorder     Cholelithiasis     Coronary artery disease     NO STENT    Crohn's disease with ileorectal anastomotic stricture (ileum and rectum)     Depression     Epilepsy     Fibromyalgia     GERD (gastroesophageal reflux disease)     Interstitial cystitis     Kidney stones     Mass, ovarian 11/22/2016    Osteoporosis     PAF (paroxysmal atrial fibrillation)     Pancreatitis     S/P ERIK-BSO (total abdominal hysterectomy and bilateral salpingo-oophorectomy) 12/19/2016     Past Surgical History:   Procedure Laterality Date    ABDOMINAL SURGERY      ABDOMINOPERINEAL RESECTION OF RECTUM N/A 2/22/2025    Procedure: PROCTECTOMY, ABDOMINOPERINEAL;  Surgeon: Robin Atkins MD;  Location: [address];  Service: Colon and Rectal;  Laterality: N/A;    ANASTOMOSIS OF ILEUM TO RECTUM  01/15/2019    Procedure: ANASTOMOSIS, ILEORECTAL;  Surgeon: Reji Peterson MD;  Location: [address];  Service: Colon and Rectal;;    ANGIOPLASTY      CHOLECYSTECTOMY N/A 05/25/2020    Procedure: CHOLECYSTECTOMY;  Surgeon: Ozzie Sanches MD;  Location: [facility];  Service: General;  Laterality: N/A;  COVID NEG      COLON SURGERY      x 2    COLONOSCOPY      multiple    COLONOSCOPY N/A 04/11/2017    Procedure: COLONOSCOPY;  Surgeon: Hussain  RYLAN Amaro MD;  Location: [facility] ENDO;  Service: Endoscopy;  Laterality: N/A;    CYSTOGRAM  2/22/2025    Procedure: CYSTOGRAM;  Surgeon: Kerwin Rachel MD;  Location: [facility] OR 2ND FLR;  Service: Urology;;    CYSTOSCOPY W/ URETERAL STENT PLACEMENT      CYSTOSCOPY W/ URETERAL STENT REMOVAL      CYSTOSCOPY W/ URETERAL STENT REMOVAL Right 09/05/2019    Procedure: CYSTOSCOPY, WITH URETERAL STENT REMOVAL;  Surgeon: Dano Mathis MD;  Location: [facility] OR;  Service: Urology;  Laterality: Right;    CYSTOSCOPY WITH URETEROSCOPY, RETROGRADE PYELOGRAPHY, AND INSERTION OF STENT Right 08/22/2019    Procedure: CYSTOSCOPY, WITH RETROGRADE PYELOGRAM AND URETERAL STENT INSERTION;  Surgeon: Dano Mathis MD;  Location: [facility] OR;  Service: Urology;  Laterality: Right;    CYSTOSCOPY WITH URETEROSCOPY, RETROGRADE PYELOGRAPHY, AND INSERTION OF STENT Bilateral 2/22/2025    Procedure: CYSTOSCOPY, WITH RETROGRADE PYELOGRAM AND URETERAL STENT INSERTION;  Surgeon: Kerwin Rachel MD;  [facility];  Service: Urology;  Laterality: Bilateral;    EXCISION, SMALL INTESTINE  2/22/2025    Procedure: EXCISION, SMALL INTESTINE;  Surgeon: Robin Atkins MD;  [facility];  Service: Colon and Rectal;;    EXTRACORPOREAL SHOCK WAVE LITHOTRIPSY Right 08/22/2019    Procedure: LITHOTRIPSY-EXTRACORPOREAL SHOCK WAVE;  Surgeon: Dano Mathis MD;  Location: [facility] OR;  Service: Urology;  Laterality: Right;    EXTRACORPOREAL SHOCK WAVE LITHOTRIPSY Right 09/05/2019    Procedure: LITHOTRIPSY-EXTRACORPOREAL SHOCK WAVE;  Surgeon: Dano Mathis MD;  Location: [facility] OR;  Service: Urology;  Laterality: Right;    FLEXIBLE SIGMOIDOSCOPY N/A 01/23/2024    Procedure: SIGMOIDOSCOPY, FLEXIBLE;  Surgeon: Robin Atkins MD;  Location: [facility] ENDO (2ND FLR);  Service: Endoscopy;  Laterality: N/A;  Ref By:  Dr. Madera  Prep Specifications: 2 days of clear and 2 enema before procedure  1/1-precall complete-pt approved to  stay at [facility] night before and following the procedure without a ride per Dr. Atkins-see encounter dated 1/18-MS    FLEXIBLE SIGMOIDOSCOPY N/A 09/17/2024    Procedure: SIGMOIDOSCOPY, FLEXIBLE;  Surgeon: Robin Atkins MD;  Location: [facility] ENDO (4TH FLR);  Service: Endoscopy;  Laterality: N/A;  Ref By: aidee Oneal  9/12-pre call complete-dr herrera would like to be [id number]-[id number] appt with stevenson 10am    HYSTERECTOMY      ILEOSTOMY      ILEOSTOMY  2/22/2025    Procedure: CREATION, ILEOSTOMY;  Surgeon: Robin Atkins MD;  Location: [facility] OR 2ND FLR;  Service: Colon and Rectal;;    LAPAROTOMY, EXPLORATORY  01/15/2019    Procedure: LAPAROTOMY, EXPLORATORY;extensive lysis of adhesions;  Surgeon: Reji Peterson MD;  Location: [facility] OR 2ND FLR;  Service: Colon and Rectal;;    LYSIS OF ADHESIONS OF URETER  01/15/2019    Procedure: URETEROLYSIS;  Surgeon: Reji Peterson MD;  Location: [facility] OR 2ND FLR;  Service: Colon and Rectal;;    PERIPHERALLY INSERTED CENTRAL CATHETER INSERTION N/A 10/30/2018    Procedure: INSERTION, PICC;  Surgeon: Dos Diagnostic Provider;  Location: [facility] OR;  Service: General;  Laterality: N/A;    PERIPHERALLY INSERTED CENTRAL CATHETER INSERTION N/A 08/23/2019    Procedure: INSERTION, PICC;  Surgeon: Liz Diagnostic Provider;  Location: [facility] OR;  Service: General;  Laterality: N/A;    PORTACATH PLACEMENT      TOTAL COLECTOMY  2014     Family History       Problem Relation (Age of Onset)    Cancer Father    Diabetes Father    Hypertension Mother    Joint hypermobility Mother    No Known Problems Brother, Brother    Physical abuse Mother    Schizophrenia Father    Stroke Father, Paternal Grandmother          Tobacco Use    Smoking status: Never    Smokeless tobacco: Never   Substance and Sexual Activity    Alcohol use: No    Drug use: No    Sexual activity: Yes     Partners: Male     Review of Systems   All other systems reviewed and are negative.    Objective:     Vital  Signs (Most Recent):  Temp: 98 °F (36.7 °C) (02/26/25 1322)  Pulse: 80 (02/26/25 1615)  Resp: 14 (02/26/25 1615)  BP: (!) 101/55 (02/26/25 1615)  SpO2: 100 % (02/26/25 1615) Vital Signs (24h Range):  Temp:  [97.4 °F (36.3 °C)-98.4 °F (36.9 °C)] 98 °F (36.7 °C)  Pulse:  [59-81] 80  Resp:  [13-20] 14  SpO2:  [95 %-100 %] 100 %  BP: ()/(50-64) 101/55     Weight: 64.5 kg (142 lb 3.2 oz)  Body mass index is 22.27 kg/m².      Physical Exam  HENT:      Head: Normocephalic.      Mouth/Throat:      Mouth: Mucous membranes are moist.   Eyes:      Pupils: Pupils are equal, round, and reactive to light.   Neck:      Comments: Two incisions present in the right upper chest, 1 on the left corresponding to prior port placements  Neurological:      Mental Status: She is alert.          Significant Labs:  All pertinent labs from the last 24 hours have been reviewed.    Significant Diagnostics:  MRI: No results found in the last 24 hours.  I have reviewed all pertinent imaging results/findings within the past 24 hours.       [1]   Medications Prior to Admission   Medication Sig Dispense Refill Last Dose/Taking    cholestyramine (QUESTRAN) 4 gram packet Take 1 packet (4 g total) by mouth once daily. 90 packet 3     ergocalciferol (VITAMIN D2) 50,000 unit Cap Take 50,000 Units by mouth every 7 days.       ondansetron (ZOFRAN-ODT) 4 MG TbDL Take 1 tablet (4 mg total) by mouth every 6 (six) hours as needed (Nausea vomiting). 12 tablet 0     pantoprazole (PROTONIX) 40 MG tablet Take 1 tablet (40 mg total) by mouth once daily. 90 tablet 3     risankizumab-rzaa (SKYRIZI) 360 mg/2.4 mL (150 mg/mL) Injt Inject 360 mg into the skin every 8 weeks. 2.4 mL 0     teduglutide (GATTEX 30-VIAL) 5 mg Kit Inject 0.05 mg/kg into the skin once daily. Inject 3mg (0.3mL) under the skin daily 1 kit 5

## 2025-02-26 NOTE — PLAN OF CARE
Pt arrived to IR room 189 for tunneled line placement. Pt oriented to unit and staff. Plan of care reviewed with patient, patient verbalizes understanding. Comfort measures utilized. Pt safely transferred from stretcher to procedural table. Fall risk reviewed with patient, fall risk interventions maintained. Safety strap applied, positioner pillows utilized to minimize pressure points. Blankets applied. Pt prepped and draped utilizing standard sterile technique. Patient placed on continuous monitoring, as required by sedation policy. Timeouts completed utilizing standard universal time-out, per department and facility policy. RN to remain at bedside, continuous monitoring maintained. Pt resting comfortably. Denies pain/discomfort. Will continue to monitor. See flow sheets for monitoring, medication administration, and updates.

## 2025-02-26 NOTE — RESPIRATORY THERAPY
"RAPID RESPONSE RESPIRATORY THERAPY ETCO2 CHECK         Time of visit: 1439     Code Status: Full Code   : 1980  Bed: 1049/1049 A:   MRN: [id number]  Time spent at the bedside: < 15 min    SITUATION    Evaluated patient for: ETCo2 compliance    BACKGROUND    Why is the patient in the hospital?: <principal problem not specified>    Patient has a past medical history of Anxiety, Bipolar 1 disorder, Cholelithiasis, Coronary artery disease, Crohn's disease with ileorectal anastomotic stricture (ileum and rectum), Depression, Epilepsy, Fibromyalgia, GERD (gastroesophageal reflux disease), Interstitial cystitis, Kidney stones, Mass, ovarian, Osteoporosis, PAF (paroxysmal atrial fibrillation), Pancreatitis, and S/P ERIK-BSO (total abdominal hysterectomy and bilateral salpingo-oophorectomy).    24 Hours Vitals Range:  Temp:  [96.5 °F (35.8 °C)-98.4 °F (36.9 °C)]   Pulse:  [60-81]   Resp:  [13-20]   BP: ()/(50-64)   SpO2:  [95 %-100 %]     Labs:    Recent Labs     25  0253 25  1805 25  0311 25  0445   * 133* 133* 134*   K 3.4* 4.1 3.8 2.9*    104 104 107   CO2 21* 17* 17* 19*   BUN 17 15 14 9   CREATININE 1.5* 1.2 1.1 0.7   * 75 63* 82   PHOS 4.2  --  2.9 2.0*   MG 1.9  --  1.6 1.3*        No results for input(s): "PH", "PCO2", "PO2", "HCO3", "POCSATURATED", "BE" in the last 72 hours.    ASSESSMENT/INTERVENTIONS         Last VS   Temp: 96.5 °F (35.8 °C) (1630)  Pulse: 80 (1730)  Resp: 16 (1730)  BP: 105/58 (1730)  SpO2: 100 % (1730)    Level of Consciousness: Level of Consciousness (AVPU): alert  Respiratory Effort: Respiratory Effort: Normal, Unlabored Expansion/Accessory Muscle Usage: Expansion/Accessory Muscles/Retractions: no use of accessory muscles, no retractions  All Lung Field Breath Sounds: All Lung Fields Breath Sounds: equal bilaterally, clear  Is the ETCO2 monitor on? Yes  Is the patient wearing a cannula? Yes  Are ETCO2 orders " placed? Yes  Is the patient on a PCA pump? Yes  ETCO2 monitored: ETCO2 (mmHg): 33 mmHg  Ambu at bedside: Yes.    Active Orders   Respiratory Care    END TIDAL CO2 MONITOR Q12H     Frequency: Q12H     Number of Occurrences: Until Specified    Incentive spirometry     Frequency: Q4H     Number of Occurrences: Until Specified     Order Comments: Q4 while awake until discharge.  Educate patient in use; Keep incentive spirometer within reach; and Document incentive spirometer volume every 4 hours while awake.    ((10 sets per hour (3-5 inspirations per set)) on original order)      Oxygen Continuous     Frequency: Continuous     Number of Occurrences: Until Specified     Order Questions:      Device type: Low flow      Device: Nasal Cannula (1- 5 Liters)      LPM: 2      Titrate O2 per Oxygen Titration Protocol: Yes      To maintain SpO2 goal of: >= 90%      Notify MD of: Inability to achieve desired SpO2; Sudden change in patient status and requires 20% increase in FiO2; Patient requires >60% FiO2    Pulse Oximetry Continuous     Frequency: Continuous     Number of Occurrences: Until Specified       RECOMMENDATIONS    We recommend: RRT Recs: Continue POC per primary team.    FOLLOW-UP    Please call back the Rapid Response RTGeorge RRT at x [phone] for any questions or concerns.

## 2025-02-26 NOTE — HPI
Ms. Cardona is a 44 year old woman with a history of urolithiasis, Crohn's disease, GERD, anxiety, CAD, Bipolar disorder, and pAF admitted for colovesiculofistula, underwent xxploratory laparotomy, extensive lysis of adhesions, Completion proctectomy, resection of ileorectal anastomosis takedown of enterovesical and vaginal fistulae, Small bowel resection, End ileostomy    Postoperative course notable delayed bowel function requiring use of TPN.  Vascular surgery consulted for safety and efficacy of lower extremity access for peripheral nutrition.    Of note, she underwent recent attempted upper extremity PICC placement, it was noted to have occluded SVC, confirmed on CT chest.  She has had multiple ports in the past, 3 scars on the chest, states that previously they have been infected as well requiring removal.     No history of lower extremity DVTs, or leg swelling.

## 2025-02-26 NOTE — PLAN OF CARE
[facility] Plan of Care Note     Dx:   Crohn's disease [K50.90]  Procedure on 2/22/25  Exploratory laparotomy, extensive lysis of adhesions for 5 hours (Modifier-22)  Completion proctectomy, resection of ileorectal anastomosis takedown of enterovesical and vaginal fistulae  Small bowel resection  End ileostomy  Bilateral ureterolysis    Shift Events: pt went down for ct chest scan and R groin tunnel cath, Mg and phos replaced via IV, K replaced via PO     Goals of Care: see care plans     Neuro: A&Ox4     Vital Signs: BP on the softer side, denies lightheadness, dizziness, chest pain or sob  Vitals:    02/25/25 1844 02/25/25 1952 02/25/25 2111 02/26/25 0017   BP: (!) 94/50 (!) 89/51 (!) 95/51 (!) 92/51    02/26/25 0423 02/26/25 0803 02/26/25 1322 02/26/25 1550   BP: 106/64 (!) 96/52 (!) 93/55 (!) 106/58    02/26/25 1600 02/26/25 1605 02/26/25 1610 02/26/25 1615   BP: 104/61 102/61 101/63 (!) 101/55    02/26/25 1630 02/26/25 1645 02/26/25 1700 02/26/25 1715   BP: (!) 93/54 (!) 106/54 (!) 106/55 (!) 103/56    02/26/25 1730 02/26/25 1750   BP: (!) 105/58 111/68          Respiratory: room air      Diet: low fiber/residue     Is patient tolerating current diet? yes     GTTS: PCA pump see MAR, TPN peripheral     Urine Output/Bowel Movement: barakat draining red/pink urine, ileostomy outputing green thin liquids     Drains/Tubes/Tube Feeds (include total output/shift):   Nura drain w/ serousan output     Lines: 2 PIVs to LUE, and now R femoral double lumen tunnel cath     Accuchecks: none      Skin: bruises and abd incisions      Fall Risk Score: 10     Activity level? Stand-by     Any scheduled procedures? None      Any safety concerns? Impulsive at times      Other:  no       Problem: Adult Inpatient Plan of Care  Goal: Absence of Hospital-Acquired Illness or Injury  Outcome: Progressing  Goal: Optimal Comfort and Wellbeing  Outcome: Progressing  Goal: Readiness for Transition of Care  Outcome: Progressing     Problem: Pain  Acute  Goal: Optimal Pain Control and Function  Outcome: Progressing     Problem: Fall Injury Risk  Goal: Absence of Fall and Fall-Related Injury  Outcome: Progressing     Problem: Infection  Goal: Absence of Infection Signs and Symptoms  Outcome: Progressing     Problem: Skin Injury Risk Increased  Goal: Skin Health and Integrity  Outcome: Progressing     Problem: Wound  Goal: Optimal Coping  Outcome: Progressing  Goal: Optimal Functional Ability  Outcome: Progressing  Goal: Absence of Infection Signs and Symptoms  Outcome: Progressing  Goal: Improved Oral Intake  Outcome: Progressing  Goal: Optimal Pain Control and Function  Outcome: Progressing  Goal: Skin Health and Integrity  Outcome: Progressing  Goal: Optimal Wound Healing  Outcome: Progressing

## 2025-02-26 NOTE — PROGRESS NOTES
[facility]  Colorectal Surgery  Progress Note    Patient Name: Candida Cardona  MRN: [id number]  Admission Date: 2/21/2025  Hospital Length of Stay: 5 days  Attending Physician: Robin Atkins MD    Subjective:     Interval History: NAEON. Patient slept through most of the night. This morning she has a CT Chest with contrast, she has been premedicated. She is to go to IR afterwards for the possible placement of a tunneled line. This am her K is 2.9, Ca 7.5, phos 2. Cr downtrending further to .7 from 1.     Post-Op Info:  Procedure(s) (LRB):  PROCTECTOMY, ABDOMINOPERINEAL (N/A)  CYSTOSCOPY, WITH RETROGRADE PYELOGRAM AND URETERAL STENT INSERTION (Bilateral)  CYSTOGRAM  EXCISION, SMALL INTESTINE  CREATION, ILEOSTOMY   4 Days Post-Op      Medications:  Continuous Infusions:   hydromorphone in 0.9 % NaCl 6 mg/30 ml   Intravenous Continuous   Rate Change at 02/25/25 1244    TPN ADULT PERIPHERAL CUSTOM   Intravenous Continuous 60 mL/hr at 02/26/25 0426 Rate Verify at 02/26/25 0426     Scheduled Meds:   acetaminophen  1,000 mg Oral Q8H    gabapentin  300 mg Oral TID    heparin (porcine)  5,000 Units Subcutaneous Q8H    HYDROmorphone  1 mg Intravenous Once    sodium phosphate 30 mmol in D5W 250 mL IVPB  30 mmol Intravenous Once    Followed by    magnesium sulfate 2 g IVPB  2 g Intravenous Q2H    mupirocin   Nasal BID    pantoprazole  40 mg Intravenous Daily    potassium chloride  40 mEq Oral Q2H    sodium chloride 0.9%  1,000 mL Intravenous Once     PRN Meds:   acetaminophen tablet 1,000 mg    gabapentin capsule 300 mg    heparin (porcine) injection 5,000 Units    HYDROmorphone injection 1 mg    sodium phosphate 30 mmol in D5W 250 mL IVPB    Followed by    magnesium sulfate 2g in water 50mL IVPB (premix)    mupirocin 2 % ointment    pantoprazole injection 40 mg    potassium chloride SA CR tablet 40 mEq    sodium chloride 0.9% bolus 1,000 mL 1,000 mL        Objective:     Vital Signs (Most Recent):  Temp: 97.9 °F  (36.6 °C) (02/26/25 0803)  Pulse: 70 (02/26/25 0803)  Resp: 19 (02/26/25 0803)  BP: (!) 96/52 (02/26/25 0803)  SpO2: 98 % (02/26/25 0803) Vital Signs (24h Range):  Temp:  [97.4 °F (36.3 °C)-98.4 °F (36.9 °C)] 97.9 °F (36.6 °C)  Pulse:  [59-78] 70  Resp:  [14-19] 19  SpO2:  [96 %-100 %] 98 %  BP: ()/(50-64) 96/52     Intake/Output - Last 3 Shifts         02/24 0700 02/25 0659 02/25 0700 02/26 0659 02/26 0700 02/27 0659    P.O. 350      I.V. (mL/kg)  2000 (31)     IV Piggyback  1113.9     TPN  348     Total Intake(mL/kg) 350 (5.4) 3461.9 (53.7)     Urine (mL/kg/hr) 675 (0.4) 575 (0.4)     Drains 75 95     Stool 1485 1075     Total Output 2235 1745     Net -1885 +1716.9            Stool Occurrence 1 x 1 x              Physical Exam  Vitals and nursing note reviewed.   Constitutional:       General: She is not in acute distress.     Appearance: She is not ill-appearing or diaphoretic.   HENT:      Head: Normocephalic and atraumatic.      Nose: Nose normal.      Mouth/Throat:      Comments: Missing teeth, poor oral hygiene   Eyes:      Extraocular Movements: Extraocular movements intact.      Pupils: Pupils are equal, round, and reactive to light.   Cardiovascular:      Rate and Rhythm: Normal rate and regular rhythm.   Pulmonary:      Effort: Pulmonary effort is normal.   Abdominal:      General: There is no distension.      Palpations: Abdomen is soft.      Tenderness: There is no abdominal tenderness.      Comments: Belly remains soft. Incision site c/d/I. Drain with dressing in place with serosang output.    Genitourinary:     Comments: Perineal incision c/d/i  Musculoskeletal:         General: Normal range of motion.      Cervical back: Normal range of motion.   Skin:     General: Skin is warm and dry.      Capillary Refill: Capillary refill takes less than 2 seconds.   Neurological:      General: No focal deficit present.      Mental Status: She is alert.   Psychiatric:         Thought Content: Thought  content normal.      Comments: High fluctuation in moods          Significant Labs:  All pertinent lab results within the last 24 hours have been reviewed.     Significant Diagnostics:  I have reviewed all pertinent imaging results/findings within the past 24 hours.  Assessment/Plan:     Crohn's disease of both small and large intestine with fistula  Candida Cardona is a 44 y.o. Now s/p completion proctectomy and excision of fistula tract on 2/22/25.       - CLD with boost shakes, No sodas or carbonated beverages.  - Multi-modal pain control; wean PCA to off  - PRN nausea meds  - CT chest with contrast and tunneled line with IR on Thursday after pre-medication  - PPN vs TPN today pending results of CT and IR procedure  - Plan for a tunneled line in groin given SVC is severely occluded above RA  - Replace electrolytes PRN  - PPI  - DVT prophylaxis   - NO limotil   - OOBTC/Ambulate  - IS  - PT/OT      Dispo: maintain care on INDIRA Bose MD  Colorectal Surgery  [facility]

## 2025-02-26 NOTE — H&P
Radiology History & Physical      SUBJECTIVE:     Chief Complaint: central venous stenosis with need for long term TPN    History of Present Illness:  Candida Cardona is a 44 y.o. female who presents for tunneled central line for long term TPN. Previous attempt completed on 2/24/25, though unable to pass wire into the right atrium.     Past Medical History:   Diagnosis Date    Anxiety     Bipolar 1 disorder     Cholelithiasis     Coronary artery disease     NO STENT    Crohn's disease with ileorectal anastomotic stricture (ileum and rectum)     Depression     Epilepsy     Fibromyalgia     GERD (gastroesophageal reflux disease)     Interstitial cystitis     Kidney stones     Mass, ovarian 11/22/2016    Osteoporosis     PAF (paroxysmal atrial fibrillation)     Pancreatitis     S/P ERIK-BSO (total abdominal hysterectomy and bilateral salpingo-oophorectomy) 12/19/2016     Past Surgical History:   Procedure Laterality Date    ABDOMINAL SURGERY      ABDOMINOPERINEAL RESECTION OF RECTUM N/A 2/22/2025    Procedure: PROCTECTOMY, ABDOMINOPERINEAL;  Surgeon: Robin Atkins MD;  Location: [address];  Service: Colon and Rectal;  Laterality: N/A;    ANASTOMOSIS OF ILEUM TO RECTUM  01/15/2019    Procedure: ANASTOMOSIS, ILEORECTAL;  Surgeon: Reji Peterson MD;  Location: [address];  Service: Colon and Rectal;;    ANGIOPLASTY      CHOLECYSTECTOMY N/A 05/25/2020    Procedure: CHOLECYSTECTOMY;  Surgeon: Ozzie Sanches MD;  Location: [facility] OR;  Service: General;  Laterality: N/A;  COVID NEG      COLON SURGERY      x 2    COLONOSCOPY      multiple    COLONOSCOPY N/A 04/11/2017    Procedure: COLONOSCOPY;  Surgeon: Hussain Amaro MD;  Location: [facility] ENDO;  Service: Endoscopy;  Laterality: N/A;    CYSTOGRAM  2/22/2025    Procedure: CYSTOGRAM;  Surgeon: Kerwin Rachel MD;  Location: [address];  Service: Urology;;    CYSTOSCOPY W/ URETERAL STENT PLACEMENT      CYSTOSCOPY W/ URETERAL STENT REMOVAL       CYSTOSCOPY W/ URETERAL STENT REMOVAL Right 09/05/2019    Procedure: CYSTOSCOPY, WITH URETERAL STENT REMOVAL;  Surgeon: Dano Mathis MD;  Location: [facility] OR;  Service: Urology;  Laterality: Right;    CYSTOSCOPY WITH URETEROSCOPY, RETROGRADE PYELOGRAPHY, AND INSERTION OF STENT Right 08/22/2019    Procedure: CYSTOSCOPY, WITH RETROGRADE PYELOGRAM AND URETERAL STENT INSERTION;  Surgeon: Dano Mathis MD;  Location: [facility] OR;  Service: Urology;  Laterality: Right;    CYSTOSCOPY WITH URETEROSCOPY, RETROGRADE PYELOGRAPHY, AND INSERTION OF STENT Bilateral 2/22/2025    Procedure: CYSTOSCOPY, WITH RETROGRADE PYELOGRAM AND URETERAL STENT INSERTION;  Surgeon: Kerwin Rachel MD;  Location: [facility] OR 2ND FLR;  Service: Urology;  Laterality: Bilateral;    EXCISION, SMALL INTESTINE  2/22/2025    Procedure: EXCISION, SMALL INTESTINE;  Surgeon: Robin Atkins MD;  Location: [facility] OR 2ND FLR;  Service: Colon and Rectal;;    EXTRACORPOREAL SHOCK WAVE LITHOTRIPSY Right 08/22/2019    Procedure: LITHOTRIPSY-EXTRACORPOREAL SHOCK WAVE;  Surgeon: Dano Mathis MD;  Location: [facility] OR;  Service: Urology;  Laterality: Right;    EXTRACORPOREAL SHOCK WAVE LITHOTRIPSY Right 09/05/2019    Procedure: LITHOTRIPSY-EXTRACORPOREAL SHOCK WAVE;  Surgeon: Dano Mathis MD;  Location: [facility] OR;  Service: Urology;  Laterality: Right;    FLEXIBLE SIGMOIDOSCOPY N/A 01/23/2024    Procedure: SIGMOIDOSCOPY, FLEXIBLE;  Surgeon: Robin Atkins MD;  Location: [facility] ENDO (2ND FLR);  Service: Endoscopy;  Laterality: N/A;  Ref By:  Dr. Madera  Prep Specifications: 2 days of clear and 2 enema before procedure  1/1-precall complete-pt approved to stay at [facility] night before and following the procedure without a ride per Dr. Atkins-see encounter dated 1/18-MS    FLEXIBLE SIGMOIDOSCOPY N/A 09/17/2024    Procedure: SIGMOIDOSCOPY, FLEXIBLE;  Surgeon: Robin Atkins MD;  Location: [facility] ENDO (4TH FLR);  Service: Endoscopy;   Laterality: N/A;  Ref By: aidee Oneal  9/12-pre call complete-dr herrera would like to be xhuixcz-ax-wdojxr appt with stevenson 10am    HYSTERECTOMY      ILEOSTOMY      ILEOSTOMY  2/22/2025    Procedure: CREATION, ILEOSTOMY;  Surgeon: Robin Atkins MD;  Location: [facility] OR 2ND FLR;  Service: Colon and Rectal;;    LAPAROTOMY, EXPLORATORY  01/15/2019    Procedure: LAPAROTOMY, EXPLORATORY;extensive lysis of adhesions;  Surgeon: Reji Peterson MD;  Location: [facility] OR 2ND FLR;  Service: Colon and Rectal;;    LYSIS OF ADHESIONS OF URETER  01/15/2019    Procedure: URETEROLYSIS;  Surgeon: Reji Peterson MD;  Location: [facility] OR 2ND FLR;  Service: Colon and Rectal;;    PERIPHERALLY INSERTED CENTRAL CATHETER INSERTION N/A 10/30/2018    Procedure: INSERTION, PICC;  Surgeon: [facility] Diagnostic Provider;  Location: [facility] OR;  Service: General;  Laterality: N/A;    PERIPHERALLY INSERTED CENTRAL CATHETER INSERTION N/A 08/23/2019    Procedure: INSERTION, PICC;  Surgeon: Dos Diagnostic Provider;  Location: [facility] OR;  Service: General;  Laterality: N/A;    PORTACATH PLACEMENT      TOTAL COLECTOMY  2014       Home Meds:   Prior to Admission medications    Medication Sig Start Date End Date Taking? Authorizing Provider   cholestyramine (QUESTRAN) 4 gram packet Take 1 packet (4 g total) by mouth once daily. 10/25/24 10/25/25  Evelina Loera MD   ergocalciferol (VITAMIN D2) 50,000 unit Cap Take 50,000 Units by mouth every 7 days.    Provider, Historical   ondansetron (ZOFRAN-ODT) 4 MG TbDL Take 1 tablet (4 mg total) by mouth every 6 (six) hours as needed (Nausea vomiting). 7/22/22   Shalom Jean, NP   pantoprazole (PROTONIX) 40 MG tablet Take 1 tablet (40 mg total) by mouth once daily. 9/17/24 9/17/25  Evelina Loera MD   risankizumab-rzaa (SKYRIZI) 360 mg/2.4 mL (150 mg/mL) Injt Inject 360 mg into the skin every 8 weeks. 2/3/25   Evelina Loera MD   teduglutide (GATTEX 30-VIAL) 5 mg Kit Inject 0.05 mg/kg into the  skin once daily. Inject 3mg (0.3mL) under the skin daily 2/25/25   Nirav Go MD     Anticoagulants/Antiplatelets: no anticoagulation    Allergies:   Review of patient's allergies indicates:   Allergen Reactions    Ciprofloxacin Shortness Of Breath and Itching    Keppra [levetiracetam] Other (See Comments)     Increased depression    Mercaptopurine analogues (thiopurines) Hives, Diarrhea and Nausea And Vomiting    Imuran [azathioprine sodium] Diarrhea and Nausea And Vomiting     Pt states that she becomes hot sweaty and passes out also. She states that she has diarrhea and nausea and vomiting     Lemhi     Imuran [azathioprine] Nausea And Vomiting    Ketorolac Itching and Hives    Adhesive Itching    Iodinated contrast media Hives     Sedation History:  no adverse reactions    Labs:  Lab Results   Component Value Date    INR 1.1 01/06/2017       Lab Results   Component Value Date    WBC 6.73 02/26/2025    HGB 7.2 (L) 02/26/2025    HCT 23.4 (L) 02/26/2025    MCV 77 (L) 02/26/2025     02/26/2025     Lab Results   Component Value Date    GLU 82 02/26/2025     (L) 02/26/2025    K 2.9 (L) 02/26/2025     02/26/2025    CO2 19 (L) 02/26/2025    BUN 9 02/26/2025    CREATININE 0.7 02/26/2025    CALCIUM 7.5 (L) 02/26/2025    MG 1.3 (L) 02/26/2025    ALT 34 02/22/2025    AST 40 02/22/2025    ALBUMIN 2.7 (L) 02/22/2025    BILITOT 1.2 (H) 02/22/2025    BILIDIR 0.4 (H) 12/21/2018       Review of Systems:   Hematological: no known coagulopathies  Respiratory: no shortness of breath  Cardiovascular: no chest pain  Gastrointestinal: no abdominal pain  Genito-Urinary: no dysuria  Musculoskeletal: negative  Neurological: no TIA or stroke symptoms         OBJECTIVE:     Vital Signs (Most Recent)  Temp: 98 °F (36.7 °C) (02/26/25 1322)  Pulse: 73 (02/26/25 1322)  Resp: 17 (02/26/25 1200)  BP: (!) 93/55 (02/26/25 1322)  SpO2: 97 % (02/26/25 1322)    Physical Exam:    ASA: 3  Mallampati: II    General: no acute  distress  Mental Status: alert and oriented to person, place and time  HEENT: normocephalic, atraumatic  Chest: unlabored breathing  Heart: regular heart rate  Abdomen: nondistended  Extremity: moves all extremities    CT Chest Venogram 2/26/25 reviewed and demonstrates short segment occlusion of central aspect of SVC immediately superior to the RA with associated calcification. Likely present on 2014 non-contrast CT Chest.    ASSESSMENT/PLAN:   Given CT evidence of chronic SVC occlusion and location of stenosis/occlusion as well as patient without symptoms of SVC syndrome, risks of SVC recanalization are felt to outweigh benefits at this time (including SVC rupture and risk of bleeding into the pericardium). Risks and benefits of the procedure discussed with patient prior to obtaining consent. Will proceed with tunneled small bore central venous catheter placement via femoral venous access.    Sedation Plan: up to moderate sedation      Benjamin Irwin MD, PGY-2  Diagnostic Radiology

## 2025-02-26 NOTE — NURSING
Patient arrived to MPU bay 5 s/p R femoral tunneled line placement. Dressing to R groin is clean and dry. Cardiac monitoring continued. Fall precautions reviewed. Bed in lowest, locked position. Call light within reach.     Vitals:    02/26/25 1630   BP: (!) 93/54   Pulse: 71   Resp: 15   Temp: 96.5 °F (35.8 °C)

## 2025-02-26 NOTE — PROCEDURES
Interventional Radiology Post-Procedure Note    Pre Op Diagnosis: Crohn's requiring TPN    Post Op Diagnosis: Same    Procedure: Tunneled central venous catheter placement    Procedure performed by:  Adelina Ferris MD    Written Informed Consent Obtained: Yes    Specimen Removed: No    Estimated Blood Loss:  Minimal     Findings:   The right internal CFV is sonographically patent.  Successful placement of right-sided femoral tunneled 6 Fr central venous catheter with catheter tip in the right atrium.     The patient tolerated the procedure without complication. The central venous catheter is ready for immediate use.     Adelina Ferris MD  Interventional Radiology

## 2025-02-26 NOTE — PROGRESS NOTES
Attempted to see pt for bag change-- TYRON --   Communication to bedside nurse, she advised pt has emptied the bag independently twice today.   Will complete bag change lesson 2/27.

## 2025-02-26 NOTE — ASSESSMENT & PLAN NOTE
44 year old woman with a history of urolithiasis, Crohn's disease, GERD, anxiety, CAD, Bipolar disorder, and pAF admitted for colovesiculofistula, underwent exploratory laparotomy, extensive lysis of adhesions, Completion proctectomy, resection of ileorectal anastomosis takedown of enterovesical and vaginal fistulae, Small bowel resection, End ileostomy, Postoperative course notable delayed bowel function requiring use of TPN.    - Given SVC occlusions, lower extremity likely only valid access.   - Lower extremity vascular access with increased infection risk, however can consider thigh or lower leg saphenous vein access to reduce risk of infection, avoid groin.

## 2025-02-26 NOTE — ASSESSMENT & PLAN NOTE
Candida Cardona is a 44 y.o. Now s/p completion proctectomy and excision of fistula tract on 2/22/25.       - CLD with boost shakes, will transition to LRD after IR procedure  - Multi-modal pain control; wean PCA  - PRN nausea meds  - CT chest with contrast and tunneled line with IR on Thursday after pre-medication  - PPN vs TPN today pending results of CT and IR procedure  - Replace electrolytes PRN  - PPI  - DVT prophylaxis   - NO limotil   - OOBTC/Ambulate  - IS  - PT/OT  - No sodas or carbonated beverages.     Dispo: maintain care on [facility]

## 2025-02-26 NOTE — CARE UPDATE
Colorectal Surgery Care Update    Counseled patient on the need for contrasted CT scan for IR tunneled catheter placement. Patient has a history of allergic reaction to contrast solution. Previous reactions include hives with no evidence of anaphylaxis/respiratory compromise. Last contrasted scan performed in 2022 with 25mg PO Benadryl used as a premedication with no documented reaction.   Given patient's recent surgery, we will refrain from steroid prep and plan for 25mg PO Benadryl to be administered within one hour of her scan in the morning. Patient agrees and would like to go forward with the stated plan.    Robin Barbour MD  [facility]  PGY - 5

## 2025-02-26 NOTE — NURSING
"..[facility] Plan of Care Note    Dx:   Crohn's disease [K50.90]    Shift Events: Patient completed blood transfusion without any new issues.  Surgery -on-call provider at bedside at the beginning of shift to assess patient baseline. Patient very sleepy and oriented.  Patient peripheral iv in the forearm required assessment and intervention see flowsheet note. Patient informed of current plan of care. Clarification received from on-call-surgery provider its okay to continue to allow the patient an oral 1500 fluid restriction with the peripheral TPN running. Patient tolerating PCA pump settings. Patient awake only 1 hour so far this shift.     Goals of Care: free from falls this admission    Neuro: A&Ox4 required frequent orientation when she awoke about her current plan of care     Vital Signs: /64 (BP Location: Right arm, Patient Position: Lying)   Pulse 75   Temp 98.4 °F (36.9 °C) (Oral)   Resp 18   Ht 5' 7" (1.702 m)   Wt 64.5 kg (142 lb 3.2 oz)   LMP  (LMP Unknown) Comment: complete hysterectomy  SpO2 97%   Breastfeeding No   BMI 22.27 kg/m²     Respiratory: room air     Diet: Diet Clear Liquid No Soft Drinks; Fluid - 1500mL  Dietary nutrition supplements Boost Breeze - Wild Berry  TPN peripheral started this shift and tolerating     Is patient tolerating current diet? yes    GTTS: PCA pump see MAR, TPN peripheral  see MAR    Urine Output/Bowel Movement:   I/O this shift:  In: 3461.9 [I.V.:2000; IV Piggyback:1113.9]  Out: 1300 [Urine:425; Drains:75; Stool:800]  Last Bowel Movement: 02/25/25      Drains/Tubes/Tube Feeds (include total output/shift):   I/O this shift:  In: 3461.9 [I.V.:2000; IV Piggyback:1113.9]  Out: 1300 [Urine:425; Drains:75; Stool:800]      Lines: 2 PIV       Accuchecks: none     Skin: see flowsheet and LDA     Fall Risk Score: 10    Activity level? Independent but had stand by due to drowsiness this shift     Any scheduled procedures? CT-scan. Central line placement today     Any " safety concerns? Patient has poor venous access    Other:  no

## 2025-02-27 LAB
ANION GAP SERPL CALC-SCNC: 8 MMOL/L (ref 8–16)
BASOPHILS # BLD AUTO: 0.02 K/UL (ref 0–0.2)
BASOPHILS NFR BLD: 0.2 % (ref 0–1.9)
BUN SERPL-MCNC: 7 MG/DL (ref 6–20)
CALCIUM SERPL-MCNC: 8 MG/DL (ref 8.7–10.5)
CHLORIDE SERPL-SCNC: 104 MMOL/L (ref 95–110)
CO2 SERPL-SCNC: 21 MMOL/L (ref 23–29)
CREAT SERPL-MCNC: 0.6 MG/DL (ref 0.5–1.4)
DIFFERENTIAL METHOD BLD: ABNORMAL
EOSINOPHIL # BLD AUTO: 0.2 K/UL (ref 0–0.5)
EOSINOPHIL NFR BLD: 2.2 % (ref 0–8)
ERYTHROCYTE [DISTWIDTH] IN BLOOD BY AUTOMATED COUNT: 23.4 % (ref 11.5–14.5)
EST. GFR  (NO RACE VARIABLE): >60 ML/MIN/1.73 M^2
FINAL PATHOLOGIC DIAGNOSIS: NORMAL
GLUCOSE SERPL-MCNC: 96 MG/DL (ref 70–110)
GROSS: NORMAL
HCT VFR BLD AUTO: 26.2 % (ref 37–48.5)
HGB BLD-MCNC: 8.1 G/DL (ref 12–16)
IMM GRANULOCYTES # BLD AUTO: 0.07 K/UL (ref 0–0.04)
IMM GRANULOCYTES NFR BLD AUTO: 0.7 % (ref 0–0.5)
LYMPHOCYTES # BLD AUTO: 1 K/UL (ref 1–4.8)
LYMPHOCYTES NFR BLD: 10.2 % (ref 18–48)
Lab: NORMAL
MAGNESIUM SERPL-MCNC: 1.7 MG/DL (ref 1.6–2.6)
MCH RBC QN AUTO: 23.2 PG (ref 27–31)
MCHC RBC AUTO-ENTMCNC: 30.9 G/DL (ref 32–36)
MCV RBC AUTO: 75 FL (ref 82–98)
MONOCYTES # BLD AUTO: 0.5 K/UL (ref 0.3–1)
MONOCYTES NFR BLD: 4.7 % (ref 4–15)
NEUTROPHILS # BLD AUTO: 8.2 K/UL (ref 1.8–7.7)
NEUTROPHILS NFR BLD: 82 % (ref 38–73)
NRBC BLD-RTO: 0 /100 WBC
PHOSPHATE SERPL-MCNC: 1.9 MG/DL (ref 2.7–4.5)
PLATELET # BLD AUTO: 251 K/UL (ref 150–450)
PLATELET BLD QL SMEAR: ABNORMAL
PMV BLD AUTO: 9.6 FL (ref 9.2–12.9)
POTASSIUM SERPL-SCNC: 3.8 MMOL/L (ref 3.5–5.1)
RBC # BLD AUTO: 3.49 M/UL (ref 4–5.4)
SODIUM SERPL-SCNC: 133 MMOL/L (ref 136–145)
WBC # BLD AUTO: 9.95 K/UL (ref 3.9–12.7)

## 2025-02-27 PROCEDURE — 25000003 PHARM REV CODE 250

## 2025-02-27 PROCEDURE — B4185 PARENTERAL SOL 10 GM LIPIDS: HCPCS

## 2025-02-27 PROCEDURE — 80048 BASIC METABOLIC PNL TOTAL CA: CPT

## 2025-02-27 PROCEDURE — 36415 COLL VENOUS BLD VENIPUNCTURE: CPT

## 2025-02-27 PROCEDURE — 25000003 PHARM REV CODE 250: Performed by: STUDENT IN AN ORGANIZED HEALTH CARE EDUCATION/TRAINING PROGRAM

## 2025-02-27 PROCEDURE — A4217 STERILE WATER/SALINE, 500 ML: HCPCS

## 2025-02-27 PROCEDURE — 83735 ASSAY OF MAGNESIUM: CPT

## 2025-02-27 PROCEDURE — 63600175 PHARM REV CODE 636 W HCPCS

## 2025-02-27 PROCEDURE — 63600175 PHARM REV CODE 636 W HCPCS: Mod: JZ,TB

## 2025-02-27 PROCEDURE — 85025 COMPLETE CBC W/AUTO DIFF WBC: CPT

## 2025-02-27 PROCEDURE — 63600175 PHARM REV CODE 636 W HCPCS: Mod: JZ,TB | Performed by: STUDENT IN AN ORGANIZED HEALTH CARE EDUCATION/TRAINING PROGRAM

## 2025-02-27 PROCEDURE — 84100 ASSAY OF PHOSPHORUS: CPT

## 2025-02-27 PROCEDURE — 63600175 PHARM REV CODE 636 W HCPCS: Performed by: STUDENT IN AN ORGANIZED HEALTH CARE EDUCATION/TRAINING PROGRAM

## 2025-02-27 PROCEDURE — 51798 US URINE CAPACITY MEASURE: CPT

## 2025-02-27 PROCEDURE — 20600001 HC STEP DOWN PRIVATE ROOM

## 2025-02-27 RX ORDER — HYDROMORPHONE HYDROCHLORIDE 1 MG/ML
0.2 INJECTION, SOLUTION INTRAMUSCULAR; INTRAVENOUS; SUBCUTANEOUS EVERY 8 HOURS PRN
Status: DISCONTINUED | OUTPATIENT
Start: 2025-02-27 | End: 2025-02-28

## 2025-02-27 RX ORDER — CALCIUM CARBONATE 200(500)MG
1000 TABLET,CHEWABLE ORAL ONCE
Status: COMPLETED | OUTPATIENT
Start: 2025-02-27 | End: 2025-02-27

## 2025-02-27 RX ADMIN — GABAPENTIN 300 MG: 300 CAPSULE ORAL at 03:02

## 2025-02-27 RX ADMIN — I.V. FAT EMULSION 250 ML: 20 EMULSION INTRAVENOUS at 09:02

## 2025-02-27 RX ADMIN — GABAPENTIN 300 MG: 300 CAPSULE ORAL at 09:02

## 2025-02-27 RX ADMIN — CALCIUM CARBONATE (ANTACID) CHEW TAB 500 MG 1000 MG: 500 CHEW TAB at 10:02

## 2025-02-27 RX ADMIN — Medication: at 12:02

## 2025-02-27 RX ADMIN — OXYCODONE HYDROCHLORIDE 10 MG: 10 TABLET ORAL at 09:02

## 2025-02-27 RX ADMIN — PANTOPRAZOLE SODIUM 40 MG: 40 INJECTION, POWDER, FOR SOLUTION INTRAVENOUS at 09:02

## 2025-02-27 RX ADMIN — ONDANSETRON 8 MG: 8 TABLET, ORALLY DISINTEGRATING ORAL at 10:02

## 2025-02-27 RX ADMIN — SODIUM PHOSPHATE, MONOBASIC, MONOHYDRATE AND SODIUM PHOSPHATE, DIBASIC, ANHYDROUS 39.9 MMOL: 142; 276 INJECTION, SOLUTION INTRAVENOUS at 10:02

## 2025-02-27 RX ADMIN — HEPARIN SODIUM 5000 UNITS: 5000 INJECTION INTRAVENOUS; SUBCUTANEOUS at 09:02

## 2025-02-27 RX ADMIN — HYDROMORPHONE HYDROCHLORIDE 0.2 MG: 1 INJECTION, SOLUTION INTRAMUSCULAR; INTRAVENOUS; SUBCUTANEOUS at 04:02

## 2025-02-27 RX ADMIN — OXYCODONE HYDROCHLORIDE 10 MG: 10 TABLET ORAL at 01:02

## 2025-02-27 RX ADMIN — PROCHLORPERAZINE EDISYLATE 2.5 MG: 5 INJECTION INTRAMUSCULAR; INTRAVENOUS at 01:02

## 2025-02-27 RX ADMIN — HEPARIN SODIUM 5000 UNITS: 5000 INJECTION INTRAVENOUS; SUBCUTANEOUS at 01:02

## 2025-02-27 RX ADMIN — ACETAMINOPHEN 1000 MG: 500 TABLET ORAL at 09:02

## 2025-02-27 RX ADMIN — ACETAMINOPHEN 1000 MG: 500 TABLET ORAL at 05:02

## 2025-02-27 RX ADMIN — MAGNESIUM SULFATE HEPTAHYDRATE: 500 INJECTION, SOLUTION INTRAMUSCULAR; INTRAVENOUS at 09:02

## 2025-02-27 RX ADMIN — HEPARIN SODIUM 5000 UNITS: 5000 INJECTION INTRAVENOUS; SUBCUTANEOUS at 05:02

## 2025-02-27 RX ADMIN — MUPIROCIN: 20 OINTMENT TOPICAL at 09:02

## 2025-02-27 NOTE — NURSING
"[facility] Plan of Care Note    Dx:   Crohn's disease [K50.90]    Shift Events: no acute events during shift, PCA maintained, ileostomy, barakat and THAI drain maintained, ambulating in room, no concerns at this time    Goals of Care: pain control,     Neuro: AAOx4    Vital Signs: /69 (BP Location: Right arm, Patient Position: Lying)   Pulse 94   Temp 97.6 °F (36.4 °C) (Oral)   Resp 18   Ht 5' 7" (1.702 m)   Wt 64.5 kg (142 lb 3.2 oz)   LMP  (LMP Unknown) Comment: complete hysterectomy  SpO2 96%   Breastfeeding No   BMI 22.27 kg/m²     Respiratory: room air    Diet: Diet Low Fiber/Residue  Dietary nutrition supplements Boost Plus Nutritional Drink - Very Vanilla, Boost Plus Nutritional Drink - Rich Chocolate    Is patient tolerating current diet? yes    GTTS: TPN, Lipids    Urine Output/Bowel Movement:   I/O this shift:  In: 240 [P.O.:240]  Out: 3550 [Urine:850; Drains:150; Stool:2550]  Last Bowel Movement: 02/26/25      Drains/Tubes/Tube Feeds (include total output/shift):   I/O this shift:  In: 240 [P.O.:240]  Out: 3550 [Urine:850; Drains:150; Stool:2550]      Lines: right femoral PICC, left upper arm PIV      Accuchecks:n/a    Skin: abdominal ileostomy and THAI drain    Fall Risk Score: 8    Activity level? independent    Any scheduled procedures? N/a    Any safety concerns? N/a    Other: n/a     "

## 2025-02-27 NOTE — PLAN OF CARE
Recommendations  --Continue TPN: 144 g D, 40 g AA, 500 kcal Lipids, GIR 1.55 to provide 1150 kcal    --Continue Low Fiber/Residue diet as tolerated and clinically indicated   --Continue Boost Plus TID  --Encourage good intakes   --Nursing: please continue to document % meal eaten on flowsheet   --RD to monitor weight, PO intake     Goals: Meet % EEN/EPN  Nutrition Goal Status: new  Communication of RD Recs:  (POC)

## 2025-02-27 NOTE — PROGRESS NOTES
[facility]    Wound Care     Patient Name:  Candida Cardona  MRN:  [id number]  Date: 2/27/2025  Diagnosis: <principal problem not specified>     History:  Past Medical History:   Diagnosis Date    Anxiety     Bipolar 1 disorder     Cholelithiasis     Coronary artery disease     NO STENT    Crohn's disease with ileorectal anastomotic stricture (ileum and rectum)     Depression     Epilepsy     Fibromyalgia     GERD (gastroesophageal reflux disease)     Interstitial cystitis     Kidney stones     Mass, ovarian 11/22/2016    Osteoporosis     PAF (paroxysmal atrial fibrillation)     Pancreatitis     S/P ERIK-BSO (total abdominal hysterectomy and bilateral salpingo-oophorectomy) 12/19/2016     Social History[1]  Precautions:  Allergies as of 02/17/2025 - Reviewed 12/12/2024   Allergen Reaction Noted    Ciprofloxacin Shortness Of Breath and Itching 02/09/2017    Keppra [levetiracetam] Other (See Comments) 09/25/2019    Mercaptopurine analogues (thiopurines) Hives, Diarrhea, and Nausea And Vomiting 09/17/2024    Imuran [azathioprine sodium] Diarrhea and Nausea And Vomiting 02/28/2014    Valders  03/05/2014    Imuran [azathioprine] Nausea And Vomiting 05/03/2019    Ketorolac Itching and Hives 05/20/2020    Adhesive Itching 11/03/2018    Iodinated contrast media Hives 07/08/2021       [facility] Assessment Details / Treatment:    Patient seen for wound care: Follow-up   Chart reviewed for this encounter.   Labs:   WBC (K/uL)   Date Value   02/27/2025 9.95   02/26/2025 6.73     Glucose   Date Value   02/27/2025 96 mg/dL   02/26/2025 82 mg/dL   02/03/2017 100 MG/DL   02/01/2017 98 MG/DL     Albumin   Date Value   02/22/2025 2.7 g/dL (L)   09/25/2024 4.0 g/dL   02/03/2017 4.3 G/DL   02/01/2017 3.5 G/DL     Vito Score: 21    Narrative:  Pt seen for ostomy follow-up and agreed to assessment / bag change.   Chart reviewed for this encounter.   See Flow Sheet for additional documentation and media.    Pt sitting up in bed,   Patient  "seen for follow up ostomy care.   Pt reports vaginal area is "much better" and continue using Triad cream.     Pouch removed using adhesive remover  Stoma and peristomal area cleansed w/warm water and 4x4 gauze.  Gently patted skin dry and measured stoma, 38mm  Cavilon skin barrier applied to peristomal area.  Pouch cut to the size of the stoma.  Pt applied hand for a few minutes.    Applied with no difficulties.  Will follow up with patient daily.  Nursing to continue care.    Supplies at bedside.    Pt agreed to receive samples kits.     RECOMMENDATIONS:  Bedside nurse assess for acute changes (purulence, increased redness/swelling, increased drainage, malodor, increased pain, pallor, necrosis) please contact physician on any acute changes.    Discussed POC with patient and primary nurse.   See EMR for orders & patient education.     Discussed nutrition and the role of protein in wound healing with the patient. Instructed patient to optimize protein for wound healing.     Bedside nursing to continue care, dressing changes, & continue monitoring.  Bedside nursing to maintain pressure injury prevention interventions, (PIP).     Recommendations made to primary team for above plan.    Thank you for the consult. Wound Care will continue to follow.       02/27/25 0830   WOCN Assessment   WOCN Total Time (mins) 30   Visit Date 02/27/25   Visit Time 0830   Consult Type Follow Up   WOCN Speciality Ostomy   WOCN List ileostomy   Wound surgical;moisture;At risk for pressure Injury   Ostomy Type Ileostomy   Procedure ostomy pouch   Intervention chart review;assessed   Teaching on-going        Ileostomy 02/22/25 1618 other (see comments) RUQ   Placement Date/Time: 02/22/25 1618   Present Prior to Hospital Arrival?: No  Inserted by: MD  Ileostomy Type: (c) other (see comments)  Location: RUQ   Wound Image    Stoma Appearance round;rosebud appearance;moist;red;protruding above skin level   Stoma Size (in) 38mm   Appliance " 1-piece;intact;leakage;no leakage;changed;per protocol/policy   Accessories/Skin Care cleansed w/ water;skin sealant   Treatment Bag change;Placement checked;Site care   Stoma Function flatus;stool;green;thin liquid   Peristomal Assessment Clean;Intact;Dry   Tolerance no signs/symptoms of discomfort;assisted w/ appliance change;assisted w/ stoma care   Output (mL) 150 mL                          [1]   Social History  Socioeconomic History    Marital status: Single    Number of children: 0   Tobacco Use    Smoking status: Never    Smokeless tobacco: Never   Substance and Sexual Activity    Alcohol use: No    Drug use: No    Sexual activity: Yes     Partners: Male   Social History Narrative    Lives with significant other     Social Drivers of Health     Financial Resource Strain: Low Risk  (2/21/2025)    Overall Financial Resource Strain (CARDIA)     Difficulty of Paying Living Expenses: Not very hard   Food Insecurity: No Food Insecurity (2/21/2025)    Hunger Vital Sign     Worried About Running Out of Food in the Last Year: Never true     Ran Out of Food in the Last Year: Never true   Physical Activity: Inactive (2/21/2025)    Exercise Vital Sign     Days of Exercise per Week: 0 days     Minutes of Exercise per Session: 0 min   Stress: Stress Concern Present (2/21/2025)    [employer] [place] [employer] - Occupational Stress Questionnaire     Feeling of Stress : To some extent   Housing Stability: Low Risk  (2/21/2025)    Housing Stability Vital Sign     Unable to Pay for Housing in the Last Year: No     Homeless in the Last Year: No

## 2025-02-27 NOTE — PROGRESS NOTES
[facility]  Colorectal Surgery  Progress Note    Patient Name: Candida Cardona  MRN: [id number]  Admission Date: 2/21/2025  Hospital Length of Stay: 6 days  Attending Physician: Robin Atkins MD    Subjective:     Interval History: Successfully received R tunneled femoral line yesterday with IR. Patient reports she tolerated the procedure well. Tolerating PO intake without N/V. Pain well controlled. Ca 8, phos 1.9.Hgb uptrending to 8.1    Post-Op Info:  Procedure(s) (LRB):  PROCTECTOMY, ABDOMINOPERINEAL (N/A)  CYSTOSCOPY, WITH RETROGRADE PYELOGRAM AND URETERAL STENT INSERTION (Bilateral)  CYSTOGRAM  EXCISION, SMALL INTESTINE  CREATION, ILEOSTOMY   5 Days Post-Op      Medications:  Continuous Infusions:   TPN ADULT CENTRAL LINE CUSTOM   Intravenous Continuous        TPN ADULT PERIPHERAL CUSTOM   Intravenous Continuous 60 mL/hr at 02/26/25 2208 New Bag at 02/26/25 2208     Scheduled Meds:   acetaminophen  1,000 mg Oral Q8H    fat emulsion 20%  250 mL Intravenous Daily    gabapentin  300 mg Oral TID    heparin (porcine)  5,000 Units Subcutaneous Q8H    HYDROmorphone  1 mg Intravenous Once    pantoprazole  40 mg Intravenous Daily    sodium chloride 0.9%  1,000 mL Intravenous Once    sodium phosphate 39.9 mmol in D5W 250 mL IVPB  39.9 mmol Intravenous Once     PRN Meds:   acetaminophen tablet 1,000 mg    fat emulsion 20% infusion 250 mL    gabapentin capsule 300 mg    heparin (porcine) injection 5,000 Units    HYDROmorphone injection 1 mg    pantoprazole injection 40 mg    sodium chloride 0.9% bolus 1,000 mL 1,000 mL    sodium phosphate 39.9 mmol in D5W 250 mL IVPB        Objective:     Vital Signs (Most Recent):  Temp: 98.2 °F (36.8 °C) (02/27/25 0730)  Pulse: 64 (02/27/25 0730)  Resp: 17 (02/27/25 0730)  BP: 101/63 (02/27/25 0730)  SpO2: 97 % (02/27/25 0730) Vital Signs (24h Range):  Temp:  [96.5 °F (35.8 °C)-98.5 °F (36.9 °C)] 98.2 °F (36.8 °C)  Pulse:  [] 64  Resp:  [13-20] 17  SpO2:  [95 %-100 %]  97 %  BP: ()/(54-71) 101/63     Intake/Output - Last 3 Shifts         02/25 0700  02/26 0659 02/26 0700 02/27 0659 02/27 0700  02/28 0659    P.O.  645     I.V. (mL/kg) 2000 (31)      IV Piggyback 1113.9       447.5     Total Intake(mL/kg) 3461.9 (53.7) 1092.5 (16.9)     Urine (mL/kg/hr) 575 (0.4) 850 (0.5)     Drains 95 230     Stool 1075 3650     Total Output 1745 4730     Net +1716.9 -3637.5            Stool Occurrence 1 x               Physical Exam  Vitals and nursing note reviewed.   Constitutional:       General: She is not in acute distress.     Appearance: She is not ill-appearing or diaphoretic.   HENT:      Head: Normocephalic and atraumatic.      Nose: Nose normal.      Mouth/Throat:      Comments: Missing teeth, poor oral hygiene   Eyes:      Extraocular Movements: Extraocular movements intact.      Pupils: Pupils are equal, round, and reactive to light.   Cardiovascular:      Rate and Rhythm: Normal rate and regular rhythm.   Pulmonary:      Effort: Pulmonary effort is normal.   Abdominal:      General: There is no distension.      Palpations: Abdomen is soft.      Tenderness: There is no abdominal tenderness.      Comments: Belly remains soft. Incision site c/d/I. Drain with dressing in place with serosang output.    Genitourinary:     Comments: Perineal incision c/d/i  Musculoskeletal:         General: Normal range of motion.      Cervical back: Normal range of motion.      Comments: Right fem tunneled line  Bilateral upper extremity edema   Skin:     General: Skin is warm and dry.      Capillary Refill: Capillary refill takes less than 2 seconds.   Neurological:      General: No focal deficit present.      Mental Status: She is alert.   Psychiatric:         Thought Content: Thought content normal.      Comments: High fluctuation in moods            Significant Labs:  All pertinent lab results within the last 24 hours have been reviewed.     Significant Diagnostics:  I have reviewed all  pertinent imaging results/findings within the past 24 hours.  Assessment/Plan:     Crohn's disease of both small and large intestine with fistula  Candida Cardona is a 44 y.o. Now s/p completion proctectomy and excision of fistula tract on 2/22/25.       - low res diet  - Multi-modal pain control; PCA off  - PRN nausea meds  - TPN  - Replace electrolytes PRN  - PPI  - DVT prophylaxis   - NO limotil   - OOBTC/Ambulate  - IS  - PT/OT    Dispo: maintain care on INDIRA Bose MD  Colorectal Surgery  [facility] INDIRA

## 2025-02-27 NOTE — NURSING
Pt returned to floor, now s/p R femoral tunnel cath placement. Pt tolerated procedure well, dressing c/d.I, double lumens patent, purple lumen now infusing TPN at 60cc/hr.    Pt BLE warm, sensation intact, BL Dps palpable.      Restarted back her on PCA pump and unfinished Mg and Phos.    Pt now on low fiber/residue diet, will cont to monitor.

## 2025-02-27 NOTE — ASSESSMENT & PLAN NOTE
Candida Cardona is a 44 y.o. Now s/p completion proctectomy and excision of fistula tract on 2/22/25.       - low res diet  - Multi-modal pain control; PCA off  - PRN nausea meds  - TPN  - Replace electrolytes PRN  - PPI  - DVT prophylaxis   - NO limotil   - OOBTC/Ambulate  - IS  - PT/OT    Dispo: maintain care on GISSU

## 2025-02-27 NOTE — NURSING
Barakat removed per order, pt due to void at 1600. Red and pink clear urine collected in barakat bag. Pt tolerated the removal okay.    PCA dc'ed per order, will cont to monitor and assess her pain.

## 2025-02-27 NOTE — PROGRESS NOTES
[facility]  Adult Nutrition  Progress Note    SUMMARY       Recommendations  --Continue TPN: 144 g D, 40 g AA, 500 kcal Lipids, GIR 1.55 to provide 1150 kcal    --Continue Low Fiber/Residue diet as tolerated and clinically indicated   --Continue Boost Plus TID  --Encourage good intakes   --Nursing: please continue to document % meal eaten on flowsheet   --RD to monitor weight, PO intake     Goals: Meet % EEN/EPN  Nutrition Goal Status: new  Communication of RD Recs:  (POC)    Nutrition Discharge Planning    Nutrition Discharge Planning: Too early to determine, pending clinical course    Assessment and Plan    Nutrition Problem  Inadequate parenteral nutrition infusion      Related to (etiology):   Infusion volume not reached     Signs and Symptoms (as evidenced by):   Inadequate PN volume compared to estimated requirements      Interventions/Recommendations (treatment strategy):  Collaboration of nutrition care with other providers   TPN     Nutrition Diagnosis Status:   Resolved    Malnutrition Assessment             Weight Loss (Malnutrition): greater than 5% in 1 month (7.8% weight loss x 1 month)                         Reason for Assessment    Reason For Assessment: RD follow-up   Diagnosis:  (No active principal problem)  General Information Comments: 44 year old woman with a history of Crohn's disease, GERD, anxiety, CAD, Bipolar disorder, and pAF and the surgeries listed below presenting to [facility] as a direct admit for colovesiculofistula. RD follow-up. Pt s/p completion proctectomy and excision of fistula tract on 2/22. Successfully received R tunneled femoral line yesterday (2/26) with IR. Attempted to speak to pt - pt stomach hurting at time of visit. Noted poor PO intake today. TPN running and meeting EEN/EPN. 7.8% weight loss x 1 month. NFPE at follow-up.     Nutrition Related Social Determinants of Health: SDOH: Adequate food in home environment    Food Insecurity: No Food Insecurity  "(2025)    Hunger Vital Sign     Worried About Running Out of Food in the Last Year: Never true     Ran Out of Food in the Last Year: Never true       Nutrition/Diet History    Spiritual, Cultural Beliefs, Sabianism Practices, Values that Affect Care: no  Food Allergies:  ([place])  Factors Affecting Nutritional Intake: NPO    Anthropometrics    Height: 5' 7" (170.2 cm)  Height (inches): 67 in  Height Method: Stated  Weight: 64.5 kg (142 lb 3.2 oz)  Weight (lb): 142.2 lb  Weight Method: Bed Scale  Ideal Body Weight (IBW), Female: 135 lb  % Ideal Body Weight, Female (lb): 105.33 %  BMI (Calculated): 22.3  BMI Grade: 18.5-24.9 - normal       Lab/Procedures/Meds    Pertinent Labs Reviewed: reviewed - hemoglobin 8.1, hematocrit 26.2, MCV 75, MCH 23.2, MCHC 30.9, Na 133, Ca 8.0, P 1.9     Pertinent Medications Reviewed: reviewed - calcium carbonate, lipids, gabapentin, heparin, bowel reg, sodium chloride, sodium phosphate     Estimated/Assessed Needs    Weight Used For Calorie Calculations: 64.5 kg (142 lb 3.2 oz)  Energy Calorie Requirements (kcal): 9572-1331 kcal/day (20-30 kcal/kg)  Energy Need Method: Kcal/kg  Protein Requirements: 52-97 g/day (0.8-1.5 g/kg)  Weight Used For Protein Calculations: 64.5 kg (142 lb 3.2 oz)     Estimated Fluid Requirement Method: RDA Method  RDA Method (mL): 1290         Nutrition Prescription Ordered    Current Diet Order: NPO  Nutrition Order Comments: TPN runnin g D, 40 g AA + IV lipids; GIR: 1.55    Evaluation of Received Nutrient/Fluid Intake    Parenteral Calories (kcal): 490  Parenteral Protein (gm): 40  Lipid Calories (kcals): 500 kcals  GIR (Glucose Infusion Rate) (mg/kg/min): 1.55 mg/kg/min  % Kcal Needs: 89  % Protein Needs: 77  Energy Calories Required: meeting needs  Protein Required: meeting needs  Comments: LBM   Tolerance: tolerating  % Intake of Estimated Energy Needs: 75 - 100 %  % Meal Intake: 0 - 25 %    Nutrition Risk    Level of Risk/Frequency of " Follow-up: high     Monitor and Evaluation    Monitor and Evaluation: Enteral and parenteral nutrition administration, Weight, Electrolyte and renal panel, Gastrointestinal profile, Glucose/endocrine profile, Inflammatory profile, Lipid profile, Nutrition focused physical findings, Skin, Food and nutrition knowledge     Nutrition Follow-Up    RD Follow-up?: Yes    Poonam Ding, Registration Eligible, Provisional LDN

## 2025-02-27 NOTE — PLAN OF CARE
[facility] Plan of Care Note     Dx:   Crohn's disease [K50.90]  Procedure on 2/22/25  1. Exploratory laparotomy, extensive lysis of adhesions for 5 hours (Modifier-22)  2. Completion proctectomy, resection of ileorectal anastomosis takedown of enterovesical and vaginal fistulae  3. Small bowel resection  4. End ileostomy  5. Bilateral ureterolysis     Shift Events: phos replaced via IV, PCA pump d/c'ed, barakat removed, PRN anti-nausea meds given, no emesis, R femoral tunnel cath dressing changed.     Goals of Care: see care plans, pain control     Neuro: A&Ox4     Vital Signs: BP on the softer side, denies lightheadness, dizziness, chest pain or sob     Respiratory: room air      Diet: low fiber/residue     Is patient tolerating current diet? yes     GTTS: TPN      Urine Output/Bowel Movement: void to bathroom ileostomy outputing green thin liquids     Drains/Tubes/Tube Feeds (include total output/shift):   Nura drain w/ serousan output     Lines: R femoral double lumen tunnel cath     Accuchecks: none      Skin: bruises and abd incisions      Fall Risk Score: 8     Activity level? Stand-by     Any scheduled procedures? None      Any safety concerns?      Other:  no       Problem: Adult Inpatient Plan of Care  Goal: Absence of Hospital-Acquired Illness or Injury  Outcome: Progressing  Goal: Optimal Comfort and Wellbeing  Outcome: Progressing  Goal: Readiness for Transition of Care  Outcome: Progressing     Problem: Pain Acute  Goal: Optimal Pain Control and Function  Outcome: Progressing     Problem: Fall Injury Risk  Goal: Absence of Fall and Fall-Related Injury  Outcome: Progressing     Problem: Infection  Goal: Absence of Infection Signs and Symptoms  Outcome: Progressing     Problem: Skin Injury Risk Increased  Goal: Skin Health and Integrity  Outcome: Progressing     Problem: Wound  Goal: Optimal Coping  Outcome: Progressing  Goal: Optimal Functional Ability  Outcome: Progressing  Goal: Absence of Infection Signs and  Symptoms  Outcome: Progressing  Goal: Improved Oral Intake  Outcome: Progressing  Goal: Optimal Pain Control and Function  Outcome: Progressing  Goal: Skin Health and Integrity  Outcome: Progressing  Goal: Optimal Wound Healing  Outcome: Progressing

## 2025-02-27 NOTE — SUBJECTIVE & OBJECTIVE
Subjective:     Interval History: Successfully received R tunneled femoral line yesterday with IR. Patient reports she tolerated the procedure well. Tolerating PO intake without N/V. Pain well controlled. Ca 8, phos 1.9.Hgb uptrending to 8.1    Post-Op Info:  Procedure(s) (LRB):  PROCTECTOMY, ABDOMINOPERINEAL (N/A)  CYSTOSCOPY, WITH RETROGRADE PYELOGRAM AND URETERAL STENT INSERTION (Bilateral)  CYSTOGRAM  EXCISION, SMALL INTESTINE  CREATION, ILEOSTOMY   5 Days Post-Op      Medications:  Continuous Infusions:   TPN ADULT CENTRAL LINE CUSTOM   Intravenous Continuous        TPN ADULT PERIPHERAL CUSTOM   Intravenous Continuous 60 mL/hr at 02/26/25 2208 New Bag at 02/26/25 2208     Scheduled Meds:   acetaminophen  1,000 mg Oral Q8H    fat emulsion 20%  250 mL Intravenous Daily    gabapentin  300 mg Oral TID    heparin (porcine)  5,000 Units Subcutaneous Q8H    HYDROmorphone  1 mg Intravenous Once    pantoprazole  40 mg Intravenous Daily    sodium chloride 0.9%  1,000 mL Intravenous Once    sodium phosphate 39.9 mmol in D5W 250 mL IVPB  39.9 mmol Intravenous Once     PRN Meds:   acetaminophen tablet 1,000 mg    fat emulsion 20% infusion 250 mL    gabapentin capsule 300 mg    heparin (porcine) injection 5,000 Units    HYDROmorphone injection 1 mg    pantoprazole injection 40 mg    sodium chloride 0.9% bolus 1,000 mL 1,000 mL    sodium phosphate 39.9 mmol in D5W 250 mL IVPB        Objective:     Vital Signs (Most Recent):  Temp: 98.2 °F (36.8 °C) (02/27/25 0730)  Pulse: 64 (02/27/25 0730)  Resp: 17 (02/27/25 0730)  BP: 101/63 (02/27/25 0730)  SpO2: 97 % (02/27/25 0730) Vital Signs (24h Range):  Temp:  [96.5 °F (35.8 °C)-98.5 °F (36.9 °C)] 98.2 °F (36.8 °C)  Pulse:  [] 64  Resp:  [13-20] 17  SpO2:  [95 %-100 %] 97 %  BP: ()/(54-71) 101/63     Intake/Output - Last 3 Shifts         02/25 0700 02/26 0659 02/26 0700 02/27 0659 02/27 0700 02/28 0659    P.O.  645     I.V. (mL/kg) 2000 (31)      IV Piggyback 1113.9        447.5     Total Intake(mL/kg) 3461.9 (53.7) 1092.5 (16.9)     Urine (mL/kg/hr) 575 (0.4) 850 (0.5)     Drains 95 230     Stool 1075 3650     Total Output 1745 4730     Net +1716.9 -3637.5            Stool Occurrence 1 x               Physical Exam  Vitals and nursing note reviewed.   Constitutional:       General: She is not in acute distress.     Appearance: She is not ill-appearing or diaphoretic.   HENT:      Head: Normocephalic and atraumatic.      Nose: Nose normal.      Mouth/Throat:      Comments: Missing teeth, poor oral hygiene   Eyes:      Extraocular Movements: Extraocular movements intact.      Pupils: Pupils are equal, round, and reactive to light.   Cardiovascular:      Rate and Rhythm: Normal rate and regular rhythm.   Pulmonary:      Effort: Pulmonary effort is normal.   Abdominal:      General: There is no distension.      Palpations: Abdomen is soft.      Tenderness: There is no abdominal tenderness.      Comments: Belly remains soft. Incision site c/d/I. Drain with dressing in place with serosang output.    Genitourinary:     Comments: Perineal incision c/d/i  Musculoskeletal:         General: Normal range of motion.      Cervical back: Normal range of motion.      Comments: Right fem tunneled line  Bilateral upper extremity edema   Skin:     General: Skin is warm and dry.      Capillary Refill: Capillary refill takes less than 2 seconds.   Neurological:      General: No focal deficit present.      Mental Status: She is alert.   Psychiatric:         Thought Content: Thought content normal.      Comments: High fluctuation in moods            Significant Labs:  All pertinent lab results within the last 24 hours have been reviewed.     Significant Diagnostics:  I have reviewed all pertinent imaging results/findings within the past 24 hours.

## 2025-02-28 LAB
ANION GAP SERPL CALC-SCNC: 9 MMOL/L (ref 8–16)
BASOPHILS # BLD AUTO: 0.02 K/UL (ref 0–0.2)
BASOPHILS NFR BLD: 0.2 % (ref 0–1.9)
BUN SERPL-MCNC: 9 MG/DL (ref 6–20)
CALCIUM SERPL-MCNC: 8 MG/DL (ref 8.7–10.5)
CHLORIDE SERPL-SCNC: 103 MMOL/L (ref 95–110)
CO2 SERPL-SCNC: 25 MMOL/L (ref 23–29)
CREAT SERPL-MCNC: 0.7 MG/DL (ref 0.5–1.4)
CRP SERPL-MCNC: 80.3 MG/L (ref 0–8.2)
DIFFERENTIAL METHOD BLD: ABNORMAL
EOSINOPHIL # BLD AUTO: 0.2 K/UL (ref 0–0.5)
EOSINOPHIL NFR BLD: 2.6 % (ref 0–8)
ERYTHROCYTE [DISTWIDTH] IN BLOOD BY AUTOMATED COUNT: 24 % (ref 11.5–14.5)
EST. GFR  (NO RACE VARIABLE): >60 ML/MIN/1.73 M^2
GLUCOSE SERPL-MCNC: 117 MG/DL (ref 70–110)
HCT VFR BLD AUTO: 25.8 % (ref 37–48.5)
HGB BLD-MCNC: 7.9 G/DL (ref 12–16)
IMM GRANULOCYTES # BLD AUTO: 0.07 K/UL (ref 0–0.04)
IMM GRANULOCYTES NFR BLD AUTO: 0.9 % (ref 0–0.5)
LYMPHOCYTES # BLD AUTO: 0.7 K/UL (ref 1–4.8)
LYMPHOCYTES NFR BLD: 8.9 % (ref 18–48)
MAGNESIUM SERPL-MCNC: 1.6 MG/DL (ref 1.6–2.6)
MCH RBC QN AUTO: 24 PG (ref 27–31)
MCHC RBC AUTO-ENTMCNC: 30.6 G/DL (ref 32–36)
MCV RBC AUTO: 78 FL (ref 82–98)
MONOCYTES # BLD AUTO: 0.4 K/UL (ref 0.3–1)
MONOCYTES NFR BLD: 5.4 % (ref 4–15)
NEUTROPHILS # BLD AUTO: 6.7 K/UL (ref 1.8–7.7)
NEUTROPHILS NFR BLD: 82 % (ref 38–73)
NRBC BLD-RTO: 0 /100 WBC
PHOSPHATE SERPL-MCNC: 3.5 MG/DL (ref 2.7–4.5)
PLATELET # BLD AUTO: 226 K/UL (ref 150–450)
PMV BLD AUTO: 9.5 FL (ref 9.2–12.9)
POTASSIUM SERPL-SCNC: 3.4 MMOL/L (ref 3.5–5.1)
RBC # BLD AUTO: 3.29 M/UL (ref 4–5.4)
SODIUM SERPL-SCNC: 137 MMOL/L (ref 136–145)
WBC # BLD AUTO: 8.11 K/UL (ref 3.9–12.7)

## 2025-02-28 PROCEDURE — 25000003 PHARM REV CODE 250

## 2025-02-28 PROCEDURE — 83735 ASSAY OF MAGNESIUM: CPT

## 2025-02-28 PROCEDURE — 36415 COLL VENOUS BLD VENIPUNCTURE: CPT | Performed by: STUDENT IN AN ORGANIZED HEALTH CARE EDUCATION/TRAINING PROGRAM

## 2025-02-28 PROCEDURE — 20600001 HC STEP DOWN PRIVATE ROOM

## 2025-02-28 PROCEDURE — 25000003 PHARM REV CODE 250: Performed by: STUDENT IN AN ORGANIZED HEALTH CARE EDUCATION/TRAINING PROGRAM

## 2025-02-28 PROCEDURE — A4217 STERILE WATER/SALINE, 500 ML: HCPCS

## 2025-02-28 PROCEDURE — 63600175 PHARM REV CODE 636 W HCPCS: Performed by: STUDENT IN AN ORGANIZED HEALTH CARE EDUCATION/TRAINING PROGRAM

## 2025-02-28 PROCEDURE — 84100 ASSAY OF PHOSPHORUS: CPT

## 2025-02-28 PROCEDURE — 86140 C-REACTIVE PROTEIN: CPT | Performed by: STUDENT IN AN ORGANIZED HEALTH CARE EDUCATION/TRAINING PROGRAM

## 2025-02-28 PROCEDURE — 80048 BASIC METABOLIC PNL TOTAL CA: CPT

## 2025-02-28 PROCEDURE — 36415 COLL VENOUS BLD VENIPUNCTURE: CPT

## 2025-02-28 PROCEDURE — 63600175 PHARM REV CODE 636 W HCPCS

## 2025-02-28 PROCEDURE — 85025 COMPLETE CBC W/AUTO DIFF WBC: CPT

## 2025-02-28 RX ORDER — HYDROMORPHONE HYDROCHLORIDE 1 MG/ML
0.5 INJECTION, SOLUTION INTRAMUSCULAR; INTRAVENOUS; SUBCUTANEOUS ONCE
Status: COMPLETED | OUTPATIENT
Start: 2025-02-28 | End: 2025-02-28

## 2025-02-28 RX ORDER — HYDROMORPHONE HYDROCHLORIDE 1 MG/ML
0.5 INJECTION, SOLUTION INTRAMUSCULAR; INTRAVENOUS; SUBCUTANEOUS EVERY 6 HOURS PRN
Status: DISCONTINUED | OUTPATIENT
Start: 2025-02-28 | End: 2025-03-03

## 2025-02-28 RX ORDER — POTASSIUM CHLORIDE 20 MEQ/1
40 TABLET, EXTENDED RELEASE ORAL ONCE
Status: COMPLETED | OUTPATIENT
Start: 2025-02-28 | End: 2025-02-28

## 2025-02-28 RX ORDER — HYDROMORPHONE HYDROCHLORIDE 1 MG/ML
0.2 INJECTION, SOLUTION INTRAMUSCULAR; INTRAVENOUS; SUBCUTANEOUS EVERY 6 HOURS PRN
Status: DISCONTINUED | OUTPATIENT
Start: 2025-02-28 | End: 2025-02-28

## 2025-02-28 RX ADMIN — PROCHLORPERAZINE EDISYLATE 2.5 MG: 5 INJECTION INTRAMUSCULAR; INTRAVENOUS at 10:02

## 2025-02-28 RX ADMIN — HYDROMORPHONE HYDROCHLORIDE 0.5 MG: 1 INJECTION, SOLUTION INTRAMUSCULAR; INTRAVENOUS; SUBCUTANEOUS at 08:02

## 2025-02-28 RX ADMIN — HYDROMORPHONE HYDROCHLORIDE 0.5 MG: 1 INJECTION, SOLUTION INTRAMUSCULAR; INTRAVENOUS; SUBCUTANEOUS at 03:02

## 2025-02-28 RX ADMIN — GABAPENTIN 300 MG: 300 CAPSULE ORAL at 09:02

## 2025-02-28 RX ADMIN — HEPARIN SODIUM 5000 UNITS: 5000 INJECTION INTRAVENOUS; SUBCUTANEOUS at 03:02

## 2025-02-28 RX ADMIN — HYDROMORPHONE HYDROCHLORIDE 0.2 MG: 1 INJECTION, SOLUTION INTRAMUSCULAR; INTRAVENOUS; SUBCUTANEOUS at 12:02

## 2025-02-28 RX ADMIN — PROCHLORPERAZINE EDISYLATE 2.5 MG: 5 INJECTION INTRAMUSCULAR; INTRAVENOUS at 08:02

## 2025-02-28 RX ADMIN — GABAPENTIN 300 MG: 300 CAPSULE ORAL at 08:02

## 2025-02-28 RX ADMIN — HEPARIN SODIUM 5000 UNITS: 5000 INJECTION INTRAVENOUS; SUBCUTANEOUS at 05:02

## 2025-02-28 RX ADMIN — HEPARIN SODIUM 5000 UNITS: 5000 INJECTION INTRAVENOUS; SUBCUTANEOUS at 09:02

## 2025-02-28 RX ADMIN — ACETAMINOPHEN 1000 MG: 500 TABLET ORAL at 09:02

## 2025-02-28 RX ADMIN — HYDROMORPHONE HYDROCHLORIDE 0.5 MG: 1 INJECTION, SOLUTION INTRAMUSCULAR; INTRAVENOUS; SUBCUTANEOUS at 09:02

## 2025-02-28 RX ADMIN — OXYCODONE HYDROCHLORIDE 10 MG: 10 TABLET ORAL at 05:02

## 2025-02-28 RX ADMIN — MAGNESIUM SULFATE HEPTAHYDRATE: 500 INJECTION, SOLUTION INTRAMUSCULAR; INTRAVENOUS at 09:02

## 2025-02-28 RX ADMIN — ACETAMINOPHEN 1000 MG: 500 TABLET ORAL at 03:02

## 2025-02-28 RX ADMIN — PANTOPRAZOLE SODIUM 40 MG: 40 INJECTION, POWDER, FOR SOLUTION INTRAVENOUS at 08:02

## 2025-02-28 RX ADMIN — ACETAMINOPHEN 1000 MG: 500 TABLET ORAL at 05:02

## 2025-02-28 RX ADMIN — POTASSIUM CHLORIDE 40 MEQ: 1500 TABLET, EXTENDED RELEASE ORAL at 10:02

## 2025-02-28 RX ADMIN — GABAPENTIN 300 MG: 300 CAPSULE ORAL at 03:02

## 2025-02-28 NOTE — ASSESSMENT & PLAN NOTE
Candida Cardona is a 44 y.o. Now s/p completion proctectomy and excision of fistula tract on 2/22/25.       - low res diet  - Multi-modal pain control;  - PRN nausea meds  - TPN  - Replace electrolytes PRN  - PPI  - DVT prophylaxis   - NO limotil   - OOBTC/Ambulate  - IS  - PT/OT    [facility]

## 2025-02-28 NOTE — NURSING
"[facility] Plan of Care Note    Dx:   Crohn's disease [K50.90]    Shift Events:  no acute events during shift, pain controlled with PRN pain medication, ileostomy and THAI drain maintained, ambulating in room, no concerns at this time     Goals of Care: decrease ostomy output    Neuro: AAOx4     Vital Signs: BP (!) 95/50   Pulse 84   Temp 98.3 °F (36.8 °C)   Resp 20   Ht 5' 7" (1.702 m)   Wt 64.5 kg (142 lb 3.2 oz)   LMP  (LMP Unknown) Comment: complete hysterectomy  SpO2 99%   Breastfeeding No   BMI 22.27 kg/m²     Respiratory:  room air     Diet: Diet Low Fiber/Residue  Dietary nutrition supplements Boost Plus Nutritional Drink - Very Vanilla, Boost Plus Nutritional Drink - Rich Chocolate    Is patient tolerating current diet?  yes     GTTS: TPN, Lipids     Urine Output/Bowel Movement:   I/O this shift:  In: -   Out: 2250 [Urine:300; Drains:100; Stool:1850]  Last Bowel Movement: 02/27/25      Drains/Tubes/Tube Feeds (include total output/shift):   I/O this shift:  In: -   Out: 2250 [Urine:300; Drains:100; Stool:1850]      Lines:  right femoral PICC, left upper arm PIV       Accuchecks:n/a     Skin: abdominal ileostomy and THAI drain       Fall Risk Score: 8    Activity level? independent    Any scheduled procedures? N/a    Any safety concerns? N/a    Other: n/a     "

## 2025-02-28 NOTE — SUBJECTIVE & OBJECTIVE
Subjective:     Interval History: NAEON. In more pain this morning now without the PCA pump. Reporting she has muscle aches, subjective fever and chills. Her pork chops yesterday caused her to have abdominal pain. She has not vomited but is nauseous. K 3.4, mag 1.6, Ca 8.    Post-Op Info:  Procedure(s) (LRB):  PROCTECTOMY, ABDOMINOPERINEAL (N/A)  CYSTOSCOPY, WITH RETROGRADE PYELOGRAM AND URETERAL STENT INSERTION (Bilateral)  CYSTOGRAM  EXCISION, SMALL INTESTINE  CREATION, ILEOSTOMY   6 Days Post-Op      Medications:  Continuous Infusions:   TPN ADULT CENTRAL LINE CUSTOM   Intravenous Continuous 42 mL/hr at 02/27/25 2127 New Bag at 02/27/25 2127     Scheduled Meds:   acetaminophen  1,000 mg Oral Q8H    gabapentin  300 mg Oral TID    heparin (porcine)  5,000 Units Subcutaneous Q8H    pantoprazole  40 mg Intravenous Daily     PRN Meds:   acetaminophen tablet 1,000 mg    gabapentin capsule 300 mg    heparin (porcine) injection 5,000 Units    pantoprazole injection 40 mg        Objective:     Vital Signs (Most Recent):  Temp: 98.5 °F (36.9 °C) (02/28/25 0817)  Pulse: 86 (02/28/25 0817)  Resp: 19 (02/28/25 0820)  BP: 107/66 (02/28/25 0817)  SpO2: 97 % (02/28/25 0817) Vital Signs (24h Range):  Temp:  [98.1 °F (36.7 °C)-98.8 °F (37.1 °C)] 98.5 °F (36.9 °C)  Pulse:  [62-88] 86  Resp:  [18-20] 19  SpO2:  [97 %-99 %] 97 %  BP: ()/(50-66) 107/66     Intake/Output - Last 3 Shifts         02/26 0700  02/27 0659 02/27 0700 02/28 0659 02/28 0700  03/01 0659    P.O. 645 320     I.V. (mL/kg)       IV Piggyback       .5      Total Intake(mL/kg) 1092.5 (16.9) 320 (5)     Urine (mL/kg/hr) 850 (0.5) 450 (0.3)     Drains 230 150     Stool 3650 2900     Total Output 4730 3500     Net -3637.5 -3180            Urine Occurrence  1 x     Stool Occurrence  1 x              Physical Exam  Vitals and nursing note reviewed.   Constitutional:       General: She is not in acute distress.     Appearance: She is not ill-appearing or  diaphoretic.   HENT:      Head: Normocephalic and atraumatic.      Nose: Nose normal.      Mouth/Throat:      Comments: Missing teeth, poor oral hygiene   Eyes:      Extraocular Movements: Extraocular movements intact.      Pupils: Pupils are equal, round, and reactive to light.   Cardiovascular:      Rate and Rhythm: Normal rate and regular rhythm.   Pulmonary:      Effort: Pulmonary effort is normal.   Abdominal:      General: There is no distension.      Palpations: Abdomen is soft.      Tenderness: There is no abdominal tenderness.      Comments: Belly remains soft. Incision site c/d/I. Drain with dressing in place with serosang output.    Genitourinary:     Comments: Perineal incision c/d/i  Musculoskeletal:         General: Normal range of motion.      Cervical back: Normal range of motion.      Comments: Right fem tunneled line  Bilateral upper extremity edema   Skin:     General: Skin is warm and dry.      Capillary Refill: Capillary refill takes less than 2 seconds.   Neurological:      General: No focal deficit present.      Mental Status: She is alert.   Psychiatric:         Thought Content: Thought content normal.      Comments: High fluctuation in moods          Significant Labs:  All pertinent lab results within the last 24 hours have been reviewed.     Significant Diagnostics:  I have reviewed all pertinent imaging results/findings within the past 24 hours.    KUB 02/28/25: Relatively normal, no gastric bubble. No concern for obstruction.

## 2025-02-28 NOTE — PLAN OF CARE
"Hussain sent referral to [facility] for home TPN and also [facility] for HH needs at [place], will follow. [facility] denied Pt "due to not being able to service the area," referral given to [facility]. Pt accepted by [facility], will follow when Pt is stable.   "

## 2025-02-28 NOTE — PLAN OF CARE
Problem: Adult Inpatient Plan of Care  Goal: Absence of Hospital-Acquired Illness or Injury  Outcome: Progressing  Goal: Optimal Comfort and Wellbeing  Outcome: Progressing  Goal: Readiness for Transition of Care  Outcome: Progressing     Problem: Pain Acute  Goal: Optimal Pain Control and Function  Outcome: Progressing     Problem: Fall Injury Risk  Goal: Absence of Fall and Fall-Related Injury  Outcome: Progressing

## 2025-02-28 NOTE — PROGRESS NOTES
Pt seen for ostomy lesson, supplies given to pt for twice weekly bag changes twice a week.   Yesterday pt agreed to receive samples, info send to [facility], [facility] and [facility].  Pt reports she will return back to [place] and have follow- ups.   Pt is unsure of d/c date / time.   All questions answered.

## 2025-02-28 NOTE — PROGRESS NOTES
[facility]  Colorectal Surgery  Progress Note    Patient Name: Candida Cardona  MRN: [id number]  Admission Date: 2/21/2025  Hospital Length of Stay: 7 days  Attending Physician: Robin Atkins MD    Subjective:     Interval History: NAEON. In more pain this morning now without the PCA pump. Reporting she has muscle aches, subjective fever and chills. Her pork chops yesterday caused her to have abdominal pain. She has not vomited but is nauseous. K 3.4, mag 1.6, Ca 8.    Post-Op Info:  Procedure(s) (LRB):  PROCTECTOMY, ABDOMINOPERINEAL (N/A)  CYSTOSCOPY, WITH RETROGRADE PYELOGRAM AND URETERAL STENT INSERTION (Bilateral)  CYSTOGRAM  EXCISION, SMALL INTESTINE  CREATION, ILEOSTOMY   6 Days Post-Op      Medications:  Continuous Infusions:   TPN ADULT CENTRAL LINE CUSTOM   Intravenous Continuous 42 mL/hr at 02/27/25 2127 New Bag at 02/27/25 2127     Scheduled Meds:   acetaminophen  1,000 mg Oral Q8H    gabapentin  300 mg Oral TID    heparin (porcine)  5,000 Units Subcutaneous Q8H    pantoprazole  40 mg Intravenous Daily     PRN Meds:   acetaminophen tablet 1,000 mg    gabapentin capsule 300 mg    heparin (porcine) injection 5,000 Units    pantoprazole injection 40 mg        Objective:     Vital Signs (Most Recent):  Temp: 98.5 °F (36.9 °C) (02/28/25 0817)  Pulse: 86 (02/28/25 0817)  Resp: 19 (02/28/25 0820)  BP: 107/66 (02/28/25 0817)  SpO2: 97 % (02/28/25 0817) Vital Signs (24h Range):  Temp:  [98.1 °F (36.7 °C)-98.8 °F (37.1 °C)] 98.5 °F (36.9 °C)  Pulse:  [62-88] 86  Resp:  [18-20] 19  SpO2:  [97 %-99 %] 97 %  BP: ()/(50-66) 107/66     Intake/Output - Last 3 Shifts         02/26 0700  02/27 0659 02/27 0700  02/28 0659 02/28 0700  03/01 0659    P.O. 645 320     I.V. (mL/kg)       IV Piggyback       .5      Total Intake(mL/kg) 1092.5 (16.9) 320 (5)     Urine (mL/kg/hr) 850 (0.5) 450 (0.3)     Drains 230 150     Stool 3650 2900     Total Output 4730 3500     Net -3637.5 -3180            Urine  Occurrence  1 x     Stool Occurrence  1 x              Physical Exam  Vitals and nursing note reviewed.   Constitutional:       General: She is not in acute distress.     Appearance: She is not ill-appearing or diaphoretic.   HENT:      Head: Normocephalic and atraumatic.      Nose: Nose normal.      Mouth/Throat:      Comments: Missing teeth, poor oral hygiene   Eyes:      Extraocular Movements: Extraocular movements intact.      Pupils: Pupils are equal, round, and reactive to light.   Cardiovascular:      Rate and Rhythm: Normal rate and regular rhythm.   Pulmonary:      Effort: Pulmonary effort is normal.   Abdominal:      General: There is no distension.      Palpations: Abdomen is soft.      Tenderness: There is no abdominal tenderness.      Comments: Belly remains soft. Incision site c/d/I. Drain with dressing in place with serosang output.    Genitourinary:     Comments: Perineal incision c/d/i  Musculoskeletal:         General: Normal range of motion.      Cervical back: Normal range of motion.      Comments: Right fem tunneled line  Bilateral upper extremity edema   Skin:     General: Skin is warm and dry.      Capillary Refill: Capillary refill takes less than 2 seconds.   Neurological:      General: No focal deficit present.      Mental Status: She is alert.   Psychiatric:         Thought Content: Thought content normal.      Comments: High fluctuation in moods          Significant Labs:  All pertinent lab results within the last 24 hours have been reviewed.     Significant Diagnostics:  I have reviewed all pertinent imaging results/findings within the past 24 hours.    KUB 02/28/25: Relatively normal, no gastric bubble. No concern for obstruction.  Assessment/Plan:     Crohn's disease of both small and large intestine with fistula  Candida E Dulce is a 44 y.o. Now s/p completion proctectomy and excision of fistula tract on 2/22/25.       - low res diet  - Multi-modal pain control;  - PRN nausea  meds  - TPN  - Replace electrolytes PRN  - PPI  - DVT prophylaxis   - NO limotil   - OOBTC/Ambulate  - IS  - PT/OT    Dispo: maintain care on INDIRA Bose MD  Colorectal Surgery  [facility]

## 2025-03-01 LAB
ANION GAP SERPL CALC-SCNC: 13 MMOL/L (ref 8–16)
BASOPHILS # BLD AUTO: 0.02 K/UL (ref 0–0.2)
BASOPHILS NFR BLD: 0.2 % (ref 0–1.9)
BUN SERPL-MCNC: 11 MG/DL (ref 6–20)
CALCIUM SERPL-MCNC: 8.7 MG/DL (ref 8.7–10.5)
CHLORIDE SERPL-SCNC: 95 MMOL/L (ref 95–110)
CO2 SERPL-SCNC: 28 MMOL/L (ref 23–29)
CREAT SERPL-MCNC: 0.7 MG/DL (ref 0.5–1.4)
CRP SERPL-MCNC: 66.8 MG/L (ref 0–8.2)
DIFFERENTIAL METHOD BLD: ABNORMAL
EOSINOPHIL # BLD AUTO: 0.1 K/UL (ref 0–0.5)
EOSINOPHIL NFR BLD: 1.3 % (ref 0–8)
ERYTHROCYTE [DISTWIDTH] IN BLOOD BY AUTOMATED COUNT: 24.5 % (ref 11.5–14.5)
EST. GFR  (NO RACE VARIABLE): >60 ML/MIN/1.73 M^2
GLUCOSE SERPL-MCNC: 122 MG/DL (ref 70–110)
HCT VFR BLD AUTO: 27.3 % (ref 37–48.5)
HGB BLD-MCNC: 8.3 G/DL (ref 12–16)
IMM GRANULOCYTES # BLD AUTO: 0.05 K/UL (ref 0–0.04)
IMM GRANULOCYTES NFR BLD AUTO: 0.5 % (ref 0–0.5)
LYMPHOCYTES # BLD AUTO: 0.6 K/UL (ref 1–4.8)
LYMPHOCYTES NFR BLD: 6.3 % (ref 18–48)
MAGNESIUM SERPL-MCNC: 1.8 MG/DL (ref 1.6–2.6)
MCH RBC QN AUTO: 23.6 PG (ref 27–31)
MCHC RBC AUTO-ENTMCNC: 30.4 G/DL (ref 32–36)
MCV RBC AUTO: 78 FL (ref 82–98)
MONOCYTES # BLD AUTO: 0.5 K/UL (ref 0.3–1)
MONOCYTES NFR BLD: 5.2 % (ref 4–15)
NEUTROPHILS # BLD AUTO: 8.4 K/UL (ref 1.8–7.7)
NEUTROPHILS NFR BLD: 86.5 % (ref 38–73)
NRBC BLD-RTO: 0 /100 WBC
PHOSPHATE SERPL-MCNC: 3.8 MG/DL (ref 2.7–4.5)
PLATELET # BLD AUTO: 299 K/UL (ref 150–450)
PMV BLD AUTO: 9.9 FL (ref 9.2–12.9)
POTASSIUM SERPL-SCNC: 3.1 MMOL/L (ref 3.5–5.1)
RBC # BLD AUTO: 3.52 M/UL (ref 4–5.4)
SODIUM SERPL-SCNC: 136 MMOL/L (ref 136–145)
WBC # BLD AUTO: 9.74 K/UL (ref 3.9–12.7)

## 2025-03-01 PROCEDURE — 63600175 PHARM REV CODE 636 W HCPCS

## 2025-03-01 PROCEDURE — 25000003 PHARM REV CODE 250

## 2025-03-01 PROCEDURE — 25000003 PHARM REV CODE 250: Performed by: COLON & RECTAL SURGERY

## 2025-03-01 PROCEDURE — 36415 COLL VENOUS BLD VENIPUNCTURE: CPT

## 2025-03-01 PROCEDURE — 84100 ASSAY OF PHOSPHORUS: CPT

## 2025-03-01 PROCEDURE — A4217 STERILE WATER/SALINE, 500 ML: HCPCS

## 2025-03-01 PROCEDURE — 20600001 HC STEP DOWN PRIVATE ROOM

## 2025-03-01 PROCEDURE — 83735 ASSAY OF MAGNESIUM: CPT

## 2025-03-01 PROCEDURE — 25000003 PHARM REV CODE 250: Performed by: STUDENT IN AN ORGANIZED HEALTH CARE EDUCATION/TRAINING PROGRAM

## 2025-03-01 PROCEDURE — 80048 BASIC METABOLIC PNL TOTAL CA: CPT

## 2025-03-01 PROCEDURE — 63600175 PHARM REV CODE 636 W HCPCS: Mod: JZ,TB | Performed by: STUDENT IN AN ORGANIZED HEALTH CARE EDUCATION/TRAINING PROGRAM

## 2025-03-01 PROCEDURE — 86140 C-REACTIVE PROTEIN: CPT | Performed by: STUDENT IN AN ORGANIZED HEALTH CARE EDUCATION/TRAINING PROGRAM

## 2025-03-01 PROCEDURE — 85025 COMPLETE CBC W/AUTO DIFF WBC: CPT

## 2025-03-01 RX ORDER — MICONAZOLE NITRATE 2 G/100G
POWDER TOPICAL 2 TIMES DAILY
Status: DISCONTINUED | OUTPATIENT
Start: 2025-03-01 | End: 2025-03-10 | Stop reason: HOSPADM

## 2025-03-01 RX ORDER — POTASSIUM CHLORIDE 20 MEQ/1
40 TABLET, EXTENDED RELEASE ORAL ONCE
Status: COMPLETED | OUTPATIENT
Start: 2025-03-01 | End: 2025-03-01

## 2025-03-01 RX ADMIN — ACETAMINOPHEN 1000 MG: 500 TABLET ORAL at 05:03

## 2025-03-01 RX ADMIN — GABAPENTIN 300 MG: 300 CAPSULE ORAL at 03:03

## 2025-03-01 RX ADMIN — HEPARIN SODIUM 5000 UNITS: 5000 INJECTION INTRAVENOUS; SUBCUTANEOUS at 05:03

## 2025-03-01 RX ADMIN — POTASSIUM CHLORIDE 40 MEQ: 1500 TABLET, EXTENDED RELEASE ORAL at 09:03

## 2025-03-01 RX ADMIN — MAGNESIUM SULFATE HEPTAHYDRATE: 500 INJECTION, SOLUTION INTRAMUSCULAR; INTRAVENOUS at 09:03

## 2025-03-01 RX ADMIN — MICONAZOLE NITRATE 2 % TOPICAL POWDER: at 09:03

## 2025-03-01 RX ADMIN — Medication 6 MG: at 09:03

## 2025-03-01 RX ADMIN — HYDROMORPHONE HYDROCHLORIDE 0.5 MG: 1 INJECTION, SOLUTION INTRAMUSCULAR; INTRAVENOUS; SUBCUTANEOUS at 10:03

## 2025-03-01 RX ADMIN — GABAPENTIN 300 MG: 300 CAPSULE ORAL at 09:03

## 2025-03-01 RX ADMIN — HEPARIN SODIUM 5000 UNITS: 5000 INJECTION INTRAVENOUS; SUBCUTANEOUS at 09:03

## 2025-03-01 RX ADMIN — HYDROMORPHONE HYDROCHLORIDE 0.5 MG: 1 INJECTION, SOLUTION INTRAMUSCULAR; INTRAVENOUS; SUBCUTANEOUS at 06:03

## 2025-03-01 RX ADMIN — HYDROMORPHONE HYDROCHLORIDE 0.5 MG: 1 INJECTION, SOLUTION INTRAMUSCULAR; INTRAVENOUS; SUBCUTANEOUS at 03:03

## 2025-03-01 RX ADMIN — OXYCODONE HYDROCHLORIDE 10 MG: 10 TABLET ORAL at 11:03

## 2025-03-01 RX ADMIN — HEPARIN SODIUM 5000 UNITS: 5000 INJECTION INTRAVENOUS; SUBCUTANEOUS at 02:03

## 2025-03-01 RX ADMIN — PROCHLORPERAZINE EDISYLATE 2.5 MG: 5 INJECTION INTRAMUSCULAR; INTRAVENOUS at 05:03

## 2025-03-01 RX ADMIN — ACETAMINOPHEN 1000 MG: 500 TABLET ORAL at 02:03

## 2025-03-01 RX ADMIN — PANTOPRAZOLE SODIUM 40 MG: 40 INJECTION, POWDER, FOR SOLUTION INTRAVENOUS at 10:03

## 2025-03-01 RX ADMIN — OXYCODONE HYDROCHLORIDE 10 MG: 10 TABLET ORAL at 09:03

## 2025-03-01 RX ADMIN — ONDANSETRON 8 MG: 8 TABLET, ORALLY DISINTEGRATING ORAL at 09:03

## 2025-03-01 RX ADMIN — ACETAMINOPHEN 1000 MG: 500 TABLET ORAL at 09:03

## 2025-03-01 RX ADMIN — ONDANSETRON 8 MG: 8 TABLET, ORALLY DISINTEGRATING ORAL at 01:03

## 2025-03-01 RX ADMIN — OXYCODONE HYDROCHLORIDE 10 MG: 10 TABLET ORAL at 04:03

## 2025-03-01 NOTE — CARE UPDATE
Paged earlier this evening for pt with one episode of emesis. Per pt and RN, she had tried to swallow a pill while lying back in bed and vomited it up. Denies preceding nausea or any currently; no other episodes of emesis tonight. Abd soft, nondistended. Feeling much better now, sitting up in bed using iPad when I saw her. Low suspicion for obstruction, ileus, or other pathology; KUB deferred at this time.      Raul Gloria MD  General Surgery PGY-1

## 2025-03-01 NOTE — PROGRESS NOTES
[facility]  General Surgery  Progress Note    Subjective:     History of Present Illness:   Ms. Cardona is a 44 year old woman with a history of Crohn's disease, GERD, anxiety, CAD, Bipolar disorder, and pAF and the surgeries listed below presenting [place] [facility] [place] direct admit for colovesiculofistula. Pt follows with Dr. Atkins and was last seen about 2 months ago. At that time they discussed a completion proctectomy with a permanent end ileostomy but this was deferred in the hopes of not having to create a stoma. Her crohn's is currently managed with Skyrizi. Pt reports pneumaturia for about 1 wk, brown discharge and pain with urination between 5-6 days. States the pain is lower abdominal as well as in her groin. She presented to an outside facility thinking it would be a UTI and ultimately spent about 5 days in the ED (since this past Monday). 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 There her labs were essentially normal aside from some mild anemia and UA results consistent with colovesciular fistula. On arrival she is hemodynamically normal and we will repeat a set of labs. She reports a fever about 5 days ago which she treated at home with tylenol. No fevers since starting zosyn at the outside facility. She reports that her next dose of Skyrizi is due about March 1. Overall she is doing well but is worried about needing an ostomy.    Past surgeries:  5/25/2020 - Laparoscopic, converted to open cholecystectomy  1/15/2019 - Ileostomy closure and ileorectal anastomosis, extensive lysis of adhesions, ureterolysis  12/22/2016 - Exploratory laparotomy with lysis of adhesions, 1 hr - ERIK, BSO at that time  3/17/2014 - TAC with EI  Prior ileocolic resections ~2007, 2010     Flexible sigmoidoscopy - 8/2022 - proctitis, stricture appreciated - not dilated    Post-Op Info:  Procedure(s)  (LRB):  PROCTECTOMY, ABDOMINOPERINEAL (N/A)  CYSTOSCOPY, WITH RETROGRADE PYELOGRAM AND URETERAL STENT INSERTION (Bilateral)  CYSTOGRAM  EXCISION, SMALL INTESTINE  CREATION, ILEOSTOMY   7 Days Post-Op     Interval History: Episode of emesis overnight. Continues to have issue with pain. Patient has rash at inframammilary fold of left breast. Continue TPN    Medications:  Continuous Infusions:   TPN ADULT CENTRAL LINE CUSTOM   Intravenous Continuous 42 mL/hr at 02/28/25 2124 New Bag at 02/28/25 2124    TPN ADULT CENTRAL LINE CUSTOM   Intravenous Continuous         Scheduled Meds:   acetaminophen  1,000 mg Oral Q8H    gabapentin  300 mg Oral TID    heparin (porcine)  5,000 Units Subcutaneous Q8H    miconazole NITRATE 2 %   Topical (Top) BID    pantoprazole  40 mg Intravenous Daily    potassium chloride  40 mEq Oral Once     PRN Meds:  Current Facility-Administered Medications:     0.9%  NaCl infusion (for blood administration), , Intravenous, Q24H PRN    acetaminophen, 650 mg, Oral, Q8H PRN    HYDROmorphone, 0.5 mg, Intravenous, Q6H PRN    LIDOcaine (PF) 10 mg/ml (1%), 1 mL, Intradermal, Once PRN    melatonin, 6 mg, Oral, Nightly PRN    ondansetron, 8 mg, Oral, Q8H PRN    oxyCODONE, 5 mg, Oral, Q6H PRN    oxyCODONE, 10 mg, Oral, Q6H PRN    prochlorperazine, 2.5 mg, Intravenous, Q4H PRN    sodium chloride 0.9%, 10 mL, Intravenous, PRN    sodium chloride 0.9%, 10 mL, Intra-Catheter, PRN     Review of patient's allergies indicates:   Allergen Reactions    Ciprofloxacin Shortness Of Breath and Itching    Keppra [levetiracetam] Other (See Comments)     Increased depression    Mercaptopurine analogues (thiopurines) Hives, Diarrhea and Nausea And Vomiting    Imuran [azathioprine sodium] Diarrhea and Nausea And Vomiting     Pt states that she becomes hot sweaty and passes out also. She states that she has diarrhea and nausea and vomiting     [place]     Imuran [azathioprine] Nausea And Vomiting    Ketorolac Itching and Hives     Adhesive Itching    Iodinated contrast media Hives     Objective:     Vital Signs (Most Recent):  Temp: 97.9 °F (36.6 °C) (03/01/25 0757)  Pulse: 88 (03/01/25 0757)  Resp: 18 (03/01/25 0757)  BP: (!) 110/59 (03/01/25 0757)  SpO2: 96 % (03/01/25 0757) Vital Signs (24h Range):  Temp:  [97.9 °F (36.6 °C)-99 °F (37.2 °C)] 97.9 °F (36.6 °C)  Pulse:  [80-89] 88  Resp:  [18-20] 18  SpO2:  [96 %-97 %] 96 %  BP: (100-117)/(57-66) 110/59     Weight: 64.5 kg (142 lb 3.2 oz)  Body mass index is 22.27 kg/m².    Intake/Output - Last 3 Shifts         02/27 0700  02/28 0659 02/28 0700  03/01 0659 03/01 0700  03/02 0659    P.O. 320      TPN       Total Intake(mL/kg) 320 (5)      Urine (mL/kg/hr) 450 (0.3) 400 (0.3)     Emesis/NG output  0     Drains 150 100     Stool 2900 1325     Total Output 3500 1825     Net -3180 -1825            Urine Occurrence 1 x 3 x     Stool Occurrence 1 x      Emesis Occurrence  1 x              Physical Exam  Vitals and nursing note reviewed.   Constitutional:       General: She is not in acute distress.     Appearance: She is not ill-appearing or diaphoretic.   HENT:      Head: Normocephalic and atraumatic.      Nose: Nose normal.      Mouth/Throat:      Comments: Missing teeth, poor oral hygiene   Eyes:      Extraocular Movements: Extraocular movements intact.      Pupils: Pupils are equal, round, and reactive to light.   Cardiovascular:      Rate and Rhythm: Normal rate and regular rhythm.   Pulmonary:      Effort: Pulmonary effort is normal.   Chest:      Comments: Erythema of left IMF  Abdominal:      General: There is no distension.      Palpations: Abdomen is soft.      Tenderness: There is no abdominal tenderness.      Comments: Belly remains soft. Incision site c/d/I. Drain with dressing in place with serosang output.    Genitourinary:     Comments: Perineal incision c/d/i  Musculoskeletal:         General: Normal range of motion.      Cervical back: Normal range of motion.      Comments: Right  fem tunneled line  Bilateral upper extremity edema   Skin:     General: Skin is warm and dry.      Capillary Refill: Capillary refill takes less than 2 seconds.   Neurological:      General: No focal deficit present.      Mental Status: She is alert.   Psychiatric:         Thought Content: Thought content normal.      Comments: High fluctuation in moods          Significant Labs:  I have reviewed all pertinent lab results within the past 24 hours.    Significant Diagnostics:  I have reviewed all pertinent imaging results/findings within the past 24 hours.  Assessment/Plan:     Crohn's disease of both small and large intestine with fistula  Candida Cardona is a 44 y.o. Now s/p completion proctectomy and excision of fistula tract on 2/22/25.        - low res diet  - Multi-modal pain control; consult pain services for ongoing issue with pain and increasing need for opioids  - Miconazole cream for breast rash  - PRN nausea meds  - TPN  - Replace electrolytes PRN  - PPI  - DVT prophylaxis   - NO limotil   - OOBTC/Ambulate  - IS  - PT/OT     Dispo: maintain care on INDIRA Stanley MD  General Surgery  [facility]

## 2025-03-01 NOTE — SUBJECTIVE & OBJECTIVE
Interval History: Episode of emesis overnight. Continues to have issue with pain. Patient has rash at inframammilary fold of left breast. Continue TPN    Medications:  Continuous Infusions:   TPN ADULT CENTRAL LINE CUSTOM   Intravenous Continuous 42 mL/hr at 02/28/25 2124 New Bag at 02/28/25 2124    TPN ADULT CENTRAL LINE CUSTOM   Intravenous Continuous         Scheduled Meds:   acetaminophen  1,000 mg Oral Q8H    gabapentin  300 mg Oral TID    heparin (porcine)  5,000 Units Subcutaneous Q8H    miconazole NITRATE 2 %   Topical (Top) BID    pantoprazole  40 mg Intravenous Daily    potassium chloride  40 mEq Oral Once     PRN Meds:  Current Facility-Administered Medications:     0.9%  NaCl infusion (for blood administration), , Intravenous, Q24H PRN    acetaminophen, 650 mg, Oral, Q8H PRN    HYDROmorphone, 0.5 mg, Intravenous, Q6H PRN    LIDOcaine (PF) 10 mg/ml (1%), 1 mL, Intradermal, Once PRN    melatonin, 6 mg, Oral, Nightly PRN    ondansetron, 8 mg, Oral, Q8H PRN    oxyCODONE, 5 mg, Oral, Q6H PRN    oxyCODONE, 10 mg, Oral, Q6H PRN    prochlorperazine, 2.5 mg, Intravenous, Q4H PRN    sodium chloride 0.9%, 10 mL, Intravenous, PRN    sodium chloride 0.9%, 10 mL, Intra-Catheter, PRN     Review of patient's allergies indicates:   Allergen Reactions    Ciprofloxacin Shortness Of Breath and Itching    Keppra [levetiracetam] Other (See Comments)     Increased depression    Mercaptopurine analogues (thiopurines) Hives, Diarrhea and Nausea And Vomiting    Imuran [azathioprine sodium] Diarrhea and Nausea And Vomiting     Pt states that she becomes hot sweaty and passes out also. She states that she has diarrhea and nausea and vomiting     [place]     Imuran [azathioprine] Nausea And Vomiting    Ketorolac Itching and Hives    Adhesive Itching    Iodinated contrast media Hives     Objective:     Vital Signs (Most Recent):  Temp: 97.9 °F (36.6 °C) (03/01/25 0757)  Pulse: 88 (03/01/25 0757)  Resp: 18 (03/01/25 0757)  BP: (!)  110/59 (03/01/25 0757)  SpO2: 96 % (03/01/25 0757) Vital Signs (24h Range):  Temp:  [97.9 °F (36.6 °C)-99 °F (37.2 °C)] 97.9 °F (36.6 °C)  Pulse:  [80-89] 88  Resp:  [18-20] 18  SpO2:  [96 %-97 %] 96 %  BP: (100-117)/(57-66) 110/59     Weight: 64.5 kg (142 lb 3.2 oz)  Body mass index is 22.27 kg/m².    Intake/Output - Last 3 Shifts         02/27 0700 02/28 0659 02/28 0700 03/01 0659 03/01 0700 03/02 0659    P.O. 320      TPN       Total Intake(mL/kg) 320 (5)      Urine (mL/kg/hr) 450 (0.3) 400 (0.3)     Emesis/NG output  0     Drains 150 100     Stool 2900 1325     Total Output 3500 1825     Net -3180 -1825            Urine Occurrence 1 x 3 x     Stool Occurrence 1 x      Emesis Occurrence  1 x              Physical Exam  Vitals and nursing note reviewed.   Constitutional:       General: She is not in acute distress.     Appearance: She is not ill-appearing or diaphoretic.   HENT:      Head: Normocephalic and atraumatic.      Nose: Nose normal.      Mouth/Throat:      Comments: Missing teeth, poor oral hygiene   Eyes:      Extraocular Movements: Extraocular movements intact.      Pupils: Pupils are equal, round, and reactive to light.   Cardiovascular:      Rate and Rhythm: Normal rate and regular rhythm.   Pulmonary:      Effort: Pulmonary effort is normal.   Chest:      Comments: Erythema of left IMF  Abdominal:      General: There is no distension.      Palpations: Abdomen is soft.      Tenderness: There is no abdominal tenderness.      Comments: Belly remains soft. Incision site c/d/I. Drain with dressing in place with serosang output.    Genitourinary:     Comments: Perineal incision c/d/i  Musculoskeletal:         General: Normal range of motion.      Cervical back: Normal range of motion.      Comments: Right fem tunneled line  Bilateral upper extremity edema   Skin:     General: Skin is warm and dry.      Capillary Refill: Capillary refill takes less than 2 seconds.   Neurological:      General: No focal  deficit present.      Mental Status: She is alert.   Psychiatric:         Thought Content: Thought content normal.      Comments: High fluctuation in moods          Significant Labs:  I have reviewed all pertinent lab results within the past 24 hours.    Significant Diagnostics:  I have reviewed all pertinent imaging results/findings within the past 24 hours.

## 2025-03-01 NOTE — PLAN OF CARE
03/01/25 1434   Post-Acute Status   Post-Acute Authorization Home Health   Discharge Delays None known at this time   Discharge Plan   Discharge Plan A Home;Home Health   Discharge Plan B Home     Update on Home Health services:    [facility]- pending     Discharge Plan A and Plan B have been determined by review of patient's clinical status, future medical and therapeutic needs, and coverage/benefits for post-acute care in coordination with multidisciplinary team members.       2:40 PM   SW sent out more home health referrals to the following agencies:    1.[facility]  2.[facility]  3. [facility]    SW will continue to follow.          Tamika Pereyra LMSW  - [facility]

## 2025-03-02 PROCEDURE — 25000003 PHARM REV CODE 250

## 2025-03-02 PROCEDURE — 63600175 PHARM REV CODE 636 W HCPCS

## 2025-03-02 PROCEDURE — 20600001 HC STEP DOWN PRIVATE ROOM

## 2025-03-02 PROCEDURE — 63600175 PHARM REV CODE 636 W HCPCS: Mod: JZ,TB | Performed by: STUDENT IN AN ORGANIZED HEALTH CARE EDUCATION/TRAINING PROGRAM

## 2025-03-02 PROCEDURE — 25000003 PHARM REV CODE 250: Performed by: STUDENT IN AN ORGANIZED HEALTH CARE EDUCATION/TRAINING PROGRAM

## 2025-03-02 PROCEDURE — A4217 STERILE WATER/SALINE, 500 ML: HCPCS | Performed by: STUDENT IN AN ORGANIZED HEALTH CARE EDUCATION/TRAINING PROGRAM

## 2025-03-02 PROCEDURE — 63600175 PHARM REV CODE 636 W HCPCS: Performed by: STUDENT IN AN ORGANIZED HEALTH CARE EDUCATION/TRAINING PROGRAM

## 2025-03-02 RX ORDER — FLUCONAZOLE 150 MG/1
150 TABLET ORAL ONCE
Status: COMPLETED | OUTPATIENT
Start: 2025-03-02 | End: 2025-03-02

## 2025-03-02 RX ORDER — LOPERAMIDE HYDROCHLORIDE 2 MG/1
2 CAPSULE ORAL 2 TIMES DAILY
Status: DISCONTINUED | OUTPATIENT
Start: 2025-03-02 | End: 2025-03-03

## 2025-03-02 RX ADMIN — Medication 6 MG: at 08:03

## 2025-03-02 RX ADMIN — ACETAMINOPHEN 1000 MG: 500 TABLET ORAL at 02:03

## 2025-03-02 RX ADMIN — MAGNESIUM SULFATE HEPTAHYDRATE: 500 INJECTION, SOLUTION INTRAMUSCULAR; INTRAVENOUS at 09:03

## 2025-03-02 RX ADMIN — FLUCONAZOLE 150 MG: 150 TABLET ORAL at 11:03

## 2025-03-02 RX ADMIN — ACETAMINOPHEN 1000 MG: 500 TABLET ORAL at 09:03

## 2025-03-02 RX ADMIN — GABAPENTIN 300 MG: 300 CAPSULE ORAL at 08:03

## 2025-03-02 RX ADMIN — HYDROMORPHONE HYDROCHLORIDE 0.5 MG: 1 INJECTION, SOLUTION INTRAMUSCULAR; INTRAVENOUS; SUBCUTANEOUS at 01:03

## 2025-03-02 RX ADMIN — HYDROMORPHONE HYDROCHLORIDE 0.5 MG: 1 INJECTION, SOLUTION INTRAMUSCULAR; INTRAVENOUS; SUBCUTANEOUS at 08:03

## 2025-03-02 RX ADMIN — OXYCODONE HYDROCHLORIDE 10 MG: 10 TABLET ORAL at 05:03

## 2025-03-02 RX ADMIN — HYDROMORPHONE HYDROCHLORIDE 0.5 MG: 1 INJECTION, SOLUTION INTRAMUSCULAR; INTRAVENOUS; SUBCUTANEOUS at 12:03

## 2025-03-02 RX ADMIN — HYDROMORPHONE HYDROCHLORIDE 0.5 MG: 1 INJECTION, SOLUTION INTRAMUSCULAR; INTRAVENOUS; SUBCUTANEOUS at 07:03

## 2025-03-02 RX ADMIN — OXYCODONE HYDROCHLORIDE 10 MG: 10 TABLET ORAL at 07:03

## 2025-03-02 RX ADMIN — HEPARIN SODIUM 5000 UNITS: 5000 INJECTION INTRAVENOUS; SUBCUTANEOUS at 02:03

## 2025-03-02 RX ADMIN — HEPARIN SODIUM 5000 UNITS: 5000 INJECTION INTRAVENOUS; SUBCUTANEOUS at 09:03

## 2025-03-02 RX ADMIN — LOPERAMIDE HYDROCHLORIDE 2 MG: 2 CAPSULE ORAL at 09:03

## 2025-03-02 RX ADMIN — GABAPENTIN 300 MG: 300 CAPSULE ORAL at 02:03

## 2025-03-02 RX ADMIN — HEPARIN SODIUM 5000 UNITS: 5000 INJECTION INTRAVENOUS; SUBCUTANEOUS at 05:03

## 2025-03-02 RX ADMIN — ACETAMINOPHEN 1000 MG: 500 TABLET ORAL at 05:03

## 2025-03-02 RX ADMIN — MICONAZOLE NITRATE 2 % TOPICAL POWDER: at 08:03

## 2025-03-02 RX ADMIN — GABAPENTIN 300 MG: 300 CAPSULE ORAL at 09:03

## 2025-03-02 RX ADMIN — LOPERAMIDE HYDROCHLORIDE 2 MG: 2 CAPSULE ORAL at 08:03

## 2025-03-02 RX ADMIN — PANTOPRAZOLE SODIUM 40 MG: 40 INJECTION, POWDER, FOR SOLUTION INTRAVENOUS at 09:03

## 2025-03-02 RX ADMIN — OXYCODONE HYDROCHLORIDE 10 MG: 10 TABLET ORAL at 12:03

## 2025-03-02 RX ADMIN — MICONAZOLE NITRATE 2 % TOPICAL POWDER: at 09:03

## 2025-03-02 RX ADMIN — ONDANSETRON 8 MG: 8 TABLET, ORALLY DISINTEGRATING ORAL at 07:03

## 2025-03-02 RX ADMIN — PROCHLORPERAZINE EDISYLATE 2.5 MG: 5 INJECTION INTRAMUSCULAR; INTRAVENOUS at 08:03

## 2025-03-02 RX ADMIN — ONDANSETRON 8 MG: 8 TABLET, ORALLY DISINTEGRATING ORAL at 05:03

## 2025-03-02 NOTE — PLAN OF CARE
Problem: Adult Inpatient Plan of Care  Goal: Absence of Hospital-Acquired Illness or Injury  Outcome: Progressing  Goal: Optimal Comfort and Wellbeing  Outcome: Progressing  Goal: Readiness for Transition of Care  Outcome: Progressing     Problem: Pain Acute  Goal: Optimal Pain Control and Function  Outcome: Progressing     Problem: Fall Injury Risk  Goal: Absence of Fall and Fall-Related Injury  Outcome: Progressing     Problem: Infection  Goal: Absence of Infection Signs and Symptoms  Outcome: Progressing     Problem: Wound  Goal: Optimal Coping  Outcome: Progressing  Goal: Optimal Functional Ability  Outcome: Progressing  Goal: Absence of Infection Signs and Symptoms  Outcome: Progressing  Goal: Improved Oral Intake  Outcome: Progressing  Goal: Optimal Pain Control and Function  Outcome: Progressing  Goal: Skin Health and Integrity  Outcome: Progressing  Goal: Optimal Wound Healing  Outcome: Progressing     Problem: Skin Injury Risk Increased  Goal: Skin Health and Integrity  Outcome: Progressing

## 2025-03-02 NOTE — PLAN OF CARE
03/02/25 1637   Post-Acute Status   Post-Acute Authorization Home Health   Discharge Delays None known at this time   Discharge Plan   Discharge Plan A Home with family;Home Health   Discharge Plan B Home     Update on home health;

## 2025-03-02 NOTE — PROGRESS NOTES
[facility]rei sergio [facility]  Colorectal Surgery  Progress Note    Patient Name: Candida Cardona  MRN: [id number]  Admission Date: 2/21/2025  Hospital Length of Stay: 9 days  Attending Physician: Robin Atkins MD    Subjective:     Interval History: NAEON, AF, HDS.  Appetite has increased and she is able to take in popsicles and fruit drinks.  She has taken the Oxy for pain and she says it has helped decrease the amount of Dilaudid needed.  She describes a vulvovaginal yeast infection.   Femoral line clean.  Waiting on labs.    Post-Op Info:  Procedure(s) (LRB):  PROCTECTOMY, ABDOMINOPERINEAL (N/A)  CYSTOSCOPY, WITH RETROGRADE PYELOGRAM AND URETERAL STENT INSERTION (Bilateral)  CYSTOGRAM  EXCISION, SMALL INTESTINE  CREATION, ILEOSTOMY   8 Days Post-Op      Medications:  Continuous Infusions:   TPN ADULT CENTRAL LINE CUSTOM   Intravenous Continuous 42 mL/hr at 03/01/25 2109 New Bag at 03/01/25 2109    TPN ADULT CENTRAL LINE CUSTOM   Intravenous Continuous 63 mL/hr at 03/02/25 0745 Rate Change at 03/02/25 0745     Scheduled Meds:   acetaminophen  1,000 mg Oral Q8H    gabapentin  300 mg Oral TID    heparin (porcine)  5,000 Units Subcutaneous Q8H    miconazole NITRATE 2 %   Topical (Top) BID    pantoprazole  40 mg Intravenous Daily     PRN Meds:   acetaminophen tablet 1,000 mg    gabapentin capsule 300 mg    heparin (porcine) injection 5,000 Units    miconazole NITRATE 2 % top powder    pantoprazole injection 40 mg        Objective:     Vital Signs (Most Recent):  Temp: 98 °F (36.7 °C) (03/02/25 0710)  Pulse: 88 (03/02/25 0710)  Resp: 16 (03/02/25 0733)  BP: 108/66 (03/02/25 0710)  SpO2: 98 % (03/02/25 0710) Vital Signs (24h Range):  Temp:  [97.3 °F (36.3 °C)-98.6 °F (37 °C)] 98 °F (36.7 °C)  Pulse:  [] 88  Resp:  [9-18] 16  SpO2:  [96 %-99 %] 98 %  BP: (100-111)/(56-69) 108/66     Intake/Output - Last 3 Shifts         02/28 0700  03/01 0659 03/01 0700 03/02 0659 03/02 0700  03/03 0659    P.O.  400     TPN  345      Total Intake(mL/kg)  745 (11.6)     Urine (mL/kg/hr) 400 (0.3) 950 (0.6)     Emesis/NG output 0      Drains 100 110     Stool 1325 750     Total Output 1825 1810     Net -1825 -1065            Urine Occurrence 3 x 1 x     Emesis Occurrence 1 x               Physical Exam  Vitals and nursing note reviewed.   Constitutional:       General: She is not in acute distress.     Appearance: She is not ill-appearing or diaphoretic.   HENT:      Head: Normocephalic and atraumatic.      Nose: Nose normal.      Mouth/Throat:      Comments: Missing teeth, poor oral hygiene   Eyes:      Extraocular Movements: Extraocular movements intact.      Pupils: Pupils are equal, round, and reactive to light.   Cardiovascular:      Rate and Rhythm: Normal rate and regular rhythm.   Pulmonary:      Effort: Pulmonary effort is normal.   Chest:      Comments: Erythema of left IMF  Abdominal:      General: There is no distension.      Palpations: Abdomen is soft.      Tenderness: There is no abdominal tenderness.      Comments: Belly remains soft. Incision site c/d/I. Drain with dressing in place with serosang output.    Genitourinary:     Comments: Perineal incision c/d/i  Musculoskeletal:         General: Normal range of motion.      Cervical back: Normal range of motion.      Comments: Right fem tunneled line  Bilateral upper extremity edema   Skin:     General: Skin is warm and dry.      Capillary Refill: Capillary refill takes less than 2 seconds.   Neurological:      General: No focal deficit present.      Mental Status: She is alert.   Psychiatric:         Thought Content: Thought content normal.      Comments: High fluctuation in moods                Significant Labs:  All pertinent lab results within the last 24 hours have been reviewed.     Significant Diagnostics:  I have reviewed all pertinent imaging results/findings within the past 24 hours.  Assessment/Plan:     Crohn's disease of both small and large intestine with  fistula  Candida Cardona is a 44 y.o. Now s/p completion proctectomy and excision of fistula tract on 2/22/25.       - low res diet, okay for sodas if she is not nauseous  - single dose fluconazole for candida vulvovaginitis with possible 2nd dose 72 hours after  - miconazole cream for breast rash  - Imodium BID  - Multi-modal pain control;  - PRN nausea meds  - TPN; transition to 16 hour   - Replace electrolytes PRN  - PPI  - DVT prophylaxis   - OOBTC/Ambulate  - IS  - PT/OT    Dispo: maintain care on NIDIRA Stanley MD  Colorectal Surgery  Andrei JACOBSON

## 2025-03-02 NOTE — SUBJECTIVE & OBJECTIVE
Subjective:     Interval History: NAEON, AF, HDS.  Appetite has increased and she is able to take in popsicles and fruit drinks.  She has taken the Oxy for pain and she says it has helped decrease the amount of Dilaudid needed.  She describes a vulvovaginal yeast infection.   Femoral line clean.  Waiting on labs.    Post-Op Info:  Procedure(s) (LRB):  PROCTECTOMY, ABDOMINOPERINEAL (N/A)  CYSTOSCOPY, WITH RETROGRADE PYELOGRAM AND URETERAL STENT INSERTION (Bilateral)  CYSTOGRAM  EXCISION, SMALL INTESTINE  CREATION, ILEOSTOMY   8 Days Post-Op      Medications:  Continuous Infusions:   TPN ADULT CENTRAL LINE CUSTOM   Intravenous Continuous 42 mL/hr at 03/01/25 2109 New Bag at 03/01/25 2109    TPN ADULT CENTRAL LINE CUSTOM   Intravenous Continuous 63 mL/hr at 03/02/25 0745 Rate Change at 03/02/25 0745     Scheduled Meds:   acetaminophen  1,000 mg Oral Q8H    gabapentin  300 mg Oral TID    heparin (porcine)  5,000 Units Subcutaneous Q8H    miconazole NITRATE 2 %   Topical (Top) BID    pantoprazole  40 mg Intravenous Daily     PRN Meds:   acetaminophen tablet 1,000 mg    gabapentin capsule 300 mg    heparin (porcine) injection 5,000 Units    miconazole NITRATE 2 % top powder    pantoprazole injection 40 mg        Objective:     Vital Signs (Most Recent):  Temp: 98 °F (36.7 °C) (03/02/25 0710)  Pulse: 88 (03/02/25 0710)  Resp: 16 (03/02/25 0733)  BP: 108/66 (03/02/25 0710)  SpO2: 98 % (03/02/25 0710) Vital Signs (24h Range):  Temp:  [97.3 °F (36.3 °C)-98.6 °F (37 °C)] 98 °F (36.7 °C)  Pulse:  [] 88  Resp:  [9-18] 16  SpO2:  [96 %-99 %] 98 %  BP: (100-111)/(56-69) 108/66     Intake/Output - Last 3 Shifts         02/28 0700  03/01 0659 03/01 0700  03/02 0659 03/02 0700 03/03 0659    P.O.  400     TPN  345     Total Intake(mL/kg)  745 (11.6)     Urine (mL/kg/hr) 400 (0.3) 950 (0.6)     Emesis/NG output 0      Drains 100 110     Stool 1325 750     Total Output 1825 1810     Net -1825 -1065            Urine Occurrence 3  x 1 x     Emesis Occurrence 1 x               Physical Exam  Vitals and nursing note reviewed.   Constitutional:       General: She is not in acute distress.     Appearance: She is not ill-appearing or diaphoretic.   HENT:      Head: Normocephalic and atraumatic.      Nose: Nose normal.      Mouth/Throat:      Comments: Missing teeth, poor oral hygiene   Eyes:      Extraocular Movements: Extraocular movements intact.      Pupils: Pupils are equal, round, and reactive to light.   Cardiovascular:      Rate and Rhythm: Normal rate and regular rhythm.   Pulmonary:      Effort: Pulmonary effort is normal.   Chest:      Comments: Erythema of left IMF  Abdominal:      General: There is no distension.      Palpations: Abdomen is soft.      Tenderness: There is no abdominal tenderness.      Comments: Belly remains soft. Incision site c/d/I. Drain with dressing in place with serosang output.    Genitourinary:     Comments: Perineal incision c/d/i  Musculoskeletal:         General: Normal range of motion.      Cervical back: Normal range of motion.      Comments: Right fem tunneled line  Bilateral upper extremity edema   Skin:     General: Skin is warm and dry.      Capillary Refill: Capillary refill takes less than 2 seconds.   Neurological:      General: No focal deficit present.      Mental Status: She is alert.   Psychiatric:         Thought Content: Thought content normal.      Comments: High fluctuation in moods                Significant Labs:  All pertinent lab results within the last 24 hours have been reviewed.     Significant Diagnostics:  I have reviewed all pertinent imaging results/findings within the past 24 hours.

## 2025-03-02 NOTE — ASSESSMENT & PLAN NOTE
Candida Cardona is a 44 y.o. Now s/p completion proctectomy and excision of fistula tract on 2/22/25.       - low res diet, okay for sodas if she is not nauseous  - single dose fluconazole for candida vulvovaginitis with possible 2nd dose 72 hours after  - miconazole cream for breast rash  - Imodium BID  - Multi-modal pain control;  - PRN nausea meds  - TPN; transition to 16 hour   - Replace electrolytes PRN  - PPI  - DVT prophylaxis   - OOBTC/Ambulate  - IS  - PT/OT    Dispo: maintain care on GISSU

## 2025-03-02 NOTE — PLAN OF CARE
03/02/25 1639   Post-Acute Status   Post-Acute Authorization Home Health   Discharge Delays None known at this time   Discharge Plan   Discharge Plan A Home with family;Home Health   Discharge Plan B Home     Update on home health services;    1.[facility] [place]- considering   2.[facility] pending  3. [facility] - pending    Discharge Plan A and Plan B have been determined by review of patient's clinical status, future medical and therapeutic needs, and coverage/benefits for post-acute care in coordination with multidisciplinary team members.       Tamika Pereyra, SW  - [facility]

## 2025-03-03 LAB
ANION GAP SERPL CALC-SCNC: 14 MMOL/L (ref 8–16)
BASOPHILS # BLD AUTO: 0.03 K/UL (ref 0–0.2)
BASOPHILS NFR BLD: 0.3 % (ref 0–1.9)
BUN SERPL-MCNC: 18 MG/DL (ref 6–20)
CALCIUM SERPL-MCNC: 9.1 MG/DL (ref 8.7–10.5)
CHLORIDE SERPL-SCNC: 86 MMOL/L (ref 95–110)
CO2 SERPL-SCNC: 32 MMOL/L (ref 23–29)
CREAT SERPL-MCNC: 0.8 MG/DL (ref 0.5–1.4)
CRP SERPL-MCNC: 52.1 MG/L (ref 0–8.2)
DIFFERENTIAL METHOD BLD: ABNORMAL
EOSINOPHIL # BLD AUTO: 0.2 K/UL (ref 0–0.5)
EOSINOPHIL NFR BLD: 1.7 % (ref 0–8)
ERYTHROCYTE [DISTWIDTH] IN BLOOD BY AUTOMATED COUNT: 23.5 % (ref 11.5–14.5)
EST. GFR  (NO RACE VARIABLE): >60 ML/MIN/1.73 M^2
GLUCOSE SERPL-MCNC: 114 MG/DL (ref 70–110)
HCT VFR BLD AUTO: 32.3 % (ref 37–48.5)
HGB BLD-MCNC: 9.6 G/DL (ref 12–16)
IMM GRANULOCYTES # BLD AUTO: 0.04 K/UL (ref 0–0.04)
IMM GRANULOCYTES NFR BLD AUTO: 0.4 % (ref 0–0.5)
LYMPHOCYTES # BLD AUTO: 0.8 K/UL (ref 1–4.8)
LYMPHOCYTES NFR BLD: 8.4 % (ref 18–48)
MAGNESIUM SERPL-MCNC: 1.8 MG/DL (ref 1.6–2.6)
MCH RBC QN AUTO: 23.7 PG (ref 27–31)
MCHC RBC AUTO-ENTMCNC: 29.7 G/DL (ref 32–36)
MCV RBC AUTO: 80 FL (ref 82–98)
MONOCYTES # BLD AUTO: 0.6 K/UL (ref 0.3–1)
MONOCYTES NFR BLD: 6.7 % (ref 4–15)
NEUTROPHILS # BLD AUTO: 7.9 K/UL (ref 1.8–7.7)
NEUTROPHILS NFR BLD: 82.5 % (ref 38–73)
NRBC BLD-RTO: 0 /100 WBC
PHOSPHATE SERPL-MCNC: 4.1 MG/DL (ref 2.7–4.5)
PLATELET # BLD AUTO: 386 K/UL (ref 150–450)
PMV BLD AUTO: 9.5 FL (ref 9.2–12.9)
POTASSIUM SERPL-SCNC: 3.2 MMOL/L (ref 3.5–5.1)
RBC # BLD AUTO: 4.05 M/UL (ref 4–5.4)
SODIUM SERPL-SCNC: 132 MMOL/L (ref 136–145)
WBC # BLD AUTO: 9.62 K/UL (ref 3.9–12.7)

## 2025-03-03 PROCEDURE — 63600175 PHARM REV CODE 636 W HCPCS: Performed by: STUDENT IN AN ORGANIZED HEALTH CARE EDUCATION/TRAINING PROGRAM

## 2025-03-03 PROCEDURE — 63600175 PHARM REV CODE 636 W HCPCS: Performed by: NURSE PRACTITIONER

## 2025-03-03 PROCEDURE — 84100 ASSAY OF PHOSPHORUS: CPT

## 2025-03-03 PROCEDURE — 63600175 PHARM REV CODE 636 W HCPCS

## 2025-03-03 PROCEDURE — 25000003 PHARM REV CODE 250

## 2025-03-03 PROCEDURE — 36415 COLL VENOUS BLD VENIPUNCTURE: CPT

## 2025-03-03 PROCEDURE — 20600001 HC STEP DOWN PRIVATE ROOM

## 2025-03-03 PROCEDURE — 80048 BASIC METABOLIC PNL TOTAL CA: CPT

## 2025-03-03 PROCEDURE — 86140 C-REACTIVE PROTEIN: CPT | Performed by: STUDENT IN AN ORGANIZED HEALTH CARE EDUCATION/TRAINING PROGRAM

## 2025-03-03 PROCEDURE — 25000003 PHARM REV CODE 250: Performed by: STUDENT IN AN ORGANIZED HEALTH CARE EDUCATION/TRAINING PROGRAM

## 2025-03-03 PROCEDURE — 85025 COMPLETE CBC W/AUTO DIFF WBC: CPT

## 2025-03-03 PROCEDURE — 25000003 PHARM REV CODE 250: Performed by: NURSE PRACTITIONER

## 2025-03-03 PROCEDURE — 83735 ASSAY OF MAGNESIUM: CPT

## 2025-03-03 PROCEDURE — A4217 STERILE WATER/SALINE, 500 ML: HCPCS | Performed by: STUDENT IN AN ORGANIZED HEALTH CARE EDUCATION/TRAINING PROGRAM

## 2025-03-03 RX ORDER — POTASSIUM CHLORIDE 7.45 MG/ML
10 INJECTION INTRAVENOUS
Status: DISPENSED | OUTPATIENT
Start: 2025-03-03 | End: 2025-03-03

## 2025-03-03 RX ORDER — POTASSIUM CHLORIDE 7.45 MG/ML
10 INJECTION INTRAVENOUS
Status: COMPLETED | OUTPATIENT
Start: 2025-03-03 | End: 2025-03-03

## 2025-03-03 RX ORDER — OXYCODONE HYDROCHLORIDE 10 MG/1
10 TABLET ORAL
Refills: 0 | Status: DISCONTINUED | OUTPATIENT
Start: 2025-03-03 | End: 2025-03-06

## 2025-03-03 RX ORDER — HYDROMORPHONE HYDROCHLORIDE 1 MG/ML
0.5 INJECTION, SOLUTION INTRAMUSCULAR; INTRAVENOUS; SUBCUTANEOUS EVERY 8 HOURS PRN
Status: DISCONTINUED | OUTPATIENT
Start: 2025-03-03 | End: 2025-03-04

## 2025-03-03 RX ORDER — LOPERAMIDE HYDROCHLORIDE 2 MG/1
2 CAPSULE ORAL 4 TIMES DAILY
Status: DISCONTINUED | OUTPATIENT
Start: 2025-03-03 | End: 2025-03-04

## 2025-03-03 RX ADMIN — POTASSIUM CHLORIDE 10 MEQ: 7.46 INJECTION, SOLUTION INTRAVENOUS at 06:03

## 2025-03-03 RX ADMIN — HYDROMORPHONE HYDROCHLORIDE 0.5 MG: 1 INJECTION, SOLUTION INTRAMUSCULAR; INTRAVENOUS; SUBCUTANEOUS at 06:03

## 2025-03-03 RX ADMIN — MAGNESIUM SULFATE HEPTAHYDRATE: 500 INJECTION, SOLUTION INTRAMUSCULAR; INTRAVENOUS at 09:03

## 2025-03-03 RX ADMIN — GABAPENTIN 300 MG: 300 CAPSULE ORAL at 09:03

## 2025-03-03 RX ADMIN — ACETAMINOPHEN 1000 MG: 500 TABLET ORAL at 09:03

## 2025-03-03 RX ADMIN — OXYCODONE HYDROCHLORIDE 10 MG: 10 TABLET ORAL at 03:03

## 2025-03-03 RX ADMIN — MICONAZOLE NITRATE 2 % TOPICAL POWDER: at 09:03

## 2025-03-03 RX ADMIN — ACETAMINOPHEN 1000 MG: 500 TABLET ORAL at 05:03

## 2025-03-03 RX ADMIN — POTASSIUM CHLORIDE 10 MEQ: 7.46 INJECTION, SOLUTION INTRAVENOUS at 09:03

## 2025-03-03 RX ADMIN — HEPARIN SODIUM 5000 UNITS: 5000 INJECTION INTRAVENOUS; SUBCUTANEOUS at 09:03

## 2025-03-03 RX ADMIN — HEPARIN SODIUM 5000 UNITS: 5000 INJECTION INTRAVENOUS; SUBCUTANEOUS at 05:03

## 2025-03-03 RX ADMIN — ACETAMINOPHEN 1000 MG: 500 TABLET ORAL at 01:03

## 2025-03-03 RX ADMIN — Medication 6 MG: at 09:03

## 2025-03-03 RX ADMIN — HEPARIN SODIUM 5000 UNITS: 5000 INJECTION INTRAVENOUS; SUBCUTANEOUS at 01:03

## 2025-03-03 RX ADMIN — LOPERAMIDE HYDROCHLORIDE 2 MG: 2 CAPSULE ORAL at 04:03

## 2025-03-03 RX ADMIN — OXYCODONE HYDROCHLORIDE 10 MG: 10 TABLET ORAL at 09:03

## 2025-03-03 RX ADMIN — GABAPENTIN 300 MG: 300 CAPSULE ORAL at 03:03

## 2025-03-03 RX ADMIN — POTASSIUM CHLORIDE 10 MEQ: 7.46 INJECTION, SOLUTION INTRAVENOUS at 03:03

## 2025-03-03 RX ADMIN — POTASSIUM CHLORIDE 10 MEQ: 7.46 INJECTION, SOLUTION INTRAVENOUS at 12:03

## 2025-03-03 RX ADMIN — LOPERAMIDE HYDROCHLORIDE 2 MG: 2 CAPSULE ORAL at 12:03

## 2025-03-03 RX ADMIN — LOPERAMIDE HYDROCHLORIDE 2 MG: 2 CAPSULE ORAL at 09:03

## 2025-03-03 RX ADMIN — HYDROMORPHONE HYDROCHLORIDE 0.5 MG: 1 INJECTION, SOLUTION INTRAMUSCULAR; INTRAVENOUS; SUBCUTANEOUS at 10:03

## 2025-03-03 RX ADMIN — HYDROMORPHONE HYDROCHLORIDE 0.5 MG: 1 INJECTION, SOLUTION INTRAMUSCULAR; INTRAVENOUS; SUBCUTANEOUS at 04:03

## 2025-03-03 RX ADMIN — OXYCODONE 15 MG: 5 TABLET ORAL at 01:03

## 2025-03-03 RX ADMIN — PANTOPRAZOLE SODIUM 40 MG: 40 INJECTION, POWDER, FOR SOLUTION INTRAVENOUS at 09:03

## 2025-03-03 RX ADMIN — PROCHLORPERAZINE EDISYLATE 2.5 MG: 5 INJECTION INTRAMUSCULAR; INTRAVENOUS at 10:03

## 2025-03-03 RX ADMIN — OXYCODONE 15 MG: 5 TABLET ORAL at 09:03

## 2025-03-03 NOTE — NURSING
"[facility] Plan of Care Note    Dx:   Crohn's disease [K50.90]    Shift Events: N/A    Goals of Care: Progressing towards goals     Neuro: AAOx4     Vital Signs: BP (!) 101/59 (BP Location: Left arm, Patient Position: Lying)   Pulse 83   Temp 98.3 °F (36.8 °C) (Oral)   Resp 16   Ht 5' 7" (1.702 m)   Wt 64.5 kg (142 lb 3.2 oz)   LMP  (LMP Unknown) Comment: complete hysterectomy  SpO2 99%   Breastfeeding No   BMI 22.27 kg/m²     Respiratory: RA    Diet: Diet Low Fiber/Residue  Dietary nutrition supplements Boost Plus Nutritional Drink - Very Vanilla, Boost Plus Nutritional Drink - Rich Chocolate    Is patient tolerating current diet? Yes    GTTS: TPN @ 63mL/hr     Urine Output/Bowel Movement:   I/O this shift:  In: 840 [P.O.:840]  Out: 620 [Drains:20; Stool:600]  Last Bowel Movement: 03/02/25      Drains/Tubes/Tube Feeds (include total output/shift):   I/O this shift:  In: 840 [P.O.:840]  Out: 620 [Drains:20; Stool:600]      Lines: PICC R Groin, PIV x 1      Accuchecks:N/A    Skin: Midline incision, ileostomy, THAI drain     Fall Risk Score: 8    Activity level? Independent     Any scheduled procedures? N/A    Any safety concerns? N/A    Other: N/A    "

## 2025-03-03 NOTE — PROGRESS NOTES
[facility]rei sergio [facility]  Colorectal Surgery  Progress Note    Patient Name: Candida Cardona  MRN: [id number]  Admission Date: 2/21/2025  Hospital Length of Stay: 10 days  Attending Physician: Robin Atkins MD    Subjective:     Interval History: large emesis this am after drinking boost, feels better after emesis, otherwise doing well, ileostomy functional, working on pain control with oral meds    Post-Op Info:  Procedure(s) (LRB):  PROCTECTOMY, ABDOMINOPERINEAL (N/A)  CYSTOSCOPY, WITH RETROGRADE PYELOGRAM AND URETERAL STENT INSERTION (Bilateral)  CYSTOGRAM  EXCISION, SMALL INTESTINE  CREATION, ILEOSTOMY   9 Days Post-Op      Medications:  Continuous Infusions:   TPN ADULT CENTRAL LINE CUSTOM   Intravenous Continuous 63 mL/hr at 03/02/25 2125 New Bag at 03/02/25 2125    TPN ADULT CENTRAL LINE CUSTOM   Intravenous Continuous         Scheduled Meds:   acetaminophen  1,000 mg Oral Q8H    gabapentin  300 mg Oral TID    heparin (porcine)  5,000 Units Subcutaneous Q8H    loperamide  2 mg Oral QID    miconazole NITRATE 2 %   Topical (Top) BID    pantoprazole  40 mg Intravenous Daily    potassium chloride  10 mEq Intravenous Q1H     PRN Meds:   acetaminophen tablet 1,000 mg    gabapentin capsule 300 mg    heparin (porcine) injection 5,000 Units    loperamide capsule 2 mg    miconazole NITRATE 2 % top powder    pantoprazole injection 40 mg    potassium chloride 10 mEq in 100 mL IVPB        Objective:     Vital Signs (Most Recent):  Temp: 98 °F (36.7 °C) (03/03/25 0741)  Pulse: 73 (03/03/25 0741)  Resp: 18 (03/03/25 1008)  BP: 110/66 (03/03/25 0741)  SpO2: (!) 92 % (03/03/25 0741) Vital Signs (24h Range):  Temp:  [97.7 °F (36.5 °C)-98.3 °F (36.8 °C)] 98 °F (36.7 °C)  Pulse:  [71-84] 73  Resp:  [16-18] 18  SpO2:  [92 %-99 %] 92 %  BP: ()/(59-69) 110/66     Intake/Output - Last 3 Shifts         03/01 0700  03/02 0659 03/02 0700 03/03 0659 03/03 0700  03/04 0659    P.O. 400 1040 480          Total Intake(mL/kg) 749  (11.6) 1040 (16.1) 480 (7.4)    Urine (mL/kg/hr) 950 (0.6) 200 (0.1)     Emesis/NG output   0    Drains 110 20     Stool 750 1550 250    Total Output 1810 1770 250    Net -1065 -730 +230           Urine Occurrence 1 x 4 x     Emesis Occurrence   1 x             Physical Exam  Vitals and nursing note reviewed.   Constitutional:       Appearance: She is well-developed.   HENT:      Head: Normocephalic.   Eyes:      Pupils: Pupils are equal, round, and reactive to light.   Cardiovascular:      Rate and Rhythm: Normal rate and regular rhythm.      Heart sounds: Normal heart sounds.   Pulmonary:      Effort: Pulmonary effort is normal. No respiratory distress.      Breath sounds: Normal breath sounds. No wheezing or rales.   Abdominal:      Palpations: Abdomen is soft. There is no mass.      Tenderness: There is no guarding or rebound.      Comments: Abd inc line healing well  THAI serous drainge  Ileosotmuy liquid stool 1550 output   Skin:     General: Skin is warm and dry.   Neurological:      Mental Status: She is alert and oriented to person, place, and time.                Significant Labs:  BMP (Last 3 Results):   Recent Labs   Lab 02/28/25  0517 03/01/25  0434 03/03/25  0435   * 122* 114*    136 132*   K 3.4* 3.1* 3.2*    95 86*   CO2 25 28 32*   BUN 9 11 18   CREATININE 0.7 0.7 0.8   CALCIUM 8.0* 8.7 9.1   MG 1.6 1.8 1.8     CBC (Last 3 Results):   Recent Labs   Lab 02/28/25  0517 03/01/25  0434 03/03/25  0435   WBC 8.11 9.74 9.62   RBC 3.29* 3.52* 4.05   HGB 7.9* 8.3* 9.6*   HCT 25.8* 27.3* 32.3*    299 386   MCV 78* 78* 80*   MCH 24.0* 23.6* 23.7*   MCHC 30.6* 30.4* 29.7*       Significant Diagnostics:  None    Assessment/Plan:     * Malnutrition of moderate degree  Nutrition consulted. Most recent weight and BMI monitored-     Measurements:  Wt Readings from Last 1 Encounters:   02/24/25 64.5 kg (142 lb 3.2 oz)   Body mass index is 22.27 kg/m².    Patient has been screened and assessed  by RD.    Malnutrition Type:  Context:    Level:      Malnutrition Characteristic Summary:  Weight Loss (Malnutrition): greater than 5% in 1 month (7.8% weight loss x 1 month)    Interventions/Recommendations (treatment strategy):  Cycle tpn 16 hours  Plan for home ton         Crohn's disease of both small and large intestine with fistula  Candida Cardona is a 44 y.o. Now s/p completion proctectomy and excision of fistula tract on 2/22/25.       - low res diet, okay for sodas if she is not nauseous  - single dose fluconazole for candida vulvovaginitis with possible 2nd dose 72 hours after  - miconazole cream for breast rash  - Imodium BID  - Multi-modal pain control;  - PRN nausea meds  - TPN; transition to 16 hour   - Replace electrolytes PRN  - PPI  - DVT prophylaxis   - OOBTC/Ambulate  - IS  - PT/OT    Dispo: maintain care on INDIRA Mathis NP  Colorectal Surgery  Andrei Lawrence - INDIRA

## 2025-03-03 NOTE — ASSESSMENT & PLAN NOTE
Nutrition consulted. Most recent weight and BMI monitored-     Measurements:  Wt Readings from Last 1 Encounters:   02/24/25 64.5 kg (142 lb 3.2 oz)   Body mass index is 22.27 kg/m².    Patient has been screened and assessed by RD.    Malnutrition Type:  Context:    Level:      Malnutrition Characteristic Summary:  Weight Loss (Malnutrition): greater than 5% in 1 month (7.8% weight loss x 1 month)    Interventions/Recommendations (treatment strategy):  Cycle tpn 16 hours  Plan for home ton

## 2025-03-03 NOTE — PLAN OF CARE
Pt still requiring pain meds every 6 hrs, BIJAN tomorrow with TPN, will follow with HH provider. [facility] will accept, will follow with dc and TPN teaching when stable.

## 2025-03-03 NOTE — PROGRESS NOTES
Pt laying in bed, bag intact, no leakage, supplies at bedside, pt asked for lubricating deodorant to be given at next visit because it helps her.   Supplies at bedside.

## 2025-03-03 NOTE — SUBJECTIVE & OBJECTIVE
Subjective:     Interval History: large emesis this am after drinking boost, feels better after emesis, otherwise doing well, ileostomy functional, working on pain control with oral meds    Post-Op Info:  Procedure(s) (LRB):  PROCTECTOMY, ABDOMINOPERINEAL (N/A)  CYSTOSCOPY, WITH RETROGRADE PYELOGRAM AND URETERAL STENT INSERTION (Bilateral)  CYSTOGRAM  EXCISION, SMALL INTESTINE  CREATION, ILEOSTOMY   9 Days Post-Op      Medications:  Continuous Infusions:   TPN ADULT CENTRAL LINE CUSTOM   Intravenous Continuous 63 mL/hr at 03/02/25 2125 New Bag at 03/02/25 2125    TPN ADULT CENTRAL LINE CUSTOM   Intravenous Continuous         Scheduled Meds:   acetaminophen  1,000 mg Oral Q8H    gabapentin  300 mg Oral TID    heparin (porcine)  5,000 Units Subcutaneous Q8H    loperamide  2 mg Oral QID    miconazole NITRATE 2 %   Topical (Top) BID    pantoprazole  40 mg Intravenous Daily    potassium chloride  10 mEq Intravenous Q1H     PRN Meds:   acetaminophen tablet 1,000 mg    gabapentin capsule 300 mg    heparin (porcine) injection 5,000 Units    loperamide capsule 2 mg    miconazole NITRATE 2 % top powder    pantoprazole injection 40 mg    potassium chloride 10 mEq in 100 mL IVPB        Objective:     Vital Signs (Most Recent):  Temp: 98 °F (36.7 °C) (03/03/25 0741)  Pulse: 73 (03/03/25 0741)  Resp: 18 (03/03/25 1008)  BP: 110/66 (03/03/25 0741)  SpO2: (!) 92 % (03/03/25 0741) Vital Signs (24h Range):  Temp:  [97.7 °F (36.5 °C)-98.3 °F (36.8 °C)] 98 °F (36.7 °C)  Pulse:  [71-84] 73  Resp:  [16-18] 18  SpO2:  [92 %-99 %] 92 %  BP: ()/(59-69) 110/66     Intake/Output - Last 3 Shifts         03/01 0700  03/02 0659 03/02 0700  03/03 0659 03/03 0700  03/04 0659    P.O. 400 1040 480          Total Intake(mL/kg) 745 (11.6) 1040 (16.1) 480 (7.4)    Urine (mL/kg/hr) 950 (0.6) 200 (0.1)     Emesis/NG output   0    Drains 110 20     Stool 750 1550 250    Total Output 1810 1770 250    Net -1065 -730 +230           Urine  Occurrence 1 x 4 x     Emesis Occurrence   1 x             Physical Exam  Vitals and nursing note reviewed.   Constitutional:       Appearance: She is well-developed.   HENT:      Head: Normocephalic.   Eyes:      Pupils: Pupils are equal, round, and reactive to light.   Cardiovascular:      Rate and Rhythm: Normal rate and regular rhythm.      Heart sounds: Normal heart sounds.   Pulmonary:      Effort: Pulmonary effort is normal. No respiratory distress.      Breath sounds: Normal breath sounds. No wheezing or rales.   Abdominal:      Palpations: Abdomen is soft. There is no mass.      Tenderness: There is no guarding or rebound.      Comments: Abd inc line healing well  TAHI serous drainge  Ileosotmuy liquid stool 1550 output   Skin:     General: Skin is warm and dry.   Neurological:      Mental Status: She is alert and oriented to person, place, and time.                Significant Labs:  BMP (Last 3 Results):   Recent Labs   Lab 02/28/25 0517 03/01/25  0434 03/03/25  0435   * 122* 114*    136 132*   K 3.4* 3.1* 3.2*    95 86*   CO2 25 28 32*   BUN 9 11 18   CREATININE 0.7 0.7 0.8   CALCIUM 8.0* 8.7 9.1   MG 1.6 1.8 1.8     CBC (Last 3 Results):   Recent Labs   Lab 02/28/25 0517 03/01/25  0434 03/03/25  0435   WBC 8.11 9.74 9.62   RBC 3.29* 3.52* 4.05   HGB 7.9* 8.3* 9.6*   HCT 25.8* 27.3* 32.3*    299 386   MCV 78* 78* 80*   MCH 24.0* 23.6* 23.7*   MCHC 30.6* 30.4* 29.7*       Significant Diagnostics:  None

## 2025-03-04 LAB
ALBUMIN SERPL BCP-MCNC: 2.1 G/DL (ref 3.5–5.2)
ALP SERPL-CCNC: 115 U/L (ref 40–150)
ALT SERPL W/O P-5'-P-CCNC: 8 U/L (ref 10–44)
ANION GAP SERPL CALC-SCNC: 15 MMOL/L (ref 8–16)
ANION GAP SERPL CALC-SCNC: 16 MMOL/L (ref 8–16)
AST SERPL-CCNC: 17 U/L (ref 10–40)
BASOPHILS # BLD AUTO: 0.04 K/UL (ref 0–0.2)
BASOPHILS NFR BLD: 0.5 % (ref 0–1.9)
BILIRUB SERPL-MCNC: 0.4 MG/DL (ref 0.1–1)
BUN SERPL-MCNC: 21 MG/DL (ref 6–20)
BUN SERPL-MCNC: 22 MG/DL (ref 6–20)
CALCIUM SERPL-MCNC: 8.7 MG/DL (ref 8.7–10.5)
CALCIUM SERPL-MCNC: 8.9 MG/DL (ref 8.7–10.5)
CHLORIDE SERPL-SCNC: 83 MMOL/L (ref 95–110)
CHLORIDE SERPL-SCNC: 86 MMOL/L (ref 95–110)
CO2 SERPL-SCNC: 26 MMOL/L (ref 23–29)
CO2 SERPL-SCNC: 31 MMOL/L (ref 23–29)
CREAT SERPL-MCNC: 0.7 MG/DL (ref 0.5–1.4)
CREAT SERPL-MCNC: 0.7 MG/DL (ref 0.5–1.4)
CRP SERPL-MCNC: 74.7 MG/L (ref 0–8.2)
DIFFERENTIAL METHOD BLD: ABNORMAL
EOSINOPHIL # BLD AUTO: 0.2 K/UL (ref 0–0.5)
EOSINOPHIL NFR BLD: 2.6 % (ref 0–8)
ERYTHROCYTE [DISTWIDTH] IN BLOOD BY AUTOMATED COUNT: 22.6 % (ref 11.5–14.5)
EST. GFR  (NO RACE VARIABLE): >60 ML/MIN/1.73 M^2
EST. GFR  (NO RACE VARIABLE): >60 ML/MIN/1.73 M^2
GLUCOSE SERPL-MCNC: 107 MG/DL (ref 70–110)
GLUCOSE SERPL-MCNC: 115 MG/DL (ref 70–110)
HCT VFR BLD AUTO: 28.7 % (ref 37–48.5)
HGB BLD-MCNC: 8.7 G/DL (ref 12–16)
IMM GRANULOCYTES # BLD AUTO: 0.04 K/UL (ref 0–0.04)
IMM GRANULOCYTES NFR BLD AUTO: 0.5 % (ref 0–0.5)
LYMPHOCYTES # BLD AUTO: 0.9 K/UL (ref 1–4.8)
LYMPHOCYTES NFR BLD: 10.4 % (ref 18–48)
MAGNESIUM SERPL-MCNC: 1.8 MG/DL (ref 1.6–2.6)
MAGNESIUM SERPL-MCNC: 1.8 MG/DL (ref 1.6–2.6)
MCH RBC QN AUTO: 23.5 PG (ref 27–31)
MCHC RBC AUTO-ENTMCNC: 30.3 G/DL (ref 32–36)
MCV RBC AUTO: 78 FL (ref 82–98)
MONOCYTES # BLD AUTO: 0.7 K/UL (ref 0.3–1)
MONOCYTES NFR BLD: 8.4 % (ref 4–15)
NEUTROPHILS # BLD AUTO: 6.6 K/UL (ref 1.8–7.7)
NEUTROPHILS NFR BLD: 77.6 % (ref 38–73)
NRBC BLD-RTO: 0 /100 WBC
PHOSPHATE SERPL-MCNC: 3 MG/DL (ref 2.7–4.5)
PHOSPHATE SERPL-MCNC: 3.6 MG/DL (ref 2.7–4.5)
PLATELET # BLD AUTO: 511 K/UL (ref 150–450)
PMV BLD AUTO: 9 FL (ref 9.2–12.9)
POTASSIUM SERPL-SCNC: 2.9 MMOL/L (ref 3.5–5.1)
POTASSIUM SERPL-SCNC: 4 MMOL/L (ref 3.5–5.1)
PROT SERPL-MCNC: 6.4 G/DL (ref 6–8.4)
RBC # BLD AUTO: 3.7 M/UL (ref 4–5.4)
SODIUM SERPL-SCNC: 128 MMOL/L (ref 136–145)
SODIUM SERPL-SCNC: 129 MMOL/L (ref 136–145)
TRIGL SERPL-MCNC: 119 MG/DL (ref 30–150)
WBC # BLD AUTO: 8.47 K/UL (ref 3.9–12.7)

## 2025-03-04 PROCEDURE — 84100 ASSAY OF PHOSPHORUS: CPT | Mod: 91 | Performed by: STUDENT IN AN ORGANIZED HEALTH CARE EDUCATION/TRAINING PROGRAM

## 2025-03-04 PROCEDURE — 63600175 PHARM REV CODE 636 W HCPCS: Performed by: STUDENT IN AN ORGANIZED HEALTH CARE EDUCATION/TRAINING PROGRAM

## 2025-03-04 PROCEDURE — 86140 C-REACTIVE PROTEIN: CPT | Performed by: STUDENT IN AN ORGANIZED HEALTH CARE EDUCATION/TRAINING PROGRAM

## 2025-03-04 PROCEDURE — 84100 ASSAY OF PHOSPHORUS: CPT

## 2025-03-04 PROCEDURE — A4217 STERILE WATER/SALINE, 500 ML: HCPCS | Performed by: STUDENT IN AN ORGANIZED HEALTH CARE EDUCATION/TRAINING PROGRAM

## 2025-03-04 PROCEDURE — 63600175 PHARM REV CODE 636 W HCPCS

## 2025-03-04 PROCEDURE — 83735 ASSAY OF MAGNESIUM: CPT

## 2025-03-04 PROCEDURE — 85025 COMPLETE CBC W/AUTO DIFF WBC: CPT

## 2025-03-04 PROCEDURE — 20600001 HC STEP DOWN PRIVATE ROOM

## 2025-03-04 PROCEDURE — 25000003 PHARM REV CODE 250

## 2025-03-04 PROCEDURE — 25000003 PHARM REV CODE 250: Performed by: STUDENT IN AN ORGANIZED HEALTH CARE EDUCATION/TRAINING PROGRAM

## 2025-03-04 PROCEDURE — 83735 ASSAY OF MAGNESIUM: CPT | Mod: 91 | Performed by: STUDENT IN AN ORGANIZED HEALTH CARE EDUCATION/TRAINING PROGRAM

## 2025-03-04 PROCEDURE — 80048 BASIC METABOLIC PNL TOTAL CA: CPT | Performed by: STUDENT IN AN ORGANIZED HEALTH CARE EDUCATION/TRAINING PROGRAM

## 2025-03-04 PROCEDURE — 63600175 PHARM REV CODE 636 W HCPCS: Mod: JZ,TB | Performed by: STUDENT IN AN ORGANIZED HEALTH CARE EDUCATION/TRAINING PROGRAM

## 2025-03-04 PROCEDURE — 84478 ASSAY OF TRIGLYCERIDES: CPT | Performed by: STUDENT IN AN ORGANIZED HEALTH CARE EDUCATION/TRAINING PROGRAM

## 2025-03-04 PROCEDURE — 36415 COLL VENOUS BLD VENIPUNCTURE: CPT | Performed by: STUDENT IN AN ORGANIZED HEALTH CARE EDUCATION/TRAINING PROGRAM

## 2025-03-04 PROCEDURE — 63600175 PHARM REV CODE 636 W HCPCS: Mod: JZ,TB | Performed by: NURSE PRACTITIONER

## 2025-03-04 PROCEDURE — 36415 COLL VENOUS BLD VENIPUNCTURE: CPT

## 2025-03-04 PROCEDURE — 80053 COMPREHEN METABOLIC PANEL: CPT | Performed by: STUDENT IN AN ORGANIZED HEALTH CARE EDUCATION/TRAINING PROGRAM

## 2025-03-04 PROCEDURE — 25000003 PHARM REV CODE 250: Performed by: NURSE PRACTITIONER

## 2025-03-04 RX ORDER — HYDROMORPHONE HYDROCHLORIDE 1 MG/ML
0.5 INJECTION, SOLUTION INTRAMUSCULAR; INTRAVENOUS; SUBCUTANEOUS EVERY 12 HOURS PRN
Status: DISCONTINUED | OUTPATIENT
Start: 2025-03-04 | End: 2025-03-05

## 2025-03-04 RX ORDER — POTASSIUM CHLORIDE 14.9 MG/ML
20 INJECTION INTRAVENOUS
Status: COMPLETED | OUTPATIENT
Start: 2025-03-04 | End: 2025-03-04

## 2025-03-04 RX ORDER — POTASSIUM CHLORIDE 7.45 MG/ML
10 INJECTION INTRAVENOUS
Status: DISCONTINUED | OUTPATIENT
Start: 2025-03-04 | End: 2025-03-04

## 2025-03-04 RX ORDER — LOPERAMIDE HYDROCHLORIDE 2 MG/1
4 CAPSULE ORAL 4 TIMES DAILY
Status: DISCONTINUED | OUTPATIENT
Start: 2025-03-04 | End: 2025-03-10 | Stop reason: HOSPADM

## 2025-03-04 RX ORDER — METHOCARBAMOL 500 MG/1
500 TABLET, FILM COATED ORAL 4 TIMES DAILY
Status: DISCONTINUED | OUTPATIENT
Start: 2025-03-04 | End: 2025-03-10 | Stop reason: HOSPADM

## 2025-03-04 RX ADMIN — METHOCARBAMOL 500 MG: 500 TABLET ORAL at 04:03

## 2025-03-04 RX ADMIN — POTASSIUM CHLORIDE 20 MEQ: 200 INJECTION, SOLUTION INTRAVENOUS at 02:03

## 2025-03-04 RX ADMIN — ONDANSETRON 8 MG: 8 TABLET, ORALLY DISINTEGRATING ORAL at 05:03

## 2025-03-04 RX ADMIN — POTASSIUM CHLORIDE 20 MEQ: 200 INJECTION, SOLUTION INTRAVENOUS at 10:03

## 2025-03-04 RX ADMIN — ACETAMINOPHEN 1000 MG: 500 TABLET ORAL at 05:03

## 2025-03-04 RX ADMIN — PANTOPRAZOLE SODIUM 40 MG: 40 INJECTION, POWDER, FOR SOLUTION INTRAVENOUS at 08:03

## 2025-03-04 RX ADMIN — HEPARIN SODIUM 5000 UNITS: 5000 INJECTION INTRAVENOUS; SUBCUTANEOUS at 01:03

## 2025-03-04 RX ADMIN — MICONAZOLE NITRATE 2 % TOPICAL POWDER: at 08:03

## 2025-03-04 RX ADMIN — GABAPENTIN 300 MG: 300 CAPSULE ORAL at 08:03

## 2025-03-04 RX ADMIN — OXYCODONE 15 MG: 5 TABLET ORAL at 09:03

## 2025-03-04 RX ADMIN — GABAPENTIN 300 MG: 300 CAPSULE ORAL at 02:03

## 2025-03-04 RX ADMIN — LOPERAMIDE HYDROCHLORIDE 4 MG: 2 CAPSULE ORAL at 08:03

## 2025-03-04 RX ADMIN — METHOCARBAMOL 500 MG: 500 TABLET ORAL at 08:03

## 2025-03-04 RX ADMIN — METHOCARBAMOL 500 MG: 500 TABLET ORAL at 12:03

## 2025-03-04 RX ADMIN — ACETAMINOPHEN 1000 MG: 500 TABLET ORAL at 08:03

## 2025-03-04 RX ADMIN — OXYCODONE 15 MG: 5 TABLET ORAL at 01:03

## 2025-03-04 RX ADMIN — ACETAMINOPHEN 1000 MG: 500 TABLET ORAL at 01:03

## 2025-03-04 RX ADMIN — MAGNESIUM SULFATE HEPTAHYDRATE: 500 INJECTION, SOLUTION INTRAMUSCULAR; INTRAVENOUS at 10:03

## 2025-03-04 RX ADMIN — POTASSIUM CHLORIDE 20 MEQ: 200 INJECTION, SOLUTION INTRAVENOUS at 12:03

## 2025-03-04 RX ADMIN — HYDROMORPHONE HYDROCHLORIDE 0.5 MG: 1 INJECTION, SOLUTION INTRAMUSCULAR; INTRAVENOUS; SUBCUTANEOUS at 04:03

## 2025-03-04 RX ADMIN — OXYCODONE 15 MG: 5 TABLET ORAL at 05:03

## 2025-03-04 RX ADMIN — LOPERAMIDE HYDROCHLORIDE 4 MG: 2 CAPSULE ORAL at 12:03

## 2025-03-04 RX ADMIN — HYDROMORPHONE HYDROCHLORIDE 0.5 MG: 1 INJECTION, SOLUTION INTRAMUSCULAR; INTRAVENOUS; SUBCUTANEOUS at 02:03

## 2025-03-04 RX ADMIN — LOPERAMIDE HYDROCHLORIDE 4 MG: 2 CAPSULE ORAL at 04:03

## 2025-03-04 RX ADMIN — HEPARIN SODIUM 5000 UNITS: 5000 INJECTION INTRAVENOUS; SUBCUTANEOUS at 05:03

## 2025-03-04 RX ADMIN — HEPARIN SODIUM 5000 UNITS: 5000 INJECTION INTRAVENOUS; SUBCUTANEOUS at 08:03

## 2025-03-04 NOTE — PLAN OF CARE
Plan of care reviewed with pt:  -AAOx4, RA, VS stable  -had 1 emesis this morning with strong foul odor brown yellow color - pt said she thinks because of the chocolate boost that she had- NP Vidhi notified- nausea relieved after compazine given. Diet changed to clear liquid   -Pain under control  -Ambulated independently  -Call light in reach, [facility]

## 2025-03-04 NOTE — PLAN OF CARE
Problem: Adult Inpatient Plan of Care  Goal: Absence of Hospital-Acquired Illness or Injury  Outcome: Progressing  Goal: Optimal Comfort and Wellbeing  Outcome: Progressing  Goal: Readiness for Transition of Care  Outcome: Progressing   [facility] Plan of Care Note  Dx Colovesical fistula + Malnutrition    Shift Events  no nausea but consumed small amount of food with full liquid diet- said she does not like what dietary served; pain  under control; Imodium increased- ostomy output recorded; THAI drain removed by MD    Goals of Care: pain control, optimized nutrition    Neuro: AAOx4    Vital Signs: stable    Respiratory: RA    Diet: full liquid + TPN    Is patient tolerating current diet? yes    GTTS: TPN    Urine Output/Bowel Movement: urinating without difficulty    Drains/Tubes/Tube Feeds (include total output/shift): THAI drain     Lines: tunnel line       Accuchecks: none    Skin: midline incision healing- tiny amount of serous drainage at the bottom part - foam dressing changed per pr request     Fall Risk Score: none    Activity level? Up ad shamika, has been ambulating to bathroom to empty her ostomy bag frequently     Any scheduled procedures? none    Any safety concerns? Fall risk precaution    Other: none

## 2025-03-04 NOTE — PROGRESS NOTES
[facility]  Colorectal Surgery  Progress Note    Patient Name: Candida Cardona  MRN: [id number]  Admission Date: 2/21/2025  Hospital Length of Stay: 11 days  Attending Physician: Robin Atkins MD    Subjective:     Interval History: pain better controlled with increased oxy, wants a full liquid diet    Post-Op Info:  Procedure(s) (LRB):  PROCTECTOMY, ABDOMINOPERINEAL (N/A)  CYSTOSCOPY, WITH RETROGRADE PYELOGRAM AND URETERAL STENT INSERTION (Bilateral)  CYSTOGRAM  EXCISION, SMALL INTESTINE  CREATION, ILEOSTOMY   10 Days Post-Op      Medications:  Continuous Infusions:   TPN ADULT CENTRAL LINE CUSTOM   Intravenous Continuous 63 mL/hr at 03/03/25 2113 New Bag at 03/03/25 2113    TPN ADULT CENTRAL LINE CUSTOM   Intravenous Continuous         Scheduled Meds:   acetaminophen  1,000 mg Oral Q8H    gabapentin  300 mg Oral TID    heparin (porcine)  5,000 Units Subcutaneous Q8H    loperamide  4 mg Oral QID    methocarbamoL  500 mg Oral QID    miconazole NITRATE 2 %   Topical (Top) BID    pantoprazole  40 mg Intravenous Daily    potassium chloride in water  20 mEq Intravenous Q2H     PRN Meds:   acetaminophen tablet 1,000 mg    gabapentin capsule 300 mg    heparin (porcine) injection 5,000 Units    loperamide capsule 4 mg    methocarbamoL tablet 500 mg    miconazole NITRATE 2 % top powder    pantoprazole injection 40 mg    potassium chloride 20 mEq in 100 mL IVPB (FOR CENTRAL LINE ADMINISTRATION ONLY)        Objective:     Vital Signs (Most Recent):  Temp: 97.9 °F (36.6 °C) (03/04/25 0717)  Pulse: 81 (03/04/25 0717)  Resp: 18 (03/04/25 0902)  BP: (!) 92/56 (03/04/25 0717)  SpO2: (!) 93 % (03/04/25 0717) Vital Signs (24h Range):  Temp:  [97.6 °F (36.4 °C)-98.2 °F (36.8 °C)] 97.9 °F (36.6 °C)  Pulse:  [71-88] 81  Resp:  [16-18] 18  SpO2:  [93 %-98 %] 93 %  BP: ()/(56-66) 92/56     Intake/Output - Last 3 Shifts         03/02 0700  03/03 0659 03/03 0700 03/04 0659 03/04 0700 03/05 0659    P.O. 1040 1200     IV  Piggyback  400     TPN 1674.4 1233.7     Total Intake(mL/kg) 2714.4 (42.1) 2833.7 (43.9)     Urine (mL/kg/hr) 200 (0.1) 0 (0)     Emesis/NG output  0     Drains 20 50     Stool 1550 1200     Total Output 1770 1250     Net +944.4 +1583.7            Urine Occurrence 4 x 6 x     Emesis Occurrence  1 x          General: alert, awake, in no acute distress  HEENT: EOMI, no scleral icterus, CN grossly intact  Cardiac: RRR, BP stable, warm well perfused  Pulm: non labored breathing on room air, no audible wheeze  Abdomen: soft, non distended, non TTP, incision clean without drainage or concern for infection, ostomy productive, drain SS minimal output  Perineum: incision clean and intact with sutures in place  Extremities: moving all four extremities  Skin: no obvious rashes  Neuro: no obvious deficits  Psych: anxious    Significant Labs:  BMP (Last 3 Results):   Recent Labs   Lab 03/01/25  0434 03/03/25  0435 03/04/25  0634   * 114* 115*    132* 129*   K 3.1* 3.2* 2.9*   CL 95 86* 83*   CO2 28 32* 31*   BUN 11 18 21*   CREATININE 0.7 0.8 0.7   CALCIUM 8.7 9.1 8.9   MG 1.8 1.8 1.8     CBC (Last 3 Results):   Recent Labs   Lab 03/01/25  0434 03/03/25  0435 03/04/25  0634   WBC 9.74 9.62 8.47   RBC 3.52* 4.05 3.70*   HGB 8.3* 9.6* 8.7*   HCT 27.3* 32.3* 28.7*    386 511*   MCV 78* 80* 78*   MCH 23.6* 23.7* 23.5*   MCHC 30.4* 29.7* 30.3*       Significant Diagnostics:  Kub reviewed from yesterday    Assessment/Plan:     * Malnutrition of moderate degree  Nutrition consulted. Most recent weight and BMI monitored-     Measurements:  Wt Readings from Last 1 Encounters:   02/24/25 64.5 kg (142 lb 3.2 oz)   Body mass index is 22.27 kg/m².    Patient has been screened and assessed by RD.    Malnutrition Type:  Context:    Level:      Malnutrition Characteristic Summary:  Weight Loss (Malnutrition): greater than 5% in 1 month (7.8% weight loss x 1 month)    Interventions/Recommendations (treatment strategy):  Cycle  tpn 16 hours  Plan for home ton         Crohn's disease of both small and large intestine with fistula  Candida Cardona is a 44 y.o. Now s/p completion proctectomy and excision of fistula tract on 2/22/25.       - low res diet tomorrow, full liquids today per patient, okay for sodas if she is not nauseous  - miconazole cream for breast rash  - Imodium 4mg QID increased  - Multi-modal pain control; iv dilaudid q12 prn today and tomorrow off, oxy 10&15, added robaxin  - PRN nausea meds  - TPN 16 hour, Replaced lytes with 1pm recheck and adjusted TPN  - PPI  - DVT prophylaxis   - OOBTC/Ambulate  - IS  - SW to help with dispo to a safe place, plan for DC by Friday on home TPN    Dispo: maintain care on INDIRA Ramos MD  Colorectal Surgery  [facility]

## 2025-03-04 NOTE — ASSESSMENT & PLAN NOTE
Candida Cardona is a 44 y.o. Now s/p completion proctectomy and excision of fistula tract on 2/22/25.       - low res diet tomorrow, full liquids today per patient, okay for sodas if she is not nauseous  - miconazole cream for breast rash  - Imodium 4mg QID increased  - Multi-modal pain control; iv dilaudid q12 prn today and tomorrow off, oxy 10&15, added robaxin  - PRN nausea meds  - TPN 16 hour, Replaced lytes with 1pm recheck and adjusted TPN  - PPI  - DVT prophylaxis   - OOBTC/Ambulate  - IS  - SW to help with dispo to a safe place, plan for DC by Friday on home TPN    Dispo: maintain care on [facility]

## 2025-03-04 NOTE — PROGRESS NOTES
[facility]  Adult Nutrition  Progress Note    SUMMARY       Recommendations  --Continue TPN: 170 g D, 75 g AA, 500 kcal Lipids, GIR 2.75 to provide 1378 kcal    --Continue Full Liquid diet as tolerated and clinically indicated   --Continue Boost Plus TID   --Encourage good intakes   --Nursing: please continue to document % meal eaten on flowsheet   --RD to monitor weight, PO intake     Goals: Meet % EEN/EPN  Nutrition Goal Status: goal met  Communication of RD Recs:  (POC)    Nutrition Discharge Planning    Nutrition Discharge Planning: Too early to determine, pending clinical course    Assessment and Plan    Nutrition Problem  Inadequate energy intake    Related to (etiology):   Nausea/emesis    Signs and Symptoms (as evidenced by):   Estimated intake less than estimated requirements     Interventions/Recommendations (treatment strategy):  Collaboration of nutrition care with other providers     Nutrition Diagnosis Status:   New      Nutrition Problem  Inadequate parenteral nutrition infusion      Related to (etiology):   Infusion volume not reached     Signs and Symptoms (as evidenced by):   Inadequate PN volume compared to estimated requirements      Interventions/Recommendations (treatment strategy):  Collaboration of nutrition care with other providers   TPN     Nutrition Diagnosis Status:   Resolved          Malnutrition Assessment             Weight Loss (Malnutrition): greater than 5% in 1 month (6.5% weight loss x 1 month)                         Reason for Assessment    Reason For Assessment: RD follow-up  Diagnosis:  (No active principal problem)  General Information Comments: 44 year old woman with a history of Crohn's disease, GERD, anxiety, CAD, Bipolar disorder, and pAF and the surgeries listed below presenting to [facility] as a direct admit for colovesiculofistula. RD follow-up. Pt experienced nausea/emesis with Boost last night. Plan to continue TPN and imodium. TPN running via PICC line and  "meeting EEN/EPN. Noted 6.5% weight loss x 1 month. NFPE at follow-up.     Nutrition Related Social Determinants of Health: SDOH: Adequate food in home environment    Food Insecurity: No Food Insecurity (2025)    Hunger Vital Sign     Worried About Running Out of Food in the Last Year: Never true     Ran Out of Food in the Last Year: Never true       Nutrition/Diet History    Spiritual, Cultural Beliefs, Confucianist Practices, Values that Affect Care: no  Food Allergies:  ([place])  Factors Affecting Nutritional Intake: NPO    Anthropometrics    Height: 5' 7" (170.2 cm)  Height (inches): 67 in  Height Method: Stated  Weight: 64.5 kg (142 lb 3.2 oz)  Weight (lb): 142.2 lb  Weight Method: Bed Scale  Ideal Body Weight (IBW), Female: 135 lb  % Ideal Body Weight, Female (lb): 105.33 %  BMI (Calculated): 22.3  BMI Grade: 18.5-24.9 - normal       Lab/Procedures/Meds    Pertinent Labs Reviewed: reviewed - hemoglobin 8.7, hematocrit 28.7, MCV 78, MCH 23.5, MCHC 30.3, Na 129, K 2.9, BUN 21, glucose 115, ALT 8, CRP 74.7     Pertinent Medications Reviewed: reviewed - gabapentin, heparin, pantoprazole, potassium chloride    Estimated/Assessed Needs    Weight Used For Calorie Calculations: 64.5 kg (142 lb 3.2 oz)  Energy Calorie Requirements (kcal): 9162-7293 kcal/day (20-30 kcal/kg)  Energy Need Method: Kcal/kg  Protein Requirements: 52-97 g/day (0.8-1.5 g/kg)  Weight Used For Protein Calculations: 64.5 kg (142 lb 3.2 oz)     Estimated Fluid Requirement Method: RDA Method  RDA Method (mL): 1290         Nutrition Prescription Ordered    Current Diet Order: NPO  Nutrition Order Comments: TPN runnin g D, 70 g AA + IV lipids; GIR: 2.75    Evaluation of Received Nutrient/Fluid Intake    Parenteral Calories (kcal): 878  Parenteral Protein (gm): 75  Lipid Calories (kcals): 500 kcals  GIR (Glucose Infusion Rate) (mg/kg/min): 2.75 mg/kg/min  Total Calories (kcal): 1378  % Kcal Needs: 100  % Protein Needs: 100  Energy Calories " Required: meeting needs  Protein Required: meeting needs  Comments: LBM 3/4  Tolerance: tolerating  % Intake of Estimated Energy Needs: 75 - 100 %  % Meal Intake: 0 - 25 %    Nutrition Risk    Level of Risk/Frequency of Follow-up: high     Monitor and Evaluation    Monitor and Evaluation: Energy intake, Food and beverage intake, Diet order, Enteral and parenteral nutrition administration, Weight, Electrolyte and renal panel, Gastrointestinal profile, Glucose/endocrine profile, Inflammatory profile, Lipid profile, Nutrition focused physical findings, Skin, Food and nutrition knowledge     Nutrition Follow-Up    RD Follow-up?: Yes    Poonam Ding, Registration Eligible, Provisional LDN

## 2025-03-04 NOTE — PLAN OF CARE
Recommendations  --Continue TPN: 170 g D, 75 g AA, 500 kcal Lipids, GIR 2.75 to provide 1378 kcal    --Continue Full Liquid diet as tolerated and clinically indicated   --Continue Boost Plus TID   --Encourage good intakes   --Nursing: please continue to document % meal eaten on flowsheet   --RD to monitor weight, PO intake     Goals: Meet % EEN/EPN  Nutrition Goal Status: goal met  Communication of RD Recs:  (POC)

## 2025-03-04 NOTE — SUBJECTIVE & OBJECTIVE
Subjective:     Interval History: pain better controlled with increased oxy, wants a full liquid diet    Post-Op Info:  Procedure(s) (LRB):  PROCTECTOMY, ABDOMINOPERINEAL (N/A)  CYSTOSCOPY, WITH RETROGRADE PYELOGRAM AND URETERAL STENT INSERTION (Bilateral)  CYSTOGRAM  EXCISION, SMALL INTESTINE  CREATION, ILEOSTOMY   10 Days Post-Op      Medications:  Continuous Infusions:   TPN ADULT CENTRAL LINE CUSTOM   Intravenous Continuous 63 mL/hr at 03/03/25 2113 New Bag at 03/03/25 2113    TPN ADULT CENTRAL LINE CUSTOM   Intravenous Continuous         Scheduled Meds:   acetaminophen  1,000 mg Oral Q8H    gabapentin  300 mg Oral TID    heparin (porcine)  5,000 Units Subcutaneous Q8H    loperamide  4 mg Oral QID    methocarbamoL  500 mg Oral QID    miconazole NITRATE 2 %   Topical (Top) BID    pantoprazole  40 mg Intravenous Daily    potassium chloride in water  20 mEq Intravenous Q2H     PRN Meds:   acetaminophen tablet 1,000 mg    gabapentin capsule 300 mg    heparin (porcine) injection 5,000 Units    loperamide capsule 4 mg    methocarbamoL tablet 500 mg    miconazole NITRATE 2 % top powder    pantoprazole injection 40 mg    potassium chloride 20 mEq in 100 mL IVPB (FOR CENTRAL LINE ADMINISTRATION ONLY)        Objective:     Vital Signs (Most Recent):  Temp: 97.9 °F (36.6 °C) (03/04/25 0717)  Pulse: 81 (03/04/25 0717)  Resp: 18 (03/04/25 0902)  BP: (!) 92/56 (03/04/25 0717)  SpO2: (!) 93 % (03/04/25 0717) Vital Signs (24h Range):  Temp:  [97.6 °F (36.4 °C)-98.2 °F (36.8 °C)] 97.9 °F (36.6 °C)  Pulse:  [71-88] 81  Resp:  [16-18] 18  SpO2:  [93 %-98 %] 93 %  BP: ()/(56-66) 92/56     Intake/Output - Last 3 Shifts         03/02 0700  03/03 0659 03/03 0700  03/04 0659 03/04 0700  03/05 0659    P.O. 1040 1200     IV Piggyback  400     TPN 1674.4 1233.7     Total Intake(mL/kg) 2714.4 (42.1) 2833.7 (43.9)     Urine (mL/kg/hr) 200 (0.1) 0 (0)     Emesis/NG output  0     Drains 20 50     Stool 1550 1200     Total Output 1770  1250     Net +944.4 +1583.7            Urine Occurrence 4 x 6 x     Emesis Occurrence  1 x          General: alert, awake, in no acute distress  HEENT: EOMI, no scleral icterus, CN grossly intact  Cardiac: RRR, BP stable, warm well perfused  Pulm: non labored breathing on room air, no audible wheeze  Abdomen: soft, non distended, non TTP, incision clean without drainage or concern for infection, ostomy productive, drain SS minimal output  Perineum: incision clean and intact with sutures in place  Extremities: moving all four extremities  Skin: no obvious rashes  Neuro: no obvious deficits  Psych: cooperative, appropriate affect  Anorectal: deferred     Significant Labs:  BMP (Last 3 Results):   Recent Labs   Lab 03/01/25  0434 03/03/25  0435 03/04/25  0634   * 114* 115*    132* 129*   K 3.1* 3.2* 2.9*   CL 95 86* 83*   CO2 28 32* 31*   BUN 11 18 21*   CREATININE 0.7 0.8 0.7   CALCIUM 8.7 9.1 8.9   MG 1.8 1.8 1.8     CBC (Last 3 Results):   Recent Labs   Lab 03/01/25  0434 03/03/25  0435 03/04/25  0634   WBC 9.74 9.62 8.47   RBC 3.52* 4.05 3.70*   HGB 8.3* 9.6* 8.7*   HCT 27.3* 32.3* 28.7*    386 511*   MCV 78* 80* 78*   MCH 23.6* 23.7* 23.5*   MCHC 30.4* 29.7* 30.3*       Significant Diagnostics:  Kub reviewed from yesterday

## 2025-03-04 NOTE — NURSING
"[facility] Plan of Care Note    Dx:   Crohn's disease [K50.90]    Shift Events: N/A     Goals of Care: Progressing towards goals      Neuro: AAOx4   Vital Signs: BP 97/60   Pulse 74   Temp 98 °F (36.7 °C) (Oral)   Resp 18   Ht 5' 7" (1.702 m)   Wt 64.5 kg (142 lb 3.2 oz)   LMP  (LMP Unknown) Comment: complete hysterectomy  SpO2 96%   Breastfeeding No   BMI 22.27 kg/m²     Respiratory: RA    Diet: Diet Clear Liquid  Dietary nutrition supplements Boost Plus Nutritional Drink - Very Vanilla, Boost Plus Nutritional Drink - Rich Chocolate    Is patient tolerating current diet? YES    GTTS: TPN    Urine Output/Bowel Movement:   I/O this shift:  In: -   Out: 570 [Drains:20; Stool:550]  Last Bowel Movement: 03/04/25      Drains/Tubes/Tube Feeds (include total output/shift):   I/O this shift:  In: -   Out: 570 [Drains:20; Stool:550]      Lines: PICC      Accuchecks:N/A      Skin: Midline incision, ileostomy, THAI drain      Fall Risk Score: 8     Activity level? Independent      Any scheduled procedures? N/A     Any safety concerns? N/A     Other: N/A    "

## 2025-03-05 LAB
ALBUMIN SERPL BCP-MCNC: 2.2 G/DL (ref 3.5–5.2)
ALP SERPL-CCNC: 132 U/L (ref 40–150)
ALT SERPL W/O P-5'-P-CCNC: 6 U/L (ref 10–44)
ANION GAP SERPL CALC-SCNC: 11 MMOL/L (ref 8–16)
AST SERPL-CCNC: 18 U/L (ref 10–40)
BASOPHILS # BLD AUTO: 0.05 K/UL (ref 0–0.2)
BASOPHILS NFR BLD: 0.6 % (ref 0–1.9)
BILIRUB SERPL-MCNC: 0.4 MG/DL (ref 0.1–1)
BUN SERPL-MCNC: 20 MG/DL (ref 6–20)
CALCIUM SERPL-MCNC: 9 MG/DL (ref 8.7–10.5)
CHLORIDE SERPL-SCNC: 88 MMOL/L (ref 95–110)
CO2 SERPL-SCNC: 29 MMOL/L (ref 23–29)
CREAT SERPL-MCNC: 0.8 MG/DL (ref 0.5–1.4)
CRP SERPL-MCNC: 54.1 MG/L (ref 0–8.2)
DIFFERENTIAL METHOD BLD: ABNORMAL
EOSINOPHIL # BLD AUTO: 0.3 K/UL (ref 0–0.5)
EOSINOPHIL NFR BLD: 3 % (ref 0–8)
ERYTHROCYTE [DISTWIDTH] IN BLOOD BY AUTOMATED COUNT: 22.7 % (ref 11.5–14.5)
EST. GFR  (NO RACE VARIABLE): >60 ML/MIN/1.73 M^2
GLUCOSE SERPL-MCNC: 93 MG/DL (ref 70–110)
HCT VFR BLD AUTO: 29.4 % (ref 37–48.5)
HGB BLD-MCNC: 8.7 G/DL (ref 12–16)
IMM GRANULOCYTES # BLD AUTO: 0.05 K/UL (ref 0–0.04)
IMM GRANULOCYTES NFR BLD AUTO: 0.6 % (ref 0–0.5)
LYMPHOCYTES # BLD AUTO: 0.9 K/UL (ref 1–4.8)
LYMPHOCYTES NFR BLD: 10.9 % (ref 18–48)
MAGNESIUM SERPL-MCNC: 2 MG/DL (ref 1.6–2.6)
MCH RBC QN AUTO: 23.3 PG (ref 27–31)
MCHC RBC AUTO-ENTMCNC: 29.6 G/DL (ref 32–36)
MCV RBC AUTO: 79 FL (ref 82–98)
MONOCYTES # BLD AUTO: 0.8 K/UL (ref 0.3–1)
MONOCYTES NFR BLD: 9.2 % (ref 4–15)
NEUTROPHILS # BLD AUTO: 6.5 K/UL (ref 1.8–7.7)
NEUTROPHILS NFR BLD: 75.7 % (ref 38–73)
NRBC BLD-RTO: 0 /100 WBC
PHOSPHATE SERPL-MCNC: 3.5 MG/DL (ref 2.7–4.5)
PLATELET # BLD AUTO: 591 K/UL (ref 150–450)
PMV BLD AUTO: 9.4 FL (ref 9.2–12.9)
POTASSIUM SERPL-SCNC: 3.7 MMOL/L (ref 3.5–5.1)
PROT SERPL-MCNC: 6.7 G/DL (ref 6–8.4)
RBC # BLD AUTO: 3.73 M/UL (ref 4–5.4)
SODIUM SERPL-SCNC: 128 MMOL/L (ref 136–145)
WBC # BLD AUTO: 8.61 K/UL (ref 3.9–12.7)

## 2025-03-05 PROCEDURE — 25000003 PHARM REV CODE 250

## 2025-03-05 PROCEDURE — 36415 COLL VENOUS BLD VENIPUNCTURE: CPT

## 2025-03-05 PROCEDURE — 63600175 PHARM REV CODE 636 W HCPCS

## 2025-03-05 PROCEDURE — 84100 ASSAY OF PHOSPHORUS: CPT

## 2025-03-05 PROCEDURE — 25000003 PHARM REV CODE 250: Performed by: STUDENT IN AN ORGANIZED HEALTH CARE EDUCATION/TRAINING PROGRAM

## 2025-03-05 PROCEDURE — 63600175 PHARM REV CODE 636 W HCPCS: Performed by: STUDENT IN AN ORGANIZED HEALTH CARE EDUCATION/TRAINING PROGRAM

## 2025-03-05 PROCEDURE — 83735 ASSAY OF MAGNESIUM: CPT

## 2025-03-05 PROCEDURE — 25000003 PHARM REV CODE 250: Performed by: NURSE PRACTITIONER

## 2025-03-05 PROCEDURE — 20600001 HC STEP DOWN PRIVATE ROOM

## 2025-03-05 PROCEDURE — B4185 PARENTERAL SOL 10 GM LIPIDS: HCPCS | Performed by: NURSE PRACTITIONER

## 2025-03-05 PROCEDURE — 80053 COMPREHEN METABOLIC PANEL: CPT | Performed by: STUDENT IN AN ORGANIZED HEALTH CARE EDUCATION/TRAINING PROGRAM

## 2025-03-05 PROCEDURE — 85025 COMPLETE CBC W/AUTO DIFF WBC: CPT

## 2025-03-05 PROCEDURE — 63600175 PHARM REV CODE 636 W HCPCS: Performed by: NURSE PRACTITIONER

## 2025-03-05 PROCEDURE — 86140 C-REACTIVE PROTEIN: CPT | Performed by: STUDENT IN AN ORGANIZED HEALTH CARE EDUCATION/TRAINING PROGRAM

## 2025-03-05 PROCEDURE — A4217 STERILE WATER/SALINE, 500 ML: HCPCS | Performed by: NURSE PRACTITIONER

## 2025-03-05 RX ORDER — DIPHENOXYLATE HYDROCHLORIDE AND ATROPINE SULFATE 2.5; .025 MG/1; MG/1
1 TABLET ORAL 2 TIMES DAILY
Status: DISCONTINUED | OUTPATIENT
Start: 2025-03-05 | End: 2025-03-10 | Stop reason: HOSPADM

## 2025-03-05 RX ADMIN — METHOCARBAMOL 500 MG: 500 TABLET ORAL at 09:03

## 2025-03-05 RX ADMIN — LOPERAMIDE HYDROCHLORIDE 4 MG: 2 CAPSULE ORAL at 05:03

## 2025-03-05 RX ADMIN — LOPERAMIDE HYDROCHLORIDE 4 MG: 2 CAPSULE ORAL at 02:03

## 2025-03-05 RX ADMIN — PANTOPRAZOLE SODIUM 40 MG: 40 INJECTION, POWDER, FOR SOLUTION INTRAVENOUS at 08:03

## 2025-03-05 RX ADMIN — ACETAMINOPHEN 1000 MG: 500 TABLET ORAL at 02:03

## 2025-03-05 RX ADMIN — PROCHLORPERAZINE EDISYLATE 2.5 MG: 5 INJECTION INTRAMUSCULAR; INTRAVENOUS at 10:03

## 2025-03-05 RX ADMIN — METHOCARBAMOL 500 MG: 500 TABLET ORAL at 05:03

## 2025-03-05 RX ADMIN — MICONAZOLE NITRATE 2 % TOPICAL POWDER: at 10:03

## 2025-03-05 RX ADMIN — LOPERAMIDE HYDROCHLORIDE 4 MG: 2 CAPSULE ORAL at 09:03

## 2025-03-05 RX ADMIN — MICONAZOLE NITRATE 2 % TOPICAL POWDER: at 11:03

## 2025-03-05 RX ADMIN — OXYCODONE 15 MG: 5 TABLET ORAL at 11:03

## 2025-03-05 RX ADMIN — LOPERAMIDE HYDROCHLORIDE 4 MG: 2 CAPSULE ORAL at 08:03

## 2025-03-05 RX ADMIN — METHOCARBAMOL 500 MG: 500 TABLET ORAL at 02:03

## 2025-03-05 RX ADMIN — ACETAMINOPHEN 1000 MG: 500 TABLET ORAL at 05:03

## 2025-03-05 RX ADMIN — HEPARIN SODIUM 5000 UNITS: 5000 INJECTION INTRAVENOUS; SUBCUTANEOUS at 09:03

## 2025-03-05 RX ADMIN — MAGNESIUM SULFATE HEPTAHYDRATE: 500 INJECTION, SOLUTION INTRAMUSCULAR; INTRAVENOUS at 10:03

## 2025-03-05 RX ADMIN — GABAPENTIN 300 MG: 300 CAPSULE ORAL at 09:03

## 2025-03-05 RX ADMIN — METHOCARBAMOL 500 MG: 500 TABLET ORAL at 08:03

## 2025-03-05 RX ADMIN — OXYCODONE 15 MG: 5 TABLET ORAL at 08:03

## 2025-03-05 RX ADMIN — I.V. FAT EMULSION 250 ML: 20 EMULSION INTRAVENOUS at 11:03

## 2025-03-05 RX ADMIN — GABAPENTIN 300 MG: 300 CAPSULE ORAL at 08:03

## 2025-03-05 RX ADMIN — HEPARIN SODIUM 5000 UNITS: 5000 INJECTION INTRAVENOUS; SUBCUTANEOUS at 05:03

## 2025-03-05 RX ADMIN — DIPHENOXYLATE HYDROCHLORIDE AND ATROPINE SULFATE 1 TABLET: .025; 2.5 TABLET ORAL at 09:03

## 2025-03-05 RX ADMIN — HEPARIN SODIUM 5000 UNITS: 5000 INJECTION INTRAVENOUS; SUBCUTANEOUS at 02:03

## 2025-03-05 RX ADMIN — OXYCODONE 15 MG: 5 TABLET ORAL at 05:03

## 2025-03-05 RX ADMIN — OXYCODONE 15 MG: 5 TABLET ORAL at 06:03

## 2025-03-05 RX ADMIN — ONDANSETRON 8 MG: 8 TABLET, ORALLY DISINTEGRATING ORAL at 06:03

## 2025-03-05 RX ADMIN — OXYCODONE 15 MG: 5 TABLET ORAL at 03:03

## 2025-03-05 RX ADMIN — DIPHENOXYLATE HYDROCHLORIDE AND ATROPINE SULFATE 1 TABLET: .025; 2.5 TABLET ORAL at 11:03

## 2025-03-05 RX ADMIN — GABAPENTIN 300 MG: 300 CAPSULE ORAL at 02:03

## 2025-03-05 RX ADMIN — ACETAMINOPHEN 1000 MG: 500 TABLET ORAL at 09:03

## 2025-03-05 NOTE — ASSESSMENT & PLAN NOTE
Candida Cardona is a 44 y.o. Now s/p completion proctectomy and excision of fistula tract on 2/22/25.       - low res diet tomorrow, full liquids today per patient, okay for sodas if she is not nauseous  - miconazole cream for breast rash  - Imodium 4mg QID increased  - Multi-modal pain control; iv dilaudid dced, oxy 10&15, added robaxin  - PRN nausea meds  - TPN 16 hour, Replaced lytes with 1pm recheck and adjusted TPN  - PPI  - DVT prophylaxis   - OOBTC/Ambulate  - IS  - SW to help with dispo to a safe place, plan for DC by Thursday on home TPN    Dispo: maintain care on [facility]

## 2025-03-05 NOTE — PLAN OF CARE
Pt still with nausea and vomiting, not stable for dc today, BIJAN moved till tomorrow, infusion team and HH team updated.

## 2025-03-05 NOTE — PROGRESS NOTES
Following pt for ostomy care/education.     Pt states she has no needs or concerns today and wants to take a nap.     Will continue to follow for ostomy care/ education.

## 2025-03-05 NOTE — PROGRESS NOTES
[facility]  Colorectal Surgery  Progress Note    Patient Name: Candida Cardona  MRN: [id number]  Admission Date: 2/21/2025  Hospital Length of Stay: 12 days  Attending Physician: Robin Atkins MD    Subjective:     Interval History: no acute events overnite, weaning off IV dilaudid, cont tpn, social service working on discharge plans, ileostomy functional    Post-Op Info:  Procedure(s) (LRB):  PROCTECTOMY, ABDOMINOPERINEAL (N/A)  CYSTOSCOPY, WITH RETROGRADE PYELOGRAM AND URETERAL STENT INSERTION (Bilateral)  CYSTOGRAM  EXCISION, SMALL INTESTINE  CREATION, ILEOSTOMY   11 Days Post-Op      Medications:  Continuous Infusions:   TPN ADULT CENTRAL LINE CUSTOM   Intravenous Continuous 63 mL/hr at 03/04/25 2233 New Bag at 03/04/25 2233    TPN ADULT CENTRAL LINE CUSTOM   Intravenous Continuous         Scheduled Meds:   acetaminophen  1,000 mg Oral Q8H    diphenoxylate-atropine 2.5-0.025 mg  1 tablet Oral BID    fat emulsion 20%  250 mL Intravenous Daily    gabapentin  300 mg Oral TID    heparin (porcine)  5,000 Units Subcutaneous Q8H    loperamide  4 mg Oral QID    methocarbamoL  500 mg Oral QID    miconazole NITRATE 2 %   Topical (Top) BID    pantoprazole  40 mg Intravenous Daily     PRN Meds:   acetaminophen tablet 1,000 mg    diphenoxylate-atropine 2.5-0.025 mg per tablet 1 tablet    fat emulsion 20% infusion 250 mL    gabapentin capsule 300 mg    heparin (porcine) injection 5,000 Units    loperamide capsule 4 mg    methocarbamoL tablet 500 mg    miconazole NITRATE 2 % top powder    pantoprazole injection 40 mg        Objective:     Vital Signs (Most Recent):  Temp: 97.7 °F (36.5 °C) (03/05/25 0433)  Pulse: 94 (03/05/25 0825)  Resp: 16 (03/05/25 1150)  BP: (!) 101/56 (03/05/25 0825)  SpO2: 97 % (03/05/25 0825) Vital Signs (24h Range):  Temp:  [97.7 °F (36.5 °C)-98.1 °F (36.7 °C)] 97.7 °F (36.5 °C)  Pulse:  [75-94] 94  Resp:  [16-18] 16  SpO2:  [93 %-99 %] 97 %  BP: ()/(56-70) 101/56     Intake/Output -  Last 3 Shifts         03/03 0700 03/04 0659 03/04 0700 03/05 0659 03/05 0700  03/06 0659    P.O. 1200 780     IV Piggyback 400 300     TPN 1233.7 497     Total Intake(mL/kg) 2833.7 (43.9) 1577 (24.4)     Urine (mL/kg/hr) 0 (0) 0 (0)     Emesis/NG output 0      Drains 50 0     Stool 1200 1600     Total Output 1250 1600     Net +1583.7 -23            Urine Occurrence 6 x 8 x     Emesis Occurrence 1 x               Physical Exam  Vitals and nursing note reviewed.   Constitutional:       Appearance: She is well-developed.   HENT:      Head: Normocephalic.   Eyes:      Pupils: Pupils are equal, round, and reactive to light.   Cardiovascular:      Rate and Rhythm: Normal rate and regular rhythm.      Heart sounds: Normal heart sounds.   Pulmonary:      Effort: Pulmonary effort is normal. No respiratory distress.      Breath sounds: Normal breath sounds. No wheezing or rales.   Abdominal:      Palpations: Abdomen is soft. There is no mass.      Tenderness: There is no guarding or rebound.      Comments: Abd inc line healing well  Ileostomy functional   Skin:     General: Skin is warm and dry.   Neurological:      Mental Status: She is alert and oriented to person, place, and time.            Significant Labs:  BMP (Last 3 Results):   Recent Labs   Lab 03/04/25  0634 03/04/25  1414 03/05/25  0442   * 107 93   * 128* 128*   K 2.9* 4.0 3.7   CL 83* 86* 88*   CO2 31* 26 29   BUN 21* 22* 20   CREATININE 0.7 0.7 0.8   CALCIUM 8.9 8.7 9.0   MG 1.8 1.8 2.0     CBC (Last 3 Results):   Recent Labs   Lab 03/03/25  0435 03/04/25  0634 03/05/25  0442   WBC 9.62 8.47 8.61   RBC 4.05 3.70* 3.73*   HGB 9.6* 8.7* 8.7*   HCT 32.3* 28.7* 29.4*    511* 591*   MCV 80* 78* 79*   MCH 23.7* 23.5* 23.3*   MCHC 29.7* 30.3* 29.6*       Significant Diagnostics:  None  Assessment/Plan:     * Malnutrition of moderate degree  Nutrition consulted. Most recent weight and BMI monitored-     Measurements:  Wt Readings from Last 1  Encounters:   02/24/25 64.5 kg (142 lb 3.2 oz)   Body mass index is 22.27 kg/m².    Patient has been screened and assessed by RD.    Malnutrition Type:  Context:    Level:      Malnutrition Characteristic Summary:  Weight Loss (Malnutrition): greater than 5% in 1 month (7.8% weight loss x 1 month)    Interventions/Recommendations (treatment strategy):  Cycle tpn 16 hours  Plan for home ton         Crohn's disease of both small and large intestine with fistula  Candida Cardona is a 44 y.o. Now s/p completion proctectomy and excision of fistula tract on 2/22/25.       - low res diet tomorrow, full liquids today per patient, okay for sodas if she is not nauseous  - miconazole cream for breast rash  - Imodium 4mg QID increased  - Multi-modal pain control; iv dilaudid dced, oxy 10&15, added robaxin  - PRN nausea meds  - TPN 16 hour, Replaced lytes with 1pm recheck and adjusted TPN  - PPI  - DVT prophylaxis   - OOBTC/Ambulate  - IS  - SW to help with dispo to a safe place, plan for DC by Thursday on home TPN    Dispo: maintain care on [facility] Mathis NP  Colorectal Surgery  [facility] - [facility]

## 2025-03-05 NOTE — SUBJECTIVE & OBJECTIVE
Subjective:     Interval History: no acute events overnite, weaning off IV dilaudid, cont tpn, social service working on discharge plans, ileostomy functional    Post-Op Info:  Procedure(s) (LRB):  PROCTECTOMY, ABDOMINOPERINEAL (N/A)  CYSTOSCOPY, WITH RETROGRADE PYELOGRAM AND URETERAL STENT INSERTION (Bilateral)  CYSTOGRAM  EXCISION, SMALL INTESTINE  CREATION, ILEOSTOMY   11 Days Post-Op      Medications:  Continuous Infusions:   TPN ADULT CENTRAL LINE CUSTOM   Intravenous Continuous 63 mL/hr at 03/04/25 2233 New Bag at 03/04/25 2233    TPN ADULT CENTRAL LINE CUSTOM   Intravenous Continuous         Scheduled Meds:   acetaminophen  1,000 mg Oral Q8H    diphenoxylate-atropine 2.5-0.025 mg  1 tablet Oral BID    fat emulsion 20%  250 mL Intravenous Daily    gabapentin  300 mg Oral TID    heparin (porcine)  5,000 Units Subcutaneous Q8H    loperamide  4 mg Oral QID    methocarbamoL  500 mg Oral QID    miconazole NITRATE 2 %   Topical (Top) BID    pantoprazole  40 mg Intravenous Daily     PRN Meds:   acetaminophen tablet 1,000 mg    diphenoxylate-atropine 2.5-0.025 mg per tablet 1 tablet    fat emulsion 20% infusion 250 mL    gabapentin capsule 300 mg    heparin (porcine) injection 5,000 Units    loperamide capsule 4 mg    methocarbamoL tablet 500 mg    miconazole NITRATE 2 % top powder    pantoprazole injection 40 mg        Objective:     Vital Signs (Most Recent):  Temp: 97.7 °F (36.5 °C) (03/05/25 0433)  Pulse: 94 (03/05/25 0825)  Resp: 16 (03/05/25 1150)  BP: (!) 101/56 (03/05/25 0825)  SpO2: 97 % (03/05/25 0825) Vital Signs (24h Range):  Temp:  [97.7 °F (36.5 °C)-98.1 °F (36.7 °C)] 97.7 °F (36.5 °C)  Pulse:  [75-94] 94  Resp:  [16-18] 16  SpO2:  [93 %-99 %] 97 %  BP: ()/(56-70) 101/56     Intake/Output - Last 3 Shifts         03/03 0700  03/04 0659 03/04 0700 03/05 0659 03/05 0700  03/06 0659    P.O. 1200 780     IV Piggyback 400 300     TPN 1233.7 497     Total Intake(mL/kg) 2833.7 (43.9) 1577 (24.4)      Urine (mL/kg/hr) 0 (0) 0 (0)     Emesis/NG output 0      Drains 50 0     Stool 1200 1600     Total Output 1250 1600     Net +1583.7 -23            Urine Occurrence 6 x 8 x     Emesis Occurrence 1 x               Physical Exam  Vitals and nursing note reviewed.   Constitutional:       Appearance: She is well-developed.   HENT:      Head: Normocephalic.   Eyes:      Pupils: Pupils are equal, round, and reactive to light.   Cardiovascular:      Rate and Rhythm: Normal rate and regular rhythm.      Heart sounds: Normal heart sounds.   Pulmonary:      Effort: Pulmonary effort is normal. No respiratory distress.      Breath sounds: Normal breath sounds. No wheezing or rales.   Abdominal:      Palpations: Abdomen is soft. There is no mass.      Tenderness: There is no guarding or rebound.      Comments: Abd inc line healing well  Ileostomy functional   Skin:     General: Skin is warm and dry.   Neurological:      Mental Status: She is alert and oriented to person, place, and time.            Significant Labs:  BMP (Last 3 Results):   Recent Labs   Lab 03/04/25  0634 03/04/25  1414 03/05/25  0442   * 107 93   * 128* 128*   K 2.9* 4.0 3.7   CL 83* 86* 88*   CO2 31* 26 29   BUN 21* 22* 20   CREATININE 0.7 0.7 0.8   CALCIUM 8.9 8.7 9.0   MG 1.8 1.8 2.0     CBC (Last 3 Results):   Recent Labs   Lab 03/03/25  0435 03/04/25  0634 03/05/25  0442   WBC 9.62 8.47 8.61   RBC 4.05 3.70* 3.73*   HGB 9.6* 8.7* 8.7*   HCT 32.3* 28.7* 29.4*    511* 591*   MCV 80* 78* 79*   MCH 23.7* 23.5* 23.3*   MCHC 29.7* 30.3* 29.6*       Significant Diagnostics:  None

## 2025-03-06 PROCEDURE — 63600175 PHARM REV CODE 636 W HCPCS: Mod: JZ,TB

## 2025-03-06 PROCEDURE — B4185 PARENTERAL SOL 10 GM LIPIDS: HCPCS | Performed by: STUDENT IN AN ORGANIZED HEALTH CARE EDUCATION/TRAINING PROGRAM

## 2025-03-06 PROCEDURE — 20600001 HC STEP DOWN PRIVATE ROOM

## 2025-03-06 PROCEDURE — 63600175 PHARM REV CODE 636 W HCPCS: Performed by: STUDENT IN AN ORGANIZED HEALTH CARE EDUCATION/TRAINING PROGRAM

## 2025-03-06 PROCEDURE — 25000003 PHARM REV CODE 250: Performed by: STUDENT IN AN ORGANIZED HEALTH CARE EDUCATION/TRAINING PROGRAM

## 2025-03-06 PROCEDURE — 25000003 PHARM REV CODE 250

## 2025-03-06 PROCEDURE — 25000003 PHARM REV CODE 250: Performed by: NURSE PRACTITIONER

## 2025-03-06 PROCEDURE — A4217 STERILE WATER/SALINE, 500 ML: HCPCS | Performed by: STUDENT IN AN ORGANIZED HEALTH CARE EDUCATION/TRAINING PROGRAM

## 2025-03-06 RX ORDER — OXYCODONE HYDROCHLORIDE 10 MG/1
10 TABLET ORAL EVERY 4 HOURS PRN
Refills: 0 | Status: DISCONTINUED | OUTPATIENT
Start: 2025-03-06 | End: 2025-03-07

## 2025-03-06 RX ADMIN — HEPARIN SODIUM 5000 UNITS: 5000 INJECTION INTRAVENOUS; SUBCUTANEOUS at 05:03

## 2025-03-06 RX ADMIN — PANTOPRAZOLE SODIUM 40 MG: 40 INJECTION, POWDER, FOR SOLUTION INTRAVENOUS at 09:03

## 2025-03-06 RX ADMIN — LOPERAMIDE HYDROCHLORIDE 4 MG: 2 CAPSULE ORAL at 08:03

## 2025-03-06 RX ADMIN — OXYCODONE HYDROCHLORIDE 15 MG: 10 TABLET ORAL at 10:03

## 2025-03-06 RX ADMIN — MICONAZOLE NITRATE 2 % TOPICAL POWDER: at 08:03

## 2025-03-06 RX ADMIN — DIPHENOXYLATE HYDROCHLORIDE AND ATROPINE SULFATE 1 TABLET: .025; 2.5 TABLET ORAL at 09:03

## 2025-03-06 RX ADMIN — OXYCODONE HYDROCHLORIDE 15 MG: 10 TABLET ORAL at 02:03

## 2025-03-06 RX ADMIN — HEPARIN SODIUM 5000 UNITS: 5000 INJECTION INTRAVENOUS; SUBCUTANEOUS at 02:03

## 2025-03-06 RX ADMIN — GABAPENTIN 300 MG: 300 CAPSULE ORAL at 08:03

## 2025-03-06 RX ADMIN — DIPHENOXYLATE HYDROCHLORIDE AND ATROPINE SULFATE 1 TABLET: .025; 2.5 TABLET ORAL at 08:03

## 2025-03-06 RX ADMIN — OXYCODONE HYDROCHLORIDE 15 MG: 10 TABLET ORAL at 08:03

## 2025-03-06 RX ADMIN — GABAPENTIN 300 MG: 300 CAPSULE ORAL at 02:03

## 2025-03-06 RX ADMIN — MICONAZOLE NITRATE 2 % TOPICAL POWDER: at 09:03

## 2025-03-06 RX ADMIN — I.V. FAT EMULSION 250 ML: 20 EMULSION INTRAVENOUS at 09:03

## 2025-03-06 RX ADMIN — OXYCODONE 15 MG: 5 TABLET ORAL at 05:03

## 2025-03-06 RX ADMIN — ALTEPLASE 2 MG: 2.2 INJECTION, POWDER, LYOPHILIZED, FOR SOLUTION INTRAVENOUS at 09:03

## 2025-03-06 RX ADMIN — GABAPENTIN 300 MG: 300 CAPSULE ORAL at 09:03

## 2025-03-06 RX ADMIN — LOPERAMIDE HYDROCHLORIDE 4 MG: 2 CAPSULE ORAL at 05:03

## 2025-03-06 RX ADMIN — METHOCARBAMOL 500 MG: 500 TABLET ORAL at 02:03

## 2025-03-06 RX ADMIN — HEPARIN SODIUM 5000 UNITS: 5000 INJECTION INTRAVENOUS; SUBCUTANEOUS at 09:03

## 2025-03-06 RX ADMIN — ACETAMINOPHEN 1000 MG: 500 TABLET ORAL at 08:03

## 2025-03-06 RX ADMIN — LOPERAMIDE HYDROCHLORIDE 4 MG: 2 CAPSULE ORAL at 09:03

## 2025-03-06 RX ADMIN — METHOCARBAMOL 500 MG: 500 TABLET ORAL at 09:03

## 2025-03-06 RX ADMIN — ACETAMINOPHEN 1000 MG: 500 TABLET ORAL at 02:03

## 2025-03-06 RX ADMIN — METHOCARBAMOL 500 MG: 500 TABLET ORAL at 08:03

## 2025-03-06 RX ADMIN — OXYCODONE 15 MG: 5 TABLET ORAL at 02:03

## 2025-03-06 RX ADMIN — ACETAMINOPHEN 1000 MG: 500 TABLET ORAL at 05:03

## 2025-03-06 RX ADMIN — METHOCARBAMOL 500 MG: 500 TABLET ORAL at 05:03

## 2025-03-06 RX ADMIN — MAGNESIUM SULFATE HEPTAHYDRATE: 500 INJECTION, SOLUTION INTRAMUSCULAR; INTRAVENOUS at 09:03

## 2025-03-06 RX ADMIN — ONDANSETRON 8 MG: 8 TABLET, ORALLY DISINTEGRATING ORAL at 03:03

## 2025-03-06 RX ADMIN — LOPERAMIDE HYDROCHLORIDE 4 MG: 2 CAPSULE ORAL at 02:03

## 2025-03-06 NOTE — PROGRESS NOTES
"Pt laying in bed, asked that she is monitored tomorrow while she changes the bag. Has "anxiety" regarding what clothing to wear and questions about swimming. Pt expressed she was "made fun of" when she had ostomy previously and this is the cause of her anxiety.  WC Nurse advised pt several different ideas: high-waisted pants / shorts, compression, loose clothing. Experimenting with bag placement to see if a small adjustment can assist in concealing the pouch.    Pt was not open to these suggestions, plan to discuss further tomorrow.      03/06/25 1300   WOCN Assessment   WOCN Total Time (mins) 15   Visit Date 03/06/25   Visit Time 1300   Consult Type Follow Up   WOCN Speciality Ostomy   WOCN List ileostomy   Wound moisture;At risk for pressure Injury;surgical   Ostomy Type Ileostomy   Intervention chart review;assessed   Teaching on-going     "

## 2025-03-06 NOTE — PROGRESS NOTES
[facility]  Colorectal Surgery  Progress Note    Patient Name: Candida Cardona  MRN: [id number]  Admission Date: 2/21/2025  Hospital Length of Stay: 13 days  Attending Physician: Robin Atkins MD    Subjective:     Interval History: emesis overnigth after overeating, advised po is more for comfort, TPN will provide fluid and nutritional support, she remains anxious and catastrophizing post op complications    Post-Op Info:  Procedure(s) (LRB):  PROCTECTOMY, ABDOMINOPERINEAL (N/A)  CYSTOSCOPY, WITH RETROGRADE PYELOGRAM AND URETERAL STENT INSERTION (Bilateral)  CYSTOGRAM  EXCISION, SMALL INTESTINE  CREATION, ILEOSTOMY   12 Days Post-Op      Medications:  Continuous Infusions:   TPN ADULT CENTRAL LINE CUSTOM   Intravenous Continuous 63 mL/hr at 03/05/25 2208 New Bag at 03/05/25 2208    TPN ADULT CENTRAL LINE CUSTOM   Intravenous Continuous         Scheduled Meds:   acetaminophen  1,000 mg Oral Q8H    diphenoxylate-atropine 2.5-0.025 mg  1 tablet Oral BID    fat emulsion 20%  250 mL Intravenous Daily    fat emulsion 20%  250 mL Intravenous Daily    gabapentin  300 mg Oral TID    heparin (porcine)  5,000 Units Subcutaneous Q8H    loperamide  4 mg Oral QID    methocarbamoL  500 mg Oral QID    miconazole NITRATE 2 %   Topical (Top) BID    pantoprazole  40 mg Intravenous Daily     PRN Meds:   acetaminophen tablet 1,000 mg    diphenoxylate-atropine 2.5-0.025 mg per tablet 1 tablet    fat emulsion 20% infusion 250 mL    fat emulsion 20% infusion 250 mL    gabapentin capsule 300 mg    heparin (porcine) injection 5,000 Units    loperamide capsule 4 mg    methocarbamoL tablet 500 mg    miconazole NITRATE 2 % top powder    pantoprazole injection 40 mg        Objective:     Vital Signs (Most Recent):  Temp: 97.6 °F (36.4 °C) (03/06/25 0346)  Pulse: 93 (03/06/25 0346)  Resp: 18 (03/06/25 0536)  BP: 106/74 (03/06/25 0346)  SpO2: 95 % (03/06/25 0346) Vital Signs (24h Range):  Temp:  [97.3 °F (36.3 °C)-99.3 °F (37.4 °C)]  97.6 °F (36.4 °C)  Pulse:  [82-93] 93  Resp:  [16-18] 18  SpO2:  [95 %-100 %] 95 %  BP: ()/(55-74) 106/74     Intake/Output - Last 3 Shifts         03/04 0700  03/05 0659 03/05 0700 03/06 0659 03/06 0700  03/07 0659    P.O. 780 1200     IV Piggyback 300      .1 1014.4     Total Intake(mL/kg) 1577.1 (24.5) 2214.4 (34.3)     Urine (mL/kg/hr) 0 (0) 0 (0)     Emesis/NG output  300     Drains 0      Stool 1600 1525     Total Output 1600 1825     Net -22.9 +389.4            Urine Occurrence 8 x 3 x     Stool Occurrence  0 x         General: alert, awake, in no acute distress  HEENT: EOMI, no scleral icterus, CN grossly intact, poor dentition  Cardiac: RRR, BP stable, warm well perfused  Pulm: non labored breathing on room air, no audible wheeze  Abdomen: soft, non distended, appropriately TTP, incision clean without drainage or concern for infection, ostomy productive  Perineum deferred today: incision has been clean and intact with sutures in place, minimal to zero drainage  Extremities: moving all four extremities  Skin: no obvious rashes  Neuro: no obvious deficits  Psych: anxious    Significant Labs:  CBC (Last 3 Results):   Recent Labs   Lab 03/03/25  0435 03/04/25  0634 03/05/25  0442   WBC 9.62 8.47 8.61   RBC 4.05 3.70* 3.73*   HGB 9.6* 8.7* 8.7*   HCT 32.3* 28.7* 29.4*    511* 591*   MCV 80* 78* 79*   MCH 23.7* 23.5* 23.3*   MCHC 29.7* 30.3* 29.6*     CMP (Last 3 Results):   Recent Labs   Lab 03/04/25  0634 03/04/25  1414 03/05/25  0442   * 107 93   CALCIUM 8.9 8.7 9.0   ALBUMIN 2.1*  --  2.2*   PROT 6.4  --  6.7   * 128* 128*   K 2.9* 4.0 3.7   CO2 31* 26 29   CL 83* 86* 88*   BUN 21* 22* 20   CREATININE 0.7 0.7 0.8   ALKPHOS 115  --  132   ALT 8*  --  6*   AST 17  --  18   BILITOT 0.4  --  0.4       Significant Diagnostics:  None    Assessment/Plan:     * Malnutrition of moderate degree  Nutrition consulted. Most recent weight and BMI monitored-     Measurements:  Wt Readings  from Last 1 Encounters:   02/24/25 64.5 kg (142 lb 3.2 oz)   Body mass index is 22.27 kg/m².    Patient has been screened and assessed by RD.    Malnutrition Type:  Context:    Level:      Malnutrition Characteristic Summary:  Weight Loss (Malnutrition): greater than 5% in 1 month (7.8% weight loss x 1 month)    Interventions/Recommendations (treatment strategy):  Cycle tpn 16 hours  Plan for home ton         Crohn's disease of both small and large intestine with fistula  Candida Cardona is a 44 y.o. Now s/p completion proctectomy and excision of fistula tract on 2/22/25.       - low res diet but patient will self assess what she can tolerate po  - miconazole cream for breast rash  - Imodium 4mg QID and lomotil 1 tab bid for now  - Multi-modal pain control; tylenol robaxin yuni sche, oxys prn spaced to q4 from q4, tomorrow will be q6  - PRN nausea meds  - TPN 16 hour  - PPI  - DVT prophylaxis   - OOBTC/Ambulate  - IS  - SW to help with dispo to a safe place, hard date established by ptn for DC is Monday    Dispo: maintain care on [facility] Ramos MD  Colorectal Surgery  [facility] - [facility]

## 2025-03-06 NOTE — SUBJECTIVE & OBJECTIVE
Subjective:     Interval History: emesis overnigth after overeating, advised po is more for comfort, TPN will provide fluid and nutritional support, she remains anxious and catastrophizing post op complications    Post-Op Info:  Procedure(s) (LRB):  PROCTECTOMY, ABDOMINOPERINEAL (N/A)  CYSTOSCOPY, WITH RETROGRADE PYELOGRAM AND URETERAL STENT INSERTION (Bilateral)  CYSTOGRAM  EXCISION, SMALL INTESTINE  CREATION, ILEOSTOMY   12 Days Post-Op      Medications:  Continuous Infusions:   TPN ADULT CENTRAL LINE CUSTOM   Intravenous Continuous 63 mL/hr at 03/05/25 2208 New Bag at 03/05/25 2208    TPN ADULT CENTRAL LINE CUSTOM   Intravenous Continuous         Scheduled Meds:   acetaminophen  1,000 mg Oral Q8H    diphenoxylate-atropine 2.5-0.025 mg  1 tablet Oral BID    fat emulsion 20%  250 mL Intravenous Daily    fat emulsion 20%  250 mL Intravenous Daily    gabapentin  300 mg Oral TID    heparin (porcine)  5,000 Units Subcutaneous Q8H    loperamide  4 mg Oral QID    methocarbamoL  500 mg Oral QID    miconazole NITRATE 2 %   Topical (Top) BID    pantoprazole  40 mg Intravenous Daily     PRN Meds:   acetaminophen tablet 1,000 mg    diphenoxylate-atropine 2.5-0.025 mg per tablet 1 tablet    fat emulsion 20% infusion 250 mL    fat emulsion 20% infusion 250 mL    gabapentin capsule 300 mg    heparin (porcine) injection 5,000 Units    loperamide capsule 4 mg    methocarbamoL tablet 500 mg    miconazole NITRATE 2 % top powder    pantoprazole injection 40 mg        Objective:     Vital Signs (Most Recent):  Temp: 97.6 °F (36.4 °C) (03/06/25 0346)  Pulse: 93 (03/06/25 0346)  Resp: 18 (03/06/25 0536)  BP: 106/74 (03/06/25 0346)  SpO2: 95 % (03/06/25 0346) Vital Signs (24h Range):  Temp:  [97.3 °F (36.3 °C)-99.3 °F (37.4 °C)] 97.6 °F (36.4 °C)  Pulse:  [82-93] 93  Resp:  [16-18] 18  SpO2:  [95 %-100 %] 95 %  BP: ()/(55-74) 106/74     Intake/Output - Last 3 Shifts         03/04 0700 03/05 0659 03/05 0700 03/06 0659 03/06  0700  03/07 0659    P.O. 780 1200     IV Piggyback 300      .1 1014.4     Total Intake(mL/kg) 1577.1 (24.5) 2214.4 (34.3)     Urine (mL/kg/hr) 0 (0) 0 (0)     Emesis/NG output  300     Drains 0      Stool 1600 1525     Total Output 1600 1825     Net -22.9 +389.4            Urine Occurrence 8 x 3 x     Stool Occurrence  0 x         General: alert, awake, in no acute distress  HEENT: EOMI, no scleral icterus, CN grossly intact, poor dentition  Cardiac: RRR, BP stable, warm well perfused  Pulm: non labored breathing on room air, no audible wheeze  Abdomen: soft, non distended, appropriately TTP, incision clean without drainage or concern for infection, ostomy productive  Perineum deferred today: incision has been clean and intact with sutures in place, minimal to zero drainage  Extremities: moving all four extremities  Skin: no obvious rashes  Neuro: no obvious deficits  Psych: anxious    Significant Labs:  CBC (Last 3 Results):   Recent Labs   Lab 03/03/25  0435 03/04/25  0634 03/05/25  0442   WBC 9.62 8.47 8.61   RBC 4.05 3.70* 3.73*   HGB 9.6* 8.7* 8.7*   HCT 32.3* 28.7* 29.4*    511* 591*   MCV 80* 78* 79*   MCH 23.7* 23.5* 23.3*   MCHC 29.7* 30.3* 29.6*     CMP (Last 3 Results):   Recent Labs   Lab 03/04/25  0634 03/04/25  1414 03/05/25  0442   * 107 93   CALCIUM 8.9 8.7 9.0   ALBUMIN 2.1*  --  2.2*   PROT 6.4  --  6.7   * 128* 128*   K 2.9* 4.0 3.7   CO2 31* 26 29   CL 83* 86* 88*   BUN 21* 22* 20   CREATININE 0.7 0.7 0.8   ALKPHOS 115  --  132   ALT 8*  --  6*   AST 17  --  18   BILITOT 0.4  --  0.4       Significant Diagnostics:  None

## 2025-03-06 NOTE — ASSESSMENT & PLAN NOTE
Candida Cardona is a 44 y.o. Now s/p completion proctectomy and excision of fistula tract on 2/22/25.       - low res diet but patient will self assess what she can tolerate po  - miconazole cream for breast rash  - Imodium 4mg QID and lomotil 1 tab bid for now  - Multi-modal pain control; tylenol robaxin yuni sche, oxys prn spaced to q4 from q4, tomorrow will be q6  - PRN nausea meds  - TPN 16 hour  - PPI  - DVT prophylaxis   - OOBTC/Ambulate  - IS  - SW to help with dispo to a safe place, hard date established by ptn for DC is Monday    Dispo: maintain care on [facility]

## 2025-03-06 NOTE — PLAN OF CARE
[facility] Plan of Care Note    Dx malnutrition of moderate degree    Shift Events: up to ambulate in room, pain mildly controlled with PRN medications ATC, nausea controlled w/PRN medications, diet advance to LFLR - mild tolerance    Goals of Care: safety, pain management    Neuro: AAOx4    Vital Signs: VSS    Respiratory: RA    Diet: LFLR    Is patient tolerating current diet? Mild tolerance, little appetite    GTTS: TPN 10p-2p    Urine Output/Bowel Movement: up to toilet, adequate UO, ileostomy w/ 975 ml     Drains/Tubes/Tube Feeds (include total output/shift): n/a    Lines: R femoral tunnel catheter    Accuchecks:n/a    Skin: midline incision w/DB    Activity level? independent    Any scheduled procedures? N/a    Any safety concerns? IV pole      Problem: Adult Inpatient Plan of Care  Goal: Absence of Hospital-Acquired Illness or Injury  Outcome: Progressing  Goal: Optimal Comfort and Wellbeing  Outcome: Progressing  Goal: Readiness for Transition of Care  Outcome: Progressing     Problem: Pain Acute  Goal: Optimal Pain Control and Function  Outcome: Progressing     Problem: Fall Injury Risk  Goal: Absence of Fall and Fall-Related Injury  Outcome: Progressing     Problem: Infection  Goal: Absence of Infection Signs and Symptoms  Outcome: Progressing     Problem: Skin Injury Risk Increased  Goal: Skin Health and Integrity  Outcome: Progressing

## 2025-03-06 NOTE — NURSING
Patient had x 1 emesis (yellow )@ shift change  and then tolerated her meds PO with out any emesis,  Able to tolerate 2 juice containers     x 1 Emesis @3 am, Stated she felt better and request a pop cycle.  Tolerated the pop cycle with out  Emesis.      Patient stated she thinks she must of ate to much and wants Doctors to place her back on full liquids. She Also requested Not to be discharged home as she feels she is not ready.

## 2025-03-07 LAB
ALBUMIN SERPL BCP-MCNC: 2.4 G/DL (ref 3.5–5.2)
ALP SERPL-CCNC: 157 U/L (ref 40–150)
ALT SERPL W/O P-5'-P-CCNC: 7 U/L (ref 10–44)
ANION GAP SERPL CALC-SCNC: 10 MMOL/L (ref 8–16)
AST SERPL-CCNC: 28 U/L (ref 10–40)
BILIRUB SERPL-MCNC: 0.3 MG/DL (ref 0.1–1)
BUN SERPL-MCNC: 33 MG/DL (ref 6–20)
CALCIUM SERPL-MCNC: 9.3 MG/DL (ref 8.7–10.5)
CHLORIDE SERPL-SCNC: 93 MMOL/L (ref 95–110)
CO2 SERPL-SCNC: 25 MMOL/L (ref 23–29)
CREAT SERPL-MCNC: 0.8 MG/DL (ref 0.5–1.4)
EST. GFR  (NO RACE VARIABLE): >60 ML/MIN/1.73 M^2
GLUCOSE SERPL-MCNC: 129 MG/DL (ref 70–110)
MAGNESIUM SERPL-MCNC: 2.1 MG/DL (ref 1.6–2.6)
PHOSPHATE SERPL-MCNC: 3.7 MG/DL (ref 2.7–4.5)
POCT GLUCOSE: 101 MG/DL (ref 70–110)
POTASSIUM SERPL-SCNC: 3.8 MMOL/L (ref 3.5–5.1)
PROT SERPL-MCNC: 7.4 G/DL (ref 6–8.4)
SODIUM SERPL-SCNC: 128 MMOL/L (ref 136–145)

## 2025-03-07 PROCEDURE — 63600175 PHARM REV CODE 636 W HCPCS: Performed by: STUDENT IN AN ORGANIZED HEALTH CARE EDUCATION/TRAINING PROGRAM

## 2025-03-07 PROCEDURE — 84100 ASSAY OF PHOSPHORUS: CPT | Performed by: STUDENT IN AN ORGANIZED HEALTH CARE EDUCATION/TRAINING PROGRAM

## 2025-03-07 PROCEDURE — 20600001 HC STEP DOWN PRIVATE ROOM

## 2025-03-07 PROCEDURE — 25000003 PHARM REV CODE 250: Performed by: STUDENT IN AN ORGANIZED HEALTH CARE EDUCATION/TRAINING PROGRAM

## 2025-03-07 PROCEDURE — B4185 PARENTERAL SOL 10 GM LIPIDS: HCPCS | Performed by: STUDENT IN AN ORGANIZED HEALTH CARE EDUCATION/TRAINING PROGRAM

## 2025-03-07 PROCEDURE — A4217 STERILE WATER/SALINE, 500 ML: HCPCS | Performed by: STUDENT IN AN ORGANIZED HEALTH CARE EDUCATION/TRAINING PROGRAM

## 2025-03-07 PROCEDURE — 25000003 PHARM REV CODE 250

## 2025-03-07 PROCEDURE — 83735 ASSAY OF MAGNESIUM: CPT | Performed by: STUDENT IN AN ORGANIZED HEALTH CARE EDUCATION/TRAINING PROGRAM

## 2025-03-07 PROCEDURE — 63600175 PHARM REV CODE 636 W HCPCS

## 2025-03-07 PROCEDURE — 80053 COMPREHEN METABOLIC PANEL: CPT | Performed by: STUDENT IN AN ORGANIZED HEALTH CARE EDUCATION/TRAINING PROGRAM

## 2025-03-07 RX ORDER — OXYCODONE HYDROCHLORIDE 10 MG/1
10 TABLET ORAL EVERY 6 HOURS PRN
Refills: 0 | Status: DISCONTINUED | OUTPATIENT
Start: 2025-03-07 | End: 2025-03-10 | Stop reason: HOSPADM

## 2025-03-07 RX ADMIN — GABAPENTIN 300 MG: 300 CAPSULE ORAL at 09:03

## 2025-03-07 RX ADMIN — SMOFLIPID 250 ML: 6; 6; 5; 3 INJECTION, EMULSION INTRAVENOUS at 10:03

## 2025-03-07 RX ADMIN — LOPERAMIDE HYDROCHLORIDE 4 MG: 2 CAPSULE ORAL at 09:03

## 2025-03-07 RX ADMIN — MICONAZOLE NITRATE 2 % TOPICAL POWDER: at 09:03

## 2025-03-07 RX ADMIN — LOPERAMIDE HYDROCHLORIDE 4 MG: 2 CAPSULE ORAL at 01:03

## 2025-03-07 RX ADMIN — HEPARIN SODIUM 5000 UNITS: 5000 INJECTION INTRAVENOUS; SUBCUTANEOUS at 05:03

## 2025-03-07 RX ADMIN — MAGNESIUM SULFATE HEPTAHYDRATE: 500 INJECTION, SOLUTION INTRAMUSCULAR; INTRAVENOUS at 10:03

## 2025-03-07 RX ADMIN — LOPERAMIDE HYDROCHLORIDE 4 MG: 2 CAPSULE ORAL at 04:03

## 2025-03-07 RX ADMIN — DIPHENOXYLATE HYDROCHLORIDE AND ATROPINE SULFATE 1 TABLET: .025; 2.5 TABLET ORAL at 10:03

## 2025-03-07 RX ADMIN — METHOCARBAMOL 500 MG: 500 TABLET ORAL at 10:03

## 2025-03-07 RX ADMIN — ACETAMINOPHEN 1000 MG: 500 TABLET ORAL at 05:03

## 2025-03-07 RX ADMIN — LOPERAMIDE HYDROCHLORIDE 4 MG: 2 CAPSULE ORAL at 10:03

## 2025-03-07 RX ADMIN — METHOCARBAMOL 500 MG: 500 TABLET ORAL at 09:03

## 2025-03-07 RX ADMIN — GABAPENTIN 300 MG: 300 CAPSULE ORAL at 10:03

## 2025-03-07 RX ADMIN — METHOCARBAMOL 500 MG: 500 TABLET ORAL at 01:03

## 2025-03-07 RX ADMIN — OXYCODONE HYDROCHLORIDE 15 MG: 10 TABLET ORAL at 01:03

## 2025-03-07 RX ADMIN — GABAPENTIN 300 MG: 300 CAPSULE ORAL at 03:03

## 2025-03-07 RX ADMIN — PANTOPRAZOLE SODIUM 40 MG: 40 INJECTION, POWDER, FOR SOLUTION INTRAVENOUS at 10:03

## 2025-03-07 RX ADMIN — ONDANSETRON 8 MG: 8 TABLET, ORALLY DISINTEGRATING ORAL at 11:03

## 2025-03-07 RX ADMIN — HEPARIN SODIUM 5000 UNITS: 5000 INJECTION INTRAVENOUS; SUBCUTANEOUS at 09:03

## 2025-03-07 RX ADMIN — MICONAZOLE NITRATE 2 % TOPICAL POWDER: at 10:03

## 2025-03-07 RX ADMIN — HEPARIN SODIUM 5000 UNITS: 5000 INJECTION INTRAVENOUS; SUBCUTANEOUS at 01:03

## 2025-03-07 RX ADMIN — METHOCARBAMOL 500 MG: 500 TABLET ORAL at 04:03

## 2025-03-07 RX ADMIN — ACETAMINOPHEN 1000 MG: 500 TABLET ORAL at 01:03

## 2025-03-07 RX ADMIN — ACETAMINOPHEN 1000 MG: 500 TABLET ORAL at 09:03

## 2025-03-07 RX ADMIN — OXYCODONE HYDROCHLORIDE 15 MG: 10 TABLET ORAL at 04:03

## 2025-03-07 RX ADMIN — OXYCODONE HYDROCHLORIDE 15 MG: 10 TABLET ORAL at 10:03

## 2025-03-07 NOTE — ASSESSMENT & PLAN NOTE
Candida Cardona is a 44 y.o. Now s/p completion proctectomy and excision of fistula tract on 2/22/25.       - low res diet but patient will self assess what she can tolerate po  - ok to go downstairs for smoothies/cafeteria food  - miconazole cream for breast rash  - Imodium 4mg QID and lomotil 1 tab bid for now, gattex outpatient per GI  - Multi-modal pain control; tylenol robaxin yuni sche, oxys prn space out to q6h  - PRN nausea meds  - TPN 16 hour, adjust for hypoNa  - PPI  - DVT prophylaxis   - OOBTC/Ambulate  - IS  - SW to help with dispo to a safe place, hard date established by ptn for DC is Monday    Dispo: maintain care on [facility]

## 2025-03-07 NOTE — PLAN OF CARE
03/07/25 0920   Post-Acute Status   Post-Acute Authorization IV Infusion;Home Health   Home Health Status Set-up Complete/Auth obtained   IV Infusion Status Set-up Complete/Auth obtained   Hospital Resources/Appts/Education Provided Appointments scheduled and added to AVS;Post-Acute resouces added to AVS   Discharge Delays None known at this time   Discharge Plan   Discharge Plan A Home Health   Discharge Plan B Long-term acute care facility (LTAC)

## 2025-03-07 NOTE — NURSING
"[facility] Plan of Care Note    Dx:   Crohn's disease [K50.90]    Shift Events: Uneventful.    Goals of Care: ostomy care, pain mgt, safety and comfort    Neuro: AO x4    Vital Signs: /64 (BP Location: Right arm, Patient Position: Lying)   Pulse 84   Temp 98.2 °F (36.8 °C) (Oral)   Resp 17   Ht 5' 7" (1.702 m)   Wt 64.5 kg (142 lb 3.2 oz)   LMP  (LMP Unknown) Comment: complete hysterectomy  SpO2 96%   Breastfeeding No   BMI 22.27 kg/m²     Respiratory: RA    Diet: Diet Low Fiber/Residue  Dietary nutrition supplements Boost Plus Nutritional Drink - Very Vanilla, Boost Plus Nutritional Drink - Rich Chocolate    Is patient tolerating current diet? YES    GTTS: TPN ADULT CENTRAL LINE [id number] mL/hr      Urine Output/Bowel Movement:   I/O this shift:  In: -   Out: 375 [Stool:375]  Last Bowel Movement: 03/06/25      Drains/Tubes/Tube Feeds (include total output/shift):   I/O this shift:  In: -   Out: 375 [Stool:375]      Lines: Tunneled Central Line - Double Lumen       Accuchecks: none    Skin: Ileostomy RUQ Wound  Incision medial Abdomen     Fall Risk Score: 12    Activity level? Independent    Any scheduled procedures? none    Any safety concerns? Falls    Other:   "

## 2025-03-07 NOTE — PROGRESS NOTES
Andrei sergoi [facility]  Colorectal Surgery  Progress Note    Patient Name: Candida Cardona  MRN: [id number]  Admission Date: 2/21/2025  Hospital Length of Stay: 14 days  Attending Physician: Robin Atkins MD    Subjective:     Interval History: did better with po yesterday    Post-Op Info:  Procedure(s) (LRB):  PROCTECTOMY, ABDOMINOPERINEAL (N/A)  CYSTOSCOPY, WITH RETROGRADE PYELOGRAM AND URETERAL STENT INSERTION (Bilateral)  CYSTOGRAM  EXCISION, SMALL INTESTINE  CREATION, ILEOSTOMY   13 Days Post-Op      Medications:  Continuous Infusions:   TPN ADULT CENTRAL LINE CUSTOM   Intravenous Continuous 63 mL/hr at 03/06/25 2122 New Bag at 03/06/25 2122     Scheduled Meds:   acetaminophen  1,000 mg Oral Q8H    diphenoxylate-atropine 2.5-0.025 mg  1 tablet Oral BID    fat emulsion 20%  250 mL Intravenous Daily    gabapentin  300 mg Oral TID    heparin (porcine)  5,000 Units Subcutaneous Q8H    loperamide  4 mg Oral QID    methocarbamoL  500 mg Oral QID    miconazole NITRATE 2 %   Topical (Top) BID    pantoprazole  40 mg Intravenous Daily     PRN Meds:   acetaminophen tablet 1,000 mg    diphenoxylate-atropine 2.5-0.025 mg per tablet 1 tablet    fat emulsion 20% infusion 250 mL    gabapentin capsule 300 mg    heparin (porcine) injection 5,000 Units    loperamide capsule 4 mg    methocarbamoL tablet 500 mg    miconazole NITRATE 2 % top powder    pantoprazole injection 40 mg        Objective:     Vital Signs (Most Recent):  Temp: 98 °F (36.7 °C) (03/07/25 0731)  Pulse: 77 (03/07/25 0731)  Resp: 18 (03/07/25 0731)  BP: 103/69 (03/07/25 0731)  SpO2: 100 % (03/07/25 0731) Vital Signs (24h Range):  Temp:  [97.7 °F (36.5 °C)-98.3 °F (36.8 °C)] 98 °F (36.7 °C)  Pulse:  [77-99] 77  Resp:  [16-20] 18  SpO2:  [96 %-100 %] 100 %  BP: (103-107)/(60-78) 103/69     Intake/Output - Last 3 Shifts         03/05 0700 03/06 0659 03/06 0700 03/07 0659 03/07 0700 03/08 0659    P.O. 1200      IV Piggyback       TPN 1014.4      Total  Intake(mL/kg) 2214.4 (34.3)      Urine (mL/kg/hr) 0 (0) 0 (0)     Emesis/NG output 300      Drains       Stool 1525 1150     Total Output 1825 1150     Net +389.4 -1150            Urine Occurrence 3 x 3 x     Stool Occurrence 0 x 2 x         General: alert, awake, in no acute distress  HEENT: EOMI, no scleral icterus, CN grossly intact, poor dentition  Cardiac: RRR, BP stable, warm well perfused  Pulm: non labored breathing on room air, no audible wheeze  Abdomen: soft, non distended, appropriately TTP, incision clean without drainage or concern for infection, ostomy productive  Perineum: incision will mild separation but minimal drainage  Extremities: moving all four extremities  Skin: no obvious rashes  Neuro: no obvious deficits  Psych: calm this morning in good spirits    Significant Labs:  CBC (Last 3 Results):   Recent Labs   Lab 03/03/25  0435 03/04/25  0634 03/05/25  0442   WBC 9.62 8.47 8.61   RBC 4.05 3.70* 3.73*   HGB 9.6* 8.7* 8.7*   HCT 32.3* 28.7* 29.4*    511* 591*   MCV 80* 78* 79*   MCH 23.7* 23.5* 23.3*   MCHC 29.7* 30.3* 29.6*     CMP (Last 3 Results):   Recent Labs   Lab 03/04/25  0634 03/04/25  1414 03/05/25  0442 03/07/25  0503   * 107 93 129*   CALCIUM 8.9 8.7 9.0 9.3   ALBUMIN 2.1*  --  2.2* 2.4*   PROT 6.4  --  6.7 7.4   * 128* 128* 128*   K 2.9* 4.0 3.7 3.8   CO2 31* 26 29 25   CL 83* 86* 88* 93*   BUN 21* 22* 20 33*   CREATININE 0.7 0.7 0.8 0.8   ALKPHOS 115  --  132 157*   ALT 8*  --  6* 7*   AST 17  --  18 28   BILITOT 0.4  --  0.4 0.3       Significant Diagnostics:  None    Assessment/Plan:     * Malnutrition of moderate degree  Nutrition consulted. Most recent weight and BMI monitored-     Measurements:  Wt Readings from Last 1 Encounters:   02/24/25 64.5 kg (142 lb 3.2 oz)   Body mass index is 22.27 kg/m².    Patient has been screened and assessed by RD.    Malnutrition Type:  Context:    Level:      Malnutrition Characteristic Summary:  Weight Loss (Malnutrition):  greater than 5% in 1 month (7.8% weight loss x 1 month)    Interventions/Recommendations (treatment strategy):  Cycle tpn 16 hours  Plan for home ton         Crohn's disease of both small and large intestine with fistula  Candida Cardona is a 44 y.o. Now s/p completion proctectomy and excision of fistula tract on 2/22/25.       - low res diet but patient will self assess what she can tolerate po  - ok to go downstairs for smoothies/cafeteria food  - miconazole cream for breast rash  - Imodium 4mg QID and lomotil 1 tab bid for now  - Multi-modal pain control; tylenol robaxin yuni sche, oxys prn space out to q6h  - PRN nausea meds  - TPN 16 hour, adjust for hypoNa  - PPI  - DVT prophylaxis   - OOBTC/Ambulate  - IS  - SW to help with dispo to a safe place, hard date established by ptn for DC is Monday    Dispo: maintain care on INDIRA Ramos MD  Colorectal Surgery  Andrei Lawrence - YURY

## 2025-03-07 NOTE — SUBJECTIVE & OBJECTIVE
Subjective:     Interval History: did better with po yesterday    Post-Op Info:  Procedure(s) (LRB):  PROCTECTOMY, ABDOMINOPERINEAL (N/A)  CYSTOSCOPY, WITH RETROGRADE PYELOGRAM AND URETERAL STENT INSERTION (Bilateral)  CYSTOGRAM  EXCISION, SMALL INTESTINE  CREATION, ILEOSTOMY   13 Days Post-Op      Medications:  Continuous Infusions:   TPN ADULT CENTRAL LINE CUSTOM   Intravenous Continuous 63 mL/hr at 03/06/25 2122 New Bag at 03/06/25 2122     Scheduled Meds:   acetaminophen  1,000 mg Oral Q8H    diphenoxylate-atropine 2.5-0.025 mg  1 tablet Oral BID    fat emulsion 20%  250 mL Intravenous Daily    gabapentin  300 mg Oral TID    heparin (porcine)  5,000 Units Subcutaneous Q8H    loperamide  4 mg Oral QID    methocarbamoL  500 mg Oral QID    miconazole NITRATE 2 %   Topical (Top) BID    pantoprazole  40 mg Intravenous Daily     PRN Meds:   acetaminophen tablet 1,000 mg    diphenoxylate-atropine 2.5-0.025 mg per tablet 1 tablet    fat emulsion 20% infusion 250 mL    gabapentin capsule 300 mg    heparin (porcine) injection 5,000 Units    loperamide capsule 4 mg    methocarbamoL tablet 500 mg    miconazole NITRATE 2 % top powder    pantoprazole injection 40 mg        Objective:     Vital Signs (Most Recent):  Temp: 98 °F (36.7 °C) (03/07/25 0731)  Pulse: 77 (03/07/25 0731)  Resp: 18 (03/07/25 0731)  BP: 103/69 (03/07/25 0731)  SpO2: 100 % (03/07/25 0731) Vital Signs (24h Range):  Temp:  [97.7 °F (36.5 °C)-98.3 °F (36.8 °C)] 98 °F (36.7 °C)  Pulse:  [77-99] 77  Resp:  [16-20] 18  SpO2:  [96 %-100 %] 100 %  BP: (103-107)/(60-78) 103/69     Intake/Output - Last 3 Shifts         03/05 0700  03/06 0659 03/06 0700  03/07 0659 03/07 0700  03/08 0659    P.O. 1200      IV Piggyback       TPN 1014.4      Total Intake(mL/kg) 2214.4 (34.3)      Urine (mL/kg/hr) 0 (0) 0 (0)     Emesis/NG output 300      Drains       Stool 1525 1150     Total Output 1825 1150     Net +389.4 -1150            Urine Occurrence 3 x 3 x     Stool  Occurrence 0 x 2 x         General: alert, awake, in no acute distress  HEENT: EOMI, no scleral icterus, CN grossly intact, poor dentition  Cardiac: RRR, BP stable, warm well perfused  Pulm: non labored breathing on room air, no audible wheeze  Abdomen: soft, non distended, appropriately TTP, incision clean without drainage or concern for infection, ostomy productive  Perineum: incision will mild separation but minimal drainage  Extremities: moving all four extremities  Skin: no obvious rashes  Neuro: no obvious deficits  Psych: calm this morning in good spirits    Significant Labs:  CBC (Last 3 Results):   Recent Labs   Lab 03/03/25  0435 03/04/25  0634 03/05/25  0442   WBC 9.62 8.47 8.61   RBC 4.05 3.70* 3.73*   HGB 9.6* 8.7* 8.7*   HCT 32.3* 28.7* 29.4*    511* 591*   MCV 80* 78* 79*   MCH 23.7* 23.5* 23.3*   MCHC 29.7* 30.3* 29.6*     CMP (Last 3 Results):   Recent Labs   Lab 03/04/25  0634 03/04/25  1414 03/05/25  0442 03/07/25  0503   * 107 93 129*   CALCIUM 8.9 8.7 9.0 9.3   ALBUMIN 2.1*  --  2.2* 2.4*   PROT 6.4  --  6.7 7.4   * 128* 128* 128*   K 2.9* 4.0 3.7 3.8   CO2 31* 26 29 25   CL 83* 86* 88* 93*   BUN 21* 22* 20 33*   CREATININE 0.7 0.7 0.8 0.8   ALKPHOS 115  --  132 157*   ALT 8*  --  6* 7*   AST 17  --  18 28   BILITOT 0.4  --  0.4 0.3       Significant Diagnostics:  None

## 2025-03-07 NOTE — PROGRESS NOTES
Andrei Lawrence [facility]    Wound Care     Patient Name:  Candida Cardona  MRN:  [id number]  Date: 3/7/2025  Diagnosis: Malnutrition of moderate degree     History:  Past Medical History:   Diagnosis Date    Anxiety     Bipolar 1 disorder     Cholelithiasis     Coronary artery disease     NO STENT    Crohn's disease with ileorectal anastomotic stricture (ileum and rectum)     Depression     Epilepsy     Fibromyalgia     GERD (gastroesophageal reflux disease)     Interstitial cystitis     Kidney stones     Mass, ovarian 11/22/2016    Osteoporosis     PAF (paroxysmal atrial fibrillation)     Pancreatitis     S/P ERIK-BSO (total abdominal hysterectomy and bilateral salpingo-oophorectomy) 12/19/2016     Social History[1]  Precautions:  Allergies as of 02/17/2025 - Reviewed 12/12/2024   Allergen Reaction Noted    Ciprofloxacin Shortness Of Breath and Itching 02/09/2017    Keppra [levetiracetam] Other (See Comments) 09/25/2019    Mercaptopurine analogues (thiopurines) Hives, Diarrhea, and Nausea And Vomiting 09/17/2024    Imuran [azathioprine sodium] Diarrhea and Nausea And Vomiting 02/28/2014    [place]  03/05/2014    Imuran [azathioprine] Nausea And Vomiting 05/03/2019    Ketorolac Itching and Hives 05/20/2020    Adhesive Itching 11/03/2018    Iodinated contrast media Hives 07/08/2021       [facility] Assessment Details / Treatment:    Patient seen for wound care: Follow-up   Chart reviewed for this encounter.   Labs:   WBC (K/uL)   Date Value   03/05/2025 8.61   03/04/2025 8.47     Glucose   Date Value   03/07/2025 129 mg/dL (H)   03/05/2025 93 mg/dL   02/03/2017 100 MG/DL   02/01/2017 98 MG/DL     Albumin   Date Value   03/07/2025 2.4 g/dL (L)   03/05/2025 2.2 g/dL (L)   02/03/2017 4.3 G/DL   02/01/2017 3.5 G/DL     Vito Score: 22    Narrative:  Pt seen for ostomy follow-up and pt agreed to assessment  Chart reviewed for this encounter.   See Flow Sheet for additional documentation and media.    Pouch removed using adhesive  remover  Stoma and peristomal area cleansed w/warm water and 4x4 gauze.  Gently patted skin dry and measured stoma, 35mm oval  Peristomal area appears red and inflamed, likely d/t stoma size change, applied Cavilon skin barrier applied to peristomal area.  Pouch cut to the size of the stoma.  Pt applied hand for a few minutes to ensure good seal.     Applied with no difficulties.  Will follow up with patient daily.  Nursing to continue care.    Pt accomplished the majority of this independently with minimal instruction.     RECOMMENDATIONS:  Bedside nurse assess for acute changes (purulence, increased redness/swelling, increased drainage, malodor, increased pain, pallor, necrosis) please contact physician on any acute changes.      Discussed POC with patient and primary nurse.   See EMR for orders & patient education.     Discussed nutrition and the role of protein in wound healing with the patient. Instructed patient to optimize protein for wound healing.     Bedside nursing to continue care, dressing changes, & continue monitoring.  Bedside nursing to maintain pressure injury prevention interventions, (PIP).     Recommendations made to primary team for above plan.    Thank you for the consult. Wound Care will continue to follow.     03/07/25 1415   WOCN Assessment   WOCN Total Time (mins) 30   Visit Date 03/07/25   Visit Time 1415   Consult Type Follow Up   WOCN Speciality Ostomy   WOCN List ileostomy   Wound surgical;At risk for pressure Injury   Ostomy Type Ileostomy   Procedure ostomy pouch   Intervention chart review;assessed   Teaching on-going        Ileostomy 02/22/25 1618 other (see comments) RUQ   Placement Date/Time: 02/22/25 1618   Present Prior to Hospital Arrival?: No  Inserted by: MD  Ileostomy Type: (c) other (see comments)  Location: RUQ   Wound Image    Stoma Appearance rosebud appearance;round;red;protruding above skin level;oval   Stoma Size (in) 35mm   Appliance 1-piece;no leakage;changed    Accessories/Skin Care cleansed w/ water;skin sealant   Treatment Site care;Placement checked;Bag change   Stoma Function flatus;stool   Peristomal Assessment Excoriation;Moist;Red   Tolerance signs/symptoms of discomfort;assisted w/ appliance change;independent w/ appliance change;assisted w/ stoma care;independent w/ stoma care   Output (mL) 150 mL                        [1]   Social History  Socioeconomic History    Marital status: Single    Number of children: 0   Tobacco Use    Smoking status: Never    Smokeless tobacco: Never   Substance and Sexual Activity    Alcohol use: No    Drug use: No    Sexual activity: Yes     Partners: Male   Social History Narrative    Lives with significant other     Social Drivers of Health     Financial Resource Strain: Low Risk  (2/21/2025)    Overall Financial Resource Strain (CARDIA)     Difficulty of Paying Living Expenses: Not very hard   Food Insecurity: No Food Insecurity (2/21/2025)    Hunger Vital Sign     Worried About Running Out of Food in the Last Year: Never true     Ran Out of Food in the Last Year: Never true   Physical Activity: Inactive (2/21/2025)    Exercise Vital Sign     Days of Exercise per Week: 0 days     Minutes of Exercise per Session: 0 min   Stress: Stress Concern Present (2/21/2025)    [employer] [place] [employer] - Occupational Stress Questionnaire     Feeling of Stress : To some extent   Housing Stability: Low Risk  (2/21/2025)    Housing Stability Vital Sign     Unable to Pay for Housing in the Last Year: No     Homeless in the Last Year: No

## 2025-03-07 NOTE — PLAN OF CARE
03/07/25 0921   Discharge Reassessment   Assessment Type Discharge Planning Reassessment   Did the patient's condition or plan change since previous assessment? No   Discharge Plan discussed with: Patient   Communicated BIJAN with patient/caregiver Yes   Discharge Plan A Home Health   Discharge Plan B Long-term acute care facility (LTAC)   DME Needed Upon Discharge  none   Transition of Care Barriers Social;Does not adhere to care plan   Why the patient remains in the hospital Requires continued medical care   Post-Acute Status   Post-Acute Authorization IV Infusion;Home Health   Home Health Status Set-up Complete/Auth obtained   IV Infusion Status Set-up Complete/Auth obtained   Hospital Resources/Appts/Education Provided Appointments scheduled and added to AVS;Post-Acute resouces added to AVS   Discharge Delays None known at this time

## 2025-03-08 PROCEDURE — 63600175 PHARM REV CODE 636 W HCPCS: Performed by: STUDENT IN AN ORGANIZED HEALTH CARE EDUCATION/TRAINING PROGRAM

## 2025-03-08 PROCEDURE — 25000003 PHARM REV CODE 250: Performed by: STUDENT IN AN ORGANIZED HEALTH CARE EDUCATION/TRAINING PROGRAM

## 2025-03-08 PROCEDURE — 25000003 PHARM REV CODE 250

## 2025-03-08 PROCEDURE — 63600175 PHARM REV CODE 636 W HCPCS

## 2025-03-08 PROCEDURE — 20600001 HC STEP DOWN PRIVATE ROOM

## 2025-03-08 PROCEDURE — A4217 STERILE WATER/SALINE, 500 ML: HCPCS | Performed by: STUDENT IN AN ORGANIZED HEALTH CARE EDUCATION/TRAINING PROGRAM

## 2025-03-08 PROCEDURE — B4185 PARENTERAL SOL 10 GM LIPIDS: HCPCS | Performed by: STUDENT IN AN ORGANIZED HEALTH CARE EDUCATION/TRAINING PROGRAM

## 2025-03-08 RX ADMIN — LOPERAMIDE HYDROCHLORIDE 4 MG: 2 CAPSULE ORAL at 05:03

## 2025-03-08 RX ADMIN — SMOFLIPID 250 ML: 6; 6; 5; 3 INJECTION, EMULSION INTRAVENOUS at 10:03

## 2025-03-08 RX ADMIN — PROCHLORPERAZINE EDISYLATE 2.5 MG: 5 INJECTION INTRAMUSCULAR; INTRAVENOUS at 01:03

## 2025-03-08 RX ADMIN — OXYCODONE HYDROCHLORIDE 15 MG: 10 TABLET ORAL at 10:03

## 2025-03-08 RX ADMIN — ACETAMINOPHEN 1000 MG: 500 TABLET ORAL at 02:03

## 2025-03-08 RX ADMIN — LOPERAMIDE HYDROCHLORIDE 4 MG: 2 CAPSULE ORAL at 09:03

## 2025-03-08 RX ADMIN — ACETAMINOPHEN 1000 MG: 500 TABLET ORAL at 05:03

## 2025-03-08 RX ADMIN — MICONAZOLE NITRATE 2 % TOPICAL POWDER: at 10:03

## 2025-03-08 RX ADMIN — MICONAZOLE NITRATE 2 % TOPICAL POWDER: at 08:03

## 2025-03-08 RX ADMIN — HEPARIN SODIUM 5000 UNITS: 5000 INJECTION INTRAVENOUS; SUBCUTANEOUS at 02:03

## 2025-03-08 RX ADMIN — LOPERAMIDE HYDROCHLORIDE 4 MG: 2 CAPSULE ORAL at 08:03

## 2025-03-08 RX ADMIN — METHOCARBAMOL 500 MG: 500 TABLET ORAL at 08:03

## 2025-03-08 RX ADMIN — HEPARIN SODIUM 5000 UNITS: 5000 INJECTION INTRAVENOUS; SUBCUTANEOUS at 09:03

## 2025-03-08 RX ADMIN — OXYCODONE HYDROCHLORIDE 15 MG: 10 TABLET ORAL at 12:03

## 2025-03-08 RX ADMIN — GABAPENTIN 300 MG: 300 CAPSULE ORAL at 08:03

## 2025-03-08 RX ADMIN — ACETAMINOPHEN 1000 MG: 500 TABLET ORAL at 09:03

## 2025-03-08 RX ADMIN — LOPERAMIDE HYDROCHLORIDE 4 MG: 2 CAPSULE ORAL at 12:03

## 2025-03-08 RX ADMIN — HEPARIN SODIUM 5000 UNITS: 5000 INJECTION INTRAVENOUS; SUBCUTANEOUS at 06:03

## 2025-03-08 RX ADMIN — METHOCARBAMOL 500 MG: 500 TABLET ORAL at 12:03

## 2025-03-08 RX ADMIN — DIPHENOXYLATE HYDROCHLORIDE AND ATROPINE SULFATE 1 TABLET: .025; 2.5 TABLET ORAL at 09:03

## 2025-03-08 RX ADMIN — MAGNESIUM SULFATE HEPTAHYDRATE: 500 INJECTION, SOLUTION INTRAMUSCULAR; INTRAVENOUS at 10:03

## 2025-03-08 RX ADMIN — METHOCARBAMOL 500 MG: 500 TABLET ORAL at 05:03

## 2025-03-08 RX ADMIN — PROCHLORPERAZINE EDISYLATE 2.5 MG: 5 INJECTION INTRAMUSCULAR; INTRAVENOUS at 06:03

## 2025-03-08 RX ADMIN — GABAPENTIN 300 MG: 300 CAPSULE ORAL at 02:03

## 2025-03-08 RX ADMIN — GABAPENTIN 300 MG: 300 CAPSULE ORAL at 09:03

## 2025-03-08 RX ADMIN — METHOCARBAMOL 500 MG: 500 TABLET ORAL at 09:03

## 2025-03-08 RX ADMIN — ONDANSETRON 8 MG: 8 TABLET, ORALLY DISINTEGRATING ORAL at 12:03

## 2025-03-08 RX ADMIN — PANTOPRAZOLE SODIUM 40 MG: 40 INJECTION, POWDER, FOR SOLUTION INTRAVENOUS at 08:03

## 2025-03-08 RX ADMIN — OXYCODONE HYDROCHLORIDE 15 MG: 10 TABLET ORAL at 02:03

## 2025-03-08 RX ADMIN — DIPHENOXYLATE HYDROCHLORIDE AND ATROPINE SULFATE 1 TABLET: .025; 2.5 TABLET ORAL at 08:03

## 2025-03-08 NOTE — PROGRESS NOTES
Andrei sergio [facility]  Colorectal Surgery  Progress Note    Patient Name: Candida Cardona  MRN: [id number]  Admission Date: 2/21/2025  Hospital Length of Stay: 15 days  Attending Physician: Robin Atkins MD    Subjective:     Interval History: NAOE. Patient doing well, no complaints this morning. Afebrile, HDS. Ileostomy functional. Tolerating a diet without n/v. Continues on TPN.     Post-Op Info:  Procedure(s) (LRB):  PROCTECTOMY, ABDOMINOPERINEAL (N/A)  CYSTOSCOPY, WITH RETROGRADE PYELOGRAM AND URETERAL STENT INSERTION (Bilateral)  CYSTOGRAM  EXCISION, SMALL INTESTINE  CREATION, ILEOSTOMY   14 Days Post-Op      Medications:  Continuous Infusions:   TPN ADULT CENTRAL LINE CUSTOM   Intravenous Continuous 63 mL/hr at 03/07/25 2237 New Bag at 03/07/25 2237    TPN ADULT CENTRAL LINE CUSTOM   Intravenous Continuous         Scheduled Meds:   acetaminophen  1,000 mg Oral Q8H    diphenoxylate-atropine 2.5-0.025 mg  1 tablet Oral BID    gabapentin  300 mg Oral TID    heparin (porcine)  5,000 Units Subcutaneous Q8H    lipid (SMOFLIPID)  250 mL Intravenous Daily    lipid (SMOFLIPID)  250 mL Intravenous Daily    loperamide  4 mg Oral QID    methocarbamoL  500 mg Oral QID    miconazole NITRATE 2 %   Topical (Top) BID    pantoprazole  40 mg Intravenous Daily     PRN Meds:   acetaminophen tablet 1,000 mg    diphenoxylate-atropine 2.5-0.025 mg per tablet 1 tablet    gabapentin capsule 300 mg    heparin (porcine) injection 5,000 Units    lipid (SMOFLIPID) (SMOFLIPID) 20 % infusion 250 mL    lipid (SMOFLIPID) (SMOFLIPID) 20 % infusion 250 mL    loperamide capsule 4 mg    methocarbamoL tablet 500 mg    miconazole NITRATE 2 % top powder    pantoprazole injection 40 mg        Objective:     Vital Signs (Most Recent):  Temp: 98.3 °F (36.8 °C) (03/08/25 0742)  Pulse: 95 (03/08/25 0742)  Resp: 18 (03/08/25 0742)  BP: (!) 99/54 (03/08/25 0742)  SpO2: 97 % (03/08/25 0742) Vital Signs (24h Range):  Temp:  [97.6 °F (36.4 °C)-98.3 °F (36.8  °C)] 98.3 °F (36.8 °C)  Pulse:  [80-97] 95  Resp:  [16-20] 18  SpO2:  [97 %-100 %] 97 %  BP: ()/(54-82) 99/54     Intake/Output - Last 3 Shifts         03/06 0700  03/07 0659 03/07 0700  03/08 0659 03/08 0700  03/09 0659    P.O.  120     TPN       Total Intake(mL/kg)  120 (1.9)     Urine (mL/kg/hr) 0 (0)      Emesis/NG output       Stool 1150 150     Total Output 1150 150     Net -1150 -30            Urine Occurrence 3 x 3 x     Stool Occurrence 2 x 2 x              Physical Exam  Vitals and nursing note reviewed.   Constitutional:       Appearance: She is well-developed.   HENT:      Head: Normocephalic.   Eyes:      Pupils: Pupils are equal, round, and reactive to light.   Cardiovascular:      Rate and Rhythm: Normal rate and regular rhythm.      Heart sounds: Normal heart sounds.   Pulmonary:      Effort: Pulmonary effort is normal. No respiratory distress.      Breath sounds: Normal breath sounds. No wheezing or rales.   Abdominal:      Palpations: Abdomen is soft. There is no mass.      Tenderness: There is no guarding or rebound.      Comments: Abd inc line healing well  Ileostomy functional   Skin:     General: Skin is warm and dry.   Neurological:      Mental Status: She is alert and oriented to person, place, and time.          Significant Labs:  BMP (Last 3 Results):   Recent Labs   Lab 03/04/25  1414 03/05/25  0442 03/07/25  0503    93 129*   * 128* 128*   K 4.0 3.7 3.8   CL 86* 88* 93*   CO2 26 29 25   BUN 22* 20 33*   CREATININE 0.7 0.8 0.8   CALCIUM 8.7 9.0 9.3   MG 1.8 2.0 2.1       Significant Diagnostics:  I have reviewed all pertinent imaging results/findings within the past 24 hours.  Assessment/Plan:     * Malnutrition of moderate degree  Nutrition consulted. Most recent weight and BMI monitored-     Measurements:  Wt Readings from Last 1 Encounters:   02/24/25 64.5 kg (142 lb 3.2 oz)   Body mass index is 22.27 kg/m².    Patient has been screened and assessed by  RD.    Malnutrition Type:  Context:    Level:      Malnutrition Characteristic Summary:  Weight Loss (Malnutrition): greater than 5% in 1 month (7.8% weight loss x 1 month)    Interventions/Recommendations (treatment strategy):  Cycle tpn 16 hours  Plan for home ton         Crohn's disease of both small and large intestine with fistula  Candida Cardona is a 44 y.o. Now s/p completion proctectomy and excision of fistula tract on 2/22/25.       - Continue low res diet, pt will assess what she is able to tolerate   - ok to go downstairs for smoothies/cafeteria food  - miconazole cream for breast rash  - Imodium 4mg QID and lomotil 1 tab bid for now, gattex outpatient per GI  - Multi-modal pain control; tylenol robaxin yuni sche, oxys prn Q6h  - PRN nausea meds  - TPN 16 hour, adjust for hypoNa  - PPI  - DVT prophylaxis   - OOBTC/Ambulate  - IS  - SW to help with dispo to a safe place, hard date established by ptn for DC is Monday    Dispo: maintain care on INDIRA Meraz MD  Colorectal Surgery  Andrei Lawrence - INDIRA

## 2025-03-08 NOTE — SUBJECTIVE & OBJECTIVE
Subjective:     Interval History: NAOE. Patient doing well, no complaints this morning. Afebrile, HDS. Ileostomy functional. Tolerating a diet without n/v. Continues on TPN.     Post-Op Info:  Procedure(s) (LRB):  PROCTECTOMY, ABDOMINOPERINEAL (N/A)  CYSTOSCOPY, WITH RETROGRADE PYELOGRAM AND URETERAL STENT INSERTION (Bilateral)  CYSTOGRAM  EXCISION, SMALL INTESTINE  CREATION, ILEOSTOMY   14 Days Post-Op      Medications:  Continuous Infusions:   TPN ADULT CENTRAL LINE CUSTOM   Intravenous Continuous 63 mL/hr at 03/07/25 2237 New Bag at 03/07/25 2237    TPN ADULT CENTRAL LINE CUSTOM   Intravenous Continuous         Scheduled Meds:   acetaminophen  1,000 mg Oral Q8H    diphenoxylate-atropine 2.5-0.025 mg  1 tablet Oral BID    gabapentin  300 mg Oral TID    heparin (porcine)  5,000 Units Subcutaneous Q8H    lipid (SMOFLIPID)  250 mL Intravenous Daily    lipid (SMOFLIPID)  250 mL Intravenous Daily    loperamide  4 mg Oral QID    methocarbamoL  500 mg Oral QID    miconazole NITRATE 2 %   Topical (Top) BID    pantoprazole  40 mg Intravenous Daily     PRN Meds:   acetaminophen tablet 1,000 mg    diphenoxylate-atropine 2.5-0.025 mg per tablet 1 tablet    gabapentin capsule 300 mg    heparin (porcine) injection 5,000 Units    lipid (SMOFLIPID) (SMOFLIPID) 20 % infusion 250 mL    lipid (SMOFLIPID) (SMOFLIPID) 20 % infusion 250 mL    loperamide capsule 4 mg    methocarbamoL tablet 500 mg    miconazole NITRATE 2 % top powder    pantoprazole injection 40 mg        Objective:     Vital Signs (Most Recent):  Temp: 98.3 °F (36.8 °C) (03/08/25 0742)  Pulse: 95 (03/08/25 0742)  Resp: 18 (03/08/25 0742)  BP: (!) 99/54 (03/08/25 0742)  SpO2: 97 % (03/08/25 0742) Vital Signs (24h Range):  Temp:  [97.6 °F (36.4 °C)-98.3 °F (36.8 °C)] 98.3 °F (36.8 °C)  Pulse:  [80-97] 95  Resp:  [16-20] 18  SpO2:  [97 %-100 %] 97 %  BP: ()/(54-82) 99/54     Intake/Output - Last 3 Shifts         03/06 0700  03/07 0659 03/07 0700  03/08 0659 03/08  0700  03/09 0659    P.O.  120     TPN       Total Intake(mL/kg)  120 (1.9)     Urine (mL/kg/hr) 0 (0)      Emesis/NG output       Stool 1150 150     Total Output 1150 150     Net -1150 -30            Urine Occurrence 3 x 3 x     Stool Occurrence 2 x 2 x              Physical Exam  Vitals and nursing note reviewed.   Constitutional:       Appearance: She is well-developed.   HENT:      Head: Normocephalic.   Eyes:      Pupils: Pupils are equal, round, and reactive to light.   Cardiovascular:      Rate and Rhythm: Normal rate and regular rhythm.      Heart sounds: Normal heart sounds.   Pulmonary:      Effort: Pulmonary effort is normal. No respiratory distress.      Breath sounds: Normal breath sounds. No wheezing or rales.   Abdominal:      Palpations: Abdomen is soft. There is no mass.      Tenderness: There is no guarding or rebound.      Comments: Abd inc line healing well  Ileostomy functional   Skin:     General: Skin is warm and dry.   Neurological:      Mental Status: She is alert and oriented to person, place, and time.          Significant Labs:  BMP (Last 3 Results):   Recent Labs   Lab 03/04/25  1414 03/05/25  0442 03/07/25  0503    93 129*   * 128* 128*   K 4.0 3.7 3.8   CL 86* 88* 93*   CO2 26 29 25   BUN 22* 20 33*   CREATININE 0.7 0.8 0.8   CALCIUM 8.7 9.0 9.3   MG 1.8 2.0 2.1       Significant Diagnostics:  I have reviewed all pertinent imaging results/findings within the past 24 hours.

## 2025-03-08 NOTE — PLAN OF CARE
Problem: Adult Inpatient Plan of Care  Goal: Absence of Hospital-Acquired Illness or Injury  Outcome: Progressing  Goal: Optimal Comfort and Wellbeing  Outcome: Progressing  Goal: Readiness for Transition of Care  Outcome: Progressing   [facility] Plan of Care Note  Dx Colovesical fistula + Malnutrition     Shift Events  pt said she felt nauseated after eating some warner- Zofran given then pt had 1 episode of emesis with 100cc-  notified; compazine given after- no more nausea/emesis; per , no need to measure ostomy output as long as ostomy bag working- pt has been emptying bag into the toilet at least 4 times today- bag with liquid brown yellow  stool ; consumed very small amount of food- pt did not go downstairs to get any food today     Goals of Care: pain control, optimized nutrition     Neuro: AAOx4     Vital Signs: stable     Respiratory: RA     Diet: low fiber    Is patient tolerating current diet? poor     GTTS: TPN over 16 hours     Urine Output/Bowel Movement: urinating without difficulty     Drains/Tubes/Tube Feeds (include total output/shift): none     Lines: tunnel line         Accuchecks: none     Skin: midline incision healing   Fall Risk Score: none     Activity level? Up ad shamika, has been ambulating to bathroom to empty her ostomy bag frequently      Any scheduled procedures? none     Any safety concerns? Fall risk precaution     Other: none

## 2025-03-08 NOTE — NURSING
Pt requesting to be taken off her continuous TPN/lipids to run to vending machine on ninth floor. MD Leon informed. States pt is able to be disconnected to run to the vending machine. Pt disconnected and will resume once arrival back to the floor

## 2025-03-08 NOTE — ASSESSMENT & PLAN NOTE
Candida Cardona is a 44 y.o. Now s/p completion proctectomy and excision of fistula tract on 2/22/25.       - Continue low res diet, pt will assess what she is able to tolerate   - ok to go downstairs for smoothies/cafeteria food  - miconazole cream for breast rash  - Imodium 4mg QID and lomotil 1 tab bid for now, gattex outpatient per GI  - Multi-modal pain control; tylenol robaxin yuni sche, oxys prn Q6h  - PRN nausea meds  - TPN 16 hour, adjust for hypoNa  - PPI  - DVT prophylaxis   - OOBTC/Ambulate  - IS  - SW to help with dispo to a safe place, hard date established by ptn for DC is Monday    Dispo: maintain care on [facility]IS

## 2025-03-09 PROCEDURE — 63600175 PHARM REV CODE 636 W HCPCS: Performed by: STUDENT IN AN ORGANIZED HEALTH CARE EDUCATION/TRAINING PROGRAM

## 2025-03-09 PROCEDURE — 25000003 PHARM REV CODE 250

## 2025-03-09 PROCEDURE — 25000003 PHARM REV CODE 250: Performed by: STUDENT IN AN ORGANIZED HEALTH CARE EDUCATION/TRAINING PROGRAM

## 2025-03-09 PROCEDURE — B4185 PARENTERAL SOL 10 GM LIPIDS: HCPCS | Performed by: COMMUNITY HEALTH WORKER

## 2025-03-09 PROCEDURE — A4217 STERILE WATER/SALINE, 500 ML: HCPCS | Performed by: COMMUNITY HEALTH WORKER

## 2025-03-09 PROCEDURE — 25000003 PHARM REV CODE 250: Performed by: COMMUNITY HEALTH WORKER

## 2025-03-09 PROCEDURE — 63600175 PHARM REV CODE 636 W HCPCS

## 2025-03-09 PROCEDURE — 20600001 HC STEP DOWN PRIVATE ROOM

## 2025-03-09 PROCEDURE — 63600175 PHARM REV CODE 636 W HCPCS: Performed by: COMMUNITY HEALTH WORKER

## 2025-03-09 RX ADMIN — DIPHENOXYLATE HYDROCHLORIDE AND ATROPINE SULFATE 1 TABLET: .025; 2.5 TABLET ORAL at 08:03

## 2025-03-09 RX ADMIN — LOPERAMIDE HYDROCHLORIDE 4 MG: 2 CAPSULE ORAL at 12:03

## 2025-03-09 RX ADMIN — HEPARIN SODIUM 5000 UNITS: 5000 INJECTION INTRAVENOUS; SUBCUTANEOUS at 01:03

## 2025-03-09 RX ADMIN — LOPERAMIDE HYDROCHLORIDE 4 MG: 2 CAPSULE ORAL at 05:03

## 2025-03-09 RX ADMIN — PANTOPRAZOLE SODIUM 40 MG: 40 INJECTION, POWDER, FOR SOLUTION INTRAVENOUS at 08:03

## 2025-03-09 RX ADMIN — GABAPENTIN 300 MG: 300 CAPSULE ORAL at 08:03

## 2025-03-09 RX ADMIN — ONDANSETRON 8 MG: 8 TABLET, ORALLY DISINTEGRATING ORAL at 02:03

## 2025-03-09 RX ADMIN — METHOCARBAMOL 500 MG: 500 TABLET ORAL at 09:03

## 2025-03-09 RX ADMIN — SMOFLIPID 250 ML: 6; 6; 5; 3 INJECTION, EMULSION INTRAVENOUS at 10:03

## 2025-03-09 RX ADMIN — HEPARIN SODIUM 5000 UNITS: 5000 INJECTION INTRAVENOUS; SUBCUTANEOUS at 09:03

## 2025-03-09 RX ADMIN — HEPARIN SODIUM 5000 UNITS: 5000 INJECTION INTRAVENOUS; SUBCUTANEOUS at 06:03

## 2025-03-09 RX ADMIN — LOPERAMIDE HYDROCHLORIDE 4 MG: 2 CAPSULE ORAL at 08:03

## 2025-03-09 RX ADMIN — ACETAMINOPHEN 1000 MG: 500 TABLET ORAL at 06:03

## 2025-03-09 RX ADMIN — METHOCARBAMOL 500 MG: 500 TABLET ORAL at 08:03

## 2025-03-09 RX ADMIN — ACETAMINOPHEN 1000 MG: 500 TABLET ORAL at 01:03

## 2025-03-09 RX ADMIN — GABAPENTIN 300 MG: 300 CAPSULE ORAL at 09:03

## 2025-03-09 RX ADMIN — OXYCODONE HYDROCHLORIDE 15 MG: 10 TABLET ORAL at 06:03

## 2025-03-09 RX ADMIN — DIPHENOXYLATE HYDROCHLORIDE AND ATROPINE SULFATE 1 TABLET: .025; 2.5 TABLET ORAL at 09:03

## 2025-03-09 RX ADMIN — MAGNESIUM SULFATE HEPTAHYDRATE: 500 INJECTION, SOLUTION INTRAMUSCULAR; INTRAVENOUS at 10:03

## 2025-03-09 RX ADMIN — LOPERAMIDE HYDROCHLORIDE 4 MG: 2 CAPSULE ORAL at 09:03

## 2025-03-09 RX ADMIN — METHOCARBAMOL 500 MG: 500 TABLET ORAL at 05:03

## 2025-03-09 RX ADMIN — MICONAZOLE NITRATE 2 % TOPICAL POWDER: at 09:03

## 2025-03-09 RX ADMIN — MICONAZOLE NITRATE 2 % TOPICAL POWDER: at 08:03

## 2025-03-09 RX ADMIN — ACETAMINOPHEN 1000 MG: 500 TABLET ORAL at 09:03

## 2025-03-09 RX ADMIN — OXYCODONE HYDROCHLORIDE 15 MG: 10 TABLET ORAL at 01:03

## 2025-03-09 RX ADMIN — GABAPENTIN 300 MG: 300 CAPSULE ORAL at 01:03

## 2025-03-09 RX ADMIN — METHOCARBAMOL 500 MG: 500 TABLET ORAL at 12:03

## 2025-03-09 RX ADMIN — OXYCODONE HYDROCHLORIDE 15 MG: 10 TABLET ORAL at 10:03

## 2025-03-09 NOTE — SUBJECTIVE & OBJECTIVE
Subjective:     Interval History: NAOE. Afebrile, HDS. Ileostomy functioning, patient continues to empty it independently. Reports some emesis after eating warner but is otherwise tolerating a diet without nausea. Continues on TPN.     Post-Op Info:  Procedure(s) (LRB):  PROCTECTOMY, ABDOMINOPERINEAL (N/A)  CYSTOSCOPY, WITH RETROGRADE PYELOGRAM AND URETERAL STENT INSERTION (Bilateral)  CYSTOGRAM  EXCISION, SMALL INTESTINE  CREATION, ILEOSTOMY   15 Days Post-Op      Medications:  Continuous Infusions:   TPN ADULT CENTRAL LINE CUSTOM   Intravenous Continuous 63 mL/hr at 03/08/25 2222 New Bag at 03/08/25 2222     Scheduled Meds:   acetaminophen  1,000 mg Oral Q8H    diphenoxylate-atropine 2.5-0.025 mg  1 tablet Oral BID    gabapentin  300 mg Oral TID    heparin (porcine)  5,000 Units Subcutaneous Q8H    lipid (SMOFLIPID)  250 mL Intravenous Daily    loperamide  4 mg Oral QID    methocarbamoL  500 mg Oral QID    miconazole NITRATE 2 %   Topical (Top) BID    pantoprazole  40 mg Intravenous Daily     PRN Meds:   acetaminophen tablet 1,000 mg    diphenoxylate-atropine 2.5-0.025 mg per tablet 1 tablet    gabapentin capsule 300 mg    heparin (porcine) injection 5,000 Units    lipid (SMOFLIPID) (SMOFLIPID) 20 % infusion 250 mL    loperamide capsule 4 mg    methocarbamoL tablet 500 mg    miconazole NITRATE 2 % top powder    pantoprazole injection 40 mg        Objective:     Vital Signs (Most Recent):  Temp: 97.8 °F (36.6 °C) (03/09/25 0743)  Pulse: 83 (03/09/25 0743)  Resp: 18 (03/09/25 0743)  BP: 115/61 (03/09/25 0743)  SpO2: 99 % (03/09/25 0743) Vital Signs (24h Range):  Temp:  [97.3 °F (36.3 °C)-98.5 °F (36.9 °C)] 97.8 °F (36.6 °C)  Pulse:  [80-89] 83  Resp:  [18] 18  SpO2:  [98 %-100 %] 99 %  BP: ()/(59-77) 115/61     Intake/Output - Last 3 Shifts         03/07 0700  03/08 0659 03/08 0700  03/09 0659 03/09 0700  03/10 0659    P.O. 120 540     TPN  3078.5     Total Intake(mL/kg) 120 (1.9) 3618.5 (56.1)     Urine  (mL/kg/hr)  0 (0)     Emesis/NG output  100     Stool 150      Total Output 150 100     Net -30 +3518.5            Urine Occurrence 3 x 3 x     Stool Occurrence 2 x      Emesis Occurrence  1 x              Physical Exam  Vitals and nursing note reviewed.   Constitutional:       Appearance: She is well-developed.   HENT:      Head: Normocephalic.   Eyes:      Pupils: Pupils are equal, round, and reactive to light.   Cardiovascular:      Rate and Rhythm: Normal rate and regular rhythm.      Heart sounds: Normal heart sounds.   Pulmonary:      Effort: Pulmonary effort is normal. No respiratory distress.      Breath sounds: Normal breath sounds. No wheezing or rales.   Abdominal:      Palpations: Abdomen is soft. There is no mass.      Tenderness: There is no guarding or rebound.      Comments: Abd inc line healing well  Ileostomy functional   Skin:     General: Skin is warm and dry.   Neurological:      Mental Status: She is alert and oriented to person, place, and time.          Significant Labs:  BMP (Last 3 Results):   Recent Labs   Lab 03/04/25  1414 03/05/25  0442 03/07/25  0503    93 129*   * 128* 128*   K 4.0 3.7 3.8   CL 86* 88* 93*   CO2 26 29 25   BUN 22* 20 33*   CREATININE 0.7 0.8 0.8   CALCIUM 8.7 9.0 9.3   MG 1.8 2.0 2.1     CBC (Last 3 Results):   Recent Labs   Lab 03/03/25  0435 03/04/25  0634 03/05/25  0442   WBC 9.62 8.47 8.61   RBC 4.05 3.70* 3.73*   HGB 9.6* 8.7* 8.7*   HCT 32.3* 28.7* 29.4*    511* 591*   MCV 80* 78* 79*   MCH 23.7* 23.5* 23.3*   MCHC 29.7* 30.3* 29.6*     CRP (Last 3 Results):   Recent Labs   Lab 03/03/25  0435 03/04/25  0634 03/05/25  0442   CRP 52.1* 74.7* 54.1*       Significant Diagnostics:  I have reviewed all pertinent imaging results/findings within the past 24 hours.

## 2025-03-09 NOTE — PROGRESS NOTES
[facility]  Colorectal Surgery  Progress Note    Patient Name: Candida Cardona  MRN: [id number]  Admission Date: 2/21/2025  Hospital Length of Stay: 16 days  Attending Physician: Robin Atkins MD    Subjective:     Interval History: NAOE. Afebrile, HDS. Ileostomy functioning, patient continues to empty it independently. Reports some emesis after eating warner but is otherwise tolerating a diet without nausea. Continues on TPN.     Post-Op Info:  Procedure(s) (LRB):  PROCTECTOMY, ABDOMINOPERINEAL (N/A)  CYSTOSCOPY, WITH RETROGRADE PYELOGRAM AND URETERAL STENT INSERTION (Bilateral)  CYSTOGRAM  EXCISION, SMALL INTESTINE  CREATION, ILEOSTOMY   15 Days Post-Op      Medications:  Continuous Infusions:   TPN ADULT CENTRAL LINE CUSTOM   Intravenous Continuous 63 mL/hr at 03/08/25 2222 New Bag at 03/08/25 2222     Scheduled Meds:   acetaminophen  1,000 mg Oral Q8H    diphenoxylate-atropine 2.5-0.025 mg  1 tablet Oral BID    gabapentin  300 mg Oral TID    heparin (porcine)  5,000 Units Subcutaneous Q8H    lipid (SMOFLIPID)  250 mL Intravenous Daily    loperamide  4 mg Oral QID    methocarbamoL  500 mg Oral QID    miconazole NITRATE 2 %   Topical (Top) BID    pantoprazole  40 mg Intravenous Daily     PRN Meds:   acetaminophen tablet 1,000 mg    diphenoxylate-atropine 2.5-0.025 mg per tablet 1 tablet    gabapentin capsule 300 mg    heparin (porcine) injection 5,000 Units    lipid (SMOFLIPID) (SMOFLIPID) 20 % infusion 250 mL    loperamide capsule 4 mg    methocarbamoL tablet 500 mg    miconazole NITRATE 2 % top powder    pantoprazole injection 40 mg        Objective:     Vital Signs (Most Recent):  Temp: 97.8 °F (36.6 °C) (03/09/25 0743)  Pulse: 83 (03/09/25 0743)  Resp: 18 (03/09/25 0743)  BP: 115/61 (03/09/25 0743)  SpO2: 99 % (03/09/25 0743) Vital Signs (24h Range):  Temp:  [97.3 °F (36.3 °C)-98.5 °F (36.9 °C)] 97.8 °F (36.6 °C)  Pulse:  [80-89] 83  Resp:  [18] 18  SpO2:  [98 %-100 %] 99 %  BP: ()/(59-77)  115/61     Intake/Output - Last 3 Shifts         03/07 0700  03/08 0659 03/08 0700 03/09 0659 03/09 0700  03/10 0659    P.O. 120 540     TPN  3078.5     Total Intake(mL/kg) 120 (1.9) 3618.5 (56.1)     Urine (mL/kg/hr)  0 (0)     Emesis/NG output  100     Stool 150      Total Output 150 100     Net -30 +3518.5            Urine Occurrence 3 x 3 x     Stool Occurrence 2 x      Emesis Occurrence  1 x              Physical Exam  Vitals and nursing note reviewed.   Constitutional:       Appearance: She is well-developed.   HENT:      Head: Normocephalic.   Eyes:      Pupils: Pupils are equal, round, and reactive to light.   Cardiovascular:      Rate and Rhythm: Normal rate and regular rhythm.      Heart sounds: Normal heart sounds.   Pulmonary:      Effort: Pulmonary effort is normal. No respiratory distress.      Breath sounds: Normal breath sounds. No wheezing or rales.   Abdominal:      Palpations: Abdomen is soft. There is no mass.      Tenderness: There is no guarding or rebound.      Comments: Abd inc line healing well  Ileostomy functional   Skin:     General: Skin is warm and dry.   Neurological:      Mental Status: She is alert and oriented to person, place, and time.          Significant Labs:  BMP (Last 3 Results):   Recent Labs   Lab 03/04/25  1414 03/05/25  0442 03/07/25  0503    93 129*   * 128* 128*   K 4.0 3.7 3.8   CL 86* 88* 93*   CO2 26 29 25   BUN 22* 20 33*   CREATININE 0.7 0.8 0.8   CALCIUM 8.7 9.0 9.3   MG 1.8 2.0 2.1     CBC (Last 3 Results):   Recent Labs   Lab 03/03/25  0435 03/04/25  0634 03/05/25  0442   WBC 9.62 8.47 8.61   RBC 4.05 3.70* 3.73*   HGB 9.6* 8.7* 8.7*   HCT 32.3* 28.7* 29.4*    511* 591*   MCV 80* 78* 79*   MCH 23.7* 23.5* 23.3*   MCHC 29.7* 30.3* 29.6*     CRP (Last 3 Results):   Recent Labs   Lab 03/03/25  0435 03/04/25  0634 03/05/25  0442   CRP 52.1* 74.7* 54.1*       Significant Diagnostics:  I have reviewed all pertinent imaging results/findings within  the past 24 hours.  Assessment/Plan:       Crohn's disease of both small and large intestine with fistula  Candida Cardona is a 44 y.o. Now s/p completion proctectomy and excision of fistula tract on 2/22/25.       - Continue low res diet, pt will assess what she is able to tolerate   - ok to go downstairs for smoothies/cafeteria food  - miconazole cream for breast rash  - Imodium 4mg QID and lomotil 1 tab bid for now, gattex outpatient per GI  - Multi-modal pain control; tylenol robaxin yuni sche, oxys prn Q6h  - PRN nausea meds  - TPN 16 hour, adjust for hypoNa  - PPI  - DVT prophylaxis   - OOBTC/Ambulate  - IS  - SW to help with dispo to a safe place, hard date established by ptn for DC is Monday    Dispo: maintain care on [facility] Meraz MD  Colorectal Surgery  [facility] - [facility]

## 2025-03-10 VITALS
DIASTOLIC BLOOD PRESSURE: 76 MMHG | OXYGEN SATURATION: 99 % | SYSTOLIC BLOOD PRESSURE: 113 MMHG | HEART RATE: 83 BPM | WEIGHT: 142.19 LBS | BODY MASS INDEX: 22.32 KG/M2 | TEMPERATURE: 98 F | RESPIRATION RATE: 18 BRPM | HEIGHT: 67 IN

## 2025-03-10 LAB
BASOPHILS # BLD AUTO: 0.06 K/UL (ref 0–0.2)
BASOPHILS NFR BLD: 0.6 % (ref 0–1.9)
DIFFERENTIAL METHOD BLD: ABNORMAL
EOSINOPHIL # BLD AUTO: 0.2 K/UL (ref 0–0.5)
EOSINOPHIL NFR BLD: 2.1 % (ref 0–8)
ERYTHROCYTE [DISTWIDTH] IN BLOOD BY AUTOMATED COUNT: 21.4 % (ref 11.5–14.5)
HCT VFR BLD AUTO: 32 % (ref 37–48.5)
HGB BLD-MCNC: 9.5 G/DL (ref 12–16)
IMM GRANULOCYTES # BLD AUTO: 0.4 K/UL (ref 0–0.04)
IMM GRANULOCYTES NFR BLD AUTO: 3.8 % (ref 0–0.5)
LYMPHOCYTES # BLD AUTO: 1 K/UL (ref 1–4.8)
LYMPHOCYTES NFR BLD: 9.8 % (ref 18–48)
MCH RBC QN AUTO: 22.8 PG (ref 27–31)
MCHC RBC AUTO-ENTMCNC: 29.7 G/DL (ref 32–36)
MCV RBC AUTO: 77 FL (ref 82–98)
MONOCYTES # BLD AUTO: 0.8 K/UL (ref 0.3–1)
MONOCYTES NFR BLD: 7.1 % (ref 4–15)
NEUTROPHILS # BLD AUTO: 8.1 K/UL (ref 1.8–7.7)
NEUTROPHILS NFR BLD: 76.6 % (ref 38–73)
NRBC BLD-RTO: 0 /100 WBC
PLATELET # BLD AUTO: 640 K/UL (ref 150–450)
PMV BLD AUTO: 8.8 FL (ref 9.2–12.9)
RBC # BLD AUTO: 4.16 M/UL (ref 4–5.4)
WBC # BLD AUTO: 10.52 K/UL (ref 3.9–12.7)

## 2025-03-10 PROCEDURE — 63600175 PHARM REV CODE 636 W HCPCS: Performed by: STUDENT IN AN ORGANIZED HEALTH CARE EDUCATION/TRAINING PROGRAM

## 2025-03-10 PROCEDURE — 85025 COMPLETE CBC W/AUTO DIFF WBC: CPT | Performed by: STUDENT IN AN ORGANIZED HEALTH CARE EDUCATION/TRAINING PROGRAM

## 2025-03-10 PROCEDURE — 99233 SBSQ HOSP IP/OBS HIGH 50: CPT | Mod: GC,,, | Performed by: INTERNAL MEDICINE

## 2025-03-10 PROCEDURE — 25000003 PHARM REV CODE 250: Performed by: STUDENT IN AN ORGANIZED HEALTH CARE EDUCATION/TRAINING PROGRAM

## 2025-03-10 PROCEDURE — 25000003 PHARM REV CODE 250

## 2025-03-10 PROCEDURE — 63600175 PHARM REV CODE 636 W HCPCS

## 2025-03-10 PROCEDURE — 36415 COLL VENOUS BLD VENIPUNCTURE: CPT | Performed by: STUDENT IN AN ORGANIZED HEALTH CARE EDUCATION/TRAINING PROGRAM

## 2025-03-10 RX ORDER — GABAPENTIN 300 MG/1
300 CAPSULE ORAL 3 TIMES DAILY
Qty: 90 CAPSULE | Refills: 11 | Status: SHIPPED | OUTPATIENT
Start: 2025-03-10 | End: 2026-03-10

## 2025-03-10 RX ORDER — LOPERAMIDE HYDROCHLORIDE 2 MG/1
4 CAPSULE ORAL 4 TIMES DAILY
Qty: 240 CAPSULE | Refills: 2 | Status: SHIPPED | OUTPATIENT
Start: 2025-03-10 | End: 2025-06-08

## 2025-03-10 RX ORDER — METHOCARBAMOL 500 MG/1
500 TABLET, FILM COATED ORAL 4 TIMES DAILY
Qty: 40 TABLET | Refills: 0 | Status: SHIPPED | OUTPATIENT
Start: 2025-03-10 | End: 2025-03-20

## 2025-03-10 RX ORDER — DIPHENOXYLATE HYDROCHLORIDE AND ATROPINE SULFATE 2.5; .025 MG/1; MG/1
2 TABLET ORAL 4 TIMES DAILY PRN
Qty: 56 TABLET | Refills: 2 | Status: SHIPPED | OUTPATIENT
Start: 2025-03-10 | End: 2025-06-08

## 2025-03-10 RX ORDER — OXYCODONE HYDROCHLORIDE 15 MG/1
15 TABLET ORAL EVERY 8 HOURS PRN
Qty: 30 TABLET | Refills: 0 | Status: SHIPPED | OUTPATIENT
Start: 2025-03-10

## 2025-03-10 RX ADMIN — OXYCODONE HYDROCHLORIDE 15 MG: 10 TABLET ORAL at 06:03

## 2025-03-10 RX ADMIN — DIPHENOXYLATE HYDROCHLORIDE AND ATROPINE SULFATE 1 TABLET: .025; 2.5 TABLET ORAL at 08:03

## 2025-03-10 RX ADMIN — LOPERAMIDE HYDROCHLORIDE 4 MG: 2 CAPSULE ORAL at 12:03

## 2025-03-10 RX ADMIN — PROCHLORPERAZINE EDISYLATE 2.5 MG: 5 INJECTION INTRAMUSCULAR; INTRAVENOUS at 05:03

## 2025-03-10 RX ADMIN — LOPERAMIDE HYDROCHLORIDE 4 MG: 2 CAPSULE ORAL at 08:03

## 2025-03-10 RX ADMIN — HEPARIN SODIUM 5000 UNITS: 5000 INJECTION INTRAVENOUS; SUBCUTANEOUS at 05:03

## 2025-03-10 RX ADMIN — LOPERAMIDE HYDROCHLORIDE 4 MG: 2 CAPSULE ORAL at 04:03

## 2025-03-10 RX ADMIN — OXYCODONE HYDROCHLORIDE 15 MG: 10 TABLET ORAL at 02:03

## 2025-03-10 NOTE — DISCHARGE SUMMARY
[facility] - [facility]  Discharge Note    HPI:  Ms. Cardona is a 44 year old woman with a history of complex fistulizing Crohn's disease, GERD, anxiety, CAD, Bipolar disorder, and pAF not on anticoagulation - she presented as a transfer with about 1 week of stool drainage from her vagina as well as pneumaturia. She has had a known ileorectal stricture and had been counseled over the last several months about my concern that this may progress and become more complicated. We discussed numerous options, including completion proctectomy and resection of her ileorectal anastomosis with end ileostomy, resection of her ileorectal anastomosis with end ileostomy and delayed proctectomy, or diverting loop ileostomy, whichever was possible. In addition, one risk that we discussed in detail was my concern with intestinal malabsorption due to her extensive past surgical history, unknown length of small bowel, and previously high-output ostomy. Unfortunately, the fistulae are quite symptomatic for her. The benefits, risks, and alternatives were discussed with the patient, they were given the opportunity to ask questions and they elected to proceed with operative intervention after signing written consent.     Procedure(s) (LRB) on 2/22/25:  PROCTECTOMY, ABDOMINOPERINEAL (N/A)  CYSTOSCOPY, WITH RETROGRADE PYELOGRAM AND URETERAL STENT INSERTION (Bilateral)  CYSTOGRAM  EXCISION, SMALL INTESTINE  CREATION, ILEOSTOMY    Hospital Course:  2/21 admitted  2/22 surgery  2/23 barakat stays for IRIS, unable to place PICC for TPN  2/25-27 CT venogram with central stenosis therefore fem PICC line placed by IR for TPN, PCA off  2/18-3/5 emesis with various foods, experimenting, spacing out narcotics and wean off IV  3/6-10 patient preference to DC on Monday  3/10 dc home on TPN with max imodium, 1 tab bid lomotil but enough to titrate to max dose, tolerating some PO with emesis related to certain foods, ambulatory, voiding, able to care for  stoma    General: alert, awake, in no acute distress  HEENT: EOMI, no scleral icterus, CN grossly intact  Cardiac: RRR, BP stable, warm well perfused  Pulm: non labored breathing on room air, no audible wheeze  Abdomen: soft, non distended, non TTP, midline incision dry and healing, ostomy productive and pink in RLQ  : perineum with slight separation but no drainage or concern for infection, erythema related to fistula and drainage but skin is healing  Extremities: moving all four extremities, no pedal edema  Skin: no obvious rashes  Neuro: no obvious deficits  Psych: cooperative, appropriate affect      OUTCOME: Condition has improved and patient is now ready for discharge.    DISPOSITION: IV Therapy Provider    FINAL DIAGNOSIS:  Malnutrition of moderate degree    FOLLOWUP: In clinic    DISCHARGE INSTRUCTIONS:   Discharge Instructions After Abdominal Operation     Pain Control: It is expected to have pain after surgery, but this should improve over the next several weeks. You may be prescribed a narcotic pain medication and/or a muscle relaxer on discharge. You can take this medication as prescribed if the pain is severe but try to decrease the frequency at which you use the narcotic over the initial two (2) weeks.  You may find you need less narcotic pain medication if you combine or stagger it with Tylenol® (acetaminophen) but avoid Advil® (ibuprofen) and other NSAIDs that can exacerbate crohns. You should not take more than 4000 mg of Tylenol® in a 24-hour period.  Narcotics and muscle relaxers can cause drowsiness, so sometimes it is helpful to take before bed for a more comfortable rest, but you CANNOT drive, operate machinery, or do mentally important work while on this medication.  Gabapentin can help with pain control as well and this is prescribed for a while longer as you heal. If you feel dizzy from this medication you can stop it.  For oxycodone: You can take 1 tab of 15mg every 8hrs, then you can cut  them in half or space out to every 12hr.    Medications:  Okay to restart your other home meds. Your gastroenterologist Dr. Loera can help adjust medications as well.    Wound Care: The incision down your midline can be left open to air unless there is drainage, in which case you should cover the wound with a dry, clean bandage or piece of gauze. Change the dressing 1-2 times each day as needed to maintain a relatively dry dressing. You can use a menstral pad or panty liner for your perineal wound (your bottom wound).   You should shower every day without a dressing on the incisions and let the water run over the incisions. Do not soak in a bathtub, hot tub or pool until the incisions are completely healed, which usually takes ~4-6 weeks.    Bowel Function: Diarrhea and loose stool are normal and expected after intestinal surgery. Bowel function initially tends to be erratic (increased frequency, gas, liquid/loose consistency, seepage, urgency), but it will improve over the next several months as your body adjusts to the surgical changes and you try different foods. You have short gut (short length of small intestine) and that may make ostomy output fluctuate or be high output as well.    Diet: You will remain on TPN, hopefully to wean off with time but we cannot predict that at this time. Dr. Loera can help guide this and may start different medications to help get off TPN. For what you eat by mouth, you can try the following to help with ostomy output:  Eat several (4-6) small meals each day.  Add in supplemental drinks (Boost®, Ensure®, Glucerna®).  Your diet should include foods to add bulk to the stool such as applesauce, bananas, cheese, peanut butter, pasta, and potatoes.    Activity: Walking is encouraged after surgery. Light aerobic activity such as climbing stairs or leisurely bike riding is acceptable but listen to your body and let pain be your guide when reintroducing activity. If it hurts, don't do it.  Avoid the following activities for about six (6) weeks after surgery:  Lifting objects over 10 pounds  Strenuous activity such as press-ups, push-ups, crunches, sit-ups, vigorous pulling/pushing, and repetitive twisting or bending    Driving: You should not drive a vehicle while taking narcotic pain medications or muscle relaxers. When you return to driving, sit in your vehicle without starting the ignition and step on the brake firmly and rapidly a few times to assure you are confident with this task. Do not go alone the first time you drive.    Potential Problems:   Bowel obstruction or ileus is characterized by persistent abdominal cramps, bloating, constipation, nausea, or vomiting. If the symptoms are mild, you should restrict your dietary intake to only liquids, and avoid solid food for 2-3 days. If the symptoms are more severe or persist beyond 24 hours, please call your surgeon's office for advice.    Frequent stools and loose stools are best managed by using bulking agents or anti-diarrheal medication. Please call your surgeon if diarrhea is not improving after a few weeks to discuss starting one of the medications below.  Benefiber®, Citrucel®, Fibercon®, Konsyl®, and Metamucil® are bulking agents that are available at most grocery stores and pharmacies. The medication should be mixed using one (1) teaspoon of the powder in the minimum amount of fluid required to dissolve the agent and taken 1-2 times each day. Benefiber® can be alternatively sprinkled over food 1-2 times each day.  You have tried these tricks in the past without success.  Imodium® (loperamide) is also available without a prescription. It is most effective if taken before meals. You should not take more than eight (8) tablets (32 mg) of Imodium in a 24-hour period. Your current regimen is 2 tabs four times a day (30min before meals and before bed).  You also have lomotil prescribed, 1 tab twice a day. Your script is written so that you can  take more as needed to slow down ostomy output, roughly less than 1200cc is the goal but due to short gut yours may be more than that. TPN will help keep you hydrated.     Dehydration can commonly occur, and its symptoms or signs include dark urine, dizziness when standing, dry mouth, increased heart rate, leg cramps, and low volumes (less than 800 ml) of urine. If these occur, you should immediately call your surgeon's office. To avoid dehydration, you should do the following:  Drink a variety of fluids. Use an oral rehydration salt solution like Pedialyte, G2 Gatorade, Nuun tabs, etc. and sip the solution between meals.  Eat salty foods or add salt to your food.  Use an anti-diarrheal medication or bulking agent as previously instructed by your surgeon.     Wound infection can occur after any surgery and is characterized by increased drainage of cloudy fluid, odor, pain around the wound, redness of the skin around the wound extending 2-3 fingerbreadths outward, or temperature greater than 101 degrees. Please immediately call your surgeon's office if you begin experiencing these symptoms or signs.    Follow up:  Return to clinic in about 2 weeks for follow up and wound check. Call [phone] for worsening pain, inability to urinate, or fever > 101.  Call or schedule follow up in about 7 days via [employer] if you are not otherwise contacted or see an appointment in the portal.        Clinical Reference Documents Added to Patient Instructions         Document    LIVING WITH AN ILEOSTOMY (ENGLISH)             Medication List        START taking these medications      diphenoxylate-atropine 2.5-0.025 mg 2.5-0.025 mg per tablet  Commonly known as: LOMOTIL  Take 2 tablets by mouth 4 (four) times daily as needed for Diarrhea (Secdon line after maxing out imodium).     gabapentin 300 MG capsule  Commonly known as: NEURONTIN  Take 1 capsule (300 mg total) by mouth 3 (three) times daily.     GATTEX 30-VIAL 5 mg Kit  Generic  drug: teduglutide  Inject 0.05 mg/kg into the skin once daily. Inject 3mg (0.3mL) under the skin daily     loperamide 2 mg capsule  Commonly known as: IMODIUM  Take 2 capsules (4 mg total) by mouth 4 (four) times daily.     methocarbamoL 500 MG Tab  Commonly known as: Robaxin  Take 1 tablet (500 mg total) by mouth 4 (four) times daily. for 10 days     oxyCODONE 15 MG Tab  Commonly known as: ROXICODONE  Take 1 tablet (15 mg total) by mouth every 8 (eight) hours as needed for Pain (pain not controleld by gabaptin, tylenol, and robaxin).            CONTINUE taking these medications      ondansetron 4 MG Tbdl  Commonly known as: ZOFRAN-ODT  Take 1 tablet (4 mg total) by mouth every 6 (six) hours as needed (Nausea vomiting).     pantoprazole 40 MG tablet  Commonly known as: PROTONIX  Take 1 tablet (40 mg total) by mouth once daily.     SKYRIZI 360 mg/2.4 mL (150 mg/mL) Injt  Generic drug: risankizumab-rzaa  Inject 360 mg into the skin every 8 weeks.     VITAMIN D2 50,000 unit Cap  Generic drug: ergocalciferol            STOP taking these medications      cholestyramine 4 gram packet  Commonly known as: QUESTRAN               Where to Get Your Medications        These medications were sent to [facility] [address], [place] [address]      Hours: Always Open Phone: [phone]   diphenoxylate-atropine 2.5-0.025 mg 2.5-0.025 mg per tablet  gabapentin 300 MG capsule  loperamide 2 mg capsule  methocarbamoL 500 MG Tab  oxyCODONE 15 MG Tab       Information about where to get these medications is not yet available    Ask your nurse or doctor about these medications  GATTEX 30-VIAL 5 mg Kit         TIME SPENT ON DISCHARGE: 25 minutes

## 2025-03-10 NOTE — PROGRESS NOTES
Pt seen for ostomy follow-up and pt agreed to assessment  Chart reviewed for this encounter.   See Flow Sheet for additional documentation and media.      Pt sitting up in bed, bag no leakage, properly functioning.   Clean, dry, intact.   Pt has supplies at bedside and was given extra supplies as she had requested the charcoal filter covers and the disposable bags.   No other needs, all questions answered.     RECOMMENDATIONS:  Bedside nurse assess for acute changes (purulence, increased redness/swelling, increased drainage, malodor, increased pain, pallor, necrosis) please contact physician on any acute changes.     Discussed POC with patient.     Bedside nursing to continue care, dressing changes, & continue monitoring.  Bedside nursing to maintain pressure injury prevention interventions, (PIP).      Recommendations made to primary team for above plan.     Thank you for the consult. Wound Care will continue to follow.

## 2025-03-10 NOTE — PLAN OF CARE
[facility]  Discharge Final Note    Primary Care Provider: Matti Conklin MD    Expected Discharge Date: 3/10/2025    Final Discharge Note (most recent)       Final Note - 03/10/25 0929          Final Note    Assessment Type Final Discharge Note     Anticipated Discharge Disposition Home-Health Care [facility]     Hospital Resources/Appts/Education Provided Post-Acute resouces added to AVS;Appointments scheduled and added to AVS        Post-Acute Status    Post-Acute Authorization IV Infusion;Home Health     Home Health Status Set-up Complete/Auth obtained     IV Infusion Status Set-up Complete/Auth obtained     Discharge Delays None known at this time                     Important Message from Medicare

## 2025-03-10 NOTE — PROGRESS NOTES
[facility] Home IV TPN Education/Discharge Planning Note:      [facility] educator met with the patient and discussed discharge plan for home IV TPN. Candida Cardona will discharge home and self infuse. The patient's TPN will infuse via CADD pump. The patient was educated on [facility] procedure & TPN mat provided. Patient education checklist and [facility] consent to treatment form reviewed and acknowledged by the patient and she is agreeable to discharge with home infusion plan of care. IV administration process using aspetic technique was reviewed with successful return demonstration (which includes CADD pump teach back) by the patient. The patient feels comfortable with home infusion process after bedside education provided. The patient also stated she has done some specialty infusion medications in the home, so she was somewhat familiar with some infusion processes. The patient will discharge home with IV TPN continuous over 16 hours hours for an estimated end of therapy date TBD by Dr Robin Atkins on an outpatient basis. Dosing/hang schedule time will be ~21:00-13:00 daily; the patient confirmed that she is comfortable setting up TPN (including injectable MVIs) + CADD pump, flushing line, and connecting the patient to her home TPN pump without [facility] at bedside, so she was not connected to home TPN pump prior to discharge. The plan will be for the patient to be connected to her home TPN CADD pump once home at her desired start time of 21:00 on day of discharge (3/10/2025). Extension sets not placed on DL Tunneled PICC, as patient will be able to self-infuse without extension sets (Tunneled PICC noted to be in femoral vein, and Dr Atkins's team confirmed that will be the primary line used for TPN at home). [facility] will follow patient for weekly dressing changes and lab draws.       Additional education materials also provided. Time allotted  for questions; questions addressed appropriately. The patient's home TPN and supplies will be delivered to the patient's home on the evening of 3/10/2025 (address confirmed as [address] [place] [address]). Provided patient with [facility] support number [phone] & [facility] phone number [phone]. The patient is ready for discharge home from [facility] perspective. Patient's discharge planning team and bedside nurse updated with the information above.      -Patient accepted to care by [facility] and report called to Elaine/Mona. They confirmed that the patient has been accepted onto services.  -Phone number [phone]     Please do not hesitate to reach out for any additional needs.    Gibson Calles MS, RDN, LDN  Clinical Liaison & Dietitian  [facility]   Phone: [phone]  [email or web address]

## 2025-03-10 NOTE — PLAN OF CARE
"[facility] Plan of Care Note    Dx:   Crohn's disease [K50.90]    Shift Events: no acute events    Goals of Care: see careplan    Neuro: x4    Vital Signs: /74   Pulse 92   Temp 97.5 °F (36.4 °C) (Oral)   Resp 18   Ht 5' 7" (1.702 m)   Wt 64.5 kg (142 lb 3.2 oz)   LMP  (LMP Unknown) Comment: complete hysterectomy  SpO2 99%   Breastfeeding No   BMI 22.27 kg/m²     Respiratory: room air    Diet: Diet Low Fiber/Residue  Dietary nutrition supplements Boost Plus Nutritional Drink - Very Vanilla, Boost Plus Nutritional Drink - Rich Chocolate    Is patient tolerating current diet? yes    GTTS: TPN    Urine Output/Bowel Movement:   No intake/output data recorded.  Last Bowel Movement: 03/09/25      Drains/Tubes/Tube Feeds (include total output/shift):   No intake/output data recorded.      Lines: R fem PICC      Accuchecks:none    Skin: incision, ostomy    Fall Risk Score: 8    Activity level? independent    Any scheduled procedures? none    Any safety concerns? Fall risk, Risk for infection    Other: none    Problem: Adult Inpatient Plan of Care  Goal: Absence of Hospital-Acquired Illness or Injury  Outcome: Progressing  Goal: Optimal Comfort and Wellbeing  Outcome: Progressing  Goal: Readiness for Transition of Care  Outcome: Progressing     Problem: Pain Acute  Goal: Optimal Pain Control and Function  Outcome: Progressing     Problem: Fall Injury Risk  Goal: Absence of Fall and Fall-Related Injury  Outcome: Progressing     Problem: Infection  Goal: Absence of Infection Signs and Symptoms  Outcome: Progressing     Problem: Skin Injury Risk Increased  Goal: Skin Health and Integrity  Outcome: Progressing     Problem: Wound  Goal: Optimal Coping  Outcome: Progressing  Goal: Optimal Functional Ability  Outcome: Progressing  Goal: Absence of Infection Signs and Symptoms  Outcome: Progressing  Goal: Improved Oral Intake  Outcome: Progressing  Goal: Optimal Pain Control and Function  Outcome: Progressing  Goal: Skin " Health and Integrity  Outcome: Progressing  Goal: Optimal Wound Healing  Outcome: Progressing

## 2025-03-10 NOTE — PROGRESS NOTES
[facility]       HOME  HEALTH ORDERS                                    FACE TO FACE ENCOUNTER      Patient Name: Candida Cardona  YOB: 1980    PCP: Matti Conklin MD   PCP Address: [address] 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       Future Appointments   Date Time Provider Department Center   3/13/2025  1:20 PM Robin Atkins MD [facility]           I have seen and examined this patient face to face today. My clinical findings that support the need for the home health skilled services and home bound status are the following:  Medical restrictions requiring assistance of another human to leave home due to  IV infusion Needs.    Allergies:  Review of patient's allergies indicates:   Allergen Reactions    Ciprofloxacin Shortness Of Breath and Itching    Keppra [levetiracetam] Other (See Comments)     Increased depression    Mercaptopurine analogues (thiopurines) Hives, Diarrhea and Nausea And Vomiting    Imuran [azathioprine sodium] Diarrhea and Nausea And Vomiting     Pt states that she becomes hot sweaty and passes out also. She states that she has diarrhea and nausea and vomiting     [place]     Imuran [azathioprine] Nausea And Vomiting    Ketorolac Itching and Hives    Adhesive Itching    Iodinated contrast media Hives       Diet: regular  diet    Activities: no heavy lifting for 2 more weeks and no driving while on narcotics    Nursing:   SN to complete comprehensive assessment including routine vital signs. Instruct on disease process and s/s of complications to report to MD. Review/verify medication list sent home with the patient at time of discharge  and instruct patient/caregiver as needed. Frequency may be adjusted depending on start of care date.    Notify MD if SBP > 160 or < 90; DBP > 90 or < 50; HR > 120 or < 50; Temp > 101; Other:   concern for infection at midline or perineum      CONSULTS:     to evaluate for community resources/long-range planning.      MISCELLANEOUS CARE:  Home Infusion Therapy:   SN to perform Infusion Therapy/Central Line Care.  Review Central Line Care & Central Line Flush with patient.    Administer (drug and dose): TPN x 16hr  Last dose given: 3/10                         Home dose due: tonight    Scrub the Hub: Prior to accessing the line, always perform a 30 second alcohol scrub  Each lumen of the central line is to be flushed at least daily with 10 mL Normal Saline and 3 mL Heparin flush (10 units/mL)  Skilled Nurse (SN) may draw blood from IV access  Blood Draw Procedure:   - Aspirate at least 5 mL of blood   - Discard   - Obtain specimen   - Change injection cap   - Flush with 20 mL Normal Saline followed by a                 3-5 mL Heparin flush (10 units/mL)  Central :   - Sterile dressing changes are done weekly and as needed.   - Use chlor-hexadine scrub to cleanse site, apply Biopatch to insertion site,       apply securement device dressing   - Injection caps are changed weekly and after EVERY lab draw.   - If sterile gauze is under dressing to control oozing,                 dressing change must be performed every 24 hours until gauze is not needed.    MISCELLANEOUS CARE:   Home Infusion Therapy: SN to perform Infusion Therapy/Central Line Care. Review Central Line Care &  Central Line Flush with patient.   Administer (drug and dose): TPN over 16 hours daily Dextrose (70%): 175g Amino Acids (15%): 75g SMOFLipids: 50g Sterile water: per pharmacy Sodium Acetate: 30 mEq Sodium Chloride: 100 mEq Sodium Phosphate: 18 mmoL Potassium Chloride: 60 mEq Magnesium Sulfate: 2g Calcium Gluconate: 1g Trace Elements: 1mL Standard Adult MVI: 10mL Total Volume: ~1300 (including lipids)   TPN over 16 hours, rate to be set by [facility].   Ok to run as a 3 in 1 over 16 hours.   Labs: CBC, CMP, Mg, Phos, Triglycerides all weekly while on TPN, to be drawn on Mondays or Tuesdays. Fax results to Dr Robin Atkins     WOUND CARE ORDERS  no      Medications: Review discharge medications with patient and family and provide education.      Current Discharge Medication List        START taking these medications    Details   teduglutide (GATTEX 30-VIAL) 5 mg Kit Inject 0.05 mg/kg into the skin once daily. 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  Qty: 90 packet, Refills: 3    Comments: [facility]  Associated Diagnoses: Diarrhea, unspecified type      ergocalciferol (VITAMIN D2) 50,000 unit Cap Take 50,000 Units by mouth every 7 days.      ondansetron (ZOFRAN-ODT) 4 MG TbDL Take 1 tablet (4 mg total) by mouth every 6 (six) hours as needed (Nausea vomiting).  Qty: 12 tablet, Refills: 0      pantoprazole (PROTONIX) 40 MG tablet Take 1 tablet (40 mg total) by mouth once daily.  Qty: 90 tablet, Refills: 3    Associated Diagnoses: Gastroesophageal reflux disease without esophagitis      risankizumab-rzaa (SKYRIZI) 360 mg/2.4 mL (150 mg/mL) Injt Inject 360 mg into the skin every 8 weeks.  Qty: 2.4 mL, Refills: 0    Associated Diagnoses: Crohn's  disease of both small and large intestine with intestinal obstruction             I certify that this patient is confined to her home and needs intermittent skilled nursing care.          Electronically Signed  _________________________________  Merary Ramos MD  03/10/2025

## 2025-03-10 NOTE — NURSING
Patient is very eager to get discharge; nurse informed pt that there is no discharge orders yet  and pt got very agitated; pt started cursing and said she needs to leave today because all her grocery is delivered to her house today  Nurse called NP Vidhi- phone was busy; nurse paged colorectal team - no one called back. Secure chat sent to all colorectal fellow ,  and - waiting for response  Patient also asks about HH and home  TPN; per SHAHNAZ Gandhi, he is also waiting to hear back from the team about pt discharge order

## 2025-03-10 NOTE — PLAN OF CARE
03/10/25 1333   Medicare Message   Important Message from Medicare regarding Discharge Appeal Rights Given to patient/caregiver   Date IMM was signed 03/10/25   Time IMM was signed 1320

## 2025-03-10 NOTE — PLAN OF CARE
Will follow with dc today with TPN with [facility] and [facility]. Pt to dc later this pm, IMM signed and updated for dc today. Amany with [facility] will teach Pt later, [facility] will see pt in the am, will follow with dc.

## 2025-03-10 NOTE — DISCHARGE INSTRUCTIONS
Discharge Instructions After Abdominal Operation     Pain Control: It is expected to have pain after surgery, but this should improve over the next several weeks. You may be prescribed a narcotic pain medication and/or a muscle relaxer on discharge. You can take this medication as prescribed if the pain is severe but try to decrease the frequency at which you use the narcotic over the initial two (2) weeks.  You may find you need less narcotic pain medication if you combine or stagger it with Tylenol® (acetaminophen) but avoid Advil® (ibuprofen) and other NSAIDs that can exacerbate crohns. You should not take more than 4000 mg of Tylenol® in a 24-hour period.  Narcotics and muscle relaxers can cause drowsiness, so sometimes it is helpful to take before bed for a more comfortable rest, but you CANNOT drive, operate machinery, or do mentally important work while on this medication.  Gabapentin can help with pain control as well and this is prescribed for a while longer as you heal. If you feel dizzy from this medication you can stop it.  For oxycodone: You can take 1 tab of 15mg every 8hrs, then you can cut them in half or space out to every 12hr.    Medications:  Okay to restart your other home meds. Your gastroenterologist Dr. Loera can help adjust medications as well.    Wound Care: The incision down your midline can be left open to air unless there is drainage, in which case you should cover the wound with a dry, clean bandage or piece of gauze. Change the dressing 1-2 times each day as needed to maintain a relatively dry dressing. You can use a menstral pad or panty liner for your perineal wound (your bottom wound).   You should shower every day without a dressing on the incisions and let the water run over the incisions. Do not soak in a bathtub, hot tub or pool until the incisions are completely healed, which usually takes ~4-6 weeks.    Bowel Function: Diarrhea and loose stool are normal and expected after  intestinal surgery. Bowel function initially tends to be erratic (increased frequency, gas, liquid/loose consistency, seepage, urgency), but it will improve over the next several months as your body adjusts to the surgical changes and you try different foods. You have short gut (short length of small intestine) and that may make ostomy output fluctuate or be high output as well.    Diet: You will remain on TPN, hopefully to wean off with time but we cannot predict that at this time. Dr. Loera can help guide this and may start different medications to help get off TPN. For what you eat by mouth, you can try the following to help with ostomy output:  Eat several (4-6) small meals each day.  Add in supplemental drinks (Boost®, Ensure®, Glucerna®).  Your diet should include foods to add bulk to the stool such as applesauce, bananas, cheese, peanut butter, pasta, and potatoes.    Activity: Walking is encouraged after surgery. Light aerobic activity such as climbing stairs or leisurely bike riding is acceptable but listen to your body and let pain be your guide when reintroducing activity. If it hurts, don't do it. Avoid the following activities for about six (6) weeks after surgery:  Lifting objects over 10 pounds  Strenuous activity such as press-ups, push-ups, crunches, sit-ups, vigorous pulling/pushing, and repetitive twisting or bending    Driving: You should not drive a vehicle while taking narcotic pain medications or muscle relaxers. When you return to driving, sit in your vehicle without starting the ignition and step on the brake firmly and rapidly a few times to assure you are confident with this task. Do not go alone the first time you drive.    Potential Problems:   Bowel obstruction or ileus is characterized by persistent abdominal cramps, bloating, constipation, nausea, or vomiting. If the symptoms are mild, you should restrict your dietary intake to only liquids, and avoid solid food for 2-3 days. If the  symptoms are more severe or persist beyond 24 hours, please call your surgeon's office for advice.    Frequent stools and loose stools are best managed by using bulking agents or anti-diarrheal medication. Please call your surgeon if diarrhea is not improving after a few weeks to discuss starting one of the medications below.  Benefiber®, Citrucel®, Fibercon®, Konsyl®, and Metamucil® are bulking agents that are available at most grocery stores and pharmacies. The medication should be mixed using one (1) teaspoon of the powder in the minimum amount of fluid required to dissolve the agent and taken 1-2 times each day. Benefiber® can be alternatively sprinkled over food 1-2 times each day.  You have tried these tricks in the past without success.  Imodium® (loperamide) is also available without a prescription. It is most effective if taken before meals. You should not take more than eight (8) tablets (32 mg) of Imodium in a 24-hour period. Your current regimen is 2 tabs four times a day (30min before meals and before bed).  You also have lomotil prescribed, 1 tab twice a day. Your script is written so that you can take more as needed to slow down ostomy output, roughly less than 1200cc is the goal but due to short gut yours may be more than that. TPN will help keep you hydrated.     Dehydration can commonly occur, and its symptoms or signs include dark urine, dizziness when standing, dry mouth, increased heart rate, leg cramps, and low volumes (less than 800 ml) of urine. If these occur, you should immediately call your surgeon's office. To avoid dehydration, you should do the following:  Drink a variety of fluids. Use an oral rehydration salt solution like Pedialyte, G2 Gatorade, Nuun tabs, etc. and sip the solution between meals.  Eat salty foods or add salt to your food.  Use an anti-diarrheal medication or bulking agent as previously instructed by your surgeon.     Wound infection can occur after any surgery and is  characterized by increased drainage of cloudy fluid, odor, pain around the wound, redness of the skin around the wound extending 2-3 fingerbreadths outward, or temperature greater than 101 degrees. Please immediately call your surgeon's office if you begin experiencing these symptoms or signs.    Follow up:  Return to clinic in about 2 weeks for follow up and wound check. Call [phone] for worsening pain, inability to urinate, or fever > 101.  Call or schedule follow up in about 7 days via [employer] if you are not otherwise contacted or see an appointment in the portal.

## 2025-03-10 NOTE — NURSING
put in discharged orders. Gibson from [facility] came and instructed pt about how to administer TPN at home. HH set up by SHAHNAZ Gandhi.   Waiting for pharmacy to deliver her meds.   Per SHAHNAZ Gandhi, transport set up to take pt home at 1630

## 2025-03-10 NOTE — NURSING
Pt took a shower, ostomy bag stayed intact- nurse asked if pt wanted to change her bag before going home- pt refused. Tunnel central line on right fem site CDI- no redness, no swelling, double lumens flushed well with saline.     At 1655, wheelchair van came to picked up pt but meds still was not delivered. Patient got upset and started yelling, nurse went downstairs to  meds  for the pt, all meds were inside the sealed bag, nurse brought the sealed bag upstairs -witnessed by charge nurse Warren and then gave to the pt. Pt then leave with transport

## 2025-03-10 NOTE — PLAN OF CARE
Problem: Adult Inpatient Plan of Care  Goal: Absence of Hospital-Acquired Illness or Injury  Outcome: Progressing  Goal: Optimal Comfort and Wellbeing  Outcome: Progressing  Goal: Readiness for Transition of Care  Outcome: Progressing   [facility] Plan of Care Note  Dx Colovesical fistula + Malnutrition     Shift Events ostomy output recorded- nausea x1- relieved with Zofran- no emesis; barely ate anything - had some popsicle     Goals of Care: pain control, optimized nutrition     Neuro: AAOx4     Vital Signs: stable     Respiratory: RA     Diet: low fiber    Is patient tolerating current diet? poor     GTTS: TPN over 16 hours     Urine Output/Bowel Movement: urinating without difficulty     Drains/Tubes/Tube Feeds (include total output/shift): none     Lines: tunnel line         Accuchecks: none     Skin: midline incision healing   Fall Risk Score: none     Activity level? Up ad shamika, has been ambulating to bathroom   Any scheduled procedures? none     Any safety concerns? Fall risk precaution     Other: none
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
## 2025-03-14 ENCOUNTER — PATIENT MESSAGE (OUTPATIENT)
Dept: GASTROENTEROLOGY | Facility: CLINIC | Age: 45
End: 2025-03-14
Payer: MEDICARE

## 2025-03-14 ENCOUNTER — TELEPHONE (OUTPATIENT)
Dept: SURGERY | Facility: HOSPITAL | Age: 45
End: 2025-03-14
Payer: MEDICARE

## 2025-03-14 NOTE — TELEPHONE ENCOUNTER
Called home infusion, spoke with Pharmacist Hamilton Macias. They had not see pts new labs form 3/11 that show Sodium of 124 and K of 5.2. These were emailed to him to make appropriate changes to TPN

## 2025-03-18 ENCOUNTER — TELEPHONE (OUTPATIENT)
Dept: GASTROENTEROLOGY | Facility: CLINIC | Age: 45
End: 2025-03-18
Payer: MEDICARE

## 2025-03-18 NOTE — TELEPHONE ENCOUNTER
----- Message from Robin Atkins MD sent at 3/17/2025 11:56 AM CDT -----  Regarding: RE: skyrizi  I agree  ----- Message -----  From: Evelina Loera MD  Sent: 3/16/2025   2:45 PM CDT  To: Robin Atkins MD; [facility] Pharmacist  Subject: skyrizi                                          Pharmacy team- Please let patient know okay to inject skyrizi.  She has been cleared by Dr. AtkinsWe are going to hold off on gattex given ileostomy output is reasonable at this timeWhen you speak with see if she can quantitate # of full bags of ostomy output dailyAlso get update on how often she is dosing imodium and lomotil and if we need to increase thisCopying Dr. Atkins on this note in case he has additional thoughts. SS

## 2025-03-18 NOTE — TELEPHONE ENCOUNTER
Called and spoke with pt   - reports taking imodium 2mg two caps every 4 hours and lomotil 2 tabs every 4 hours. She takes them a couple of hours apart from each other   - Skyrizi 360 mg SC q 8 weeks (LD 1/10)  - has not been very observant with the amount of times she changes her bag so is difficult to quantify  - reports approx  6-12 half full bags a day; loose brown stool  - has had a few episode of leakage overnight but sleeps with her light on to prevent that  - denies blood in the bag  - tries to keep up with eating marshmallows and peanut butter to help stool consistency  - reports having good appetite but not able to eat much without getting nauseous, reports a few episodes of emesis; takes zofran which has been helping   - she expresses frustration with the TPNs and reports feeling fatigued   - denies any fever, chills or signs of infection. Incision site is healing well   - advised to proceed with skyrizi injection but hold off on gattex for now  - will notify [facility]  - case discussed with Dr. Loera

## 2025-03-21 ENCOUNTER — PATIENT MESSAGE (OUTPATIENT)
Dept: SURGERY | Facility: CLINIC | Age: 45
End: 2025-03-21
Payer: MEDICARE

## 2025-03-21 DIAGNOSIS — K50.813 CROHN'S DISEASE OF BOTH SMALL AND LARGE INTESTINE WITH FISTULA: ICD-10-CM

## 2025-03-21 RX ORDER — OXYCODONE HYDROCHLORIDE 15 MG/1
15 TABLET ORAL EVERY 8 HOURS PRN
Qty: 20 TABLET | Refills: 0 | Status: SHIPPED | OUTPATIENT
Start: 2025-03-21

## 2025-03-26 ENCOUNTER — TELEPHONE (OUTPATIENT)
Dept: GASTROENTEROLOGY | Facility: CLINIC | Age: 45
End: 2025-03-26
Payer: MEDICARE

## 2025-03-26 NOTE — PROGRESS NOTES
[facility]  Colon and Rectal Surgery    [address]  [place], [place]  Tel: [phone]  Fax: [phone]  [email or web address]   MD Julio Cesar White MD Brian Kann, MD W. Forrest Johnston, MD Matthew Giglia, MD Jennifer Paruch, MD William Kethman, MD Danielle Kay, MD     Patient name: Candida Cardona   YOB: 1980   MRN: [id number]    It was a pleasure seeing Ms. Cardona in the Colon and Rectal Surgery clinic here at [facility].     As you know, Ms. Cardona is a 44 year old woman with a history of Crohn's disease, GERD, anxiety, CAD, Bipolar disorder, and pAF  who presents for evaluation of ileocolic stricture . She has an extensive and complex surgical history - summarized below. Her Crohn's disease is managed by Dr. Lama - she is on Skyrizi every 8 weeks. Since her last dose of Skyrizi she has been going to the bathroom about 6-8 times per day but the week before this dose 10-12 bowel movements per day. She has been on Skyrizi since December of 2022 - prior to this she was on Stelara (apparently unable to get therapeutic or clinical benefit from levels). In September she had an episode of increased abdominal pain, nausea, vomiting which was eventually relieved with frequent bowel movements. She had significant issues with her ostomy and wants to avoid this at all cost. She has not had any perianal procedure - no abscesses or fistulas. She does not recall having any recent disease activity or therapeutic drug monitoring recently. She has no fevers or chills. She is trying Canasa suppositories (not currently using them) for perianal pain but she doesn't feel like this helps. Warm baths have improved her perineal pain. She is not on Prednisone, last dose was a few weeks ago - 9 day taper.    Past surgeries:  5/25/2020 - Laparoscopic, converted to open cholecystectomy  1/15/2019 - Ileostomy closure and ileorectal anastomosis, extensive  lysis of adhesions, ureterolysis  12/22/2016 - Exploratory laparotomy with lysis of adhesions, 1 hr - ERIK, BSO at that time  3/17/2014 - TAC with EI  Prior ileocolic resections ~2007, 2010    Flexible sigmoidoscopy - 8/2022 - proctitis, stricture appreciated - not dilated    CT AP reviewed - appears to have a relative short-segment stricture at her ileorectal anastomosis    12/12/2024  Last seen in September by me at time of flexible sigmoidoscopy, seen by Dr. Loera on 10/25/2024, on Skyrizi. Transfer request was placed and accepted by Dr. Lang - she was ultimately not transferred due to improvement in symptoms. Of note, we have discussed completion proctectomy and end ileostomy many times, she has previously declined this option. She went to the ED because she was told to go to the ED by Dr. Lama's office. We do not have the results of the CT she had completed. She was treated with IV Dilaudid. She eventually began to have bowel movements in the ED which resolved her nausea and vomiting. Since then she has been eating regular and having normal bowel movements without pain.     9/17/2024 - Flexible sigmoidoscopy  The perianal exam was abnormal. Scarring circumferentially likely related to long-standing Crohn's disease and frequent bowel movements, no evidence of perianal abscesses or fistulae.   The ileum appeared normal. Biopsies were taken with a cold forceps for histology.   There was evidence of a prior end-to-end ileo-rectal anastomosis in the proximal rectum. This was patent and was characterized by congestion, edema, friable mucosa and mild stenosis. The anastomosis was traversed.   A single (solitary) ulcer was found in the distal rectum. No bleeding was present. Stigmata of recent bleeding were present. Biopsies were taken with a cold forceps for histology.   A polypoid lesion was found in the distal rectum - this appeared to be an inflammatory polyp. The lesion was semi-pedunculated. No bleeding was  present. Biopsies were taken with a cold forceps for histology.     1. RADHA-TERMINAL ILEUM, BIOPSY:   Benign small bowel mucosa with no significant pathologic abnormality     2. LOW-RECTAL ULCER, BIOPSY:   Chronic active colitis   No evidence of granuloma, dysplasia or malignancy   Immunostain for CMV is pending     3. DISTAL RECTUM, BIOPSY:   Chronic active colitis with ulceration and granulation tissue   No evidence of granuloma, dysplasia or malignancy   Immunostain for CMV is pending     3/27/2025  Here for post-operative follow-up after urgent extensive lysis of adhesions, completion proctectomy with resection of ileorectal anastomosis and takedown of enterovesical and vaginal fistulae with end ileostomy on 2/22/2025 - discharged on 3/10/2025. She was discharged on cycled TPN due to only 115 cm of remaining small bowel. Her appetite has improved. She is still having some intermittent nausea but it sounds more like food aversion from being obstructed for so long. She has not really been keeping track of her ostomy output but it hasn't put out much.     Pathology  1. Anus and rectum, resection:   Chronic active colitis with ulceration.    Serosal fibrovascular adhesions.   Inflammation extends the proximal margin of resection.    Skin at the distal resection margin shows hyperkeratosis and mild inflammation.    Negative for dysplasia and malignancy.     2. Small bowel and ileorectal anastomosis, resection:   Chronic active colitis with ulceration and acute serositis.   Serosal fibrovascular adhesions.    Mucosal inflammation and acute serositis extend to the colonic resection margin.   Acute serositis extends to the ileal resection margin.    Negative for dysplasia and malignancy.     3.  Small bowel with fistula, resection:   Small bowel with focal mucosal erosion and scattered non-caseating granulomas.   Granulomas extend from the submucosa to the serosa.   Serosal fibrovascular adhesions.    Acute serositis  extends to the resection margins.   Negative for dysplasia and malignancy.     Lab Results   Component Value Date    ALBUMIN 2.4 (L) 03/07/2025    CREATININE 0.8 03/07/2025    HGB 9.5 (L) 03/10/2025    CALCIUM 9.3 03/07/2025     Wt Readings from Last 3 Encounters:   03/27/25 64.9 kg (143 lb 1.3 oz)   02/24/25 64.5 kg (142 lb 3.2 oz)   12/12/24 68.5 kg (151 lb 0.2 oz)     The patient was informed of the availability of a certified  without charge. A certified  was not necessary for this visit.    Review of Systems  See pertinent review of systems above    Past Medical History:   Diagnosis Date    Anxiety     Bipolar 1 disorder     Cholelithiasis     Coronary artery disease     NO STENT    Crohn's disease with ileorectal anastomotic stricture (ileum and rectum)     Depression     Epilepsy     Fibromyalgia     GERD (gastroesophageal reflux disease)     Interstitial cystitis     Kidney stones     Mass, ovarian 11/22/2016    Osteoporosis     PAF (paroxysmal atrial fibrillation)     Pancreatitis     S/P ERIK-BSO (total abdominal hysterectomy and bilateral salpingo-oophorectomy) 12/19/2016     Past Surgical History:   Procedure Laterality Date    ABDOMINAL SURGERY      ABDOMINOPERINEAL RESECTION OF RECTUM N/A 2/22/2025    Procedure: PROCTECTOMY, ABDOMINOPERINEAL;  Surgeon: Robin Atkins MD;  Location: [address];  Service: Colon and Rectal;  Laterality: N/A;    ANASTOMOSIS OF ILEUM TO RECTUM  01/15/2019    Procedure: ANASTOMOSIS, ILEORECTAL;  Surgeon: Reji Peterson MD;  Location: [address];  Service: Colon and Rectal;;    ANGIOPLASTY      CHOLECYSTECTOMY N/A 05/25/2020    Procedure: CHOLECYSTECTOMY;  Surgeon: Ozzie Sanches MD;  Location: [facility] OR;  Service: General;  Laterality: N/A;  COVID NEG      COLON SURGERY      x 2    COLONOSCOPY      multiple    COLONOSCOPY N/A 04/11/2017    Procedure: COLONOSCOPY;  Surgeon: Hussain Amaro MD;  Location: [facility] ENDO;   Service: Endoscopy;  Laterality: N/A;    CYSTOGRAM  2/22/2025    Procedure: CYSTOGRAM;  Surgeon: Kerwin Rachel MD;  [facility];  Service: Urology;;    CYSTOSCOPY W/ URETERAL STENT PLACEMENT      CYSTOSCOPY W/ URETERAL STENT REMOVAL      CYSTOSCOPY W/ URETERAL STENT REMOVAL Right 09/05/2019    Procedure: CYSTOSCOPY, WITH URETERAL STENT REMOVAL;  Surgeon: Dano Mathis MD;  Location: [facility] OR;  Service: Urology;  Laterality: Right;    CYSTOSCOPY WITH URETEROSCOPY, RETROGRADE PYELOGRAPHY, AND INSERTION OF STENT Right 08/22/2019    Procedure: CYSTOSCOPY, WITH RETROGRADE PYELOGRAM AND URETERAL STENT INSERTION;  Surgeon: Dano Mathis MD;  Location: [facility] OR;  Service: Urology;  Laterality: Right;    CYSTOSCOPY WITH URETEROSCOPY, RETROGRADE PYELOGRAPHY, AND INSERTION OF STENT Bilateral 2/22/2025    Procedure: CYSTOSCOPY, WITH RETROGRADE PYELOGRAM AND URETERAL STENT INSERTION;  Surgeon: Kerwin Rachel MD;  [facility];  Service: Urology;  Laterality: Bilateral;    EXCISION, SMALL INTESTINE  2/22/2025    Procedure: EXCISION, SMALL INTESTINE;  Surgeon: Robin Atkins MD;  [facility];  Service: Colon and Rectal;;    EXTRACORPOREAL SHOCK WAVE LITHOTRIPSY Right 08/22/2019    Procedure: LITHOTRIPSY-EXTRACORPOREAL SHOCK WAVE;  Surgeon: Dano Mathis MD;  Location: [facility];  Service: Urology;  Laterality: Right;    EXTRACORPOREAL SHOCK WAVE LITHOTRIPSY Right 09/05/2019    Procedure: LITHOTRIPSY-EXTRACORPOREAL SHOCK WAVE;  Surgeon: Dano Mathis MD;  Location: [facility] OR;  Service: Urology;  Laterality: Right;    FLEXIBLE SIGMOIDOSCOPY N/A 01/23/2024    Procedure: SIGMOIDOSCOPY, FLEXIBLE;  Surgeon: Robin Atkins MD;  Location: [facility] ENDO (2ND FLR);  Service: Endoscopy;  Laterality: N/A;  Ref By:  Dr. Madera  Prep Specifications: 2 days of clear and 2 enema before procedure  1/1-precall complete-pt approved to stay at [facility] night before and  following the procedure without a ride per Dr. Atkins-see encounter dated 1/18-MS    FLEXIBLE SIGMOIDOSCOPY N/A 09/17/2024    Procedure: SIGMOIDOSCOPY, FLEXIBLE;  Surgeon: Robin Atkins MD;  Location: [facility] ENDO (4TH FLR);  Service: Endoscopy;  Laterality: N/A;  Ref By: aidee Oneal  9/12-pre call complete-dr herrera would like to be [id number] appt with gamino 10am    HYSTERECTOMY      ILEOSTOMY      ILEOSTOMY  2/22/2025    Procedure: CREATION, ILEOSTOMY;  Surgeon: Robin Atkins MD;  Location: [facility] OR 2ND FLR;  Service: Colon and Rectal;;    LAPAROTOMY, EXPLORATORY  01/15/2019    Procedure: LAPAROTOMY, EXPLORATORY;extensive lysis of adhesions;  Surgeon: Reji Peterson MD;  Location: [facility] OR 2ND FLR;  Service: Colon and Rectal;;    LYSIS OF ADHESIONS OF URETER  01/15/2019    Procedure: URETEROLYSIS;  Surgeon: Reji Peterson MD;  Location: [facility] OR 2ND FLR;  Service: Colon and Rectal;;    PERIPHERALLY INSERTED CENTRAL CATHETER INSERTION N/A 10/30/2018    Procedure: INSERTION, PICC;  Surgeon: Dosc Diagnostic Provider;  Location: [facility] OR;  Service: General;  Laterality: N/A;    PERIPHERALLY INSERTED CENTRAL CATHETER INSERTION N/A 08/23/2019    Procedure: INSERTION, PICC;  Surgeon: Dos Diagnostic Provider;  Location: [facility] OR;  Service: General;  Laterality: N/A;    PORTACATH PLACEMENT      TOTAL COLECTOMY  2014     Family History   Problem Relation Name Age of Onset    Hypertension Mother      Joint hypermobility Mother      Physical abuse Mother      Cancer Father          brain    Diabetes Father      Schizophrenia Father      Stroke Father      No Known Problems Brother 1     No Known Problems Brother      Stroke Paternal Grandmother      Ovarian cancer Neg Hx      Uterine cancer Neg Hx      Breast cancer Neg Hx      Colon cancer Neg Hx      Anesthesia problems Neg Hx       Social History     Tobacco Use    Smoking status: Never    Smokeless tobacco: Never   Substance Use Topics     Alcohol use: No    Drug use: No     Review of patient's allergies indicates:   Allergen Reactions    Ciprofloxacin Shortness Of Breath and Itching    Keppra [levetiracetam] Other (See Comments)     Increased depression    Mercaptopurine analogues (thiopurines) Hives, Diarrhea and Nausea And Vomiting    Imuran [azathioprine sodium] Diarrhea and Nausea And Vomiting     Pt states that she becomes hot sweaty and passes out also. She states that she has diarrhea and nausea and vomiting     [place]     Imuran [azathioprine] Nausea And Vomiting    Ketorolac Itching and Hives    Adhesive Itching    Iodinated contrast media Hives       Current Outpatient Medications on File Prior to Visit   Medication Sig Dispense Refill    ergocalciferol (VITAMIN D2) 50,000 unit Cap Take 50,000 Units by mouth every 7 days.      gabapentin (NEURONTIN) 300 MG capsule Take 1 capsule (300 mg total) by mouth 3 (three) times daily. 90 capsule 11    loperamide (IMODIUM) 2 mg capsule Take 2 capsules (4 mg total) by mouth 4 (four) times daily. 240 capsule 2    ondansetron (ZOFRAN-ODT) 4 MG TbDL Take 1 tablet (4 mg total) by mouth every 6 (six) hours as needed (Nausea vomiting). 12 tablet 0    oxyCODONE (ROXICODONE) 15 MG Tab Take 1 tablet (15 mg total) by mouth every 8 (eight) hours as needed for Pain (pain not controleld by gabaptin, tylenol, and robaxin). 20 tablet 0    pantoprazole (PROTONIX) 40 MG tablet Take 1 tablet (40 mg total) by mouth once daily. 90 tablet 3    risankizumab-rzaa (SKYRIZI) 360 mg/2.4 mL (150 mg/mL) Injt Inject 360 mg into the skin every 8 weeks. 2.4 mL 0    teduglutide (GATTEX 30-VIAL) 5 mg Kit Inject 0.05 mg/kg into the skin once daily. Inject 3mg (0.3mL) under the skin daily 1 kit 5    [DISCONTINUED] diphenoxylate-atropine 2.5-0.025 mg (LOMOTIL) 2.5-0.025 mg per tablet Take 2 tablets by mouth 4 (four) times daily as needed for Diarrhea (Secdon line after maxing out imodium). 56 tablet 2     Current Facility-Administered  "Medications on File Prior to Visit   Medication Dose Route Frequency Provider Last Rate Last Admin    mupirocin 2 % ointment   Nasal On Call Procedure Vidhi Mathis NP   Given at 01/15/19 0642     Physical Examination  /64   Pulse (P) 74   Resp 16   Ht 5' 7" (1.702 m)   Wt 64.9 kg (143 lb 1.3 oz)   LMP  (LMP Unknown) Comment: complete hysterectomy  BMI 22.41 kg/m²      Constitutional: well developed, no cough, no dyspnea, alert, and no acute distress    Head: Normocephalic, no lesions, without obvious abnormality  Eye: Normal external eye, conjunctiva, and lids  Cardiovascular: regular rate and regular rhythm  Respiratory: normal air entry  Gastrointestinal: soft, non-tender, some mild separation of upper abdominal incision - well-appearing, not infected        Musculoskeletal: full range of motion without pain  Neurologic: alert, oriented, normal speech, no focal findings or movement disorder noted  Psychiatric: appropriate, normal mood    Assessment and Plan of Care    Thank you again for referring Ms. Cardona to my care. In summary, Ms. Cardona is a 44 year old woman presenting with complicated Crohn's disease - intermittent obstructions of ileorectal anastomosis on Skyrizi. She presented with acute worsening and fistulization of her anastomosis required resection. We discussed treatment options and have provided the following recommendations:    Will work with pharmacy on down titration of nutritional support from TPN as ostomy output is overall not as high as anticipated and she is able to eat - will need follow-up with dietitian to monitor nutritional needs (referral placed)  Plan to follow-up with Dr. Loera for ongoing management of Crohn's disease and potential malnutrition related to necessary resection (4/22)  Discussed importance of continued Lomotil and Imodium  Follow-up with me in 1 month to reassess    Please do not hesitate to contact me if you have any questions.      Robin" MD Wilbert, FACS, FASCRS  Department of Colon & Rectal Surgery  [facility]

## 2025-03-27 ENCOUNTER — OFFICE VISIT (OUTPATIENT)
Dept: SURGERY | Facility: CLINIC | Age: 45
End: 2025-03-27
Payer: MEDICARE

## 2025-03-27 VITALS
DIASTOLIC BLOOD PRESSURE: 64 MMHG | WEIGHT: 143.06 LBS | BODY MASS INDEX: 22.45 KG/M2 | SYSTOLIC BLOOD PRESSURE: 102 MMHG | HEIGHT: 67 IN | RESPIRATION RATE: 16 BRPM

## 2025-03-27 DIAGNOSIS — N18.1 CHRONIC KIDNEY DISEASE, STAGE 1: ICD-10-CM

## 2025-03-27 DIAGNOSIS — K50.813 CROHN'S DISEASE OF BOTH SMALL AND LARGE INTESTINE WITH FISTULA: ICD-10-CM

## 2025-03-27 DIAGNOSIS — R11.0 NAUSEA: Primary | ICD-10-CM

## 2025-03-27 PROCEDURE — 99999 PR PBB SHADOW E&M-EST. PATIENT-LVL III: CPT | Mod: PBBFAC,,, | Performed by: STUDENT IN AN ORGANIZED HEALTH CARE EDUCATION/TRAINING PROGRAM

## 2025-03-27 PROCEDURE — 3074F SYST BP LT 130 MM HG: CPT | Mod: CPTII,S$GLB,, | Performed by: STUDENT IN AN ORGANIZED HEALTH CARE EDUCATION/TRAINING PROGRAM

## 2025-03-27 PROCEDURE — 3078F DIAST BP <80 MM HG: CPT | Mod: CPTII,S$GLB,, | Performed by: STUDENT IN AN ORGANIZED HEALTH CARE EDUCATION/TRAINING PROGRAM

## 2025-03-27 PROCEDURE — 99024 POSTOP FOLLOW-UP VISIT: CPT | Mod: S$GLB,,, | Performed by: STUDENT IN AN ORGANIZED HEALTH CARE EDUCATION/TRAINING PROGRAM

## 2025-03-27 RX ORDER — DIPHENOXYLATE HYDROCHLORIDE AND ATROPINE SULFATE 2.5; .025 MG/1; MG/1
2 TABLET ORAL 4 TIMES DAILY PRN
Qty: 56 TABLET | Refills: 6 | Status: SHIPPED | OUTPATIENT
Start: 2025-03-27 | End: 2025-06-25

## 2025-03-27 RX ORDER — ONDANSETRON 4 MG/1
4 TABLET, FILM COATED ORAL EVERY 12 HOURS PRN
Qty: 60 TABLET | Refills: 1 | Status: SHIPPED | OUTPATIENT
Start: 2025-03-27

## 2025-03-28 ENCOUNTER — PATIENT MESSAGE (OUTPATIENT)
Dept: SURGERY | Facility: CLINIC | Age: 45
End: 2025-03-28
Payer: MEDICARE

## 2025-03-31 DIAGNOSIS — R11.0 NAUSEA: Primary | ICD-10-CM

## 2025-03-31 RX ORDER — ONDANSETRON 4 MG/1
4 TABLET, ORALLY DISINTEGRATING ORAL EVERY 6 HOURS PRN
Qty: 12 TABLET | Refills: 0 | Status: SHIPPED | OUTPATIENT
Start: 2025-03-31 | End: 2025-03-31

## 2025-03-31 RX ORDER — ONDANSETRON 4 MG/1
4 TABLET, FILM COATED ORAL EVERY 12 HOURS PRN
Qty: 60 TABLET | Refills: 1 | Status: SHIPPED | OUTPATIENT
Start: 2025-03-31 | End: 2025-04-02 | Stop reason: SDUPTHER

## 2025-04-01 NOTE — TELEPHONE ENCOUNTER
Can get for under 10$ on [facility] if she wants us to send there. Just check when you speak with her regarding tpn. Thanks

## 2025-04-02 ENCOUNTER — TELEPHONE (OUTPATIENT)
Dept: SURGERY | Facility: CLINIC | Age: 45
End: 2025-04-02
Payer: MEDICARE

## 2025-04-02 DIAGNOSIS — R11.0 NAUSEA: ICD-10-CM

## 2025-04-02 RX ORDER — ONDANSETRON 4 MG/1
4 TABLET, FILM COATED ORAL EVERY 12 HOURS PRN
Qty: 60 TABLET | Refills: 1 | Status: SHIPPED | OUTPATIENT
Start: 2025-04-02

## 2025-04-02 NOTE — TELEPHONE ENCOUNTER
Ok with less TPN, trying to eat solid foods but getting nauseated - she is able to drink fine but only carbonated beverages  Cheese cake was fine, she is not taking Lomotil but is taking Imodium  The output is high when she drinks things  Labs done on Monday but she doesn't have these results and neither do I  Urine is clear    Called Pharmacy to stop our dose reduction and leave at 50% - [phone] - DONE    Will call Candida in 2 weeks to discuss further reduction      Robin Atkins MD, FACS, FASCRS  Department of Colon & Rectal Surgery  [facility]

## 2025-04-07 ENCOUNTER — TELEPHONE (OUTPATIENT)
Dept: SURGERY | Facility: CLINIC | Age: 45
End: 2025-04-07
Payer: MEDICARE

## 2025-04-07 NOTE — TELEPHONE ENCOUNTER
Call transferred from call center. Spoke with Shaniqua (home health nurse) regarding patient condition. Nurse states that patient is not compliant with TPN, calling all weekend for assistance, c/o pain and refusing to go to ER. States that apartment is filthy and has trash scattered throughout .  nurse requesting social work consult. Explained that I will relay this information to Dr. Atkins and call her back.

## 2025-04-07 NOTE — TELEPHONE ENCOUNTER
----- Message from Latha sent at 4/7/2025 11:55 AM CDT -----  Regarding: Patient concerns  Contact: [phone]  Type:  Needs Medical AdviceWho Called: Lisa [facility] called in regards to the pt the pt call her all weekend complaining of pain the pt went to urgent care and they gave her 3 pain pills and she was asking  consult for her  Would the patient rather a call back or a response via [facility]? Call back Best Call Back Number:[phone] Information:

## 2025-04-14 ENCOUNTER — TELEPHONE (OUTPATIENT)
Dept: SURGERY | Facility: CLINIC | Age: 45
End: 2025-04-14
Payer: MEDICARE

## 2025-04-14 ENCOUNTER — PATIENT MESSAGE (OUTPATIENT)
Dept: SURGERY | Facility: CLINIC | Age: 45
End: 2025-04-14
Payer: MEDICARE

## 2025-04-14 NOTE — TELEPHONE ENCOUNTER
Called Ms. Fishs Eddy, she is currently admitted at [facility] like fluid and electrolyte issues - will likely require SNF on discharge, CT was reportedly reassuring  Let her know that if she provides a number for her treating physicians that I can help them with ostomy management      Robin Atkins MD, FACS, FASCRS  Department of Colon & Rectal Surgery  [facility]

## 2025-04-14 NOTE — TELEPHONE ENCOUNTER
RN called pt's step parent to ask her to go and check on pt and see if she can get her to go to the ER with her.  RN left Ms. Chen a vmail with [facility] office number to call back.

## 2025-04-14 NOTE — TELEPHONE ENCOUNTER
RN called pt's phone and left her a vmail to check in on her.   Nurse Lisa called the triage line this AM stating that pt's BP was 70/40.  She was laying on her sofa in urine.  It was clear that pt had multiple episodes of N/V.  Pt had belongings all over the home as well as pills all over the floor making it challenging to navigate through the home.  Her abdominal incision was very red and angry an unsanitary.  Pt is not eating, has not bathed and overall seems very depressed.  Psych consult suggested bu  Nurse.  RN will message Dr. Atkins who is currently in the OR and will also call pt's step-parent listed in the chart.  Nurse Gonzalez has tried to call the ambulance for the pt and has urged her to go to the ER immediately.  Pt refused and stated that she will call her aunt to take her later.  Nurse Gonzalez stated that she will call pt again and check in with her.

## 2025-04-15 ENCOUNTER — PATIENT MESSAGE (OUTPATIENT)
Dept: GASTROENTEROLOGY | Facility: CLINIC | Age: 45
End: 2025-04-15
Payer: MEDICARE

## 2025-04-15 NOTE — TELEPHONE ENCOUNTER
"Reason for Call: Change in condition In CCU at [facility] - requests to cancel appt 4/22/25  Contact: Called & spoke to patient  Date of symptom onset: 4/14/25  Symptom details:   Home Health RN took BP 70/40, advised ER  High ostomy output  Dizziness & "black out" with change of positions  Currently in CCU at [facility], per pt report:  Continues to have high ostomy output, has only started eating PO during this admission, NPO from dc 3/10/25 til now, TPN   Na+ low  Infection in blood  On bedrest d/t continued dizziness and risk of fainting with position change  Disease phenotype: Crohn's disease ileum/rectum with ileorectal anastomotic stricture (urgent extensive lysis of adhesions, completion proctectomy with resection of ileorectal anastomosis and takedown of enterovesical and vaginal fistulae with end ileostomy on 2/22/2025)  Current IBD meds: Skyrizi 360mg q8w (started 12/2022; LD: 3/21 (per Mary PharmD, pt could not remember), ND: 5/16),   Other pertinent meds: Lomotil, Imodium  Additional pertinent information (recent lab, scope, imaging, etc.): urgent extensive lysis of adhesions, completion proctectomy with resection of ileorectal anastomosis and takedown of enterovesical and vaginal fistulae with end ileostomy on 2/22/2025  Next OV: 4/22/25 - pt canceled and declined to reschedule d/t unknown hospital dc date - reminder set to reach out in 1 wk for update  Provider: Dr. Loera updated. Dr. Atkins is helping manage her ostomy during current hospitalization.    "

## 2025-04-17 ENCOUNTER — PATIENT MESSAGE (OUTPATIENT)
Dept: SURGERY | Facility: CLINIC | Age: 45
End: 2025-04-17
Payer: MEDICARE

## 2025-04-17 ENCOUNTER — PATIENT MESSAGE (OUTPATIENT)
Dept: WOUND CARE | Facility: CLINIC | Age: 45
End: 2025-04-17
Payer: MEDICARE

## 2025-04-17 ENCOUNTER — TELEPHONE (OUTPATIENT)
Facility: CLINIC | Age: 45
End: 2025-04-17
Payer: MEDICARE

## 2025-04-29 ENCOUNTER — DOCUMENT SCAN (OUTPATIENT)
Dept: HOME HEALTH SERVICES | Facility: HOSPITAL | Age: 45
End: 2025-04-29
Payer: MEDICARE

## 2025-05-06 ENCOUNTER — DOCUMENT SCAN (OUTPATIENT)
Dept: HOME HEALTH SERVICES | Facility: HOSPITAL | Age: 45
End: 2025-05-06
Payer: MEDICARE

## 2025-05-07 ENCOUNTER — EXTERNAL HOME HEALTH (OUTPATIENT)
Dept: HOME HEALTH SERVICES | Facility: HOSPITAL | Age: 45
End: 2025-05-07
Payer: MEDICARE

## 2025-05-09 ENCOUNTER — PATIENT MESSAGE (OUTPATIENT)
Dept: SURGERY | Facility: CLINIC | Age: 45
End: 2025-05-09
Payer: MEDICARE

## 2025-05-09 DIAGNOSIS — Z93.2 ILEOSTOMY IN PLACE: Primary | ICD-10-CM

## 2025-05-13 ENCOUNTER — TELEPHONE (OUTPATIENT)
Dept: GASTROENTEROLOGY | Facility: CLINIC | Age: 45
End: 2025-05-13
Payer: MEDICARE

## 2025-05-13 NOTE — TELEPHONE ENCOUNTER
Spoke with Meseret:  Anticipating abx therapy to complete this week and being discharged 5/16/25  Ajay due 5/16/25 but pt doesn't have housing as she gave up her apartment because she was told she wouldn't be able to live on her own but now she will be able to resume independent living and is looking for new housing. She will contact us on 5/19/25 with her new address for Ajay  to be sent to.  TPN line was changed d/t infection and TPN has been reduced d/t tolerating foods including milk, Ensure, even vegetables  She has changed her PCP to Dr. Hiram Vásquez, updated on chart  OV: 6/10/25    Dr. Loera and Pharmacy team will be updated.

## 2025-05-13 NOTE — TELEPHONE ENCOUNTER
Copied from CRM #[id number]. Topic: General Inquiry - Return Call  >> May 13, 2025  2:12 PM Mirtha wrote:  Pt is returning a missed call from someone in the office and is asking for a return call back soon. Thanks.     Reason for call:MISS CALL      Patient's DX:     Patient requesting call back or [facility] [phone]

## 2025-05-13 NOTE — TELEPHONE ENCOUNTER
----- Message from RIA Burgos sent at 4/23/2025  9:32 AM CDT -----  Patient currently admitted at [facility] as of 4/22/25 for IV abx (PICC line in place). Dx with staph infection in blood. Advised she was given an estimate of 2-4 weeks more of IV abx. Has she been discharged?- Advised by Dr. Loera on 4/22 to hold Skyrizi dose on 5/16/25.- Skyrizi 360mg q8w (started 12/2022; LD: 3/21 (per Mary PharmD)- OV scheduled 6/10/25- Was in contact with Dr. Loera as of 4/22/25.

## 2025-05-15 ENCOUNTER — DOCUMENT SCAN (OUTPATIENT)
Dept: HOME HEALTH SERVICES | Facility: HOSPITAL | Age: 45
End: 2025-05-15
Payer: MEDICARE

## 2025-05-19 DIAGNOSIS — K50.812 CROHN'S DISEASE OF BOTH SMALL AND LARGE INTESTINE WITH INTESTINAL OBSTRUCTION: Chronic | ICD-10-CM

## 2025-05-19 RX ORDER — RISANKIZUMAB-RZAA 360 MG/2.4
360 WEARABLE INJECTOR SUBCUTANEOUS
Qty: 2.4 ML | Refills: 0 | OUTPATIENT
Start: 2025-05-19

## 2025-05-19 NOTE — TELEPHONE ENCOUNTER
Refill request sent to Hiram Vásquez MD for approval of the following medications:   Requested Prescriptions     Pending Prescriptions Disp Refills    risankizumab-rzaa (SKYRIZI) 360 mg/2.4 mL (150 mg/mL) Injt 2.4 mL 0     Sig: Inject 360 mg into the skin every 8 weeks.        Last Visit with provider: 8/20/2019   Next Visit with provider: 6/2/2025 Hiram Vásquez MD

## 2025-05-20 ENCOUNTER — PATIENT MESSAGE (OUTPATIENT)
Dept: SURGERY | Facility: CLINIC | Age: 45
End: 2025-05-20
Payer: MEDICARE

## 2025-05-20 DIAGNOSIS — K50.812 CROHN'S DISEASE OF BOTH SMALL AND LARGE INTESTINE WITH INTESTINAL OBSTRUCTION: Chronic | ICD-10-CM

## 2025-05-20 RX ORDER — RISANKIZUMAB-RZAA 360 MG/2.4
360 WEARABLE INJECTOR SUBCUTANEOUS
Qty: 2.4 ML | Refills: 0 | OUTPATIENT
Start: 2025-05-20

## 2025-05-22 ENCOUNTER — TELEPHONE (OUTPATIENT)
Dept: GASTROENTEROLOGY | Facility: CLINIC | Age: 45
End: 2025-05-22
Payer: MEDICARE

## 2025-05-22 NOTE — TELEPHONE ENCOUNTER
Reason for Call: Change in condition High Ileostomy output  Contact: Called & spoke to patient  Date of symptom onset: a couple hours ago  Symptom details:   Reports emptying 5 full bags in the last couple hours, bags have approx 1/3-1/2 full stool w undigested food and air. The air is causing the bag to become taut   Stool is soft w undigested food like peas, ate fried rice for lunch  She's drank approx 42 oz fluids today, reviewed she should be aiming for  oz of fluid daily d/t less ability for resorption d/t ileostomy  Adv may try Gas X to help reduce amount of air in ostomy bag  Denies fever, chills, sxs of infection  Last dose of antibiotics 5/16/25 for line infection  She'd like to get her Skyrizi dose delivered to her as she did get colostomy supplies delivered  Currently residing in a hotel: [address], [address], [place] [address]  She doesn't feel she can manage her care on he own and is attempting to be placed into skilled nursing care.She had declined placement as she was under the impression she would not be able to see her doctors. She is seeing her PCP on 6/2/25 to discuss.  Disease phenotype: CD, ileum/rectum with ileorectal anastomotic stricture(urgent extensive lysis of adhesions, completion proctectomy with resection of ileorectal anastomosis and takedown of enterovesical and vaginal fistulae with end ileostomy on 2/22/2025)  Current IBD meds: Skyrizi 360mg q8w (started 12/2022; LD: 3/21, ND: 5/16 HOLD), RX Adherence: Adherent, but HOLD d/t antibiotic therapy  Next OV: 6/10/25  Provider: Dr. Loera updated. OK to resume Skyrizi as pt is not reporting any s/s of infection. Pharmacy team updated.

## 2025-05-23 ENCOUNTER — DOCUMENT SCAN (OUTPATIENT)
Dept: HOME HEALTH SERVICES | Facility: HOSPITAL | Age: 45
End: 2025-05-23
Payer: MEDICARE

## 2025-05-23 DIAGNOSIS — K50.812 CROHN'S DISEASE OF BOTH SMALL AND LARGE INTESTINE WITH INTESTINAL OBSTRUCTION: Chronic | ICD-10-CM

## 2025-05-23 RX ORDER — RISANKIZUMAB-RZAA 360 MG/2.4
360 WEARABLE INJECTOR SUBCUTANEOUS
Qty: 2.4 ML | Refills: 0 | Status: ACTIVE | OUTPATIENT
Start: 2025-05-23

## 2025-05-23 NOTE — TELEPHONE ENCOUNTER
"Primary provider: Dr. Evelina Loera    IBD medications: Skyrizi 360 mg SC q 8 weeks -- per 5/22/25 Dr. Loera ok to resume Skyrizi as pt is not reporting any s/s of infection.     Refill request for:      Pended Medication(s)   Requested Prescriptions     Pending Prescriptions Disp Refills    risankizumab-rzaa (SKYRIZI) 360 mg/2.4 mL (150 mg/mL) Injt 2.4 mL 0     Sig: Inject 360 mg into the skin every 8 weeks.           Allergies reviewed: Yes    Drug Monitoring labs/frequency for all IBD meds:    CBC and CMP every 6 months  TB and Hep B every 12 months    Lab Results   Component Value Date    HEPBSAG Non-reactive 09/25/2024    HEPBCAB Non-reactive 09/25/2024     Lab Results   Component Value Date    TBGOLDPLUS Negative 10/25/2024     Lab Results   Component Value Date    QUANTNILVALU 0.05 04/17/2018      No results found for: "TSPOTSCREN"  Lab Results   Component Value Date    ZBFHOGQM31BU 36 09/25/2024    BATWQJLO37 186 (L) 09/25/2024     Lab Results   Component Value Date    WBC 10.52 03/10/2025    HGB 9.5 (L) 03/10/2025    HCT 32.0 (L) 03/10/2025    MCV 77 (L) 03/10/2025     (H) 03/10/2025     Lab Results   Component Value Date    CREATININE 0.8 03/07/2025    ALBUMIN 2.4 (L) 03/07/2025    BILITOT 0.3 03/07/2025    ALKPHOS 157 (H) 03/07/2025    AST 28 03/07/2025    ALT 7 (L) 03/07/2025     Lab Results   Component Value Date    CHOL 130 05/03/2019    HDL 49 05/03/2019    LDLCALC 56.4 (L) 05/03/2019    TRIG 119 03/04/2025       Lab due date (mo/yr):  9/2025    Labs scheduled: No - but patient has an OV before or when labs are due     Next appt: 6/10/25    RX refill sent to provider for amount until next labs: No - bridge supply to future office visit.       "

## 2025-05-28 ENCOUNTER — DOCUMENT SCAN (OUTPATIENT)
Dept: HOME HEALTH SERVICES | Facility: HOSPITAL | Age: 45
End: 2025-05-28
Payer: MEDICARE

## 2025-06-04 ENCOUNTER — TELEPHONE (OUTPATIENT)
Dept: SURGERY | Facility: CLINIC | Age: 45
End: 2025-06-04
Payer: MEDICARE

## 2025-06-09 ENCOUNTER — HOSPITAL ENCOUNTER (EMERGENCY)
Facility: HOSPITAL | Age: 45
Discharge: PSYCHIATRIC OR SUBSTANCE USE FACILITY | End: 2025-06-10
Attending: FAMILY MEDICINE
Payer: MEDICARE

## 2025-06-09 DIAGNOSIS — F41.9 ANXIETY: ICD-10-CM

## 2025-06-09 DIAGNOSIS — F32.A DEPRESSION, UNSPECIFIED DEPRESSION TYPE: ICD-10-CM

## 2025-06-09 DIAGNOSIS — Z00.8 MEDICAL CLEARANCE FOR PSYCHIATRIC ADMISSION: Primary | ICD-10-CM

## 2025-06-09 LAB
ABSOLUTE EOSINOPHIL (OHS): 0.14 K/UL
ABSOLUTE MONOCYTE (OHS): 0.35 K/UL (ref 0.3–1)
ABSOLUTE NEUTROPHIL COUNT (OHS): 3.74 K/UL (ref 1.8–7.7)
ALBUMIN SERPL BCP-MCNC: 3.2 G/DL (ref 3.5–5.2)
ALP SERPL-CCNC: 112 UNIT/L (ref 40–150)
ALT SERPL W/O P-5'-P-CCNC: 20 UNIT/L (ref 10–44)
AMPHET UR QL SCN: NEGATIVE
ANION GAP (OHS): 11 MMOL/L (ref 8–16)
APAP SERPL-MCNC: <3 UG/ML (ref 10–20)
AST SERPL-CCNC: 35 UNIT/L (ref 11–45)
BACTERIA #/AREA URNS HPF: ABNORMAL /HPF
BARBITURATE SCN PRESENT UR: NEGATIVE
BASOPHILS # BLD AUTO: 0.02 K/UL
BASOPHILS NFR BLD AUTO: 0.4 %
BENZODIAZ UR QL SCN: NEGATIVE
BILIRUB SERPL-MCNC: 0.7 MG/DL (ref 0.1–1)
BILIRUB UR QL STRIP.AUTO: NEGATIVE
BUN SERPL-MCNC: 14 MG/DL (ref 6–20)
CALCIUM SERPL-MCNC: 7.6 MG/DL (ref 8.7–10.5)
CANNABINOIDS UR QL SCN: ABNORMAL
CHLORIDE SERPL-SCNC: 103 MMOL/L (ref 95–110)
CLARITY UR: CLEAR
CO2 SERPL-SCNC: 22 MMOL/L (ref 23–29)
COCAINE UR QL SCN: NEGATIVE
COLOR UR AUTO: YELLOW
CREAT SERPL-MCNC: 1.1 MG/DL (ref 0.5–1.4)
CREAT UR-MCNC: 237.7 MG/DL (ref 15–325)
ERYTHROCYTE [DISTWIDTH] IN BLOOD BY AUTOMATED COUNT: 18.4 % (ref 11.5–14.5)
ETHANOL SERPL-MCNC: <10 MG/DL
GFR SERPLBLD CREATININE-BSD FMLA CKD-EPI: >60 ML/MIN/1.73/M2
GLUCOSE SERPL-MCNC: 103 MG/DL (ref 70–110)
GLUCOSE UR QL STRIP: NEGATIVE
GRAN CASTS #/AREA URNS LPF: 1 /LPF (ref ?–0)
HCT VFR BLD AUTO: 36.9 % (ref 37–48.5)
HGB BLD-MCNC: 11.5 GM/DL (ref 12–16)
HGB UR QL STRIP: NEGATIVE
HOLD SPECIMEN: NORMAL
HYALINE CASTS #/AREA URNS LPF: 0 /LPF (ref 0–1)
IMM GRANULOCYTES # BLD AUTO: 0.07 K/UL (ref 0–0.04)
IMM GRANULOCYTES NFR BLD AUTO: 1.3 % (ref 0–0.5)
KETONES UR QL STRIP: NEGATIVE
LEUKOCYTE ESTERASE UR QL STRIP: ABNORMAL
LYMPHOCYTES # BLD AUTO: 1.16 K/UL (ref 1–4.8)
MCH RBC QN AUTO: 25.1 PG (ref 27–31)
MCHC RBC AUTO-ENTMCNC: 31.2 G/DL (ref 32–36)
MCV RBC AUTO: 80 FL (ref 82–98)
METHADONE UR QL SCN: NEGATIVE
MICROSCOPIC COMMENT: ABNORMAL
NITRITE UR QL STRIP: NEGATIVE
NUCLEATED RBC (/100WBC) (OHS): 0 /100 WBC
OPIATES UR QL SCN: NEGATIVE
PCP UR QL: NEGATIVE
PH UR STRIP: 6 [PH]
PLATELET # BLD AUTO: 253 K/UL (ref 150–450)
PMV BLD AUTO: 9.2 FL (ref 9.2–12.9)
POTASSIUM SERPL-SCNC: 3.3 MMOL/L (ref 3.5–5.1)
PROT SERPL-MCNC: 7.1 GM/DL (ref 6–8.4)
PROT UR QL STRIP: ABNORMAL
RBC # BLD AUTO: 4.59 M/UL (ref 4–5.4)
RBC #/AREA URNS HPF: 0 /HPF (ref 0–4)
RELATIVE EOSINOPHIL (OHS): 2.6 %
RELATIVE LYMPHOCYTE (OHS): 21.2 % (ref 18–48)
RELATIVE MONOCYTE (OHS): 6.4 % (ref 4–15)
RELATIVE NEUTROPHIL (OHS): 68.1 % (ref 38–73)
SODIUM SERPL-SCNC: 136 MMOL/L (ref 136–145)
SP GR UR STRIP: 1.02
SQUAMOUS #/AREA URNS HPF: 6 /HPF
UROBILINOGEN UR STRIP-ACNC: NEGATIVE EU/DL
WBC # BLD AUTO: 5.48 K/UL (ref 3.9–12.7)
WBC #/AREA URNS HPF: 6 /HPF (ref 0–5)

## 2025-06-09 PROCEDURE — 80307 DRUG TEST PRSMV CHEM ANLYZR: CPT | Performed by: FAMILY MEDICINE

## 2025-06-09 PROCEDURE — 80143 DRUG ASSAY ACETAMINOPHEN: CPT | Performed by: FAMILY MEDICINE

## 2025-06-09 PROCEDURE — 85025 COMPLETE CBC W/AUTO DIFF WBC: CPT | Performed by: FAMILY MEDICINE

## 2025-06-09 PROCEDURE — 84075 ASSAY ALKALINE PHOSPHATASE: CPT | Performed by: FAMILY MEDICINE

## 2025-06-09 PROCEDURE — 81003 URINALYSIS AUTO W/O SCOPE: CPT | Mod: 59 | Performed by: FAMILY MEDICINE

## 2025-06-09 PROCEDURE — 99285 EMERGENCY DEPT VISIT HI MDM: CPT

## 2025-06-09 PROCEDURE — 25000003 PHARM REV CODE 250

## 2025-06-09 PROCEDURE — 82077 ASSAY SPEC XCP UR&BREATH IA: CPT | Performed by: FAMILY MEDICINE

## 2025-06-09 RX ORDER — LANOLIN ALCOHOL/MO/W.PET/CERES
400 CREAM (GRAM) TOPICAL ONCE
Status: DISCONTINUED | OUTPATIENT
Start: 2025-06-09 | End: 2025-06-09

## 2025-06-09 RX ORDER — LANOLIN ALCOHOL/MO/W.PET/CERES
400 CREAM (GRAM) TOPICAL ONCE
Status: COMPLETED | OUTPATIENT
Start: 2025-06-09 | End: 2025-06-09

## 2025-06-09 RX ORDER — LOPERAMIDE HYDROCHLORIDE 2 MG/1
2 CAPSULE ORAL
Status: COMPLETED | OUTPATIENT
Start: 2025-06-09 | End: 2025-06-09

## 2025-06-09 RX ADMIN — Medication 400 MG: at 09:06

## 2025-06-09 RX ADMIN — LOPERAMIDE HYDROCHLORIDE 2 MG: 2 CAPSULE ORAL at 09:06

## 2025-06-09 NOTE — ED NOTES
PT WOULD LIKE TO BE TREATED AT [facility]. PT WAS PREVIOUSLY TREATED AT THEIR FACILITY IN 2019 FOR AN EATING DISORDER. NOTIFIED MD AND CHARGE RN

## 2025-06-09 NOTE — ED PROVIDER NOTES
Encounter Date: 6/9/2025       History     Chief Complaint   Patient presents with    Psychiatric Evaluation     HPI  44-year-old female with a history of bipolar disorder, depression, anxiety, Crohn's presents to the ED with depression, anxiety.  Patient does state she has had previous hospitalization at [facility] and is supposed to go to [facility] for their eating disorder program.  She had a diverting ileostomy in February.  She is not currently on any psych medications.  Review of patient's allergies indicates:   Allergen Reactions    Ciprofloxacin Shortness Of Breath and Itching    Keppra [levetiracetam] Other (See Comments)     Increased depression    Mercaptopurine analogues (thiopurines) Hives, Diarrhea and Nausea And Vomiting    Imuran [azathioprine sodium] Diarrhea and Nausea And Vomiting     Pt states that she becomes hot sweaty and passes out also. She states that she has diarrhea and nausea and vomiting     [place]     Imuran [azathioprine] Nausea And Vomiting    Ketorolac Itching and Hives    Adhesive Itching    Iodinated contrast media Hives     Past Medical History:   Diagnosis Date    Anxiety     Bipolar 1 disorder     Cholelithiasis     Coronary artery disease     NO STENT    Crohn's disease with ileorectal anastomotic stricture (ileum and rectum)     Depression     Epilepsy     Fibromyalgia     GERD (gastroesophageal reflux disease)     Interstitial cystitis     Kidney stones     Mass, ovarian 11/22/2016    Osteoporosis     PAF (paroxysmal atrial fibrillation)     Pancreatitis     S/P ERIK-BSO (total abdominal hysterectomy and bilateral salpingo-oophorectomy) 12/19/2016     Past Surgical History:   Procedure Laterality Date    ABDOMINAL SURGERY      ABDOMINOPERINEAL RESECTION OF RECTUM N/A 2/22/2025    Procedure: PROCTECTOMY, ABDOMINOPERINEAL;  Surgeon: Robin Atkins MD;  Location: [facility] OR [address] [place] [address];  Service: Colon and Rectal;  Laterality: N/A;    ANASTOMOSIS OF ILEUM TO RECTUM  01/15/2019     Procedure: ANASTOMOSIS, ILEORECTAL;  Surgeon: Reji Peterson MD;  Location: [facility] OR 2ND FLR;  Service: Colon and Rectal;;    ANGIOPLASTY      CHOLECYSTECTOMY N/A 05/25/2020    Procedure: CHOLECYSTECTOMY;  Surgeon: Ozzie Sanches MD;  Location: [facility] OR;  Service: General;  Laterality: N/A;  COVID NEG      COLON SURGERY      x 2    COLONOSCOPY      multiple    COLONOSCOPY N/A 04/11/2017    Procedure: COLONOSCOPY;  Surgeon: Hussain Amaro MD;  Location: [facility] ENDO;  Service: Endoscopy;  Laterality: N/A;    CYSTOGRAM  2/22/2025    Procedure: CYSTOGRAM;  Surgeon: Kerwin Rachel MD;  Location: [facility] OR 2ND FLR;  Service: Urology;;    CYSTOSCOPY W/ URETERAL STENT PLACEMENT      CYSTOSCOPY W/ URETERAL STENT REMOVAL      CYSTOSCOPY W/ URETERAL STENT REMOVAL Right 09/05/2019    Procedure: CYSTOSCOPY, WITH URETERAL STENT REMOVAL;  Surgeon: Dano Mathis MD;  Location: [facility] OR;  Service: Urology;  Laterality: Right;    CYSTOSCOPY WITH URETEROSCOPY, RETROGRADE PYELOGRAPHY, AND INSERTION OF STENT Right 08/22/2019    Procedure: CYSTOSCOPY, WITH RETROGRADE PYELOGRAM AND URETERAL STENT INSERTION;  Surgeon: Dano Mathis MD;  Location: [facility] OR;  Service: Urology;  Laterality: Right;    CYSTOSCOPY WITH URETEROSCOPY, RETROGRADE PYELOGRAPHY, AND INSERTION OF STENT Bilateral 2/22/2025    Procedure: CYSTOSCOPY, WITH RETROGRADE PYELOGRAM AND URETERAL STENT INSERTION;  Surgeon: Kerwin Rachel MD;  [facility];  Service: Urology;  Laterality: Bilateral;    EXCISION, SMALL INTESTINE  2/22/2025    Procedure: EXCISION, SMALL INTESTINE;  Surgeon: Robin Atkins MD;  Location: [facility] OR 2ND FLR;  Service: Colon and Rectal;;    EXTRACORPOREAL SHOCK WAVE LITHOTRIPSY Right 08/22/2019    Procedure: LITHOTRIPSY-EXTRACORPOREAL SHOCK WAVE;  Surgeon: Dano Mathis MD;  Location: [facility] OR;  Service: Urology;  Laterality: Right;    EXTRACORPOREAL SHOCK WAVE LITHOTRIPSY Right 09/05/2019    Procedure:  LITHOTRIPSY-EXTRACORPOREAL SHOCK WAVE;  Surgeon: Dano Mathis MD;  Location: [facility] OR;  Service: Urology;  Laterality: Right;    FLEXIBLE SIGMOIDOSCOPY N/A 01/23/2024    Procedure: SIGMOIDOSCOPY, FLEXIBLE;  Surgeon: Robin Atkins MD;  Location: [facility] ENDO (2ND FLR);  Service: Endoscopy;  Laterality: N/A;  Ref By:  Dr. Madera  Prep Specifications: 2 days of clear and 2 enema before procedure  1/1-precall complete-pt approved to stay at [facility] night before and following the procedure without a ride per Dr. Atkins-see encounter dated 1/18-MS    FLEXIBLE SIGMOIDOSCOPY N/A 09/17/2024    Procedure: SIGMOIDOSCOPY, FLEXIBLE;  Surgeon: Robin Atkins MD;  Location: [facility] ENDO (4TH FLR);  Service: Endoscopy;  Laterality: N/A;  Ref By: Dr.Kethmanportal.  9/12-pre call complete-dr herrera would like to be [id number] appt with gamino 10am    HYSTERECTOMY      ILEOSTOMY      ILEOSTOMY  2/22/2025    Procedure: CREATION, ILEOSTOMY;  Surgeon: Robin Atikns MD;  Location: [facility] OR 2ND FLR;  Service: Colon and Rectal;;    LAPAROTOMY, EXPLORATORY  01/15/2019    Procedure: LAPAROTOMY, EXPLORATORY;extensive lysis of adhesions;  Surgeon: Reji Peterson MD;  Location: [facility] OR 2ND FLR;  Service: Colon and Rectal;;    LYSIS OF ADHESIONS OF URETER  01/15/2019    Procedure: URETEROLYSIS;  Surgeon: Reji Peterson MD;  Location: [facility] OR 2ND FLR;  Service: Colon and Rectal;;    PERIPHERALLY INSERTED CENTRAL CATHETER INSERTION N/A 10/30/2018    Procedure: INSERTION, PICC;  Surgeon: Liz Diagnostic Provider;  Location: [facility] OR;  Service: General;  Laterality: N/A;    PERIPHERALLY INSERTED CENTRAL CATHETER INSERTION N/A 08/23/2019    Procedure: INSERTION, PICC;  Surgeon: Liz Diagnostic Provider;  Location: [facility] OR;  Service: General;  Laterality: N/A;    PORTACATH PLACEMENT      TOTAL COLECTOMY  2014     Family History   Problem Relation Name Age of Onset    Hypertension Mother      Joint hypermobility  Mother      Physical abuse Mother      Cancer Father          brain    Diabetes Father      Schizophrenia Father      Stroke Father      No Known Problems Brother 1     No Known Problems Brother      Stroke Paternal Grandmother      Ovarian cancer Neg Hx      Uterine cancer Neg Hx      Breast cancer Neg Hx      Colon cancer Neg Hx      Anesthesia problems Neg Hx       Social History[1]  Review of Systems   Constitutional:  Negative for chills and fever.   HENT:  Negative for sore throat.    Respiratory:  Negative for shortness of breath and wheezing.    Cardiovascular:  Negative for chest pain.   Gastrointestinal:  Negative for diarrhea, nausea and vomiting.   Genitourinary:  Negative for dysuria.   Skin:  Negative for rash.   Neurological:  Negative for headaches.   Psychiatric/Behavioral:  Positive for agitation and dysphoric mood. The patient is nervous/anxious.    All other systems reviewed and are negative.      Physical Exam     Initial Vitals [06/09/25 1155]   BP Pulse Resp Temp SpO2   115/83 91 19 98.2 °F (36.8 °C) 96 %      MAP       --         Physical Exam    Constitutional: She appears well-developed and well-nourished. She is not diaphoretic. No distress.   HENT:   Head: Normocephalic and atraumatic.   Right Ear: External ear normal.   Left Ear: External ear normal.   Eyes: EOM are normal. Pupils are equal, round, and reactive to light.   Neck:   Normal range of motion.  Cardiovascular:  Normal rate and regular rhythm.           No murmur heard.  Pulmonary/Chest: Breath sounds normal. No respiratory distress. She has no wheezes.   Abdominal: Abdomen is soft. She exhibits no distension. There is no abdominal tenderness.   Musculoskeletal:      Cervical back: Normal range of motion.     Neurological: She is alert and oriented to person, place, and time. GCS score is 15. GCS eye subscore is 4. GCS verbal subscore is 5. GCS motor subscore is 6.   Skin: Skin is warm and dry. No rash noted.   Psychiatric: Her  "mood appears anxious. Her speech is rapid and/or pressured. She is hyperactive. She expresses impulsivity. She exhibits a depressed mood. She expresses no homicidal and no suicidal ideation.         ED Course   Procedures  Labs Reviewed   COMPREHENSIVE METABOLIC PANEL - Abnormal       Result Value    Sodium 136      Potassium 3.3 (*)     Chloride 103      CO2 22 (*)     Glucose 103      BUN 14      Creatinine 1.1      Calcium 7.6 (*)     Protein Total 7.1      Albumin 3.2 (*)     Bilirubin Total 0.7            AST 35      ALT 20      Anion Gap 11      eGFR >60     URINALYSIS, REFLEX TO URINE CULTURE - Abnormal    Color, UA Yellow      Appearance, UA Clear      pH, UA 6.0      Spec Grav UA 1.020      Protein, UA Trace (*)     Glucose, UA Negative      Ketones, UA Negative      Bilirubin, UA Negative      Blood, UA Negative      Nitrites, UA Negative      Urobilinogen, UA Negative      Leukocyte Esterase, UA Trace (*)    DRUG SCREEN PANEL, URINE EMERGENCY - Abnormal    Benzodiazepine, Urine Negative      Methadone, Urine Negative      Cocaine, Urine Negative      Opiates, Urine Negative      Barbiturates, Urine Negative      Amphetamines, Urine Negative      THC Presumptive Positive (*)     Phencyclidine, Urine Negative      Urine Creatinine 237.7      Narrative:     This screen includes the following classes of drugs at the listed cut-off:     Benzodiazepines:        200 ng/ml   Methadone:              300 ng/ml   Cocaine metabolite:     300 ng/ml   Opiates:                300 ng/ml   Barbiturates:           200 ng/ml   Amphetamines:           1000 ng/ml   Marijuana metabs (THC): 50 ng/ml   Phencyclidine (PCP):    25 ng/ml     This is a screening test. If results do not correlate with clinical presentation, then a confirmatory send out test is advised.    This report is intended for use in clinical monitoring and management of patients. It is not intended for use in employment related drug testing." "   ACETAMINOPHEN LEVEL - Abnormal    Acetaminophen Level <3.0 (*)    CBC WITH DIFFERENTIAL - Abnormal    WBC 5.48      RBC 4.59      HGB 11.5 (*)     HCT 36.9 (*)     MCV 80 (*)     MCH 25.1 (*)     MCHC 31.2 (*)     RDW 18.4 (*)     Platelet Count 253      MPV 9.2      Nucleated RBC 0      Neut % 68.1      Lymph % 21.2      Mono % 6.4      Eos % 2.6      Basophil % 0.4      Imm Grans % 1.3 (*)     Neut # 3.74      Lymph # 1.16      Mono # 0.35      Eos # 0.14      Baso # 0.02      Imm Grans # 0.07 (*)    URINALYSIS MICROSCOPIC - Abnormal    RBC, UA 0      WBC, UA 6 (*)     Bacteria, UA Few (*)     Squamous Epithelial Cells, UA 6      Hyaline Casts, UA 0      Granular Casts 1 (*)     Microscopic Comment       ALCOHOL,MEDICAL (ETHANOL) - Normal    Alcohol, Serum <10     CBC W/ AUTO DIFFERENTIAL    Narrative:     The following orders were created for panel order CBC auto differential.  Procedure                               Abnormality         Status                     ---------                               -----------         ------                     CBC with Differential[[id number]]       Abnormal            Final result                 Please view results for these tests on the individual orders.   GREY TOP URINE HOLD    Extra Tube Hold for add-ons.            Imaging Results    None          Medications - No data to display  Medical Decision Making  44-year-old female that presents to the ED with anxiety, depression.  Previous diagnosis of bipolar disorder and ADHD.  Patient feels depressed and increasing anxiety.  Not on any psych medications.  Previous psychiatric admission.  Pec in place as patient feels as though symptoms are out of control.  No active suicidal or homicidal ideation.  Medically cleared.  Awaiting psychiatric placement at this time.    Amount and/or Complexity of Data Reviewed  Labs: ordered.                  Medically cleared for psychiatry placement: 6/9/2025  2:49 PM                    Clinical Impression:  Final diagnoses:  [Z00.8] Medical clearance for psychiatric admission (Primary)  [F41.9] Anxiety  [F32.A] Depression, unspecified depression type          ED Disposition Condition    Transfer to Psych Facility Stable          ED Prescriptions    None       Follow-up Information    None                [1]   Social History  Tobacco Use    Smoking status: Never    Smokeless tobacco: Never   Substance Use Topics    Alcohol use: No    Drug use: No        Raissa Nieto MD  06/09/25 [id number]

## 2025-06-09 NOTE — ED NOTES
Phone call to [facility] to request [facility] for pt. Pt has previously spoken with Jodie at [facility]. Notified [facility] that pt has also been previously hospitalized at [facility] in 2019. [facility] will attempt [facility].

## 2025-06-09 NOTE — ED NOTES
PHONE CALL TO [facility] TO GIVE REPORT. THEY ARE UNABLE TO TAKE REPORT AT THIS TIME

## 2025-06-09 NOTE — ED TRIAGE NOTES
Patient requesting admission to [facility] for depression and anxiety. Patient reports tries to please everyone and is tired of it. Patient also reports doesn't have a healthy relationship with food.  Denies SI or HI. Patient had an Ileostomy 2/22/2025. Patient rambling in triage.

## 2025-06-10 VITALS
RESPIRATION RATE: 18 BRPM | HEART RATE: 68 BPM | OXYGEN SATURATION: 99 % | HEIGHT: 67 IN | DIASTOLIC BLOOD PRESSURE: 65 MMHG | SYSTOLIC BLOOD PRESSURE: 109 MMHG | WEIGHT: 157.06 LBS | TEMPERATURE: 98 F | BODY MASS INDEX: 24.65 KG/M2

## 2025-06-14 ENCOUNTER — HOSPITAL ENCOUNTER (EMERGENCY)
Facility: HOSPITAL | Age: 45
Discharge: PSYCHIATRIC OR SUBSTANCE USE FACILITY | End: 2025-06-14
Attending: SURGERY
Payer: MEDICARE

## 2025-06-14 VITALS
HEART RATE: 84 BPM | BODY MASS INDEX: 23.46 KG/M2 | OXYGEN SATURATION: 99 % | RESPIRATION RATE: 18 BRPM | SYSTOLIC BLOOD PRESSURE: 117 MMHG | DIASTOLIC BLOOD PRESSURE: 73 MMHG | WEIGHT: 149.5 LBS | TEMPERATURE: 98 F | HEIGHT: 67 IN

## 2025-06-14 DIAGNOSIS — F32.A DEPRESSION, UNSPECIFIED DEPRESSION TYPE: Primary | ICD-10-CM

## 2025-06-14 LAB
ABSOLUTE EOSINOPHIL (OHS): 0.15 K/UL
ABSOLUTE MONOCYTE (OHS): 0.48 K/UL (ref 0.3–1)
ABSOLUTE NEUTROPHIL COUNT (OHS): 4.82 K/UL (ref 1.8–7.7)
ALBUMIN SERPL BCP-MCNC: 3.9 G/DL (ref 3.5–5.2)
ALP SERPL-CCNC: 143 UNIT/L (ref 40–150)
ALT SERPL W/O P-5'-P-CCNC: 26 UNIT/L (ref 10–44)
AMPHET UR QL SCN: NEGATIVE
ANION GAP (OHS): 16 MMOL/L (ref 8–16)
APAP SERPL-MCNC: <3 UG/ML (ref 10–20)
AST SERPL-CCNC: 37 UNIT/L (ref 11–45)
B-HCG UR QL: NEGATIVE
BACTERIA #/AREA URNS HPF: ABNORMAL /HPF
BARBITURATE SCN PRESENT UR: NEGATIVE
BASOPHILS # BLD AUTO: 0.03 K/UL
BASOPHILS NFR BLD AUTO: 0.4 %
BENZODIAZ UR QL SCN: NEGATIVE
BILIRUB SERPL-MCNC: 0.7 MG/DL (ref 0.1–1)
BILIRUB UR QL STRIP.AUTO: NEGATIVE
BUN SERPL-MCNC: 36 MG/DL (ref 6–20)
CALCIUM SERPL-MCNC: 9.1 MG/DL (ref 8.7–10.5)
CANNABINOIDS UR QL SCN: NEGATIVE
CHLORIDE SERPL-SCNC: 100 MMOL/L (ref 95–110)
CLARITY UR: CLEAR
CO2 SERPL-SCNC: 16 MMOL/L (ref 23–29)
COCAINE UR QL SCN: NEGATIVE
COLOR UR AUTO: YELLOW
CREAT SERPL-MCNC: 1.3 MG/DL (ref 0.5–1.4)
CREAT UR-MCNC: 143.4 MG/DL (ref 15–325)
ERYTHROCYTE [DISTWIDTH] IN BLOOD BY AUTOMATED COUNT: 17.2 % (ref 11.5–14.5)
ETHANOL SERPL-MCNC: <10 MG/DL
GFR SERPLBLD CREATININE-BSD FMLA CKD-EPI: 53 ML/MIN/1.73/M2
GLUCOSE SERPL-MCNC: 104 MG/DL (ref 70–110)
GLUCOSE UR QL STRIP: NEGATIVE
HCT VFR BLD AUTO: 44.7 % (ref 37–48.5)
HGB BLD-MCNC: 14.4 GM/DL (ref 12–16)
HGB UR QL STRIP: NEGATIVE
HOLD SPECIMEN: NORMAL
IMM GRANULOCYTES # BLD AUTO: 0.04 K/UL (ref 0–0.04)
IMM GRANULOCYTES NFR BLD AUTO: 0.6 % (ref 0–0.5)
KETONES UR QL STRIP: NEGATIVE
LEUKOCYTE ESTERASE UR QL STRIP: ABNORMAL
LYMPHOCYTES # BLD AUTO: 1.63 K/UL (ref 1–4.8)
MCH RBC QN AUTO: 25.4 PG (ref 27–31)
MCHC RBC AUTO-ENTMCNC: 32.2 G/DL (ref 32–36)
MCV RBC AUTO: 79 FL (ref 82–98)
METHADONE UR QL SCN: NEGATIVE
MICROSCOPIC COMMENT: ABNORMAL
NITRITE UR QL STRIP: NEGATIVE
NUCLEATED RBC (/100WBC) (OHS): 0 /100 WBC
OPIATES UR QL SCN: NEGATIVE
PCP UR QL: NEGATIVE
PH UR STRIP: 6 [PH]
PLATELET # BLD AUTO: 327 K/UL (ref 150–450)
PMV BLD AUTO: 9.7 FL (ref 9.2–12.9)
POTASSIUM SERPL-SCNC: 3.5 MMOL/L (ref 3.5–5.1)
PROT SERPL-MCNC: 8.9 GM/DL (ref 6–8.4)
PROT UR QL STRIP: NEGATIVE
RBC # BLD AUTO: 5.67 M/UL (ref 4–5.4)
RBC #/AREA URNS HPF: 1 /HPF (ref 0–4)
RELATIVE EOSINOPHIL (OHS): 2.1 %
RELATIVE LYMPHOCYTE (OHS): 22.8 % (ref 18–48)
RELATIVE MONOCYTE (OHS): 6.7 % (ref 4–15)
RELATIVE NEUTROPHIL (OHS): 67.4 % (ref 38–73)
SALICYLATES SERPL-MCNC: <5 MG/DL (ref 15–30)
SODIUM SERPL-SCNC: 132 MMOL/L (ref 136–145)
SP GR UR STRIP: 1.01
SQUAMOUS #/AREA URNS HPF: 1 /HPF
TSH SERPL-ACNC: 2.64 UIU/ML (ref 0.4–4)
UROBILINOGEN UR STRIP-ACNC: NEGATIVE EU/DL
WBC # BLD AUTO: 7.15 K/UL (ref 3.9–12.7)
WBC #/AREA URNS HPF: 11 /HPF (ref 0–5)
WBC CLUMPS URNS QL MICRO: ABNORMAL
YEAST URNS QL MICRO: ABNORMAL /HPF

## 2025-06-14 PROCEDURE — 87086 URINE CULTURE/COLONY COUNT: CPT | Performed by: SURGERY

## 2025-06-14 PROCEDURE — 80053 COMPREHEN METABOLIC PANEL: CPT | Performed by: SURGERY

## 2025-06-14 PROCEDURE — 80179 DRUG ASSAY SALICYLATE: CPT | Performed by: SURGERY

## 2025-06-14 PROCEDURE — 84443 ASSAY THYROID STIM HORMONE: CPT | Performed by: SURGERY

## 2025-06-14 PROCEDURE — 81001 URINALYSIS AUTO W/SCOPE: CPT | Mod: XB | Performed by: SURGERY

## 2025-06-14 PROCEDURE — 85025 COMPLETE CBC W/AUTO DIFF WBC: CPT | Performed by: SURGERY

## 2025-06-14 PROCEDURE — 80143 DRUG ASSAY ACETAMINOPHEN: CPT | Performed by: SURGERY

## 2025-06-14 PROCEDURE — 80307 DRUG TEST PRSMV CHEM ANLYZR: CPT | Performed by: SURGERY

## 2025-06-14 PROCEDURE — 81025 URINE PREGNANCY TEST: CPT | Performed by: SURGERY

## 2025-06-14 PROCEDURE — 99285 EMERGENCY DEPT VISIT HI MDM: CPT

## 2025-06-14 PROCEDURE — 82077 ASSAY SPEC XCP UR&BREATH IA: CPT | Performed by: SURGERY

## 2025-06-14 RX ORDER — HALOPERIDOL LACTATE 5 MG/ML
5 INJECTION, SOLUTION INTRAMUSCULAR EVERY 4 HOURS PRN
Status: DISCONTINUED | OUTPATIENT
Start: 2025-06-14 | End: 2025-06-14 | Stop reason: HOSPADM

## 2025-06-14 RX ORDER — DIPHENHYDRAMINE HYDROCHLORIDE 50 MG/ML
50 INJECTION, SOLUTION INTRAMUSCULAR; INTRAVENOUS EVERY 4 HOURS PRN
Status: DISCONTINUED | OUTPATIENT
Start: 2025-06-14 | End: 2025-06-14 | Stop reason: HOSPADM

## 2025-06-14 RX ORDER — LORAZEPAM 2 MG/ML
2 INJECTION INTRAMUSCULAR EVERY 4 HOURS PRN
Status: DISCONTINUED | OUTPATIENT
Start: 2025-06-14 | End: 2025-06-14 | Stop reason: HOSPADM

## 2025-06-14 NOTE — ED NOTES
Patient has acceptance to [facility] , accepting provider Dr. Denilson Marie Report Number [phone] . Pt waiting transfer to next facility .

## 2025-06-14 NOTE — ED PROVIDER NOTES
Encounter Date: 6/14/2025       History     Chief Complaint   Patient presents with    Depression     History of Present Illness  Candida Cardona is a 44 y.o. female that presents with anxiety & depression  Patient was recently in a Behavioral Health Unit, discharge 72 hours ago  Patient reports continued anxiety & depression since recent U discharge  Not eating not sleeping that has not suicidal &/or homicidal on ER interview   Severe depression issues severe issues with bipolar anxiety hypochondriasis    The history is provided by the patient.     Review of patient's allergies indicates:   Allergen Reactions    Ciprofloxacin Shortness Of Breath and Itching    Keppra [levetiracetam] Other (See Comments)     Increased depression    Mercaptopurine analogues (thiopurines) Hives, Diarrhea and Nausea And Vomiting    Imuran [azathioprine sodium] Diarrhea and Nausea And Vomiting     Pt states that she becomes hot sweaty and passes out also. She states that she has diarrhea and nausea and vomiting     [place]     Imuran [azathioprine] Nausea And Vomiting    Ketorolac Itching and Hives    Adhesive Itching    Iodinated contrast media Hives     Past Medical History:   Diagnosis Date    Anxiety     Bipolar 1 disorder     Cholelithiasis     Coronary artery disease     NO STENT    Crohn's disease with ileorectal anastomotic stricture (ileum and rectum)     Depression     Epilepsy     Fibromyalgia     GERD (gastroesophageal reflux disease)     Interstitial cystitis     Kidney stones     Mass, ovarian 11/22/2016    Osteoporosis     PAF (paroxysmal atrial fibrillation)     Pancreatitis     S/P ERIK-BSO (total abdominal hysterectomy and bilateral salpingo-oophorectomy) 12/19/2016     Past Surgical History:   Procedure Laterality Date    ABDOMINAL SURGERY      ABDOMINOPERINEAL RESECTION OF RECTUM N/A 2/22/2025    Procedure: PROCTECTOMY, ABDOMINOPERINEAL;  Surgeon: Robin Atkins MD;  Location: [facility] OR [address] [place] [address];  Service: Colon  and Rectal;  Laterality: N/A;    ANASTOMOSIS OF ILEUM TO RECTUM  01/15/2019    Procedure: ANASTOMOSIS, ILEORECTAL;  Surgeon: Reji Peterson MD;  Location: [facility] OR 2ND FLR;  Service: Colon and Rectal;;    ANGIOPLASTY      CHOLECYSTECTOMY N/A 05/25/2020    Procedure: CHOLECYSTECTOMY;  Surgeon: Ozzie Sanches MD;  Location: [facility] OR;  Service: General;  Laterality: N/A;  COVID NEG      COLON SURGERY      x 2    COLONOSCOPY      multiple    COLONOSCOPY N/A 04/11/2017    Procedure: COLONOSCOPY;  Surgeon: Hussain Amaro MD;  Location: [facility] ENDO;  Service: Endoscopy;  Laterality: N/A;    CYSTOGRAM  2/22/2025    Procedure: CYSTOGRAM;  Surgeon: Kerwin Rachel MD;  [facility];  Service: Urology;;    CYSTOSCOPY W/ URETERAL STENT PLACEMENT      CYSTOSCOPY W/ URETERAL STENT REMOVAL      CYSTOSCOPY W/ URETERAL STENT REMOVAL Right 09/05/2019    Procedure: CYSTOSCOPY, WITH URETERAL STENT REMOVAL;  Surgeon: Dano Mathis MD;  Location: [facility] OR;  Service: Urology;  Laterality: Right;    CYSTOSCOPY WITH URETEROSCOPY, RETROGRADE PYELOGRAPHY, AND INSERTION OF STENT Right 08/22/2019    Procedure: CYSTOSCOPY, WITH RETROGRADE PYELOGRAM AND URETERAL STENT INSERTION;  Surgeon: Dano Mathis MD;  Location: [facility] OR;  Service: Urology;  Laterality: Right;    CYSTOSCOPY WITH URETEROSCOPY, RETROGRADE PYELOGRAPHY, AND INSERTION OF STENT Bilateral 2/22/2025    Procedure: CYSTOSCOPY, WITH RETROGRADE PYELOGRAM AND URETERAL STENT INSERTION;  Surgeon: Kerwin Rachel MD;  Location: [facility] OR 2ND FLR;  Service: Urology;  Laterality: Bilateral;    EXCISION, SMALL INTESTINE  2/22/2025    Procedure: EXCISION, SMALL INTESTINE;  Surgeon: Robin Atkins MD;  Location: [facility] OR 2ND FLR;  Service: Colon and Rectal;;    EXTRACORPOREAL SHOCK WAVE LITHOTRIPSY Right 08/22/2019    Procedure: LITHOTRIPSY-EXTRACORPOREAL SHOCK WAVE;  Surgeon: Dano Mathis MD;  Location: [facility] OR;  Service: Urology;  Laterality:  Right;    EXTRACORPOREAL SHOCK WAVE LITHOTRIPSY Right 09/05/2019    Procedure: LITHOTRIPSY-EXTRACORPOREAL SHOCK WAVE;  Surgeon: Dano Mathis MD;  Location: [facility] OR;  Service: Urology;  Laterality: Right;    FLEXIBLE SIGMOIDOSCOPY N/A 01/23/2024    Procedure: SIGMOIDOSCOPY, FLEXIBLE;  Surgeon: Robin Atkins MD;  Location: [facility] ENDO (2ND FLR);  Service: Endoscopy;  Laterality: N/A;  Ref By:  Dr. Madera  Prep Specifications: 2 days of clear and 2 enema before procedure  1/1-precall complete-pt approved to stay at [facility] night before and following the procedure without a ride per Dr. Atkins-see encounter dated 1/18-MS    FLEXIBLE SIGMOIDOSCOPY N/A 09/17/2024    Procedure: SIGMOIDOSCOPY, FLEXIBLE;  Surgeon: Robin Atkins MD;  Location: [facility] (4TH FLR);  Service: Endoscopy;  Laterality: N/A;  Ref By: Dr.Kethmanportal.  9/12-pre call complete-dr herrera would like to be [id number] appt with stevenson 10am    HYSTERECTOMY      ILEOSTOMY      ILEOSTOMY  2/22/2025    Procedure: CREATION, ILEOSTOMY;  Surgeon: Robin Atkins MD;  Location: [facility] OR 2ND FLR;  Service: Colon and Rectal;;    LAPAROTOMY, EXPLORATORY  01/15/2019    Procedure: LAPAROTOMY, EXPLORATORY;extensive lysis of adhesions;  Surgeon: Reji Peterson MD;  Location: [facility] OR 2ND FLR;  Service: Colon and Rectal;;    LYSIS OF ADHESIONS OF URETER  01/15/2019    Procedure: URETEROLYSIS;  Surgeon: Reji Peterson MD;  Location: [facility] OR 2ND FLR;  Service: Colon and Rectal;;    PERIPHERALLY INSERTED CENTRAL CATHETER INSERTION N/A 10/30/2018    Procedure: INSERTION, PICC;  Surgeon: Liz Diagnostic Provider;  Location: [facility] OR;  Service: General;  Laterality: N/A;    PERIPHERALLY INSERTED CENTRAL CATHETER INSERTION N/A 08/23/2019    Procedure: INSERTION, PICC;  Surgeon: Liz Diagnostic Provider;  Location: [facility] OR;  Service: General;  Laterality: N/A;    PORTACATH PLACEMENT      TOTAL COLECTOMY  2014     Family History   Problem  Relation Name Age of Onset    Hypertension Mother      Joint hypermobility Mother      Physical abuse Mother      Cancer Father          brain    Diabetes Father      Schizophrenia Father      Stroke Father      No Known Problems Brother 1     No Known Problems Brother      Stroke Paternal Grandmother      Ovarian cancer Neg Hx      Uterine cancer Neg Hx      Breast cancer Neg Hx      Colon cancer Neg Hx      Anesthesia problems Neg Hx       Social History[1]    Review of Systems   Constitutional: Negative.    HENT: Negative.     Eyes: Negative.    Respiratory: Negative.     Cardiovascular: Negative.    Gastrointestinal: Negative.    Genitourinary: Negative.    Musculoskeletal: Negative.    Skin: Negative.    Neurological: Negative.    Psychiatric/Behavioral:  Positive for dysphoric mood. The patient is nervous/anxious.        Physical Exam     Initial Vitals [06/14/25 1128]   BP Pulse Resp Temp SpO2   125/84 85 20 98.1 °F (36.7 °C) 99 %      MAP       --         Physical Exam    Nursing note and vitals reviewed.  Constitutional: Vital signs are normal. She appears well-developed and well-nourished. She is cooperative.   HENT:   Head: Normocephalic and atraumatic.   Eyes: Conjunctivae, EOM and lids are normal. Pupils are equal, round, and reactive to light.   Neck: Trachea normal and phonation normal. Neck supple. No JVD present.   Normal range of motion.   Full passive range of motion without pain.     Cardiovascular:  Normal rate, regular rhythm, S1 normal, S2 normal, normal heart sounds, intact distal pulses and normal pulses.           Pulmonary/Chest: Effort normal and breath sounds normal.   Abdominal: Abdomen is soft and flat. Bowel sounds are normal.   Musculoskeletal:         General: Normal range of motion.      Cervical back: Full passive range of motion without pain, normal range of motion and neck supple.     Neurological: She is alert and oriented to person, place, and time. She has normal strength.    Skin: Skin is warm, dry and intact. Capillary refill takes less than 2 seconds.         ED Course   Procedures  Labs Reviewed   COMPREHENSIVE METABOLIC PANEL   CBC W/ AUTO DIFFERENTIAL    Narrative:     The following orders were created for panel order CBC Auto Differential.  Procedure                               Abnormality         Status                     ---------                               -----------         ------                     CBC with Differential[[id number]]                                                        Please view results for these tests on the individual orders.   ACETAMINOPHEN LEVEL   SALICYLATE LEVEL   URINALYSIS, REFLEX TO URINE CULTURE   DRUG SCREEN PANEL, URINE EMERGENCY   TSH   ALCOHOL,MEDICAL (ETHANOL)   PREGNANCY TEST, URINE RAPID   CBC WITH DIFFERENTIAL          Imaging Results    None          Medications   haloperidol lactate injection 5 mg (has no administration in time range)   LORazepam injection 2 mg (has no administration in time range)   diphenhydrAMINE injection 50 mg (has no administration in time range)     Medical Decision Making  Differential includes SI, HI, psychosis, schizophrenia, bipolar DO, drug abuse    Problems Addressed:  Depression, unspecified depression type: complicated acute illness or injury    Amount and/or Complexity of Data Reviewed  Labs: ordered. Decision-making details documented in ED Course.    ED Management & Risks of Complication, Morbidity, & Mortality:  Medically cleared for psychiatry placement: 6/14/2025 11:34 AM    Final diagnoses:  [F32.A] Depression, unspecified depression type (Primary)          ED Disposition Condition    Transfer to Psych Facility Stable                 [1]   Social History  Tobacco Use    Smoking status: Never    Smokeless tobacco: Never   Substance Use Topics    Alcohol use: No    Drug use: No        Benjamin Dennis MD  06/14/25 [id number]

## 2025-06-14 NOTE — ED NOTES
Called [facility] gave report to Nurse Tabatha . [facility] arrived to transport patient to [facility] . Patient personal belongings given to [facility] . Pt has 2 safe bags along with [facility]

## 2025-06-15 PROBLEM — F33.1 MODERATE EPISODE OF RECURRENT MAJOR DEPRESSIVE DISORDER: Status: ACTIVE | Noted: 2025-06-15

## 2025-06-16 ENCOUNTER — TELEPHONE (OUTPATIENT)
Dept: GASTROENTEROLOGY | Facility: CLINIC | Age: 45
End: 2025-06-16
Payer: MEDICARE

## 2025-06-16 LAB — BACTERIA UR CULT: NORMAL

## 2025-06-17 ENCOUNTER — TELEPHONE (OUTPATIENT)
Dept: GASTROENTEROLOGY | Facility: CLINIC | Age: 45
End: 2025-06-17
Payer: MEDICARE

## 2025-06-17 NOTE — ED NOTES
06/17/25 at 8:00 am-Pt called regarding her tablet stating we still had her tablet. Safe checked. No belongings for pt in the safe. Pt's belongings with safe bags (which included tablet) were given to [facility] per discharge note (16:51 on 06/14/25).

## 2025-06-17 NOTE — TELEPHONE ENCOUNTER
"Spoke with Candida:  Recently discharged from hospital  Living in an assisted living with transportation available to her for her appts  She's unsure of the last date she took her Skyrizi but believes it to be May 20 "something"  OV scheduled 6/19/25, seeing Dr. Atkins earlier same day  "

## 2025-06-17 NOTE — TELEPHONE ENCOUNTER
Copied from CRM #[id number]. Topic: Appointments - Appointment Scheduling  >> Jun 17, 2025  3:34 PM Minal Murillo wrote:  Type:  appt     Who Called: pt is calling to reschedule her missed appt    Would the patient rather a call back or a response via [facility]? Yes both   Best Call Back Number: [phone] (M)

## 2025-06-18 ENCOUNTER — TELEPHONE (OUTPATIENT)
Dept: GASTROENTEROLOGY | Facility: CLINIC | Age: 45
End: 2025-06-18
Payer: MEDICARE

## 2025-06-18 ENCOUNTER — TELEPHONE (OUTPATIENT)
Dept: SURGERY | Facility: CLINIC | Age: 45
End: 2025-06-18
Payer: MEDICARE

## 2025-06-18 NOTE — TELEPHONE ENCOUNTER
RN called pt back.  Pt stated that she forgot to set up medical transport for her appt and she was recently seen in the hospital for a psych encounter.  She is now set up with a facility that can arrange transportation for her to [facility] for appts after 10am.  Pt is asking for a new date with Ramu Atkins and Luz Maria.  RN will speak with Dr. Loera's nurse and get pt scheduled.  Pt verbalized understanding to all.

## 2025-06-18 NOTE — TELEPHONE ENCOUNTER
Copied from CRM #[id number]. Topic: Appointments - Appointment Rescheduling  >> Jun 18, 2025  8:38 AM Minal Murillo wrote:  Type:  Reschedule appt      Who Called: pt decline for me to r/s her appt o 6/19. Pt prefers to speak to a nurse in office.    Would the patient rather a call back or a response via [facility]? Call only   Best Call Back Number: [phone] (M)

## 2025-06-18 NOTE — TELEPHONE ENCOUNTER
Called and spoke with the patient  - She needs to reschedule her 6/19 appointments with Dr. Loera and Dr. Atkins due to transportation.   - Staff message sent to Dr. Atkins's staff.

## 2025-06-19 ENCOUNTER — PATIENT MESSAGE (OUTPATIENT)
Dept: SURGERY | Facility: CLINIC | Age: 45
End: 2025-06-19
Payer: MEDICARE

## 2025-06-19 DIAGNOSIS — K50.813 CROHN'S DISEASE OF BOTH SMALL AND LARGE INTESTINE WITH FISTULA: ICD-10-CM

## 2025-06-19 DIAGNOSIS — Z93.2 ILEOSTOMY IN PLACE: Primary | ICD-10-CM

## 2025-06-19 RX ORDER — LOPERAMIDE HYDROCHLORIDE 2 MG/1
2 CAPSULE ORAL 4 TIMES DAILY
Qty: 120 CAPSULE | Refills: 11 | Status: SHIPPED | OUTPATIENT
Start: 2025-06-19 | End: 2026-06-14

## 2025-06-19 RX ORDER — DIPHENOXYLATE HYDROCHLORIDE AND ATROPINE SULFATE 2.5; .025 MG/1; MG/1
2 TABLET ORAL 4 TIMES DAILY PRN
Qty: 56 TABLET | Refills: 6 | Status: SHIPPED | OUTPATIENT
Start: 2025-06-19 | End: 2025-09-17

## 2025-07-15 DIAGNOSIS — K50.812 CROHN'S DISEASE OF BOTH SMALL AND LARGE INTESTINE WITH INTESTINAL OBSTRUCTION: Chronic | ICD-10-CM

## 2025-07-15 RX ORDER — RISANKIZUMAB-RZAA 360 MG/2.4
360 WEARABLE INJECTOR SUBCUTANEOUS
Qty: 2.4 ML | Refills: 0 | Status: ACTIVE | OUTPATIENT
Start: 2025-07-15

## 2025-07-15 NOTE — TELEPHONE ENCOUNTER
"Primary provider: Dr. Evelina Loera    IBD medications: :Skyrizi 360 mg SC q 8 weeks     Refill request for:      Pended Medication(s)   Requested Prescriptions     Pending Prescriptions Disp Refills    risankizumab-rzaa (SKYRIZI) 360 mg/2.4 mL (150 mg/mL) Injt 2.4 mL 0     Sig: Inject 360 mg into the skin every 8 weeks.           Allergies reviewed: Yes    Drug Monitoring labs/frequency for all IBD meds:    CBC and CMP every 6 months  TB and Hep B every 12 months    Lab Results   Component Value Date    HEPBSAG Non-reactive 09/25/2024    HEPBCAB Non-reactive 09/25/2024     Lab Results   Component Value Date    TBGOLDPLUS Negative 10/25/2024     Lab Results   Component Value Date    QUANTNILVALU 0.05 04/17/2018      No results found for: "TSPOTSCREN"  Lab Results   Component Value Date    JVDPDKTC13RO 36 09/25/2024    OYKMVHJY87 186 (L) 09/25/2024     Lab Results   Component Value Date    WBC 7.15 06/14/2025    HGB 14.4 06/14/2025    HCT 44.7 06/14/2025    MCV 79 (L) 06/14/2025     06/14/2025     Lab Results   Component Value Date    CREATININE 1.3 06/14/2025    ALBUMIN 3.9 06/14/2025    BILITOT 0.7 06/14/2025    ALKPHOS 143 06/14/2025    AST 37 06/14/2025    ALT 26 06/14/2025     Lab Results   Component Value Date    CHOL 130 05/03/2019    HDL 49 05/03/2019    LDLCALC 56.4 (L) 05/03/2019    TRIG 119 03/04/2025       Lab due date (mo/yr):  9/2025    Labs scheduled: No - but patient has an OV before or when labs are due     Next appt: 8/5/2025    RX refill sent to provider for amount until next labs: Yes       "

## 2025-07-23 ENCOUNTER — PATIENT MESSAGE (OUTPATIENT)
Dept: SURGERY | Facility: CLINIC | Age: 45
End: 2025-07-23
Payer: MEDICARE

## 2025-08-04 NOTE — PROGRESS NOTES
[facility]  Colon and Rectal Surgery    [address]  [place], [place]  Tel: [phone]  Fax: [phone]  [email or web address]   MD Julio Cesar White MD Brian Kann, MD W. Forrest Johnston, MD Matthew Giglia, MD Jennifer Paruch, MD William Kethman, MD Danielle Kay, MD     Patient name: Candida Cardona   YOB: 1980   MRN: [id number]    It was a pleasure seeing Ms. Cardona in the Colon and Rectal Surgery clinic here at [facility].     As you know, Ms. Cardona is a 44 year old woman with a history of Crohn's disease, GERD, anxiety, CAD, Bipolar disorder, and pAF who presents for evaluation of ileocolic stricture. She has an extensive and complex surgical history - summarized below. Her Crohn's disease is managed by Dr. Lama - she is on Skyrizi every 8 weeks. Since her last dose of Skyrizi she has been going to the bathroom about 6-8 times per day but the week before this dose 10-12 bowel movements per day. She has been on Skyrizi since December of 2022 - prior to this she was on Stelara (apparently unable to get therapeutic or clinical benefit from levels). In September she had an episode of increased abdominal pain, nausea, vomiting which was eventually relieved with frequent bowel movements. She had significant issues with her ostomy and wants to avoid this at all cost. She has not had any perianal procedure - no abscesses or fistulas. She does not recall having any recent disease activity or therapeutic drug monitoring recently. She has no fevers or chills. She is trying Canasa suppositories (not currently using them) for perianal pain but she doesn't feel like this helps. Warm baths have improved her perineal pain. She is not on Prednisone, last dose was a few weeks ago - 9 day taper.    Past surgeries:  5/25/2020 - Laparoscopic, converted to open cholecystectomy  1/15/2019 - Ileostomy closure and ileorectal anastomosis, extensive lysis  of adhesions, ureterolysis  12/22/2016 - Exploratory laparotomy with lysis of adhesions, 1 hr - ERIK, BSO at that time  3/17/2014 - TAC with EI  Prior ileocolic resections ~2007, 2010    Flexible sigmoidoscopy - 8/2022 - proctitis, stricture appreciated - not dilated    CT AP reviewed - appears to have a relative short-segment stricture at her ileorectal anastomosis    12/12/2024  Last seen in September by me at time of flexible sigmoidoscopy, seen by Dr. Loera on 10/25/2024, on Skyrizi. Transfer request was placed and accepted by Dr. Lang - she was ultimately not transferred due to improvement in symptoms. Of note, we have discussed completion proctectomy and end ileostomy many times, she has previously declined this option. She went to the ED because she was told to go to the ED by Dr. Lama's office. We do not have the results of the CT she had completed. She was treated with IV Dilaudid. She eventually began to have bowel movements in the ED which resolved her nausea and vomiting. Since then she has been eating regular and having normal bowel movements without pain.     9/17/2024 - Flexible sigmoidoscopy  The perianal exam was abnormal. Scarring circumferentially likely related to long-standing Crohn's disease and frequent bowel movements, no evidence of perianal abscesses or fistulae.   The ileum appeared normal. Biopsies were taken with a cold forceps for histology.   There was evidence of a prior end-to-end ileo-rectal anastomosis in the proximal rectum. This was patent and was characterized by congestion, edema, friable mucosa and mild stenosis. The anastomosis was traversed.   A single (solitary) ulcer was found in the distal rectum. No bleeding was present. Stigmata of recent bleeding were present. Biopsies were taken with a cold forceps for histology.   A polypoid lesion was found in the distal rectum - this appeared to be an inflammatory polyp. The lesion was semi-pedunculated. No bleeding was present.  Biopsies were taken with a cold forceps for histology.     1. RADHA-TERMINAL ILEUM, BIOPSY:   Benign small bowel mucosa with no significant pathologic abnormality     2. LOW-RECTAL ULCER, BIOPSY:   Chronic active colitis   No evidence of granuloma, dysplasia or malignancy   Immunostain for CMV is pending     3. DISTAL RECTUM, BIOPSY:   Chronic active colitis with ulceration and granulation tissue   No evidence of granuloma, dysplasia or malignancy   Immunostain for CMV is pending     3/27/2025  Here for post-operative follow-up after urgent extensive lysis of adhesions, completion proctectomy with resection of ileorectal anastomosis and takedown of enterovesical and vaginal fistulae with end ileostomy on 2/22/2025 - discharged on 3/10/2025. She was discharged on cycled TPN due to only 115 cm of remaining small bowel. Her appetite has improved. She is still having some intermittent nausea but it sounds more like food aversion from being obstructed for so long. She has not really been keeping track of her ostomy output but it hasn't put out much.     Pathology  1. Anus and rectum, resection:   Chronic active colitis with ulceration.    Serosal fibrovascular adhesions.   Inflammation extends the proximal margin of resection.    Skin at the distal resection margin shows hyperkeratosis and mild inflammation.    Negative for dysplasia and malignancy.     2. Small bowel and ileorectal anastomosis, resection:   Chronic active colitis with ulceration and acute serositis.   Serosal fibrovascular adhesions.    Mucosal inflammation and acute serositis extend to the colonic resection margin.   Acute serositis extends to the ileal resection margin.    Negative for dysplasia and malignancy.     3.  Small bowel with fistula, resection:   Small bowel with focal mucosal erosion and scattered non-caseating granulomas.   Granulomas extend from the submucosa to the serosa.   Serosal fibrovascular adhesions.    Acute serositis extends to  the resection margins.   Negative for dysplasia and malignancy.     8/4/2025  Here for follow-up. She remains on Skyrizi. She is off TPN, not on IVFs. She is eating like a normal person. Her ostomy output is good - Imodium 2 pills every 4 hours, Lomotil PRN. She is not having any pain. She lives in [facility] in [place]. She is not having major issues with her ileostomy. She is not going to start Gattex. She is not having nausea or vomiting.     Lab Results   Component Value Date    ALBUMIN 3.9 06/14/2025    CREATININE 1.3 06/14/2025    HGB 14.4 06/14/2025    TSH 2.639 06/14/2025    CALCIUM 9.1 06/14/2025     Wt Readings from Last 3 Encounters:   06/14/25 67.8 kg (149 lb 7.6 oz)   06/09/25 71.2 kg (157 lb 1.2 oz)   06/02/25 70.6 kg (155 lb 9.6 oz)     The patient was informed of the availability of a certified  without charge. A certified  was not necessary for this visit.    Review of Systems  See pertinent review of systems above    Past Medical History:   Diagnosis Date    Anxiety     Bipolar 1 disorder     Cholelithiasis     Coronary artery disease     NO STENT    Crohn's disease with ileorectal anastomotic stricture (ileum and rectum)     Depression     Epilepsy     Fibromyalgia     GERD (gastroesophageal reflux disease)     Interstitial cystitis     Kidney stones     Mass, ovarian 11/22/2016    Osteoporosis     PAF (paroxysmal atrial fibrillation)     Pancreatitis     S/P ERIK-BSO (total abdominal hysterectomy and bilateral salpingo-oophorectomy) 12/19/2016     Past Surgical History:   Procedure Laterality Date    ABDOMINAL SURGERY      ABDOMINOPERINEAL RESECTION OF RECTUM N/A 2/22/2025    Procedure: PROCTECTOMY, ABDOMINOPERINEAL;  Surgeon: Robin Atkins MD;  Location: [facility] OR [address] [place] [address];  Service: Colon and Rectal;  Laterality: N/A;    ANASTOMOSIS OF ILEUM TO RECTUM  01/15/2019    Procedure: ANASTOMOSIS, ILEORECTAL;  Surgeon: Reji Peterson MD;  Location: [facility] OR [facility]  FLR;  Service: Colon and Rectal;;    ANGIOPLASTY      CHOLECYSTECTOMY N/A 05/25/2020    Procedure: CHOLECYSTECTOMY;  Surgeon: Ozzie Sanches MD;  [facility];  Service: General;  Laterality: N/A;  COVID NEG      COLON SURGERY      x 2    COLONOSCOPY      multiple    COLONOSCOPY N/A 04/11/2017    Procedure: COLONOSCOPY;  Surgeon: Hussain Amaro MD;  [facility];  Service: Endoscopy;  Laterality: N/A;    CYSTOGRAM  2/22/2025    Procedure: CYSTOGRAM;  Surgeon: Kerwin Rachel MD;  Location: [facility] OR 2ND FLR;  Service: Urology;;    CYSTOSCOPY W/ URETERAL STENT PLACEMENT      CYSTOSCOPY W/ URETERAL STENT REMOVAL      CYSTOSCOPY W/ URETERAL STENT REMOVAL Right 09/05/2019    Procedure: CYSTOSCOPY, WITH URETERAL STENT REMOVAL;  Surgeon: Dano Mathis MD;  [facility];  Service: Urology;  Laterality: Right;    CYSTOSCOPY WITH URETEROSCOPY, RETROGRADE PYELOGRAPHY, AND INSERTION OF STENT Right 08/22/2019    Procedure: CYSTOSCOPY, WITH RETROGRADE PYELOGRAM AND URETERAL STENT INSERTION;  Surgeon: Dano Mathis MD;  [facility];  Service: Urology;  Laterality: Right;    CYSTOSCOPY WITH URETEROSCOPY, RETROGRADE PYELOGRAPHY, AND INSERTION OF STENT Bilateral 2/22/2025    Procedure: CYSTOSCOPY, WITH RETROGRADE PYELOGRAM AND URETERAL STENT INSERTION;  Surgeon: Kerwin Rachel MD;  Location: [facility] OR 2ND FLR;  Service: Urology;  Laterality: Bilateral;    EXCISION, SMALL INTESTINE  2/22/2025    Procedure: EXCISION, SMALL INTESTINE;  Surgeon: Robin Atkins MD;  Location: [facility] OR 2ND FLR;  Service: Colon and Rectal;;    EXTRACORPOREAL SHOCK WAVE LITHOTRIPSY Right 08/22/2019    Procedure: LITHOTRIPSY-EXTRACORPOREAL SHOCK WAVE;  Surgeon: Dano Mathis MD;  [facility];  Service: Urology;  Laterality: Right;    EXTRACORPOREAL SHOCK WAVE LITHOTRIPSY Right 09/05/2019    Procedure: LITHOTRIPSY-EXTRACORPOREAL SHOCK WAVE;  Surgeon: Dnao Mathis MD;  [facility];   Service: Urology;  Laterality: Right;    FLEXIBLE SIGMOIDOSCOPY N/A 01/23/2024    Procedure: SIGMOIDOSCOPY, FLEXIBLE;  Surgeon: Roibn Atkins MD;  Location: [facility] ENDO (2ND FLR);  Service: Endoscopy;  Laterality: N/A;  Ref By:  Dr. Madera  Prep Specifications: 2 days of clear and 2 enema before procedure  1/1-precall complete-pt approved to stay at [facility] night before and following the procedure without a ride per Dr. Atkins-see encounter dated 1/18-MS    FLEXIBLE SIGMOIDOSCOPY N/A 09/17/2024    Procedure: SIGMOIDOSCOPY, FLEXIBLE;  Surgeon: Robin Atkins MD;  Location: [facility] ENDO (4TH FLR);  Service: Endoscopy;  Laterality: N/A;  Ref By: key Oneal.  9/12-pre call complete-dr herrera would like to be [id number] appt with gamino 10am    HYSTERECTOMY      ILEOSTOMY      ILEOSTOMY  2/22/2025    Procedure: CREATION, ILEOSTOMY;  Surgeon: Robin Atkins MD;  Location: [facility] OR 2ND FLR;  Service: Colon and Rectal;;    LAPAROTOMY, EXPLORATORY  01/15/2019    Procedure: LAPAROTOMY, EXPLORATORY;extensive lysis of adhesions;  Surgeon: Reji Peterson MD;  Location: [facility] OR 2ND FLR;  Service: Colon and Rectal;;    LYSIS OF ADHESIONS OF URETER  01/15/2019    Procedure: URETEROLYSIS;  Surgeon: Reji Peterson MD;  Location: [facility] OR 2ND FLR;  Service: Colon and Rectal;;    PERIPHERALLY INSERTED CENTRAL CATHETER INSERTION N/A 10/30/2018    Procedure: INSERTION, PICC;  Surgeon: Dosc Diagnostic Provider;  Location: [facility] OR;  Service: General;  Laterality: N/A;    PERIPHERALLY INSERTED CENTRAL CATHETER INSERTION N/A 08/23/2019    Procedure: INSERTION, PICC;  Surgeon: Spencerc Diagnostic Provider;  Location: [facility] OR;  Service: General;  Laterality: N/A;    PORTACATH PLACEMENT      TOTAL COLECTOMY  2014     Family History   Problem Relation Name Age of Onset    Hypertension Mother      Joint hypermobility Mother      Physical abuse Mother      Cancer Father          brain    Diabetes Father       Schizophrenia Father      Stroke Father      No Known Problems Brother 1     No Known Problems Brother      Stroke Paternal Grandmother      Ovarian cancer Neg Hx      Uterine cancer Neg Hx      Breast cancer Neg Hx      Colon cancer Neg Hx      Anesthesia problems Neg Hx       Social History     Tobacco Use    Smoking status: Never    Smokeless tobacco: Never   Substance Use Topics    Alcohol use: No    Drug use: No     Review of patient's allergies indicates:   Allergen Reactions    Ciprofloxacin Shortness Of Breath and Itching    Keppra [levetiracetam] Other (See Comments)     Increased depression    Mercaptopurine analogues (thiopurines) Hives, Diarrhea and Nausea And Vomiting    Imuran [azathioprine sodium] Diarrhea and Nausea And Vomiting     Pt states that she becomes hot sweaty and passes out also. She states that she has diarrhea and nausea and vomiting     [place]     Imuran [azathioprine] Nausea And Vomiting    Ketorolac Itching and Hives    Adhesive Itching    Iodinated contrast media Hives       Current Outpatient Medications on File Prior to Visit   Medication Sig Dispense Refill    cyanocobalamin 1,000 mcg/mL injection Inject 1 mL (1,000 mcg total) into the skin every 28 days. 3 mL 1    diphenoxylate-atropine 2.5-0.025 mg (LOMOTIL) 2.5-0.025 mg per tablet Take 2 tablets by mouth 4 (four) times daily as needed for Diarrhea (Secdon line after maxing out imodium). 56 tablet 6    ergocalciferol (VITAMIN D2) 50,000 unit Cap Take 1 capsule (50,000 Units total) by mouth every 7 days. 12 capsule 1    loperamide (IMODIUM) 2 mg capsule Take 1 capsule (2 mg total) by mouth 4 (four) times daily. 120 capsule 11    [Paused] magnesium oxide (MAG-OX) 400 mg (241.3 mg magnesium) tablet Take 400 mg by mouth 2 (two) times daily.      ondansetron (ZOFRAN) 4 MG tablet Take 1 tablet (4 mg total) by mouth every 12 (twelve) hours as needed for Nausea. 60 tablet 1    pantoprazole (PROTONIX) 40 MG tablet Take 1 tablet (40 mg  total) by mouth once daily. 90 tablet 3    risankizumab-rzaa (SKYRIZI) 360 mg/2.4 mL (150 mg/mL) Injt Inject 360 mg into the skin every 8 weeks. 2.4 mL 0    sodium bicarbonate 650 MG tablet Take 650 mg by mouth 2 (two) times daily.      teduglutide (GATTEX 30-VIAL) 5 mg Kit Inject 0.05 mg/kg into the skin once daily. Inject 3mg (0.3mL) under the skin daily (Patient not taking: Reported on 6/2/2025) 1 kit 5     Current Facility-Administered Medications on File Prior to Visit   Medication Dose Route Frequency Provider Last Rate Last Admin    mupirocin 2 % ointment   Nasal On Call Procedure Vidhi Mathis NP   Given at 01/15/19 0642     Physical Examination  LMP  (LMP Unknown) Comment: complete hysterectomy     Constitutional: well developed, no cough, no dyspnea, alert, and no acute distress    Head: Normocephalic, no lesions, without obvious abnormality  Eye: Normal external eye, conjunctiva, and lids  Cardiovascular: regular rate and regular rhythm  Respiratory: normal air entry  Gastrointestinal: soft        Musculoskeletal: full range of motion without pain  Neurologic: alert, oriented, normal speech, no focal findings or movement disorder noted  Psychiatric: appropriate, normal mood    Assessment and Plan of Care    Thank you again for referring Ms. Cardona to my care. In summary, Ms. Cardona is a 44 year old woman presenting with complicated Crohn's disease - intermittent obstructions of ileorectal anastomosis on Skyrizi. She presented with acute worsening and fistulization of her anastomosis required resection. We discussed treatment options and have provided the following recommendations:    Plan to follow-up with Dr. Loera for ongoing management of Crohn's disease, seems to be doing well from a nutritional perspective  Discussed importance of continued Lomotil and Imodium - re-prescribed Lomotil  WOCN as needed  Follow-up with me as needed    Please do not hesitate to contact me if you have any  questions.      Robin Atkins MD, FACS, FASCRS  Department of Colon & Rectal Surgery  [facility]

## 2025-08-05 ENCOUNTER — LAB VISIT (OUTPATIENT)
Dept: LAB | Facility: HOSPITAL | Age: 45
End: 2025-08-05
Attending: INTERNAL MEDICINE
Payer: MEDICARE

## 2025-08-05 ENCOUNTER — TELEPHONE (OUTPATIENT)
Dept: GASTROENTEROLOGY | Facility: CLINIC | Age: 45
End: 2025-08-05
Payer: MEDICARE

## 2025-08-05 ENCOUNTER — OFFICE VISIT (OUTPATIENT)
Dept: GASTROENTEROLOGY | Facility: CLINIC | Age: 45
End: 2025-08-05
Payer: MEDICARE

## 2025-08-05 ENCOUNTER — OFFICE VISIT (OUTPATIENT)
Dept: SURGERY | Facility: CLINIC | Age: 45
End: 2025-08-05
Payer: MEDICARE

## 2025-08-05 VITALS
TEMPERATURE: 98 F | OXYGEN SATURATION: 100 % | SYSTOLIC BLOOD PRESSURE: 107 MMHG | DIASTOLIC BLOOD PRESSURE: 73 MMHG | WEIGHT: 167.13 LBS | HEIGHT: 67 IN | BODY MASS INDEX: 26.23 KG/M2 | HEART RATE: 78 BPM

## 2025-08-05 VITALS
DIASTOLIC BLOOD PRESSURE: 75 MMHG | WEIGHT: 167.13 LBS | HEART RATE: 88 BPM | SYSTOLIC BLOOD PRESSURE: 119 MMHG | RESPIRATION RATE: 16 BRPM | BODY MASS INDEX: 26.23 KG/M2 | HEIGHT: 67 IN

## 2025-08-05 DIAGNOSIS — T45.1X5A IMMUNODEFICIENCY DUE TO LONG TERM IMMUNOSUPPRESSIVE DRUG THERAPY: ICD-10-CM

## 2025-08-05 DIAGNOSIS — E55.9 VITAMIN D DEFICIENCY: ICD-10-CM

## 2025-08-05 DIAGNOSIS — K50.812 CROHN'S DISEASE OF BOTH SMALL AND LARGE INTESTINE WITH INTESTINAL OBSTRUCTION: Primary | ICD-10-CM

## 2025-08-05 DIAGNOSIS — Z79.60 IMMUNODEFICIENCY DUE TO LONG TERM IMMUNOSUPPRESSIVE DRUG THERAPY: ICD-10-CM

## 2025-08-05 DIAGNOSIS — K21.9 GASTROESOPHAGEAL REFLUX DISEASE WITHOUT ESOPHAGITIS: ICD-10-CM

## 2025-08-05 DIAGNOSIS — K90.821 SHORT BOWEL SYNDROME WITH COLON IN CONTINUITY: ICD-10-CM

## 2025-08-05 DIAGNOSIS — E53.8 VITAMIN B12 DEFICIENCY: ICD-10-CM

## 2025-08-05 DIAGNOSIS — Z90.49 S/P SMALL BOWEL RESECTION: ICD-10-CM

## 2025-08-05 DIAGNOSIS — Z93.2 ILEOSTOMY IN PLACE: ICD-10-CM

## 2025-08-05 DIAGNOSIS — K50.818 CROHN'S DISEASE OF BOTH SMALL AND LARGE INTESTINE WITH OTHER COMPLICATION: ICD-10-CM

## 2025-08-05 DIAGNOSIS — D84.821 IMMUNODEFICIENCY DUE TO LONG TERM IMMUNOSUPPRESSIVE DRUG THERAPY: ICD-10-CM

## 2025-08-05 DIAGNOSIS — K50.813 CROHN'S DISEASE OF BOTH SMALL AND LARGE INTESTINE WITH FISTULA: ICD-10-CM

## 2025-08-05 DIAGNOSIS — K50.812 CROHN'S DISEASE OF BOTH SMALL AND LARGE INTESTINE WITH INTESTINAL OBSTRUCTION: ICD-10-CM

## 2025-08-05 PROBLEM — D84.9 IMMUNOSUPPRESSED STATUS: Status: RESOLVED | Noted: 2025-02-24 | Resolved: 2025-08-05

## 2025-08-05 PROBLEM — N32.2 BLADDER FISTULA: Status: RESOLVED | Noted: 2025-02-24 | Resolved: 2025-08-05

## 2025-08-05 PROBLEM — N32.1 COLOVESICAL FISTULA: Status: RESOLVED | Noted: 2025-02-21 | Resolved: 2025-08-05

## 2025-08-05 PROBLEM — N20.0 NEPHROLITHIASIS: Status: RESOLVED | Noted: 2017-02-10 | Resolved: 2025-08-05

## 2025-08-05 PROBLEM — F33.1 MODERATE EPISODE OF RECURRENT MAJOR DEPRESSIVE DISORDER: Status: RESOLVED | Noted: 2025-06-15 | Resolved: 2025-08-05

## 2025-08-05 PROBLEM — K80.10 CALCULUS OF GALLBLADDER WITH CHRONIC CHOLECYSTITIS WITHOUT OBSTRUCTION: Status: RESOLVED | Noted: 2020-05-25 | Resolved: 2025-08-05

## 2025-08-05 PROBLEM — D64.9 ANEMIA: Chronic | Status: RESOLVED | Noted: 2017-02-11 | Resolved: 2025-08-05

## 2025-08-05 PROBLEM — E44.0 MALNUTRITION OF MODERATE DEGREE: Status: RESOLVED | Noted: 2019-01-28 | Resolved: 2025-08-05

## 2025-08-05 LAB
25(OH)D3+25(OH)D2 SERPL-MCNC: 20 NG/ML (ref 30–96)
ABSOLUTE EOSINOPHIL (OHS): 0.29 K/UL
ABSOLUTE MONOCYTE (OHS): 0.25 K/UL (ref 0.3–1)
ABSOLUTE NEUTROPHIL COUNT (OHS): 4.17 K/UL (ref 1.8–7.7)
ALBUMIN SERPL BCP-MCNC: 3.8 G/DL (ref 3.5–5.2)
ALP SERPL-CCNC: 129 UNIT/L (ref 40–150)
ALT SERPL W/O P-5'-P-CCNC: 23 UNIT/L (ref 0–55)
ANION GAP (OHS): 11 MMOL/L (ref 8–16)
AST SERPL-CCNC: 28 UNIT/L (ref 0–50)
BASOPHILS # BLD AUTO: 0.04 K/UL
BASOPHILS NFR BLD AUTO: 0.7 %
BILIRUB SERPL-MCNC: 0.4 MG/DL (ref 0.1–1)
BUN SERPL-MCNC: 16 MG/DL (ref 6–20)
CALCIUM SERPL-MCNC: 9.4 MG/DL (ref 8.7–10.5)
CHLORIDE SERPL-SCNC: 106 MMOL/L (ref 95–110)
CO2 SERPL-SCNC: 21 MMOL/L (ref 23–29)
CREAT SERPL-MCNC: 1 MG/DL (ref 0.5–1.4)
ERYTHROCYTE [DISTWIDTH] IN BLOOD BY AUTOMATED COUNT: 15.2 % (ref 11.5–14.5)
GFR SERPLBLD CREATININE-BSD FMLA CKD-EPI: >60 ML/MIN/1.73/M2
GLUCOSE SERPL-MCNC: 76 MG/DL (ref 70–110)
HBV CORE AB SERPL QL IA: NORMAL
HBV SURFACE AG SERPL QL IA: NORMAL
HCT VFR BLD AUTO: 41.1 % (ref 37–48.5)
HGB BLD-MCNC: 12.8 GM/DL (ref 12–16)
IMM GRANULOCYTES # BLD AUTO: 0.04 K/UL (ref 0–0.04)
IMM GRANULOCYTES NFR BLD AUTO: 0.7 % (ref 0–0.5)
LYMPHOCYTES # BLD AUTO: 1.32 K/UL (ref 1–4.8)
MAGNESIUM SERPL-MCNC: 1.3 MG/DL (ref 1.6–2.6)
MCH RBC QN AUTO: 25.4 PG (ref 27–31)
MCHC RBC AUTO-ENTMCNC: 31.1 G/DL (ref 32–36)
MCV RBC AUTO: 82 FL (ref 82–98)
NUCLEATED RBC (/100WBC) (OHS): 0 /100 WBC
PLATELET # BLD AUTO: 216 K/UL (ref 150–450)
PLATELET BLD QL SMEAR: NORMAL
PMV BLD AUTO: 10 FL (ref 9.2–12.9)
POTASSIUM SERPL-SCNC: 3.9 MMOL/L (ref 3.5–5.1)
PROT SERPL-MCNC: 7.7 GM/DL (ref 6–8.4)
RBC # BLD AUTO: 5.04 M/UL (ref 4–5.4)
RELATIVE EOSINOPHIL (OHS): 4.7 %
RELATIVE LYMPHOCYTE (OHS): 21.6 % (ref 18–48)
RELATIVE MONOCYTE (OHS): 4.1 % (ref 4–15)
RELATIVE NEUTROPHIL (OHS): 68.2 % (ref 38–73)
SODIUM SERPL-SCNC: 138 MMOL/L (ref 136–145)
VIT B12 SERPL-MCNC: 451 PG/ML (ref 210–950)
WBC # BLD AUTO: 6.11 K/UL (ref 3.9–12.7)

## 2025-08-05 PROCEDURE — 36415 COLL VENOUS BLD VENIPUNCTURE: CPT

## 2025-08-05 PROCEDURE — 99214 OFFICE O/P EST MOD 30 MIN: CPT | Mod: S$GLB,,, | Performed by: STUDENT IN AN ORGANIZED HEALTH CARE EDUCATION/TRAINING PROGRAM

## 2025-08-05 PROCEDURE — 90471 IMMUNIZATION ADMIN: CPT | Mod: S$GLB,,, | Performed by: INTERNAL MEDICINE

## 2025-08-05 PROCEDURE — 90632 HEPA VACCINE ADULT IM: CPT | Mod: S$GLB,,, | Performed by: INTERNAL MEDICINE

## 2025-08-05 PROCEDURE — 3008F BODY MASS INDEX DOCD: CPT | Mod: CPTII,S$GLB,, | Performed by: STUDENT IN AN ORGANIZED HEALTH CARE EDUCATION/TRAINING PROGRAM

## 2025-08-05 PROCEDURE — 1160F RVW MEDS BY RX/DR IN RCRD: CPT | Mod: CPTII,S$GLB,, | Performed by: INTERNAL MEDICINE

## 2025-08-05 PROCEDURE — 3008F BODY MASS INDEX DOCD: CPT | Mod: CPTII,S$GLB,, | Performed by: INTERNAL MEDICINE

## 2025-08-05 PROCEDURE — 84446 ASSAY OF VITAMIN E: CPT

## 2025-08-05 PROCEDURE — 99999 PR PBB SHADOW E&M-EST. PATIENT-LVL III: CPT | Mod: PBBFAC,,, | Performed by: INTERNAL MEDICINE

## 2025-08-05 PROCEDURE — 3078F DIAST BP <80 MM HG: CPT | Mod: CPTII,S$GLB,, | Performed by: STUDENT IN AN ORGANIZED HEALTH CARE EDUCATION/TRAINING PROGRAM

## 2025-08-05 PROCEDURE — 3074F SYST BP LT 130 MM HG: CPT | Mod: CPTII,S$GLB,, | Performed by: INTERNAL MEDICINE

## 2025-08-05 PROCEDURE — 3078F DIAST BP <80 MM HG: CPT | Mod: CPTII,S$GLB,, | Performed by: INTERNAL MEDICINE

## 2025-08-05 PROCEDURE — 3074F SYST BP LT 130 MM HG: CPT | Mod: CPTII,S$GLB,, | Performed by: STUDENT IN AN ORGANIZED HEALTH CARE EDUCATION/TRAINING PROGRAM

## 2025-08-05 PROCEDURE — 84590 ASSAY OF VITAMIN A: CPT

## 2025-08-05 PROCEDURE — 85025 COMPLETE CBC W/AUTO DIFF WBC: CPT

## 2025-08-05 PROCEDURE — 84255 ASSAY OF SELENIUM: CPT

## 2025-08-05 PROCEDURE — 83735 ASSAY OF MAGNESIUM: CPT

## 2025-08-05 PROCEDURE — 99999 PR PBB SHADOW E&M-EST. PATIENT-LVL III: CPT | Mod: PBBFAC,,, | Performed by: STUDENT IN AN ORGANIZED HEALTH CARE EDUCATION/TRAINING PROGRAM

## 2025-08-05 PROCEDURE — 82306 VITAMIN D 25 HYDROXY: CPT

## 2025-08-05 PROCEDURE — G2211 COMPLEX E/M VISIT ADD ON: HCPCS | Mod: S$GLB,,, | Performed by: INTERNAL MEDICINE

## 2025-08-05 PROCEDURE — 86480 TB TEST CELL IMMUN MEASURE: CPT

## 2025-08-05 PROCEDURE — 84597 ASSAY OF VITAMIN K: CPT

## 2025-08-05 PROCEDURE — 99215 OFFICE O/P EST HI 40 MIN: CPT | Mod: 25,S$GLB,, | Performed by: INTERNAL MEDICINE

## 2025-08-05 PROCEDURE — 84630 ASSAY OF ZINC: CPT

## 2025-08-05 PROCEDURE — 1159F MED LIST DOCD IN RCRD: CPT | Mod: CPTII,S$GLB,, | Performed by: STUDENT IN AN ORGANIZED HEALTH CARE EDUCATION/TRAINING PROGRAM

## 2025-08-05 PROCEDURE — 86704 HEP B CORE ANTIBODY TOTAL: CPT

## 2025-08-05 PROCEDURE — 1159F MED LIST DOCD IN RCRD: CPT | Mod: CPTII,S$GLB,, | Performed by: INTERNAL MEDICINE

## 2025-08-05 PROCEDURE — 84450 TRANSFERASE (AST) (SGOT): CPT

## 2025-08-05 PROCEDURE — 82607 VITAMIN B-12: CPT

## 2025-08-05 PROCEDURE — 87340 HEPATITIS B SURFACE AG IA: CPT

## 2025-08-05 RX ORDER — DIPHENOXYLATE HYDROCHLORIDE AND ATROPINE SULFATE 2.5; .025 MG/1; MG/1
2 TABLET ORAL 4 TIMES DAILY PRN
Qty: 56 TABLET | Refills: 6 | Status: SHIPPED | OUTPATIENT
Start: 2025-08-05 | End: 2025-11-03

## 2025-08-05 NOTE — PROGRESS NOTES
Provided patient with education on brief, evidence-based psychotherapy offered within GI clinic to cope with mental and physical health stressors. Per chart, patient was recently discharged from inpatient psychiatry unit for bipolar disorder with current depression (no SI/HI), anxiety, and history of eating disorder in remission and was referred to intensive outpatient program at [facility]. Patient no longer interested in IOP given time commitment, noting she feels it is unnecessary given low distress at this time. She is not currently interested in psychiatric medication either, noting that side effects of psychiatric medication were burdensome and she was not properly absorbing these medications to begin with. Patient interested in initiating psychotherapy at this time and plan was made to further assess mental health needs and services at diagnostic intake appointment.     Joanne Dawkins, PhD  Psychology Postdoctoral Fellow  GI/IBD Psychology  Supervised by: Twila Calero, PhD

## 2025-08-05 NOTE — PATIENT INSTRUCTIONS
Patient instructions:  Continue Skyrizi every 8 weeks.  Continue current anti-diarrheal regimen (Lomotil and Immodium).   Labs today- CBC, CMP, TB, HepB Core Ab, Hep surface Ag, Vitamin D, Vitamin B12, Vitamin A, Vitamin K, Vitamin E, Calcium, Magnesium, Selenium, Zinc   Ileoscopy through [facility] this month- Clear liquids the day before. Bring extra ostomy supplies.  Un-sedated.  Valium prior to procedure (PCP to prescribe)  MRE same day as ileoscopy   Psychology and Dietician appt- same day  Update treatment team  Download [facility] Tracker una    Please be sure my chart nua is downloaded to your phone and notifications are turned on    IBD specific health maintenance recommendations:  Vaccines-  no live vaccines. Flu shot in the fall. Updated COVID booster available.  Double check the date of your last Tdap and let us know.  Tdap recommended every 10 years. HepA #1 today, #2 plan for same day as ileoscopy around 2/2025.  HepB vaccine series (Heplisav-B) through PCP  Yearly well woman visits with OBGYN  DEXA with OBGYN  Skin exam-  Once a year total body skin exam recommended    Contact us by sending a [facility] message or by calling [phone] to report the following:  - Changes in bowel symptoms  - Symptoms of infection including fever of 100.0 F or higher  - Antibiotics prescribed  - New medical diagnosis or new medication  - If you are planning on having surgery since we may need to tailor your meds based on surgery timing

## 2025-08-05 NOTE — TELEPHONE ENCOUNTER
Patient is scheduled for a Ileoscopy through [facility] on 9/8/25 with Dr. RYLAN Loera  Referral for procedure from Clinic visit

## 2025-08-05 NOTE — PROGRESS NOTES
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 (SKYRIZI) 360 mg/2.4 mL (150 mg/mL) Injt Inject 360 mg into the skin every 8 weeks. 2.4 mL 0    simethicone (GAS-X EXTRA STRENGTH ORAL) Take by mouth. BID      [Paused] magnesium oxide (MAG-OX) 400 mg (241.3 mg magnesium) tablet Take 400 mg by mouth 2 (two) times daily. (Patient not taking: Reported on 8/5/2025)      sodium bicarbonate 650 MG tablet Take 650 mg by mouth 2 (two) times daily. (Patient not taking: Reported on 8/5/2025)      [DISCONTINUED] teduglutide (GATTEX 30-VIAL) 5 mg Kit Inject 0.05 mg/kg into the skin once daily. Inject 3mg (0.3mL) under the skin daily (Patient not taking: Reported on 6/2/2025) 1 kit 5     Current Facility-Administered Medications on File Prior to Visit   Medication Dose Route Frequency Provider Last Rate Last Admin    mupirocin 2 % ointment   Nasal On Call Procedure Vidhi Mathis NP   Given at 01/15/19 0605

## 2025-08-06 LAB
MITOGEN MINUS NIL (OHS): 0.01
NIL TB SYNCED (OHS): 0.05
QUANTIFERON GOLD INTERP (OHS): ABNORMAL
TB1 AG MINUS NIL (OHS): 0
TB2 AG MINUS NIL (OHS): <0

## 2025-08-07 LAB — SELENIUM SERPL-MCNC: 136 MCG/L (ref 110–165)

## 2025-08-08 LAB — W ZINC: 68 UG/DL

## 2025-08-11 LAB
W VITAMIN A: 82 UG/DL
W VITAMIN E: 1353 UG/DL

## 2025-08-12 LAB — W VITAMIN K: 1.36 NMOL/L

## 2025-08-27 ENCOUNTER — OFFICE VISIT (OUTPATIENT)
Dept: GASTROENTEROLOGY | Facility: CLINIC | Age: 45
End: 2025-08-27
Payer: MEDICARE

## 2025-08-27 DIAGNOSIS — Z86.59 HISTORY OF EATING DISORDER: ICD-10-CM

## 2025-08-27 DIAGNOSIS — F43.22 ADJUSTMENT DISORDER WITH ANXIETY: Primary | ICD-10-CM

## 2025-08-27 PROBLEM — F41.9 ANXIETY: Chronic | Status: ACTIVE | Noted: 2025-08-27

## 2025-08-27 PROCEDURE — 90791 PSYCH DIAGNOSTIC EVALUATION: CPT | Mod: S$GLB,,, | Performed by: STUDENT IN AN ORGANIZED HEALTH CARE EDUCATION/TRAINING PROGRAM

## 2025-08-29 DIAGNOSIS — K21.9 GASTROESOPHAGEAL REFLUX DISEASE WITHOUT ESOPHAGITIS: ICD-10-CM

## 2025-08-29 RX ORDER — PANTOPRAZOLE SODIUM 40 MG/1
40 TABLET, DELAYED RELEASE ORAL
Qty: 90 TABLET | Refills: 3 | Status: SHIPPED | OUTPATIENT
Start: 2025-08-29

## 2025-09-03 ENCOUNTER — HOSPITAL ENCOUNTER (OUTPATIENT)
Dept: RADIOLOGY | Facility: HOSPITAL | Age: 45
Discharge: HOME | End: 2025-09-03
Attending: INTERNAL MEDICINE
Payer: MEDICARE

## 2025-09-03 DIAGNOSIS — K50.812 CROHN'S DISEASE OF BOTH SMALL AND LARGE INTESTINE WITH INTESTINAL OBSTRUCTION: ICD-10-CM

## 2025-09-03 PROCEDURE — 72197 MRI PELVIS W/O & W/DYE: CPT | Mod: 26,,, | Performed by: STUDENT IN AN ORGANIZED HEALTH CARE EDUCATION/TRAINING PROGRAM

## 2025-09-03 PROCEDURE — 74183 MRI ABD W/O CNTR FLWD CNTR: CPT | Mod: TC

## 2025-09-03 PROCEDURE — 74183 MRI ABD W/O CNTR FLWD CNTR: CPT | Mod: 26,,, | Performed by: STUDENT IN AN ORGANIZED HEALTH CARE EDUCATION/TRAINING PROGRAM

## 2025-09-03 PROCEDURE — 25500020 PHARM REV CODE 255: Performed by: INTERNAL MEDICINE

## 2025-09-03 PROCEDURE — A9585 GADOBUTROL INJECTION: HCPCS | Performed by: INTERNAL MEDICINE

## 2025-09-03 RX ORDER — GADOBUTROL 604.72 MG/ML
10 INJECTION INTRAVENOUS
Status: COMPLETED | OUTPATIENT
Start: 2025-09-03 | End: 2025-09-03

## 2025-09-03 RX ADMIN — GADOBUTROL 10 ML: 604.72 INJECTION INTRAVENOUS at 10:09

## (undated) DEVICE — SUT VICRYL COAT ANTI 0 27IN

## (undated) DEVICE — RELOAD PROXIMATE CUT BLUE 75MM

## (undated) DEVICE — SOL NACL 0.9% IV INJ 1000ML

## (undated) DEVICE — TRAY GENERAL SURGERY OMC

## (undated) DEVICE — DRAPE ABDOMINAL TIBURON 14X11

## (undated) DEVICE — SPONGE LAP 18X18 PREWASHED

## (undated) DEVICE — Device

## (undated) DEVICE — CUTTER PROXIMATE BLUE 75MM

## (undated) DEVICE — SEE MEDLINE ITEM 146417

## (undated) DEVICE — RETRACTOR LONE STAR 14.1X14.1

## (undated) DEVICE — KIT SURGIFLO HEMOSTATIC MATRIX

## (undated) DEVICE — SUT VICRYL + 2-0 12-18IN

## (undated) DEVICE — SUT VICRYL 3-0 27 SH

## (undated) DEVICE — DRAPE CORETEMP FLD WRM 56X62IN

## (undated) DEVICE — BOWL STERILE LARGE 32OZ

## (undated) DEVICE — TRAY SKIN SCRUB WET PREMIUM

## (undated) DEVICE — BOVIE SUCTION

## (undated) DEVICE — ELECTRODE REM PLYHSV RETURN 9

## (undated) DEVICE — CART STAPLE RELD PROX 60X4.8MM

## (undated) DEVICE — GOWN POLY REINF BRTH SLV XL

## (undated) DEVICE — SOL IRR WATER STRL 3000 ML

## (undated) DEVICE — SYR ONLY LUER LOCK 20CC

## (undated) DEVICE — GOWN SURGICAL X-LARGE

## (undated) DEVICE — POWDER ARISTA AH 3G

## (undated) DEVICE — DRAPE UINDERBUT GRAD PCH

## (undated) DEVICE — LUBRICANT SURGILUBE 2 OZ

## (undated) DEVICE — LEGGING CLEAR POLY 2/PACK

## (undated) DEVICE — PENCIL ROCKER SWITCH 10FT CORD

## (undated) DEVICE — COVER MAYO STND XL 30X57IN

## (undated) DEVICE — TUBING NEPTUNE 2 SMOKE 10IN

## (undated) DEVICE — COVER LIGHT HANDLE 80/CA

## (undated) DEVICE — ELECTRODE EXTENDED BLADE

## (undated) DEVICE — TIP YANKAUERS BULB NO VENT

## (undated) DEVICE — TRAY MINOR GEN SURG

## (undated) DEVICE — CATH POLLACK OPEN-END FLEXI-TI

## (undated) DEVICE — TRAY CATH 1-LYR URIMTR 16FR

## (undated) DEVICE — BAG DRAIN ANTI REFLUX 2000ML

## (undated) DEVICE — TRAY MINOR GEN SURG OMC

## (undated) DEVICE — STAPLER INT PROX TX 60X3.5MM

## (undated) DEVICE — SYR 10CC LUER LOCK

## (undated) DEVICE — PACK LITHOTOMY I ECLIPSE

## (undated) DEVICE — TOWEL OR DISP STRL BLUE 4/PK

## (undated) DEVICE — SEALER LIGASURE IMPACT 18CM

## (undated) DEVICE — SUT VICRYL+ 2-0 SH 18IN

## (undated) DEVICE — GOWN SMART IMP BREATHABLE XXLG

## (undated) DEVICE — STAPLER CONTOUR CRV GRN 40MM

## (undated) DEVICE — NDL 22GA X1 1/2 REG BEVEL

## (undated) DEVICE — SYR 30CC LUER LOCK

## (undated) DEVICE — HOOK STAY ELAS 5MM 8EA/PK

## (undated) DEVICE — GUIDE WIRE MOTION .035 X 150CM

## (undated) DEVICE — DRESSING HEMOSTATIC Q-CLOT 4X4

## (undated) DEVICE — NDL BOX COUNTER

## (undated) DEVICE — SUT CTD VICRYL 2-0 VIL BR

## (undated) DEVICE — SET IRR URLGY 2LINE UNIV SPIKE

## (undated) DEVICE — STAPLER CIRCULAR 25MM

## (undated) DEVICE — ELECTRODE MEGADYNE RETURN DUAL

## (undated) DEVICE — SEE MEDLINE ITEM 157117